# Patient Record
Sex: FEMALE | Race: WHITE | NOT HISPANIC OR LATINO | Employment: UNEMPLOYED | URBAN - METROPOLITAN AREA
[De-identification: names, ages, dates, MRNs, and addresses within clinical notes are randomized per-mention and may not be internally consistent; named-entity substitution may affect disease eponyms.]

---

## 2017-03-30 ENCOUNTER — TELEPHONE (OUTPATIENT)
Dept: FAMILY MEDICINE | Facility: CLINIC | Age: 57
End: 2017-03-30

## 2017-03-30 DIAGNOSIS — J45.50 UNCOMPLICATED SEVERE PERSISTENT ASTHMA (H): Primary | ICD-10-CM

## 2017-03-30 RX ORDER — ALBUTEROL SULFATE 90 UG/1
2 AEROSOL, METERED RESPIRATORY (INHALATION) EVERY 4 HOURS PRN
Qty: 1 INHALER | Refills: 0 | Status: SHIPPED | OUTPATIENT
Start: 2017-03-30 | End: 2017-06-20

## 2017-03-30 NOTE — TELEPHONE ENCOUNTER
PROAIR HFA      NOT ON MED LIST  Last Written Prescription Date: 2-14-17  Last Fill Quantity: 8.5, # refills: 0    [place] [facility] [place] [facility] or  Health prescribing provider:  10-14-16   Future Office Visit:       Date of Last Asthma Action Plan Letter:   Asthma Action Plan Q1 Year    Topic Date Due     Asthma Action Plan - yearly  07/30/2016      Asthma Control Test:   ACT Total Scores 10/14/2016   ACT TOTAL SCORE -   ASTHMA ER VISITS -   ASTHMA HOSPITALIZATIONS -   ACT TOTAL SCORE (Goal Greater than or Equal to 20) 18   In the past 12 months, how many times did you visit the emergency room for your asthma without being admitted to the hospital? 0   In the past 12 months, how many times were you hospitalized overnight because of your asthma? 0       Date of Last Spirometry Test:   No results found for this or any previous visit.    MONETLUKAST NOT ON MED LIST       Last Written Prescription Date: 1-20-16  Last Fill Quantity: 30, # refills: 0    [place] [facility] [place] [facility] or  Health prescribing provider:  10-14-16   Future Office Visit:       Date of Last Asthma Action Plan Letter:   Asthma Action Plan Q1 Year    Topic Date Due     Asthma Action Plan - yearly  07/30/2016      Asthma Control Test:   ACT Total Scores 10/14/2016   ACT TOTAL SCORE -   ASTHMA ER VISITS -   ASTHMA HOSPITALIZATIONS -   ACT TOTAL SCORE (Goal Greater than or Equal to 20) 18   In the past 12 months, how many times did you visit the emergency room for your asthma without being admitted to the hospital? 0   In the past 12 months, how many times were you hospitalized overnight because of your asthma? 0       Date of Last Spirometry Test:   No results found for this or any previous visit.

## 2017-04-10 DIAGNOSIS — J30.2 SEASONAL ALLERGIC RHINITIS, UNSPECIFIED ALLERGIC RHINITIS TRIGGER: Primary | ICD-10-CM

## 2017-04-10 NOTE — TELEPHONE ENCOUNTER
Montelukast      Last Written Prescription Date:  01/20/16  Last Fill Quantity: 30,   # refills: 0  Last Office Visit with [facility], [facility] or [facility] prescribing provider: 10/14/16  Future Office visit:       Routing refill request to provider for review/approval because:  Drug not active on patient's medication list  Medication is reported/historical

## 2017-04-11 RX ORDER — MONTELUKAST SODIUM 10 MG/1
10 TABLET ORAL AT BEDTIME
Qty: 30 TABLET | Refills: 0 | Status: SHIPPED | OUTPATIENT
Start: 2017-04-11 | End: 2017-06-20

## 2017-04-26 DIAGNOSIS — F41.9 ANXIETY: ICD-10-CM

## 2017-04-26 NOTE — TELEPHONE ENCOUNTER
RN unable to refill per protocol.  Patient has depression on problem list and PHQ-9 >4.  Will send to Haseeb PETERS to repeat PHQ-9

## 2017-04-26 NOTE — TELEPHONE ENCOUNTER
CITALOPRAM     Last Written Prescription Date: 1-21-17  Last Fill Quantity: 90, # refills: 0  [place] [facility] [place] provider:  10-26-16        Last PHQ-9 score on record=   PHQ-9 SCORE 10/14/2016   Total Score -   Total Score 5

## 2017-04-27 NOTE — TELEPHONE ENCOUNTER
Left message for patient to call back pod 2 line.([phone]). Aliyah declined refill as patient is due for an office visit, has been more then 1 year.

## 2017-05-01 NOTE — TELEPHONE ENCOUNTER
Patient returned call.  Appointment made for 5-15, wondering if she can get enough medication until then.

## 2017-05-02 RX ORDER — CITALOPRAM HYDROBROMIDE 40 MG/1
TABLET ORAL
Qty: 30 TABLET | Refills: 0 | Status: SHIPPED | OUTPATIENT
Start: 2017-05-02 | End: 2017-06-20

## 2017-06-20 ENCOUNTER — TELEPHONE (OUTPATIENT)
Dept: FAMILY MEDICINE | Facility: CLINIC | Age: 57
End: 2017-06-20

## 2017-06-20 DIAGNOSIS — F41.9 ANXIETY: ICD-10-CM

## 2017-06-20 DIAGNOSIS — J30.2 SEASONAL ALLERGIC RHINITIS, UNSPECIFIED ALLERGIC RHINITIS TRIGGER: ICD-10-CM

## 2017-06-20 DIAGNOSIS — J45.50 UNCOMPLICATED SEVERE PERSISTENT ASTHMA (H): ICD-10-CM

## 2017-06-20 RX ORDER — ALBUTEROL SULFATE 90 UG/1
AEROSOL, METERED RESPIRATORY (INHALATION)
Qty: 1 INHALER | Refills: 0 | Status: SHIPPED | OUTPATIENT
Start: 2017-06-20 | End: 2017-08-03

## 2017-06-20 RX ORDER — CITALOPRAM HYDROBROMIDE 40 MG/1
TABLET ORAL
Qty: 30 TABLET | Refills: 0 | Status: SHIPPED | OUTPATIENT
Start: 2017-06-20 | End: 2017-08-03

## 2017-06-20 RX ORDER — MONTELUKAST SODIUM 10 MG/1
TABLET ORAL
Qty: 30 TABLET | Refills: 0 | Status: SHIPPED | OUTPATIENT
Start: 2017-06-20 | End: 2017-08-03

## 2017-06-20 RX ORDER — FLUTICASONE PROPIONATE 50 MCG
SPRAY, SUSPENSION (ML) NASAL
Qty: 1 BOTTLE | Refills: 0 | Status: SHIPPED | OUTPATIENT
Start: 2017-06-20 | End: 2017-08-03

## 2017-06-20 NOTE — TELEPHONE ENCOUNTER
MITCHELL       Last Written Prescription Date: 3-30-17  Last Fill Quantity: 8.5, # refills: 0    Last Office Visit with [facility] or [facility] prescribing provider:  5-15-17   Future Office Visit:       Date of Last Asthma Action Plan Letter:   Asthma Action Plan Q1 Year    Topic Date Due     Asthma Action Plan - yearly  07/30/2016      Asthma Control Test:   ACT Total Scores 10/14/2016   ACT TOTAL SCORE -   ASTHMA ER VISITS -   ASTHMA HOSPITALIZATIONS -   ACT TOTAL SCORE (Goal Greater than or Equal to 20) 18   In the past 12 months, how many times did you visit the emergency room for your asthma without being admitted to the hospital? 0   In the past 12 months, how many times were you hospitalized overnight because of your asthma? 0       Date of Last Spirometry Test:   No results found for this or any previous visit.

## 2017-06-20 NOTE — TELEPHONE ENCOUNTER
Routing refill request to provider for review/approval because:  Mary Jo given x1 and patient did not follow up, please advise  Lexi Ortega RN

## 2017-06-20 NOTE — TELEPHONE ENCOUNTER
Panel Management Review      Patient has the following on her problem list:     Depression / Dysthymia review  PHQ-9 SCORE 7/30/2015 4/8/2016 10/14/2016   Total Score 2 - -   Total Score - 8 5      Patient is due for:  PHQ9 and DAP    Asthma review     ACT Total Scores 10/14/2016   ACT TOTAL SCORE -   ASTHMA ER VISITS -   ASTHMA HOSPITALIZATIONS -   ACT TOTAL SCORE (Goal Greater than or Equal to 20) 18   In the past 12 months, how many times did you visit the emergency room for your asthma without being admitted to the hospital? 0   In the past 12 months, how many times were you hospitalized overnight because of your asthma? 0      1. Is Asthma diagnosis on the Problem List? Yes    2. Is Asthma listed on Health Maintenance? Yes    3. Patient is due for:  ACT and AAP      Composite cancer screening  Chart review shows that this patient is due/due soon for the following Mammogram and Colonoscopy  Summary:    Patient is due/failing the following:   AAP, ACT, COLONOSCOPY, DAP, FOLLOW UP, MAMMOGRAM and PHQ9    Action needed:   Patient needs office visit for follow up asthma.  Then can address other things due for health maintenance.    Type of outreach:    Phone, spoke to patient.  Appotinment made for this Friday 6/23 with Kanu Williamson.    Questions for provider review:    None                                                                                                                                    Chey Rueda MA       Chart routed to Care Team .

## 2017-06-20 NOTE — TELEPHONE ENCOUNTER
CITALOPRAM     Last Written Prescription Date: 5-22-17  Last Fill Quantity: 30, # refills: 0  Last Office Visit with [facility] primary care provider:  5-15-17        Last PHQ-9 score on record=   PHQ-9 SCORE 10/14/2016   Total Score -   Total Score 5         MONTELUKAST       Last Written Prescription Date: 4-11-17  Last Fill Quantity: 30, # refills: 0    Last Office Visit with [facility], [facility] or [facility] prescribing provider:  5-15-17   Future Office Visit:       Date of Last Asthma Action Plan Letter:   Asthma Action Plan Q1 Year    Topic Date Due     Asthma Action Plan - yearly  07/30/2016      Asthma Control Test:   ACT Total Scores 10/14/2016   ACT TOTAL SCORE -   ASTHMA ER VISITS -   ASTHMA HOSPITALIZATIONS -   ACT TOTAL SCORE (Goal Greater than or Equal to 20) 18   In the past 12 months, how many times did you visit the emergency room for your asthma without being admitted to the hospital? 0   In the past 12 months, how many times were you hospitalized overnight because of your asthma? 0       Date of Last Spirometry Test:   No results found for this or any previous visit.    FLUTICASONE       Last Written Prescription Date: 12-19-16  Last Fill Quantity: 16, # refills: 0    Last Office Visit with [facility], [facility] or [facility] prescribing provider:  5-15-17   Future Office Visit:       Date of Last Asthma Action Plan Letter:   Asthma Action Plan Q1 Year    Topic Date Due     Asthma Action Plan - yearly  07/30/2016      Asthma Control Test:   ACT Total Scores 10/14/2016   ACT TOTAL SCORE -   ASTHMA ER VISITS -   ASTHMA HOSPITALIZATIONS -   ACT TOTAL SCORE (Goal Greater than or Equal to 20) 18   In the past 12 months, how many times did you visit the emergency room for your asthma without being admitted to the hospital? 0   In the past 12 months, how many times were you hospitalized overnight because of your asthma? 0       Date of Last Spirometry Test:   No results found for this or any previous visit.

## 2017-06-20 NOTE — TELEPHONE ENCOUNTER
Routing refill request to provider for review/approval because:  Mary Jo given x1 and patient did not follow up, please advise.  Lexi Ortega RN

## 2017-07-20 ENCOUNTER — TELEPHONE (OUTPATIENT)
Dept: FAMILY MEDICINE | Facility: CLINIC | Age: 57
End: 2017-07-20

## 2017-07-20 NOTE — LETTER
[facility]  [address]  [place] [id number] [phone]        July 31, 2017    Stefani Crowell  [address] [place] [address]              Dear Stefani Crowell    This is to remind you that your colonoscopy and mammogram are due.  You are also due for a follow up on your current medications in order to receive any more refills.    You may call our office at [phone] to schedule an appointment.    Please disregard this notice if you have already made an appointment.        Sincerely,      [facility] hospital

## 2017-07-20 NOTE — TELEPHONE ENCOUNTER
Panel Management Review      Patient has the following on her problem list:     Depression / Dysthymia review  PHQ-9 SCORE 7/30/2015 4/8/2016 10/14/2016   Total Score 2 - -   Total Score - 8 5      Patient is due for:  PHQ9 and DAP    Asthma review     ACT Total Scores 10/14/2016   ACT TOTAL SCORE -   ASTHMA ER VISITS -   ASTHMA HOSPITALIZATIONS -   ACT TOTAL SCORE (Goal Greater than or Equal to 20) 18   In the past 12 months, how many times did you visit the emergency room for your asthma without being admitted to the hospital? 0   In the past 12 months, how many times were you hospitalized overnight because of your asthma? 0      1. Is Asthma diagnosis on the Problem List? Yes    2. Is Asthma listed on Health Maintenance? Yes    3. Patient is due for:  ACT and AAP          Composite cancer screening  Chart review shows that this patient is due/due soon for the following Colonoscopy, mammogram  Summary:    Patient is due/failing the following:   AAP, ACT, COLONOSCOPY, DAP, MAMMOGRAM and PHQ9    Action needed:   Patient needs to do ACT., Patient needs to do PHQ9. and Patient needs referral/order: mammogram and colonoscopy    Type of outreach:    Phone, left message for patient to call back.     Questions for provider review:    None                                                                                                                                    Chey Rueda MA       Chart routed to Care Team .

## 2017-07-27 NOTE — TELEPHONE ENCOUNTER
Left message for patient to call back pod 2 line.([phone])      If no call back in x1 week, send letter.

## 2017-08-01 NOTE — TELEPHONE ENCOUNTER
Pt returned call- advised pt she cannot receive any refills until she is seen in clinic- appt was made for 8/3/17.

## 2017-08-03 ENCOUNTER — OFFICE VISIT (OUTPATIENT)
Dept: FAMILY MEDICINE | Facility: CLINIC | Age: 57
End: 2017-08-03
Payer: COMMERCIAL

## 2017-08-03 VITALS
OXYGEN SATURATION: 98 % | TEMPERATURE: 97.9 F | SYSTOLIC BLOOD PRESSURE: 135 MMHG | RESPIRATION RATE: 16 BRPM | HEART RATE: 72 BPM | DIASTOLIC BLOOD PRESSURE: 83 MMHG | BODY MASS INDEX: 30.79 KG/M2 | WEIGHT: 185 LBS

## 2017-08-03 DIAGNOSIS — J30.81 ALLERGIC RHINITIS DUE TO ANIMAL DANDER: ICD-10-CM

## 2017-08-03 DIAGNOSIS — M62.89 MUSCLE TIGHTNESS: ICD-10-CM

## 2017-08-03 DIAGNOSIS — Z11.59 ENCOUNTER FOR HEPATITIS C SCREENING TEST FOR LOW RISK PATIENT: ICD-10-CM

## 2017-08-03 DIAGNOSIS — F41.9 ANXIETY: ICD-10-CM

## 2017-08-03 DIAGNOSIS — F32.0 MILD MAJOR DEPRESSION (H): Primary | ICD-10-CM

## 2017-08-03 DIAGNOSIS — J45.50 UNCOMPLICATED SEVERE PERSISTENT ASTHMA (H): ICD-10-CM

## 2017-08-03 DIAGNOSIS — E03.9 HYPOTHYROIDISM, UNSPECIFIED TYPE: ICD-10-CM

## 2017-08-03 LAB — TSH SERPL DL<=0.005 MIU/L-ACNC: 1.94 MU/L (ref 0.4–4)

## 2017-08-03 PROCEDURE — 86803 HEPATITIS C AB TEST: CPT | Performed by: PHYSICIAN ASSISTANT

## 2017-08-03 PROCEDURE — 36415 COLL VENOUS BLD VENIPUNCTURE: CPT | Performed by: PHYSICIAN ASSISTANT

## 2017-08-03 PROCEDURE — 99214 OFFICE O/P EST MOD 30 MIN: CPT | Performed by: PHYSICIAN ASSISTANT

## 2017-08-03 PROCEDURE — 84443 ASSAY THYROID STIM HORMONE: CPT | Performed by: PHYSICIAN ASSISTANT

## 2017-08-03 RX ORDER — ALBUTEROL SULFATE 90 UG/1
1-2 AEROSOL, METERED RESPIRATORY (INHALATION) EVERY 6 HOURS PRN
Qty: 1 INHALER | Refills: 5 | Status: SHIPPED | OUTPATIENT
Start: 2017-08-03 | End: 2018-06-21

## 2017-08-03 RX ORDER — FLUTICASONE PROPIONATE 50 MCG
1-2 SPRAY, SUSPENSION (ML) NASAL DAILY
Qty: 1 BOTTLE | Refills: 11 | Status: SHIPPED | OUTPATIENT
Start: 2017-08-03 | End: 2019-05-10

## 2017-08-03 RX ORDER — CYCLOBENZAPRINE HCL 10 MG
5-10 TABLET ORAL 3 TIMES DAILY PRN
Qty: 30 TABLET | Refills: 5 | Status: SHIPPED | OUTPATIENT
Start: 2017-08-03 | End: 2020-09-16

## 2017-08-03 RX ORDER — LORAZEPAM 0.5 MG/1
TABLET ORAL
Qty: 15 TABLET | Refills: 1 | Status: SHIPPED | OUTPATIENT
Start: 2017-08-03 | End: 2019-08-19

## 2017-08-03 RX ORDER — MONTELUKAST SODIUM 10 MG/1
10 TABLET ORAL AT BEDTIME
Qty: 90 TABLET | Refills: 3 | Status: SHIPPED | OUTPATIENT
Start: 2017-08-03 | End: 2018-06-29

## 2017-08-03 RX ORDER — ALBUTEROL SULFATE 0.83 MG/ML
1 SOLUTION RESPIRATORY (INHALATION) EVERY 4 HOURS PRN
Qty: 1 BOX | Refills: 11 | Status: SHIPPED | OUTPATIENT
Start: 2017-08-03 | End: 2020-09-16

## 2017-08-03 RX ORDER — CITALOPRAM HYDROBROMIDE 40 MG/1
40 TABLET ORAL DAILY
Qty: 90 TABLET | Refills: 3 | Status: SHIPPED | OUTPATIENT
Start: 2017-08-03 | End: 2018-12-31

## 2017-08-03 ASSESSMENT — PATIENT HEALTH QUESTIONNAIRE - PHQ9: 5. POOR APPETITE OR OVEREATING: NOT AT ALL

## 2017-08-03 ASSESSMENT — ANXIETY QUESTIONNAIRES
GAD7 TOTAL SCORE: 2
1. FEELING NERVOUS, ANXIOUS, OR ON EDGE: SEVERAL DAYS
6. BECOMING EASILY ANNOYED OR IRRITABLE: NOT AT ALL
2. NOT BEING ABLE TO STOP OR CONTROL WORRYING: SEVERAL DAYS
IF YOU CHECKED OFF ANY PROBLEMS ON THIS QUESTIONNAIRE, HOW DIFFICULT HAVE THESE PROBLEMS MADE IT FOR YOU TO DO YOUR WORK, TAKE CARE OF THINGS AT HOME, OR GET ALONG WITH OTHER PEOPLE: NOT DIFFICULT AT ALL
3. WORRYING TOO MUCH ABOUT DIFFERENT THINGS: NOT AT ALL
7. FEELING AFRAID AS IF SOMETHING AWFUL MIGHT HAPPEN: NOT AT ALL
5. BEING SO RESTLESS THAT IT IS HARD TO SIT STILL: NOT AT ALL

## 2017-08-03 NOTE — NURSING NOTE
"Chief Complaint   Patient presents with     Recheck Medication       Initial /83  Pulse 72  Temp 97.9  F (36.6  C) (Tympanic)  Resp 16  Wt 185 lb (83.9 kg)  LMP 10/03/2011  SpO2 98%  BMI 30.79 kg/m2 Estimated body mass index is 30.79 kg/(m^2) as calculated from the following:    Height as of 10/14/16: 5' 5\" (1.651 m).    Weight as of this encounter: 185 lb (83.9 kg).  Medication Reconciliation: complete       Discussed HM  Extended mammo and colon referrals    "

## 2017-08-03 NOTE — PROGRESS NOTES
SUBJECTIVE:                                                    Stefani Crowell is a 57 year old female who presents to clinic today for the following health issues:    Anxiety Follow-Up    Status since last visit: No change    Other associated symptoms:None    Complicating factors:   Significant life event: No   Current substance abuse: None  Depression symptoms: No    STEPHANIE-7 SCORE 7/30/2015 4/8/2016 10/14/2016   Total Score 6 - -   Total Score - 12 5       GAD7      Asthma Follow-Up    Was ACT completed today?    Yes    ACT Total Scores 10/14/2016   ACT TOTAL SCORE -   ASTHMA ER VISITS -   ASTHMA HOSPITALIZATIONS -   ACT TOTAL SCORE (Goal Greater than or Equal to 20) 18   In the past 12 months, how many times did you visit the emergency room for your asthma without being admitted to the hospital? 0   In the past 12 months, how many times were you hospitalized overnight because of your asthma? 0       Recent asthma triggers that patient is dealing with: humidity and ragweed -this fall            Amount of exercise or physical activity: 6-7 days/week for an average of 30-45 minutes    Problems taking medications regularly: No    Medication side effects: none  Diet: regular (no restrictions)        Problem list and histories reviewed & adjusted, as indicated.  Additional history: as documented    Patient Active Problem List   Diagnosis     Hypothyroid     Mild major depression (H)     Anxiety     Asthma, severe persistent     Seasonal allergic rhinitis     Allergic rhinitis due to animal dander     Diagnostic skin and sensitization tests     Noncompliance with medication regimen     Dry eye syndrome     Cigarette smoker     Panic attacks     Low back pain     Past Surgical History:   Procedure Laterality Date     GENITOURINARY SURGERY      bladder repair     SURGICAL HISTORY OF -   3/10    transvaginal tape placement with cystoscopy       Social History   Substance Use Topics     Smoking status: Current Every Day  Smoker     Packs/day: 0.50     Years: 20.00     Types: Cigarettes     Smokeless tobacco: Never Used      Comment: 5 cigs     Alcohol use Yes      Comment: occasionally     Family History   Problem Relation Age of Onset     Thyroid Disease Mother      Eye Disorder Mother      cornia transplants     Depression Mother      Arthritis Mother      Cervical Cancer Mother      DIABETES Father      Hypertension Father      Asthma Father      Aneurysm Father      Aortic      Eye Disorder Maternal Grandmother      Glaucoma Maternal Grandmother      Macular Degeneration Maternal Grandmother      HEART DISEASE Maternal Grandfather 60     MI     Asthma Paternal Grandmother      Alcohol/Drug Paternal Grandfather      alcohol     Asthma Sister      Depression Sister      Thyroid Disease Sister      Musculoskeletal Disorder Sister      fibromyalgia     Thyroid Disease Sister      Thyroid Disease Sister      Depression Sister      Macular Degeneration Maternal Aunt      CEREBROVASCULAR DISEASE No family hx of      CANCER No family hx of              Reviewed and updated as needed this visit by clinical staff  Tobacco  Allergies  Meds  Med Hx  Surg Hx  Fam Hx  Soc Hx      Reviewed and updated as needed this visit by Provider         ROS:  Constitutional, endocrine, pulmonary, and psychiatric systems are negative, except as otherwise noted.      OBJECTIVE:                                                    /83  Pulse 72  Temp 97.9  F (36.6  C) (Tympanic)  Resp 16  Wt 185 lb (83.9 kg)  LMP 10/03/2011  SpO2 98%  BMI 30.79 kg/m2  Body mass index is 30.79 kg/(m^2).  Constitutional: healthy, alert, active, no distress.    Head: Normocephalic  Musculoskeletal: extremities normal- no gross deformities noted, gait normal, normal muscle tone and able to move about the exam room without difficulty.    Skin: no suspicious lesions or rashes appreciated on exposed areas  Neurologic: Gait normal. Moving all extremities  spontaneously, no apparent weakness.    Psychiatric: mentation appears normal, thoughts are clear and concise. Converses appropriately. Affect is Appropriate/mood-congruent       ASSESSMENT:                                                      1. Mild major depression (H)    2. Anxiety    3. Uncomplicated severe persistent asthma    4. Hypothyroidism, unspecified type    5. Allergic rhinitis due to animal dander    6. Encounter for hepatitis C screening test for low risk patient    7. Muscle tightness         PLAN:                                                    Meds renewed today. Only change was switching Dulera to Breo for better asthma control. Will phone her in 1 month to repeat her ACT.     Labs today. Otherwise, follow up annually.     The patient was in agreement with the plan today and had no questions or concerns prior to leaving the clinic.     Aliyah Marcus PA-C  [facility]

## 2017-08-03 NOTE — PATIENT INSTRUCTIONS
How to Quit Smoking  Smoking is one of the hardest habits to break. About half of all people who have ever smoked have been able to quit. Most people who still smoke want to quit. Here are some of the best ways to stop smoking.    Keep trying  It takes most smokers about eight tries before they can quit entirely. It s important not to give up.  Go cold turkey  Most former smokers quit cold turkey (all at once). Trying to cut back gradually doesn't seem to work as well, perhaps because it continues the smoking habit. Also, it is possible to inhale more while smoking fewer cigarettes. This results in the same amount of nicotine in your body!  Get support  Support programs can be a big help, especially for heavy smokers. These groups offer lectures, ways to change behavior, and peer support. Here are some ways to find a support program:    Free national quitline: 800-QUIT-NOW (846-383-0587).    [facility] quit-smoking programs.    American Lung Association: (947.471.8150).    American Cancer Society (742-760-3192).  Support at home is important too. Nonsmokers can offer praise and encouragement. If the smoker in your life finds it hard to quit, encourage them to keep trying!  Over-the-counter medicines  Nicotine replacement therapy may make quitting easier. Certain aids, such as the nicotine patch, gum, and lozenges, are available without a prescription. It is best to use these under a doctor s care, though. The skin patch provides a steady supply of nicotine. Nicotine gum and lozenges give temporary bursts of low levels of nicotine. Both methods reduce the craving for cigarettes. Warning: If you have nausea, vomiting, dizziness, weakness, or a fast heartbeat, stop using these products and see your doctor.  Prescription medicines  After reviewing your smoking patterns and prior attempts to quit, your doctor may offer a prescription medicine such as bupropion, varenicline, a nicotine inhaler, or nasal spray. Each has  "advantages and side effects. Your doctor can review these with you.  Health benefits of quitting  The benefits of quitting start right away and keep improving the longer you go without smoking. These benefits occur at any age.  So whether you are 17 or 70, quitting is a good decision. Some of the benefits include:    20 minutes: Blood pressure and pulse return to normal.    8 hours: Oxygen levels return to normal.    2 days: Ability to smell and taste begin to improve as damaged nerves regrow.    2 to 3 weeks: Circulation and lung function improve.    1 to 9 months: Coughing, congestion, and shortness of breath decrease; tiredness decreases.    1 year: Risk of heart attack decreases by half.    5 years: Risk of lung cancer decreases by half; risk of stroke becomes the same as a nonsmoker s.  For more on how to quit smoking, try these online resources:     Smokefree.gov    \"Clearing the Air\" booklet from the [employer]: smokefree.gov/sites/default/files/pdf/clearing-the-air-accessible.pdf  Date Last Reviewed: 4/28/2015 2000-2017 [employer]. [address] [place] [id number]. All rights reserved. This information is not intended as a substitute for professional medical care. Always follow your healthcare professional's instructions.        "

## 2017-08-03 NOTE — LETTER
My Depression Action Plan  Name: Stefani Crowell   Date of Birth 1960  Date: 8/3/2017    My doctor: Aliyah Marcus   My clinic: [facility]  [address] [facility]  [place] [id number] [phone]          GREEN    ZONE   Good Control    What it looks like:     Things are going generally well. You have normal up s and down s. You may even feel depressed from time to time, but bad moods usually last less than a day.   What you need to do:  1. Continue to care for yourself (see self care plan)  2. Check your depression survival kit and update it as needed  3. Follow your physician s recommendations including any medication.  4. Do not stop taking medication unless you consult with your physician first.           YELLOW         ZONE Getting Worse    What it looks like:     Depression is starting to interfere with your life.     It may be hard to get out of bed; you may be starting to isolate yourself from others.    Symptoms of depression are starting to last most all day and this has happened for several days.     You may have suicidal thoughts but they are not constant.   What you need to do:     1. Call your care team, your response to treatment will improve if you keep your care team informed of your progress. Yellow periods are signs an adjustment may need to be made.     2. Continue your self-care, even if you have to fake it!    3. Talk to someone in your support network    4. Open up your depression survival kit           RED    ZONE Medical Alert - Get Help    What it looks like:     Depression is seriously interfering with your life.     You may experience these or other symptoms: You can t get out of bed most days, can t work or engage in other necessary activities, you have trouble taking care of basic hygiene, or basic responsibilities, thoughts of suicide or death that will not go away, self-injurious behavior.     What you need to do:  1. Call your care team  and request a same-day appointment. If they are not available (weekends or after hours) call your local crisis line, emergency room or 911.      Electronically signed by: Cesilia Jordan, August 3, 2017    Depression Self Care Plan / Survival Kit    Self-Care for Depression  Here s the deal. Your body and mind are really not as separate as most people think.  What you do and think affects how you feel and how you feel influences what you do and think. This means if you do things that people who feel good do, it will help you feel better.  Sometimes this is all it takes.  There is also a place for medication and therapy depending on how severe your depression is, so be sure to consult with your medical provider and/ or Behavioral Health Consultant if your symptoms are worsening or not improving.     In order to better manage my stress, I will:    Exercise  Get some form of exercise, every day. This will help reduce pain and release endorphins, the  feel good  chemicals in your brain. This is almost as good as taking antidepressants!  This is not the same as joining a gym and then never going! (they count on that by the way ) It can be as simple as just going for a walk or doing some gardening, anything that will get you moving.      Hygiene   Maintain good hygiene (Get out of bed in the morning, Make your bed, Brush your teeth, Take a shower, and Get dressed like you were going to work, even if you are unemployed).  If your clothes don't fit try to get ones that do.    Diet  I will strive to eat foods that are good for me, drink plenty of water, and avoid excessive sugar, caffeine, alcohol, and other mood-altering substances.  Some foods that are helpful in depression are: complex carbohydrates, B vitamins, flaxseed, fish or fish oil, fresh fruits and vegetables.    Psychotherapy  I agree to participate in Individual Therapy (if recommended).    Medication  If prescribed medications, I agree to take them.  Missing  doses can result in serious side effects.  I understand that drinking alcohol, or other illicit drug use, may cause potential side effects.  I will not stop my medication abruptly without first discussing it with my provider.    Staying Connected With Others  I will stay in touch with my friends, family members, and my primary care provider/team.    Use your imagination  Be creative.  We all have a creative side; it doesn t matter if it s oil painting, sand castles, or mud pies! This will also kick up the endorphins.    Witness Beauty  (AKA stop and smell the roses) Take a look outside, even in mid-winter. Notice colors, textures. Watch the squirrels and birds.     Service to others  Be of service to others.  There is always someone else in need.  By helping others we can  get out of ourselves  and remember the really important things.  This also provides opportunities for practicing all the other parts of the program.    Humor  Laugh and be silly!  Adjust your TV habits for less news and crime-drama and more comedy.    Control your stress  Try breathing deep, massage therapy, biofeedback, and meditation. Find time to relax each day.     My support system    Clinic Contact:  Phone number:    Contact 1:  Phone number:    Contact 2:  Phone number:    Jain/:  Phone number:    Therapist:  Phone number:    Local crisis center:    Phone number:    Other community support:  Phone number:

## 2017-08-03 NOTE — LETTER
My Asthma Action Plan  Name: Stefani Crowell   YOB: 1960  Date: 8/3/2017   My doctor: Aliyah Marcus PA-C   [facility]        My Control Medicine: Dulera 200/5  My Rescue Medicine: Albuterol nebulizer solution 108  Albuterol (Proair/Ventolin/Proventil) inhaler 108   My Asthma Severity: severe persistent  Avoid your asthma triggers: pollens and humidity  humidity  ragweed -this fall            GREEN ZONE   Good Control    I feel good    No cough or wheeze    Can work, sleep and play without asthma symptoms       Take your asthma control medicine every day.     1. If exercise triggers your asthma, take your rescue medication    15 minutes before exercise or sports, and    During exercise if you have asthma symptoms  2. Spacer to use with inhaler: If you have a spacer, make sure to use it with your inhaler             YELLOW ZONE Getting Worse  I have ANY of these:    I do not feel good    Cough or wheeze    Chest feels tight    Wake up at night   1. Keep taking your Green Zone medications  2. Start taking your rescue medicine:    every 20 minutes for up to 1 hour. Then every 4 hours for 24-48 hours.  3. If you stay in the Yellow Zone for more than 12-24 hours, contact your doctor.  4. If you do not return to the Green Zone in 12-24 hours or you get worse, start taking your oral steroid medicine if prescribed by your provider.           RED ZONE Medical Alert - Get Help  I have ANY of these:    I feel awful    Medicine is not helping    Breathing getting harder    Trouble walking or talking    Nose opens wide to breathe       1. Take your rescue medicine NOW  2. If your provider has prescribed an oral steroid medicine, start taking it NOW  3. Call your doctor NOW  4. If you are still in the Red Zone after 20 minutes and you have not reached your doctor:    Take your rescue medicine again and    Call 911 or go to the emergency room right away    See your regular doctor  within 2 weeks of an Emergency Room or Urgent Care visit for follow-up treatment.        Electronically signed by: Cesilia Jordan, August 3, 2017    Annual Reminders:  Meet with Asthma Educator,  Flu Shot in the Fall, consider Pneumonia Vaccination for patients with asthma (aged 19 and older).    Pharmacy: [facility] [address] [place] [address] [place] - [address] [facility] AT [facility]                    Asthma Triggers  How To Control Things That Make Your Asthma Worse    Triggers are things that make your asthma worse.  Look at the list below to help you find your triggers and what you can do about them.  You can help prevent asthma flare-ups by staying away from your triggers.      Trigger                                                          What you can do   Cigarette Smoke  Tobacco smoke can make asthma worse. Do not allow smoking in your home, car or around you.  Be sure no one smokes at a child s day care or school.  If you smoke, ask your health care provider for ways to help you quit.  Ask family members to quit too.  Ask your health care provider for a referral to Quit Plan to help you quit smoking, or call 1-163-656-PLAN.     Colds, Flu, Bronchitis  These are common triggers of asthma. Wash your hands often.  Don t touch your eyes, nose or mouth.  Get a flu shot every year.     Dust Mites  These are tiny bugs that live in cloth or carpet. They are too small to see. Wash sheets and blankets in hot water every week.   Encase pillows and mattress in dust mite proof covers.  Avoid having carpet if you can. If you have carpet, vacuum weekly.   Use a dust mask and HEPA vacuum.   Pollen and Outdoor Mold  Some people are allergic to trees, grass, or weed pollen, or molds. Try to keep your windows closed.  Limit time out doors when pollen count is high.   Ask you health care provider about taking medicine during allergy season.     Animal Dander  Some people are allergic to skin flakes,  urine or saliva from pets with fur or feathers. Keep pets with fur or feathers out of your home.    If you can t keep the pet outdoors, then keep the pet out of your bedroom.  Keep the bedroom door closed.  Keep pets off cloth furniture and away from stuffed toys.     Mice, Rats, and Cockroaches  Some people are allergic to the waste from these pests.   Cover food and garbage.  Clean up spills and food crumbs.  Store grease in the refrigerator.   Keep food out of the bedroom.   Indoor Mold  This can be a trigger if your home has high moisture. Fix leaking faucets, pipes, or other sources of water.   Clean moldy surfaces.  Dehumidify basement if it is damp and smelly.   Smoke, Strong Odors, and Sprays  These can reduce air quality. Stay away from strong odors and sprays, such as perfume, powder, hair spray, paints, smoke incense, paint, cleaning products, candles and new carpet.   Exercise or Sports  Some people with asthma have this trigger. Be active!  Ask your doctor about taking medicine before sports or exercise to prevent symptoms.    Warm up for 5-10 minutes before and after sports or exercise.     Other Triggers of Asthma  Cold air:  Cover your nose and mouth with a scarf.  Sometimes laughing or crying can be a trigger.  Some medicines and food can trigger asthma.

## 2017-08-03 NOTE — MR AVS SNAPSHOT
After Visit Summary   8/3/2017    Stefani Crowell    MRN: [id number]           Patient Information     Date Of Birth          1960        Visit Information        Provider Department      8/3/2017 11:40 AM Aliyah aMrcus PA-C [facility]        Today's Diagnoses     Mild major depression (H)    -  1    Anxiety        Uncomplicated severe persistent asthma        Hypothyroidism, unspecified type        Allergic rhinitis due to animal dander        Encounter for hepatitis C screening test for low risk patient        Muscle tightness          Care Instructions      How to Quit Smoking  Smoking is one of the hardest habits to break. About half of all people who have ever smoked have been able to quit. Most people who still smoke want to quit. Here are some of the best ways to stop smoking.    Keep trying  It takes most smokers about eight tries before they can quit entirely. It s important not to give up.  Go cold turkey  Most former smokers quit cold turkey (all at once). Trying to cut back gradually doesn't seem to work as well, perhaps because it continues the smoking habit. Also, it is possible to inhale more while smoking fewer cigarettes. This results in the same amount of nicotine in your body!  Get support  Support programs can be a big help, especially for heavy smokers. These groups offer lectures, ways to change behavior, and peer support. Here are some ways to find a support program:    Free national quitline: 800-QUIT-NOW (243-641-3773).    [facility] quit-smoking programs.    American Lung Association: (426.444.3867).    American Cancer Society (377-861-0239).  Support at home is important too. Nonsmokers can offer praise and encouragement. If the smoker in your life finds it hard to quit, encourage them to keep trying!  Over-the-counter medicines  Nicotine replacement therapy may make quitting easier. Certain aids, such as the nicotine patch, gum, and lozenges, are  "available without a prescription. It is best to use these under a doctor s care, though. The skin patch provides a steady supply of nicotine. Nicotine gum and lozenges give temporary bursts of low levels of nicotine. Both methods reduce the craving for cigarettes. Warning: If you have nausea, vomiting, dizziness, weakness, or a fast heartbeat, stop using these products and see your doctor.  Prescription medicines  After reviewing your smoking patterns and prior attempts to quit, your doctor may offer a prescription medicine such as bupropion, varenicline, a nicotine inhaler, or nasal spray. Each has advantages and side effects. Your doctor can review these with you.  Health benefits of quitting  The benefits of quitting start right away and keep improving the longer you go without smoking. These benefits occur at any age.  So whether you are 17 or 70, quitting is a good decision. Some of the benefits include:    20 minutes: Blood pressure and pulse return to normal.    8 hours: Oxygen levels return to normal.    2 days: Ability to smell and taste begin to improve as damaged nerves regrow.    2 to 3 weeks: Circulation and lung function improve.    1 to 9 months: Coughing, congestion, and shortness of breath decrease; tiredness decreases.    1 year: Risk of heart attack decreases by half.    5 years: Risk of lung cancer decreases by half; risk of stroke becomes the same as a nonsmoker s.  For more on how to quit smoking, try these online resources:     Smokefree.gov    \"Clearing the Air\" booklet from the National [employer] [place]: smokefree.gov/sites/default/files/pdf/clearing-the-air-accessible.pdf  Date Last Reviewed: 4/28/2015 2000-2017 [employer]. [address] [place] [address], [place] [address]. All rights reserved. This information is not intended as a substitute for professional medical care. Always follow your healthcare professional's instructions.                Follow-ups after your visit      "   Who to contact     Normal or non-critical lab and imaging results will be communicated to you by [employer], letter or phone within 4 business days after the clinic has received the results. If you do not hear from us within 7 days, please contact the clinic through [facility] or phone. If you have a critical or abnormal lab result, we will notify you by phone as soon as possible.  Submit refill requests through [employer] or call your pharmacy and they will forward the refill request to us. Please allow 3 business days for your refill to be completed.          If you need to speak with a  for additional information , please call: [phone]             Additional Information About Your Visit        [employer] Information     [employer] gives you secure access to your electronic health record. If you see a primary care provider, you can also send messages to your care team and make appointments. If you have questions, please call your primary care clinic.  If you do not have a primary care provider, please call [phone] and they will assist you.        Care EveryWhere ID     This is your Care EveryWhere ID. This could be used by other organizations to access your [facility] medical records  [id number]        Your Vitals Were     Pulse Temperature Respirations Last Period Pulse Oximetry BMI (Body Mass Index)    72 97.9  F (36.6  C) (Tympanic) 16 10/03/2011 98% 30.79 kg/m2       Blood Pressure from Last 3 Encounters:   08/03/17 135/83   10/26/16 136/86   10/14/16 144/87    Weight from Last 3 Encounters:   08/03/17 185 lb (83.9 kg)   10/26/16 183 lb (83 kg)   10/14/16 183 lb 9.6 oz (83.3 kg)              We Performed the Following     Asthma Action Plan (AAP)     DEPRESSION ACTION PLAN (DAP)     Hepatitis C Screen Reflex to HCV RNA Quant and Genotype     TSH with free T4 reflex          Today's Medication Changes          These changes are accurate as of: 8/3/17 12:30 PM.  If you have any questions, ask your  nurse or doctor.               Start taking these medicines.        Dose/Directions    fluticasone-vilanterol 200-25 MCG/INH oral inhaler   Commonly known as:  BREO ELLIPTA   Used for:  Uncomplicated severe persistent asthma   Started by:  Aliyah Marcus PA-C        Dose:  1 puff   Inhale 1 puff into the lungs daily   Quantity:  3 Inhaler   Refills:  11       montelukast 10 MG tablet   Commonly known as:  SINGULAIR   Used for:  Allergic rhinitis due to animal dander   Started by:  Aliyah Marcus PA-C        Dose:  10 mg   Take 1 tablet (10 mg) by mouth At Bedtime   Quantity:  90 tablet   Refills:  3         These medicines have changed or have updated prescriptions.        Dose/Directions    * albuterol 108 (90 BASE) MCG/ACT Inhaler   Commonly known as:  albuterol   This may have changed:  See the new instructions.   Used for:  Uncomplicated severe persistent asthma   Changed by:  Aliyah Marcus PA-C        Dose:  1-2 puff   Inhale 1-2 puffs into the lungs every 6 hours as needed for shortness of breath / dyspnea or wheezing   Quantity:  1 Inhaler   Refills:  5       * albuterol (2.5 MG/3ML) 0.083% neb solution   This may have changed:  Another medication with the same name was changed. Make sure you understand how and when to take each.   Used for:  Uncomplicated severe persistent asthma   Changed by:  Aliyah Marcus PA-C        Dose:  1 vial   Take 1 vial (2.5 mg) by nebulization every 4 hours as needed Use in neb machine   Quantity:  1 Box   Refills:  11       citalopram 40 MG tablet   Commonly known as:  celeXA   This may have changed:  See the new instructions.   Used for:  Anxiety   Changed by:  Aliyah Marcus PA-C        Dose:  40 mg   Take 1 tablet (40 mg) by mouth daily   Quantity:  90 tablet   Refills:  3       fluticasone 50 MCG/ACT spray   Commonly known as:  FLONASE   This may have changed:  See the new instructions.   Used for:  Allergic rhinitis due to animal dander    Changed by:  Aliyah Marcus PA-C        Dose:  1-2 spray   Spray 1-2 sprays into both nostrils daily   Quantity:  1 Bottle   Refills:  11       * Notice:  This list has 2 medication(s) that are the same as other medications prescribed for you. Read the directions carefully, and ask your doctor or other care provider to review them with you.         Where to get your medicines      These medications were sent to [facility] [id number] [place] [id number] [facility]  [address] [facility] [place] [facility] [address]    Hours:  24-hours Phone:  [phone]     albuterol (2.5 MG/3ML) 0.083% neb solution    albuterol 108 (90 BASE) MCG/ACT Inhaler    citalopram 40 MG tablet    cyclobenzaprine 10 MG tablet    fluticasone 50 MCG/ACT spray    fluticasone-vilanterol 200-25 MCG/INH oral inhaler    montelukast 10 MG tablet         Some of these will need a paper prescription and others can be bought over the counter.  Ask your nurse if you have questions.     Bring a paper prescription for each of these medications     LORazepam 0.5 MG tablet                Primary Care Provider Office Phone # Fax #    Aliyah Marcus PA-C [phone]       [facility] [address] [facility] [address]        Equal Access to Services     [facility] AH: Hadii aad ku hadasho Soomaali, waaxda luqadaha, qaybta kaalmada adeegyada, abelardo hook hayrodney colin . So [facility] [phone].    ATENCIÓN: Si habla español, tiene a purdy disposición servicios gratuitos de asistencia lingüística. Llame al [phone].    We comply with applicable federal civil rights laws and [place] laws. We do not discriminate on the basis of race, color, national origin, age, disability sex, sexual orientation or gender identity.            Thank you!     Thank you for choosing [facility]  for your care. Our goal is always to provide you with excellent care. Hearing  back from our patients is one way we can continue to improve our services. Please take a few minutes to complete the written survey that you may receive in the mail after your visit with us. Thank you!             Your Updated Medication List - Protect others around you: Learn how to safely use, store and throw away your medicines at www.disposemymeds.org.          This list is accurate as of: 8/3/17 12:30 PM.  Always use your most recent med list.                   Brand Name Dispense Instructions for use Diagnosis    * albuterol 108 (90 BASE) MCG/ACT Inhaler    albuterol    1 Inhaler    Inhale 1-2 puffs into the lungs every 6 hours as needed for shortness of breath / dyspnea or wheezing    Uncomplicated severe persistent asthma       * albuterol (2.5 MG/3ML) 0.083% neb solution     1 Box    Take 1 vial (2.5 mg) by nebulization every 4 hours as needed Use in neb machine    Uncomplicated severe persistent asthma       citalopram 40 MG tablet    celeXA    90 tablet    Take 1 tablet (40 mg) by mouth daily    Anxiety       cyclobenzaprine 10 MG tablet    FLEXERIL    30 tablet    Take 0.5-1 tablets (5-10 mg) by mouth 3 times daily as needed for muscle spasms    Muscle tightness       diclofenac 75 MG EC tablet    VOLTAREN    30 tablet    Take 1 tablet (75 mg) by mouth 2 times daily as needed for moderate pain Take with meals    Bilateral lower abdominal pain, Pelvic pain in female       fluticasone 50 MCG/ACT spray    FLONASE    1 Bottle    Spray 1-2 sprays into both nostrils daily    Allergic rhinitis due to animal dander       fluticasone-vilanterol 200-25 MCG/INH oral inhaler    BREO ELLIPTA    3 Inhaler    Inhale 1 puff into the lungs daily    Uncomplicated severe persistent asthma       levothyroxine 112 MCG tablet    SYNTHROID/LEVOTHROID    90 tablet    Take 1 tablet (112 mcg) by mouth daily    Hypothyroidism, unspecified hypothyroidism type       LORazepam 0.5 MG tablet    ATIVAN    15 tablet    Take 1 tablet by  mouth up to 2 times daily as needed for panic attacks    Anxiety       mometasone-formoterol 200-5 MCG/ACT oral inhaler    DULERA    3 Inhaler    Inhale 2 puffs into the lungs 2 times daily    Uncomplicated severe persistent asthma       montelukast 10 MG tablet    SINGULAIR    90 tablet    Take 1 tablet (10 mg) by mouth At Bedtime    Allergic rhinitis due to animal dander       OVER-THE-COUNTER     1 Bottle    Place 1 drop into both eyes 3 times daily. Blink Tears, Systane Ultra, Systane Balance or Refresh Optive Artificial Tear    Dry eye syndrome       SINUS RINSE BOTTLE KIT NA      Spray 1 Bottle in nostril daily as needed.    Seasonal allergic rhinitis       * Notice:  This list has 2 medication(s) that are the same as other medications prescribed for you. Read the directions carefully, and ask your doctor or other care provider to review them with you.

## 2017-08-04 LAB — HCV AB SERPL QL IA: NORMAL

## 2017-08-04 ASSESSMENT — ASTHMA QUESTIONNAIRES: ACT_TOTALSCORE: 18

## 2017-08-04 ASSESSMENT — ANXIETY QUESTIONNAIRES: GAD7 TOTAL SCORE: 2

## 2017-08-04 ASSESSMENT — PATIENT HEALTH QUESTIONNAIRE - PHQ9: SUM OF ALL RESPONSES TO PHQ QUESTIONS 1-9: 0

## 2017-08-23 DIAGNOSIS — E03.9 HYPOTHYROIDISM: ICD-10-CM

## 2017-08-23 NOTE — TELEPHONE ENCOUNTER
LEVOTHYROXINE     Last Written Prescription Date: 3-30-17  Last Quantity: 90, # refills: 0  [place] [email or web address] or [facility] prescribing provider: 8-3-17        TSH   Date Value Ref Range Status   08/03/2017 1.94 0.40 - 4.00 mU/L Final

## 2017-08-25 RX ORDER — LEVOTHYROXINE SODIUM 112 UG/1
TABLET ORAL
Qty: 90 TABLET | Refills: 3 | Status: SHIPPED | OUTPATIENT
Start: 2017-08-25 | End: 2019-08-19

## 2017-09-05 ENCOUNTER — TELEPHONE (OUTPATIENT)
Dept: FAMILY MEDICINE | Facility: CLINIC | Age: 57
End: 2017-09-05

## 2017-09-05 NOTE — TELEPHONE ENCOUNTER
LVM for patient to return call to pod 2. Patient was sent home with a blank ACT. Please review and update ACT form in patients chart.      Catherine Craft CMA

## 2017-09-06 NOTE — TELEPHONE ENCOUNTER
ACT completed    ACT Total Scores 10/14/2016 8/3/2017 9/6/2017   ACT TOTAL SCORE - - -   ASTHMA ER VISITS - - -   ASTHMA HOSPITALIZATIONS - - -   ACT TOTAL SCORE (Goal Greater than or Equal to 20) 18 18 20   In the past 12 months, how many times did you visit the emergency room for your asthma without being admitted to the hospital? 0 0 0   In the past 12 months, how many times were you hospitalized overnight because of your asthma? 0 0 0

## 2017-09-06 NOTE — TELEPHONE ENCOUNTER
LVM for patient to return call to pod 2. Will postpone until 9/8/17 and if no response from patient encounter will be closed.      Catherine Craft,CMA

## 2017-09-07 ASSESSMENT — ASTHMA QUESTIONNAIRES: ACT_TOTALSCORE: 20

## 2017-10-01 ENCOUNTER — HEALTH MAINTENANCE LETTER (OUTPATIENT)
Age: 57
End: 2017-10-01

## 2018-01-07 ENCOUNTER — HEALTH MAINTENANCE LETTER (OUTPATIENT)
Age: 58
End: 2018-01-07

## 2018-06-11 DIAGNOSIS — J45.909 ASTHMA: ICD-10-CM

## 2018-06-11 NOTE — TELEPHONE ENCOUNTER
"Requested Prescriptions   Pending Prescriptions Disp Refills     PROAIR  (90 Base) MCG/ACT inhaler [Pharmacy Med Name: PROAIR HFA ORAL INH (200  PFS) 8.5G] 8.5 g 0    Last Written Prescription Date:  6-20-18  Last Fill Quantity: 8.5,  # refills: 0   Last office visit: 8/3/2017 with prescribing provider:  8-3-17   Future Office Visit:   Sig: INHALE 2 PUFFS BY MOUTH EVERY 4 HOURS AS NEEDED FOR SHORTNESS OF BREATH    Asthma Maintenance Inhalers - Anticholinergics Failed    6/11/2018  2:54 PM       Failed - Asthma control assessment score within normal limits in last 6 months    Please review ACT score.          Failed - Recent (6 mo) or future (30 days) visit within the authorizing provider's specialty    Patient had office visit in the last 6 months or has a visit in the next 30 days with authorizing provider or within the authorizing provider's specialty.  See \"Patient Info\" tab in inbasket, or \"Choose Columns\" in Meds & Orders section of the refill encounter.           Passed - Patient is age 12 years or older        "

## 2018-06-11 NOTE — LETTER
June 12, 2018      Stefani Crowell  [address] [place] [address]        Dear Stefani,       Your medication has been approved.    However, you are due for a annual visit for further refills. Please schedule this visit at your earliest convenience.         Aliyah Marcus PA-C/mp

## 2018-06-12 RX ORDER — ALBUTEROL SULFATE 90 UG/1
AEROSOL, METERED RESPIRATORY (INHALATION)
Qty: 8.5 G | Refills: 0 | Status: SHIPPED | OUTPATIENT
Start: 2018-06-12 | End: 2019-08-19

## 2018-06-12 NOTE — TELEPHONE ENCOUNTER
Medication is being filled for 1 time refill only due to:  Patient needs to be seen because Needs annual exam, mammogram, asthma recheck.   Please call to schedule.  Marycarmen Netwon RN

## 2018-06-29 ENCOUNTER — OFFICE VISIT (OUTPATIENT)
Dept: FAMILY MEDICINE | Facility: CLINIC | Age: 58
End: 2018-06-29
Payer: COMMERCIAL

## 2018-06-29 VITALS
OXYGEN SATURATION: 96 % | RESPIRATION RATE: 20 BRPM | TEMPERATURE: 98.4 F | DIASTOLIC BLOOD PRESSURE: 73 MMHG | SYSTOLIC BLOOD PRESSURE: 121 MMHG | BODY MASS INDEX: 31.18 KG/M2 | WEIGHT: 187.4 LBS | HEART RATE: 95 BPM

## 2018-06-29 DIAGNOSIS — J30.81 ALLERGIC RHINITIS DUE TO ANIMAL DANDER: ICD-10-CM

## 2018-06-29 DIAGNOSIS — R19.7 DIARRHEA, UNSPECIFIED TYPE: ICD-10-CM

## 2018-06-29 DIAGNOSIS — J45.50 UNCOMPLICATED SEVERE PERSISTENT ASTHMA (H): Primary | ICD-10-CM

## 2018-06-29 LAB
BASOPHILS # BLD AUTO: 0 10E9/L (ref 0–0.2)
BASOPHILS NFR BLD AUTO: 0.4 %
CRP SERPL-MCNC: 5.9 MG/L (ref 0–8)
DIFFERENTIAL METHOD BLD: ABNORMAL
EOSINOPHIL # BLD AUTO: 0.1 10E9/L (ref 0–0.7)
EOSINOPHIL NFR BLD AUTO: 1.8 %
ERYTHROCYTE [DISTWIDTH] IN BLOOD BY AUTOMATED COUNT: 13 % (ref 10–15)
ERYTHROCYTE [SEDIMENTATION RATE] IN BLOOD BY WESTERGREN METHOD: 7 MM/H (ref 0–30)
HCT VFR BLD AUTO: 41.8 % (ref 35–47)
HGB BLD-MCNC: 13.7 G/DL (ref 11.7–15.7)
LYMPHOCYTES # BLD AUTO: 1.5 10E9/L (ref 0.8–5.3)
LYMPHOCYTES NFR BLD AUTO: 26 %
MCH RBC QN AUTO: 32.2 PG (ref 26.5–33)
MCHC RBC AUTO-ENTMCNC: 32.8 G/DL (ref 31.5–36.5)
MCV RBC AUTO: 98 FL (ref 78–100)
MONOCYTES # BLD AUTO: 0.6 10E9/L (ref 0–1.3)
MONOCYTES NFR BLD AUTO: 10.2 %
NEUTROPHILS # BLD AUTO: 3.4 10E9/L (ref 1.6–8.3)
NEUTROPHILS NFR BLD AUTO: 61.6 %
PLATELET # BLD AUTO: 125 10E9/L (ref 150–450)
RBC # BLD AUTO: 4.25 10E12/L (ref 3.8–5.2)
WBC # BLD AUTO: 5.6 10E9/L (ref 4–11)

## 2018-06-29 PROCEDURE — 99207 ZZC FOR CODING REVIEW: CPT | Performed by: PHYSICIAN ASSISTANT

## 2018-06-29 PROCEDURE — 99214 OFFICE O/P EST MOD 30 MIN: CPT | Performed by: PHYSICIAN ASSISTANT

## 2018-06-29 PROCEDURE — 85652 RBC SED RATE AUTOMATED: CPT | Performed by: PHYSICIAN ASSISTANT

## 2018-06-29 PROCEDURE — 86140 C-REACTIVE PROTEIN: CPT | Performed by: PHYSICIAN ASSISTANT

## 2018-06-29 PROCEDURE — 85025 COMPLETE CBC W/AUTO DIFF WBC: CPT | Performed by: PHYSICIAN ASSISTANT

## 2018-06-29 PROCEDURE — 36415 COLL VENOUS BLD VENIPUNCTURE: CPT | Performed by: PHYSICIAN ASSISTANT

## 2018-06-29 RX ORDER — ALBUTEROL SULFATE 90 UG/1
1-2 AEROSOL, METERED RESPIRATORY (INHALATION) EVERY 6 HOURS PRN
Qty: 1 INHALER | Refills: 11 | Status: SHIPPED | OUTPATIENT
Start: 2018-06-29 | End: 2019-07-30

## 2018-06-29 RX ORDER — MONTELUKAST SODIUM 10 MG/1
10 TABLET ORAL AT BEDTIME
Qty: 90 TABLET | Refills: 3 | Status: SHIPPED | OUTPATIENT
Start: 2018-06-29 | End: 2019-07-30

## 2018-06-29 NOTE — MR AVS SNAPSHOT
After Visit Summary   6/29/2018    Stefani Crowell    MRN: [id number]           Patient Information     Date Of Birth          1960        Visit Information        Provider Department      6/29/2018 12:40 PM Aliyah Marcus PA-C [facility]        Today's Diagnoses     Diarrhea, unspecified type    -  1    Screen for colon cancer        Visit for screening mammogram        Screening for HIV (human immunodeficiency virus)        Allergic rhinitis due to animal dander        Uncomplicated severe persistent asthma           Follow-ups after your visit        Future tests that were ordered for you today     Open Future Orders        Priority Expected Expires Ordered    Giardia antigen Routine  6/29/2019 6/29/2018    Enteric Bacteria and Virus Panel by FRANCISCO J Stool Routine  6/29/2019 6/29/2018    Clostridium difficile toxin B PCR Routine  7/29/2018 6/29/2018    Ova and Parasite Exam Routine Routine  6/29/2019 6/29/2018            Who to contact     Normal or non-critical lab and imaging results will be communicated to you by [employer], letter or phone within 4 business days after the clinic has received the results. If you do not hear from us within 7 days, please contact the clinic through [employer] or phone. If you have a critical or abnormal lab result, we will notify you by phone as soon as possible.  Submit refill requests through [employer] or call your pharmacy and they will forward the refill request to us. Please allow 3 business days for your refill to be completed.          If you need to speak with a  for additional information , please call: [phone]             Additional Information About Your Visit        [employer] Information     [employer] gives you secure access to your electronic health record. If you see a primary care provider, you can also send messages to your care team and make appointments. If you have questions, please call your primary care clinic.   If you do not have a primary care provider, please call [phone] and they will assist you.        Care EveryWhere ID     This is your Care EveryWhere ID. This could be used by other organizations to access your Colon medical records  [id number]        Your Vitals Were     Pulse Temperature Respirations Last Period Pulse Oximetry BMI (Body Mass Index)    95 98.4  F (36.9  C) (Tympanic) 20 10/03/2011 96% 31.18 kg/m2       Blood Pressure from Last 3 Encounters:   06/29/18 121/73   08/03/17 135/83   10/26/16 136/86    Weight from Last 3 Encounters:   06/29/18 187 lb 6.4 oz (85 kg)   08/03/17 185 lb (83.9 kg)   10/26/16 183 lb (83 kg)              We Performed the Following     CBC with platelets differential     CRP inflammation     Erythrocyte sedimentation rate auto          Today's Medication Changes          These changes are accurate as of 6/29/18  1:07 PM.  If you have any questions, ask your nurse or doctor.               These medicines have changed or have updated prescriptions.        Dose/Directions    * albuterol (2.5 MG/3ML) 0.083% neb solution   This may have changed:  Another medication with the same name was changed. Make sure you understand how and when to take each.   Used for:  Uncomplicated severe persistent asthma   Changed by:  Aliyah Marcus PA-C        Dose:  1 vial   Take 1 vial (2.5 mg) by nebulization every 4 hours as needed Use in neb machine   Quantity:  1 Box   Refills:  11       * PROAIR  (90 Base) MCG/ACT Inhaler   This may have changed:  Another medication with the same name was changed. Make sure you understand how and when to take each.   Used for:  Asthma   Generic drug:  albuterol   Changed by:  Aliyah Marcus PA-C        INHALE 2 PUFFS BY MOUTH EVERY 4 HOURS AS NEEDED FOR SHORTNESS OF BREATH   Quantity:  8.5 g   Refills:  0       * albuterol 108 (90 Base) MCG/ACT Inhaler   Commonly known as:  PROAIR HFA   This may have changed:  See the new  instructions.   Used for:  Uncomplicated severe persistent asthma   Changed by:  Aliyah Marcus PA-C        Dose:  1-2 puff   Inhale 1-2 puffs into the lungs every 6 hours as needed for shortness of breath / dyspnea or wheezing   Quantity:  1 Inhaler   Refills:  11       * Notice:  This list has 3 medication(s) that are the same as other medications prescribed for you. Read the directions carefully, and ask your doctor or other care provider to review them with you.      Stop taking these medicines if you haven't already. Please contact your care team if you have questions.     mometasone-formoterol 200-5 MCG/ACT oral inhaler   Commonly known as:  DULERA   Stopped by:  Aliyah Marcus PA-C                Where to get your medicines      These medications were sent to [id number] [address] [facility] [address] [address] [facility]  [address] [address] [facility] [place] [address]    Hours:  24-hours Phone:  [phone]     albuterol 108 (90 Base) MCG/ACT Inhaler    fluticasone-vilanterol 200-25 MCG/INH oral inhaler    montelukast 10 MG tablet                Primary Care Provider Office Phone # Fax #    Aliyah Marcus PA-C [phone] [address] [facility] [address] [address]        Equal Access to Services     [facility] AH: Hadii aad ku hadasho Soomaali, waaxda luqadaha, qaybta kaalmada adeegyada, abelardo idiin hayaan jackie colin . So [facility] [phone].    ATENCIÓN: Si habla español, tiene a purdy disposición servicios gratuitos de asistencia lingüística. Juan Carlos al [phone].    We comply with applicable federal civil rights laws and [place] laws. We do not discriminate on the basis of race, color, national origin, age, disability, sex, sexual orientation, or gender identity.            Thank you!     Thank you for choosing [facility]  for your care. Our goal is always to provide you with excellent care. Hearing back from  our patients is one way we can continue to improve our services. Please take a few minutes to complete the written survey that you may receive in the mail after your visit with us. Thank you!             Your Updated Medication List - Protect others around you: Learn how to safely use, store and throw away your medicines at www.disposemymeds.org.          This list is accurate as of 6/29/18  1:07 PM.  Always use your most recent med list.                   Brand Name Dispense Instructions for use Diagnosis    * albuterol (2.5 MG/3ML) 0.083% neb solution     1 Box    Take 1 vial (2.5 mg) by nebulization every 4 hours as needed Use in neb machine    Uncomplicated severe persistent asthma       * PROAIR  (90 Base) MCG/ACT Inhaler   Generic drug:  albuterol     8.5 g    INHALE 2 PUFFS BY MOUTH EVERY 4 HOURS AS NEEDED FOR SHORTNESS OF BREATH    Asthma       * albuterol 108 (90 Base) MCG/ACT Inhaler    PROAIR HFA    1 Inhaler    Inhale 1-2 puffs into the lungs every 6 hours as needed for shortness of breath / dyspnea or wheezing    Uncomplicated severe persistent asthma       citalopram 40 MG tablet    celeXA    90 tablet    Take 1 tablet (40 mg) by mouth daily    Anxiety       cyclobenzaprine 10 MG tablet    FLEXERIL    30 tablet    Take 0.5-1 tablets (5-10 mg) by mouth 3 times daily as needed for muscle spasms    Muscle tightness       diclofenac 75 MG EC tablet    VOLTAREN    30 tablet    Take 1 tablet (75 mg) by mouth 2 times daily as needed for moderate pain Take with meals    Bilateral lower abdominal pain, Pelvic pain in female       fluticasone 50 MCG/ACT spray    FLONASE    1 Bottle    Spray 1-2 sprays into both nostrils daily    Allergic rhinitis due to animal dander       fluticasone-vilanterol 200-25 MCG/INH oral inhaler    BREO ELLIPTA    3 Inhaler    Inhale 1 puff into the lungs daily    Uncomplicated severe persistent asthma       levothyroxine 112 MCG tablet    SYNTHROID/LEVOTHROID    90 tablet     TAKE 1 TABLET(112 MCG) BY MOUTH ONCE DAILY    Hypothyroidism       LORazepam 0.5 MG tablet    ATIVAN    15 tablet    Take 1 tablet by mouth up to 2 times daily as needed for panic attacks    Anxiety       montelukast 10 MG tablet    SINGULAIR    90 tablet    Take 1 tablet (10 mg) by mouth At Bedtime    Allergic rhinitis due to animal dander       OVER-THE-COUNTER     1 Bottle    Place 1 drop into both eyes 3 times daily. Blink Tears, Systane Ultra, Systane Balance or Refresh Optive Artificial Tear    Dry eye syndrome       SINUS RINSE BOTTLE KIT NA      Spray 1 Bottle in nostril daily as needed.    Seasonal allergic rhinitis       * Notice:  This list has 3 medication(s) that are the same as other medications prescribed for you. Read the directions carefully, and ask your doctor or other care provider to review them with you.

## 2018-06-29 NOTE — PROGRESS NOTES
SUBJECTIVE:   Stefani Crowell is a 58 year old female who presents to clinic today for the following health issues:      Asthma Follow-Up    Was ACT completed today?    Yes    ACT Total Scores 6/29/2018   ACT TOTAL SCORE -   ASTHMA ER VISITS -   ASTHMA HOSPITALIZATIONS -   ACT TOTAL SCORE (Goal Greater than or Equal to 20) 15   In the past 12 months, how many times did you visit the emergency room for your asthma without being admitted to the hospital? 0   In the past 12 months, how many times were you hospitalized overnight because of your asthma? 0       Recent asthma triggers that patient is dealing with: pollens and humidity        Amount of exercise or physical activity: 6-7 days/week for an average of greater than 60 minutes    Problems taking medications regularly: No    Medication side effects: none    Diet: regular (no restrictions)        PROBLEMS TO ADD ON...  -------------------------------------  Diarrhea      Duration: on and off for a month     Description:       Consistency of stool: watery and mucousy       Blood in stool: no        Number of loose stools past 24 hours: 3    Intensity:  severe    Accompanying signs and symptoms:       Fever: no        Nausea/vomitting: no        Abdominal pain: no        Weight loss: no     History (recent antibiotics or travel/ill contacts/med changes/testing done): yes daughter and  sick x2wks    Precipitating or alleviating factors: None    Therapies tried and outcome: Imodium AD      Problem list and histories reviewed & adjusted, as indicated.  Additional history: as documented    Patient Active Problem List   Diagnosis     Hypothyroid     Mild major depression (H)     Anxiety     Asthma, severe persistent     Seasonal allergic rhinitis     Allergic rhinitis due to animal dander     Diagnostic skin and sensitization tests     Noncompliance with medication regimen     Dry eye syndrome     Cigarette smoker     Panic attacks     Low back pain     Past  Surgical History:   Procedure Laterality Date     GENITOURINARY SURGERY      bladder repair     SURGICAL HISTORY OF -   3/10    transvaginal tape placement with cystoscopy       Social History   Substance Use Topics     Smoking status: Current Every Day Smoker     Packs/day: 0.50     Years: 20.00     Types: Cigarettes     Smokeless tobacco: Never Used      Comment: 5 cigs     Alcohol use Yes      Comment: occasionally     Family History   Problem Relation Age of Onset     Thyroid Disease Mother      Eye Disorder Mother      cornia transplants     Depression Mother      Arthritis Mother      Cervical Cancer Mother      Diabetes Father      Hypertension Father      Asthma Father      Aneurysm Father      Aortic      Eye Disorder Maternal Grandmother      Glaucoma Maternal Grandmother      Macular Degeneration Maternal Grandmother      HEART DISEASE Maternal Grandfather 60     MI     Asthma Paternal Grandmother      Alcohol/Drug Paternal Grandfather      alcohol     Asthma Sister      Depression Sister      Thyroid Disease Sister      Musculoskeletal Disorder Sister      fibromyalgia     Thyroid Disease Sister      Thyroid Disease Sister      Depression Sister      Macular Degeneration Maternal Aunt      Cerebrovascular Disease No family hx of      Cancer No family hx of            Reviewed and updated as needed this visit by clinical staff  Tobacco  Allergies  Meds       Reviewed and updated as needed this visit by Provider         ROS:  Constitutional, HEENT, cardiovascular, gi systems are negative, except as otherwise noted.    OBJECTIVE:                                                    /73  Pulse 95  Temp 98.4  F (36.9  C) (Tympanic)  Resp 20  Wt 187 lb 6.4 oz (85 kg)  LMP 10/03/2011  SpO2 96%  BMI 31.18 kg/m2  Body mass index is 31.18 kg/(m^2).  GENERAL APPEARANCE: healthy, alert and no distress  RESP: lungs clear to auscultation - no rales, rhonchi or wheezes  CV: regular rates and rhythm,  normal S1 S2, no S3 or S4 and no murmur, click or rub  MS: extremities normal- no gross deformities noted  NEURO: Normal strength and tone  PSYCH: mentation appears normal and affect normal/bright       ASSESSMENT:                                                      1. Uncomplicated severe persistent asthma    2. Allergic rhinitis due to animal dander    3. Diarrhea, unspecified type         PLAN:                                                    Patient states her asthma control is pretty good - the humid weather and spring allergies will flare her asthma. Meds renewed, no changes.    Will obtain stool studies and labs for her diarrhea. If neg for infection will proceed with a colonoscopy. Imodium ok to use PRN.     Follow up for her physical in near future.  The patient was in agreement with the plan today and had no questions or concerns prior to leaving the clinic.     Aliyah Marcus PA-C  [facility]

## 2018-06-30 ASSESSMENT — ASTHMA QUESTIONNAIRES: ACT_TOTALSCORE: 15

## 2018-07-03 ENCOUNTER — MYC MEDICAL ADVICE (OUTPATIENT)
Dept: FAMILY MEDICINE | Facility: CLINIC | Age: 58
End: 2018-07-03

## 2018-07-03 DIAGNOSIS — D69.6 THROMBOCYTOPENIA (H): Primary | ICD-10-CM

## 2018-07-03 NOTE — LETTER
[facility] [place]  [address] [place] [address]  [phone]        November 15, 2018    Stefani Crowell  [address]  [place] [address]              Dear Stefani Crowell    This is to remind you that your CBC Panel is due.    You may call our office at [phone] to schedule an appointment.    Please disregard this notice if you have already had your labs drawn or made an appointment.        Sincerely,        Aliyah Marcus PA-C

## 2018-07-10 NOTE — TELEPHONE ENCOUNTER
Spoke with patient and informed her of results per Aliyah Marcus PA-C, see below read word for word.  Patient verbalized understanding.  Offered to schedule lab appointment but patient preferred to call back later to do.

## 2018-07-10 NOTE — TELEPHONE ENCOUNTER
Patient has not read [employer]. Please call patient with the following info, to RNs:    Your white blood cell count and inflammatory markers are normal. This is good to see.   Your platelet count is just slightly low which can happen due to mild inflammation. I'd like to repeat this is 1 month; a lab only appointment would be fine.

## 2018-07-11 DIAGNOSIS — R19.7 DIARRHEA, UNSPECIFIED TYPE: ICD-10-CM

## 2018-07-11 LAB
C DIFF TOX B STL QL: NEGATIVE
SPECIMEN SOURCE: NORMAL

## 2018-07-11 PROCEDURE — 87493 C DIFF AMPLIFIED PROBE: CPT | Performed by: PHYSICIAN ASSISTANT

## 2018-07-11 PROCEDURE — 87329 GIARDIA AG IA: CPT | Performed by: PHYSICIAN ASSISTANT

## 2018-07-11 PROCEDURE — 87209 SMEAR COMPLEX STAIN: CPT | Performed by: PHYSICIAN ASSISTANT

## 2018-07-11 PROCEDURE — 87506 IADNA-DNA/RNA PROBE TQ 6-11: CPT | Performed by: PHYSICIAN ASSISTANT

## 2018-07-11 PROCEDURE — 87177 OVA AND PARASITES SMEARS: CPT | Performed by: PHYSICIAN ASSISTANT

## 2018-07-12 LAB
C COLI+JEJUNI+LARI FUSA STL QL NAA+PROBE: NOT DETECTED
EC STX1 GENE STL QL NAA+PROBE: NOT DETECTED
EC STX2 GENE STL QL NAA+PROBE: NOT DETECTED
ENTERIC PATHOGEN COMMENT: NORMAL
G LAMBLIA AG STL QL IA: NORMAL
NOROV GI+II ORF1-ORF2 JNC STL QL NAA+PR: NOT DETECTED
O+P STL MICRO: NORMAL
O+P STL MICRO: NORMAL
RVA NSP5 STL QL NAA+PROBE: NOT DETECTED
SALMONELLA SP RPOD STL QL NAA+PROBE: NOT DETECTED
SHIGELLA SP+EIEC IPAH STL QL NAA+PROBE: NOT DETECTED
SPECIMEN SOURCE: NORMAL
SPECIMEN SOURCE: NORMAL
V CHOL+PARA RFBL+TRKH+TNAA STL QL NAA+PR: NOT DETECTED
Y ENTERO RECN STL QL NAA+PROBE: NOT DETECTED

## 2018-07-16 ENCOUNTER — MYC MEDICAL ADVICE (OUTPATIENT)
Dept: FAMILY MEDICINE | Facility: CLINIC | Age: 58
End: 2018-07-16

## 2018-07-16 NOTE — LETTER
July 26, 2018        Stefani Crowell  [address]  [place] [address]      Dear Lolly,      I have tried to reach you by telephone and via my chart and have been unsuccessful.  Your stool studies are all normal. Are you still having loose stools/diarrhea?     Please call clinic at your earliest convenience or you can respond via my chart. Thank you      Aliyah Marcus's Care Team

## 2018-07-18 NOTE — TELEPHONE ENCOUNTER
Patient has not read [employer] message, please call with info:    Your stool studies are all normal.   Are you still having loose stools/diarrhea?

## 2018-08-24 ENCOUNTER — ALLIED HEALTH/NURSE VISIT (OUTPATIENT)
Dept: NURSING | Facility: CLINIC | Age: 58
End: 2018-08-24
Payer: COMMERCIAL

## 2018-08-24 DIAGNOSIS — Z23 NEED FOR TDAP VACCINATION: Primary | ICD-10-CM

## 2018-08-24 PROCEDURE — 90471 IMMUNIZATION ADMIN: CPT

## 2018-08-24 PROCEDURE — 90715 TDAP VACCINE 7 YRS/> IM: CPT

## 2018-08-24 PROCEDURE — 99207 ZZC NO CHARGE NURSE ONLY: CPT

## 2018-10-08 ENCOUNTER — HEALTH MAINTENANCE LETTER (OUTPATIENT)
Age: 58
End: 2018-10-08

## 2019-02-28 NOTE — PROGRESS NOTES
SUBJECTIVE:   Stefani Crowell is a 58 year old female who presents to clinic today for the following health issues:      Concern: spotting  Duration: 1 month   Description: spotting-light blood- light watermelon to brown in color. Also having a mucous discharge. Has been in menopause for 4-5 years. Hx bladder repair; does have hx of bladder incontinence and bowel incontinence due to severe perineal tear with vaginal delivery per pt    Problem list and histories reviewed & adjusted, as indicated.  Additional history: as documented    Patient Active Problem List   Diagnosis     Hypothyroid     Mild major depression (H)     Anxiety     Asthma, severe persistent     Seasonal allergic rhinitis     Allergic rhinitis due to animal dander     Diagnostic skin and sensitization tests     Noncompliance with medication regimen     Dry eye syndrome     Cigarette smoker     Panic attacks     Low back pain     Thrombocytopenia (H)     Past Surgical History:   Procedure Laterality Date     GENITOURINARY SURGERY      bladder repair     SURGICAL HISTORY OF -   3/10    transvaginal tape placement with cystoscopy       Social History     Tobacco Use     Smoking status: Current Every Day Smoker     Packs/day: 0.50     Years: 20.00     Pack years: 10.00     Types: Cigarettes     Smokeless tobacco: Never Used     Tobacco comment: 5 cigs   Substance Use Topics     Alcohol use: Yes     Comment: occasionally     Family History   Problem Relation Age of Onset     Thyroid Disease Mother      Eye Disorder Mother         cornia transplants     Depression Mother      Arthritis Mother      Cervical Cancer Mother      Diabetes Father      Hypertension Father      Asthma Father      Aneurysm Father         Aortic      Eye Disorder Maternal Grandmother      Glaucoma Maternal Grandmother      Macular Degeneration Maternal Grandmother      Heart Disease Maternal Grandfather 60        MI     Asthma Paternal Grandmother      Alcohol/Drug Paternal  Grandfather         alcohol     Asthma Sister      Depression Sister      Thyroid Disease Sister      Musculoskeletal Disorder Sister         fibromyalgia     Thyroid Disease Sister      Thyroid Disease Sister      Depression Sister      Macular Degeneration Maternal Aunt      Cerebrovascular Disease No family hx of      Cancer No family hx of          Current Outpatient Medications   Medication Sig Dispense Refill     albuterol (2.5 MG/3ML) 0.083% neb solution Take 1 vial (2.5 mg) by nebulization every 4 hours as needed Use in neb machine 1 Box 11     albuterol (PROAIR HFA) 108 (90 Base) MCG/ACT Inhaler Inhale 1-2 puffs into the lungs every 6 hours as needed for shortness of breath / dyspnea or wheezing 1 Inhaler 11     citalopram (CELEXA) 40 MG tablet TAKE 1 TABLET(40 MG) BY MOUTH DAILY 30 tablet 0     cyclobenzaprine (FLEXERIL) 10 MG tablet Take 0.5-1 tablets (5-10 mg) by mouth 3 times daily as needed for muscle spasms 30 tablet 5     diclofenac (VOLTAREN) 75 MG EC tablet Take 1 tablet (75 mg) by mouth 2 times daily as needed for moderate pain Take with meals 30 tablet 0     fluticasone (FLONASE) 50 MCG/ACT spray Spray 1-2 sprays into both nostrils daily 1 Bottle 11     fluticasone-vilanterol (BREO ELLIPTA) 200-25 MCG/INH oral inhaler Inhale 1 puff into the lungs daily 3 Inhaler 11     Hypertonic Nasal Wash (SINUS RINSE BOTTLE KIT NA) Spray 1 Bottle in nostril daily as needed.       levothyroxine (SYNTHROID/LEVOTHROID) 112 MCG tablet Take 1 tablet (112 mcg) by mouth daily - DUE FOR PHYSICAL 30 tablet 0     levothyroxine (SYNTHROID/LEVOTHROID) 112 MCG tablet TAKE 1 TABLET(112 MCG) BY MOUTH ONCE DAILY 90 tablet 3     LORazepam (ATIVAN) 0.5 MG tablet Take 1 tablet by mouth up to 2 times daily as needed for panic attacks 15 tablet 1     montelukast (SINGULAIR) 10 MG tablet Take 1 tablet (10 mg) by mouth At Bedtime 90 tablet 3     OVER-THE-COUNTER Place 1 drop into both eyes 3 times daily. Blink Tears, Systane Ultra,  "Systane Balance or Refresh Optive Artificial Tear 1 Bottle      PROAIR  (90 Base) MCG/ACT inhaler INHALE 2 PUFFS BY MOUTH EVERY 4 HOURS AS NEEDED FOR SHORTNESS OF BREATH 8.5 g 0     Allergies   Allergen Reactions     Azithromycin Nausea     Recent Labs   Lab Test 08/03/17  1236 04/08/16  1040  07/08/13  1606  10/13/11  1803   LDL  --  141*  --  79  --  92   HDL  --  83  --  74  --  58   TRIG  --  97  --  125  --  229*   ALT  --   --   --  24  --   --    TSH 1.94 1.00   < > 1.25   < > 2.07    < > = values in this interval not displayed.      BP Readings from Last 3 Encounters:   03/01/19 118/77   06/29/18 121/73   08/03/17 135/83    Wt Readings from Last 3 Encounters:   03/01/19 83.7 kg (184 lb 9.6 oz)   06/29/18 85 kg (187 lb 6.4 oz)   08/03/17 83.9 kg (185 lb)                  Labs reviewed in EPIC    Reviewed and updated as needed this visit by clinical staff  Tobacco  Allergies  Meds  Problems  Med Hx  Surg Hx  Fam Hx  Soc Hx        Reviewed and updated as needed this visit by Provider  Tobacco  Allergies  Meds  Problems  Med Hx  Surg Hx  Fam Hx         ROS:  Constitutional, HEENT, cardiovascular, pulmonary, GI, , musculoskeletal, neuro, skin, endocrine and psych systems are negative, except as otherwise noted.    OBJECTIVE:     /77   Pulse 78   Temp 98.1  F (36.7  C) (Oral)   Resp 16   Ht 1.651 m (5' 5\")   Wt 83.7 kg (184 lb 9.6 oz)   LMP 10/03/2011   SpO2 98%   BMI 30.72 kg/m    Body mass index is 30.72 kg/m .  GENERAL: healthy, alert and no distress  RESP: lungs clear to auscultation - no rales, rhonchi or wheezes  CV: regular rate and rhythm, normal S1 S2, no S3 or S4, no murmur, click or rub, no peripheral edema and peripheral pulses strong   (female): normal urethral meatus, vaginal mucosa pink, moist, well rugated, and normal cervix/adnexa/uterus without masses or discharge POSITIVE for external genitalia erythematous; normal for her per pt  PSYCH: mentation appears " normal, affect normal/bright    Diagnostic Test Results:  none     ASSESSMENT/PLAN:         ICD-10-CM    1. Post-menopausal bleeding N95.0 Pap imaged thin layer screen with HPV - recommended age 30 - 65 years (select HPV order below)     HPV High Risk Types DNA Cervical     US Pelvic Complete w Transvaginal   2. Vaginal discharge N89.8 Wet prep   3. Screening for cervical cancer Z12.4 Pap imaged thin layer screen with HPV - recommended age 30 - 65 years (select HPV order below)   4. Special screening examination for human papillomavirus (HPV) Z11.51 HPV High Risk Types DNA Cervical   5. Special screening for malignant neoplasms, colon Z12.11 GASTROENTEROLOGY ADULT REF PROCEDURE ONLY   6. Screening for HIV (human immunodeficiency virus) Z11.4 HIV Antigen Antibody Combo   7. Screening for thyroid disorder Z13.29 TSH with free T4 reflex   8. Screening cholesterol level Z13.220 Lipid panel reflex to direct LDL Non-fasting       See Patient Instructions: We will let you know results.  If symptoms worsen, please follow up.    Sully Yanez, DESTINY  [facility]

## 2019-03-01 ENCOUNTER — OFFICE VISIT (OUTPATIENT)
Dept: FAMILY MEDICINE | Facility: CLINIC | Age: 59
End: 2019-03-01
Payer: COMMERCIAL

## 2019-03-01 ENCOUNTER — ANCILLARY PROCEDURE (OUTPATIENT)
Dept: ULTRASOUND IMAGING | Facility: CLINIC | Age: 59
End: 2019-03-01
Attending: NURSE PRACTITIONER
Payer: COMMERCIAL

## 2019-03-01 ENCOUNTER — ANCILLARY PROCEDURE (OUTPATIENT)
Dept: MAMMOGRAPHY | Facility: CLINIC | Age: 59
End: 2019-03-01
Payer: COMMERCIAL

## 2019-03-01 VITALS
TEMPERATURE: 98.1 F | HEART RATE: 78 BPM | OXYGEN SATURATION: 98 % | SYSTOLIC BLOOD PRESSURE: 118 MMHG | HEIGHT: 65 IN | WEIGHT: 184.6 LBS | RESPIRATION RATE: 16 BRPM | BODY MASS INDEX: 30.75 KG/M2 | DIASTOLIC BLOOD PRESSURE: 77 MMHG

## 2019-03-01 DIAGNOSIS — Z12.4 SCREENING FOR CERVICAL CANCER: ICD-10-CM

## 2019-03-01 DIAGNOSIS — R93.5 ABNORMAL ENDOMETRIAL ULTRASOUND: ICD-10-CM

## 2019-03-01 DIAGNOSIS — N95.0 POST-MENOPAUSAL BLEEDING: Primary | ICD-10-CM

## 2019-03-01 DIAGNOSIS — Z13.29 SCREENING FOR THYROID DISORDER: ICD-10-CM

## 2019-03-01 DIAGNOSIS — Z11.4 SCREENING FOR HIV (HUMAN IMMUNODEFICIENCY VIRUS): ICD-10-CM

## 2019-03-01 DIAGNOSIS — Z11.51 SPECIAL SCREENING EXAMINATION FOR HUMAN PAPILLOMAVIRUS (HPV): ICD-10-CM

## 2019-03-01 DIAGNOSIS — Z12.11 SPECIAL SCREENING FOR MALIGNANT NEOPLASMS, COLON: ICD-10-CM

## 2019-03-01 DIAGNOSIS — N89.8 VAGINAL DISCHARGE: ICD-10-CM

## 2019-03-01 DIAGNOSIS — Z13.220 SCREENING CHOLESTEROL LEVEL: ICD-10-CM

## 2019-03-01 DIAGNOSIS — Z12.31 VISIT FOR SCREENING MAMMOGRAM: ICD-10-CM

## 2019-03-01 DIAGNOSIS — N95.0 POST-MENOPAUSAL BLEEDING: ICD-10-CM

## 2019-03-01 LAB
CHOLEST SERPL-MCNC: 210 MG/DL
HDLC SERPL-MCNC: 75 MG/DL
LDLC SERPL CALC-MCNC: 110 MG/DL
NONHDLC SERPL-MCNC: 135 MG/DL
SPECIMEN SOURCE: NORMAL
TRIGL SERPL-MCNC: 125 MG/DL
TSH SERPL DL<=0.005 MIU/L-ACNC: 2.33 MU/L (ref 0.4–4)
WET PREP SPEC: NORMAL

## 2019-03-01 PROCEDURE — G0145 SCR C/V CYTO,THINLAYER,RESCR: HCPCS | Performed by: NURSE PRACTITIONER

## 2019-03-01 PROCEDURE — 80061 LIPID PANEL: CPT | Performed by: NURSE PRACTITIONER

## 2019-03-01 PROCEDURE — 84443 ASSAY THYROID STIM HORMONE: CPT | Performed by: NURSE PRACTITIONER

## 2019-03-01 PROCEDURE — 76856 US EXAM PELVIC COMPLETE: CPT | Mod: 59

## 2019-03-01 PROCEDURE — 87624 HPV HI-RISK TYP POOLED RSLT: CPT | Performed by: NURSE PRACTITIONER

## 2019-03-01 PROCEDURE — 87389 HIV-1 AG W/HIV-1&-2 AB AG IA: CPT | Performed by: NURSE PRACTITIONER

## 2019-03-01 PROCEDURE — 87210 SMEAR WET MOUNT SALINE/INK: CPT | Performed by: NURSE PRACTITIONER

## 2019-03-01 PROCEDURE — 76830 TRANSVAGINAL US NON-OB: CPT

## 2019-03-01 PROCEDURE — 99214 OFFICE O/P EST MOD 30 MIN: CPT | Performed by: NURSE PRACTITIONER

## 2019-03-01 PROCEDURE — 77067 SCR MAMMO BI INCL CAD: CPT | Mod: TC

## 2019-03-01 PROCEDURE — 36415 COLL VENOUS BLD VENIPUNCTURE: CPT | Performed by: NURSE PRACTITIONER

## 2019-03-01 ASSESSMENT — ANXIETY QUESTIONNAIRES
GAD7 TOTAL SCORE: 3
5. BEING SO RESTLESS THAT IT IS HARD TO SIT STILL: NOT AT ALL
2. NOT BEING ABLE TO STOP OR CONTROL WORRYING: SEVERAL DAYS
6. BECOMING EASILY ANNOYED OR IRRITABLE: NOT AT ALL
7. FEELING AFRAID AS IF SOMETHING AWFUL MIGHT HAPPEN: NOT AT ALL
3. WORRYING TOO MUCH ABOUT DIFFERENT THINGS: SEVERAL DAYS
1. FEELING NERVOUS, ANXIOUS, OR ON EDGE: SEVERAL DAYS

## 2019-03-01 ASSESSMENT — PATIENT HEALTH QUESTIONNAIRE - PHQ9
SUM OF ALL RESPONSES TO PHQ QUESTIONS 1-9: 1
5. POOR APPETITE OR OVEREATING: NOT AT ALL

## 2019-03-01 ASSESSMENT — MIFFLIN-ST. JEOR: SCORE: 1428.22

## 2019-03-01 NOTE — RESULT ENCOUNTER NOTE
Matthew Ko,    Thank you for your recent office visit.    Here are your recent results.  Your uterine ultrasound is abnormal per radiology:    Impression    IMPRESSION:   1. Abnormal thickening of the endometrial stripe in this  postmenopausal patient. Endometrial hyperplasia or neoplasia are  possible.  2. Normal-appearing right ovary. No ultrasound evidence of right  ovarian torsion. Left ovary not visualized.    I am placing an order for you to speak with OBGYN regarding imaging and treatment.  If you need assistance with scheduling, please call the number below.  Follow up as needed.     Feel free to contact me via [employer] or call the clinic at [phone].    Sincerely,    Sully Yanez, ROD, FNP-BC

## 2019-03-01 NOTE — PATIENT INSTRUCTIONS
We will let you know results.  If symptoms worsen, please follow up.  Patient Education     Dysfunctional Uterine Bleeding    Dysfunctional uterine bleeding, also called abnormal uterine bleeding, is a condition in which bleeding is abnormal and occurs at unexpected times of the month. This happens because of changes in the hormones that help control a woman s menstrual cycle each month.  The bleeding may be heavier or lighter than normal. If you have heavy bleeding often, this can lead to a problem called anemia. With anemia, your red blood cell count is too low. Red blood cells help carry oxygen throughout your body. Severe anemia may cause you to look pale and feel very weak or tired. You might also become short of breath easily.  To treat dysfunctional uterine bleeding, medicines are often tried first. If these don t help, or if you have additional symptoms or have reached menopause, further testing and treatments may be needed. Discuss all of your options with your provider.  Home care  Medicines  If you re prescribed medicines, be sure to take them as directed. Some of the more common medicines you may be prescribed include:    Hormone therapy (Options include most methods of hormonal birth control such as pills, shots, or a hormone-releasing IUD)    Nonsteroidal anti-inflammatory drugs (NSAIDs), such as ibuprofen    Iron supplements, if you have anemia     General care    Get plenty of rest if you tire easily. Avoid heavy exertion.    To help relieve pain or cramping that may occur with bleeding, try using a heating pad on the lower belly or back. A warm bath may also help.  Follow-up care  Follow up with your healthcare provider, or as directed.  When to seek medical advice  Call your healthcare provider right away if:    Bleeding becomes heavy (soaking 1 pad or tampon every hour for 3 hours)    Increased abdominal pain    Irregular bleeding worsens or does not get better even with treatment    Fever of  100.4 F (38 C) or higher, or as directed by your provider    Signs of anemia, such as pale skin, extreme fatigue or weakness, or shortness of breath    Dizziness or fainting   Date Last Reviewed: 11/1/2017 2000-2018 [employer]. [address] [place], [place] [address]. All rights reserved. This information is not intended as a substitute for professional medical care. Always follow your healthcare professional's instructions.

## 2019-03-02 ASSESSMENT — ASTHMA QUESTIONNAIRES: ACT_TOTALSCORE: 20

## 2019-03-02 ASSESSMENT — ANXIETY QUESTIONNAIRES: GAD7 TOTAL SCORE: 3

## 2019-03-04 LAB — HIV 1+2 AB+HIV1 P24 AG SERPL QL IA: NONREACTIVE

## 2019-03-04 NOTE — RESULT ENCOUNTER NOTE
Matthew Ko,    Thank you for your recent office visit.    Here are your recent results.  Your blood labs are considered in the normal range; especially for not fasting.  Please follow up as needed for any health care questions/ concerns.     Feel free to contact me via [employer] or call the clinic at [phone].    Sincerely,    Sully Yanez, ROD, FNP-BC

## 2019-03-06 LAB
COPATH REPORT: NORMAL
PAP: NORMAL

## 2019-03-07 LAB
FINAL DIAGNOSIS: NORMAL
HPV HR 12 DNA CVX QL NAA+PROBE: NEGATIVE
HPV16 DNA SPEC QL NAA+PROBE: NEGATIVE
HPV18 DNA SPEC QL NAA+PROBE: NEGATIVE
SPECIMEN DESCRIPTION: NORMAL
SPECIMEN SOURCE CVX/VAG CYTO: NORMAL

## 2019-03-16 ENCOUNTER — OFFICE VISIT (OUTPATIENT)
Dept: URGENT CARE | Facility: URGENT CARE | Age: 59
End: 2019-03-16
Payer: COMMERCIAL

## 2019-03-16 VITALS
TEMPERATURE: 99.8 F | SYSTOLIC BLOOD PRESSURE: 142 MMHG | WEIGHT: 185.2 LBS | DIASTOLIC BLOOD PRESSURE: 91 MMHG | BODY MASS INDEX: 30.82 KG/M2 | RESPIRATION RATE: 16 BRPM | HEART RATE: 95 BPM | OXYGEN SATURATION: 94 %

## 2019-03-16 DIAGNOSIS — J45.41 MODERATE PERSISTENT ASTHMA WITH EXACERBATION: Primary | ICD-10-CM

## 2019-03-16 DIAGNOSIS — K21.9 GASTROESOPHAGEAL REFLUX DISEASE, ESOPHAGITIS PRESENCE NOT SPECIFIED: ICD-10-CM

## 2019-03-16 DIAGNOSIS — Z72.0 TOBACCO USE: ICD-10-CM

## 2019-03-16 PROCEDURE — 99213 OFFICE O/P EST LOW 20 MIN: CPT | Performed by: FAMILY MEDICINE

## 2019-03-16 RX ORDER — METHYLPREDNISOLONE 4 MG
TABLET, DOSE PACK ORAL
Qty: 21 TABLET | Refills: 0 | Status: SHIPPED | OUTPATIENT
Start: 2019-03-16 | End: 2020-09-16

## 2019-03-16 NOTE — PROGRESS NOTES
Acute Illness   Acute illness concerns: worsening cough and wheezing from baseline- has a known history of Asthma  Onset: 5 days    Fever: YES    Chills/Sweats: YES    Headache (location?): YES    Sinus Pressure:no    Conjunctivitis:  no    Ear Pain: no    Rhinorrhea: YES    Congestion: YES    Sore Throat: YES- from coughing     Cough: YES-productive of clear sputum, with shortness of breath    Wheeze: YES    Decreased Appetite: YES    Nausea: no    Vomiting: no    Diarrhea:  no    Dysuria/Freq.: no    Fatigue/Achiness: YES with body aches.    Sick/Strep Exposure: no     Therapies Tried and outcome: ibuprofen, albuterol      Also reports worsening heart burn symptoms with nausea and globus sensation.   Smokes tobacco. No desire to quit at this time.    Problem list and histories reviewed & adjusted, as indicated.  Additional history: as documented    Patient Active Problem List   Diagnosis     Hypothyroid     Mild major depression (H)     Anxiety     Asthma, severe persistent     Seasonal allergic rhinitis     Allergic rhinitis due to animal dander     Diagnostic skin and sensitization tests     Noncompliance with medication regimen     Dry eye syndrome     Cigarette smoker     Panic attacks     Low back pain     Thrombocytopenia (H)     Past Surgical History:   Procedure Laterality Date     GENITOURINARY SURGERY      bladder repair     SURGICAL HISTORY OF -   3/10    transvaginal tape placement with cystoscopy       Social History     Tobacco Use     Smoking status: Current Every Day Smoker     Packs/day: 0.50     Years: 20.00     Pack years: 10.00     Types: Cigarettes     Smokeless tobacco: Never Used     Tobacco comment: 5 cigs   Substance Use Topics     Alcohol use: Yes     Comment: occasionally     Family History   Problem Relation Age of Onset     Thyroid Disease Mother      Eye Disorder Mother         cornia transplants     Depression Mother      Arthritis Mother      Cervical Cancer Mother      Diabetes  Father      Hypertension Father      Asthma Father      Aneurysm Father         Aortic      Eye Disorder Maternal Grandmother      Glaucoma Maternal Grandmother      Macular Degeneration Maternal Grandmother      Heart Disease Maternal Grandfather 60        MI     Asthma Paternal Grandmother      Alcohol/Drug Paternal Grandfather         alcohol     Asthma Sister      Depression Sister      Thyroid Disease Sister      Musculoskeletal Disorder Sister         fibromyalgia     Thyroid Disease Sister      Thyroid Disease Sister      Depression Sister      Macular Degeneration Maternal Aunt      Cerebrovascular Disease No family hx of      Cancer No family hx of          Current Outpatient Medications   Medication Sig Dispense Refill     albuterol (2.5 MG/3ML) 0.083% neb solution Take 1 vial (2.5 mg) by nebulization every 4 hours as needed Use in neb machine 1 Box 11     albuterol (PROAIR HFA) 108 (90 Base) MCG/ACT Inhaler Inhale 1-2 puffs into the lungs every 6 hours as needed for shortness of breath / dyspnea or wheezing 1 Inhaler 11     amoxicillin-clavulanate (AUGMENTIN) 875-125 MG tablet Take 1 tablet by mouth 2 times daily for 7 days 14 tablet 0     citalopram (CELEXA) 40 MG tablet TAKE 1 TABLET(40 MG) BY MOUTH DAILY 30 tablet 0     cyclobenzaprine (FLEXERIL) 10 MG tablet Take 0.5-1 tablets (5-10 mg) by mouth 3 times daily as needed for muscle spasms 30 tablet 5     diclofenac (VOLTAREN) 75 MG EC tablet Take 1 tablet (75 mg) by mouth 2 times daily as needed for moderate pain Take with meals 30 tablet 0     fluticasone (FLONASE) 50 MCG/ACT spray Spray 1-2 sprays into both nostrils daily 1 Bottle 11     fluticasone-vilanterol (BREO ELLIPTA) 200-25 MCG/INH oral inhaler Inhale 1 puff into the lungs daily 3 Inhaler 11     Hypertonic Nasal Wash (SINUS RINSE BOTTLE KIT NA) Spray 1 Bottle in nostril daily as needed.       levothyroxine (SYNTHROID/LEVOTHROID) 112 MCG tablet Take 1 tablet (112 mcg) by mouth daily - DUE FOR  PHYSICAL 30 tablet 0     levothyroxine (SYNTHROID/LEVOTHROID) 112 MCG tablet TAKE 1 TABLET(112 MCG) BY MOUTH ONCE DAILY 90 tablet 3     LORazepam (ATIVAN) 0.5 MG tablet Take 1 tablet by mouth up to 2 times daily as needed for panic attacks 15 tablet 1     methylPREDNISolone (MEDROL DOSEPAK) 4 MG tablet therapy pack Follow Package Directions 21 tablet 0     montelukast (SINGULAIR) 10 MG tablet Take 1 tablet (10 mg) by mouth At Bedtime 90 tablet 3     omeprazole (PRILOSEC) 20 MG DR capsule Take 1 capsule (20 mg) by mouth daily       OVER-THE-COUNTER Place 1 drop into both eyes 3 times daily. Blink Tears, Systane Ultra, Systane Balance or Refresh Optive Artificial Tear 1 Bottle      PROAIR  (90 Base) MCG/ACT inhaler INHALE 2 PUFFS BY MOUTH EVERY 4 HOURS AS NEEDED FOR SHORTNESS OF BREATH 8.5 g 0     Allergies   Allergen Reactions     Azithromycin Nausea            ROS:  All others are negative except as above.      OBJECTIVE:     BP (!) 142/91   Pulse 95   Temp 99.8  F (37.7  C) (Tympanic)   Resp 16   Wt 84 kg (185 lb 3.2 oz)   LMP 10/03/2011   SpO2 94%   BMI 30.82 kg/m    Body mass index is 30.82 kg/m .  GENERAL: healthy, alert and no distress  EYES: Eyes grossly normal to inspection, PERRL and conjunctivae and sclerae normal  HENT: ear canals and TM's normal, nose and mouth without ulcers or lesions  NECK: no adenopathy, no asymmetry, masses, or scars and thyroid normal to palpation  RESP: Bilateral expiratory wheezes. Normal respiratory effort  CV: regular rate and rhythm, normal S1 S2, no S3 or S4, no murmur, click or rub, no peripheral edema and peripheral pulses strong  PSYCH: mentation appears normal, affect normal/bright    Diagnostic Test Results:  none     ASSESSMENT/PLAN:     Stefani was seen today for cough.    Diagnoses and all orders for this visit:    Moderate persistent asthma with exacerbation  -     Outside the window for testing for influenza  -     methylPREDNISolone (MEDROL DOSEPAK) 4  MG tablet therapy pack; Follow Package Directions  -     amoxicillin-clavulanate (AUGMENTIN) 875-125 MG tablet; Take 1 tablet by mouth 2 times daily for 7 days  Continue current Albuterol INH as needed and Breo Ellipta INH daily.    Gastroesophageal reflux disease, esophagitis presence not specified  -     Trial of a PPI: omeprazole (PRILOSEC) 20 MG DR capsule; Take 1 capsule (20 mg) by mouth daily    Tobacco use  Patient has no desire to quit at this time.  Encouraged cessation.       Follow up if symptoms fail to improve in 1 week or worsen.      The patient was in agreement with the plan today and had no questions or concerns prior to leaving the clinic.      Jennifer Christie MD  [facility]

## 2019-05-10 DIAGNOSIS — J30.81 ALLERGIC RHINITIS DUE TO ANIMAL DANDER: ICD-10-CM

## 2019-05-10 NOTE — TELEPHONE ENCOUNTER
Requested Prescriptions   Pending Prescriptions Disp Refills     fluticasone (FLONASE) 50 MCG/ACT nasal spray  Last Written Prescription Date:  08/03/17  Last Fill Quantity: 1,  # refills: 11   Last office visit: 3/16/2019 with prescribing provider:  PORSHA Christie   Future Office Visit:           Sig: Spray 1-2 sprays into both nostrils daily       There is no refill protocol information for this order

## 2019-05-10 NOTE — TELEPHONE ENCOUNTER
Patient is due for physical.   Routing refill request to provider for review/approval because:  Mary Jo given x1 and patient did not follow up, please advise    Odalys Mtz, RN, BSN, PHN

## 2019-05-13 RX ORDER — FLUTICASONE PROPIONATE 50 MCG
1-2 SPRAY, SUSPENSION (ML) NASAL DAILY
Qty: 9.9 ML | Refills: 0 | Status: SHIPPED | OUTPATIENT
Start: 2019-05-13 | End: 2019-07-30

## 2019-06-05 DIAGNOSIS — F41.9 ANXIETY: ICD-10-CM

## 2019-06-05 NOTE — TELEPHONE ENCOUNTER
Spoke with pt and she said she has had it refilled since January. Our records state 30 day supply sent 1/2/19, maybe pharmacy had old refills on hand.  Pt said she isn't very good about taking it every day but is definitely taking it. Pt would like mediation refilled and she said she will try to be better about taking it everyday.     3 month supply pended for approval.

## 2019-06-05 NOTE — TELEPHONE ENCOUNTER
Routing refill request to provider for review/approval because:  A break in medication.  Left message on voice mail for patient to call clinic. [phone]  Lexi Ortega RN

## 2019-06-05 NOTE — TELEPHONE ENCOUNTER
"Requested Prescriptions   Pending Prescriptions Disp Refills     citalopram (CELEXA) 40 MG tablet [Pharmacy Med Name: CITALOPRAM 40MG TABLETS]  Last Written Prescription Date:  01/02/19  Last Fill Quantity: 30,  # refills: 0   Last office visit: 3/16/2019 with prescribing provider:  PORSHA Christie   Future Office Visit:     30 tablet 0     Sig: TAKE 1 TABLET(40 MG) BY MOUTH DAILY       SSRIs Protocol Passed - 6/5/2019 10:07 AM   STEPHANIE-7 SCORE 10/14/2016 8/3/2017 3/1/2019   Total Score - - -   Total Score 5 2 3     PHQ-9 SCORE 10/14/2016 8/3/2017 3/1/2019   PHQ-9 Total Score - - -   PHQ-9 Total Score 5 0 1        Passed - Recent (12 mo) or future (30 days) visit within the authorizing provider's specialty     Patient had office visit in the last 12 months or has a visit in the next 30 days with authorizing provider or within the authorizing provider's specialty.  See \"Patient Info\" tab in inbasket, or \"Choose Columns\" in Meds & Orders section of the refill encounter.              Passed - Medication is active on med list        Passed - Patient is age 18 or older        Passed - No active pregnancy on record        Passed - No positive pregnancy test in last 12 months          "

## 2019-06-06 RX ORDER — CITALOPRAM HYDROBROMIDE 40 MG/1
40 TABLET ORAL DAILY
Qty: 30 TABLET | Refills: 0 | Status: SHIPPED | OUTPATIENT
Start: 2019-06-06 | End: 2019-07-30

## 2019-07-30 DIAGNOSIS — J30.81 ALLERGIC RHINITIS DUE TO ANIMAL DANDER: ICD-10-CM

## 2019-07-30 DIAGNOSIS — J45.50 UNCOMPLICATED SEVERE PERSISTENT ASTHMA (H): ICD-10-CM

## 2019-07-30 DIAGNOSIS — F41.9 ANXIETY: ICD-10-CM

## 2019-07-30 DIAGNOSIS — E03.9 HYPOTHYROIDISM, UNSPECIFIED TYPE: ICD-10-CM

## 2019-07-30 NOTE — TELEPHONE ENCOUNTER
"Requested Prescriptions   Pending Prescriptions Disp Refills     levothyroxine (SYNTHROID/LEVOTHROID) 112 MCG tablet [Pharmacy Med Name: LEVOTHYROXINE 0.112MG (112MCG) TABS] 30 tablet 0     Sig: TAKE 1 TABLET(112 MCG) BY MOUTH DAILY   Last Written Prescription Date:  5-13-19  Last Fill Quantity: 30,  # refills: 0   Last office visit: 3/1/2019 with prescribing provider:  3-1-19   Future Office Visit:      Thyroid Protocol Passed - 7/30/2019 11:43 AM        Passed - Patient is 12 years or older        Passed - Recent (12 mo) or future (30 days) visit within the authorizing provider's specialty     Patient had office visit in the last 12 months or has a visit in the next 30 days with authorizing provider or within the authorizing provider's specialty.  See \"Patient Info\" tab in inbasket, or \"Choose Columns\" in Meds & Orders section of the refill encounter.              Passed - Medication is active on med list        Passed - Normal TSH on file in past 12 months     Recent Labs   Lab Test 03/01/19  0954   TSH 2.33              Passed - No active pregnancy on record     If patient is pregnant or has had a positive pregnancy test, please check TSH.          Passed - No positive pregnancy test in past 12 months     If patient is pregnant or has had a positive pregnancy test, please check TSH.          albuterol (PROAIR HFA/PROVENTIL HFA/VENTOLIN HFA) 108 (90 Base) MCG/ACT inhaler [Pharmacy Med Name: ALBUTEROL HFA INH (200 PUFFS) 8.5GM] 8.5 g 0     Sig: INHALE 1 TO 2 PUFFS INTO THE LUNGS EVERY 6 HOURS AS NEEDED FOR SHORTNESS OF BREATH, DYSPNEA OR WHEEZING   Last Written Prescription Date:  6-5-19  Last Fill Quantity: 8.5,  # refills: 0   Last office visit: 3/1/2019 with prescribing provider:  3-1-19   Future Office Visit:      Asthma Maintenance Inhalers - Anticholinergics Passed - 7/30/2019 11:43 AM        Passed - Patient is age 12 years or older        Passed - Asthma control assessment score within normal limits in " "last 6 months     Please review ACT score.           Passed - Medication is active on med list        Passed - Recent (6 mo) or future (30 days) visit within the authorizing provider's specialty     Patient had office visit in the last 6 months or has a visit in the next 30 days with authorizing provider or within the authorizing provider's specialty.  See \"Patient Info\" tab in inbasket, or \"Choose Columns\" in Meds & Orders section of the refill encounter.            citalopram (CELEXA) 40 MG tablet [Pharmacy Med Name: CITALOPRAM 40MG TABLETS] 30 tablet 0     Sig: TAKE 1 TABLET(40 MG) BY MOUTH DAILY. FOLLOW UP   Last Written Prescription Date:  6-6-19  Last Fill Quantity: 30,  # refills: 0   Last office visit: 3/1/2019 with prescribing provider:  3-1-19   Future Office Visit:      SSRIs Protocol Passed - 7/30/2019 11:43 AM        Passed - Recent (12 mo) or future (30 days) visit within the authorizing provider's specialty     Patient had office visit in the last 12 months or has a visit in the next 30 days with authorizing provider or within the authorizing provider's specialty.  See \"Patient Info\" tab in inbasket, or \"Choose Columns\" in Meds & Orders section of the refill encounter.              Passed - Medication is active on med list        Passed - Patient is age 18 or older        Passed - No active pregnancy on record        Passed - No positive pregnancy test in last 12 months        montelukast (SINGULAIR) 10 MG tablet [Pharmacy Med Name: MONTELUKAST 10MG TABLETS] 90 tablet 0     Sig: TAKE 1 TABLET(10 MG) BY MOUTH AT BEDTIME   Last Written Prescription Date:  1-2-19  Last Fill Quantity: 90,  # refills: 0   Last office visit: 3/1/2019 with prescribing provider:  3-1-19   Future Office Visit:      Leukotriene Inhibitors Protocol Passed - 7/30/2019 11:43 AM        Passed - Patient is age 12 or older     If patient is under 16, ok to refill using age based dosing.           Passed - Asthma control assessment " "score within normal limits in last 6 months     Please review ACT score.           Passed - Medication is active on med list        Passed - Recent (6 mo) or future (30 days) visit within the authorizing provider's specialty     Patient had office visit in the last 6 months or has a visit in the next 30 days with authorizing provider or within the authorizing provider's specialty.  See \"Patient Info\" tab in inbasket, or \"Choose Columns\" in Meds & Orders section of the refill encounter.            fluticasone (FLONASE) 50 MCG/ACT nasal spray [Pharmacy Med Name: FLUTICASONE 50MCG NASAL SP (120) RX] 16 mL 0     Sig: SHAKE LIQUID AND USE 1 TO 2 SPRAYS IN EACH NOSTRIL DAILY   Last Written Prescription Date:  5-13-19  Last Fill Quantity: 16,  # refills: 0   Last office visit: 3/1/2019 with prescribing provider:  3-1-19   Future Office Visit:      Inhaled Steroids Protocol Passed - 7/30/2019 11:43 AM        Passed - Patient is age 12 or older        Passed - Asthma control assessment score within normal limits in last 6 months     Please review ACT score.           Passed - Medication is active on med list        Passed - Recent (6 mo) or future (30 days) visit within the authorizing provider's specialty     Patient had office visit in the last 6 months or has a visit in the next 30 days with authorizing provider or within the authorizing provider's specialty.  See \"Patient Info\" tab in inbasket, or \"Choose Columns\" in Meds & Orders section of the refill encounter.            PROAIR  (90 Base) MCG/ACT inhaler [Pharmacy Med Name: PROAIR HFA ORAL INH (200  PFS) 8.5G] 8.5 g 0     Sig: INHALE 1 TO 2 PUFFS INTO LUNGS EVERY 6 HOURS AS NEEDED FOR SHORTNESS OF BREATH AND WHEEZING   Last Written Prescription Date:  6-26-18  Last Fill Quantity: 8.5,  # refills: 0   Last office visit: 3/1/2019 with prescribing provider:  3-1-19   Future Office Visit:      Asthma Maintenance Inhalers - Anticholinergics Passed - 7/30/2019 11:43 " "AM        Passed - Patient is age 12 years or older        Passed - Asthma control assessment score within normal limits in last 6 months     Please review ACT score.           Passed - Medication is active on med list        Passed - Recent (6 mo) or future (30 days) visit within the authorizing provider's specialty     Patient had office visit in the last 6 months or has a visit in the next 30 days with authorizing provider or within the authorizing provider's specialty.  See \"Patient Info\" tab in inbasket, or \"Choose Columns\" in Meds & Orders section of the refill encounter.            "

## 2019-07-30 NOTE — TELEPHONE ENCOUNTER
Routing refill request to provider for review/approval because:  Mary Jo given x1 and patient did not follow up, please advise    Patient last seen by PCP: 6/29/18    Odalys Mtz, RN, BSN, PHN

## 2019-08-08 ENCOUNTER — TELEPHONE (OUTPATIENT)
Dept: FAMILY MEDICINE | Facility: CLINIC | Age: 59
End: 2019-08-08

## 2019-08-08 NOTE — TELEPHONE ENCOUNTER
Reason for call:  Medication   If this is a refill request, has the caller requested the refill from the pharmacy already? Yes  Will the patient be using a [facility]? No  Name of the pharmacy and phone number for the current request:   [id number] - [place], [place] - [address] [address] [facility] [phone] (Phone)  [phone] (Fax)         Name of the medication requested: LEVOTHYROXINE, ALBUTEROL, CITALOPRAM, MONTELUKAST, FLUTICASONE, PROAIR    Other request: Patient has several medications that needed to be refilled. Unsure of the specific names. Seek patient's chart.    Phone number to reach patient:  Home number on file [phone] (home)    Best Time:  ANYTIME    Can we leave a detailed message on this number?  YES

## 2019-08-08 NOTE — TELEPHONE ENCOUNTER
Patient needs to be seen for refills.  Has not seen Aliyah Knaeble since 6-29-18 7-30-19  Needs appt scheduled prior to sending last refill

## 2019-08-09 RX ORDER — ALBUTEROL SULFATE 90 UG/1
AEROSOL, METERED RESPIRATORY (INHALATION)
Qty: 8.5 G | Refills: 0 | Status: SHIPPED | OUTPATIENT
Start: 2019-08-09 | End: 2019-08-19

## 2019-08-09 RX ORDER — FLUTICASONE PROPIONATE 50 MCG
SPRAY, SUSPENSION (ML) NASAL
Qty: 16 ML | Refills: 0 | Status: SHIPPED | OUTPATIENT
Start: 2019-08-09 | End: 2020-03-04

## 2019-08-09 RX ORDER — LEVOTHYROXINE SODIUM 112 UG/1
TABLET ORAL
Qty: 30 TABLET | Refills: 0 | Status: SHIPPED | OUTPATIENT
Start: 2019-08-09 | End: 2019-08-19

## 2019-08-09 RX ORDER — MONTELUKAST SODIUM 10 MG/1
TABLET ORAL
Qty: 30 TABLET | Refills: 0 | Status: SHIPPED | OUTPATIENT
Start: 2019-08-09 | End: 2019-08-19

## 2019-08-09 RX ORDER — ALBUTEROL SULFATE 90 UG/1
AEROSOL, METERED RESPIRATORY (INHALATION)
Qty: 8.5 G | Refills: 0 | Status: SHIPPED | OUTPATIENT
Start: 2019-08-09 | End: 2020-09-16

## 2019-08-09 RX ORDER — CITALOPRAM HYDROBROMIDE 40 MG/1
TABLET ORAL
Qty: 30 TABLET | Refills: 0 | Status: SHIPPED | OUTPATIENT
Start: 2019-08-09 | End: 2019-08-19

## 2019-08-09 NOTE — TELEPHONE ENCOUNTER
Appointment scheduled for 8/19.    Please advise on refills- patient is completely out. (see 8/8 encounter)     Odalys Mtz RN, BSN, PHN

## 2019-08-09 NOTE — TELEPHONE ENCOUNTER
Patient returned call. Scheduled appt to see Aliyah 8/19/19(first available) patient is completely out of Pro Air Inhaler, Levothyroxine and Citalopram. Requesting refill to be sent to [facility]. Please advise

## 2019-08-09 NOTE — TELEPHONE ENCOUNTER
Patient left message message on TC phone, requesting appt with Aliyah. Called back to patient, LM on  for patient to return call

## 2019-08-19 ENCOUNTER — OFFICE VISIT (OUTPATIENT)
Dept: FAMILY MEDICINE | Facility: CLINIC | Age: 59
End: 2019-08-19
Payer: COMMERCIAL

## 2019-08-19 VITALS
DIASTOLIC BLOOD PRESSURE: 90 MMHG | WEIGHT: 184 LBS | RESPIRATION RATE: 18 BRPM | SYSTOLIC BLOOD PRESSURE: 144 MMHG | OXYGEN SATURATION: 99 % | BODY MASS INDEX: 30.62 KG/M2 | TEMPERATURE: 98.1 F | HEART RATE: 86 BPM

## 2019-08-19 DIAGNOSIS — F41.1 GAD (GENERALIZED ANXIETY DISORDER): Primary | ICD-10-CM

## 2019-08-19 DIAGNOSIS — J45.50 SEVERE PERSISTENT ASTHMA WITHOUT COMPLICATION (H): ICD-10-CM

## 2019-08-19 DIAGNOSIS — Z13.1 SCREENING FOR DIABETES MELLITUS: ICD-10-CM

## 2019-08-19 DIAGNOSIS — F32.0 MILD MAJOR DEPRESSION (H): ICD-10-CM

## 2019-08-19 DIAGNOSIS — E03.9 HYPOTHYROIDISM, UNSPECIFIED TYPE: ICD-10-CM

## 2019-08-19 DIAGNOSIS — J30.81 ALLERGIC RHINITIS DUE TO ANIMAL DANDER: ICD-10-CM

## 2019-08-19 LAB — GLUCOSE SERPL-MCNC: 95 MG/DL (ref 70–99)

## 2019-08-19 PROCEDURE — 82947 ASSAY GLUCOSE BLOOD QUANT: CPT | Performed by: PHYSICIAN ASSISTANT

## 2019-08-19 PROCEDURE — 36415 COLL VENOUS BLD VENIPUNCTURE: CPT | Performed by: PHYSICIAN ASSISTANT

## 2019-08-19 PROCEDURE — 99214 OFFICE O/P EST MOD 30 MIN: CPT | Performed by: PHYSICIAN ASSISTANT

## 2019-08-19 RX ORDER — MONTELUKAST SODIUM 10 MG/1
TABLET ORAL
Qty: 90 TABLET | Refills: 3 | Status: SHIPPED | OUTPATIENT
Start: 2019-08-19 | End: 2020-07-10

## 2019-08-19 RX ORDER — ALBUTEROL SULFATE 90 UG/1
AEROSOL, METERED RESPIRATORY (INHALATION)
Qty: 8.5 G | Refills: 11 | Status: SHIPPED | OUTPATIENT
Start: 2019-08-19 | End: 2020-09-16

## 2019-08-19 RX ORDER — LEVOTHYROXINE SODIUM 112 UG/1
TABLET ORAL
Qty: 90 TABLET | Refills: 1 | Status: SHIPPED | OUTPATIENT
Start: 2019-08-19 | End: 2020-03-04

## 2019-08-19 RX ORDER — LORAZEPAM 0.5 MG/1
TABLET ORAL
Qty: 20 TABLET | Refills: 1 | Status: SHIPPED | OUTPATIENT
Start: 2019-08-19 | End: 2020-09-03

## 2019-08-19 RX ORDER — CITALOPRAM HYDROBROMIDE 40 MG/1
60 TABLET ORAL DAILY
Qty: 135 TABLET | Refills: 3 | Status: SHIPPED | OUTPATIENT
Start: 2019-08-19 | End: 2020-09-03

## 2019-08-19 ASSESSMENT — ANXIETY QUESTIONNAIRES
5. BEING SO RESTLESS THAT IT IS HARD TO SIT STILL: NEARLY EVERY DAY
IF YOU CHECKED OFF ANY PROBLEMS ON THIS QUESTIONNAIRE, HOW DIFFICULT HAVE THESE PROBLEMS MADE IT FOR YOU TO DO YOUR WORK, TAKE CARE OF THINGS AT HOME, OR GET ALONG WITH OTHER PEOPLE: SOMEWHAT DIFFICULT
1. FEELING NERVOUS, ANXIOUS, OR ON EDGE: MORE THAN HALF THE DAYS
6. BECOMING EASILY ANNOYED OR IRRITABLE: MORE THAN HALF THE DAYS
3. WORRYING TOO MUCH ABOUT DIFFERENT THINGS: NEARLY EVERY DAY
7. FEELING AFRAID AS IF SOMETHING AWFUL MIGHT HAPPEN: MORE THAN HALF THE DAYS
2. NOT BEING ABLE TO STOP OR CONTROL WORRYING: NEARLY EVERY DAY
GAD7 TOTAL SCORE: 17

## 2019-08-19 ASSESSMENT — ASTHMA QUESTIONNAIRES
QUESTION_4 LAST FOUR WEEKS HOW OFTEN HAVE YOU USED YOUR RESCUE INHALER OR NEBULIZER MEDICATION (SUCH AS ALBUTEROL): TWO OR THREE TIMES PER WEEK
QUESTION_1 LAST FOUR WEEKS HOW MUCH OF THE TIME DID YOUR ASTHMA KEEP YOU FROM GETTING AS MUCH DONE AT WORK, SCHOOL OR AT HOME: A LITTLE OF THE TIME
QUESTION_3 LAST FOUR WEEKS HOW OFTEN DID YOUR ASTHMA SYMPTOMS (WHEEZING, COUGHING, SHORTNESS OF BREATH, CHEST TIGHTNESS OR PAIN) WAKE YOU UP AT NIGHT OR EARLIER THAN USUAL IN THE MORNING: ONCE A WEEK
ACUTE_EXACERBATION_TODAY: NO
ACT_TOTALSCORE: 17
QUESTION_5 LAST FOUR WEEKS HOW WOULD YOU RATE YOUR ASTHMA CONTROL: SOMEWHAT CONTROLLED
QUESTION_2 LAST FOUR WEEKS HOW OFTEN HAVE YOU HAD SHORTNESS OF BREATH: ONCE OR TWICE A WEEK

## 2019-08-19 ASSESSMENT — PATIENT HEALTH QUESTIONNAIRE - PHQ9
SUM OF ALL RESPONSES TO PHQ QUESTIONS 1-9: 9
5. POOR APPETITE OR OVEREATING: MORE THAN HALF THE DAYS

## 2019-08-19 NOTE — PROGRESS NOTES
Subjective     Stefani Crowell is a 59 year old female who presents to clinic today for the following health issues:    HPI   Depression and Anxiety Follow-Up    How are you doing with your depression since your last visit? Worsened -new job     How are you doing with your anxiety since your last visit?  Worsened -new job    Are you having other symptoms that might be associated with depression or anxiety? Yes:  panic attacks     Have you had a significant life event? Job Concerns     Do you have any concerns with your use of alcohol or other drugs? No    Social History     Tobacco Use     Smoking status: Current Every Day Smoker     Packs/day: 0.50     Years: 20.00     Pack years: 10.00     Types: Cigarettes     Smokeless tobacco: Never Used     Tobacco comment: 5 cigs   Substance Use Topics     Alcohol use: Yes     Comment: occasionally     Drug use: No     PHQ 8/3/2017 3/1/2019 8/19/2019   PHQ-9 Total Score 0 1 9   Q9: Thoughts of better off dead/self-harm past 2 weeks Not at all Not at all Several days     STEPHANIE-7 SCORE 8/3/2017 3/1/2019 8/19/2019   Total Score - - -   Total Score 2 3 17       Suicide Assessment Five-step Evaluation and Treatment (SAFE-T)      Asthma Follow-Up    Was ACT completed today?    Yes    ACT Total Scores 8/19/2019   ACT TOTAL SCORE -   ASTHMA ER VISITS -   ASTHMA HOSPITALIZATIONS -   ACT TOTAL SCORE (Goal Greater than or Equal to 20) 17   In the past 12 months, how many times did you visit the emergency room for your asthma without being admitted to the hospital? 0   In the past 12 months, how many times were you hospitalized overnight because of your asthma? 0       How many days per week do you miss taking your asthma controller medication?  0    Please describe any recent triggers for your asthma: emotions    Have you had any Emergency Room Visits, Urgent Care Visits, or Hospital Admissions since your last office visit?  No      Hypothyroidism Follow-up      Since last visit, patient  describes the following symptoms: loose stools, tremors, anxiety, depression and fatigue        How many servings of fruits and vegetables do you eat daily?  0-1    On average, how many sweetened beverages do you drink each day (soda, juice, sweet tea, etc)?   0  How many days per week do you miss taking your medication? 1    What makes it hard for you to take your medications?  remembering to take        PROBLEMS TO ADD ON...  Fasting for labs  -------------------------------------    Patient Active Problem List   Diagnosis     Hypothyroid     Mild major depression (H)     Anxiety     Asthma, severe persistent     Seasonal allergic rhinitis     Allergic rhinitis due to animal dander     Diagnostic skin and sensitization tests     Noncompliance with medication regimen     Dry eye syndrome     Cigarette smoker     Panic attacks     Low back pain     Thrombocytopenia (H)     Past Surgical History:   Procedure Laterality Date     GENITOURINARY SURGERY      bladder repair     SURGICAL HISTORY OF -   3/10    transvaginal tape placement with cystoscopy       Social History     Tobacco Use     Smoking status: Current Every Day Smoker     Packs/day: 0.50     Years: 20.00     Pack years: 10.00     Types: Cigarettes     Smokeless tobacco: Never Used     Tobacco comment: 5 cigs   Substance Use Topics     Alcohol use: Yes     Comment: occasionally     Family History   Problem Relation Age of Onset     Thyroid Disease Mother      Eye Disorder Mother         cornia transplants     Depression Mother      Arthritis Mother      Cervical Cancer Mother      Diabetes Father      Hypertension Father      Asthma Father      Aneurysm Father         Aortic      Eye Disorder Maternal Grandmother      Glaucoma Maternal Grandmother      Macular Degeneration Maternal Grandmother      Heart Disease Maternal Grandfather 60        MI     Asthma Paternal Grandmother      Alcohol/Drug Paternal Grandfather         alcohol     Asthma Sister       Depression Sister      Thyroid Disease Sister      Musculoskeletal Disorder Sister         fibromyalgia     Thyroid Disease Sister      Thyroid Disease Sister      Depression Sister      Macular Degeneration Maternal Aunt      Cerebrovascular Disease No family hx of      Cancer No family hx of            Reviewed and updated as needed this visit by Provider         Review of Systems   ROS COMP: Constitutional, HEENT, pulmonary, endocrine and psych systems are negative, except as otherwise noted.      Objective    BP (!) 144/90   Pulse 86   Temp 98.1  F (36.7  C) (Tympanic)   Resp 18   Wt 83.5 kg (184 lb)   LMP 10/03/2011   SpO2 99%   BMI 30.62 kg/m    Body mass index is 30.62 kg/m .  Physical Exam   Constitutional: healthy, alert, active, no distress.    Head: Normocephalic  Musculoskeletal: extremities normal- no gross deformities noted, gait normal, normal muscle tone and able to move about the exam room without difficulty.    Skin: no suspicious lesions or rashes appreciated on exposed areas  Neurologic: Gait normal. Moving all extremities spontaneously, no apparent weakness.    Psychiatric: mentation appears normal, thoughts are clear and concise. Converses appropriately. Affect is Appropriate/mood-congruent          Assessment & Plan   Assessment  1. STEPHANIE (generalized anxiety disorder)    2. Mild major depression (H)    3. Severe persistent asthma without complication    4. Allergic rhinitis due to animal dander    5. Hypothyroidism, unspecified type    6. Screening for diabetes mellitus         Plan  1,2) Citalopram increased to 60mg every day for now. Plan to decrease again once she is more familiar with her new job. Xanax renewed.     3,4) Continue Breo, Sigulair, Flonase, and an antihistamine. Referral to allergy/asthma to discuss possible allergy shots.     5) TSH up to date. Levothyroxine renewed, no change.        Tobacco Cessation:   reports that she has been smoking cigarettes.  She has a 10.00  "pack-year smoking history. She has never used smokeless tobacco.        BMI:   Estimated body mass index is 30.62 kg/m  as calculated from the following:    Height as of 3/1/19: 1.651 m (5' 5\").    Weight as of this encounter: 83.5 kg (184 lb).               Return in about 1 month (around 9/19/2019) for an e-visit.    Aliyah Marcus PA-C  [facility]          "

## 2019-08-19 NOTE — PATIENT INSTRUCTIONS
OB/GYN follow up: Levi [phone]     eVisit through [employer] - simply log in to your [facility] (must be on a computer or laptop this is not currently available via the stan[facility], but you could use a web browser on your phone), select the Online care button and request an eVisit. The provider will respond to your visit within one business day. The cost for an eVisit is $40-45.

## 2019-08-20 ASSESSMENT — ANXIETY QUESTIONNAIRES: GAD7 TOTAL SCORE: 17

## 2019-08-20 ASSESSMENT — ASTHMA QUESTIONNAIRES: ACT_TOTALSCORE: 17

## 2019-09-11 ENCOUNTER — TELEPHONE (OUTPATIENT)
Dept: FAMILY MEDICINE | Facility: CLINIC | Age: 59
End: 2019-09-11

## 2019-09-11 NOTE — TELEPHONE ENCOUNTER
Panel Management Review      Patient has the following on her problem list:     Depression / Dysthymia review    Measure:  Needs PHQ-9 score of 4 or less during index window.  Administer PHQ-9 and if score is 5 or more, send encounter to provider for next steps.    5 - 7 month window range: .    PHQ-9 SCORE 8/3/2017 3/1/2019 8/19/2019   PHQ-9 Total Score - - -   PHQ-9 Total Score 0 1 9       If PHQ-9 recheck is 5 or more, route to provider for next steps.    Patient is due for:  PHQ9    Asthma review     ACT Total Scores 8/19/2019   ACT TOTAL SCORE -   ASTHMA ER VISITS -   ASTHMA HOSPITALIZATIONS -   ACT TOTAL SCORE (Goal Greater than or Equal to 20) 17   In the past 12 months, how many times did you visit the emergency room for your asthma without being admitted to the hospital? 0   In the past 12 months, how many times were you hospitalized overnight because of your asthma? 0      1. Is Asthma diagnosis on the Problem List? Yes    2. Is Asthma listed on Health Maintenance? Yes    3. Patient is due for:  ACT      Composite cancer screening  Chart review shows that this patient is due/due soon for the following Colonoscopy  Summary:    Patient is due/failing the following:   Flu vaccine, ACT, BP CHECK and PHQ9    Action needed:   Patient needs nurse only appointment.    Type of outreach:    Phone, left message for patient to call back.  and Sent [employer] message.    Questions for provider review:    None                                                                                                                                    Linda Rajan CMA       Chart routed to Care Team .

## 2019-09-11 NOTE — LETTER
September 16, 2019      Stefani Crowell  [address]  [place] [address]        Dear Stefani,     In order to ensure we are providing the best quality care, we have reviewed your chart and see that you are due for the following.     1. Your next nurse visit is due for blood pressure check   2. Flu vaccine   3. Need to complete an Asthma and depression questionnaire    For fasting labs please fast for at least 10 hours. You can still take your medications and have water.    We greatly appreciate the opportunity to serve you.  Thank you for trusting us with your health care.    If you are now being seen elsewhere please let us know and we will remove you from the list.      Sincerely,      Linda Rajan CMA

## 2019-09-13 NOTE — TELEPHONE ENCOUNTER
2nd Attempt  Linda Rajan CMA contacted Stefani on 09/13/19 and left a message. If patient calls back please schedule appointment as soon as possible on Ancillary-see message below.

## 2019-09-16 NOTE — TELEPHONE ENCOUNTER
3rd attempt  Linda Rajan CMA contacted Stefani on 09/16/19 and left a message. If patient calls back please schedule appointment as soon as possible on Ancillary-see message below.    Letter mailed

## 2019-09-17 ENCOUNTER — ALLIED HEALTH/NURSE VISIT (OUTPATIENT)
Dept: NURSING | Facility: CLINIC | Age: 59
End: 2019-09-17
Payer: COMMERCIAL

## 2019-09-17 VITALS
DIASTOLIC BLOOD PRESSURE: 82 MMHG | RESPIRATION RATE: 20 BRPM | SYSTOLIC BLOOD PRESSURE: 124 MMHG | TEMPERATURE: 99.9 F | OXYGEN SATURATION: 96 % | HEART RATE: 108 BPM

## 2019-09-17 DIAGNOSIS — Z01.30 BP CHECK: Primary | ICD-10-CM

## 2019-09-17 PROCEDURE — 99207 ZZC NO CHARGE NURSE ONLY: CPT

## 2019-09-17 NOTE — PROGRESS NOTES
Stefani Crowell is a 59 year old patient who comes in today for a Blood Pressure check.  /82   Pulse 108   Temp 99.9  F (37.7  C) (Tympanic)   Resp 20   LMP 10/03/2011   SpO2 96%        Disposition: provider notified while patient in the clinic      Panel Management Review    Summary:    Patient is due/failing the following:   Zoster, Influenza and PHYSICAL    Type of outreach:    Patient informed that she was due, while in for ancillary visit.  Patient does not want to schedule these at this time.    Questions for provider review:    None                                                                                                                                    Unique Carbajal CMA       Chart routed to Care Team .

## 2019-12-13 ENCOUNTER — TRANSFERRED RECORDS (OUTPATIENT)
Dept: HEALTH INFORMATION MANAGEMENT | Facility: CLINIC | Age: 59
End: 2019-12-13

## 2020-02-23 ENCOUNTER — HEALTH MAINTENANCE LETTER (OUTPATIENT)
Age: 60
End: 2020-02-23

## 2020-03-02 DIAGNOSIS — E03.9 HYPOTHYROIDISM, UNSPECIFIED TYPE: ICD-10-CM

## 2020-03-02 DIAGNOSIS — J45.50 SEVERE PERSISTENT ASTHMA WITHOUT COMPLICATION (H): ICD-10-CM

## 2020-03-02 DIAGNOSIS — J30.81 ALLERGIC RHINITIS DUE TO ANIMAL DANDER: ICD-10-CM

## 2020-03-02 NOTE — TELEPHONE ENCOUNTER
"Requested Prescriptions   Pending Prescriptions Disp Refills     levothyroxine (SYNTHROID/LEVOTHROID) 112 MCG tablet [Pharmacy Med Name: LEVOTHYROXINE 0.112MG (112MCG) TABS] 30 tablet      Sig: TAKE 1 TABLET(112 MCG) BY MOUTH DAILY  Last Written Prescription Date:  3/2/20  Last Fill Quantity: 30,  # refills: 0   Last office visit: 8/19/2019 with prescribing provider:  Amrit  Future Office Visit:         Thyroid Protocol Failed - 3/2/2020  4:58 PM        Failed - Normal TSH on file in past 12 months     Recent Labs   Lab Test 03/01/19  0954   TSH 2.33              Passed - Patient is 12 years or older        Passed - Recent (12 mo) or future (30 days) visit within the authorizing provider's specialty     Patient has had an office visit with the authorizing provider or a provider within the authorizing providers department within the previous 12 mos or has a future within next 30 days. See \"Patient Info\" tab in inbasket, or \"Choose Columns\" in Meds & Orders section of the refill encounter.              Passed - Medication is active on med list        Passed - No active pregnancy on record     If patient is pregnant or has had a positive pregnancy test, please check TSH.          Passed - No positive pregnancy test in past 12 months     If patient is pregnant or has had a positive pregnancy test, please check TSH.          BREO ELLIPTA 200-25 MCG/INH Inhaler [Pharmacy Med Name: BREO ELLIPTA 200-25MCG ORAL INH(30)]       Sig: INHALE 1 PUFF INTO THE LUNGS DAILY  Last Written Prescription Date:  3/2/20  Last Fill Quantity: 180,  # refills: 0   Last office visit: 8/19/2019 with prescribing provider:  Amrit   Future Office Visit:         Inhaled Steroids Protocol Failed - 3/2/2020  4:58 PM        Failed - Asthma control assessment score within normal limits in last 6 months     Please review ACT score.           Failed - Recent (6 mo) or future (30 days) visit within the authorizing provider's specialty     Patient had " "office visit in the last 6 months or has a visit in the next 30 days with authorizing provider or within the authorizing provider's specialty.  See \"Patient Info\" tab in inbasket, or \"Choose Columns\" in Meds & Orders section of the refill encounter.            Passed - Patient is age 12 or older        Passed - Medication is active on med list        fluticasone (FLONASE) 50 MCG/ACT nasal spray [Pharmacy Med Name: FLUTICASONE 50MCG NASAL SP (120) RX] 16 g      Sig: SHAKE LIQUID AND USE 1 TO 2 SPRAYS IN EACH NOSTRIL DAILY  Last Written Prescription Date:  3/2/20  Last Fill Quantity: 16 g,  # refills: 0   Last office visit: 8/19/2019 with prescribing provider:  Amrit   Future Office Visit:         Inhaled Steroids Protocol Failed - 3/2/2020  4:58 PM        Failed - Asthma control assessment score within normal limits in last 6 months     Please review ACT score.           Failed - Recent (6 mo) or future (30 days) visit within the authorizing provider's specialty     Patient had office visit in the last 6 months or has a visit in the next 30 days with authorizing provider or within the authorizing provider's specialty.  See \"Patient Info\" tab in inbasket, or \"Choose Columns\" in Meds & Orders section of the refill encounter.            Passed - Patient is age 12 or older        Passed - Medication is active on med list        "

## 2020-03-04 NOTE — TELEPHONE ENCOUNTER
Routing refill request to provider for review/approval because:  Patient needs to be seen because:  Last OV 8/19/19 and due for 6 month follow up 2/19/20.     TSH   Date Value Ref Range Status   03/01/2019 2.33 0.40 - 4.00 mU/L Final   Last Lab TSH over 1 year ago.   Overdue for ACT.     Pended for 1 month supplies with appointment reminder.     Stephanie Kang RN BSN

## 2020-03-05 RX ORDER — LEVOTHYROXINE SODIUM 112 UG/1
TABLET ORAL
Qty: 30 TABLET | Refills: 0 | Status: SHIPPED | OUTPATIENT
Start: 2020-03-05 | End: 2020-09-11

## 2020-03-05 RX ORDER — FLUTICASONE PROPIONATE 50 MCG
SPRAY, SUSPENSION (ML) NASAL
Qty: 16 G | Refills: 0 | Status: SHIPPED | OUTPATIENT
Start: 2020-03-05 | End: 2021-06-21

## 2020-07-09 DIAGNOSIS — J45.50 SEVERE PERSISTENT ASTHMA WITHOUT COMPLICATION (H): ICD-10-CM

## 2020-07-09 DIAGNOSIS — J30.81 ALLERGIC RHINITIS DUE TO ANIMAL DANDER: ICD-10-CM

## 2020-07-10 RX ORDER — MONTELUKAST SODIUM 10 MG/1
TABLET ORAL
Qty: 30 TABLET | Refills: 0 | Status: SHIPPED | OUTPATIENT
Start: 2020-07-10 | End: 2020-09-16

## 2020-07-10 NOTE — TELEPHONE ENCOUNTER
Medication is being filled for 1 time refill only due to:  Patient needs to be seen because needs recheck.   Marycarmen Newton RN

## 2020-08-31 ENCOUNTER — NURSE TRIAGE (OUTPATIENT)
Dept: NURSING | Facility: CLINIC | Age: 60
End: 2020-08-31

## 2020-08-31 NOTE — TELEPHONE ENCOUNTER
"  Call from pt       CC:  Spells of pain behind the breast bone  - has been going on for \"some time\" now       Pain located about an inch below her breasts       She thinks this is a stomach ulcer but has not been seen for this     Spells may last 30-45 seconds at a time - the pain will be at least an 8 out of 10  - she does describes it as \"feels like I'm dying\"       During these these spells she will start sweating and then feel nauseated and will often vomit -- the emesis and a few deep breaths will usually make her feel better       Pain does not seem to travel anywhere - not up jaw or arms       She does report issues with diarrhea as well but that is \"chronic\"        The spells have been getting worse over time      Last one was this am       A/P:  > Directed to be seen in ED for eval of this now          She will head there now         Jordin Murillo RN                             COVID 19 Nurse Triage Plan/Patient Instructions    Please be aware that novel coronavirus (COVID-19) may be circulating in the community. If you develop symptoms such as fever, cough, or SOB or if you have concerns about the presence of another infection including coronavirus (COVID-19), please contact your health care provider or visit [email or web address].     Disposition/Instructions    ED Visit recommended. Follow protocol based instructions.     Bring Your Own Device:  Please also bring your smart device(s) (smart phones, tablets, laptops) and their charging cables for your personal use and to communicate with your care team during your visit.    Thank you for taking steps to prevent the spread of this virus.  o Limit your contact with others.  o Wear a simple mask to cover your cough.  o Wash your hands well and often.    Resources    [facility]: About COVID-19: [email or web address]    CDC: What to Do If You're Sick: www.cdc.gov/coronavirus/2019-ncov/about/steps-when-sick.html    CDC: Ending Home Isolation: " www.cdc.gov/coronavirus/2019-ncov/hcp/disposition-in-home-patients.html     CDC: Caring for Someone: www.cdc.gov/coronavirus/2019-ncov/if-you-are-sick/care-for-someone.html     [facility]: Interim Guidance for Hospital Discharge to Home: www.health.[facility].mn.[place]/diseases/coronavirus/hcp/hospdischarge.pdf    [facility] clinical trials (COVID-19 research studies): [email or web address]/um-clinical-trials     Below are the COVID-19 hotlines at the [place] Department of Health ([facility]). Interpreters are available.   o For health questions: Call 769-654-7102 or 1-941.497.8224 (7 a.m. to 7 p.m.)  o For questions about schools and childcare: Call 049-238-6610 or 1-237.230.6170 (7 a.m. to 7 p.m.)                           Additional Information    Negative: Severe difficulty breathing (e.g., struggling for each breath, speaks in single words)    Negative: Passed out (i.e., fainted, collapsed and was not responding)    Negative: Chest pain lasting longer than 5 minutes and ANY of the following:* Over 50 years old* Over 30 years old and at least one cardiac risk factor (i.e., high blood pressure, diabetes, high cholesterol, obesity, smoker or strong family history of heart disease)* Pain is crushing, pressure-like, or heavy * Took nitroglycerin and chest pain was not relieved* History of heart disease (i.e., angina, heart attack, bypass surgery, angioplasty, CHF)    Intermittent chest pain and pain has been increasing in severity or frequency    Negative: SEVERE chest pain    Negative: Pain also present in shoulder(s) or arm(s) or jaw    Negative: Difficulty breathing    Negative: Cocaine use within last 3 days    Negative: History of prior 'blood clot' in leg or lungs (i.e., deep vein thrombosis, pulmonary embolism)    Negative: Recent illness requiring prolonged bed rest (i.e., immobilization)    Negative: Hip or leg fracture in past 2 months (e.g, or had cast on leg or ankle)    Negative: Major surgery in the past  "month    Negative: Recent long-distance travel with prolonged time in car, bus, plane, or train (i.e., within past 2 weeks; 6 or more hours duration)    Negative: Heart beating irregularly or very rapidly    Negative: Followed an injury to chest    Negative: Visible sweat on face or sweat dripping down face    Negative: Sounds like a life-threatening emergency to the triager    Negative: Chest pain lasting longer than 5 minutes    Answer Assessment - Initial Assessment Questions  1. LOCATION: \"Where does it hurt?\"          2. RADIATION: \"Does the pain go anywhere else?\" (e.g., into neck, jaw, arms, back)    No           3. ONSET: \"When did the chest pain begin?\" (Minutes, hours or days)         Week+      4. PATTERN \"Does the pain come and go, or has it been constant since it started?\"  \"Does it get worse with exertion?\"     Comes and goes           5. DURATION: \"How long does it last\" (e.g., seconds, minutes, hours)    30 seconds a a time       6. SEVERITY: \"How bad is the pain?\"  (e.g., Scale 1-10; mild, moderate, or severe)     - MILD (1-3): doesn't interfere with normal activities      - MODERATE (4-7): interferes with normal activities or awakens from sleep     - SEVERE (8-10): excruciating pain, unable to do any normal activities      Pain feels 8-10 and start sweating and SOB           7. CARDIAC RISK FACTORS: \"Do you have any history of heart problems or risk factors for heart disease?\" (e.g., prior heart attack, angina; high blood pressure, diabetes, being overweight, high cholesterol, smoking, or strong family history of heart disease)    Yes smoking - that makes it worse     Fam Hx is NIDDM         8. PULMONARY RISK FACTORS: \"Do you have any history of lung disease?\"  (e.g., blood clots in lung, asthma, emphysema, birth control pills)      Yes asthma       9. CAUSE: \"What do you think is causing the chest pain?\"          Stomach ulcerns       10. OTHER SYMPTOMS: \"Do you have any other symptoms?\" (e.g., " "dizziness, nausea, vomiting, sweating, fever, difficulty breathing, cough)    SOB and vomiting with the spells       11. PREGNANCY: \"Is there any chance you are pregnant?\" \"When was your last menstrual period?\"  NA    Protocols used: CHEST PAIN-A-[place]      "

## 2020-09-03 ENCOUNTER — OFFICE VISIT (OUTPATIENT)
Dept: FAMILY MEDICINE | Facility: CLINIC | Age: 60
End: 2020-09-03
Payer: COMMERCIAL

## 2020-09-03 VITALS
BODY MASS INDEX: 29.59 KG/M2 | SYSTOLIC BLOOD PRESSURE: 126 MMHG | TEMPERATURE: 98.9 F | WEIGHT: 177.6 LBS | HEIGHT: 65 IN | HEART RATE: 83 BPM | DIASTOLIC BLOOD PRESSURE: 75 MMHG | OXYGEN SATURATION: 96 %

## 2020-09-03 DIAGNOSIS — R10.13 ABDOMINAL PAIN, EPIGASTRIC: Primary | ICD-10-CM

## 2020-09-03 DIAGNOSIS — R19.7 DIARRHEA, UNSPECIFIED TYPE: ICD-10-CM

## 2020-09-03 DIAGNOSIS — F41.1 GAD (GENERALIZED ANXIETY DISORDER): ICD-10-CM

## 2020-09-03 DIAGNOSIS — F32.0 MILD MAJOR DEPRESSION (H): ICD-10-CM

## 2020-09-03 LAB
ALBUMIN SERPL-MCNC: 3.6 G/DL (ref 3.4–5)
ALP SERPL-CCNC: 167 U/L (ref 40–150)
ALT SERPL W P-5'-P-CCNC: 85 U/L (ref 0–50)
ANION GAP SERPL CALCULATED.3IONS-SCNC: 7 MMOL/L (ref 3–14)
AST SERPL W P-5'-P-CCNC: 257 U/L (ref 0–45)
BILIRUB DIRECT SERPL-MCNC: 1 MG/DL (ref 0–0.2)
BILIRUB SERPL-MCNC: 2 MG/DL (ref 0.2–1.3)
BUN SERPL-MCNC: 6 MG/DL (ref 7–30)
CALCIUM SERPL-MCNC: 9 MG/DL (ref 8.5–10.1)
CHLORIDE SERPL-SCNC: 103 MMOL/L (ref 94–109)
CO2 SERPL-SCNC: 28 MMOL/L (ref 20–32)
CREAT SERPL-MCNC: 0.57 MG/DL (ref 0.52–1.04)
ERYTHROCYTE [DISTWIDTH] IN BLOOD BY AUTOMATED COUNT: 14.1 % (ref 10–15)
GFR SERPL CREATININE-BSD FRML MDRD: >90 ML/MIN/{1.73_M2}
GLUCOSE SERPL-MCNC: 99 MG/DL (ref 70–99)
HCT VFR BLD AUTO: 42.7 % (ref 35–47)
HGB BLD-MCNC: 14.1 G/DL (ref 11.7–15.7)
LIPASE SERPL-CCNC: 189 U/L (ref 73–393)
MCH RBC QN AUTO: 33.4 PG (ref 26.5–33)
MCHC RBC AUTO-ENTMCNC: 33 G/DL (ref 31.5–36.5)
MCV RBC AUTO: 101 FL (ref 78–100)
PLATELET # BLD AUTO: 74 10E9/L (ref 150–450)
POTASSIUM SERPL-SCNC: 3.7 MMOL/L (ref 3.4–5.3)
PROT SERPL-MCNC: 7.4 G/DL (ref 6.8–8.8)
RBC # BLD AUTO: 4.22 10E12/L (ref 3.8–5.2)
SODIUM SERPL-SCNC: 138 MMOL/L (ref 133–144)
WBC # BLD AUTO: 5 10E9/L (ref 4–11)

## 2020-09-03 PROCEDURE — 80048 BASIC METABOLIC PNL TOTAL CA: CPT | Performed by: PHYSICIAN ASSISTANT

## 2020-09-03 PROCEDURE — 36415 COLL VENOUS BLD VENIPUNCTURE: CPT | Performed by: PHYSICIAN ASSISTANT

## 2020-09-03 PROCEDURE — 85027 COMPLETE CBC AUTOMATED: CPT | Performed by: PHYSICIAN ASSISTANT

## 2020-09-03 PROCEDURE — 83690 ASSAY OF LIPASE: CPT | Performed by: PHYSICIAN ASSISTANT

## 2020-09-03 PROCEDURE — 80076 HEPATIC FUNCTION PANEL: CPT | Performed by: PHYSICIAN ASSISTANT

## 2020-09-03 PROCEDURE — 99214 OFFICE O/P EST MOD 30 MIN: CPT | Performed by: PHYSICIAN ASSISTANT

## 2020-09-03 RX ORDER — FAMOTIDINE 20 MG/1
20 TABLET, FILM COATED ORAL 2 TIMES DAILY PRN
Qty: 60 TABLET | Refills: 1 | Status: SHIPPED | OUTPATIENT
Start: 2020-09-03 | End: 2022-03-02

## 2020-09-03 RX ORDER — CITALOPRAM HYDROBROMIDE 40 MG/1
40 TABLET ORAL DAILY
Qty: 90 TABLET | Refills: 1 | Status: SHIPPED | OUTPATIENT
Start: 2020-09-03 | End: 2020-09-16

## 2020-09-03 RX ORDER — LORAZEPAM 0.5 MG/1
TABLET ORAL
Qty: 20 TABLET | Refills: 0 | Status: SHIPPED | OUTPATIENT
Start: 2020-09-03 | End: 2020-09-16

## 2020-09-03 RX ORDER — LORAZEPAM 0.5 MG/1
TABLET ORAL
Qty: 20 TABLET | Refills: 1 | Status: CANCELLED | OUTPATIENT
Start: 2020-09-03

## 2020-09-03 ASSESSMENT — ENCOUNTER SYMPTOMS
LIGHT-HEADEDNESS: 0
SHORTNESS OF BREATH: 0
HEMATURIA: 0
VOMITING: 1
NERVOUS/ANXIOUS: 0
NAUSEA: 1
HEARTBURN: 1
CHILLS: 0
DYSURIA: 0
FEVER: 0
ABDOMINAL PAIN: 0
DIARRHEA: 1

## 2020-09-03 ASSESSMENT — MIFFLIN-ST. JEOR: SCORE: 1386.47

## 2020-09-03 NOTE — PROGRESS NOTES
"Subjective     Stefani Crowell is a 60 year old female who presents to clinic today for the following health issues:    HPI     Patient notes at least 24 years of intermittent epigastric abdominal discomfort that worsened over the last 6 months. She admits to diarrhea, nausea, vomiting and bloating. Patient states she has 10 episodes of diarrhea per day. Symptoms treated with Omeprazole, Imodium, Pepto Bismol without significant relief.     Patient drinks around 2 vodka drinks per night with 1.5 shots per drink.     Patient notes ongoing anxiety that has increased due to abdominal discomfort, diarrhea, COVID19, and not being able to get out of the house as often as she would like. Celexa has been working for patient and she is taking one tablet per day (40mg). Ativan has been used for panic successfully and is only used once per month. No therapy at this time. Patient has good family support.     Abdominal/Flank Pain  Onset/Duration: 6 months   Description:   Character: Cramping and \"heartburn pain\"  Location: By bowels and periumbilical region  Radiation: Pelvic region  Intensity: severe  Progression of Symptoms:  worsening  Accompanying Signs & Symptoms:  Fever/Chills: no  Gas/Bloating: YES  Nausea: YES  Vomitting: YES  Diarrhea: YES  Constipation: no  Dysuria or Hematuria: YES- hematuria  History:   Trauma: YES- level 4 lacerations with birth of daughter   Previous similar pain: no  Previous tests done: none  Precipitating factors:   Does the pain change with:     Food: YES    Bowel Movement: no    Urination: no   Other factors:  no  Therapies tried and outcome: Omeprazole, Imodium, Pepto Bismol  Patient's last menstrual period was 10/03/2011.    Patient is out of her Lorazepam.     Olga Ellis CMA     Review of Systems   Constitutional: Negative for chills and fever.   HENT: Negative for congestion.    Respiratory: Negative for shortness of breath.    Cardiovascular: Negative for chest pain.   Gastrointestinal: " "Positive for diarrhea, heartburn, nausea and vomiting. Negative for abdominal pain (epigastric ).   Genitourinary: Negative for dysuria and hematuria.   Skin: Negative for rash.   Neurological: Negative for light-headedness.   Psychiatric/Behavioral: The patient is not nervous/anxious.             Objective    /75   Pulse 83   Temp 98.9  F (37.2  C) (Tympanic)   Ht 1.651 m (5' 5\")   Wt 80.6 kg (177 lb 9.6 oz)   LMP 10/03/2011   SpO2 96%   BMI 29.55 kg/m    Body mass index is 29.55 kg/m .  Physical Exam  Vitals signs and nursing note reviewed.   Constitutional:       General: She is not in acute distress.     Appearance: Normal appearance.   HENT:      Head: Normocephalic and atraumatic.   Eyes:      Extraocular Movements: Extraocular movements intact.      Pupils: Pupils are equal, round, and reactive to light.   Neck:      Musculoskeletal: Normal range of motion.   Cardiovascular:      Rate and Rhythm: Normal rate and regular rhythm.      Heart sounds: Normal heart sounds.   Pulmonary:      Effort: Pulmonary effort is normal.      Breath sounds: Normal breath sounds.   Abdominal:      General: Bowel sounds are normal.      Palpations: Abdomen is soft.      Tenderness: There is no abdominal tenderness. There is no guarding or rebound.   Musculoskeletal: Normal range of motion.   Skin:     General: Skin is warm and dry.   Neurological:      General: No focal deficit present.      Mental Status: She is alert.   Psychiatric:         Mood and Affect: Mood normal.         Behavior: Behavior normal.            Results for orders placed or performed in visit on 09/03/20 (from the past 24 hour(s))   CBC with platelets   Result Value Ref Range    WBC 5.0 4.0 - 11.0 10e9/L    RBC Count 4.22 3.8 - 5.2 10e12/L    Hemoglobin 14.1 11.7 - 15.7 g/dL    Hematocrit 42.7 35.0 - 47.0 %     (H) 78 - 100 fl    MCH 33.4 (H) 26.5 - 33.0 pg    MCHC 33.0 31.5 - 36.5 g/dL    RDW 14.1 10.0 - 15.0 %    Platelet Count 74 (L) " 150 - 450 10e9/L       BMP, C. difficile PCR, enteric bacteria/viral panel, hepatic panel, lipase pending.    Assessment & Plan         Abdominal pain, epigastric  Diarrhea, unspecified type  Patient is a 60-year-old female who presents to clinic today due to abdominal pain located to the epigastric region as well as ongoing diarrhea.  Patient notes symptoms have been present for many years, but worsened over the last 6 months.  Vital signs normal.  Physical exam without acute abnormalities.  Differential diagnosis for epigastric symptoms include reflux, pancreatitis, cholelithiasis.  Will evaluate further with CBC, BMP, LFTs, and lipase.  Patient prescribed Pepcid for treatment.     Patient also notes up to 10 episodes of diarrhea daily.  Will evaluate with C. difficile PCR as well as enteric bacterial/viral panel.  Low suspicion for significant volume loss given normal vital signs and abdominal exam without rebound, guarding, or tenderness.    - CBC with platelets  - Basic metabolic panel  (Ca, Cl, CO2, Creat, Gluc, K, Na, BUN)  - Hepatic panel (Albumin, ALT, AST, Bili, Alk Phos, TP)  - Lipase  - famotidine (PEPCID) 20 MG tablet; Take 1 tablet (20 mg) by mouth 2 times daily as needed (reflux, epigsatric discomfort)  - Clostridium difficile Toxin B PCR; Future  - Enteric Bacteria and Virus Panel by FRANCISCO J Stool; Future    STEPHANIE (generalized anxiety disorder)  Mild major depression (H)  Patient also requests refills of medication for depression/anxiety including citalopram and Lorazepam.  Patient notes increase in anxiety symptoms due to ongoing abdominal discomfort as well as COVID-19.  Patient is not therapy at this time and declines referral to therapy.  She feels medications work well to treat her symptoms.  Refills provided.  Recommended follow-up with PCP in 1 month for reevaluation and ongoing refills.  - citalopram (CELEXA) 40 MG tablet; Take 1 tablet (40 mg) by mouth daily  - LORazepam (ATIVAN) 0.5 MG tablet;  Take 1 tablet by mouth daily as needed for panic attacks       See Patient Instructions    Return in about 4 weeks (around 10/1/2020) for Reevaluation.    Ninfa Carreon PA-C  [facility]

## 2020-09-03 NOTE — PATIENT INSTRUCTIONS
We will complete lab work today to further evaluate your abdominal discomfort.  You have been provided with items to collect stool samples.  Please bring these back to the clinic for evaluation.  We are also completing blood work to assess your liver, pancreas, and gallbladder function.  You have been prescribed famotidine which will hopefully help your abdominal discomfort as well as nausea and vomiting.    You have been provided with refills of your citalopram and Ativan for anxiety.    I would like you to follow-up with Aliyah your primary care provider in 4 weeks for a recheck.  If you notice significantly worsening symptoms such as fevers, severe abdominal pain uncontrollable nausea vomiting, please seek urgent care.      Patient Education     Understanding Anxiety Disorders  Almost everyone gets nervous now and then. It s normal to have knots in your stomach before a test, or for your heart to race on a first date. But an anxiety disorder is much more than a case of nerves. In fact, its symptoms may be overwhelming. But treatment can relieve many of these symptoms. Talking to your healthcare provider is the first step.    What are anxiety disorders?  An anxiety disorder causes intense feelings of panic and fear. These feelings may arise for no apparent reason. And they tend to recur again and again. They may prevent you from coping with life and cause you great distress. As a result, you may avoid anything that triggers your fear. In extreme cases, you may never leave the house. Anxiety disorders may cause other symptoms, such as:    Obsessive thoughts that are unwanted and you can t control    Constant nightmares or painful thoughts of the past    Nausea, sweating, and muscle tension    Trouble sleeping or concentrating  What causes anxiety disorders?  Anxiety disorders tend to run in families. For some people, childhood abuse or neglect may play a role. For others, stressful life events or trauma may trigger  anxiety disorders. Anxiety can trigger low self-esteem and poor coping skills.    Panic disorder. This causes an intense fear of being in danger.    Phobias. These are extreme fears of certain objects, places, or events.    Obsessive-compulsive disorder. This causes you to have unwanted thoughts and urges. You also may perform certain actions over and over.    Posttraumatic stress disorder. This occurs in people who have survived a terrible ordeal. It can cause nightmares and flashbacks about the event.    Generalized anxiety disorder. This causes constant worry that can greatly disrupt your life.    Getting better  You may believe that nothing can help you. Or, you might fear what others may think. But most anxiety symptoms can be eased. Having an anxiety disorder is nothing to be ashamed of. Most people do best with treatment that combines medicine and individual and group therapy. These aren t cures. But they can help you live a healthier life.  Date Last Reviewed: 2/1/2017 2000-2019 [employer]. [address] [place] [address]. All rights reserved. This information is not intended as a substitute for professional medical care. Always follow your healthcare professional's instructions.           Patient Education     Tips to Control Acid Reflux    To control acid reflux, you ll need to make some basic diet and lifestyle changes. The simple steps outlined below may be all you ll need to ease discomfort.  Watch what you eat    Avoid fatty foods and spicy foods.    Eat fewer acidic foods, such as citrus and tomato-based foods. These can increase symptoms.    Limit drinking alcohol, caffeine, and fizzy beverages. All increase acid reflux.    Try limiting chocolate, peppermint, and spearmint. These can worsen acid reflux in some people.  Watch when you eat    Avoid lying down for 3 hours after eating.    Do not snack before going to bed.  Raise your head  Raising your head and upper body by  4 to 6 inches helps limit reflux when you re lying down. Put blocks under the head of your bed frame to raise it.  Other changes    Lose weight, if you need to    Don t exercise near bedtime    Avoid tight-fitting clothes    Limit aspirin and ibuprofen    Stop smoking   Date Last Reviewed: 7/1/2016 2000-2019 [employer]. [address] [place] [address], [place] [address]. All rights reserved. This information is not intended as a substitute for professional medical care. Always follow your healthcare professional's instructions.           Patient Education     Diarrhea with Uncertain Cause (Adult)    Diarrhea is when stools are loose and watery. This can be caused by:    Viral infections    Bacterial infections    Food poisoning    Parasites    Irritable bowel syndrome (IBS)    Inflammatory bowel diseases such as ulcerative colitis, Crohn's disease, and celiac disease    Food intolerance, such as to lactose, the sugar found in milk and milk products    Reaction to medicines like antibiotics, laxatives, cancer drugs, and antacids  Along with diarrhea, you may also have:    Abdominal pain and cramping    Nausea and vomiting    Loss of bowel control    Fever and chills    Bloody stools  In some cases, antibiotics may help to treat diarrhea. You may have a stool sample test. This is done to see what is causing your diarrhea, and if antibiotics will help treat it. The results of a stool sample test may take up to 2 days. The healthcare provider may not give you antibiotics until he or she has the stool test results.  Diarrhea can cause dehydration. This is the loss of too much water and other fluids from the body. When this occurs, body fluid must be replaced. This can be done with oral rehydration solutions. Oral rehydration solutions are available at drugstores and grocery stores without a prescription. Sports drinks are not the best choice if you are very dehydrated. They have too much sugar and not enough  electrolytes.  Home care  Follow all instructions given by your healthcare provider. Rest at home for the next 24 hours, or until you feel better. Avoid caffeine, tobacco, and alcohol. These can make diarrhea, cramping, and pain worse.  If taking medicines:    Over-the-counter nausea and diarrhea medicines are generally OK unless you experience fever or blood stool. Check with your doctor first in those circumstances.    You may use acetaminophen or NSAID medicines like ibuprofen or naproxen to reduce pain and fever. Don t use these if you have chronic liver or kidney disease, or ever had a stomach ulcer or gastrointestinal bleeding. Don't use NSAID medicines if you are already taking one for another condition (like arthritis) or are on daily aspirin therapy (such as for heart disease or after a stroke). Talk with your healthcare provider first.    If antibiotics were prescribed, be sure you take them until they are finished. Don t stop taking them even when you feel better. Antibiotics must be taken as a full course.  To prevent the spread of illness:    Remember that washing with soap and water and using alcohol-based  is the best way to prevent the spread of infection. Dry your hands with a single use towel (like a paper towel).    Clean the toilet after each use.    Wash your hands before eating.    Wash your hands before and after preparing food. Keep in mind that people with diarrhea or vomiting should not prepare food for others.    Wash your hands after using cutting boards, countertops, and knives that have been in contact with raw foods.    Wash and then peel fruits and vegetables.    Keep uncooked meats away from cooked and ready-to-eat foods.    Use a food thermometer when cooking. Cook poultry to at least 165 F (74 C). Cook ground meat (beef, veal, pork, lamb) to at least 160 F (71 C). Cook fresh beef, veal, lamb, and pork to at least 145 F (63 C).    Don t eat raw or undercooked eggs (poached  or bk side up), poultry, meat, or unpasteurized milk and juices.  Food and drinks  The main goal while treating vomiting or diarrhea is to prevent dehydration. This is done by taking small amounts of liquids often.    Keep in mind that liquids are more important than food right now.    Drink only small amounts of liquids at a time.    Don t force yourself to eat, especially if you are having cramping, vomiting, or diarrhea. Don t eat large amounts at a time, even if you are hungry.    If you eat, avoid fatty, greasy, spicy, or fried foods.    Don t eat dairy foods or drink milk if you have diarrhea. These can make diarrhea worse.  During the first 24 hours you can try:    Oral rehydration solutions.  Sports drinks may be used if you are not too dehydrated and are otherwise healthy.    Soft drinks without caffeine    Ginger ale    Water (plain or flavored)    Decaf tea or coffee    Clear broth, consommé, or bouillon    Gelatin, popsicles, or frozen fruit juice bars  The second 24 hours, if you are feeling better, you can add:    Hot cereal, plain toast, bread, rolls, or crackers    Plain noodles, rice, mashed potatoes, chicken noodle soup, or rice soup    Unsweetened canned fruit (no pineapple)    Bananas  As you recover:    Limit fat intake to less than 15 grams per day. Don t eat margarine, butter, oils, mayonnaise, sauces, gravies, fried foods, peanut butter, meat, poultry, or fish.    Limit fiber. Don t eat raw or cooked vegetables, fresh fruits except bananas, or bran cereals.    Limit caffeine and chocolate.    Limit dairy.    Don t use spices or seasonings except salt.    Go back to your normal diet over time, as you feel better and your symptoms improve.    If the symptoms come back, go back to a simple diet or clear liquids.  Follow-up care  Follow up with your healthcare provider, or as advised. If a stool sample was taken or cultures were done, call the healthcare provider for the results as  instructed.  Call 911  Call 911 if you have any of these symptoms:    Trouble breathing    Confusion    Extreme drowsiness or trouble walking    Loss of consciousness    Rapid heart rate    Chest pain    Stiff neck    Seizure  When to seek medical advice  Call your healthcare provider right away if any of these occur:    Abdominal pain that gets worse    Constant lower right abdominal pain    Continued vomiting and inability to keep liquids down    Diarrhea more than 5 times a day    Blood in vomit or stool    Dark urine or no urine for 8 hours, dry mouth and tongue, tiredness, weakness, or dizziness    Drowsiness    New rash    You don t get better in 2 to 3 days    Fever of 100.4 F (38 C) or higher, or as directed by your healthcare provider  Date Last Reviewed: 6/1/2018 2000-2019 [employer]. [address] [place] [address], [place] [address]. All rights reserved. This information is not intended as a substitute for professional medical care. Always follow your healthcare professional's instructions.

## 2020-09-04 DIAGNOSIS — R79.89 ELEVATED LFTS: Primary | ICD-10-CM

## 2020-09-04 DIAGNOSIS — R19.7 DIARRHEA, UNSPECIFIED TYPE: ICD-10-CM

## 2020-09-04 LAB
C DIFF TOX B STL QL: POSITIVE
SPECIMEN SOURCE: ABNORMAL

## 2020-09-04 PROCEDURE — 87506 IADNA-DNA/RNA PROBE TQ 6-11: CPT | Performed by: PHYSICIAN ASSISTANT

## 2020-09-04 PROCEDURE — 87493 C DIFF AMPLIFIED PROBE: CPT | Performed by: PHYSICIAN ASSISTANT

## 2020-09-05 LAB
C COLI+JEJUNI+LARI FUSA STL QL NAA+PROBE: ABNORMAL
EC STX1 GENE STL QL NAA+PROBE: ABNORMAL
EC STX2 GENE STL QL NAA+PROBE: ABNORMAL
ENTERIC PATHOGEN COMMENT: ABNORMAL
NOROV GI+II ORF1-ORF2 JNC STL QL NAA+PR: ABNORMAL
RVA NSP5 STL QL NAA+PROBE: ABNORMAL
SALMONELLA SP RPOD STL QL NAA+PROBE: ABNORMAL
SHIGELLA SP+EIEC IPAH STL QL NAA+PROBE: ABNORMAL
V CHOL+PARA RFBL+TRKH+TNAA STL QL NAA+PR: ABNORMAL
Y ENTERO RECN STL QL NAA+PROBE: ABNORMAL

## 2020-09-08 ENCOUNTER — TELEPHONE (OUTPATIENT)
Dept: FAMILY MEDICINE | Facility: CLINIC | Age: 60
End: 2020-09-08

## 2020-09-08 DIAGNOSIS — A04.72 C. DIFFICILE COLITIS: Primary | ICD-10-CM

## 2020-09-08 RX ORDER — VANCOMYCIN HYDROCHLORIDE 125 MG/1
125 CAPSULE ORAL 4 TIMES DAILY
Qty: 40 CAPSULE | Refills: 0 | Status: SHIPPED | OUTPATIENT
Start: 2020-09-08 | End: 2020-09-18

## 2020-09-08 NOTE — TELEPHONE ENCOUNTER
Left message on answering machine to return call.  Direct line provided.  Andie Lind RN      Team - please call patient with results.  Patient stool samples are positive for C. difficile colitis.  This requires antibiotic treatment with vancomycin.  Prescription sent to patient's preferred pharmacy.  Symptoms should improve over the 10-day course of treatment.  If patient experiences new or worsening symptoms such as severe abdominal pain, uncontrollable fevers, uncontrollable nausea/vomiting, emergent follow-up required.  If symptoms not improving with treatment over the next 7-10 days, patient follow-up in clinic.

## 2020-09-09 DIAGNOSIS — E03.9 HYPOTHYROIDISM, UNSPECIFIED TYPE: ICD-10-CM

## 2020-09-09 NOTE — TELEPHONE ENCOUNTER
Requested Prescriptions   Pending Prescriptions Disp Refills     levothyroxine (SYNTHROID/LEVOTHROID) 112 MCG tablet 30 tablet 0   Last Written Prescription Date:  7-9-20  Last Fill Quantity: 90,  # refills: 0   Last office visit: 9/3/2020 with prescribing provider:  9-3-20   Future Office Visit:            There is no refill protocol information for this order

## 2020-09-10 ENCOUNTER — ANCILLARY PROCEDURE (OUTPATIENT)
Dept: ULTRASOUND IMAGING | Facility: CLINIC | Age: 60
End: 2020-09-10
Attending: PHYSICIAN ASSISTANT
Payer: COMMERCIAL

## 2020-09-10 DIAGNOSIS — R79.89 ELEVATED LFTS: ICD-10-CM

## 2020-09-10 PROCEDURE — 76705 ECHO EXAM OF ABDOMEN: CPT

## 2020-09-12 RX ORDER — LEVOTHYROXINE SODIUM 112 UG/1
112 TABLET ORAL DAILY
Qty: 30 TABLET | Refills: 0 | Status: SHIPPED | OUTPATIENT
Start: 2020-09-12 | End: 2020-09-16

## 2020-09-14 NOTE — TELEPHONE ENCOUNTER
Call placed topatient.  Voicemail message left, requesting call back to verify she has received lab results from ROWENA Carreon and has started her antibiotic.  Call back number clinic RN given.  Anitra Cruz RN

## 2020-09-16 ENCOUNTER — OFFICE VISIT (OUTPATIENT)
Dept: FAMILY MEDICINE | Facility: CLINIC | Age: 60
End: 2020-09-16
Payer: COMMERCIAL

## 2020-09-16 VITALS
BODY MASS INDEX: 28.79 KG/M2 | WEIGHT: 173 LBS | OXYGEN SATURATION: 98 % | TEMPERATURE: 97.6 F | SYSTOLIC BLOOD PRESSURE: 106 MMHG | HEART RATE: 83 BPM | DIASTOLIC BLOOD PRESSURE: 72 MMHG | RESPIRATION RATE: 20 BRPM

## 2020-09-16 DIAGNOSIS — J45.50 SEVERE PERSISTENT ASTHMA WITHOUT COMPLICATION (H): ICD-10-CM

## 2020-09-16 DIAGNOSIS — E03.9 HYPOTHYROIDISM, UNSPECIFIED TYPE: ICD-10-CM

## 2020-09-16 DIAGNOSIS — F32.0 MILD MAJOR DEPRESSION (H): ICD-10-CM

## 2020-09-16 DIAGNOSIS — D69.6 THROMBOCYTOPENIA (H): ICD-10-CM

## 2020-09-16 DIAGNOSIS — R79.89 ELEVATED LFTS: ICD-10-CM

## 2020-09-16 DIAGNOSIS — A04.72 C. DIFFICILE COLITIS: Primary | ICD-10-CM

## 2020-09-16 DIAGNOSIS — F41.1 GAD (GENERALIZED ANXIETY DISORDER): ICD-10-CM

## 2020-09-16 DIAGNOSIS — R31.0 GROSS HEMATURIA: ICD-10-CM

## 2020-09-16 DIAGNOSIS — J30.81 ALLERGIC RHINITIS DUE TO ANIMAL DANDER: ICD-10-CM

## 2020-09-16 LAB
ALBUMIN SERPL-MCNC: 3.5 G/DL (ref 3.4–5)
ALP SERPL-CCNC: 111 U/L (ref 40–150)
ALT SERPL W P-5'-P-CCNC: 87 U/L (ref 0–50)
AST SERPL W P-5'-P-CCNC: 149 U/L (ref 0–45)
BACTERIA #/AREA URNS HPF: ABNORMAL /HPF
BASOPHILS # BLD AUTO: 0.1 10E9/L (ref 0–0.2)
BASOPHILS NFR BLD AUTO: 2 %
BILIRUB DIRECT SERPL-MCNC: 1.4 MG/DL (ref 0–0.2)
BILIRUB SERPL-MCNC: 2.8 MG/DL (ref 0.2–1.3)
DIFFERENTIAL METHOD BLD: ABNORMAL
EOSINOPHIL # BLD AUTO: 0.2 10E9/L (ref 0–0.7)
EOSINOPHIL NFR BLD AUTO: 3 %
ERYTHROCYTE [DISTWIDTH] IN BLOOD BY AUTOMATED COUNT: 13.5 % (ref 10–15)
HCT VFR BLD AUTO: 43 % (ref 35–47)
HGB BLD-MCNC: 13.9 G/DL (ref 11.7–15.7)
LYMPHOCYTES # BLD AUTO: 1.5 10E9/L (ref 0.8–5.3)
LYMPHOCYTES NFR BLD AUTO: 21 %
MCH RBC QN AUTO: 33.1 PG (ref 26.5–33)
MCHC RBC AUTO-ENTMCNC: 32.3 G/DL (ref 31.5–36.5)
MCV RBC AUTO: 102 FL (ref 78–100)
MONOCYTES # BLD AUTO: 0.5 10E9/L (ref 0–1.3)
MONOCYTES NFR BLD AUTO: 7 %
NEUTROPHILS # BLD AUTO: 4.8 10E9/L (ref 1.6–8.3)
NEUTROPHILS NFR BLD AUTO: 67 %
NON-SQ EPI CELLS #/AREA URNS LPF: ABNORMAL /LPF
PLATELET # BLD AUTO: 118 10E9/L (ref 150–450)
PLATELET # BLD EST: ABNORMAL 10*3/UL
PROT SERPL-MCNC: 7.5 G/DL (ref 6.8–8.8)
RBC # BLD AUTO: 4.2 10E12/L (ref 3.8–5.2)
RBC #/AREA URNS AUTO: ABNORMAL /HPF
RBC MORPH BLD: ABNORMAL
T4 FREE SERPL-MCNC: 1.68 NG/DL (ref 0.76–1.46)
TSH SERPL DL<=0.005 MIU/L-ACNC: 10.44 MU/L (ref 0.4–4)
VARIANT LYMPHS BLD QL SMEAR: PRESENT
WBC # BLD AUTO: 7.1 10E9/L (ref 4–11)
WBC #/AREA URNS AUTO: ABNORMAL /HPF

## 2020-09-16 PROCEDURE — 80076 HEPATIC FUNCTION PANEL: CPT | Performed by: PHYSICIAN ASSISTANT

## 2020-09-16 PROCEDURE — 81015 MICROSCOPIC EXAM OF URINE: CPT | Performed by: PHYSICIAN ASSISTANT

## 2020-09-16 PROCEDURE — 87086 URINE CULTURE/COLONY COUNT: CPT | Performed by: PHYSICIAN ASSISTANT

## 2020-09-16 PROCEDURE — 84443 ASSAY THYROID STIM HORMONE: CPT | Performed by: PHYSICIAN ASSISTANT

## 2020-09-16 PROCEDURE — 84439 ASSAY OF FREE THYROXINE: CPT | Performed by: PHYSICIAN ASSISTANT

## 2020-09-16 PROCEDURE — 99214 OFFICE O/P EST MOD 30 MIN: CPT | Performed by: PHYSICIAN ASSISTANT

## 2020-09-16 PROCEDURE — 85025 COMPLETE CBC W/AUTO DIFF WBC: CPT | Performed by: PHYSICIAN ASSISTANT

## 2020-09-16 PROCEDURE — 36415 COLL VENOUS BLD VENIPUNCTURE: CPT | Performed by: PHYSICIAN ASSISTANT

## 2020-09-16 RX ORDER — LEVOTHYROXINE SODIUM 112 UG/1
112 TABLET ORAL DAILY
Qty: 90 TABLET | Refills: 3 | Status: SHIPPED | OUTPATIENT
Start: 2020-09-16 | End: 2021-09-21

## 2020-09-16 RX ORDER — LORAZEPAM 0.5 MG/1
TABLET ORAL
Qty: 20 TABLET | Refills: 1 | Status: SHIPPED | OUTPATIENT
Start: 2020-09-16 | End: 2021-09-27

## 2020-09-16 RX ORDER — ALBUTEROL SULFATE 90 UG/1
AEROSOL, METERED RESPIRATORY (INHALATION)
Qty: 8.5 G | Refills: 11 | Status: SHIPPED | OUTPATIENT
Start: 2020-09-16 | End: 2022-01-25

## 2020-09-16 RX ORDER — CITALOPRAM HYDROBROMIDE 40 MG/1
40 TABLET ORAL DAILY
Qty: 90 TABLET | Refills: 3 | Status: SHIPPED | OUTPATIENT
Start: 2020-09-16 | End: 2021-09-27

## 2020-09-16 RX ORDER — MONTELUKAST SODIUM 10 MG/1
10 TABLET ORAL AT BEDTIME
Qty: 90 TABLET | Refills: 3 | Status: SHIPPED | OUTPATIENT
Start: 2020-09-16 | End: 2021-10-26

## 2020-09-16 ASSESSMENT — ANXIETY QUESTIONNAIRES
2. NOT BEING ABLE TO STOP OR CONTROL WORRYING: SEVERAL DAYS
IF YOU CHECKED OFF ANY PROBLEMS ON THIS QUESTIONNAIRE, HOW DIFFICULT HAVE THESE PROBLEMS MADE IT FOR YOU TO DO YOUR WORK, TAKE CARE OF THINGS AT HOME, OR GET ALONG WITH OTHER PEOPLE: SOMEWHAT DIFFICULT
3. WORRYING TOO MUCH ABOUT DIFFERENT THINGS: SEVERAL DAYS
5. BEING SO RESTLESS THAT IT IS HARD TO SIT STILL: NOT AT ALL
7. FEELING AFRAID AS IF SOMETHING AWFUL MIGHT HAPPEN: NOT AT ALL
6. BECOMING EASILY ANNOYED OR IRRITABLE: NOT AT ALL
1. FEELING NERVOUS, ANXIOUS, OR ON EDGE: SEVERAL DAYS
GAD7 TOTAL SCORE: 3

## 2020-09-16 ASSESSMENT — PATIENT HEALTH QUESTIONNAIRE - PHQ9
5. POOR APPETITE OR OVEREATING: NOT AT ALL
SUM OF ALL RESPONSES TO PHQ QUESTIONS 1-9: 5

## 2020-09-16 NOTE — PROGRESS NOTES
Subjective     Setfani Crowell is a 60 year old female who presents to clinic today for the following health issues:    HPI       Medication Followup of Abx for c.diff    Taking Medication as prescribed: yes    Side Effects:  None    Medication Helping Symptoms:  Yes feeling really good now.    On day 8 of 10  Diarrhea nearly resolved  No fevers     Depression and Anxiety Follow-Up    How are you doing with your depression since your last visit? Improved     How are you doing with your anxiety since your last visit?  Improved     Are you having other symptoms that might be associated with depression or anxiety? No    Have you had a significant life event? No     Do you have any concerns with your use of alcohol or other drugs? No    Social History     Tobacco Use     Smoking status: Current Every Day Smoker     Packs/day: 0.50     Years: 20.00     Pack years: 10.00     Types: Cigarettes     Smokeless tobacco: Never Used     Tobacco comment: 5 cigs   Substance Use Topics     Alcohol use: Yes     Comment: occasionally     Drug use: No     PHQ 3/1/2019 8/19/2019 9/16/2020   PHQ-9 Total Score 1 9 5   Q9: Thoughts of better off dead/self-harm past 2 weeks Not at all Several days Not at all     STEPHANIE-7 SCORE 3/1/2019 8/19/2019 9/16/2020   Total Score - - -   Total Score 3 17 3       Suicide Assessment Five-step Evaluation and Treatment (SAFE-T)    Asthma Follow-Up    Was ACT completed today?    Yes    ACT Total Scores 9/16/2020   ACT TOTAL SCORE -   ASTHMA ER VISITS -   ASTHMA HOSPITALIZATIONS -   ACT TOTAL SCORE (Goal Greater than or Equal to 20) 20   In the past 12 months, how many times did you visit the emergency room for your asthma without being admitted to the hospital? 0   In the past 12 months, how many times were you hospitalized overnight because of your asthma? 0         How many days per week do you miss taking your asthma controller medication?  2    Please describe any recent triggers for your asthma: smoke  from [facility]    Have you had any Emergency Room Visits, Urgent Care Visits, or Hospital Admissions since your last office visit?  No    Hypothyroidism Follow-up      Since last visit, patient describes the following symptoms: weight loss of 10 lbs and loose stools-due to c.diff      How many servings of fruits and vegetables do you eat daily?  2-3    On average, how many sweetened beverages do you drink each day (Examples: soda, juice, sweet tea, etc.  Do NOT count diet or artificially sweetened beverages)?   0    How many days per week do you exercise enough to make your heart beat faster? 3 or less    How many minutes a day do you exercise enough to make your heart beat faster? 30 - 60  How many days per week do you miss taking your medication? 1    What makes it hard for you to take your medications?  remembering to take      Review of Systems   Constitutional, pulmonary, GI, endocrine and psych systems are negative, except as otherwise noted.      Objective    /72   Pulse 83   Temp 97.6  F (36.4  C) (Tympanic)   Resp 20   Wt 78.5 kg (173 lb)   LMP 10/03/2011   SpO2 98%   BMI 28.79 kg/m    Body mass index is 28.79 kg/m .  Physical Exam   GENERAL: healthy, alert and no distress  MS: no gross musculoskeletal defects noted, no edema  SKIN: no suspicious lesions or rashes  NEURO: Normal strength and tone, mentation intact and speech normal  PSYCH: mentation appears normal, affect normal/bright        Assessment & Plan     1. C. difficile colitis    2. Severe persistent asthma without complication    3. STEPHANIE (generalized anxiety disorder)    4. Mild major depression (H)    5. Hypothyroidism, unspecified type    6. Allergic rhinitis due to animal dander    7. Thrombocytopenia (H)    8. Elevated LFTs    9. Gross hematuria         1) Currently being treated. Follow up if symptoms worsen again.    2-8) Due for routine labs, obtained today. ACT, PHQ, and STEPHANIE at goal. Meds renewed, no changes.     9) UA today.  "If negative, will refer to GYN for re-evaluation. She has a hx of postmenopausal bleeding.       Tobacco Cessation:   reports that she has been smoking cigarettes. She has a 10.00 pack-year smoking history. She has never used smokeless tobacco.      BMI:   Estimated body mass index is 28.79 kg/m  as calculated from the following:    Height as of 9/3/20: 1.651 m (5' 5\").    Weight as of this encounter: 78.5 kg (173 lb).       Return in about 1 year (around 9/16/2021) for an annual office visit, repeat labs.    Aliyah Marcus PA-C  [facility] [place]    "

## 2020-09-17 LAB
BACTERIA SPEC CULT: NORMAL
Lab: NORMAL
SPECIMEN SOURCE: NORMAL

## 2020-09-17 ASSESSMENT — ANXIETY QUESTIONNAIRES: GAD7 TOTAL SCORE: 3

## 2020-09-17 ASSESSMENT — ASTHMA QUESTIONNAIRES: ACT_TOTALSCORE: 20

## 2020-09-18 DIAGNOSIS — E03.9 HYPOTHYROIDISM, UNSPECIFIED TYPE: Primary | ICD-10-CM

## 2020-09-18 DIAGNOSIS — R79.89 ELEVATED LFTS: ICD-10-CM

## 2020-12-13 ENCOUNTER — HEALTH MAINTENANCE LETTER (OUTPATIENT)
Age: 60
End: 2020-12-13

## 2020-12-29 ENCOUNTER — MYC MEDICAL ADVICE (OUTPATIENT)
Dept: FAMILY MEDICINE | Facility: CLINIC | Age: 60
End: 2020-12-29

## 2021-01-19 ENCOUNTER — OFFICE VISIT (OUTPATIENT)
Dept: FAMILY MEDICINE | Facility: CLINIC | Age: 61
End: 2021-01-19
Payer: COMMERCIAL

## 2021-01-19 VITALS
DIASTOLIC BLOOD PRESSURE: 84 MMHG | HEIGHT: 65 IN | BODY MASS INDEX: 26.82 KG/M2 | HEART RATE: 74 BPM | WEIGHT: 161 LBS | TEMPERATURE: 97.3 F | RESPIRATION RATE: 14 BRPM | SYSTOLIC BLOOD PRESSURE: 126 MMHG

## 2021-01-19 DIAGNOSIS — F32.0 MILD MAJOR DEPRESSION (H): ICD-10-CM

## 2021-01-19 DIAGNOSIS — K76.9 LIVER DISEASE: ICD-10-CM

## 2021-01-19 DIAGNOSIS — J45.50 SEVERE PERSISTENT ASTHMA WITHOUT COMPLICATION (H): ICD-10-CM

## 2021-01-19 DIAGNOSIS — Z23 NEED FOR VACCINATION: ICD-10-CM

## 2021-01-19 DIAGNOSIS — E03.9 HYPOTHYROIDISM, UNSPECIFIED TYPE: ICD-10-CM

## 2021-01-19 DIAGNOSIS — Z23 NEED FOR PROPHYLACTIC VACCINATION AND INOCULATION AGAINST INFLUENZA: ICD-10-CM

## 2021-01-19 DIAGNOSIS — N93.9 VAGINAL BLEEDING: ICD-10-CM

## 2021-01-19 DIAGNOSIS — Z01.818 PRE-OP EVALUATION: Primary | ICD-10-CM

## 2021-01-19 DIAGNOSIS — D69.6 THROMBOCYTOPENIA (H): ICD-10-CM

## 2021-01-19 LAB
ALBUMIN SERPL-MCNC: 4.2 G/DL (ref 3.4–5)
ALP SERPL-CCNC: 89 U/L (ref 40–150)
ALT SERPL W P-5'-P-CCNC: 33 U/L (ref 0–50)
ANION GAP SERPL CALCULATED.3IONS-SCNC: 6 MMOL/L (ref 3–14)
AST SERPL W P-5'-P-CCNC: 37 U/L (ref 0–45)
BILIRUB DIRECT SERPL-MCNC: 0.7 MG/DL (ref 0–0.2)
BILIRUB SERPL-MCNC: 2.2 MG/DL (ref 0.2–1.3)
BUN SERPL-MCNC: 9 MG/DL (ref 7–30)
CALCIUM SERPL-MCNC: 9.6 MG/DL (ref 8.5–10.1)
CHLORIDE SERPL-SCNC: 104 MMOL/L (ref 94–109)
CO2 SERPL-SCNC: 25 MMOL/L (ref 20–32)
CREAT SERPL-MCNC: 0.69 MG/DL (ref 0.52–1.04)
ERYTHROCYTE [DISTWIDTH] IN BLOOD BY AUTOMATED COUNT: 15.2 % (ref 10–15)
GFR SERPL CREATININE-BSD FRML MDRD: >90 ML/MIN/{1.73_M2}
GLUCOSE SERPL-MCNC: 106 MG/DL (ref 70–99)
HCT VFR BLD AUTO: 44.1 % (ref 35–47)
HGB BLD-MCNC: 14.7 G/DL (ref 11.7–15.7)
INR PPP: 1.39 (ref 0.86–1.14)
MCH RBC QN AUTO: 31.3 PG (ref 26.5–33)
MCHC RBC AUTO-ENTMCNC: 33.3 G/DL (ref 31.5–36.5)
MCV RBC AUTO: 94 FL (ref 78–100)
PLATELET # BLD AUTO: 51 10E9/L (ref 150–450)
POTASSIUM SERPL-SCNC: 4.4 MMOL/L (ref 3.4–5.3)
PROT SERPL-MCNC: 8 G/DL (ref 6.8–8.8)
RBC # BLD AUTO: 4.69 10E12/L (ref 3.8–5.2)
SODIUM SERPL-SCNC: 135 MMOL/L (ref 133–144)
TSH SERPL DL<=0.005 MIU/L-ACNC: 2.46 MU/L (ref 0.4–4)
WBC # BLD AUTO: 5.2 10E9/L (ref 4–11)

## 2021-01-19 PROCEDURE — 80048 BASIC METABOLIC PNL TOTAL CA: CPT | Performed by: PHYSICIAN ASSISTANT

## 2021-01-19 PROCEDURE — 99214 OFFICE O/P EST MOD 30 MIN: CPT | Mod: 25 | Performed by: PHYSICIAN ASSISTANT

## 2021-01-19 PROCEDURE — 90471 IMMUNIZATION ADMIN: CPT | Performed by: PHYSICIAN ASSISTANT

## 2021-01-19 PROCEDURE — 36415 COLL VENOUS BLD VENIPUNCTURE: CPT | Performed by: PHYSICIAN ASSISTANT

## 2021-01-19 PROCEDURE — 85610 PROTHROMBIN TIME: CPT | Performed by: PHYSICIAN ASSISTANT

## 2021-01-19 PROCEDURE — 84443 ASSAY THYROID STIM HORMONE: CPT | Performed by: PHYSICIAN ASSISTANT

## 2021-01-19 PROCEDURE — 90682 RIV4 VACC RECOMBINANT DNA IM: CPT | Performed by: PHYSICIAN ASSISTANT

## 2021-01-19 PROCEDURE — 85027 COMPLETE CBC AUTOMATED: CPT | Performed by: PHYSICIAN ASSISTANT

## 2021-01-19 PROCEDURE — 80076 HEPATIC FUNCTION PANEL: CPT | Performed by: PHYSICIAN ASSISTANT

## 2021-01-19 ASSESSMENT — MIFFLIN-ST. JEOR: SCORE: 1311.17

## 2021-01-19 ASSESSMENT — ENCOUNTER SYMPTOMS
COUGH: 0
ABDOMINAL PAIN: 0
DYSURIA: 0
NAUSEA: 0
WEAKNESS: 0
HEMATURIA: 0
HEADACHES: 0
ARTHRALGIAS: 0
PALPITATIONS: 0
COLOR CHANGE: 0
VOMITING: 0
DIARRHEA: 0
SHORTNESS OF BREATH: 0
FEVER: 0
BACK PAIN: 0
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
## 2021-01-19 NOTE — PATIENT INSTRUCTIONS
We are completing lab work today for further evaluation of your liver. I will call you tomorrow with results and next steps regarding your surgery.    Please call the gastroenterology clinic to schedule your appointment within the 1-3 days.     [place]  Gastroenterology   [facility] [place]   Floor 2, Check-In D  ?[address]  [place] [address]   Appointments: [phone]

## 2021-01-20 DIAGNOSIS — D69.6 THROMBOCYTOPENIA (H): Primary | ICD-10-CM

## 2021-01-20 ASSESSMENT — ENCOUNTER SYMPTOMS
CHILLS: 0
FATIGUE: 1
DIAPHORESIS: 1

## 2021-01-21 NOTE — TELEPHONE ENCOUNTER
RECORDS RECEIVED FROM: Internal   Appt Date: 01.29.2021   NOTES STATUS DETAILS   OFFICE NOTE from referring provider Internal 01.19.2021 Ninfa Carreon PA-C   OFFICE NOTES from other specialists Internal 09.16.2020 Aliyah Marcus PA-C   DISCHARGE SUMMARY from hospital N/A    MEDICATION LIST Internal    LIVER BIOSPY (IF APPLICABLE)      PATHOLOGY REPORTS  N/A    IMAGING     ENDOSCOPY (IF AVAILABLE) N/A    COLONOSCOPY (IF AVAILABLE) N/A    ULTRASOUND LIVER Internal 09.10.2020    CT OF ABDOMEN N/A    MRI OF LIVER N/A    FIBROSCAN, US ELASTOGRAPHY, FIBROSIS SCAN, MR ELASTOGRAPHY N/A    LABS     HEPATIC PANEL (LIVER PANEL) Internal 01.19.2021   BASIC METABOLIC PANEL Internal 01.19.2021   COMPLETE METABOLIC PANEL Internal 01.19.2021   COMPLETE BLOOD COUNT (CBC) Internal 01.19.2021   INTERNATIONAL NORMALIZED RATIO (INR) N/A    HEPATITIS C ANTIBODY N/A    HEPATITIS C VIRAL LOAD/PCR N/A    HEPATITIS C GENOTYPE N/A    HEPATITIS B SURFACE ANTIGEN N/A    HEPATITIS B SURFACE ANTIBODY N/A    HEPATITIS B DNA QUANT LEVEL N/A    HEPATITIS B CORE ANTIBODY N/A

## 2021-01-25 NOTE — TELEPHONE ENCOUNTER
ONCOLOGY INTAKE: Records Information      APPT INFORMATION: 1/26/21 - Jan - Video  Referring provider:  Jun  Referring provider s clinic:  [facility]  Reason for visit/diagnosis:  significant thrombocytopenia  Has patient been notified of appointment date and time?: Yes by Tasha CARRERA    RECORDS INFORMATION:  Were the records received with the referral (via [employer])? In Epic/CE    ADDITIONAL INFORMATION:  Scheduled via IM from /[facility]  No call needed per Tasha CARRERA - she notified patient & pt is familiar with video visit instructions

## 2021-01-26 ENCOUNTER — MYC MEDICAL ADVICE (OUTPATIENT)
Dept: FAMILY MEDICINE | Facility: CLINIC | Age: 61
End: 2021-01-26

## 2021-01-26 ENCOUNTER — VIRTUAL VISIT (OUTPATIENT)
Dept: ONCOLOGY | Facility: CLINIC | Age: 61
End: 2021-01-26
Attending: INTERNAL MEDICINE
Payer: COMMERCIAL

## 2021-01-26 ENCOUNTER — PRE VISIT (OUTPATIENT)
Dept: ONCOLOGY | Facility: CLINIC | Age: 61
End: 2021-01-26

## 2021-01-26 DIAGNOSIS — D69.6 THROMBOCYTOPENIA (H): ICD-10-CM

## 2021-01-26 PROCEDURE — 99205 OFFICE O/P NEW HI 60 MIN: CPT | Mod: GT | Performed by: INTERNAL MEDICINE

## 2021-01-26 PROCEDURE — 999N001193 HC VIDEO/TELEPHONE VISIT; NO CHARGE

## 2021-01-26 RX ORDER — TRANEXAMIC ACID 650 MG/1
TABLET ORAL
Qty: 60 TABLET | Refills: 0 | Status: SHIPPED | OUTPATIENT
Start: 2021-01-26 | End: 2021-02-03

## 2021-01-26 NOTE — TELEPHONE ENCOUNTER
Duplicate my chart.  Same message sent to PCP.  Ninfa Carreon is a same day provider so will close this encounter

## 2021-01-26 NOTE — PROGRESS NOTES
[facility] Hematology Consultation  [address] [place] [address]  Phone: [phone]    Outpatient Visit Note:    Patient: Stefani Crowell  MRN: [id number]  : 1960  SHYANNE: 2021     Reason for Consultation:  Stefani Crowell is a referred by Dr. Carreon for evaluation and treatment of thrombocytopenia    Assessment: Stefani Crowell is a 60 year old woman with cirrhosis (previous heavy alcohol use) and mild thrombocytopenia secondary to splenomegaly and portal hypertension.  Her baseline is borderline adequate for surgery, but for the sake of safety we recommended use of platelet transfusion and antifibrinolytics with surgery.     She was noted to be thrombocytopenic to the 50s as part of pre-operative work up.She is planned to undergo a dilatation and curettage procedure, which is being done for post-menopausal bleeding.  At this time, we suspect that the thrombocytopenia is related to her underlying cirrhosis. We do not have spleen imaging. She does not have any other cytopenias. There are no new medications to suggest drug induced thrombocytopenia. She has had a fluctuating thrombocytopenia since 2018, and suspicion for ITP is low. No heparin exposures to suggest HIT. We do not have viral testing (HIV/Hep B or HepC) but that may be done as part of her cirrhosis work up when she meets with Hepatology later this week.   Specifically for her procedure, we discussed that a platelet count of 50 is generally adequate, but we would recommend that there be platelets and blood available incase the need arises and there is unexpected bleeding.     Recommendations for management of thrombocytopenia he-procedurally for her upcoming D/C:  1. Recommend having blood and platelet products available at time of procedure in case there is unexpected bleeding  2. In addition, recommend one dose of IV tranexemic acid 1g to be given 30-60 minutes before procedure  3. Post procedure, initiate PO  tranexemic acid 1300 mg TID x 7 days    Patient seen and discussed with Dr. Jan Rodriguez MD  PGY5 Fellow. Hematology/Oncology/Transplantation      Physician Attestation   I saw this patient with the resident and agree with the resident s findings and plan of care as documented in the resident s note.      Briefly, Ms Crowell is a 60 year old woman with cirrhosis and thrombocytopenia from cirrhosis, splenomegaly and portal hypertension. For surgery, give TXA as above and have platelets available in the OR.    60 minutes spent on the date of the encounter doing chart review, review of test results, interpretation of tests, patient visit and documentation     Patrice Montoya MD   of Medicine  [facility] [employer]   -------------------------------      History: Stefani Crowell is a 60 year old female with a past medical history of depression and hypothyroidism and a recent diagnosis of liver cirrhosis, presumably alcohol related (has consultation with hepatology next week) who was referred to the hematology clinic for evaluation of thrombocytopenia.  Patient reports that this all started when she was diagnosed with C. difficile colitis-itis in the fall 2020.  She went to her PCP and underwent lab test that showed elevated liver function tests.  She was instructed to discontinue alcohol and she underwent an ultrasound of her liver.  The ultrasound revealed an enlarged liver with imaging evidence of cirrhosis.  Spleen was not evaluated.  She reports that in terms of bleeding, she has been dealing with abnormal vaginal bleeding for the past year.  She underwent evaluation with OB/GYN about a year ago and reports that she had a biopsy done that did not reveal any abnormalities.  She has had continued spotting, pretty much daily, for the last year requiring her to wear pads daily.  She had a repeat visit with OB/GYN and a dilatation and curettage was being planned.  She  was undergoing preoperative evaluation for this When she was noted to have worsening  thrombocytopenia with a platelet count of 51, and she was referred to hematology. She denies current GI bleeding, but reports that she would have frequent black tarry stools when she was drinking heavily prior to9/2020. She denies hematemesis.   She denies fevers or chills. She denies abdominal pain. She reports easy bruising all her life. She denies ever being diagnosed with a blood clot.     her medical history is notable for depression, hypothyroidism, cirrhosis    her surgical history is reviewed in the EMR and is notable for bladder surgery over 10 years ago    her medications are reviewed in the EMR and notable for levothyroxine, antidepressants, cyclobenzaprine    her family history is notable for nosebleed and ICH in father while on coumadin. No other relatives with bleeding problems. No family history of clots that she knows.     Physical Exam:  Appears comfortable in no distress. Appropriate mood and affect. Exam limited as this was a video visit.     Labs:  Results for ZARA TORRES (MRN [id number]) as of 1/26/2021 11:35   Ref. Range 6/29/2018 13:09 9/3/2020 12:16 9/16/2020 11:47 1/19/2021 15:02   WBC Latest Ref Range: 4.0 - 11.0 10e9/L 5.6 5.0 7.1 5.2   Hemoglobin Latest Ref Range: 11.7 - 15.7 g/dL 13.7 14.1 13.9 14.7   Hematocrit Latest Ref Range: 35.0 - 47.0 % 41.8 42.7 43.0 44.1   Platelet Count Latest Ref Range: 150 - 450 10e9/L 125 (L) 74 (L) 118 (L) 51 (L)       Imaging:  US abdomen 9/10/2020                                    IMPRESSION:  1.  Enlarged, cirrhotic-appearing liver is associated with a small  amount of ascites.        Video-Visit Details    Type of service:  Video Visit  Video Start Time: 832 AM  Video End Time:920 AM    Originating Location (pt. Location): Home    Distant Location (provider location):  [facility]     Platform used for Video Visit: Constant Contact

## 2021-01-26 NOTE — LETTER
"    2021         RE: Stefani Crowell  [address] [facility] [address]        Dear Colleague,    Thank you for referring your patient, Stefani Crowell, to the [facility]. Please see a copy of my visit note below.    Lolly is a 60 year old who is being evaluated via a billable video visit.      How would you like to obtain your AVS? MyChart  If the video visit is dropped, the invitation should be resent by: Text to cell phone: [phone]  Will anyone else be joining your video visit? No      Vitals - Patient Reported  Weight (Patient Reported): 73 kg (161 lb)  Height (Patient Reported): 165.1 cm (5' 5\")  BMI (Based on Pt Reported Ht/Wt): 26.79  Pain Score: Extreme Pain (8)  Pain Loc: Low Back      I have reviewed and updated patient's allergy and medication list.    Concerns: NONE  Refills: NONE      Emelyn Nix [facility] Hematology Consultation  [address] [place], [place] [id number]  Phone: [phone]    Outpatient Visit Note:    Patient: Stefani Crowell  MRN: [id number]  : 1960  SHYANNE: 2021     Reason for Consultation:  Stefani Crowell is a referred by Dr. Carreon for evaluation and treatment of thrombocytopenia    Assessment: Setfani Crowell is a 60 year old woman with cirrhosis (previous heavy alcohol use) and mild thrombocytopenia secondary to splenomegaly and portal hypertension.  Her baseline is borderline adequate for surgery, but for the sake of safety we recommended use of platelet transfusion and antifibrinolytics with surgery.     She was noted to be thrombocytopenic to the 50s as part of pre-operative work up.She is planned to undergo a dilatation and curettage procedure, which is being done for post-menopausal bleeding.  At this time, we suspect that the thrombocytopenia is related to her underlying cirrhosis. We do not have spleen imaging. She does not have any other cytopenias. There are no new " medications to suggest drug induced thrombocytopenia. She has had a fluctuating thrombocytopenia since 2018, and suspicion for ITP is low. No heparin exposures to suggest HIT. We do not have viral testing (HIV/Hep B or HepC) but that may be done as part of her cirrhosis work up when she meets with Hepatology later this week.   Specifically for her procedure, we discussed that a platelet count of 50 is generally adequate, but we would recommend that there be platelets and blood available incase the need arises and there is unexpected bleeding.     Recommendations for management of thrombocytopenia he-procedurally for her upcoming D/C:  1. Recommend having blood and platelet products available at time of procedure in case there is unexpected bleeding  2. In addition, recommend one dose of IV tranexemic acid 1g to be given 30-60 minutes before procedure  3. Post procedure, initiate PO tranexemic acid 1300 mg TID x 7 days    Patient seen and discussed with Dr. Jan Rodriguez MD  PGY5 Fellow. Hematology/Oncology/Transplantation      Physician Attestation   I saw this patient with the resident and agree with the resident s findings and plan of care as documented in the resident s note.      Briefly, Ms Crowell is a 60 year old woman with cirrhosis and thrombocytopenia from cirrhosis, splenomegaly and portal hypertension. For surgery, give TXA as above and have platelets available in the OR.    60 minutes spent on the date of the encounter doing chart review, review of test results, interpretation of tests, patient visit and documentation     Patrice Montoya MD   of Medicine  [facility] [employer]   -------------------------------      History: Stefani Crowell is a 60 year old female with a past medical history of depression and hypothyroidism and a recent diagnosis of liver cirrhosis, presumably alcohol related (has consultation with hepatology next week) who was  referred to the hematology clinic for evaluation of thrombocytopenia.  Patient reports that this all started when she was diagnosed with C. difficile colitis-itis in the fall 2020.  She went to her PCP and underwent lab test that showed elevated liver function tests.  She was instructed to discontinue alcohol and she underwent an ultrasound of her liver.  The ultrasound revealed an enlarged liver with imaging evidence of cirrhosis.  Spleen was not evaluated.  She reports that in terms of bleeding, she has been dealing with abnormal vaginal bleeding for the past year.  She underwent evaluation with OB/GYN about a year ago and reports that she had a biopsy done that did not reveal any abnormalities.  She has had continued spotting, pretty much daily, for the last year requiring her to wear pads daily.  She had a repeat visit with OB/GYN and a dilatation and curettage was being planned.  She was undergoing preoperative evaluation for this When she was noted to have worsening  thrombocytopenia with a platelet count of 51, and she was referred to hematology. She denies current GI bleeding, but reports that she would have frequent black tarry stools when she was drinking heavily prior to9/2020. She denies hematemesis.   She denies fevers or chills. She denies abdominal pain. She reports easy bruising all her life. She denies ever being diagnosed with a blood clot.     her medical history is notable for depression, hypothyroidism, cirrhosis    her surgical history is reviewed in the EMR and is notable for bladder surgery over 10 years ago    her medications are reviewed in the EMR and notable for levothyroxine, antidepressants, cyclobenzaprine    her family history is notable for nosebleed and ICH in father while on coumadin. No other relatives with bleeding problems. No family history of clots that she knows.     Physical Exam:  Appears comfortable in no distress. Appropriate mood and affect. Exam limited as this was a  video visit.     Labs:  Results for ZARA TORRES (MRN [id number]) as of 1/26/2021 11:35   Ref. Range 6/29/2018 13:09 9/3/2020 12:16 9/16/2020 11:47 1/19/2021 15:02   WBC Latest Ref Range: 4.0 - 11.0 10e9/L 5.6 5.0 7.1 5.2   Hemoglobin Latest Ref Range: 11.7 - 15.7 g/dL 13.7 14.1 13.9 14.7   Hematocrit Latest Ref Range: 35.0 - 47.0 % 41.8 42.7 43.0 44.1   Platelet Count Latest Ref Range: 150 - 450 10e9/L 125 (L) 74 (L) 118 (L) 51 (L)       Imaging:  US abdomen 9/10/2020                                    IMPRESSION:  1.  Enlarged, cirrhotic-appearing liver is associated with a small  amount of ascites.        Video-Visit Details    Type of service:  Video Visit  Video Start Time: 832 AM  Video End Time:920 AM    Originating Location (pt. Location): Home    Distant Location (provider location):  [facility] CANCER [facility]     Platform used for Video Visit: Romeo        Again, thank you for allowing me to participate in the care of your patient.        Sincerely,        Patrice Montoya MD

## 2021-01-26 NOTE — PROGRESS NOTES
"Lolly is a 60 year old who is being evaluated via a billable video visit.      How would you like to obtain your AVS? MyChart  If the video visit is dropped, the invitation should be resent by: Text to cell phone: [phone]  Will anyone else be joining your video visit? No      Vitals - Patient Reported  Weight (Patient Reported): 73 kg (161 lb)  Height (Patient Reported): 165.1 cm (5' 5\")  BMI (Based on Pt Reported Ht/Wt): 26.79  Pain Score: Extreme Pain (8)  Pain Loc: Low Back      I have reviewed and updated patient's allergy and medication list.    Concerns: NONE  Refills: NONE      Emelyn Nix CMA      "

## 2021-01-27 ENCOUNTER — TELEPHONE (OUTPATIENT)
Dept: ONCOLOGY | Facility: CLINIC | Age: 61
End: 2021-01-27

## 2021-01-27 ENCOUNTER — PATIENT OUTREACH (OUTPATIENT)
Dept: CARE COORDINATION | Facility: CLINIC | Age: 61
End: 2021-01-27

## 2021-01-27 NOTE — PROGRESS NOTES
Oncology Distress Screening Follow-up  Clinical Social Work  [facility]    Identified Concern and Score From Distress Screening:   Oncology Distress Screening    Row Name  21   [id number]           Please tell me how concerned you feel regarding the following common issues people with cancer face. Please answer with a number from zero to ten where 0=not concerned and 10=very concerned.    1. How concerned are you about your ability to eat?   5  PATIENT STATED SHE IS LOSING WEIGHT - UNABLE TO EAT AS MUCH AS SHE USED TO           2. How concerned are you about unintended weight loss or your current weight?   5  PATIENT STATED SHE IS LOSING WEIGHT - UNABLE TO EAT AS MUCH AS SHE USED TO           3. How concerned are you about feeling depressed or very sad?   8Abnormal   PATIENT STATED SHE WAS UNABLE TO SLEEP LAST NIGHT - SHE IS SO WORRIED ABOUT THIS APPOINTMENT           4. How concerned are you about feeling anxious or very scared?   8Abnormal   PATIENT STATED SHE WAS UNABLE TO SLEEP LAST NIGHT - SHE IS SO WORRIED ABOUT THIS APPOINTMENT           7. How concerned are you about knowing what resources are available to help you?   0           8. Do you currently have what you would describe as Yazdanism or spiritual struggles?              Not at all                 Date of Distress Screenin2021    Data:   Pt indicated feelings of being worried about the appointment.    Intervention:     SW called pt to follow up on distress screen, but pt did not answer. SW left pt a voicemail with contact information and will wait for pt to call back.     Follow-up Required:     SW will wait for pt to call back and continue to remain available as needed.      KAR Sparks,ELAINA  Hematology/Oncology Social Worker  Phone:[phone] Pager: [phone]

## 2021-01-28 NOTE — TELEPHONE ENCOUNTER
Central Prior Authorization Team   Phone: [phone]      PA Initiation    Medication: Tranexamic acid-PA initiated  Insurance Company: [employer] ([facility]) - Phone [phone] Fax [phone]  Pharmacy Filling the Rx: [id number] - [place] - [address] [facility]  Filling Pharmacy Phone: [phone]  Filling Pharmacy Fax:    Start Date: 1/28/2021

## 2021-01-29 ENCOUNTER — PRE VISIT (OUTPATIENT)
Dept: GASTROENTEROLOGY | Facility: CLINIC | Age: 61
End: 2021-01-29

## 2021-01-29 ENCOUNTER — VIRTUAL VISIT (OUTPATIENT)
Dept: GASTROENTEROLOGY | Facility: CLINIC | Age: 61
End: 2021-01-29
Attending: STUDENT IN AN ORGANIZED HEALTH CARE EDUCATION/TRAINING PROGRAM
Payer: COMMERCIAL

## 2021-01-29 DIAGNOSIS — K70.31 ALCOHOLIC CIRRHOSIS OF LIVER WITH ASCITES (H): Primary | ICD-10-CM

## 2021-01-29 DIAGNOSIS — N93.9 VAGINAL BLEEDING: ICD-10-CM

## 2021-01-29 DIAGNOSIS — K76.9 LIVER DISEASE: ICD-10-CM

## 2021-01-29 DIAGNOSIS — D69.6 THROMBOCYTOPENIA (H): ICD-10-CM

## 2021-01-29 DIAGNOSIS — F10.21 ALCOHOL USE DISORDER, SEVERE, IN EARLY REMISSION (H): ICD-10-CM

## 2021-01-29 PROCEDURE — 99215 OFFICE O/P EST HI 40 MIN: CPT | Mod: GT | Performed by: STUDENT IN AN ORGANIZED HEALTH CARE EDUCATION/TRAINING PROGRAM

## 2021-01-29 ASSESSMENT — PAIN SCALES - GENERAL: PAINLEVEL: NO PAIN (0)
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
## 2021-01-29 NOTE — PROGRESS NOTES
No history of jaundice  No bx    First told she had liver disease in September.    Was drinking 3-4 drinks daily,     Had c. Dif - brought her to doctor in September. BR was noted to be elevated, so US was ordered that showed cirrhosis and small volume ascites.     First found to have c. Dif 2020    In the past, was sober for 6 months, did not drink while pregnant.     Never had a paracentesis. No history of HE, GIB.     Brother in law  of liver cancer    Having vaginal bleeding, about the same. Has to wear a pad daily. Dr. Lolly Adams    Planning on D+C       Lives at home with partner of 19 years, Hari, and daughter  Smokes 7-10/day    Was taking ativan to help sleeping after she stopped drinking. Now taking melatonin. She reports mild withdrawal symptoms with stopping drinking, never been hospitalized.     Went to AA a few years ago.     DUI in her 20s    Lost 30 lbs with stopping drinking  llabs with PCP    Endoscopy =- EGD/colon  RUQ US    RV 3 months    marcin
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
## 2021-01-29 NOTE — TELEPHONE ENCOUNTER
PRIOR AUTHORIZATION DENIED    Medication: Tranexamic acid-PA denied    Denial Date: 1/28/2021    Denial Rational: Excluded        Appeal Information:

## 2021-02-01 ENCOUNTER — TELEPHONE (OUTPATIENT)
Dept: GASTROENTEROLOGY | Facility: CLINIC | Age: 61
End: 2021-02-01

## 2021-02-01 NOTE — NURSING NOTE
Office visit note faxed to Dr. Lolly Muse ([facility]) per request of Dr. Cary.    Mery LAGUNA LPN  Hepatology Clinic

## 2021-02-03 DIAGNOSIS — D69.6 THROMBOCYTOPENIA (H): ICD-10-CM

## 2021-02-03 RX ORDER — TRANEXAMIC ACID 650 MG/1
TABLET ORAL
Qty: 30 TABLET | Refills: 1 | Status: SHIPPED | OUTPATIENT
Start: 2021-02-03 | End: 2022-10-07

## 2021-02-04 ENCOUNTER — MYC MEDICAL ADVICE (OUTPATIENT)
Dept: FAMILY MEDICINE | Facility: CLINIC | Age: 61
End: 2021-02-04

## 2021-02-18 ENCOUNTER — OFFICE VISIT (OUTPATIENT)
Dept: FAMILY MEDICINE | Facility: CLINIC | Age: 61
End: 2021-02-18
Payer: COMMERCIAL

## 2021-02-18 VITALS
HEIGHT: 65 IN | SYSTOLIC BLOOD PRESSURE: 122 MMHG | DIASTOLIC BLOOD PRESSURE: 74 MMHG | HEART RATE: 68 BPM | RESPIRATION RATE: 14 BRPM | BODY MASS INDEX: 27.72 KG/M2 | WEIGHT: 166.38 LBS | TEMPERATURE: 98 F

## 2021-02-18 DIAGNOSIS — N93.9 VAGINAL BLEEDING: ICD-10-CM

## 2021-02-18 DIAGNOSIS — K70.31 ALCOHOLIC CIRRHOSIS OF LIVER WITH ASCITES (H): ICD-10-CM

## 2021-02-18 DIAGNOSIS — K76.9 LIVER DISEASE: ICD-10-CM

## 2021-02-18 DIAGNOSIS — D69.6 THROMBOCYTOPENIA (H): ICD-10-CM

## 2021-02-18 DIAGNOSIS — Z01.818 PREOP GENERAL PHYSICAL EXAM: Primary | ICD-10-CM

## 2021-02-18 DIAGNOSIS — E03.9 HYPOTHYROIDISM, UNSPECIFIED TYPE: ICD-10-CM

## 2021-02-18 DIAGNOSIS — F10.21 ALCOHOL USE DISORDER, SEVERE, IN EARLY REMISSION (H): ICD-10-CM

## 2021-02-18 LAB
AFP SERPL-MCNC: 6.7 UG/L (ref 0–8)
ALBUMIN SERPL-MCNC: 4 G/DL (ref 3.4–5)
ALP SERPL-CCNC: 85 U/L (ref 40–150)
ALT SERPL W P-5'-P-CCNC: 43 U/L (ref 0–50)
ANION GAP SERPL CALCULATED.3IONS-SCNC: 4 MMOL/L (ref 3–14)
AST SERPL W P-5'-P-CCNC: 48 U/L (ref 0–45)
BILIRUB DIRECT SERPL-MCNC: 0.4 MG/DL (ref 0–0.2)
BILIRUB SERPL-MCNC: 1.4 MG/DL (ref 0.2–1.3)
BUN SERPL-MCNC: 10 MG/DL (ref 7–30)
CALCIUM SERPL-MCNC: 9.8 MG/DL (ref 8.5–10.1)
CHLORIDE SERPL-SCNC: 105 MMOL/L (ref 94–109)
CO2 SERPL-SCNC: 30 MMOL/L (ref 20–32)
CREAT SERPL-MCNC: 0.65 MG/DL (ref 0.52–1.04)
ERYTHROCYTE [DISTWIDTH] IN BLOOD BY AUTOMATED COUNT: 14.2 % (ref 10–15)
GFR SERPL CREATININE-BSD FRML MDRD: >90 ML/MIN/{1.73_M2}
GLUCOSE SERPL-MCNC: 90 MG/DL (ref 70–99)
HCT VFR BLD AUTO: 41.6 % (ref 35–47)
HGB BLD-MCNC: 14.1 G/DL (ref 11.7–15.7)
INR PPP: 1.28 (ref 0.86–1.14)
MCH RBC QN AUTO: 31.2 PG (ref 26.5–33)
MCHC RBC AUTO-ENTMCNC: 33.9 G/DL (ref 31.5–36.5)
MCV RBC AUTO: 92 FL (ref 78–100)
PLATELET # BLD AUTO: 53 10E9/L (ref 150–450)
POTASSIUM SERPL-SCNC: 3.9 MMOL/L (ref 3.4–5.3)
PROT SERPL-MCNC: 7.9 G/DL (ref 6.8–8.8)
RBC # BLD AUTO: 4.52 10E12/L (ref 3.8–5.2)
SODIUM SERPL-SCNC: 139 MMOL/L (ref 133–144)
WBC # BLD AUTO: 4.9 10E9/L (ref 4–11)

## 2021-02-18 PROCEDURE — 82248 BILIRUBIN DIRECT: CPT | Performed by: STUDENT IN AN ORGANIZED HEALTH CARE EDUCATION/TRAINING PROGRAM

## 2021-02-18 PROCEDURE — 86704 HEP B CORE ANTIBODY TOTAL: CPT | Performed by: STUDENT IN AN ORGANIZED HEALTH CARE EDUCATION/TRAINING PROGRAM

## 2021-02-18 PROCEDURE — 80053 COMPREHEN METABOLIC PANEL: CPT | Performed by: STUDENT IN AN ORGANIZED HEALTH CARE EDUCATION/TRAINING PROGRAM

## 2021-02-18 PROCEDURE — 36415 COLL VENOUS BLD VENIPUNCTURE: CPT | Performed by: STUDENT IN AN ORGANIZED HEALTH CARE EDUCATION/TRAINING PROGRAM

## 2021-02-18 PROCEDURE — 85027 COMPLETE CBC AUTOMATED: CPT | Performed by: STUDENT IN AN ORGANIZED HEALTH CARE EDUCATION/TRAINING PROGRAM

## 2021-02-18 PROCEDURE — 82105 ALPHA-FETOPROTEIN SERUM: CPT | Performed by: STUDENT IN AN ORGANIZED HEALTH CARE EDUCATION/TRAINING PROGRAM

## 2021-02-18 PROCEDURE — 99214 OFFICE O/P EST MOD 30 MIN: CPT | Performed by: PHYSICIAN ASSISTANT

## 2021-02-18 PROCEDURE — 87340 HEPATITIS B SURFACE AG IA: CPT | Performed by: STUDENT IN AN ORGANIZED HEALTH CARE EDUCATION/TRAINING PROGRAM

## 2021-02-18 PROCEDURE — 85610 PROTHROMBIN TIME: CPT | Performed by: STUDENT IN AN ORGANIZED HEALTH CARE EDUCATION/TRAINING PROGRAM

## 2021-02-18 ASSESSMENT — MIFFLIN-ST. JEOR: SCORE: 1335.55
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
## 2021-02-18 NOTE — PATIENT INSTRUCTIONS
Matthew Ambrocio,     We will complete lab work today ordered by Dr. Lim.  I will contact you tomorrow regarding whether or not it is safe for you to proceed with your planned D&C procedure.    I would like you to schedule an appointment with your OB/GYN doctor early next week to discuss your ongoing low platelets, cirrhosis, vaginal bleeding and the D&C procedure.  Ultimately, it is up to you and the OB/GYN doctor to decide whether or not to proceed.  We will fax over my summary and preoperative report to the OB/GYN doctor after the lab work is available tomorrow.     If you do decide to proceed with the procedure, you may take all of your medications as prescribed.    Please reach out with any questions or concerns along the way.    Take Care,  Ninfa Carreon PA-C  Preparing for Your Surgery  Getting started  A nurse will call you to review your health history and instructions. They will give you an arrival time based on your scheduled surgery time.  Please be ready to share the following:    Your doctor's clinic name and phone number    Your medical, surgical and anesthesia history    A list of allergies and sensitivities    A list of medicines, including herbal treatments and over-the-counter drugs    Whether the patient has a legal guardian (ask how to send us the papers in advance)  If you have a child who's having surgery, please ask for a copy of Preparing for Your Child's Surgery.    Preparing for surgery    Within 30 days of surgery: Have a pre-op exam (sometimes called an H&P, or History and Physical). This can be done at a clinic or pre-operative center.  ? If you're having a , you may not need this exam. Talk to your care team    At your pre-op exam, talk to your care team about all medicines you take. If you need to stop any medicines before surgery, ask when to start taking them again.  ? We do this for your safety. Many medicines can make you bleed too much during surgery. Some change how well  surgery (anesthesia) drugs work.    Call your insurance company to let them know you're having surgery. (If you don't have insurance, call [phone].)    Call your clinic if there's any change in your health. This includes signs of a cold or flu (sore throat, runny nose, cough, rash, fever). It also includes a scrape or scratch near the surgery site.    If you have questions on the day of surgery, call your hospital or surgery center.  Eating and drinking guidelines  For your safety: Unless your surgeon tells you otherwise, follow the guidelines below.    Eat and drink as usual until 8 hours before surgery. After that, no food or milk.    Drink clear liquids until 2 hours before surgery. These are liquids you can see through, like water, Gatorade and Propel Water. You may also have black coffee and tea (no cream or milk).    Nothing by mouth within 2 hours of surgery. This includes gum, candy and breath mints.    If you drink, stop drinking alcohol the night before surgery.    If your care team tells you to take medicine on the morning of surgery, it's okay to take it with a sip of water.  Preventing infection    Shower or bathe the night before and morning of your surgery. Follow the instructions your clinic gave you. (If no instructions, use regular soap.)    Don't shave or clip hair near your surgery site. We'll remove the hair if needed.    Don't smoke or vape the morning of surgery. You may chew nicotine gum up to 2 hours before surgery. A nicotine patch is okay.  ? Note: Some surgeries require you to completely quit smoking and nicotine. Check with your surgeon.    Your care team will make every effort to keep you safe from infection. We will:  ? Clean our hands often with soap and water (or an alcohol-based hand rub).  ? Clean the skin at your surgery site with a special soap that kills germs.  ? Give you a special gown to keep you warm. (Cold raises the risk of infection.)  ? Wear special hair covers,  masks, gowns and gloves during surgery.  ? Give antibiotic medicine, if prescribed. Not all surgeries need antibiotics.  What to bring on the day of surgery    Photo ID and insurance card    Copy of your health care directive, if you have one    Glasses and hearing aides (bring cases)  ? You can't wear contacts during surgery    Inhaler and eye drops, if you use them (tell us about these when you arrive)    CPAP machine or breathing device, if you use them    A few personal items, if spending the night    If you have . . .  ? A pacemaker or ICD (cardiac defibrillator): Bring the ID card.  ? An implanted stimulator: Bring the remote control.  ? A legal guardian: Bring a copy of the certified (court-stamped) guardianship papers.  Please remove any jewelry, including body piercings. Leave jewelry and other valuables at home.  If you're going home the day of surgery  Important: If you don't follow the rules below, we must cancel your surgery.     Arrange for someone to drive you home after surgery. You may not drive, take a taxi or take public transportation by yourself (unless you'll have local anesthesia only).    Arrange for a responsible adult to stay with you overnight. If you don't, we may keep you in the hospital overnight, and you may need to pay the costs yourself.  Questions?   If you have any questions for your care team, list them here: _________________________________________________________________________________________________________________________________________________________________________________________________________________________________________________________________________________________________________________________  For informational purposes only. Not to replace the advice of your health care provider. Copyright   2003, 2019 [facility]. All rights reserved. Clinically reviewed by Zohra Richardson MD. [employer] [id number] - REV 4/20.

## 2021-02-18 NOTE — Clinical Note
Matthew Ly and Dr. Cary,     Thank you so much for your assistance with Lolly's care plan. I will have my pre-op note faxed to the OB/GYN Dr. Muse today. On a good note, it looks like Lolly's MELD is coming down. 10 based on lab results yesterday. She is going to schedule an appointment with Dr. Muse for further discussion prior to surgery and in light of all of the new information.     Take Care,  Ninfa Carreon PA-C

## 2021-02-19 LAB
HBV CORE AB SERPL QL IA: NONREACTIVE
HBV SURFACE AG SERPL QL IA: NONREACTIVE

## 2021-02-22 DIAGNOSIS — K76.9 LIVER DISEASE: Primary | ICD-10-CM

## 2021-02-22 DIAGNOSIS — Z12.11 COLON CANCER SCREENING: ICD-10-CM

## 2021-02-22 DIAGNOSIS — K57.32 DIVERTICULITIS OF COLON: ICD-10-CM

## 2021-02-23 ENCOUNTER — TELEPHONE (OUTPATIENT)
Dept: FAMILY MEDICINE | Facility: CLINIC | Age: 61
End: 2021-02-23

## 2021-02-23 NOTE — TELEPHONE ENCOUNTER
Spoke with Dr. Muse regarding patient care and needs for D&C. Dr. Muse notes that patient's endometrial stripe was thickened on prior ultrasound at 8 mm.  Ideally postmenopausally, this should be under 4 mm.  At 8 mm there are some concerns for malignancy.  Although, biopsy negative.  Biopsy only provided small amount of tissue and not adequate sample.  Given risks of bleeding due to thrombocytopenia, she may repeat ultrasound to ensure D&C is needed.     Ninfa Carreon PA-C on 2/23/2021 at 12:21 PM

## 2021-02-23 NOTE — TELEPHONE ENCOUNTER
Please call provider below.  Thank-you,    Danielle WHATLEYN-RN  Triage Nurse  [facility]: [facility]

## 2021-02-23 NOTE — TELEPHONE ENCOUNTER
Reason for Call:  Other     Detailed comments: Patient had been seen for a pre op for a DNC procedure and the provider would like to discuss her low platelets     Phone Number  Dr Micha keita  [phone]         Best Time:     Can we leave a detailed message on this number? YES    Call taken on 2/23/2021 at 11:55 AM by Rahcel Salas

## 2021-02-24 ENCOUNTER — TELEPHONE (OUTPATIENT)
Dept: GASTROENTEROLOGY | Facility: CLINIC | Age: 61
End: 2021-02-24

## 2021-02-24 NOTE — TELEPHONE ENCOUNTER
Patient is scheduled for EGD & COLONOSCOPY with Dr. LEVENTHAL    Spoke with: ZARA    Date of Procedure: 03/31/2021    Location: [facility]    Sedation Type CS    Pre-op for Unit J MAC and OR NOT NEEDED    (if yes advise patient they will need a pre-op prior to procedure)      Is patient on blood thinners? -NO (If yes- inform patient to follow up with PCP or provider for follow up instructions)     Informed patient they will need an adult  YES    Informed Patient of COVID Test Requirement YES    Preferred Pharmacy for Pre Prescription NA    Confirmed Nurse will call to complete assessment YES    Additional comments: NA

## 2021-03-15 DIAGNOSIS — Z11.59 ENCOUNTER FOR SCREENING FOR OTHER VIRAL DISEASES: ICD-10-CM

## 2021-03-23 ENCOUNTER — TELEPHONE (OUTPATIENT)
Dept: GASTROENTEROLOGY | Facility: CLINIC | Age: 61
End: 2021-03-23

## 2021-03-25 ENCOUNTER — OFFICE VISIT (OUTPATIENT)
Dept: FAMILY MEDICINE | Facility: CLINIC | Age: 61
End: 2021-03-25
Payer: COMMERCIAL

## 2021-03-25 VITALS
DIASTOLIC BLOOD PRESSURE: 72 MMHG | TEMPERATURE: 98.4 F | SYSTOLIC BLOOD PRESSURE: 100 MMHG | BODY MASS INDEX: 26.99 KG/M2 | HEIGHT: 65 IN | WEIGHT: 162 LBS | RESPIRATION RATE: 16 BRPM | OXYGEN SATURATION: 100 % | HEART RATE: 79 BPM

## 2021-03-25 DIAGNOSIS — K70.31 ALCOHOLIC CIRRHOSIS OF LIVER WITH ASCITES (H): ICD-10-CM

## 2021-03-25 DIAGNOSIS — E03.9 HYPOTHYROIDISM, UNSPECIFIED TYPE: ICD-10-CM

## 2021-03-25 DIAGNOSIS — N93.9 VAGINAL BLEEDING: ICD-10-CM

## 2021-03-25 DIAGNOSIS — F32.0 MILD MAJOR DEPRESSION (H): ICD-10-CM

## 2021-03-25 DIAGNOSIS — D69.6 THROMBOCYTOPENIA (H): ICD-10-CM

## 2021-03-25 DIAGNOSIS — Z01.818 PREOP GENERAL PHYSICAL EXAM: Primary | ICD-10-CM

## 2021-03-25 DIAGNOSIS — F10.21 ALCOHOL USE DISORDER, SEVERE, IN EARLY REMISSION (H): ICD-10-CM

## 2021-03-25 LAB
ALBUMIN SERPL-MCNC: 4.2 G/DL (ref 3.4–5)
ALP SERPL-CCNC: 76 U/L (ref 40–150)
ALT SERPL W P-5'-P-CCNC: 41 U/L (ref 0–50)
ANION GAP SERPL CALCULATED.3IONS-SCNC: 2 MMOL/L (ref 3–14)
AST SERPL W P-5'-P-CCNC: 43 U/L (ref 0–45)
BILIRUB SERPL-MCNC: 1.7 MG/DL (ref 0.2–1.3)
BUN SERPL-MCNC: 9 MG/DL (ref 7–30)
CALCIUM SERPL-MCNC: 9.8 MG/DL (ref 8.5–10.1)
CHLORIDE SERPL-SCNC: 106 MMOL/L (ref 94–109)
CO2 SERPL-SCNC: 29 MMOL/L (ref 20–32)
CREAT SERPL-MCNC: 0.7 MG/DL (ref 0.52–1.04)
ERYTHROCYTE [DISTWIDTH] IN BLOOD BY AUTOMATED COUNT: 15.6 % (ref 10–15)
GFR SERPL CREATININE-BSD FRML MDRD: >90 ML/MIN/{1.73_M2}
GLUCOSE SERPL-MCNC: 93 MG/DL (ref 70–99)
HCT VFR BLD AUTO: 42.3 % (ref 35–47)
HGB BLD-MCNC: 14 G/DL (ref 11.7–15.7)
INR PPP: 1.36 (ref 0.86–1.14)
MCH RBC QN AUTO: 30.8 PG (ref 26.5–33)
MCHC RBC AUTO-ENTMCNC: 33.1 G/DL (ref 31.5–36.5)
MCV RBC AUTO: 93 FL (ref 78–100)
PLATELET # BLD AUTO: 56 10E9/L (ref 150–450)
POTASSIUM SERPL-SCNC: 4.5 MMOL/L (ref 3.4–5.3)
PROT SERPL-MCNC: 8.1 G/DL (ref 6.8–8.8)
RBC # BLD AUTO: 4.55 10E12/L (ref 3.8–5.2)
SODIUM SERPL-SCNC: 137 MMOL/L (ref 133–144)
WBC # BLD AUTO: 5.9 10E9/L (ref 4–11)

## 2021-03-25 PROCEDURE — 80053 COMPREHEN METABOLIC PANEL: CPT | Performed by: PHYSICIAN ASSISTANT

## 2021-03-25 PROCEDURE — 85610 PROTHROMBIN TIME: CPT | Performed by: PHYSICIAN ASSISTANT

## 2021-03-25 PROCEDURE — 85027 COMPLETE CBC AUTOMATED: CPT | Performed by: PHYSICIAN ASSISTANT

## 2021-03-25 PROCEDURE — 36415 COLL VENOUS BLD VENIPUNCTURE: CPT | Performed by: PHYSICIAN ASSISTANT

## 2021-03-25 PROCEDURE — 99214 OFFICE O/P EST MOD 30 MIN: CPT | Performed by: PHYSICIAN ASSISTANT

## 2021-03-25 ASSESSMENT — MIFFLIN-ST. JEOR: SCORE: 1315.71
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
## 2021-03-27 DIAGNOSIS — Z11.59 ENCOUNTER FOR SCREENING FOR OTHER VIRAL DISEASES: ICD-10-CM

## 2021-03-27 LAB
SARS-COV-2 RNA RESP QL NAA+PROBE: NORMAL
SPECIMEN SOURCE: NORMAL

## 2021-03-27 PROCEDURE — U0005 INFEC AGEN DETEC AMPLI PROBE: HCPCS | Performed by: INTERNAL MEDICINE

## 2021-03-27 PROCEDURE — U0003 INFECTIOUS AGENT DETECTION BY NUCLEIC ACID (DNA OR RNA); SEVERE ACUTE RESPIRATORY SYNDROME CORONAVIRUS 2 (SARS-COV-2) (CORONAVIRUS DISEASE [COVID-19]), AMPLIFIED PROBE TECHNIQUE, MAKING USE OF HIGH THROUGHPUT TECHNOLOGIES AS DESCRIBED BY CMS-2020-01-R: HCPCS | Performed by: INTERNAL MEDICINE

## 2021-03-28 LAB
LABORATORY COMMENT REPORT: NORMAL
SARS-COV-2 RNA RESP QL NAA+PROBE: NEGATIVE
SPECIMEN SOURCE: NORMAL

## 2021-03-30 RX ORDER — LIDOCAINE 40 MG/G
CREAM TOPICAL
Status: CANCELLED | OUTPATIENT
Start: 2021-03-30

## 2021-03-30 RX ORDER — ONDANSETRON 2 MG/ML
4 INJECTION INTRAMUSCULAR; INTRAVENOUS
Status: CANCELLED | OUTPATIENT
Start: 2021-03-30

## 2021-03-31 ENCOUNTER — HOSPITAL ENCOUNTER (OUTPATIENT)
Facility: AMBULATORY SURGERY CENTER | Age: 61
Discharge: HOME | End: 2021-03-31
Attending: INTERNAL MEDICINE | Admitting: INTERNAL MEDICINE
Payer: COMMERCIAL

## 2021-03-31 VITALS
WEIGHT: 162 LBS | TEMPERATURE: 97.2 F | SYSTOLIC BLOOD PRESSURE: 130 MMHG | OXYGEN SATURATION: 97 % | RESPIRATION RATE: 12 BRPM | BODY MASS INDEX: 26.03 KG/M2 | DIASTOLIC BLOOD PRESSURE: 69 MMHG | HEART RATE: 65 BPM | HEIGHT: 66 IN

## 2021-03-31 DIAGNOSIS — Z12.11 COLON CANCER SCREENING: Primary | ICD-10-CM

## 2021-03-31 DIAGNOSIS — K70.31 ALCOHOLIC CIRRHOSIS OF LIVER WITH ASCITES (H): ICD-10-CM

## 2021-03-31 LAB
COLONOSCOPY: NORMAL
UPPER GI ENDOSCOPY: NORMAL

## 2021-03-31 PROCEDURE — 88305 TISSUE EXAM BY PATHOLOGIST: CPT | Mod: GC | Performed by: PATHOLOGY

## 2021-03-31 PROCEDURE — 45380 COLONOSCOPY AND BIOPSY: CPT | Mod: 33

## 2021-03-31 PROCEDURE — 43239 EGD BIOPSY SINGLE/MULTIPLE: CPT

## 2021-03-31 RX ORDER — FENTANYL CITRATE 50 UG/ML
INJECTION, SOLUTION INTRAMUSCULAR; INTRAVENOUS PRN
Status: DISCONTINUED | OUTPATIENT
Start: 2021-03-31 | End: 2021-03-31 | Stop reason: HOSPADM

## 2021-03-31 RX ORDER — SIMETHICONE
LIQUID (ML) MISCELLANEOUS PRN
Status: DISCONTINUED | OUTPATIENT
Start: 2021-03-31 | End: 2021-03-31 | Stop reason: HOSPADM

## 2021-03-31 ASSESSMENT — MIFFLIN-ST. JEOR: SCORE: 1331.58

## 2021-04-02 LAB — COPATH REPORT: NORMAL

## 2021-04-11 ENCOUNTER — HEALTH MAINTENANCE LETTER (OUTPATIENT)
Age: 61
End: 2021-04-11

## 2021-06-06 ENCOUNTER — HEALTH MAINTENANCE LETTER (OUTPATIENT)
Age: 61
End: 2021-06-06

## 2021-07-21 NOTE — PATIENT INSTRUCTIONS
You may continue with your procedures/surgeries as planned.  We are completing lab work today and will contact you if there are any worrisome findings.    Please reach out with any questions or concerns.  Preparing for Your Surgery  Getting started  A nurse will call you to review your health history and instructions. They will give you an arrival time based on your scheduled surgery time.  Please be ready to share the following:    Your doctor's clinic name and phone number    Your medical, surgical and anesthesia history    A list of allergies and sensitivities    A list of medicines, including herbal treatments and over-the-counter drugs    Whether the patient has a legal guardian (ask how to send us the papers in advance)  If you have a child who's having surgery, please ask for a copy of Preparing for Your Child's Surgery.    Preparing for surgery    Within 30 days of surgery: Have a pre-op exam (sometimes called an H&P, or History and Physical). This can be done at a clinic or pre-operative center.  ? If you're having a , you may not need this exam. Talk to your care team    At your pre-op exam, talk to your care team about all medicines you take. If you need to stop any medicines before surgery, ask when to start taking them again.  ? We do this for your safety. Many medicines can make you bleed too much during surgery. Some change how well surgery (anesthesia) drugs work.    Call your insurance company to let them know you're having surgery. (If you don't have insurance, call [phone].)    Call your clinic if there's any change in your health. This includes signs of a cold or flu (sore throat, runny nose, cough, rash, fever). It also includes a scrape or scratch near the surgery site.    If you have questions on the day of surgery, call your hospital or surgery center.  Eating and drinking guidelines  For your safety: Unless your surgeon tells you otherwise, follow the guidelines below.    Eat  and drink as usual until 8 hours before surgery. After that, no food or milk.    Drink clear liquids until 2 hours before surgery. These are liquids you can see through, like water, Gatorade and Propel Water. You may also have black coffee and tea (no cream or milk).    Nothing by mouth within 2 hours of surgery. This includes gum, candy and breath mints.    If you drink, stop drinking alcohol the night before surgery.    If your care team tells you to take medicine on the morning of surgery, it's okay to take it with a sip of water.  Preventing infection    Shower or bathe the night before and morning of your surgery. Follow the instructions your clinic gave you. (If no instructions, use regular soap.)    Don't shave or clip hair near your surgery site. We'll remove the hair if needed.    Don't smoke or vape the morning of surgery. You may chew nicotine gum up to 2 hours before surgery. A nicotine patch is okay.  ? Note: Some surgeries require you to completely quit smoking and nicotine. Check with your surgeon.    Your care team will make every effort to keep you safe from infection. We will:  ? Clean our hands often with soap and water (or an alcohol-based hand rub).  ? Clean the skin at your surgery site with a special soap that kills germs.  ? Give you a special gown to keep you warm. (Cold raises the risk of infection.)  ? Wear special hair covers, masks, gowns and gloves during surgery.  ? Give antibiotic medicine, if prescribed. Not all surgeries need antibiotics.  What to bring on the day of surgery    Photo ID and insurance card    Copy of your health care directive, if you have one    Glasses and hearing aides (bring cases)  ? You can't wear contacts during surgery    Inhaler and eye drops, if you use them (tell us about these when you arrive)    CPAP machine or breathing device, if you use them    A few personal items, if spending the night    If you have . . .  ? A pacemaker or ICD (cardiac  defibrillator): Bring the ID card.  ? An implanted stimulator: Bring the remote control.  ? A legal guardian: Bring a copy of the certified (court-stamped) guardianship papers.  Please remove any jewelry, including body piercings. Leave jewelry and other valuables at home.  If you're going home the day of surgery  Important: If you don't follow the rules below, we must cancel your surgery.     Arrange for someone to drive you home after surgery. You may not drive, take a taxi or take public transportation by yourself (unless you'll have local anesthesia only).    Arrange for a responsible adult to stay with you overnight. If you don't, we may keep you in the hospital overnight, and you may need to pay the costs yourself.  Questions?   If you have any questions for your care team, list them here: _________________________________________________________________________________________________________________________________________________________________________________________________________________________________________________________________________________________________________________________  For informational purposes only. Not to replace the advice of your health care provider. Copyright   2003, 2019 [facility]. All rights reserved. Clinically reviewed by Zohra Richardson MD. RETAIL PRO [id number] - REV 4/20.     C/O pain with swallowing and general discomfort.

## 2021-08-09 DIAGNOSIS — J45.50 SEVERE PERSISTENT ASTHMA WITHOUT COMPLICATION (H): ICD-10-CM

## 2021-08-11 NOTE — TELEPHONE ENCOUNTER
Medication is being filled for 1 time refill only due to:  Patient needs to be seen because Return in about 1 year (around 9/16/2021) for an annual office visit, repeat labs.   Please schedule.  Marycarmen Newton RN

## 2021-09-18 DIAGNOSIS — E03.9 HYPOTHYROIDISM, UNSPECIFIED TYPE: ICD-10-CM

## 2021-09-21 RX ORDER — LEVOTHYROXINE SODIUM 112 UG/1
112 TABLET ORAL DAILY
Qty: 90 TABLET | Refills: 0 | Status: SHIPPED | OUTPATIENT
Start: 2021-09-21 | End: 2021-12-20

## 2021-09-26 ENCOUNTER — HEALTH MAINTENANCE LETTER (OUTPATIENT)
Age: 61
End: 2021-09-26

## 2021-09-27 ENCOUNTER — MYC MEDICAL ADVICE (OUTPATIENT)
Dept: FAMILY MEDICINE | Facility: CLINIC | Age: 61
End: 2021-09-27

## 2021-09-27 DIAGNOSIS — F32.0 MILD MAJOR DEPRESSION (H): ICD-10-CM

## 2021-09-27 DIAGNOSIS — F41.1 GAD (GENERALIZED ANXIETY DISORDER): ICD-10-CM

## 2021-09-27 RX ORDER — LORAZEPAM 0.5 MG/1
TABLET ORAL
Qty: 10 TABLET | Refills: 0 | Status: SHIPPED | OUTPATIENT
Start: 2021-09-27 | End: 2022-06-17

## 2021-09-28 RX ORDER — CITALOPRAM HYDROBROMIDE 40 MG/1
40 TABLET ORAL DAILY
Qty: 30 TABLET | Refills: 0 | Status: SHIPPED | OUTPATIENT
Start: 2021-09-28 | End: 2021-11-15

## 2021-09-28 NOTE — TELEPHONE ENCOUNTER
"Requested Prescriptions   Pending Prescriptions Disp Refills     citalopram (CELEXA) 40 MG tablet 30 tablet 0     Sig: Take 1 tablet (40 mg) by mouth daily       SSRIs Protocol Failed - 9/27/2021  5:47 PM        Failed - PHQ-9 score less than 5 in past 6 months     Please review last PHQ-9 score.           Failed - Recent (6 mo) or future (30 days) visit within the authorizing provider's specialty     Patient had office visit in the last 6 months or has a visit in the next 30 days with authorizing provider or within the authorizing provider's specialty.  See \"Patient Info\" tab in inbasket, or \"Choose Columns\" in Meds & Orders section of the refill encounter.            Passed - Medication is active on med list        Passed - Patient is age 18 or older        Passed - No active pregnancy on record        Passed - No positive pregnancy test in last 12 months           Routing refill request to provider for review/approval because:  Failed protocol.  Danielle Diehl RN, BSN  Triage nurse  [facility]: [place]    "

## 2021-10-24 DIAGNOSIS — J30.81 ALLERGIC RHINITIS DUE TO ANIMAL DANDER: ICD-10-CM

## 2021-10-24 DIAGNOSIS — J45.50 SEVERE PERSISTENT ASTHMA WITHOUT COMPLICATION (H): ICD-10-CM

## 2021-10-26 RX ORDER — MONTELUKAST SODIUM 10 MG/1
10 TABLET ORAL AT BEDTIME
Qty: 90 TABLET | Refills: 0 | Status: SHIPPED | OUTPATIENT
Start: 2021-10-26 | End: 2022-08-15

## 2021-11-10 ENCOUNTER — MYC REFILL (OUTPATIENT)
Dept: FAMILY MEDICINE | Facility: CLINIC | Age: 61
End: 2021-11-10
Payer: COMMERCIAL

## 2021-11-10 DIAGNOSIS — E03.9 HYPOTHYROIDISM, UNSPECIFIED TYPE: ICD-10-CM

## 2021-11-10 DIAGNOSIS — J45.50 SEVERE PERSISTENT ASTHMA WITHOUT COMPLICATION (H): ICD-10-CM

## 2021-11-10 RX ORDER — LEVOTHYROXINE SODIUM 112 UG/1
112 TABLET ORAL DAILY
Qty: 90 TABLET | Refills: 0 | Status: CANCELLED | OUTPATIENT
Start: 2021-11-10

## 2021-11-12 DIAGNOSIS — F41.1 GAD (GENERALIZED ANXIETY DISORDER): ICD-10-CM

## 2021-11-12 DIAGNOSIS — F32.0 MILD MAJOR DEPRESSION (H): ICD-10-CM

## 2021-11-15 NOTE — TELEPHONE ENCOUNTER
Routing refill request to provider for review/approval because:  Patient needs to be seen because it has been more than 1 year since last office visit.  Failed protocol  Mary Jo x 1    Last Written Prescription Date:  9/28/21  Last Fill Quantity: 30 tablets  refills: 0     Last office visit: 3/25/2021 with Ninfa Carreon PA-C     Future Office Visit:  None    Stephanie Kang RN BSN  [facility]

## 2021-11-15 NOTE — TELEPHONE ENCOUNTER
Left message on voice mail for patient to call clinic.   [phone].    See My Chart message sent to patient.     Stephanie Kang RN BSN  [facility]

## 2021-11-15 NOTE — TELEPHONE ENCOUNTER
Deleted duplicate refill request for Breo inhaler.     Stephanie Kang RN BSN  [facility]

## 2021-11-15 NOTE — TELEPHONE ENCOUNTER
Routing refill request to provider for review/approval because:  Patient needs to be seen because it has been more than 1 year since last office visit.  Failed protocol  Mary Jo given x 1    Last Written Prescription Date:  8/11/21  Last Fill Quantity: 60 g  refills: 0     Last office visit: 3/25/2021 with Ninfa Carreon PA-C  Future Office Visit:        Stephanie Kang RN BSN  [facility]

## 2021-11-17 RX ORDER — CITALOPRAM HYDROBROMIDE 40 MG/1
40 TABLET ORAL DAILY
Qty: 30 TABLET | Refills: 0 | Status: SHIPPED | OUTPATIENT
Start: 2021-11-17 | End: 2022-10-07

## 2021-12-17 DIAGNOSIS — E03.9 HYPOTHYROIDISM, UNSPECIFIED TYPE: ICD-10-CM

## 2021-12-20 RX ORDER — LEVOTHYROXINE SODIUM 112 UG/1
112 TABLET ORAL DAILY
Qty: 30 TABLET | Refills: 0 | Status: SHIPPED | OUTPATIENT
Start: 2021-12-20 | End: 2022-02-21

## 2022-01-26 DIAGNOSIS — J45.50 UNCOMPLICATED SEVERE PERSISTENT ASTHMA (H): ICD-10-CM

## 2022-01-26 RX ORDER — ALBUTEROL SULFATE 0.83 MG/ML
2.5 SOLUTION RESPIRATORY (INHALATION) EVERY 4 HOURS PRN
Qty: 30 ML | Refills: 0 | Status: SHIPPED | OUTPATIENT
Start: 2022-01-26 | End: 2023-01-01

## 2022-01-26 NOTE — TELEPHONE ENCOUNTER
Albuterol (PROVENTIL) (2.5 MG/3ML) 0.083% neb solution authorized by Aliyah Marcus PA-C.    Stephanie Kang RN BSN  [facility]

## 2022-01-26 NOTE — TELEPHONE ENCOUNTER
"Lolly states that she has not picked up her Albuterol inhaler yet. The inhaler was approved yesterday.     She said that what she needed most was to have her Albuterol Neb solution filled to have \"on hand.\" Current supply at home has .     Patient said that she only wants to make one trip to her pharmacy. She reports that someone in her home already has Covid and if she would become ill, she would need her Albuterol Nebulizer Solution.     I informed patient that she needs to schedule her annual physical and she agreed to schedule this visit.     Routing refill request to provider for review/approval because:  Drug not active on patient's medication list  Needs provider approval    Last Written Prescription Date:  8/3/17  Last Fill Quantity: 1 box  refills: 11     Last office visit: 3/25/2021 with prescribing provider:  Ninfa SANCHES (Pre-Op).     Future Office Visit:   Next 5 appointments (look out 90 days)    2022  3:20 PM  (Arrive by 3:00 PM)  Annual Wellness Visit with Aliyah Marcus PA-C  [facility] [place] ([facility] ) [address] [facility]  [place] [address]  [phone]         Stephanie Kang RN BSN  [facility]                "

## 2022-02-19 DIAGNOSIS — E03.9 HYPOTHYROIDISM, UNSPECIFIED TYPE: ICD-10-CM

## 2022-02-21 RX ORDER — LEVOTHYROXINE SODIUM 112 UG/1
TABLET ORAL
Qty: 30 TABLET | Refills: 0 | Status: SHIPPED | OUTPATIENT
Start: 2022-02-21 | End: 2022-03-18

## 2022-03-01 DIAGNOSIS — R10.13 ABDOMINAL PAIN, EPIGASTRIC: ICD-10-CM

## 2022-03-01 NOTE — TELEPHONE ENCOUNTER
"Requested Prescriptions   Pending Prescriptions Disp Refills     famotidine (PEPCID) 20 MG tablet [Pharmacy Med Name: FAMOTIDINE 20MG TABLETS] 50 tablet      Sig: TAKE 1 TABLET BY MOUTH TWICE DAILY AS NEEDED FOR REFLUX, EPIGASTRIC DISCOMFORT       H2 Blockers Protocol Passed - 3/1/2022  1:03 PM        Passed - Patient is age 12 or older        Passed - Recent (12 mo) or future (30 days) visit within the authorizing provider's specialty     Patient has had an office visit with the authorizing provider or a provider within the authorizing providers department within the previous 12 mos or has a future within next 30 days. See \"Patient Info\" tab in inbasket, or \"Choose Columns\" in Meds & Orders section of the refill encounter.              Passed - Medication is active on med list           Last Written Prescription Date:  9/3/20  Last Fill Quantity: 60,  # refills: 0   Last office visit: 3/25/2021 with prescribing provider:     Please call patient and schedule her for an OV with provider.she is overdue for office visits    Route back to RN when done.      Danielle MCCLURE,BSN  Triage Nurse  [facility]  Ph: [phone]          "

## 2022-03-01 NOTE — TELEPHONE ENCOUNTER
Patient sent [employer] message regarding need for an office visit, copied here:    I currently do not have health insurance. I need to set that up I am between jobs. (I contract as an SLP).  Lolly      I see she had a preop visit with Ninfa Mark 3/25/21.  She cancelled or no-showed visits scheduled in Jan and Feb 2022.    Routed to Aliyah Marcus to address request for refill in light of patient without insurance.    Adele Molina RN  [facility]

## 2022-03-02 RX ORDER — FAMOTIDINE 20 MG/1
TABLET, FILM COATED ORAL
Qty: 60 TABLET | Refills: 0 | Status: SHIPPED | OUTPATIENT
Start: 2022-03-02 | End: 2022-12-07

## 2022-03-02 NOTE — TELEPHONE ENCOUNTER
[employer] message sent to patient with the information below.    Danielle RN,BSN  Triage Nurse  [facility]: [facility]]  Ph: [phone]

## 2022-05-08 ENCOUNTER — HEALTH MAINTENANCE LETTER (OUTPATIENT)
Age: 62
End: 2022-05-08

## 2022-06-16 DIAGNOSIS — E03.9 HYPOTHYROIDISM, UNSPECIFIED TYPE: ICD-10-CM

## 2022-06-16 DIAGNOSIS — F32.0 MILD MAJOR DEPRESSION (H): ICD-10-CM

## 2022-06-16 DIAGNOSIS — J45.50 UNCOMPLICATED SEVERE PERSISTENT ASTHMA (H): ICD-10-CM

## 2022-06-16 DIAGNOSIS — F41.1 GAD (GENERALIZED ANXIETY DISORDER): ICD-10-CM

## 2022-06-16 RX ORDER — LORAZEPAM 0.5 MG/1
TABLET ORAL
Qty: 10 TABLET | Refills: 0 | Status: CANCELLED | OUTPATIENT
Start: 2022-06-16

## 2022-06-16 RX ORDER — LEVOTHYROXINE SODIUM 112 UG/1
112 TABLET ORAL DAILY
Qty: 30 TABLET | Refills: 0 | Status: CANCELLED | OUTPATIENT
Start: 2022-06-16

## 2022-06-16 NOTE — TELEPHONE ENCOUNTER
Pt is calling for a refill on medicationslevothyroxine (SYNTHROID/LEVOTHROID) 112 MCG tablet    citalopram (CELEXA) 40 MG tablet    [facility]    Call  if questions/concerns    Dominga Cook  [place]

## 2022-06-17 ENCOUNTER — VIRTUAL VISIT (OUTPATIENT)
Dept: FAMILY MEDICINE | Facility: CLINIC | Age: 62
End: 2022-06-17
Payer: COMMERCIAL

## 2022-06-17 DIAGNOSIS — F32.0 MILD MAJOR DEPRESSION (H): Primary | ICD-10-CM

## 2022-06-17 DIAGNOSIS — E03.9 HYPOTHYROIDISM, UNSPECIFIED TYPE: ICD-10-CM

## 2022-06-17 DIAGNOSIS — J45.50 SEVERE PERSISTENT ASTHMA WITHOUT COMPLICATION (H): ICD-10-CM

## 2022-06-17 DIAGNOSIS — F41.1 GAD (GENERALIZED ANXIETY DISORDER): ICD-10-CM

## 2022-06-17 PROCEDURE — 96127 BRIEF EMOTIONAL/BEHAV ASSMT: CPT | Performed by: PHYSICIAN ASSISTANT

## 2022-06-17 PROCEDURE — 99214 OFFICE O/P EST MOD 30 MIN: CPT | Mod: 95 | Performed by: PHYSICIAN ASSISTANT

## 2022-06-17 RX ORDER — CITALOPRAM HYDROBROMIDE 20 MG/1
20 TABLET ORAL DAILY
Qty: 30 TABLET | Refills: 0 | Status: SHIPPED | OUTPATIENT
Start: 2022-06-17 | End: 2022-08-15

## 2022-06-17 RX ORDER — ALBUTEROL SULFATE 90 UG/1
AEROSOL, METERED RESPIRATORY (INHALATION)
Qty: 18 G | Refills: 0 | Status: SHIPPED | OUTPATIENT
Start: 2022-06-17 | End: 2022-08-15

## 2022-06-17 RX ORDER — LEVOTHYROXINE SODIUM 112 UG/1
112 TABLET ORAL DAILY
Qty: 30 TABLET | Refills: 0 | Status: SHIPPED | OUTPATIENT
Start: 2022-06-17 | End: 2022-07-21

## 2022-06-17 RX ORDER — LORAZEPAM 0.5 MG/1
TABLET ORAL
Qty: 10 TABLET | Refills: 0 | Status: SHIPPED | OUTPATIENT
Start: 2022-06-17 | End: 2022-08-15

## 2022-06-17 ASSESSMENT — ANXIETY QUESTIONNAIRES
6. BECOMING EASILY ANNOYED OR IRRITABLE: SEVERAL DAYS
7. FEELING AFRAID AS IF SOMETHING AWFUL MIGHT HAPPEN: NEARLY EVERY DAY
1. FEELING NERVOUS, ANXIOUS, OR ON EDGE: NEARLY EVERY DAY
GAD7 TOTAL SCORE: 17
2. NOT BEING ABLE TO STOP OR CONTROL WORRYING: NEARLY EVERY DAY
3. WORRYING TOO MUCH ABOUT DIFFERENT THINGS: NEARLY EVERY DAY
GAD7 TOTAL SCORE: 17
5. BEING SO RESTLESS THAT IT IS HARD TO SIT STILL: MORE THAN HALF THE DAYS
IF YOU CHECKED OFF ANY PROBLEMS ON THIS QUESTIONNAIRE, HOW DIFFICULT HAVE THESE PROBLEMS MADE IT FOR YOU TO DO YOUR WORK, TAKE CARE OF THINGS AT HOME, OR GET ALONG WITH OTHER PEOPLE: EXTREMELY DIFFICULT

## 2022-06-17 ASSESSMENT — PATIENT HEALTH QUESTIONNAIRE - PHQ9
5. POOR APPETITE OR OVEREATING: MORE THAN HALF THE DAYS
SUM OF ALL RESPONSES TO PHQ QUESTIONS 1-9: 13

## 2022-06-17 NOTE — PATIENT INSTRUCTIONS
Matthew Ambrocio,     I am sorry your depression and anxiety have been severe.  We have restarted your Celexa to help with symptoms.  Additionally, you have been prescribed a small amount of Ativan to help with panic.  Ativan needs to be used very sparingly.  It is important that you get counseling scheduled.  I have placed a referral to help with this.    You were also provided with refills of albuterol as well as levothyroxine.    Additionally, we have scheduled you for a follow-up with Aliyah, your primary care provider.  Please make sure to follow-up sooner for any new or worsening symptoms.  Reach out with questions or concerns.    Take care,  Ninfa Mark PA-C    In an emergency--call 911  Don't leave the person alone. Anyone who is at imminent risk of suicide needs psychiatric services right away. The person must be constantly watched, and never left out of sight. Call 911 or a 24-hour suicide crisis hotline. You can search for this online. You can also take the person to the nearest hospital emergency room.     Don't keep it a secret and don't wait  Call a mental health clinic or a licensed mental health professional in your area right away. This may be a psychiatrist, clinical psychologist, psychiatric or licensed clinical , marriage and family counselor, or clergy. If you don't know how to contact such professionals and there is an immediate risk, call 911. Tell them you need help for a person who is thinking about suicide.     Resources  National Suicide Prevention Sxsiyydw800-374-1692 (128-022-FMDM)www.suicidepreventionlifeline.org  National Suicide Zctapjp236-658-1383 (800-SUICIDE)  National [place] of Mental Lbgktw843-811-7761fdp.nimh.nih.gov  National [place] on Mental Qykivls790-755-2909frq.lorene.org  Mental Health Uuhiicp710-561-7699ush.nmha.org

## 2022-06-17 NOTE — COMMUNITY RESOURCES LIST (ENGLISH)
06/17/2022   [facility]  Ninfacassie Mark  For questions about this resource list or additional care needs, please contact your primary care clinic or care manager.  Phone: [phone]   Email: N/A   Address: [address] [place] [address]   Hours: N/A        Hotlines and Helplines       Hotline - Crisis help  1  [facility]. - 24-Hour Helpline Distance: 0.82 miles      COVID-19 Status: Regular Operations   [address] [place] [address]  Language: Welsh, English, Hmong, Eritrean, Hungarian  Hours: Mon - Sun Open 24 Hours   Phone: (954) [phone] Email: [email or web address] CenterExosLas VegasImb[facility] Website: http://SuppreMol     2  [facility] Distance: 0.95 miles      COVID-19 Status: Phone/Virtual   [address] [place] [address]  Language: English  Hours: Mon - Sun Open 24 Hours   Phone: (677) 279-3822 Website: [email or web address]://[email or web address].org/location/coon-rapids/          Mental Health       Individual counseling  3  [facility] Distance: 0.94 miles      COVID-19 Status: Regular Operations, COVID-19 Status: Phone/Virtual   [address] [facility] [address] [place] [address]  Language: English  Hours: Mon - Fri 8:30 AM - 8:00 PM  Fees: Insurance, Self Pay, Sliding Fee   Phone: (233) 401-5633 Email: justo@[email or web address].org Website: http://www.Tesora.org/     4  [facility] Distance: 0.95 miles      COVID-19 Status: Regular Operations, COVID-19 Status: Phone/Virtual   [address] [facility] [address] [place] [address]  Language: English  Hours: Mon - Fri 9:00 AM - 5:00 PM  Fees: Insurance, Self Pay, Sliding Fee   Phone: (607) 258-6537 Website: [email or web address]://[email or web address]/location/coon-[email or web address]/     Mental health crisis care  5  [facility] Distance: 0.95 miles      COVID-19 Status: Regular  Operations, COVID-19 Status: Phone/Virtual   [address] [place] [address]  Language: English  Hours: Mon - Sun Open 24 Hours  Fees: Insurance, Self Pay   Phone: (685) 633-5338 Website: https://www.mindSHIFT Technologies.org/location/gregory/     6  [facility] Distance: 13.22 miles      COVID-19 Status: Regular Operations   [address] [place] [address]  Language: English  Hours: Mon - Sun Open 24 Hours  Fees: Insurance, Self Pay, Sliding Fee   Phone: (934) 275-3642 Website: https://www.[facility].org/specialty/psychiatry/acute-psychiatry-services/     Mental health support group  7  [facility] Distance: 3.56 miles      COVID-19 Status: Phone/Virtual   [address] [place] [address]  Language: English  Hours: Mon - Fri 9:00 AM - 3:00 PM  Fees: Free   Phone: (383) 175-9743 Website: http://www.GREEer.Celnyx/     8  [facility] Distance: 12.66 miles      COVID-19 Status: Regular Operations, COVID-19 Status: Phone/Virtual   [address] [place] [address]  Language: English, Ethiopian, Ukrainian  Hours: Mon - Fri 9:00 AM - 5:00 PM  Fees: Free   Phone: (565) 414-1253 Email: bk@Qu[employer]Vadis.Celnyx Website: https://www.shopp.[employer]/BlueMessaging.org/          Important Numbers & Websites       Emergency Services   911  City Services   311  Poison Control   (793) 997-1683  Suicide Prevention Lifeline   (842) 654-5463 (TALK)  Child Abuse Hotline   (899) 418-5065 (4-A-Child)  Sexual Assault Hotline   (655) 952-9117 (HOPE)  National Runaway Safeline   (190) 844-4836 (RUNAWAY)  All-Options Talkline   (958) 619-8259  Substance Abuse Referral   (236) 920-5839 (HELP)
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
## 2022-06-18 NOTE — TELEPHONE ENCOUNTER
Pt has appt 6/29/22    PHQ-9 score:    PHQ 6/17/2022   PHQ-9 Total Score 13   Q9: Thoughts of better off dead/self-harm past 2 weeks Not at all     STEPHANIE-7 SCORE 8/19/2019 9/16/2020 6/17/2022   Total Score - - -   Total Score 17 3 17       .

## 2022-06-20 RX ORDER — CITALOPRAM HYDROBROMIDE 40 MG/1
40 TABLET ORAL DAILY
Qty: 30 TABLET | Refills: 0 | OUTPATIENT
Start: 2022-06-20

## 2022-06-20 RX ORDER — ALBUTEROL SULFATE 0.83 MG/ML
2.5 SOLUTION RESPIRATORY (INHALATION) EVERY 4 HOURS PRN
Qty: 30 ML | Refills: 0 | OUTPATIENT
Start: 2022-06-20

## 2022-06-20 NOTE — TELEPHONE ENCOUNTER
Duplicate medication request. Albuterol inhaler and Celexa were filled 6/17/22 and Receipt confirmed by pharmacy (6/17/2022  2:22 PM CDT).     Phylicia Roche RN   [facility]

## 2022-07-20 DIAGNOSIS — E03.9 HYPOTHYROIDISM, UNSPECIFIED TYPE: ICD-10-CM

## 2022-07-21 RX ORDER — LEVOTHYROXINE SODIUM 112 UG/1
112 TABLET ORAL DAILY
Qty: 30 TABLET | Refills: 0 | Status: SHIPPED | OUTPATIENT
Start: 2022-07-21 | End: 2022-08-15

## 2022-07-21 NOTE — TELEPHONE ENCOUNTER
Routing refill request to provider for review/approval because:  Labs not current:  TSH    levothyroxine (SYNTHROID/LEVOTHROID) 112 MCG tablet 30 tablet 0 6/17/2022       Last seen virtually on 6/17/22 with same day provider Ninfa Mark.  Last seen by PCP Aliyah Marcus on 9/16/20  Future Office Visit:   Next 5 appointments (look out 90 days)    Aug 15, 2022  1:40 PM  (Arrive by 1:20 PM)  Provider Visit with Aliyah Marcus PA-C  [facility] [place] ([facility] [place] ) [address] [facility] [address]  [phone]           TSH   Date Value Ref Range Status   01/19/2021 2.46 0.40 - 4.00 mU/L Final

## 2022-08-10 DIAGNOSIS — J45.50 SEVERE PERSISTENT ASTHMA WITHOUT COMPLICATION (H): ICD-10-CM

## 2022-08-10 RX ORDER — ALBUTEROL SULFATE 90 UG/1
AEROSOL, METERED RESPIRATORY (INHALATION)
Qty: 18 G | Refills: 0 | Status: CANCELLED | OUTPATIENT
Start: 2022-08-10

## 2022-08-11 NOTE — TELEPHONE ENCOUNTER
Routing refill request to provider for review/approval because:  Last JF-Upvnobp-0/17/22    ACT Total Scores 3/1/2019 8/19/2019 9/16/2020   ACT TOTAL SCORE - - -   ASTHMA ER VISITS - - -   ASTHMA HOSPITALIZATIONS - - -   ACT TOTAL SCORE (Goal Greater than or Equal to 20) 20 17 20   In the past 12 months, how many times did you visit the emergency room for your asthma without being admitted to the hospital? 0 0 0   In the past 12 months, how many times were you hospitalized overnight because of your asthma? 0 0 0

## 2022-08-15 ENCOUNTER — OFFICE VISIT (OUTPATIENT)
Dept: FAMILY MEDICINE | Facility: CLINIC | Age: 62
End: 2022-08-15
Payer: COMMERCIAL

## 2022-08-15 VITALS
SYSTOLIC BLOOD PRESSURE: 158 MMHG | TEMPERATURE: 97.3 F | RESPIRATION RATE: 20 BRPM | HEIGHT: 65 IN | HEART RATE: 101 BPM | OXYGEN SATURATION: 97 % | BODY MASS INDEX: 28.82 KG/M2 | WEIGHT: 173 LBS | DIASTOLIC BLOOD PRESSURE: 87 MMHG

## 2022-08-15 DIAGNOSIS — Z13.1 DIABETES MELLITUS SCREENING: ICD-10-CM

## 2022-08-15 DIAGNOSIS — J45.50 SEVERE PERSISTENT ASTHMA WITHOUT COMPLICATION (H): ICD-10-CM

## 2022-08-15 DIAGNOSIS — J30.81 ALLERGIC RHINITIS DUE TO ANIMAL DANDER: ICD-10-CM

## 2022-08-15 DIAGNOSIS — D69.6 THROMBOCYTOPENIA (H): ICD-10-CM

## 2022-08-15 DIAGNOSIS — F32.0 MILD MAJOR DEPRESSION (H): ICD-10-CM

## 2022-08-15 DIAGNOSIS — Z12.31 VISIT FOR SCREENING MAMMOGRAM: ICD-10-CM

## 2022-08-15 DIAGNOSIS — Z23 HIGH PRIORITY FOR 2019-NCOV VACCINE: ICD-10-CM

## 2022-08-15 DIAGNOSIS — K70.31 ALCOHOLIC CIRRHOSIS OF LIVER WITH ASCITES (H): ICD-10-CM

## 2022-08-15 DIAGNOSIS — E03.9 HYPOTHYROIDISM, UNSPECIFIED TYPE: ICD-10-CM

## 2022-08-15 DIAGNOSIS — F41.1 GAD (GENERALIZED ANXIETY DISORDER): Primary | ICD-10-CM

## 2022-08-15 DIAGNOSIS — Z13.220 LIPID SCREENING: ICD-10-CM

## 2022-08-15 LAB
ALBUMIN SERPL-MCNC: 3.6 G/DL (ref 3.4–5)
ALP SERPL-CCNC: 120 U/L (ref 40–150)
ALT SERPL W P-5'-P-CCNC: 72 U/L (ref 0–50)
AST SERPL W P-5'-P-CCNC: 133 U/L (ref 0–45)
BASOPHILS # BLD AUTO: 0 10E3/UL (ref 0–0.2)
BASOPHILS NFR BLD AUTO: 0 %
BILIRUB DIRECT SERPL-MCNC: 0.7 MG/DL (ref 0–0.2)
BILIRUB SERPL-MCNC: 1.6 MG/DL (ref 0.2–1.3)
CHOLEST SERPL-MCNC: 176 MG/DL
EOSINOPHIL # BLD AUTO: 0.1 10E3/UL (ref 0–0.7)
EOSINOPHIL NFR BLD AUTO: 2 %
ERYTHROCYTE [DISTWIDTH] IN BLOOD BY AUTOMATED COUNT: 13.7 % (ref 10–15)
FASTING STATUS PATIENT QL REPORTED: YES
FASTING STATUS PATIENT QL REPORTED: YES
GLUCOSE BLD-MCNC: 107 MG/DL (ref 70–99)
HCT VFR BLD AUTO: 41.4 % (ref 35–47)
HDLC SERPL-MCNC: 44 MG/DL
HGB BLD-MCNC: 13.6 G/DL (ref 11.7–15.7)
LDLC SERPL CALC-MCNC: 110 MG/DL
LYMPHOCYTES # BLD AUTO: 1.2 10E3/UL (ref 0.8–5.3)
LYMPHOCYTES NFR BLD AUTO: 34 %
MCH RBC QN AUTO: 32.9 PG (ref 26.5–33)
MCHC RBC AUTO-ENTMCNC: 32.9 G/DL (ref 31.5–36.5)
MCV RBC AUTO: 100 FL (ref 78–100)
MONOCYTES # BLD AUTO: 0.4 10E3/UL (ref 0–1.3)
MONOCYTES NFR BLD AUTO: 10 %
NEUTROPHILS # BLD AUTO: 2 10E3/UL (ref 1.6–8.3)
NEUTROPHILS NFR BLD AUTO: 55 %
NONHDLC SERPL-MCNC: 132 MG/DL
PLATELET # BLD AUTO: 41 10E3/UL (ref 150–450)
PROT SERPL-MCNC: 7.6 G/DL (ref 6.8–8.8)
RBC # BLD AUTO: 4.13 10E6/UL (ref 3.8–5.2)
TRIGL SERPL-MCNC: 111 MG/DL
TSH SERPL DL<=0.005 MIU/L-ACNC: 1.43 MU/L (ref 0.4–4)
WBC # BLD AUTO: 3.7 10E3/UL (ref 4–11)

## 2022-08-15 PROCEDURE — 90471 IMMUNIZATION ADMIN: CPT | Performed by: PHYSICIAN ASSISTANT

## 2022-08-15 PROCEDURE — 82947 ASSAY GLUCOSE BLOOD QUANT: CPT | Performed by: PHYSICIAN ASSISTANT

## 2022-08-15 PROCEDURE — 80061 LIPID PANEL: CPT | Performed by: PHYSICIAN ASSISTANT

## 2022-08-15 PROCEDURE — 36415 COLL VENOUS BLD VENIPUNCTURE: CPT | Performed by: PHYSICIAN ASSISTANT

## 2022-08-15 PROCEDURE — 0064A COVID-19,PF,MODERNA (18+ YRS BOOSTER .25ML): CPT | Performed by: PHYSICIAN ASSISTANT

## 2022-08-15 PROCEDURE — 99214 OFFICE O/P EST MOD 30 MIN: CPT | Mod: 25 | Performed by: PHYSICIAN ASSISTANT

## 2022-08-15 PROCEDURE — 80076 HEPATIC FUNCTION PANEL: CPT | Performed by: PHYSICIAN ASSISTANT

## 2022-08-15 PROCEDURE — 84443 ASSAY THYROID STIM HORMONE: CPT | Performed by: PHYSICIAN ASSISTANT

## 2022-08-15 PROCEDURE — 90677 PCV20 VACCINE IM: CPT | Performed by: PHYSICIAN ASSISTANT

## 2022-08-15 PROCEDURE — 85025 COMPLETE CBC W/AUTO DIFF WBC: CPT | Performed by: PHYSICIAN ASSISTANT

## 2022-08-15 PROCEDURE — 96127 BRIEF EMOTIONAL/BEHAV ASSMT: CPT | Mod: 59 | Performed by: PHYSICIAN ASSISTANT

## 2022-08-15 PROCEDURE — 91306 COVID-19,PF,MODERNA (18+ YRS BOOSTER .25ML): CPT | Performed by: PHYSICIAN ASSISTANT

## 2022-08-15 RX ORDER — ALBUTEROL SULFATE 90 UG/1
AEROSOL, METERED RESPIRATORY (INHALATION)
Qty: 18 G | Refills: 3 | Status: SHIPPED | OUTPATIENT
Start: 2022-08-15 | End: 2022-12-07

## 2022-08-15 RX ORDER — LORAZEPAM 0.5 MG/1
TABLET ORAL
Qty: 20 TABLET | Refills: 0 | Status: SHIPPED | OUTPATIENT
Start: 2022-08-15 | End: 2024-01-01

## 2022-08-15 RX ORDER — CITALOPRAM HYDROBROMIDE 40 MG/1
40 TABLET ORAL DAILY
Qty: 90 TABLET | Refills: 0 | Status: SHIPPED | OUTPATIENT
Start: 2022-08-15 | End: 2022-12-07

## 2022-08-15 RX ORDER — MONTELUKAST SODIUM 10 MG/1
10 TABLET ORAL AT BEDTIME
Qty: 90 TABLET | Refills: 3 | Status: SHIPPED | OUTPATIENT
Start: 2022-08-15 | End: 2023-01-01

## 2022-08-15 RX ORDER — LEVOTHYROXINE SODIUM 112 UG/1
112 TABLET ORAL DAILY
Qty: 90 TABLET | Refills: 3 | Status: SHIPPED | OUTPATIENT
Start: 2022-08-15 | End: 2023-01-01

## 2022-08-15 ASSESSMENT — ASTHMA QUESTIONNAIRES
ACT_TOTALSCORE: 20
QUESTION_2 LAST FOUR WEEKS HOW OFTEN HAVE YOU HAD SHORTNESS OF BREATH: ONCE OR TWICE A WEEK
QUESTION_4 LAST FOUR WEEKS HOW OFTEN HAVE YOU USED YOUR RESCUE INHALER OR NEBULIZER MEDICATION (SUCH AS ALBUTEROL): ONE OR TWO TIMES PER DAY
QUESTION_5 LAST FOUR WEEKS HOW WOULD YOU RATE YOUR ASTHMA CONTROL: WELL CONTROLLED
QUESTION_3 LAST FOUR WEEKS HOW OFTEN DID YOUR ASTHMA SYMPTOMS (WHEEZING, COUGHING, SHORTNESS OF BREATH, CHEST TIGHTNESS OR PAIN) WAKE YOU UP AT NIGHT OR EARLIER THAN USUAL IN THE MORNING: NOT AT ALL
ACT_TOTALSCORE: 20
QUESTION_1 LAST FOUR WEEKS HOW MUCH OF THE TIME DID YOUR ASTHMA KEEP YOU FROM GETTING AS MUCH DONE AT WORK, SCHOOL OR AT HOME: NONE OF THE TIME

## 2022-08-15 ASSESSMENT — ANXIETY QUESTIONNAIRES
GAD7 TOTAL SCORE: 15
2. NOT BEING ABLE TO STOP OR CONTROL WORRYING: NEARLY EVERY DAY
8. IF YOU CHECKED OFF ANY PROBLEMS, HOW DIFFICULT HAVE THESE MADE IT FOR YOU TO DO YOUR WORK, TAKE CARE OF THINGS AT HOME, OR GET ALONG WITH OTHER PEOPLE?: EXTREMELY DIFFICULT
GAD7 TOTAL SCORE: 15
5. BEING SO RESTLESS THAT IT IS HARD TO SIT STILL: SEVERAL DAYS
7. FEELING AFRAID AS IF SOMETHING AWFUL MIGHT HAPPEN: MORE THAN HALF THE DAYS
IF YOU CHECKED OFF ANY PROBLEMS ON THIS QUESTIONNAIRE, HOW DIFFICULT HAVE THESE PROBLEMS MADE IT FOR YOU TO DO YOUR WORK, TAKE CARE OF THINGS AT HOME, OR GET ALONG WITH OTHER PEOPLE: EXTREMELY DIFFICULT
GAD7 TOTAL SCORE: 15
7. FEELING AFRAID AS IF SOMETHING AWFUL MIGHT HAPPEN: MORE THAN HALF THE DAYS
1. FEELING NERVOUS, ANXIOUS, OR ON EDGE: NEARLY EVERY DAY
6. BECOMING EASILY ANNOYED OR IRRITABLE: MORE THAN HALF THE DAYS
3. WORRYING TOO MUCH ABOUT DIFFERENT THINGS: NEARLY EVERY DAY
4. TROUBLE RELAXING: SEVERAL DAYS

## 2022-08-15 ASSESSMENT — PATIENT HEALTH QUESTIONNAIRE - PHQ9
SUM OF ALL RESPONSES TO PHQ QUESTIONS 1-9: 10
SUM OF ALL RESPONSES TO PHQ QUESTIONS 1-9: 10
10. IF YOU CHECKED OFF ANY PROBLEMS, HOW DIFFICULT HAVE THESE PROBLEMS MADE IT FOR YOU TO DO YOUR WORK, TAKE CARE OF THINGS AT HOME, OR GET ALONG WITH OTHER PEOPLE: EXTREMELY DIFFICULT

## 2022-08-15 ASSESSMENT — PAIN SCALES - GENERAL: PAINLEVEL: NO PAIN (0)

## 2022-08-15 NOTE — PROGRESS NOTES
"  Assessment & Plan       ICD-10-CM    1. STEPHANIE (generalized anxiety disorder)  F41.1 citalopram (CELEXA) 40 MG tablet     LORazepam (ATIVAN) 0.5 MG tablet   2. Mild major depression (H)  F32.0 citalopram (CELEXA) 40 MG tablet   3. Allergic rhinitis due to animal dander  J30.81 montelukast (SINGULAIR) 10 MG tablet   4. Severe persistent asthma without complication  J45.50 montelukast (SINGULAIR) 10 MG tablet     albuterol (PROAIR HFA/PROVENTIL HFA/VENTOLIN HFA) 108 (90 Base) MCG/ACT inhaler     fluticasone-vilanterol (BREO ELLIPTA) 200-25 MCG/INH inhaler   5. Hypothyroidism, unspecified type  E03.9 TSH with free T4 reflex     levothyroxine (SYNTHROID/LEVOTHROID) 112 MCG tablet   6. Alcoholic cirrhosis of liver with ascites (H)  K70.31 Hepatic function panel   7. Thrombocytopenia (H)  D69.6 CBC with Platelets & Differential   8. Lipid screening  Z13.220 Lipid panel reflex to direct LDL Fasting   9. Diabetes mellitus screening  Z13.1 Glucose   10. Visit for screening mammogram  Z12.31 MA SCREENING DIGITAL BILAT - Future  (s+30)   11. High priority for 2019-nCoV vaccine  Z23 COVID-19,PF,MODERNA (18+ Yrs BOOSTER .25mL)       1,2) Increase Celexa to 40mg daily. Continue Ativan PRN. Follow up e-visit in 1 month.     3,4) Asthma at goal. ACT at goal. Meds renewed, no changes    5-7) Routine labs in process. Med renewed, no change    8-11) Screenings discussed      Ordering of each unique test  Prescription drug management         BMI:   Estimated body mass index is 29.24 kg/m  as calculated from the following:    Height as of this encounter: 1.638 m (5' 4.5\").    Weight as of this encounter: 78.5 kg (173 lb).       Return in about 1 month (around 9/15/2022) for depression/anxiety follow up, an e-visit through [facility].    Aliyah Marcus PA-C  [facility] DANI Ambrocio is a 62 year old, presenting for the following health issues:  Recheck Medication      History of Present Illness       Mental " "Health Follow-up:  Patient presents to follow-up on Depression & Anxiety.Patient's depression since last visit has been:  Worse  The patient is not having other symptoms associated with depression.  Patient's anxiety since last visit has been:  Worse  The patient is not having other symptoms associated with anxiety.  Any significant life events: financial concerns, grief or loss and health concerns  Patient is feeling anxious or having panic attacks.  Patient has no concerns about alcohol or drug use.    She eats 0-1 servings of fruits and vegetables daily.She consumes 0 sweetened beverage(s) daily.She exercises with enough effort to increase her heart rate 10 to 19 minutes per day.  She exercises with enough effort to increase her heart rate 3 or less days per week.   She is taking medications regularly.    Today's PHQ-9         PHQ-9 Total Score: 10    PHQ-9 Q9 Thoughts of better off dead/self-harm past 2 weeks :   Not at all    How difficult have these problems made it for you to do your work, take care of things at home, or get along with other people: Extremely difficult  Today's STEPHANIE-7 Score: 15     Good friend of the family passed away last week, drowned    is on       Hypothyroidism Follow-up      Since last visit, patient describes the following symptoms: Weight stable, no hair loss, no skin changes, no constipation, no loose stools    Cirrhosis  Doesn't drink every day, more so socially    Thrombocytopenia      Review of Systems   Constitutional, cardiovascular, pulmonary, and psych systems are negative, except as otherwise noted.      Objective    BP (!) 158/87 (BP Location: Left arm, Patient Position: Sitting, Cuff Size: Adult Regular)   Pulse 101   Temp 97.3  F (36.3  C) (Tympanic)   Resp 20   Ht 1.638 m (5' 4.5\")   Wt 78.5 kg (173 lb)   LMP 10/03/2011   SpO2 97%   Breastfeeding No   BMI 29.24 kg/m    Body mass index is 29.24 kg/m .  Physical Exam   GENERAL: healthy, alert and no " distress  RESP: lungs clear to auscultation - no rales, rhonchi or wheezes  CV: regular rates and rhythm, normal S1 S2, no S3 or S4 and no murmur, click or rub  MS: no gross musculoskeletal defects noted, no edema  SKIN: no suspicious lesions or rashes  NEURO: Normal strength and tone, mentation intact and speech normal  PSYCH: mentation appears normal, affect normal/bright              .  ..

## 2022-08-15 NOTE — PROGRESS NOTES
Prior to immunization administration, verified patients identity using patient s name and date of birth. Please see Immunization Activity for additional information.     Screening Questionnaire for Adult Immunization    Are you sick today?   No   Do you have allergies to medications, food, a vaccine component or latex?   No   Have you ever had a serious reaction after receiving a vaccination?   No   Do you have a long-term health problem with heart, lung, kidney, or metabolic disease (e.g., diabetes), asthma, a blood disorder, no spleen, complement component deficiency, a cochlear implant, or a spinal fluid leak?  Are you on long-term aspirin therapy?   Yes   Do you have cancer, leukemia, HIV/AIDS, or any other immune system problem?   No   Do you have a parent, brother, or sister with an immune system problem?   No   In the past 3 months, have you taken medications that affect  your immune system, such as prednisone, other steroids, or anticancer drugs; drugs for the treatment of rheumatoid arthritis, Crohn s disease, or psoriasis; or have you had radiation treatments?   No   Have you had a seizure, or a brain or other nervous system problem?   No   During the past year, have you received a transfusion of blood or blood    products, or been given immune (gamma) globulin or antiviral drug?   No   For women: Are you pregnant or is there a chance you could become       pregnant during the next month?   No   Have you received any vaccinations in the past 4 weeks?   No     Immunization questionnaire answers were all negative.        Per orders of Aliyah Marcus PA-C, injection of [id number] given by Shahana Couch CMA. Patient instructed to remain in clinic for 15 minutes afterwards, and to report any adverse reaction to me immediately.       Screening performed by Shahana Couch CMA on 8/15/2022 at 2:11 PM.

## 2022-08-18 PROBLEM — R73.01 ELEVATED FASTING GLUCOSE: Status: ACTIVE | Noted: 2022-08-18

## 2022-08-28 ENCOUNTER — HEALTH MAINTENANCE LETTER (OUTPATIENT)
Age: 62
End: 2022-08-28

## 2022-10-07 ENCOUNTER — VIRTUAL VISIT (OUTPATIENT)
Dept: GASTROENTEROLOGY | Facility: CLINIC | Age: 62
End: 2022-10-07
Attending: PHYSICIAN ASSISTANT
Payer: COMMERCIAL

## 2022-10-07 DIAGNOSIS — K70.31 ALCOHOLIC CIRRHOSIS OF LIVER WITH ASCITES (H): ICD-10-CM

## 2022-10-07 DIAGNOSIS — F10.21 ALCOHOL USE DISORDER, SEVERE, IN EARLY REMISSION (H): Primary | ICD-10-CM

## 2022-10-07 PROCEDURE — G0463 HOSPITAL OUTPT CLINIC VISIT: HCPCS | Mod: PN,RTG | Performed by: STUDENT IN AN ORGANIZED HEALTH CARE EDUCATION/TRAINING PROGRAM

## 2022-10-07 PROCEDURE — 99214 OFFICE O/P EST MOD 30 MIN: CPT | Mod: 95 | Performed by: STUDENT IN AN ORGANIZED HEALTH CARE EDUCATION/TRAINING PROGRAM

## 2022-10-07 RX ORDER — GABAPENTIN 300 MG/1
300 CAPSULE ORAL 3 TIMES DAILY
Qty: 270 CAPSULE | Refills: 3 | Status: ON HOLD | OUTPATIENT
Start: 2022-10-07 | End: 2023-01-01
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
## 2022-10-07 NOTE — PROGRESS NOTES
Lolly is a 62 year old who is being evaluated via a billable video visit.      How would you like to obtain your AVS? MyChart  If the video visit is dropped, the invitation should be resent by: Text to cell phone: [phone]  Will anyone else be joining your video visit? No        Video-Visit Details    Video Start Time: 11:24 AM    Type of service:  Video Visit    Video End Time:11:36 AM    Originating Location (pt. Location): Home    Distant Location (provider location):  [facility] [place]     Platform used for Video Visit: [employer]

## 2022-10-11 ENCOUNTER — TELEPHONE (OUTPATIENT)
Dept: GASTROENTEROLOGY | Facility: CLINIC | Age: 62
End: 2022-10-11

## 2022-10-11 ENCOUNTER — HOSPITAL ENCOUNTER (OUTPATIENT)
Facility: AMBULATORY SURGERY CENTER | Age: 62
End: 2022-10-11
Attending: STUDENT IN AN ORGANIZED HEALTH CARE EDUCATION/TRAINING PROGRAM

## 2022-10-11 NOTE — TELEPHONE ENCOUNTER
Screening Questions  BLUE  KIND OF PREP RED  LOCATION [review exclusion criteria] GREEN  SEDATION TYPE        Y Are you active on mychart?      Annie Cary MD   Ordering/Referring Provider?        HEALTHCHOICE What type of coverage do you have?      N Have you had a positive covid test in the last 90 days?     29.7 1. BMI  [BMI 40+ - review exclusion criteria]    Y  2. Are you able to give consent for your medical care? [IF NO,RN REVIEW]        N  3. Are you taking any prescription pain medications on a routine schedule?      N  3a. EXTENDED PREP What kind of prescription?     Y, ALCOHOL 4. Do you have any chemical dependencies such as alcohol, street drugs, or methadone?    Y, PTSD 5. Do you have any history of post-traumatic stress syndrome, severe anxiety or history of psychosis?      **If yes 3- 5 , please schedule with MAC sedation.**          IF YES TO ANY 6 - 10 - HOSPITAL SETTING ONLY.     N 6.   Do you need assistance transferring?     N 7.   Have you had a heart or lung transplant?    N 8.   Are you currently on dialysis?   N 9.   Do you use daily home oxygen?   N 10. Do you take nitroglycerin?   10a. NA If yes, how often?     11. [FEMALES]  N Are you currently pregnant?    11a. NA If yes, how many weeks? [ Greater than 12 weeks, OR NEEDED]    N 12. Do you have Pulmonary Hypertension? *NEED PAC APPT AT [facility]*     N 13. [review exclusion criteria]  Do you have any implantable devices in your body (pacemaker, defib, LVAD)?    N 14. In the past 6 months, have you had any heart related issues including cardiomyopathy or heart attack?     14a. NA If yes, did it require cardiac stenting if so when?     N 15. Have you had a stroke or Transient ischemic attack (TIA - aka  mini stroke ) within 6 months?      N 16. Do you have mod to severe Obstructive Sleep Apnea?  [Hospital only - Ok at [facility]]    N 17. Do you have SEVERE AND UNCONTROLLED asthma? *NEED PAC APPT AT [facility]*     N 18. Are you currently taking  "any blood thinners?     18a. If yes, inform patient to \"follow up w/ ordering provider for bridging instructions.\"    N 19. Do you take the medication Phentermine?    19a. If yes, \"Hold for 7 days before procedure.  Please consult your prescribing provider if you have questions about holding this medication.\"     N  20. Do you have chronic kidney disease?      N  21. Do you have a diagnosis of diabetes?     N  22. On a regular basis do you go 3-5 days between bowel movements?      23. Preferred LOCAL Pharmacy for Pre Prescription    [ LIST ONLY ONE PHARMACY]     [id number] - [facility] [id number] [address]    - CLOSING REMINDERS -    Informed patient they will need an adult    Cannot take any type of public or medical transportation alone    Conscious Sedation- Needs  for 6 hours after the procedure       MAC/General-Needs  for 24 hours after procedure    Pre-Procedure Covid test to be completed [[facility] PCR Testing Required]    Confirmed Nurse will call to complete assessment       - SCHEDULING DETAILS -     ULISES  Surgeon    12/29  Date of Procedure  Lower Endoscopy [Colonoscopy]  Type of Procedure Scheduled   [facility]    MAC Sedation Type     N PAC / Pre-op Required         Additional comments:            "

## 2022-12-07 DIAGNOSIS — J45.50 SEVERE PERSISTENT ASTHMA WITHOUT COMPLICATION (H): ICD-10-CM

## 2022-12-08 RX ORDER — FLUTICASONE FUROATE AND VILANTEROL 200; 25 UG/1; UG/1
POWDER RESPIRATORY (INHALATION)
Qty: 60 EACH | Refills: 0 | Status: ON HOLD | OUTPATIENT
Start: 2022-12-08 | End: 2023-01-01

## 2022-12-16 ENCOUNTER — TELEPHONE (OUTPATIENT)
Dept: GASTROENTEROLOGY | Facility: CLINIC | Age: 62
End: 2022-12-16

## 2022-12-16 NOTE — TELEPHONE ENCOUNTER
Attempted to contact patient regarding upcoming EGD procedure on 12/29/22 for pre assessment questions. No answer.     Left message to return call to [phone] #[phone]    Discuss Covid policy.     Pre op exam scheduled: N/A    Arrival time: 1030    [facility] [address] [place] [address], [address] Floor, [place] [address]    Sedation type: MAC    Anticoagulants: No    Electronic implanted devices? No    Diabetic? No    Indication for procedure: Alcohol use disorder, severe, in early remission     Prep instructions sent via [employer].     LIA RAMIRES RN

## 2022-12-20 NOTE — TELEPHONE ENCOUNTER
2nd attempt    Attempted to contact patient regarding upcoming upper endoscopy (EGD) procedure on 12.29.22 for pre assessment questions. No answer.     Left message to return call to [phone] #4    MyChart message sent      Katie Barahona RN

## 2022-12-25 RX ORDER — LIDOCAINE 40 MG/G
CREAM TOPICAL
Status: CANCELLED | OUTPATIENT
Start: 2022-12-25

## 2022-12-25 RX ORDER — ONDANSETRON 2 MG/ML
4 INJECTION INTRAMUSCULAR; INTRAVENOUS
Status: CANCELLED | OUTPATIENT
Start: 2022-12-25

## 2022-12-25 RX ORDER — SODIUM CHLORIDE, SODIUM LACTATE, POTASSIUM CHLORIDE, CALCIUM CHLORIDE 600; 310; 30; 20 MG/100ML; MG/100ML; MG/100ML; MG/100ML
INJECTION, SOLUTION INTRAVENOUS CONTINUOUS
Status: CANCELLED | OUTPATIENT
Start: 2022-12-25

## 2022-12-28 NOTE — TELEPHONE ENCOUNTER
Third attempt for pre-assessment prior to upcoming upper endoscopy (EGD)     No answer.  Left message to return call [phone] #4    [employer] message previously sent has not been read.     Stefani Goss RN

## 2022-12-29 ENCOUNTER — TELEPHONE (OUTPATIENT)
Dept: GASTROENTEROLOGY | Facility: CLINIC | Age: 62
End: 2022-12-29

## 2022-12-29 NOTE — TELEPHONE ENCOUNTER
Caller: Left VM [facility]  Reason for Reschedule/Cancellation (please be detailed, any staff messages or encounters to note?): Patient did not show      Prior to reschedule please review:    Ordering Provider:Raeann    Sedation per order:MAC    Does patient have any ASC Exclusions, please identify?: n      Notes on Cancelled Procedure:    Procedure:Lower Endoscopy [Colonoscopy]     Date: 12/29     Location:[facility]; [address] [place] [address], [address] Floor, [place] [address]    Surgeon: Raeann        Rescheduled: No    Left message to reschedule due to no show

## 2023-01-01 ENCOUNTER — APPOINTMENT (OUTPATIENT)
Dept: GENERAL RADIOLOGY | Facility: CLINIC | Age: 63
End: 2023-01-01
Payer: COMMERCIAL

## 2023-01-01 ENCOUNTER — TRANSFERRED RECORDS (OUTPATIENT)
Dept: HEALTH INFORMATION MANAGEMENT | Facility: CLINIC | Age: 63
End: 2023-01-01
Payer: COMMERCIAL

## 2023-01-01 ENCOUNTER — HOSPITAL ENCOUNTER (OUTPATIENT)
Dept: ULTRASOUND IMAGING | Facility: CLINIC | Age: 63
Discharge: HOME | End: 2023-08-29
Attending: STUDENT IN AN ORGANIZED HEALTH CARE EDUCATION/TRAINING PROGRAM | Admitting: STUDENT IN AN ORGANIZED HEALTH CARE EDUCATION/TRAINING PROGRAM
Payer: COMMERCIAL

## 2023-01-01 ENCOUNTER — APPOINTMENT (OUTPATIENT)
Dept: PHYSICAL THERAPY | Facility: CLINIC | Age: 63
End: 2023-01-01
Attending: STUDENT IN AN ORGANIZED HEALTH CARE EDUCATION/TRAINING PROGRAM
Payer: COMMERCIAL

## 2023-01-01 ENCOUNTER — PATIENT OUTREACH (OUTPATIENT)
Dept: GASTROENTEROLOGY | Facility: CLINIC | Age: 63
End: 2023-01-01
Payer: COMMERCIAL

## 2023-01-01 ENCOUNTER — TRANSFERRED RECORDS (OUTPATIENT)
Dept: TRANSPLANT | Facility: CLINIC | Age: 63
End: 2023-01-01

## 2023-01-01 ENCOUNTER — HOSPITAL ENCOUNTER (OUTPATIENT)
Dept: ULTRASOUND IMAGING | Facility: CLINIC | Age: 63
Discharge: HOME | End: 2023-04-27
Attending: STUDENT IN AN ORGANIZED HEALTH CARE EDUCATION/TRAINING PROGRAM | Admitting: STUDENT IN AN ORGANIZED HEALTH CARE EDUCATION/TRAINING PROGRAM
Payer: COMMERCIAL

## 2023-01-01 ENCOUNTER — TELEPHONE (OUTPATIENT)
Dept: TRANSPLANT | Facility: CLINIC | Age: 63
End: 2023-01-01
Payer: COMMERCIAL

## 2023-01-01 ENCOUNTER — OFFICE VISIT (OUTPATIENT)
Dept: OBGYN | Facility: CLINIC | Age: 63
End: 2023-01-01
Attending: REGISTERED NURSE
Payer: COMMERCIAL

## 2023-01-01 ENCOUNTER — MYC REFILL (OUTPATIENT)
Dept: FAMILY MEDICINE | Facility: CLINIC | Age: 63
End: 2023-01-01
Payer: COMMERCIAL

## 2023-01-01 ENCOUNTER — MYC MEDICAL ADVICE (OUTPATIENT)
Dept: FAMILY MEDICINE | Facility: CLINIC | Age: 63
End: 2023-01-01
Payer: COMMERCIAL

## 2023-01-01 ENCOUNTER — TELEPHONE (OUTPATIENT)
Dept: FAMILY MEDICINE | Facility: CLINIC | Age: 63
End: 2023-01-01
Payer: COMMERCIAL

## 2023-01-01 ENCOUNTER — MEDICAL CORRESPONDENCE (OUTPATIENT)
Dept: HEALTH INFORMATION MANAGEMENT | Facility: CLINIC | Age: 63
End: 2023-01-01
Payer: COMMERCIAL

## 2023-01-01 ENCOUNTER — ANCILLARY ORDERS (OUTPATIENT)
Dept: GASTROENTEROLOGY | Facility: CLINIC | Age: 63
End: 2023-01-01

## 2023-01-01 ENCOUNTER — PATIENT OUTREACH (OUTPATIENT)
Dept: GASTROENTEROLOGY | Facility: CLINIC | Age: 63
End: 2023-01-01

## 2023-01-01 ENCOUNTER — TELEPHONE (OUTPATIENT)
Dept: GASTROENTEROLOGY | Facility: CLINIC | Age: 63
End: 2023-01-01

## 2023-01-01 ENCOUNTER — APPOINTMENT (OUTPATIENT)
Dept: GENERAL RADIOLOGY | Facility: CLINIC | Age: 63
End: 2023-01-01
Attending: STUDENT IN AN ORGANIZED HEALTH CARE EDUCATION/TRAINING PROGRAM
Payer: COMMERCIAL

## 2023-01-01 ENCOUNTER — APPOINTMENT (OUTPATIENT)
Dept: PHYSICAL THERAPY | Facility: CLINIC | Age: 63
End: 2023-01-01
Payer: COMMERCIAL

## 2023-01-01 ENCOUNTER — APPOINTMENT (OUTPATIENT)
Dept: CARDIOLOGY | Facility: CLINIC | Age: 63
End: 2023-01-01
Attending: HOSPITALIST
Payer: COMMERCIAL

## 2023-01-01 ENCOUNTER — APPOINTMENT (OUTPATIENT)
Dept: GENERAL RADIOLOGY | Facility: CLINIC | Age: 63
End: 2023-01-01
Attending: ORTHOPAEDIC SURGERY
Payer: COMMERCIAL

## 2023-01-01 ENCOUNTER — APPOINTMENT (OUTPATIENT)
Dept: ULTRASOUND IMAGING | Facility: CLINIC | Age: 63
End: 2023-01-01
Attending: INTERNAL MEDICINE
Payer: COMMERCIAL

## 2023-01-01 ENCOUNTER — ANCILLARY PROCEDURE (OUTPATIENT)
Dept: ULTRASOUND IMAGING | Facility: CLINIC | Age: 63
End: 2023-01-01
Attending: PEDIATRICS
Payer: COMMERCIAL

## 2023-01-01 ENCOUNTER — TELEPHONE (OUTPATIENT)
Dept: GASTROENTEROLOGY | Facility: CLINIC | Age: 63
End: 2023-01-01
Payer: COMMERCIAL

## 2023-01-01 ENCOUNTER — ANCILLARY PROCEDURE (OUTPATIENT)
Dept: GENERAL RADIOLOGY | Facility: CLINIC | Age: 63
End: 2023-01-01
Attending: PHYSICIAN ASSISTANT
Payer: COMMERCIAL

## 2023-01-01 ENCOUNTER — ANESTHESIA (OUTPATIENT)
Dept: EMERGENCY MEDICINE | Facility: CLINIC | Age: 63
End: 2023-01-01
Payer: COMMERCIAL

## 2023-01-01 ENCOUNTER — HOSPITAL ENCOUNTER (INPATIENT)
Facility: CLINIC | Age: 63
LOS: 32 days | Discharge: HOME | End: 2023-11-23
Attending: EMERGENCY MEDICINE | Admitting: EMERGENCY MEDICINE
Payer: COMMERCIAL

## 2023-01-01 ENCOUNTER — APPOINTMENT (OUTPATIENT)
Dept: GENERAL RADIOLOGY | Facility: CLINIC | Age: 63
End: 2023-01-01
Attending: INTERNAL MEDICINE
Payer: COMMERCIAL

## 2023-01-01 ENCOUNTER — TELEPHONE (OUTPATIENT)
Dept: MEDSURG UNIT | Facility: CLINIC | Age: 63
End: 2023-01-01
Payer: COMMERCIAL

## 2023-01-01 ENCOUNTER — DOCUMENTATION ONLY (OUTPATIENT)
Dept: OTHER | Facility: CLINIC | Age: 63
End: 2023-01-01
Payer: COMMERCIAL

## 2023-01-01 ENCOUNTER — OFFICE VISIT (OUTPATIENT)
Dept: CARDIOLOGY | Facility: CLINIC | Age: 63
End: 2023-01-01
Payer: COMMERCIAL

## 2023-01-01 ENCOUNTER — TRANSFERRED RECORDS (OUTPATIENT)
Dept: HEALTH INFORMATION MANAGEMENT | Facility: CLINIC | Age: 63
End: 2023-01-01

## 2023-01-01 ENCOUNTER — MYC MEDICAL ADVICE (OUTPATIENT)
Dept: CARDIOLOGY | Facility: CLINIC | Age: 63
End: 2023-01-01
Payer: COMMERCIAL

## 2023-01-01 ENCOUNTER — TELEPHONE (OUTPATIENT)
Dept: FAMILY MEDICINE | Facility: CLINIC | Age: 63
End: 2023-01-01

## 2023-01-01 ENCOUNTER — TELEPHONE (OUTPATIENT)
Dept: TRANSPLANT | Facility: CLINIC | Age: 63
End: 2023-01-01

## 2023-01-01 ENCOUNTER — LAB (OUTPATIENT)
Dept: LAB | Facility: CLINIC | Age: 63
End: 2023-01-01
Payer: COMMERCIAL

## 2023-01-01 ENCOUNTER — HOSPITAL ENCOUNTER (OUTPATIENT)
Dept: ULTRASOUND IMAGING | Facility: CLINIC | Age: 63
Discharge: HOME | End: 2023-08-23
Attending: STUDENT IN AN ORGANIZED HEALTH CARE EDUCATION/TRAINING PROGRAM | Admitting: STUDENT IN AN ORGANIZED HEALTH CARE EDUCATION/TRAINING PROGRAM
Payer: COMMERCIAL

## 2023-01-01 ENCOUNTER — HEALTH MAINTENANCE LETTER (OUTPATIENT)
Age: 63
End: 2023-01-01

## 2023-01-01 ENCOUNTER — HOSPITAL ENCOUNTER (OUTPATIENT)
Dept: ULTRASOUND IMAGING | Facility: CLINIC | Age: 63
Discharge: HOME | End: 2023-08-15
Attending: STUDENT IN AN ORGANIZED HEALTH CARE EDUCATION/TRAINING PROGRAM | Admitting: STUDENT IN AN ORGANIZED HEALTH CARE EDUCATION/TRAINING PROGRAM
Payer: COMMERCIAL

## 2023-01-01 ENCOUNTER — APPOINTMENT (OUTPATIENT)
Dept: INTERVENTIONAL RADIOLOGY/VASCULAR | Facility: CLINIC | Age: 63
End: 2023-01-01
Attending: NURSE PRACTITIONER
Payer: COMMERCIAL

## 2023-01-01 ENCOUNTER — TELEPHONE (OUTPATIENT)
Dept: CARDIOLOGY | Facility: CLINIC | Age: 63
End: 2023-01-01

## 2023-01-01 ENCOUNTER — HOSPITAL ENCOUNTER (INPATIENT)
Facility: CLINIC | Age: 63
LOS: 10 days | Discharge: HOME | End: 2023-12-16
Attending: EMERGENCY MEDICINE | Admitting: INTERNAL MEDICINE
Payer: COMMERCIAL

## 2023-01-01 ENCOUNTER — DOCUMENTATION ONLY (OUTPATIENT)
Dept: GASTROENTEROLOGY | Facility: CLINIC | Age: 63
End: 2023-01-01
Payer: COMMERCIAL

## 2023-01-01 ENCOUNTER — PATIENT OUTREACH (OUTPATIENT)
Dept: CARE COORDINATION | Facility: CLINIC | Age: 63
End: 2023-01-01

## 2023-01-01 ENCOUNTER — PATIENT OUTREACH (OUTPATIENT)
Dept: CARE COORDINATION | Facility: CLINIC | Age: 63
End: 2023-01-01
Payer: COMMERCIAL

## 2023-01-01 ENCOUNTER — HOSPITAL ENCOUNTER (OUTPATIENT)
Dept: ULTRASOUND IMAGING | Facility: CLINIC | Age: 63
Discharge: HOME | End: 2023-04-14
Attending: STUDENT IN AN ORGANIZED HEALTH CARE EDUCATION/TRAINING PROGRAM | Admitting: STUDENT IN AN ORGANIZED HEALTH CARE EDUCATION/TRAINING PROGRAM
Payer: COMMERCIAL

## 2023-01-01 ENCOUNTER — OFFICE VISIT (OUTPATIENT)
Dept: FAMILY MEDICINE | Facility: CLINIC | Age: 63
End: 2023-01-01
Payer: COMMERCIAL

## 2023-01-01 ENCOUNTER — TELEPHONE (OUTPATIENT)
Dept: INTERVENTIONAL RADIOLOGY/VASCULAR | Facility: CLINIC | Age: 63
End: 2023-01-01
Payer: COMMERCIAL

## 2023-01-01 ENCOUNTER — ANCILLARY ORDERS (OUTPATIENT)
Dept: TRANSPLANT | Facility: CLINIC | Age: 63
End: 2023-01-01

## 2023-01-01 ENCOUNTER — APPOINTMENT (OUTPATIENT)
Dept: GENERAL RADIOLOGY | Facility: CLINIC | Age: 63
End: 2023-01-01
Attending: HOSPITALIST
Payer: COMMERCIAL

## 2023-01-01 ENCOUNTER — DOCUMENTATION ONLY (OUTPATIENT)
Dept: TRANSPLANT | Facility: CLINIC | Age: 63
End: 2023-01-01

## 2023-01-01 ENCOUNTER — APPOINTMENT (OUTPATIENT)
Dept: GENERAL RADIOLOGY | Facility: CLINIC | Age: 63
End: 2023-01-01
Attending: EMERGENCY MEDICINE
Payer: COMMERCIAL

## 2023-01-01 ENCOUNTER — HOSPITAL ENCOUNTER (OUTPATIENT)
Dept: ULTRASOUND IMAGING | Facility: CLINIC | Age: 63
Discharge: HOME | End: 2023-05-10
Attending: STUDENT IN AN ORGANIZED HEALTH CARE EDUCATION/TRAINING PROGRAM | Admitting: STUDENT IN AN ORGANIZED HEALTH CARE EDUCATION/TRAINING PROGRAM
Payer: COMMERCIAL

## 2023-01-01 ENCOUNTER — VIRTUAL VISIT (OUTPATIENT)
Dept: FAMILY MEDICINE | Facility: CLINIC | Age: 63
End: 2023-01-01
Payer: COMMERCIAL

## 2023-01-01 ENCOUNTER — HOSPITAL ENCOUNTER (OUTPATIENT)
Dept: ULTRASOUND IMAGING | Facility: CLINIC | Age: 63
Discharge: HOME | End: 2023-05-17
Attending: STUDENT IN AN ORGANIZED HEALTH CARE EDUCATION/TRAINING PROGRAM | Admitting: STUDENT IN AN ORGANIZED HEALTH CARE EDUCATION/TRAINING PROGRAM
Payer: COMMERCIAL

## 2023-01-01 ENCOUNTER — MYC MEDICAL ADVICE (OUTPATIENT)
Dept: GASTROENTEROLOGY | Facility: CLINIC | Age: 63
End: 2023-01-01
Payer: COMMERCIAL

## 2023-01-01 ENCOUNTER — DOCUMENTATION ONLY (OUTPATIENT)
Dept: INTERVENTIONAL RADIOLOGY/VASCULAR | Facility: CLINIC | Age: 63
End: 2023-01-01

## 2023-01-01 ENCOUNTER — REFERRAL (OUTPATIENT)
Dept: TRANSPLANT | Facility: CLINIC | Age: 63
End: 2023-01-01
Payer: COMMERCIAL

## 2023-01-01 ENCOUNTER — ANESTHESIA EVENT (OUTPATIENT)
Dept: EMERGENCY MEDICINE | Facility: CLINIC | Age: 63
End: 2023-01-01
Payer: COMMERCIAL

## 2023-01-01 ENCOUNTER — ANCILLARY PROCEDURE (OUTPATIENT)
Dept: ULTRASOUND IMAGING | Facility: CLINIC | Age: 63
End: 2023-01-01
Attending: STUDENT IN AN ORGANIZED HEALTH CARE EDUCATION/TRAINING PROGRAM
Payer: COMMERCIAL

## 2023-01-01 ENCOUNTER — HOSPITAL ENCOUNTER (OUTPATIENT)
Dept: ULTRASOUND IMAGING | Facility: CLINIC | Age: 63
Discharge: HOME | End: 2023-06-05
Attending: STUDENT IN AN ORGANIZED HEALTH CARE EDUCATION/TRAINING PROGRAM | Admitting: STUDENT IN AN ORGANIZED HEALTH CARE EDUCATION/TRAINING PROGRAM
Payer: COMMERCIAL

## 2023-01-01 ENCOUNTER — LAB (OUTPATIENT)
Dept: LAB | Facility: CLINIC | Age: 63
End: 2023-01-01
Attending: INTERNAL MEDICINE
Payer: COMMERCIAL

## 2023-01-01 ENCOUNTER — HOSPITAL ENCOUNTER (OUTPATIENT)
Dept: ULTRASOUND IMAGING | Facility: CLINIC | Age: 63
Discharge: HOME | End: 2023-06-12
Attending: STUDENT IN AN ORGANIZED HEALTH CARE EDUCATION/TRAINING PROGRAM | Admitting: STUDENT IN AN ORGANIZED HEALTH CARE EDUCATION/TRAINING PROGRAM
Payer: COMMERCIAL

## 2023-01-01 ENCOUNTER — APPOINTMENT (OUTPATIENT)
Dept: CT IMAGING | Facility: CLINIC | Age: 63
End: 2023-01-01
Attending: STUDENT IN AN ORGANIZED HEALTH CARE EDUCATION/TRAINING PROGRAM
Payer: COMMERCIAL

## 2023-01-01 ENCOUNTER — OFFICE VISIT (OUTPATIENT)
Dept: INFUSION THERAPY | Facility: CLINIC | Age: 63
End: 2023-01-01
Attending: INTERNAL MEDICINE
Payer: COMMERCIAL

## 2023-01-01 ENCOUNTER — HOSPITAL ENCOUNTER (OUTPATIENT)
Facility: CLINIC | Age: 63
End: 2023-01-01
Attending: INTERNAL MEDICINE | Admitting: INTERNAL MEDICINE
Payer: COMMERCIAL

## 2023-01-01 ENCOUNTER — MEDICAL CORRESPONDENCE (OUTPATIENT)
Dept: HEALTH INFORMATION MANAGEMENT | Facility: CLINIC | Age: 63
End: 2023-01-01

## 2023-01-01 ENCOUNTER — HOSPITAL ENCOUNTER (OUTPATIENT)
Dept: ULTRASOUND IMAGING | Facility: CLINIC | Age: 63
Discharge: HOME | End: 2023-06-20
Attending: STUDENT IN AN ORGANIZED HEALTH CARE EDUCATION/TRAINING PROGRAM | Admitting: STUDENT IN AN ORGANIZED HEALTH CARE EDUCATION/TRAINING PROGRAM
Payer: COMMERCIAL

## 2023-01-01 ENCOUNTER — APPOINTMENT (OUTPATIENT)
Dept: GENERAL RADIOLOGY | Facility: CLINIC | Age: 63
End: 2023-01-01
Attending: CLINICAL NURSE SPECIALIST
Payer: COMMERCIAL

## 2023-01-01 ENCOUNTER — ANCILLARY PROCEDURE (OUTPATIENT)
Dept: MAMMOGRAPHY | Facility: CLINIC | Age: 63
End: 2023-01-01
Attending: INTERNAL MEDICINE
Payer: COMMERCIAL

## 2023-01-01 ENCOUNTER — MYC MEDICAL ADVICE (OUTPATIENT)
Dept: FAMILY MEDICINE | Facility: CLINIC | Age: 63
End: 2023-01-01

## 2023-01-01 ENCOUNTER — HOSPITAL ENCOUNTER (OUTPATIENT)
Dept: ULTRASOUND IMAGING | Facility: CLINIC | Age: 63
Discharge: HOME | End: 2023-05-24
Attending: STUDENT IN AN ORGANIZED HEALTH CARE EDUCATION/TRAINING PROGRAM | Admitting: STUDENT IN AN ORGANIZED HEALTH CARE EDUCATION/TRAINING PROGRAM
Payer: COMMERCIAL

## 2023-01-01 ENCOUNTER — APPOINTMENT (OUTPATIENT)
Dept: GENERAL RADIOLOGY | Facility: CLINIC | Age: 63
End: 2023-01-01
Attending: PEDIATRICS
Payer: COMMERCIAL

## 2023-01-01 ENCOUNTER — HOSPITAL ENCOUNTER (OUTPATIENT)
Dept: ULTRASOUND IMAGING | Facility: CLINIC | Age: 63
Discharge: HOME | End: 2023-05-04
Attending: STUDENT IN AN ORGANIZED HEALTH CARE EDUCATION/TRAINING PROGRAM | Admitting: STUDENT IN AN ORGANIZED HEALTH CARE EDUCATION/TRAINING PROGRAM
Payer: COMMERCIAL

## 2023-01-01 ENCOUNTER — OFFICE VISIT (OUTPATIENT)
Dept: TRANSPLANT | Facility: CLINIC | Age: 63
End: 2023-01-01
Attending: INTERNAL MEDICINE
Payer: COMMERCIAL

## 2023-01-01 ENCOUNTER — HOSPITAL ENCOUNTER (INPATIENT)
Facility: CLINIC | Age: 63
Setting detail: SURGERY ADMIT
End: 2023-01-01
Payer: COMMERCIAL

## 2023-01-01 ENCOUNTER — APPOINTMENT (OUTPATIENT)
Dept: INTERVENTIONAL RADIOLOGY/VASCULAR | Facility: CLINIC | Age: 63
End: 2023-01-01
Attending: STUDENT IN AN ORGANIZED HEALTH CARE EDUCATION/TRAINING PROGRAM
Payer: COMMERCIAL

## 2023-01-01 ENCOUNTER — HOSPITAL ENCOUNTER (OUTPATIENT)
Facility: CLINIC | Age: 63
End: 2023-01-01
Payer: COMMERCIAL

## 2023-01-01 VITALS
SYSTOLIC BLOOD PRESSURE: 120 MMHG | TEMPERATURE: 99.7 F | DIASTOLIC BLOOD PRESSURE: 74 MMHG | HEART RATE: 92 BPM | DIASTOLIC BLOOD PRESSURE: 79 MMHG | SYSTOLIC BLOOD PRESSURE: 131 MMHG | HEART RATE: 88 BPM | RESPIRATION RATE: 16 BRPM | OXYGEN SATURATION: 95 % | TEMPERATURE: 98.8 F | RESPIRATION RATE: 18 BRPM | OXYGEN SATURATION: 93 %

## 2023-01-01 VITALS
OXYGEN SATURATION: 94 % | SYSTOLIC BLOOD PRESSURE: 100 MMHG | BODY MASS INDEX: 25.83 KG/M2 | WEIGHT: 155 LBS | TEMPERATURE: 98.8 F | DIASTOLIC BLOOD PRESSURE: 58 MMHG | HEIGHT: 65 IN | HEART RATE: 95 BPM

## 2023-01-01 VITALS
DIASTOLIC BLOOD PRESSURE: 51 MMHG | RESPIRATION RATE: 17 BRPM | TEMPERATURE: 97.6 F | SYSTOLIC BLOOD PRESSURE: 98 MMHG | HEART RATE: 81 BPM

## 2023-01-01 VITALS — WEIGHT: 145 LBS | BODY MASS INDEX: 24.16 KG/M2 | HEIGHT: 65 IN

## 2023-01-01 VITALS
OXYGEN SATURATION: 99 % | WEIGHT: 150.6 LBS | SYSTOLIC BLOOD PRESSURE: 109 MMHG | BODY MASS INDEX: 24.2 KG/M2 | HEIGHT: 66 IN | DIASTOLIC BLOOD PRESSURE: 64 MMHG | HEART RATE: 72 BPM

## 2023-01-01 VITALS
HEART RATE: 75 BPM | SYSTOLIC BLOOD PRESSURE: 123 MMHG | OXYGEN SATURATION: 100 % | TEMPERATURE: 98.4 F | RESPIRATION RATE: 16 BRPM | DIASTOLIC BLOOD PRESSURE: 76 MMHG

## 2023-01-01 VITALS
WEIGHT: 153 LBS | HEART RATE: 77 BPM | DIASTOLIC BLOOD PRESSURE: 66 MMHG | BODY MASS INDEX: 25.49 KG/M2 | SYSTOLIC BLOOD PRESSURE: 106 MMHG | HEIGHT: 65 IN

## 2023-01-01 VITALS — SYSTOLIC BLOOD PRESSURE: 127 MMHG | HEART RATE: 84 BPM | DIASTOLIC BLOOD PRESSURE: 63 MMHG | RESPIRATION RATE: 16 BRPM

## 2023-01-01 VITALS
OXYGEN SATURATION: 97 % | HEART RATE: 87 BPM | DIASTOLIC BLOOD PRESSURE: 62 MMHG | SYSTOLIC BLOOD PRESSURE: 127 MMHG | TEMPERATURE: 98.8 F | RESPIRATION RATE: 16 BRPM

## 2023-01-01 VITALS
RESPIRATION RATE: 20 BRPM | BODY MASS INDEX: 29.69 KG/M2 | HEIGHT: 65 IN | TEMPERATURE: 98.1 F | DIASTOLIC BLOOD PRESSURE: 66 MMHG | WEIGHT: 178.2 LBS | SYSTOLIC BLOOD PRESSURE: 110 MMHG | HEART RATE: 91 BPM | OXYGEN SATURATION: 97 %

## 2023-01-01 VITALS
TEMPERATURE: 98.3 F | DIASTOLIC BLOOD PRESSURE: 68 MMHG | OXYGEN SATURATION: 95 % | SYSTOLIC BLOOD PRESSURE: 121 MMHG | HEART RATE: 75 BPM | RESPIRATION RATE: 16 BRPM

## 2023-01-01 VITALS
WEIGHT: 154.6 LBS | BODY MASS INDEX: 25.76 KG/M2 | TEMPERATURE: 97.8 F | HEART RATE: 87 BPM | OXYGEN SATURATION: 97 % | HEIGHT: 65 IN

## 2023-01-01 VITALS
DIASTOLIC BLOOD PRESSURE: 39 MMHG | WEIGHT: 135 LBS | SYSTOLIC BLOOD PRESSURE: 113 MMHG | RESPIRATION RATE: 16 BRPM | BODY MASS INDEX: 22.49 KG/M2 | TEMPERATURE: 97.3 F | OXYGEN SATURATION: 94 % | HEART RATE: 70 BPM | HEIGHT: 65 IN

## 2023-01-01 VITALS
SYSTOLIC BLOOD PRESSURE: 130 MMHG | WEIGHT: 172.5 LBS | OXYGEN SATURATION: 97 % | BODY MASS INDEX: 28.64 KG/M2 | HEART RATE: 80 BPM | DIASTOLIC BLOOD PRESSURE: 84 MMHG | TEMPERATURE: 98 F

## 2023-01-01 VITALS
OXYGEN SATURATION: 97 % | SYSTOLIC BLOOD PRESSURE: 94 MMHG | DIASTOLIC BLOOD PRESSURE: 50 MMHG | HEART RATE: 78 BPM | WEIGHT: 145 LBS | BODY MASS INDEX: 23.87 KG/M2 | TEMPERATURE: 97.3 F

## 2023-01-01 VITALS
TEMPERATURE: 97.8 F | HEIGHT: 65 IN | BODY MASS INDEX: 26.12 KG/M2 | DIASTOLIC BLOOD PRESSURE: 51 MMHG | HEART RATE: 64 BPM | RESPIRATION RATE: 18 BRPM | WEIGHT: 156.75 LBS | SYSTOLIC BLOOD PRESSURE: 92 MMHG | OXYGEN SATURATION: 100 %

## 2023-01-01 VITALS
SYSTOLIC BLOOD PRESSURE: 113 MMHG | TEMPERATURE: 98.1 F | OXYGEN SATURATION: 100 % | HEART RATE: 83 BPM | RESPIRATION RATE: 16 BRPM | DIASTOLIC BLOOD PRESSURE: 55 MMHG

## 2023-01-01 VITALS — DIASTOLIC BLOOD PRESSURE: 56 MMHG | SYSTOLIC BLOOD PRESSURE: 104 MMHG | RESPIRATION RATE: 16 BRPM | HEART RATE: 75 BPM

## 2023-01-01 VITALS
DIASTOLIC BLOOD PRESSURE: 73 MMHG | OXYGEN SATURATION: 99 % | RESPIRATION RATE: 16 BRPM | HEART RATE: 86 BPM | SYSTOLIC BLOOD PRESSURE: 116 MMHG | TEMPERATURE: 98.2 F

## 2023-01-01 VITALS
HEART RATE: 67 BPM | SYSTOLIC BLOOD PRESSURE: 97 MMHG | BODY MASS INDEX: 22.07 KG/M2 | HEIGHT: 66 IN | OXYGEN SATURATION: 100 % | WEIGHT: 137.3 LBS | DIASTOLIC BLOOD PRESSURE: 57 MMHG

## 2023-01-01 VITALS
WEIGHT: 163.8 LBS | DIASTOLIC BLOOD PRESSURE: 74 MMHG | OXYGEN SATURATION: 93 % | BODY MASS INDEX: 27.29 KG/M2 | HEART RATE: 92 BPM | RESPIRATION RATE: 18 BRPM | TEMPERATURE: 97 F | SYSTOLIC BLOOD PRESSURE: 112 MMHG | HEIGHT: 65 IN

## 2023-01-01 VITALS
HEART RATE: 72 BPM | OXYGEN SATURATION: 98 % | SYSTOLIC BLOOD PRESSURE: 129 MMHG | DIASTOLIC BLOOD PRESSURE: 67 MMHG | RESPIRATION RATE: 16 BRPM

## 2023-01-01 VITALS
OXYGEN SATURATION: 95 % | SYSTOLIC BLOOD PRESSURE: 107 MMHG | RESPIRATION RATE: 16 BRPM | HEART RATE: 74 BPM | DIASTOLIC BLOOD PRESSURE: 63 MMHG | TEMPERATURE: 97.3 F

## 2023-01-01 DIAGNOSIS — E87.1 HYPONATREMIA: ICD-10-CM

## 2023-01-01 DIAGNOSIS — K70.31 ALCOHOLIC CIRRHOSIS OF LIVER WITH ASCITES (H): Primary | ICD-10-CM

## 2023-01-01 DIAGNOSIS — R25.2 CRAMP OF LIMB: ICD-10-CM

## 2023-01-01 DIAGNOSIS — J90 PLEURAL EFFUSION: ICD-10-CM

## 2023-01-01 DIAGNOSIS — K76.9 LIVER DISEASE: ICD-10-CM

## 2023-01-01 DIAGNOSIS — J30.81 ALLERGIC RHINITIS DUE TO ANIMAL DANDER: ICD-10-CM

## 2023-01-01 DIAGNOSIS — K70.31 ALCOHOLIC CIRRHOSIS OF LIVER WITH ASCITES (H): ICD-10-CM

## 2023-01-01 DIAGNOSIS — R06.00 DYSPNEA, UNSPECIFIED TYPE: ICD-10-CM

## 2023-01-01 DIAGNOSIS — Z12.31 BREAST CANCER SCREENING BY MAMMOGRAM: ICD-10-CM

## 2023-01-01 DIAGNOSIS — Z12.31 BREAST CANCER SCREENING BY MAMMOGRAM: Primary | ICD-10-CM

## 2023-01-01 DIAGNOSIS — E03.9 HYPOTHYROIDISM, UNSPECIFIED TYPE: ICD-10-CM

## 2023-01-01 DIAGNOSIS — Z09 HOSPITAL DISCHARGE FOLLOW-UP: Primary | ICD-10-CM

## 2023-01-01 DIAGNOSIS — J45.50 SEVERE PERSISTENT ASTHMA WITHOUT COMPLICATION (H): ICD-10-CM

## 2023-01-01 DIAGNOSIS — S23.8XXA SPRAIN OF CHEST WALL, INITIAL ENCOUNTER: ICD-10-CM

## 2023-01-01 DIAGNOSIS — J34.89 NASAL SORE: Primary | ICD-10-CM

## 2023-01-01 DIAGNOSIS — F10.21 ALCOHOL USE DISORDER, SEVERE, IN EARLY REMISSION (H): ICD-10-CM

## 2023-01-01 DIAGNOSIS — Z01.818 ENCOUNTER FOR PRE-TRANSPLANT EVALUATION FOR CHRONIC LIVER DISEASE: ICD-10-CM

## 2023-01-01 DIAGNOSIS — K27.9 PUD (PEPTIC ULCER DISEASE): ICD-10-CM

## 2023-01-01 DIAGNOSIS — K70.30 ALCOHOLIC CIRRHOSIS (H): ICD-10-CM

## 2023-01-01 DIAGNOSIS — D64.9 LOW HEMOGLOBIN: ICD-10-CM

## 2023-01-01 DIAGNOSIS — E87.1 HYPONATREMIA: Primary | ICD-10-CM

## 2023-01-01 DIAGNOSIS — K70.11 ALCOHOLIC HEPATITIS WITH ASCITES (H): ICD-10-CM

## 2023-01-01 DIAGNOSIS — R73.01 ELEVATED FASTING GLUCOSE: ICD-10-CM

## 2023-01-01 DIAGNOSIS — F32.0 MILD MAJOR DEPRESSION (H): ICD-10-CM

## 2023-01-01 DIAGNOSIS — K76.82 HEPATIC ENCEPHALOPATHY (H): ICD-10-CM

## 2023-01-01 DIAGNOSIS — K76.9 PLEURAL EFFUSION ASSOCIATED WITH HEPATIC DISORDER: Primary | ICD-10-CM

## 2023-01-01 DIAGNOSIS — K70.30 ALCOHOLIC CIRRHOSIS (H): Primary | ICD-10-CM

## 2023-01-01 DIAGNOSIS — Z76.82 LIVER TRANSPLANT CANDIDATE: Primary | ICD-10-CM

## 2023-01-01 DIAGNOSIS — F41.0 PANIC ATTACKS: ICD-10-CM

## 2023-01-01 DIAGNOSIS — Z01.419 ENCOUNTER FOR GYNECOLOGICAL EXAMINATION WITHOUT ABNORMAL FINDING: ICD-10-CM

## 2023-01-01 DIAGNOSIS — Z12.4 SCREENING FOR MALIGNANT NEOPLASM OF CERVIX: Primary | ICD-10-CM

## 2023-01-01 DIAGNOSIS — S51.012A SKIN TEAR OF LEFT ELBOW WITHOUT COMPLICATION, INITIAL ENCOUNTER: ICD-10-CM

## 2023-01-01 DIAGNOSIS — J91.8 PLEURAL EFFUSION ASSOCIATED WITH HEPATIC DISORDER: Primary | ICD-10-CM

## 2023-01-01 DIAGNOSIS — Z91.148 NONCOMPLIANCE WITH MEDICATION REGIMEN: ICD-10-CM

## 2023-01-01 DIAGNOSIS — K70.30 ALCOHOLIC CIRRHOSIS OF LIVER WITHOUT ASCITES (H): Primary | ICD-10-CM

## 2023-01-01 DIAGNOSIS — J45.50 UNCOMPLICATED SEVERE PERSISTENT ASTHMA (H): ICD-10-CM

## 2023-01-01 DIAGNOSIS — T14.8XXA ABRASION: ICD-10-CM

## 2023-01-01 DIAGNOSIS — J90 PLEURAL EFFUSION: Primary | ICD-10-CM

## 2023-01-01 DIAGNOSIS — D69.6 THROMBOCYTOPENIA (H): ICD-10-CM

## 2023-01-01 DIAGNOSIS — Z12.31 VISIT FOR SCREENING MAMMOGRAM: ICD-10-CM

## 2023-01-01 DIAGNOSIS — F41.1 GAD (GENERALIZED ANXIETY DISORDER): ICD-10-CM

## 2023-01-01 DIAGNOSIS — N17.9 ACUTE RENAL FAILURE, UNSPECIFIED ACUTE RENAL FAILURE TYPE (H): ICD-10-CM

## 2023-01-01 DIAGNOSIS — D64.9 LOW HEMOGLOBIN: Primary | ICD-10-CM

## 2023-01-01 DIAGNOSIS — Z48.02 VISIT FOR SUTURE REMOVAL: ICD-10-CM

## 2023-01-01 DIAGNOSIS — D62 ANEMIA DUE TO BLOOD LOSS, ACUTE: ICD-10-CM

## 2023-01-01 DIAGNOSIS — J45.50 SEVERE PERSISTENT ASTHMA, UNSPECIFIED WHETHER COMPLICATED (H): ICD-10-CM

## 2023-01-01 DIAGNOSIS — J34.89 SORE IN NOSE: ICD-10-CM

## 2023-01-01 DIAGNOSIS — S81.812A: Primary | ICD-10-CM

## 2023-01-01 DIAGNOSIS — E55.9 VITAMIN D DEFICIENCY: Primary | ICD-10-CM

## 2023-01-01 DIAGNOSIS — F17.210 CIGARETTE SMOKER: ICD-10-CM

## 2023-01-01 DIAGNOSIS — D22.9 CHANGE IN SKIN MOLE: ICD-10-CM

## 2023-01-01 DIAGNOSIS — Z02.89 ENCOUNTER FOR COMPLETION OF FORM WITH PATIENT: ICD-10-CM

## 2023-01-01 DIAGNOSIS — Z01.89 DIAGNOSTIC SKIN AND SENSITIZATION TESTS: ICD-10-CM

## 2023-01-01 DIAGNOSIS — K29.71 GASTROINTESTINAL HEMORRHAGE ASSOCIATED WITH GASTRITIS, UNSPECIFIED GASTRITIS TYPE: ICD-10-CM

## 2023-01-01 DIAGNOSIS — F41.9 ANXIETY: ICD-10-CM

## 2023-01-01 LAB
ABO/RH(D): NORMAL
ABSOLUTE NEUTROPHILS, BODY FLUID: 23.2 /UL
ABSOLUTE NEUTROPHILS, BODY FLUID: 32.1 /UL
ACANTHOCYTES BLD QL SMEAR: ABNORMAL
ACANTHOCYTES BLD QL SMEAR: ABNORMAL
ACANTHOCYTES BLD QL SMEAR: NORMAL
ACANTHOCYTES BLD QL SMEAR: NORMAL
AFP SERPL-MCNC: 5.3 NG/ML
ALBUMIN BODY FLUID SOURCE: NORMAL
ALBUMIN BODY FLUID SOURCE: NORMAL
ALBUMIN FLD-MCNC: 0.3 G/DL
ALBUMIN FLD-MCNC: 0.9 G/DL
ALBUMIN MFR UR ELPH: 11.2 MG/DL
ALBUMIN MFR UR ELPH: <6 MG/DL
ALBUMIN SERPL BCG-MCNC: 3.1 G/DL (ref 3.5–5.2)
ALBUMIN SERPL BCG-MCNC: 3.2 G/DL (ref 3.5–5.2)
ALBUMIN SERPL BCG-MCNC: 3.2 G/DL (ref 3.5–5.2)
ALBUMIN SERPL BCG-MCNC: 3.3 G/DL (ref 3.5–5.2)
ALBUMIN SERPL BCG-MCNC: 3.4 G/DL (ref 3.5–5.2)
ALBUMIN SERPL BCG-MCNC: 3.5 G/DL (ref 3.5–5.2)
ALBUMIN SERPL BCG-MCNC: 3.6 G/DL (ref 3.5–5.2)
ALBUMIN SERPL BCG-MCNC: 3.6 G/DL (ref 3.5–5.2)
ALBUMIN SERPL BCG-MCNC: 3.7 G/DL (ref 3.5–5.2)
ALBUMIN SERPL BCG-MCNC: 3.8 G/DL (ref 3.5–5.2)
ALBUMIN SERPL BCG-MCNC: 3.9 G/DL (ref 3.5–5.2)
ALBUMIN SERPL BCG-MCNC: 4 G/DL (ref 3.5–5.2)
ALBUMIN SERPL BCG-MCNC: 4.2 G/DL (ref 3.5–5.2)
ALBUMIN SERPL BCG-MCNC: 4.3 G/DL (ref 3.5–5.2)
ALBUMIN SERPL BCG-MCNC: 4.4 G/DL (ref 3.5–5.2)
ALBUMIN SERPL BCG-MCNC: 4.5 G/DL (ref 3.5–5.2)
ALBUMIN SERPL BCG-MCNC: 4.6 G/DL (ref 3.5–5.2)
ALBUMIN SERPL BCG-MCNC: 4.8 G/DL (ref 3.5–5.2)
ALBUMIN SERPL BCG-MCNC: 5.1 G/DL (ref 3.5–5.2)
ALBUMIN UR-MCNC: NEGATIVE MG/DL
ALP SERPL-CCNC: 100 U/L (ref 35–104)
ALP SERPL-CCNC: 103 U/L (ref 35–104)
ALP SERPL-CCNC: 103 U/L (ref 40–150)
ALP SERPL-CCNC: 106 U/L (ref 35–104)
ALP SERPL-CCNC: 107 U/L (ref 35–104)
ALP SERPL-CCNC: 110 U/L (ref 35–104)
ALP SERPL-CCNC: 111 U/L (ref 35–104)
ALP SERPL-CCNC: 112 U/L (ref 35–104)
ALP SERPL-CCNC: 114 U/L (ref 35–104)
ALP SERPL-CCNC: 114 U/L (ref 35–104)
ALP SERPL-CCNC: 61 U/L (ref 35–104)
ALP SERPL-CCNC: 63 U/L (ref 35–104)
ALP SERPL-CCNC: 64 U/L (ref 40–150)
ALP SERPL-CCNC: 65 U/L (ref 40–150)
ALP SERPL-CCNC: 66 U/L (ref 35–104)
ALP SERPL-CCNC: 66 U/L (ref 40–150)
ALP SERPL-CCNC: 67 U/L (ref 35–104)
ALP SERPL-CCNC: 67 U/L (ref 40–150)
ALP SERPL-CCNC: 67 U/L (ref 40–150)
ALP SERPL-CCNC: 68 U/L (ref 40–150)
ALP SERPL-CCNC: 71 U/L (ref 40–150)
ALP SERPL-CCNC: 72 U/L (ref 35–104)
ALP SERPL-CCNC: 73 U/L (ref 40–150)
ALP SERPL-CCNC: 73 U/L (ref 40–150)
ALP SERPL-CCNC: 75 U/L (ref 35–104)
ALP SERPL-CCNC: 76 U/L (ref 35–104)
ALP SERPL-CCNC: 77 U/L (ref 35–104)
ALP SERPL-CCNC: 78 U/L (ref 35–104)
ALP SERPL-CCNC: 78 U/L (ref 40–150)
ALP SERPL-CCNC: 80 U/L (ref 35–104)
ALP SERPL-CCNC: 80 U/L (ref 40–150)
ALP SERPL-CCNC: 83 U/L (ref 40–150)
ALP SERPL-CCNC: 84 U/L (ref 35–104)
ALP SERPL-CCNC: 84 U/L (ref 35–104)
ALP SERPL-CCNC: 91 U/L (ref 35–104)
ALP SERPL-CCNC: 91 U/L (ref 35–104)
ALP SERPL-CCNC: 96 U/L (ref 35–104)
ALT SERPL W P-5'-P-CCNC: 24 U/L (ref 0–50)
ALT SERPL W P-5'-P-CCNC: 25 U/L (ref 0–50)
ALT SERPL W P-5'-P-CCNC: 25 U/L (ref 0–50)
ALT SERPL W P-5'-P-CCNC: 26 U/L (ref 0–50)
ALT SERPL W P-5'-P-CCNC: 27 U/L (ref 0–50)
ALT SERPL W P-5'-P-CCNC: 28 U/L (ref 0–50)
ALT SERPL W P-5'-P-CCNC: 29 U/L (ref 0–50)
ALT SERPL W P-5'-P-CCNC: 30 U/L (ref 0–50)
ALT SERPL W P-5'-P-CCNC: 30 U/L (ref 0–50)
ALT SERPL W P-5'-P-CCNC: 31 U/L (ref 0–50)
ALT SERPL W P-5'-P-CCNC: 32 U/L (ref 0–50)
ALT SERPL W P-5'-P-CCNC: 32 U/L (ref 10–35)
ALT SERPL W P-5'-P-CCNC: 34 U/L (ref 0–50)
ALT SERPL W P-5'-P-CCNC: 36 U/L (ref 0–50)
ALT SERPL W P-5'-P-CCNC: 36 U/L (ref 0–50)
ALT SERPL W P-5'-P-CCNC: 37 U/L (ref 0–50)
ALT SERPL W P-5'-P-CCNC: 38 U/L (ref 0–50)
ALT SERPL W P-5'-P-CCNC: 39 U/L (ref 0–50)
ALT SERPL W P-5'-P-CCNC: 39 U/L (ref 0–50)
ALT SERPL W P-5'-P-CCNC: 40 U/L (ref 0–50)
ALT SERPL W P-5'-P-CCNC: 41 U/L (ref 0–50)
AMMONIA PLAS-SCNC: 30 UMOL/L (ref 11–51)
ANION GAP SERPL CALCULATED.3IONS-SCNC: 10 MMOL/L (ref 7–15)
ANION GAP SERPL CALCULATED.3IONS-SCNC: 11 MMOL/L (ref 7–15)
ANION GAP SERPL CALCULATED.3IONS-SCNC: 12 MMOL/L (ref 7–15)
ANION GAP SERPL CALCULATED.3IONS-SCNC: 13 MMOL/L (ref 7–15)
ANION GAP SERPL CALCULATED.3IONS-SCNC: 13 MMOL/L (ref 7–15)
ANION GAP SERPL CALCULATED.3IONS-SCNC: 14 MMOL/L (ref 7–15)
ANION GAP SERPL CALCULATED.3IONS-SCNC: 15 MMOL/L (ref 7–15)
ANION GAP SERPL CALCULATED.3IONS-SCNC: 6 MMOL/L (ref 7–15)
ANION GAP SERPL CALCULATED.3IONS-SCNC: 7 MMOL/L (ref 7–15)
ANION GAP SERPL CALCULATED.3IONS-SCNC: 8 MMOL/L (ref 7–15)
ANION GAP SERPL CALCULATED.3IONS-SCNC: 9 MMOL/L (ref 7–15)
ANTIBODY SCREEN: NEGATIVE
APPEARANCE FLD: ABNORMAL
APPEARANCE UR: ABNORMAL
APPEARANCE UR: CLEAR
AST SERPL W P-5'-P-CCNC: 34 U/L (ref 0–45)
AST SERPL W P-5'-P-CCNC: 35 U/L (ref 0–45)
AST SERPL W P-5'-P-CCNC: 37 U/L (ref 0–45)
AST SERPL W P-5'-P-CCNC: 38 U/L (ref 0–45)
AST SERPL W P-5'-P-CCNC: 39 U/L (ref 0–45)
AST SERPL W P-5'-P-CCNC: 39 U/L (ref 0–45)
AST SERPL W P-5'-P-CCNC: 40 U/L (ref 0–45)
AST SERPL W P-5'-P-CCNC: 41 U/L (ref 0–45)
AST SERPL W P-5'-P-CCNC: 42 U/L (ref 0–45)
AST SERPL W P-5'-P-CCNC: 43 U/L (ref 0–45)
AST SERPL W P-5'-P-CCNC: 43 U/L (ref 0–45)
AST SERPL W P-5'-P-CCNC: 44 U/L (ref 0–45)
AST SERPL W P-5'-P-CCNC: 44 U/L (ref 0–45)
AST SERPL W P-5'-P-CCNC: 45 U/L (ref 0–45)
AST SERPL W P-5'-P-CCNC: 46 U/L (ref 0–45)
AST SERPL W P-5'-P-CCNC: 47 U/L (ref 0–45)
AST SERPL W P-5'-P-CCNC: 48 U/L (ref 0–45)
AST SERPL W P-5'-P-CCNC: 50 U/L (ref 0–45)
AST SERPL W P-5'-P-CCNC: 51 U/L (ref 0–45)
AST SERPL W P-5'-P-CCNC: 52 U/L (ref 0–45)
AST SERPL W P-5'-P-CCNC: 66 U/L (ref 10–35)
ATRIAL RATE - MUSE: 80 BPM
ATRIAL RATE - MUSE: 83 BPM
AUER BODIES BLD QL SMEAR: ABNORMAL
AUER BODIES BLD QL SMEAR: ABNORMAL
AUER BODIES BLD QL SMEAR: NORMAL
AUER BODIES BLD QL SMEAR: NORMAL
BACTERIA #/AREA URNS HPF: ABNORMAL /HPF
BACTERIA FLD CULT: NO GROWTH
BACTERIA PLR CULT: NO GROWTH
BACTERIA PLR CULT: NORMAL
BACTERIA UR CULT: ABNORMAL
BACTERIA UR CULT: ABNORMAL
BACTERIA UR CULT: NORMAL
BACTERIA UR CULT: NORMAL
BASO STIPL BLD QL SMEAR: ABNORMAL
BASO STIPL BLD QL SMEAR: ABNORMAL
BASO STIPL BLD QL SMEAR: NORMAL
BASO STIPL BLD QL SMEAR: NORMAL
BASO+EOS+MONOS # BLD AUTO: ABNORMAL 10*3/UL
BASO+EOS+MONOS NFR BLD AUTO: ABNORMAL %
BASOPHILS # BLD AUTO: 0 10E3/UL (ref 0–0.2)
BASOPHILS NFR BLD AUTO: 0 %
BASOPHILS NFR BLD AUTO: 1 %
BASOPHILS NFR FLD MANUAL: 1 %
BILIRUB DIRECT SERPL-MCNC: 1.23 MG/DL (ref 0–0.3)
BILIRUB DIRECT SERPL-MCNC: 1.48 MG/DL (ref 0–0.3)
BILIRUB DIRECT SERPL-MCNC: 1.52 MG/DL (ref 0–0.3)
BILIRUB DIRECT SERPL-MCNC: 1.53 MG/DL (ref 0–0.3)
BILIRUB DIRECT SERPL-MCNC: 1.76 MG/DL (ref 0–0.3)
BILIRUB DIRECT SERPL-MCNC: 1.83 MG/DL (ref 0–0.3)
BILIRUB DIRECT SERPL-MCNC: 1.94 MG/DL (ref 0–0.3)
BILIRUB DIRECT SERPL-MCNC: 2.06 MG/DL (ref 0–0.3)
BILIRUB DIRECT SERPL-MCNC: 2.88 MG/DL (ref 0–0.3)
BILIRUB DIRECT SERPL-MCNC: 2.88 MG/DL (ref 0–0.3)
BILIRUB SERPL-MCNC: 2.9 MG/DL
BILIRUB SERPL-MCNC: 3.1 MG/DL
BILIRUB SERPL-MCNC: 3.2 MG/DL
BILIRUB SERPL-MCNC: 3.3 MG/DL
BILIRUB SERPL-MCNC: 3.3 MG/DL
BILIRUB SERPL-MCNC: 3.4 MG/DL
BILIRUB SERPL-MCNC: 3.6 MG/DL
BILIRUB SERPL-MCNC: 3.8 MG/DL
BILIRUB SERPL-MCNC: 3.8 MG/DL
BILIRUB SERPL-MCNC: 3.9 MG/DL
BILIRUB SERPL-MCNC: 4.2 MG/DL
BILIRUB SERPL-MCNC: 4.3 MG/DL
BILIRUB SERPL-MCNC: 4.5 MG/DL
BILIRUB SERPL-MCNC: 4.9 MG/DL
BILIRUB SERPL-MCNC: 5.2 MG/DL
BILIRUB SERPL-MCNC: 5.3 MG/DL
BILIRUB SERPL-MCNC: 5.4 MG/DL
BILIRUB SERPL-MCNC: 5.4 MG/DL
BILIRUB SERPL-MCNC: 5.5 MG/DL
BILIRUB SERPL-MCNC: 5.6 MG/DL
BILIRUB SERPL-MCNC: 5.9 MG/DL
BILIRUB SERPL-MCNC: 6.3 MG/DL
BILIRUB SERPL-MCNC: 6.3 MG/DL
BILIRUB SERPL-MCNC: 6.9 MG/DL
BILIRUB SERPL-MCNC: 6.9 MG/DL
BILIRUB SERPL-MCNC: 7.1 MG/DL
BILIRUB SERPL-MCNC: 7.2 MG/DL
BILIRUB SERPL-MCNC: 7.3 MG/DL
BILIRUB SERPL-MCNC: 8 MG/DL
BILIRUB SERPL-MCNC: 8.5 MG/DL
BILIRUB SERPL-MCNC: 8.8 MG/DL
BILIRUB SERPL-MCNC: 8.9 MG/DL
BILIRUB UR QL STRIP: NEGATIVE
BITE CELLS BLD QL SMEAR: ABNORMAL
BITE CELLS BLD QL SMEAR: ABNORMAL
BITE CELLS BLD QL SMEAR: NORMAL
BITE CELLS BLD QL SMEAR: NORMAL
BLD PROD TYP BPU: NORMAL
BLISTER CELLS BLD QL SMEAR: ABNORMAL
BLISTER CELLS BLD QL SMEAR: ABNORMAL
BLISTER CELLS BLD QL SMEAR: NORMAL
BLISTER CELLS BLD QL SMEAR: NORMAL
BLOOD COMPONENT TYPE: NORMAL
BUN SERPL-MCNC: 102 MG/DL (ref 8–23)
BUN SERPL-MCNC: 14 MG/DL (ref 8–23)
BUN SERPL-MCNC: 18.8 MG/DL (ref 8–23)
BUN SERPL-MCNC: 23.4 MG/DL (ref 8–23)
BUN SERPL-MCNC: 26.5 MG/DL (ref 8–23)
BUN SERPL-MCNC: 27.6 MG/DL (ref 8–23)
BUN SERPL-MCNC: 30 MG/DL (ref 8–23)
BUN SERPL-MCNC: 30.3 MG/DL (ref 8–23)
BUN SERPL-MCNC: 33.9 MG/DL (ref 8–23)
BUN SERPL-MCNC: 34.4 MG/DL (ref 8–23)
BUN SERPL-MCNC: 35 MG/DL (ref 8–23)
BUN SERPL-MCNC: 35.4 MG/DL (ref 8–23)
BUN SERPL-MCNC: 36.1 MG/DL (ref 8–23)
BUN SERPL-MCNC: 38 MG/DL (ref 8–23)
BUN SERPL-MCNC: 38.2 MG/DL (ref 8–23)
BUN SERPL-MCNC: 43.3 MG/DL (ref 8–23)
BUN SERPL-MCNC: 44.3 MG/DL (ref 8–23)
BUN SERPL-MCNC: 46.1 MG/DL (ref 8–23)
BUN SERPL-MCNC: 47.8 MG/DL (ref 8–23)
BUN SERPL-MCNC: 48.2 MG/DL (ref 8–23)
BUN SERPL-MCNC: 48.5 MG/DL (ref 8–23)
BUN SERPL-MCNC: 48.7 MG/DL (ref 8–23)
BUN SERPL-MCNC: 49.7 MG/DL (ref 8–23)
BUN SERPL-MCNC: 50.1 MG/DL (ref 8–23)
BUN SERPL-MCNC: 51.1 MG/DL (ref 8–23)
BUN SERPL-MCNC: 53.1 MG/DL (ref 8–23)
BUN SERPL-MCNC: 53.3 MG/DL (ref 8–23)
BUN SERPL-MCNC: 54.1 MG/DL (ref 8–23)
BUN SERPL-MCNC: 54.8 MG/DL (ref 8–23)
BUN SERPL-MCNC: 56 MG/DL (ref 8–23)
BUN SERPL-MCNC: 56.1 MG/DL (ref 8–23)
BUN SERPL-MCNC: 57.9 MG/DL (ref 8–23)
BUN SERPL-MCNC: 58.4 MG/DL (ref 8–23)
BUN SERPL-MCNC: 58.8 MG/DL (ref 8–23)
BUN SERPL-MCNC: 59.4 MG/DL (ref 8–23)
BUN SERPL-MCNC: 61.1 MG/DL (ref 8–23)
BUN SERPL-MCNC: 62.2 MG/DL (ref 8–23)
BUN SERPL-MCNC: 62.7 MG/DL (ref 8–23)
BUN SERPL-MCNC: 67.4 MG/DL (ref 8–23)
BUN SERPL-MCNC: 71.2 MG/DL (ref 8–23)
BUN SERPL-MCNC: 72.3 MG/DL (ref 8–23)
BUN SERPL-MCNC: 73.8 MG/DL (ref 8–23)
BUN SERPL-MCNC: 73.8 MG/DL (ref 8–23)
BUN SERPL-MCNC: 74 MG/DL (ref 8–23)
BUN SERPL-MCNC: 74.7 MG/DL (ref 8–23)
BUN SERPL-MCNC: 78.6 MG/DL (ref 8–23)
BUN SERPL-MCNC: 8.3 MG/DL (ref 8–23)
BUN SERPL-MCNC: 82.7 MG/DL (ref 8–23)
BUN SERPL-MCNC: 83.4 MG/DL (ref 8–23)
BUN SERPL-MCNC: 92.4 MG/DL (ref 8–23)
BUN SERPL-MCNC: 93.1 MG/DL (ref 8–23)
BUN SERPL-MCNC: 93.3 MG/DL (ref 8–23)
BURR CELLS BLD QL SMEAR: ABNORMAL
BURR CELLS BLD QL SMEAR: NORMAL
BURR CELLS BLD QL SMEAR: NORMAL
BURR CELLS BLD QL SMEAR: SLIGHT
CALCIUM SERPL-MCNC: 10 MG/DL (ref 8.8–10.2)
CALCIUM SERPL-MCNC: 10.1 MG/DL (ref 8.8–10.2)
CALCIUM SERPL-MCNC: 10.2 MG/DL (ref 8.8–10.2)
CALCIUM SERPL-MCNC: 10.3 MG/DL (ref 8.8–10.2)
CALCIUM SERPL-MCNC: 10.4 MG/DL (ref 8.8–10.2)
CALCIUM SERPL-MCNC: 10.4 MG/DL (ref 8.8–10.2)
CALCIUM SERPL-MCNC: 10.5 MG/DL (ref 8.8–10.2)
CALCIUM SERPL-MCNC: 10.6 MG/DL (ref 8.8–10.2)
CALCIUM SERPL-MCNC: 10.6 MG/DL (ref 8.8–10.2)
CALCIUM SERPL-MCNC: 10.8 MG/DL (ref 8.8–10.2)
CALCIUM SERPL-MCNC: 10.9 MG/DL (ref 8.8–10.2)
CALCIUM SERPL-MCNC: 11 MG/DL (ref 8.8–10.2)
CALCIUM SERPL-MCNC: 11.1 MG/DL (ref 8.8–10.2)
CALCIUM SERPL-MCNC: 11.2 MG/DL (ref 8.8–10.2)
CALCIUM SERPL-MCNC: 8.8 MG/DL (ref 8.8–10.2)
CALCIUM SERPL-MCNC: 8.9 MG/DL (ref 8.8–10.2)
CALCIUM SERPL-MCNC: 9 MG/DL (ref 8.8–10.2)
CALCIUM SERPL-MCNC: 9.2 MG/DL (ref 8.8–10.2)
CALCIUM SERPL-MCNC: 9.3 MG/DL (ref 8.8–10.2)
CALCIUM SERPL-MCNC: 9.4 MG/DL (ref 8.8–10.2)
CALCIUM SERPL-MCNC: 9.5 MG/DL (ref 8.8–10.2)
CALCIUM SERPL-MCNC: 9.6 MG/DL (ref 8.8–10.2)
CALCIUM SERPL-MCNC: 9.7 MG/DL (ref 8.8–10.2)
CALCIUM SERPL-MCNC: 9.8 MG/DL (ref 8.8–10.2)
CALCIUM SERPL-MCNC: 9.8 MG/DL (ref 8.8–10.2)
CALCIUM SERPL-MCNC: 9.9 MG/DL (ref 8.8–10.2)
CELL COUNT BODY FLUID SOURCE: ABNORMAL
CHLORIDE SERPL-SCNC: 100 MMOL/L (ref 98–107)
CHLORIDE SERPL-SCNC: 100 MMOL/L (ref 98–107)
CHLORIDE SERPL-SCNC: 102 MMOL/L (ref 98–107)
CHLORIDE SERPL-SCNC: 105 MMOL/L (ref 98–107)
CHLORIDE SERPL-SCNC: 81 MMOL/L (ref 98–107)
CHLORIDE SERPL-SCNC: 84 MMOL/L (ref 98–107)
CHLORIDE SERPL-SCNC: 85 MMOL/L (ref 98–107)
CHLORIDE SERPL-SCNC: 86 MMOL/L (ref 98–107)
CHLORIDE SERPL-SCNC: 87 MMOL/L (ref 98–107)
CHLORIDE SERPL-SCNC: 88 MMOL/L (ref 98–107)
CHLORIDE SERPL-SCNC: 89 MMOL/L (ref 98–107)
CHLORIDE SERPL-SCNC: 90 MMOL/L (ref 98–107)
CHLORIDE SERPL-SCNC: 91 MMOL/L (ref 98–107)
CHLORIDE SERPL-SCNC: 92 MMOL/L (ref 98–107)
CHLORIDE SERPL-SCNC: 92 MMOL/L (ref 98–107)
CHLORIDE SERPL-SCNC: 94 MMOL/L (ref 98–107)
CHLORIDE SERPL-SCNC: 95 MMOL/L (ref 98–107)
CHLORIDE SERPL-SCNC: 97 MMOL/L (ref 98–107)
CHLORIDE SERPL-SCNC: 99 MMOL/L (ref 98–107)
CHLORIDE SERPL-SCNC: 99 MMOL/L (ref 98–107)
CMV DNA SPEC NAA+PROBE-ACNC: NOT DETECTED IU/ML
CMV IGG SERPL IA-ACNC: >10 U/ML
CMV IGG SERPL IA-ACNC: >10 U/ML
CMV IGG SERPL IA-ACNC: ABNORMAL
CMV IGG SERPL IA-ACNC: ABNORMAL
CODING SYSTEM: NORMAL
COLONOSCOPY: NORMAL
COLOR FLD: ABNORMAL
COLOR UR AUTO: ABNORMAL
COLOR UR AUTO: YELLOW
CORTIS SERPL-MCNC: 10.4 UG/DL
CREAT SERPL-MCNC: 0.58 MG/DL (ref 0.51–0.95)
CREAT SERPL-MCNC: 0.59 MG/DL (ref 0.51–0.95)
CREAT SERPL-MCNC: 0.59 MG/DL (ref 0.51–0.95)
CREAT SERPL-MCNC: 0.62 MG/DL (ref 0.51–0.95)
CREAT SERPL-MCNC: 0.63 MG/DL (ref 0.51–0.95)
CREAT SERPL-MCNC: 0.63 MG/DL (ref 0.51–0.95)
CREAT SERPL-MCNC: 0.64 MG/DL (ref 0.51–0.95)
CREAT SERPL-MCNC: 0.64 MG/DL (ref 0.51–0.95)
CREAT SERPL-MCNC: 0.68 MG/DL (ref 0.51–0.95)
CREAT SERPL-MCNC: 0.7 MG/DL (ref 0.51–0.95)
CREAT SERPL-MCNC: 0.7 MG/DL (ref 0.51–0.95)
CREAT SERPL-MCNC: 0.71 MG/DL (ref 0.51–0.95)
CREAT SERPL-MCNC: 0.73 MG/DL (ref 0.51–0.95)
CREAT SERPL-MCNC: 0.74 MG/DL (ref 0.51–0.95)
CREAT SERPL-MCNC: 0.74 MG/DL (ref 0.51–0.95)
CREAT SERPL-MCNC: 0.76 MG/DL (ref 0.51–0.95)
CREAT SERPL-MCNC: 0.77 MG/DL (ref 0.51–0.95)
CREAT SERPL-MCNC: 0.78 MG/DL (ref 0.51–0.95)
CREAT SERPL-MCNC: 0.8 MG/DL (ref 0.51–0.95)
CREAT SERPL-MCNC: 0.8 MG/DL (ref 0.51–0.95)
CREAT SERPL-MCNC: 0.81 MG/DL (ref 0.51–0.95)
CREAT SERPL-MCNC: 0.81 MG/DL (ref 0.51–0.95)
CREAT SERPL-MCNC: 0.82 MG/DL (ref 0.51–0.95)
CREAT SERPL-MCNC: 0.83 MG/DL (ref 0.51–0.95)
CREAT SERPL-MCNC: 0.86 MG/DL (ref 0.51–0.95)
CREAT SERPL-MCNC: 0.87 MG/DL (ref 0.51–0.95)
CREAT SERPL-MCNC: 0.88 MG/DL (ref 0.51–0.95)
CREAT SERPL-MCNC: 0.95 MG/DL (ref 0.51–0.95)
CREAT SERPL-MCNC: 0.96 MG/DL (ref 0.51–0.95)
CREAT SERPL-MCNC: 0.97 MG/DL (ref 0.51–0.95)
CREAT SERPL-MCNC: 0.98 MG/DL (ref 0.51–0.95)
CREAT SERPL-MCNC: 1.01 MG/DL (ref 0.51–0.95)
CREAT SERPL-MCNC: 1.04 MG/DL (ref 0.51–0.95)
CREAT SERPL-MCNC: 1.05 MG/DL (ref 0.51–0.95)
CREAT SERPL-MCNC: 1.08 MG/DL (ref 0.51–0.95)
CREAT SERPL-MCNC: 1.1 MG/DL (ref 0.51–0.95)
CREAT SERPL-MCNC: 1.11 MG/DL (ref 0.51–0.95)
CREAT SERPL-MCNC: 1.11 MG/DL (ref 0.51–0.95)
CREAT SERPL-MCNC: 1.12 MG/DL (ref 0.51–0.95)
CREAT SERPL-MCNC: 1.13 MG/DL (ref 0.51–0.95)
CREAT SERPL-MCNC: 1.13 MG/DL (ref 0.51–0.95)
CREAT SERPL-MCNC: 1.14 MG/DL (ref 0.51–0.95)
CREAT SERPL-MCNC: 1.16 MG/DL (ref 0.51–0.95)
CREAT SERPL-MCNC: 1.17 MG/DL (ref 0.51–0.95)
CREAT SERPL-MCNC: 1.19 MG/DL (ref 0.51–0.95)
CREAT SERPL-MCNC: 1.24 MG/DL (ref 0.51–0.95)
CREAT SERPL-MCNC: 1.27 MG/DL (ref 0.51–0.95)
CREAT SERPL-MCNC: 1.41 MG/DL (ref 0.51–0.95)
CREAT UR-MCNC: 40.5 MG/DL
CREAT UR-MCNC: 46.5 MG/DL
CROSSMATCH: NORMAL
CRP SERPL-MCNC: 3.56 MG/L
CYSTATIN C (ROCHE): 1.4 MG/L (ref 0.6–1)
CYSTATIN C (ROCHE): 1.9 MG/L (ref 0.6–1)
DACRYOCYTES BLD QL SMEAR: ABNORMAL
DACRYOCYTES BLD QL SMEAR: ABNORMAL
DACRYOCYTES BLD QL SMEAR: NORMAL
DACRYOCYTES BLD QL SMEAR: NORMAL
DEPRECATED CALCIDIOL+CALCIFEROL SERPL-MC: <14 UG/L (ref 20–75)
DEPRECATED HCO3 PLAS-SCNC: 15 MMOL/L (ref 22–29)
DEPRECATED HCO3 PLAS-SCNC: 17 MMOL/L (ref 22–29)
DEPRECATED HCO3 PLAS-SCNC: 18 MMOL/L (ref 22–29)
DEPRECATED HCO3 PLAS-SCNC: 19 MMOL/L (ref 22–29)
DEPRECATED HCO3 PLAS-SCNC: 20 MMOL/L (ref 22–29)
DEPRECATED HCO3 PLAS-SCNC: 21 MMOL/L (ref 22–29)
DEPRECATED HCO3 PLAS-SCNC: 22 MMOL/L (ref 22–29)
DEPRECATED HCO3 PLAS-SCNC: 23 MMOL/L (ref 22–29)
DEPRECATED HCO3 PLAS-SCNC: 24 MMOL/L (ref 22–29)
DEPRECATED HCO3 PLAS-SCNC: 25 MMOL/L (ref 22–29)
DEPRECATED HCO3 PLAS-SCNC: 26 MMOL/L (ref 22–29)
DEPRECATED HCO3 PLAS-SCNC: 26 MMOL/L (ref 22–29)
DIASTOLIC BLOOD PRESSURE - MUSE: NORMAL MMHG
DIASTOLIC BLOOD PRESSURE - MUSE: NORMAL MMHG
EBV DNA COPIES/ML, INSTRUMENT: 537 COPIES/ML
EBV DNA SPEC NAA+PROBE-LOG#: 2.7 {LOG_COPIES}/ML
EBV VCA IGG SER IA-ACNC: >750 U/ML
EBV VCA IGG SER IA-ACNC: POSITIVE
EGFRCR SERPLBLD CKD-EPI 2021: 42 ML/MIN/1.73M2
EGFRCR SERPLBLD CKD-EPI 2021: 48 ML/MIN/1.73M2
EGFRCR SERPLBLD CKD-EPI 2021: 49 ML/MIN/1.73M2
EGFRCR SERPLBLD CKD-EPI 2021: 52 ML/MIN/1.73M2
EGFRCR SERPLBLD CKD-EPI 2021: 53 ML/MIN/1.73M2
EGFRCR SERPLBLD CKD-EPI 2021: 53 ML/MIN/1.73M2
EGFRCR SERPLBLD CKD-EPI 2021: 55 ML/MIN/1.73M2
EGFRCR SERPLBLD CKD-EPI 2021: 56 ML/MIN/1.73M2
EGFRCR SERPLBLD CKD-EPI 2021: 57 ML/MIN/1.73M2
EGFRCR SERPLBLD CKD-EPI 2021: 58 ML/MIN/1.73M2
EGFRCR SERPLBLD CKD-EPI 2021: 60 ML/MIN/1.73M2
EGFRCR SERPLBLD CKD-EPI 2021: 61 ML/MIN/1.73M2
EGFRCR SERPLBLD CKD-EPI 2021: 63 ML/MIN/1.73M2
EGFRCR SERPLBLD CKD-EPI 2021: 65 ML/MIN/1.73M2
EGFRCR SERPLBLD CKD-EPI 2021: 66 ML/MIN/1.73M2
EGFRCR SERPLBLD CKD-EPI 2021: 67 ML/MIN/1.73M2
EGFRCR SERPLBLD CKD-EPI 2021: 68 ML/MIN/1.73M2
EGFRCR SERPLBLD CKD-EPI 2021: 74 ML/MIN/1.73M2
EGFRCR SERPLBLD CKD-EPI 2021: 75 ML/MIN/1.73M2
EGFRCR SERPLBLD CKD-EPI 2021: 76 ML/MIN/1.73M2
EGFRCR SERPLBLD CKD-EPI 2021: 80 ML/MIN/1.73M2
EGFRCR SERPLBLD CKD-EPI 2021: 81 ML/MIN/1.73M2
EGFRCR SERPLBLD CKD-EPI 2021: 82 ML/MIN/1.73M2
EGFRCR SERPLBLD CKD-EPI 2021: 82 ML/MIN/1.73M2
EGFRCR SERPLBLD CKD-EPI 2021: 83 ML/MIN/1.73M2
EGFRCR SERPLBLD CKD-EPI 2021: 83 ML/MIN/1.73M2
EGFRCR SERPLBLD CKD-EPI 2021: 86 ML/MIN/1.73M2
EGFRCR SERPLBLD CKD-EPI 2021: 87 ML/MIN/1.73M2
EGFRCR SERPLBLD CKD-EPI 2021: 89 ML/MIN/1.73M2
EGFRCR SERPLBLD CKD-EPI 2021: >90 ML/MIN/1.73M2
ELLIPTOCYTES BLD QL SMEAR: NORMAL
ELLIPTOCYTES BLD QL SMEAR: NORMAL
ELLIPTOCYTES BLD QL SMEAR: SLIGHT
ELLIPTOCYTES BLD QL SMEAR: SLIGHT
EOSINOPHIL # BLD AUTO: 0.1 10E3/UL (ref 0–0.7)
EOSINOPHIL # BLD AUTO: 0.2 10E3/UL (ref 0–0.7)
EOSINOPHIL NFR BLD AUTO: 1 %
EOSINOPHIL NFR BLD AUTO: 2 %
EOSINOPHIL NFR BLD AUTO: 2 %
EOSINOPHIL NFR BLD AUTO: 3 %
EOSINOPHIL NFR BLD AUTO: 3 %
EOSINOPHIL NFR FLD MANUAL: 1 %
ERYTHROCYTE [DISTWIDTH] IN BLOOD BY AUTOMATED COUNT: 14.3 % (ref 10–15)
ERYTHROCYTE [DISTWIDTH] IN BLOOD BY AUTOMATED COUNT: 14.8 % (ref 10–15)
ERYTHROCYTE [DISTWIDTH] IN BLOOD BY AUTOMATED COUNT: 15.2 % (ref 10–15)
ERYTHROCYTE [DISTWIDTH] IN BLOOD BY AUTOMATED COUNT: 15.2 % (ref 10–15)
ERYTHROCYTE [DISTWIDTH] IN BLOOD BY AUTOMATED COUNT: 15.3 % (ref 10–15)
ERYTHROCYTE [DISTWIDTH] IN BLOOD BY AUTOMATED COUNT: 15.4 % (ref 10–15)
ERYTHROCYTE [DISTWIDTH] IN BLOOD BY AUTOMATED COUNT: 15.4 % (ref 10–15)
ERYTHROCYTE [DISTWIDTH] IN BLOOD BY AUTOMATED COUNT: 15.5 % (ref 10–15)
ERYTHROCYTE [DISTWIDTH] IN BLOOD BY AUTOMATED COUNT: 15.5 % (ref 10–15)
ERYTHROCYTE [DISTWIDTH] IN BLOOD BY AUTOMATED COUNT: 15.6 % (ref 10–15)
ERYTHROCYTE [DISTWIDTH] IN BLOOD BY AUTOMATED COUNT: 15.6 % (ref 10–15)
ERYTHROCYTE [DISTWIDTH] IN BLOOD BY AUTOMATED COUNT: 15.7 % (ref 10–15)
ERYTHROCYTE [DISTWIDTH] IN BLOOD BY AUTOMATED COUNT: 15.7 % (ref 10–15)
ERYTHROCYTE [DISTWIDTH] IN BLOOD BY AUTOMATED COUNT: 15.8 % (ref 10–15)
ERYTHROCYTE [DISTWIDTH] IN BLOOD BY AUTOMATED COUNT: 15.9 % (ref 10–15)
ERYTHROCYTE [DISTWIDTH] IN BLOOD BY AUTOMATED COUNT: 15.9 % (ref 10–15)
ERYTHROCYTE [DISTWIDTH] IN BLOOD BY AUTOMATED COUNT: 16 % (ref 10–15)
ERYTHROCYTE [DISTWIDTH] IN BLOOD BY AUTOMATED COUNT: 16.1 % (ref 10–15)
ERYTHROCYTE [DISTWIDTH] IN BLOOD BY AUTOMATED COUNT: 16.4 % (ref 10–15)
ERYTHROCYTE [DISTWIDTH] IN BLOOD BY AUTOMATED COUNT: 16.4 % (ref 10–15)
ERYTHROCYTE [DISTWIDTH] IN BLOOD BY AUTOMATED COUNT: 16.6 % (ref 10–15)
ERYTHROCYTE [DISTWIDTH] IN BLOOD BY AUTOMATED COUNT: 16.8 % (ref 10–15)
ERYTHROCYTE [DISTWIDTH] IN BLOOD BY AUTOMATED COUNT: 16.9 % (ref 10–15)
ERYTHROCYTE [DISTWIDTH] IN BLOOD BY AUTOMATED COUNT: 17.2 % (ref 10–15)
ERYTHROCYTE [DISTWIDTH] IN BLOOD BY AUTOMATED COUNT: 17.3 % (ref 10–15)
ERYTHROCYTE [DISTWIDTH] IN BLOOD BY AUTOMATED COUNT: 17.5 % (ref 10–15)
ERYTHROCYTE [DISTWIDTH] IN BLOOD BY AUTOMATED COUNT: 17.8 % (ref 10–15)
ERYTHROCYTE [DISTWIDTH] IN BLOOD BY AUTOMATED COUNT: 17.9 % (ref 10–15)
ERYTHROCYTE [DISTWIDTH] IN BLOOD BY AUTOMATED COUNT: 18.3 % (ref 10–15)
ERYTHROCYTE [DISTWIDTH] IN BLOOD BY AUTOMATED COUNT: 18.3 % (ref 10–15)
ERYTHROCYTE [DISTWIDTH] IN BLOOD BY AUTOMATED COUNT: 18.4 % (ref 10–15)
ERYTHROCYTE [DISTWIDTH] IN BLOOD BY AUTOMATED COUNT: 18.5 % (ref 10–15)
ERYTHROCYTE [DISTWIDTH] IN BLOOD BY AUTOMATED COUNT: 18.6 % (ref 10–15)
ERYTHROCYTE [DISTWIDTH] IN BLOOD BY AUTOMATED COUNT: 18.7 % (ref 10–15)
ERYTHROCYTE [DISTWIDTH] IN BLOOD BY AUTOMATED COUNT: 18.8 % (ref 10–15)
ERYTHROCYTE [DISTWIDTH] IN BLOOD BY AUTOMATED COUNT: 18.8 % (ref 10–15)
ERYTHROCYTE [DISTWIDTH] IN BLOOD BY AUTOMATED COUNT: 18.9 % (ref 10–15)
ERYTHROCYTE [DISTWIDTH] IN BLOOD BY AUTOMATED COUNT: 19 % (ref 10–15)
ERYTHROCYTE [DISTWIDTH] IN BLOOD BY AUTOMATED COUNT: 19 % (ref 10–15)
ERYTHROCYTE [DISTWIDTH] IN BLOOD BY AUTOMATED COUNT: 19.1 % (ref 10–15)
ERYTHROCYTE [DISTWIDTH] IN BLOOD BY AUTOMATED COUNT: 19.2 % (ref 10–15)
ERYTHROCYTE [DISTWIDTH] IN BLOOD BY AUTOMATED COUNT: 19.3 % (ref 10–15)
ERYTHROCYTE [DISTWIDTH] IN BLOOD BY AUTOMATED COUNT: 19.3 % (ref 10–15)
ERYTHROCYTE [SEDIMENTATION RATE] IN BLOOD BY WESTERGREN METHOD: 12 MM/HR (ref 0–30)
FERRITIN SERPL-MCNC: 157 NG/ML (ref 11–328)
FIBRINOGEN PPP-MCNC: 144 MG/DL (ref 170–490)
FIBRINOGEN PPP-MCNC: 155 MG/DL (ref 170–490)
FIBRINOGEN PPP-MCNC: 167 MG/DL (ref 170–490)
FIBRINOGEN PPP-MCNC: 179 MG/DL (ref 170–490)
FIBRINOGEN PPP-MCNC: 204 MG/DL (ref 170–490)
FIBRINOGEN PPP-MCNC: 223 MG/DL (ref 170–490)
FLUAV RNA SPEC QL NAA+PROBE: NEGATIVE
FLUBV RNA RESP QL NAA+PROBE: NEGATIVE
FOLATE SERPL-MCNC: 15.2 NG/ML (ref 4.6–34.8)
FRAGMENTS BLD QL SMEAR: ABNORMAL
FRAGMENTS BLD QL SMEAR: ABNORMAL
FRAGMENTS BLD QL SMEAR: NORMAL
FRAGMENTS BLD QL SMEAR: NORMAL
GAMMA INTERFERON BACKGROUND BLD IA-ACNC: 0.04 IU/ML
GAMMA INTERFERON BACKGROUND BLD IA-ACNC: 0.05 IU/ML
GFR SERPL CREATININE-BSD FRML MDRD: 31 ML/MIN/1.73M2
GFR SERPL CREATININE-BSD FRML MDRD: 46 ML/MIN/1.73M2
GFR SERPL CREATININE-BSD FRML MDRD: >90 ML/MIN/1.73M2
GLUCOSE BLDC GLUCOMTR-MCNC: 102 MG/DL (ref 70–99)
GLUCOSE BLDC GLUCOMTR-MCNC: 83 MG/DL (ref 70–99)
GLUCOSE BODY FLUID SOURCE: NORMAL
GLUCOSE FLD-MCNC: 103 MG/DL
GLUCOSE FLD-MCNC: 112 MG/DL
GLUCOSE FLD-MCNC: 126 MG/DL
GLUCOSE SERPL-MCNC: 100 MG/DL (ref 70–99)
GLUCOSE SERPL-MCNC: 100 MG/DL (ref 70–99)
GLUCOSE SERPL-MCNC: 101 MG/DL (ref 70–99)
GLUCOSE SERPL-MCNC: 103 MG/DL (ref 70–99)
GLUCOSE SERPL-MCNC: 104 MG/DL (ref 70–99)
GLUCOSE SERPL-MCNC: 105 MG/DL (ref 70–99)
GLUCOSE SERPL-MCNC: 106 MG/DL (ref 70–99)
GLUCOSE SERPL-MCNC: 109 MG/DL (ref 70–99)
GLUCOSE SERPL-MCNC: 110 MG/DL (ref 70–99)
GLUCOSE SERPL-MCNC: 111 MG/DL (ref 70–99)
GLUCOSE SERPL-MCNC: 113 MG/DL (ref 70–99)
GLUCOSE SERPL-MCNC: 115 MG/DL (ref 70–99)
GLUCOSE SERPL-MCNC: 116 MG/DL (ref 70–99)
GLUCOSE SERPL-MCNC: 118 MG/DL (ref 70–99)
GLUCOSE SERPL-MCNC: 119 MG/DL (ref 70–99)
GLUCOSE SERPL-MCNC: 127 MG/DL (ref 70–99)
GLUCOSE SERPL-MCNC: 127 MG/DL (ref 70–99)
GLUCOSE SERPL-MCNC: 128 MG/DL (ref 70–99)
GLUCOSE SERPL-MCNC: 128 MG/DL (ref 70–99)
GLUCOSE SERPL-MCNC: 148 MG/DL (ref 70–99)
GLUCOSE SERPL-MCNC: 71 MG/DL (ref 70–99)
GLUCOSE SERPL-MCNC: 75 MG/DL (ref 70–99)
GLUCOSE SERPL-MCNC: 77 MG/DL (ref 70–99)
GLUCOSE SERPL-MCNC: 78 MG/DL (ref 70–99)
GLUCOSE SERPL-MCNC: 78 MG/DL (ref 70–99)
GLUCOSE SERPL-MCNC: 79 MG/DL (ref 70–99)
GLUCOSE SERPL-MCNC: 80 MG/DL (ref 70–99)
GLUCOSE SERPL-MCNC: 86 MG/DL (ref 70–99)
GLUCOSE SERPL-MCNC: 87 MG/DL (ref 70–99)
GLUCOSE SERPL-MCNC: 88 MG/DL (ref 70–99)
GLUCOSE SERPL-MCNC: 89 MG/DL (ref 70–99)
GLUCOSE SERPL-MCNC: 90 MG/DL (ref 70–99)
GLUCOSE SERPL-MCNC: 90 MG/DL (ref 70–99)
GLUCOSE SERPL-MCNC: 91 MG/DL (ref 70–99)
GLUCOSE SERPL-MCNC: 91 MG/DL (ref 70–99)
GLUCOSE SERPL-MCNC: 92 MG/DL (ref 70–99)
GLUCOSE SERPL-MCNC: 93 MG/DL (ref 70–99)
GLUCOSE SERPL-MCNC: 93 MG/DL (ref 70–99)
GLUCOSE SERPL-MCNC: 94 MG/DL (ref 70–99)
GLUCOSE SERPL-MCNC: 95 MG/DL (ref 70–99)
GLUCOSE SERPL-MCNC: 96 MG/DL (ref 70–99)
GLUCOSE SERPL-MCNC: 98 MG/DL (ref 70–99)
GLUCOSE UR STRIP-MCNC: NEGATIVE MG/DL
GRAM STAIN RESULT: NORMAL
HAPTOGLOB SERPL-MCNC: 30 MG/DL (ref 30–200)
HBA1C MFR BLD: 4.7 % (ref 0–5.6)
HCG INTACT+B SERPL-ACNC: <1 MIU/ML
HCG INTACT+B SERPL-ACNC: <1 MIU/ML
HCT VFR BLD AUTO: 19 % (ref 35–47)
HCT VFR BLD AUTO: 19.3 % (ref 35–47)
HCT VFR BLD AUTO: 19.6 % (ref 35–47)
HCT VFR BLD AUTO: 19.7 % (ref 35–47)
HCT VFR BLD AUTO: 19.8 % (ref 35–47)
HCT VFR BLD AUTO: 20.2 % (ref 35–47)
HCT VFR BLD AUTO: 20.4 % (ref 35–47)
HCT VFR BLD AUTO: 20.8 % (ref 35–47)
HCT VFR BLD AUTO: 20.8 % (ref 35–47)
HCT VFR BLD AUTO: 21 % (ref 35–47)
HCT VFR BLD AUTO: 21.2 % (ref 35–47)
HCT VFR BLD AUTO: 21.5 % (ref 35–47)
HCT VFR BLD AUTO: 21.8 % (ref 35–47)
HCT VFR BLD AUTO: 21.8 % (ref 35–47)
HCT VFR BLD AUTO: 21.9 % (ref 35–47)
HCT VFR BLD AUTO: 21.9 % (ref 35–47)
HCT VFR BLD AUTO: 22.3 % (ref 35–47)
HCT VFR BLD AUTO: 22.4 % (ref 35–47)
HCT VFR BLD AUTO: 22.5 % (ref 35–47)
HCT VFR BLD AUTO: 22.6 % (ref 35–47)
HCT VFR BLD AUTO: 22.8 % (ref 35–47)
HCT VFR BLD AUTO: 22.9 % (ref 35–47)
HCT VFR BLD AUTO: 23 % (ref 35–47)
HCT VFR BLD AUTO: 23.3 % (ref 35–47)
HCT VFR BLD AUTO: 23.4 % (ref 35–47)
HCT VFR BLD AUTO: 23.9 % (ref 35–47)
HCT VFR BLD AUTO: 24 % (ref 35–47)
HCT VFR BLD AUTO: 24.1 % (ref 35–47)
HCT VFR BLD AUTO: 24.3 % (ref 35–47)
HCT VFR BLD AUTO: 24.4 % (ref 35–47)
HCT VFR BLD AUTO: 24.4 % (ref 35–47)
HCT VFR BLD AUTO: 24.6 % (ref 35–47)
HCT VFR BLD AUTO: 25 % (ref 35–47)
HCT VFR BLD AUTO: 25 % (ref 35–47)
HCT VFR BLD AUTO: 25.1 % (ref 35–47)
HCT VFR BLD AUTO: 25.2 % (ref 35–47)
HCT VFR BLD AUTO: 25.4 % (ref 35–47)
HCT VFR BLD AUTO: 25.7 % (ref 35–47)
HCT VFR BLD AUTO: 25.7 % (ref 35–47)
HCT VFR BLD AUTO: 26 % (ref 35–47)
HCT VFR BLD AUTO: 26.2 % (ref 35–47)
HCT VFR BLD AUTO: 26.4 % (ref 35–47)
HCT VFR BLD AUTO: 26.8 % (ref 35–47)
HCT VFR BLD AUTO: 26.8 % (ref 35–47)
HCT VFR BLD AUTO: 27.9 % (ref 35–47)
HCT VFR BLD AUTO: 28 % (ref 35–47)
HCT VFR BLD AUTO: 28.3 % (ref 35–47)
HCT VFR BLD AUTO: 28.3 % (ref 35–47)
HCT VFR BLD AUTO: 28.6 % (ref 35–47)
HCT VFR BLD AUTO: 33.1 % (ref 35–47)
HCT VFR BLD AUTO: 39.1 % (ref 35–47)
HGB BLD-MCNC: 10.7 G/DL (ref 11.7–15.7)
HGB BLD-MCNC: 12.5 G/DL (ref 11.7–15.7)
HGB BLD-MCNC: 6.4 G/DL (ref 11.7–15.7)
HGB BLD-MCNC: 6.7 G/DL (ref 11.7–15.7)
HGB BLD-MCNC: 6.8 G/DL (ref 11.7–15.7)
HGB BLD-MCNC: 6.9 G/DL (ref 11.7–15.7)
HGB BLD-MCNC: 7 G/DL (ref 11.7–15.7)
HGB BLD-MCNC: 7.1 G/DL (ref 11.7–15.7)
HGB BLD-MCNC: 7.3 G/DL (ref 11.7–15.7)
HGB BLD-MCNC: 7.4 G/DL (ref 11.7–15.7)
HGB BLD-MCNC: 7.5 G/DL (ref 11.7–15.7)
HGB BLD-MCNC: 7.6 G/DL (ref 11.7–15.7)
HGB BLD-MCNC: 7.6 G/DL (ref 11.7–15.7)
HGB BLD-MCNC: 7.7 G/DL (ref 11.7–15.7)
HGB BLD-MCNC: 7.8 G/DL (ref 11.7–15.7)
HGB BLD-MCNC: 7.9 G/DL (ref 11.7–15.7)
HGB BLD-MCNC: 8 G/DL (ref 11.7–15.7)
HGB BLD-MCNC: 8.1 G/DL (ref 11.7–15.7)
HGB BLD-MCNC: 8.2 G/DL (ref 11.7–15.7)
HGB BLD-MCNC: 8.3 G/DL (ref 11.7–15.7)
HGB BLD-MCNC: 8.4 G/DL (ref 11.7–15.7)
HGB BLD-MCNC: 8.5 G/DL (ref 11.7–15.7)
HGB BLD-MCNC: 8.6 G/DL (ref 11.7–15.7)
HGB BLD-MCNC: 8.6 G/DL (ref 11.7–15.7)
HGB BLD-MCNC: 8.7 G/DL (ref 11.7–15.7)
HGB BLD-MCNC: 8.8 G/DL (ref 11.7–15.7)
HGB BLD-MCNC: 9.1 G/DL (ref 11.7–15.7)
HGB BLD-MCNC: 9.2 G/DL (ref 11.7–15.7)
HGB BLD-MCNC: 9.3 G/DL (ref 11.7–15.7)
HGB BLD-MCNC: 9.4 G/DL (ref 11.7–15.7)
HGB BLD-MCNC: 9.5 G/DL (ref 11.7–15.7)
HGB C CRYSTALS: ABNORMAL
HGB C CRYSTALS: ABNORMAL
HGB C CRYSTALS: NORMAL
HGB C CRYSTALS: NORMAL
HGB UR QL STRIP: ABNORMAL
HGB UR QL STRIP: NEGATIVE
HGB UR QL STRIP: NEGATIVE
HOLD SPECIMEN: NORMAL
HOWELL-JOLLY BOD BLD QL SMEAR: ABNORMAL
HOWELL-JOLLY BOD BLD QL SMEAR: ABNORMAL
HOWELL-JOLLY BOD BLD QL SMEAR: NORMAL
HOWELL-JOLLY BOD BLD QL SMEAR: NORMAL
HYALINE CASTS: 16 /LPF
HYALINE CASTS: 5 /LPF
HYALINE CASTS: 5 /LPF
HYALINE CASTS: 6 /LPF
IMM GRANULOCYTES # BLD: 0 10E3/UL
IMM GRANULOCYTES # BLD: 0.1 10E3/UL
IMM GRANULOCYTES # BLD: 0.1 10E3/UL
IMM GRANULOCYTES NFR BLD: 0 %
IMM GRANULOCYTES NFR BLD: 1 %
INR PPP: 1.72 (ref 0.85–1.15)
INR PPP: 1.73 (ref 0.85–1.15)
INR PPP: 1.74 (ref 0.85–1.15)
INR PPP: 1.81 (ref 0.85–1.15)
INR PPP: 1.85 (ref 0.85–1.15)
INR PPP: 1.89 (ref 0.85–1.15)
INR PPP: 1.89 (ref 0.85–1.15)
INR PPP: 1.93 (ref 0.85–1.15)
INR PPP: 1.94 (ref 0.85–1.15)
INR PPP: 1.95 (ref 0.85–1.15)
INR PPP: 1.96 (ref 0.85–1.15)
INR PPP: 1.97 (ref 0.85–1.15)
INR PPP: 1.97 (ref 0.85–1.15)
INR PPP: 1.98 (ref 0.85–1.15)
INR PPP: 2 (ref 0.85–1.15)
INR PPP: 2.02 (ref 0.85–1.15)
INR PPP: 2.05 (ref 0.85–1.15)
INR PPP: 2.08 (ref 0.85–1.15)
INR PPP: 2.09 (ref 0.85–1.15)
INR PPP: 2.11 (ref 0.85–1.15)
INR PPP: 2.13 (ref 0.85–1.15)
INR PPP: 2.16 (ref 0.85–1.15)
INR PPP: 2.19 (ref 0.85–1.15)
INR PPP: 2.19 (ref 0.85–1.15)
INR PPP: 2.22 (ref 0.85–1.15)
INR PPP: 2.25 (ref 0.85–1.15)
INR PPP: 2.3 (ref 0.85–1.15)
INR PPP: 2.3 (ref 0.85–1.15)
INR PPP: 2.33 (ref 0.85–1.15)
INR PPP: 2.41 (ref 0.85–1.15)
INR PPP: 2.56 (ref 0.85–1.15)
INR PPP: 2.68 (ref 0.85–1.15)
INTERPRETATION ECG - MUSE: NORMAL
INTERPRETATION ECG - MUSE: NORMAL
IRON BINDING CAPACITY (ROCHE): 156 UG/DL (ref 240–430)
IRON BINDING CAPACITY (ROCHE): 203 UG/DL (ref 240–430)
IRON BINDING CAPACITY (ROCHE): 221 UG/DL (ref 240–430)
IRON SATN MFR SERPL: 26 % (ref 15–46)
IRON SATN MFR SERPL: 28 % (ref 15–46)
IRON SATN MFR SERPL: 28 % (ref 15–46)
IRON SERPL-MCNC: 43 UG/DL (ref 37–145)
IRON SERPL-MCNC: 52 UG/DL (ref 37–145)
IRON SERPL-MCNC: 62 UG/DL (ref 37–145)
ISSUE DATE AND TIME: NORMAL
KETONES UR STRIP-MCNC: NEGATIVE MG/DL
LABORATORY REPORT: NORMAL
LACTATE SERPL-SCNC: 1.1 MMOL/L (ref 0.7–2)
LD BODY BODY FLUID SOURCE: NORMAL
LDH FLD L TO P-CCNC: 156 U/L
LDH FLD L TO P-CCNC: 45 U/L
LDH FLD L TO P-CCNC: 46 U/L
LDH SERPL L TO P-CCNC: 162 U/L (ref 0–250)
LDH SERPL L TO P-CCNC: 185 U/L (ref 0–250)
LDH SERPL L TO P-CCNC: 193 U/L (ref 0–250)
LDH SERPL L TO P-CCNC: 205 U/L (ref 0–250)
LDH SERPL L TO P-CCNC: 243 U/L (ref 0–250)
LEUKOCYTE ESTERASE UR QL STRIP: ABNORMAL
LIPASE SERPL-CCNC: 116 U/L (ref 13–60)
LIPASE SERPL-CCNC: 125 U/L (ref 13–60)
LVEF ECHO: NORMAL
LYMPHOCYTES # BLD AUTO: 0.6 10E3/UL (ref 0.8–5.3)
LYMPHOCYTES # BLD AUTO: 0.8 10E3/UL (ref 0.8–5.3)
LYMPHOCYTES # BLD AUTO: 0.9 10E3/UL (ref 0.8–5.3)
LYMPHOCYTES # BLD AUTO: 1 10E3/UL (ref 0.8–5.3)
LYMPHOCYTES # BLD AUTO: 1.1 10E3/UL (ref 0.8–5.3)
LYMPHOCYTES # BLD AUTO: 1.3 10E3/UL (ref 0.8–5.3)
LYMPHOCYTES # BLD AUTO: 1.5 10E3/UL (ref 0.8–5.3)
LYMPHOCYTES # BLD AUTO: 1.5 10E3/UL (ref 0.8–5.3)
LYMPHOCYTES NFR BLD AUTO: 11 %
LYMPHOCYTES NFR BLD AUTO: 12 %
LYMPHOCYTES NFR BLD AUTO: 13 %
LYMPHOCYTES NFR BLD AUTO: 14 %
LYMPHOCYTES NFR BLD AUTO: 14 %
LYMPHOCYTES NFR BLD AUTO: 17 %
LYMPHOCYTES NFR BLD AUTO: 17 %
LYMPHOCYTES NFR BLD AUTO: 25 %
LYMPHOCYTES NFR FLD MANUAL: 2 %
LYMPHOCYTES NFR FLD MANUAL: 20 %
LYMPHOCYTES NFR FLD MANUAL: 27 %
LYMPHOCYTES NFR FLD MANUAL: 30 %
LYMPHOCYTES NFR FLD MANUAL: 47 %
LYMPHOCYTES NFR FLD MANUAL: 57 %
M TB IFN-G BLD-IMP: NEGATIVE
M TB IFN-G BLD-IMP: NEGATIVE
M TB IFN-G CD4+ BCKGRND COR BLD-ACNC: 9.95 IU/ML
M TB IFN-G CD4+ BCKGRND COR BLD-ACNC: 9.96 IU/ML
MAGNESIUM SERPL-MCNC: 2 MG/DL (ref 1.7–2.3)
MAGNESIUM SERPL-MCNC: 2.1 MG/DL (ref 1.7–2.3)
MAGNESIUM SERPL-MCNC: 2.2 MG/DL (ref 1.7–2.3)
MAGNESIUM SERPL-MCNC: 2.6 MG/DL (ref 1.7–2.3)
MAGNESIUM SERPL-MCNC: 2.8 MG/DL (ref 1.7–2.3)
MAGNESIUM SERPL-MCNC: 2.9 MG/DL (ref 1.7–2.3)
MCH RBC QN AUTO: 30.3 PG (ref 26.5–33)
MCH RBC QN AUTO: 30.3 PG (ref 26.5–33)
MCH RBC QN AUTO: 30.6 PG (ref 26.5–33)
MCH RBC QN AUTO: 30.6 PG (ref 26.5–33)
MCH RBC QN AUTO: 30.7 PG (ref 26.5–33)
MCH RBC QN AUTO: 30.9 PG (ref 26.5–33)
MCH RBC QN AUTO: 30.9 PG (ref 26.5–33)
MCH RBC QN AUTO: 31.1 PG (ref 26.5–33)
MCH RBC QN AUTO: 31.1 PG (ref 26.5–33)
MCH RBC QN AUTO: 31.3 PG (ref 26.5–33)
MCH RBC QN AUTO: 31.3 PG (ref 26.5–33)
MCH RBC QN AUTO: 31.5 PG (ref 26.5–33)
MCH RBC QN AUTO: 31.6 PG (ref 26.5–33)
MCH RBC QN AUTO: 31.7 PG (ref 26.5–33)
MCH RBC QN AUTO: 31.8 PG (ref 26.5–33)
MCH RBC QN AUTO: 31.9 PG (ref 26.5–33)
MCH RBC QN AUTO: 32 PG (ref 26.5–33)
MCH RBC QN AUTO: 32.1 PG (ref 26.5–33)
MCH RBC QN AUTO: 32.2 PG (ref 26.5–33)
MCH RBC QN AUTO: 32.2 PG (ref 26.5–33)
MCH RBC QN AUTO: 32.3 PG (ref 26.5–33)
MCH RBC QN AUTO: 32.5 PG (ref 26.5–33)
MCH RBC QN AUTO: 32.5 PG (ref 26.5–33)
MCH RBC QN AUTO: 32.6 PG (ref 26.5–33)
MCH RBC QN AUTO: 32.6 PG (ref 26.5–33)
MCH RBC QN AUTO: 32.7 PG (ref 26.5–33)
MCH RBC QN AUTO: 32.8 PG (ref 26.5–33)
MCH RBC QN AUTO: 32.8 PG (ref 26.5–33)
MCH RBC QN AUTO: 32.9 PG (ref 26.5–33)
MCH RBC QN AUTO: 32.9 PG (ref 26.5–33)
MCH RBC QN AUTO: 33 PG (ref 26.5–33)
MCH RBC QN AUTO: 33.2 PG (ref 26.5–33)
MCH RBC QN AUTO: 33.3 PG (ref 26.5–33)
MCH RBC QN AUTO: 33.5 PG (ref 26.5–33)
MCHC RBC AUTO-ENTMCNC: 31.6 G/DL (ref 31.5–36.5)
MCHC RBC AUTO-ENTMCNC: 31.8 G/DL (ref 31.5–36.5)
MCHC RBC AUTO-ENTMCNC: 31.9 G/DL (ref 31.5–36.5)
MCHC RBC AUTO-ENTMCNC: 32 G/DL (ref 31.5–36.5)
MCHC RBC AUTO-ENTMCNC: 32.3 G/DL (ref 31.5–36.5)
MCHC RBC AUTO-ENTMCNC: 32.6 G/DL (ref 31.5–36.5)
MCHC RBC AUTO-ENTMCNC: 32.6 G/DL (ref 31.5–36.5)
MCHC RBC AUTO-ENTMCNC: 32.7 G/DL (ref 31.5–36.5)
MCHC RBC AUTO-ENTMCNC: 32.8 G/DL (ref 31.5–36.5)
MCHC RBC AUTO-ENTMCNC: 32.9 G/DL (ref 31.5–36.5)
MCHC RBC AUTO-ENTMCNC: 33 G/DL (ref 31.5–36.5)
MCHC RBC AUTO-ENTMCNC: 33.1 G/DL (ref 31.5–36.5)
MCHC RBC AUTO-ENTMCNC: 33.2 G/DL (ref 31.5–36.5)
MCHC RBC AUTO-ENTMCNC: 33.3 G/DL (ref 31.5–36.5)
MCHC RBC AUTO-ENTMCNC: 33.5 G/DL (ref 31.5–36.5)
MCHC RBC AUTO-ENTMCNC: 33.6 G/DL (ref 31.5–36.5)
MCHC RBC AUTO-ENTMCNC: 33.6 G/DL (ref 31.5–36.5)
MCHC RBC AUTO-ENTMCNC: 33.7 G/DL (ref 31.5–36.5)
MCHC RBC AUTO-ENTMCNC: 33.7 G/DL (ref 31.5–36.5)
MCHC RBC AUTO-ENTMCNC: 33.8 G/DL (ref 31.5–36.5)
MCHC RBC AUTO-ENTMCNC: 33.9 G/DL (ref 31.5–36.5)
MCHC RBC AUTO-ENTMCNC: 34 G/DL (ref 31.5–36.5)
MCHC RBC AUTO-ENTMCNC: 34.1 G/DL (ref 31.5–36.5)
MCHC RBC AUTO-ENTMCNC: 34.1 G/DL (ref 31.5–36.5)
MCHC RBC AUTO-ENTMCNC: 34.2 G/DL (ref 31.5–36.5)
MCHC RBC AUTO-ENTMCNC: 34.7 G/DL (ref 31.5–36.5)
MCV RBC AUTO: 100 FL (ref 78–100)
MCV RBC AUTO: 101 FL (ref 78–100)
MCV RBC AUTO: 102 FL (ref 78–100)
MCV RBC AUTO: 103 FL (ref 78–100)
MCV RBC AUTO: 91 FL (ref 78–100)
MCV RBC AUTO: 92 FL (ref 78–100)
MCV RBC AUTO: 93 FL (ref 78–100)
MCV RBC AUTO: 94 FL (ref 78–100)
MCV RBC AUTO: 95 FL (ref 78–100)
MCV RBC AUTO: 96 FL (ref 78–100)
MCV RBC AUTO: 97 FL (ref 78–100)
MCV RBC AUTO: 98 FL (ref 78–100)
MCV RBC AUTO: 99 FL (ref 78–100)
MCV RBC AUTO: 99 FL (ref 78–100)
MITOGEN IGNF BCKGRD COR BLD-ACNC: 0 IU/ML
MITOGEN IGNF BCKGRD COR BLD-ACNC: 0 IU/ML
MITOGEN IGNF BCKGRD COR BLD-ACNC: 0.01 IU/ML
MITOGEN IGNF BCKGRD COR BLD-ACNC: 0.02 IU/ML
MONOCYTES # BLD AUTO: 0.6 10E3/UL (ref 0–1.3)
MONOCYTES # BLD AUTO: 0.9 10E3/UL (ref 0–1.3)
MONOCYTES # BLD AUTO: 0.9 10E3/UL (ref 0–1.3)
MONOCYTES # BLD AUTO: 1 10E3/UL (ref 0–1.3)
MONOCYTES # BLD AUTO: 1.1 10E3/UL (ref 0–1.3)
MONOCYTES # BLD AUTO: 1.1 10E3/UL (ref 0–1.3)
MONOCYTES # BLD AUTO: 1.5 10E3/UL (ref 0–1.3)
MONOCYTES # BLD AUTO: 1.7 10E3/UL (ref 0–1.3)
MONOCYTES # BLD AUTO: 2 10E3/UL (ref 0–1.3)
MONOCYTES # BLD AUTO: 2 10E3/UL (ref 0–1.3)
MONOCYTES NFR BLD AUTO: 10 %
MONOCYTES NFR BLD AUTO: 11 %
MONOCYTES NFR BLD AUTO: 14 %
MONOCYTES NFR BLD AUTO: 15 %
MONOCYTES NFR BLD AUTO: 17 %
MONOCYTES NFR BLD AUTO: 18 %
MONOCYTES NFR BLD AUTO: 18 %
MONOCYTES NFR BLD AUTO: 20 %
MONOCYTES NFR BLD AUTO: 22 %
MONOCYTES NFR BLD AUTO: 23 %
MONOS+MACROS NFR FLD MANUAL: 11 %
MONOS+MACROS NFR FLD MANUAL: 25 %
MONOS+MACROS NFR FLD MANUAL: 44 %
MONOS+MACROS NFR FLD MANUAL: 65 %
MONOS+MACROS NFR FLD MANUAL: NORMAL %
MONOS+MACROS NFR FLD MANUAL: NORMAL %
MUCOUS THREADS #/AREA URNS LPF: PRESENT /LPF
NEUTROPHILS # BLD AUTO: 3.9 10E3/UL (ref 1.6–8.3)
NEUTROPHILS # BLD AUTO: 4.1 10E3/UL (ref 1.6–8.3)
NEUTROPHILS # BLD AUTO: 4.1 10E3/UL (ref 1.6–8.3)
NEUTROPHILS # BLD AUTO: 4.2 10E3/UL (ref 1.6–8.3)
NEUTROPHILS # BLD AUTO: 4.7 10E3/UL (ref 1.6–8.3)
NEUTROPHILS # BLD AUTO: 5.2 10E3/UL (ref 1.6–8.3)
NEUTROPHILS # BLD AUTO: 5.5 10E3/UL (ref 1.6–8.3)
NEUTROPHILS # BLD AUTO: 5.6 10E3/UL (ref 1.6–8.3)
NEUTROPHILS # BLD AUTO: 5.6 10E3/UL (ref 1.6–8.3)
NEUTROPHILS # BLD AUTO: 6.5 10E3/UL (ref 1.6–8.3)
NEUTROPHILS NFR BLD AUTO: 60 %
NEUTROPHILS NFR BLD AUTO: 61 %
NEUTROPHILS NFR BLD AUTO: 64 %
NEUTROPHILS NFR BLD AUTO: 65 %
NEUTROPHILS NFR BLD AUTO: 65 %
NEUTROPHILS NFR BLD AUTO: 66 %
NEUTROPHILS NFR BLD AUTO: 67 %
NEUTROPHILS NFR BLD AUTO: 68 %
NEUTROPHILS NFR BLD AUTO: 71 %
NEUTROPHILS NFR BLD AUTO: 73 %
NEUTS BAND NFR FLD MANUAL: 14 %
NEUTS BAND NFR FLD MANUAL: 16 %
NEUTS BAND NFR FLD MANUAL: 25 %
NEUTS BAND NFR FLD MANUAL: 27 %
NEUTS BAND NFR FLD MANUAL: 3 %
NEUTS BAND NFR FLD MANUAL: 63 %
NEUTS HYPERSEG BLD QL SMEAR: ABNORMAL
NEUTS HYPERSEG BLD QL SMEAR: ABNORMAL
NEUTS HYPERSEG BLD QL SMEAR: NORMAL
NEUTS HYPERSEG BLD QL SMEAR: NORMAL
NITRATE UR QL: NEGATIVE
NRBC # BLD AUTO: 0 10E3/UL
NRBC BLD AUTO-RTO: 0 /100
OSMOLALITY SERPL: 266 MMOL/KG (ref 280–301)
OSMOLALITY SERPL: 269 MMOL/KG (ref 280–301)
OSMOLALITY SERPL: 274 MMOL/KG (ref 280–301)
OSMOLALITY UR: 359 MMOL/KG (ref 100–1200)
OSMOLALITY UR: 373 MMOL/KG (ref 100–1200)
OSMOLALITY UR: 393 MMOL/KG (ref 100–1200)
OSMOLALITY UR: 401 MMOL/KG (ref 100–1200)
OSMOLALITY UR: 409 MMOL/KG (ref 100–1200)
OTHER CELLS FLD MANUAL: 27 %
OTHER CELLS FLD MANUAL: 95 %
P AXIS - MUSE: 62 DEGREES
P AXIS - MUSE: 64 DEGREES
PATH REPORT.COMMENTS IMP SPEC: NORMAL
PATH REPORT.FINAL DX SPEC: NORMAL
PATH REPORT.GROSS SPEC: NORMAL
PATH REPORT.MICROSCOPIC SPEC OTHER STN: NORMAL
PATH REPORT.RELEVANT HX SPEC: NORMAL
PETH INTERPRETATION: NORMAL
PETH INTERPRETATION: NORMAL
PH BODY FLUID SOURCE: NORMAL
PH FLD: 7.6 PH
PH UR STRIP: 5 [PH] (ref 5–7)
PH UR STRIP: 5.5 [PH] (ref 5–7)
PHOSPHATE SERPL-MCNC: 2.6 MG/DL (ref 2.5–4.5)
PLAT MORPH BLD: ABNORMAL
PLAT MORPH BLD: ABNORMAL
PLAT MORPH BLD: NORMAL
PLAT MORPH BLD: NORMAL
PLATELET # BLD AUTO: 27 10E3/UL (ref 150–450)
PLATELET # BLD AUTO: 27 10E3/UL (ref 150–450)
PLATELET # BLD AUTO: 35 10E3/UL (ref 150–450)
PLATELET # BLD AUTO: 37 10E3/UL (ref 150–450)
PLATELET # BLD AUTO: 39 10E3/UL (ref 150–450)
PLATELET # BLD AUTO: 39 10E3/UL (ref 150–450)
PLATELET # BLD AUTO: 40 10E3/UL (ref 150–450)
PLATELET # BLD AUTO: 43 10E3/UL (ref 150–450)
PLATELET # BLD AUTO: 44 10E3/UL (ref 150–450)
PLATELET # BLD AUTO: 46 10E3/UL (ref 150–450)
PLATELET # BLD AUTO: 47 10E3/UL (ref 150–450)
PLATELET # BLD AUTO: 49 10E3/UL (ref 150–450)
PLATELET # BLD AUTO: 50 10E3/UL (ref 150–450)
PLATELET # BLD AUTO: 51 10E3/UL (ref 150–450)
PLATELET # BLD AUTO: 51 10E3/UL (ref 150–450)
PLATELET # BLD AUTO: 52 10E3/UL (ref 150–450)
PLATELET # BLD AUTO: 53 10E3/UL (ref 150–450)
PLATELET # BLD AUTO: 54 10E3/UL (ref 150–450)
PLATELET # BLD AUTO: 55 10E3/UL (ref 150–450)
PLATELET # BLD AUTO: 55 10E3/UL (ref 150–450)
PLATELET # BLD AUTO: 57 10E3/UL (ref 150–450)
PLATELET # BLD AUTO: 58 10E3/UL (ref 150–450)
PLATELET # BLD AUTO: 59 10E3/UL (ref 150–450)
PLATELET # BLD AUTO: 59 10E3/UL (ref 150–450)
PLATELET # BLD AUTO: 61 10E3/UL (ref 150–450)
PLATELET # BLD AUTO: 62 10E3/UL (ref 150–450)
PLATELET # BLD AUTO: 65 10E3/UL (ref 150–450)
PLATELET # BLD AUTO: 72 10E3/UL (ref 150–450)
PLATELET # BLD AUTO: 72 10E3/UL (ref 150–450)
PLATELET # BLD AUTO: 77 10E3/UL (ref 150–450)
PLATELET # BLD AUTO: 82 10E3/UL (ref 150–450)
PLATELET # BLD AUTO: 83 10E3/UL (ref 150–450)
PLATELET # BLD AUTO: 84 10E3/UL (ref 150–450)
PLATELET # BLD AUTO: 86 10E3/UL (ref 150–450)
PLATELET # BLD AUTO: 92 10E3/UL (ref 150–450)
PLPETH BLD-MCNC: 22 NG/ML
PLPETH BLD-MCNC: <10 NG/ML
POLYCHROMASIA BLD QL SMEAR: ABNORMAL
POLYCHROMASIA BLD QL SMEAR: ABNORMAL
POLYCHROMASIA BLD QL SMEAR: NORMAL
POLYCHROMASIA BLD QL SMEAR: NORMAL
POPETH BLD-MCNC: 23 NG/ML
POPETH BLD-MCNC: <10 NG/ML
POTASSIUM SERPL-SCNC: 3.1 MMOL/L (ref 3.4–5.3)
POTASSIUM SERPL-SCNC: 3.2 MMOL/L (ref 3.4–5.3)
POTASSIUM SERPL-SCNC: 3.3 MMOL/L (ref 3.4–5.3)
POTASSIUM SERPL-SCNC: 3.4 MMOL/L (ref 3.4–5.3)
POTASSIUM SERPL-SCNC: 3.4 MMOL/L (ref 3.4–5.3)
POTASSIUM SERPL-SCNC: 3.9 MMOL/L (ref 3.4–5.3)
POTASSIUM SERPL-SCNC: 4 MMOL/L (ref 3.4–5.3)
POTASSIUM SERPL-SCNC: 4.1 MMOL/L (ref 3.4–5.3)
POTASSIUM SERPL-SCNC: 4.2 MMOL/L (ref 3.4–5.3)
POTASSIUM SERPL-SCNC: 4.3 MMOL/L (ref 3.4–5.3)
POTASSIUM SERPL-SCNC: 4.4 MMOL/L (ref 3.4–5.3)
POTASSIUM SERPL-SCNC: 4.5 MMOL/L (ref 3.4–5.3)
POTASSIUM SERPL-SCNC: 4.5 MMOL/L (ref 3.4–5.3)
POTASSIUM SERPL-SCNC: 4.6 MMOL/L (ref 3.4–5.3)
POTASSIUM SERPL-SCNC: 4.7 MMOL/L (ref 3.4–5.3)
POTASSIUM SERPL-SCNC: 4.8 MMOL/L (ref 3.4–5.3)
POTASSIUM SERPL-SCNC: 4.8 MMOL/L (ref 3.4–5.3)
POTASSIUM SERPL-SCNC: 4.9 MMOL/L (ref 3.4–5.3)
POTASSIUM SERPL-SCNC: 4.9 MMOL/L (ref 3.4–5.3)
POTASSIUM SERPL-SCNC: 5 MMOL/L (ref 3.4–5.3)
POTASSIUM SERPL-SCNC: 5.1 MMOL/L (ref 3.4–5.3)
POTASSIUM SERPL-SCNC: 5.2 MMOL/L (ref 3.4–5.3)
POTASSIUM SERPL-SCNC: 5.3 MMOL/L (ref 3.4–5.3)
POTASSIUM SERPL-SCNC: 5.4 MMOL/L (ref 3.4–5.3)
POTASSIUM SERPL-SCNC: 5.6 MMOL/L (ref 3.4–5.3)
POTASSIUM SERPL-SCNC: 5.7 MMOL/L (ref 3.4–5.3)
POTASSIUM UR-SCNC: 26.9 MMOL/L
PR INTERVAL - MUSE: 126 MS
PR INTERVAL - MUSE: 130 MS
PROCALCITONIN SERPL IA-MCNC: 0.15 NG/ML
PROT FLD-MCNC: 0.4 G/DL
PROT FLD-MCNC: 0.9 G/DL
PROT FLD-MCNC: 1.1 G/DL
PROT FLD-MCNC: 2.1 G/DL
PROT SERPL-MCNC: 4.6 G/DL (ref 6.4–8.3)
PROT SERPL-MCNC: 4.8 G/DL (ref 6.4–8.3)
PROT SERPL-MCNC: 4.8 G/DL (ref 6.4–8.3)
PROT SERPL-MCNC: 5 G/DL (ref 6.4–8.3)
PROT SERPL-MCNC: 5.1 G/DL (ref 6.4–8.3)
PROT SERPL-MCNC: 5.2 G/DL (ref 6.4–8.3)
PROT SERPL-MCNC: 5.3 G/DL (ref 6.4–8.3)
PROT SERPL-MCNC: 5.4 G/DL (ref 6.4–8.3)
PROT SERPL-MCNC: 5.5 G/DL (ref 6.4–8.3)
PROT SERPL-MCNC: 5.6 G/DL (ref 6.4–8.3)
PROT SERPL-MCNC: 5.7 G/DL (ref 6.4–8.3)
PROT SERPL-MCNC: 5.7 G/DL (ref 6.4–8.3)
PROT SERPL-MCNC: 5.8 G/DL (ref 6.4–8.3)
PROT SERPL-MCNC: 5.9 G/DL (ref 6.4–8.3)
PROT SERPL-MCNC: 6 G/DL (ref 6.4–8.3)
PROT SERPL-MCNC: 6.1 G/DL (ref 6.4–8.3)
PROT SERPL-MCNC: 6.2 G/DL (ref 6.4–8.3)
PROT SERPL-MCNC: 6.2 G/DL (ref 6.4–8.3)
PROT SERPL-MCNC: 6.3 G/DL (ref 6.4–8.3)
PROT SERPL-MCNC: 6.6 G/DL (ref 6.4–8.3)
PROT SERPL-MCNC: 7.3 G/DL (ref 6.4–8.3)
PROT/CREAT 24H UR: 0.24 MG/MG CR (ref 0–0.2)
PROT/CREAT 24H UR: NORMAL MG/G{CREAT}
PROTEIN BODY FLUID SOURCE: NORMAL
QRS DURATION - MUSE: 106 MS
QRS DURATION - MUSE: 94 MS
QT - MUSE: 460 MS
QT - MUSE: 472 MS
QTC - MUSE: 540 MS
QTC - MUSE: 544 MS
QUANTIFERON MITOGEN: 10 IU/ML
QUANTIFERON MITOGEN: 10 IU/ML
QUANTIFERON NIL TUBE: 0.04 IU/ML
QUANTIFERON NIL TUBE: 0.05 IU/ML
QUANTIFERON TB1 TUBE: 0.05 IU/ML
QUANTIFERON TB1 TUBE: 0.06 IU/ML
QUANTIFERON TB2 TUBE: 0.05
QUANTIFERON TB2 TUBE: 0.05
R AXIS - MUSE: 22 DEGREES
R AXIS - MUSE: 9 DEGREES
RADIOLOGIST FLAGS: ABNORMAL
RBC # BLD AUTO: 2 10E6/UL (ref 3.8–5.2)
RBC # BLD AUTO: 2 10E6/UL (ref 3.8–5.2)
RBC # BLD AUTO: 2.02 10E6/UL (ref 3.8–5.2)
RBC # BLD AUTO: 2.08 10E6/UL (ref 3.8–5.2)
RBC # BLD AUTO: 2.09 10E6/UL (ref 3.8–5.2)
RBC # BLD AUTO: 2.09 10E6/UL (ref 3.8–5.2)
RBC # BLD AUTO: 2.1 10E6/UL (ref 3.8–5.2)
RBC # BLD AUTO: 2.11 10E6/UL (ref 3.8–5.2)
RBC # BLD AUTO: 2.13 10E6/UL (ref 3.8–5.2)
RBC # BLD AUTO: 2.13 10E6/UL (ref 3.8–5.2)
RBC # BLD AUTO: 2.14 10E6/UL (ref 3.8–5.2)
RBC # BLD AUTO: 2.16 10E6/UL (ref 3.8–5.2)
RBC # BLD AUTO: 2.2 10E6/UL (ref 3.8–5.2)
RBC # BLD AUTO: 2.24 10E6/UL (ref 3.8–5.2)
RBC # BLD AUTO: 2.29 10E6/UL (ref 3.8–5.2)
RBC # BLD AUTO: 2.31 10E6/UL (ref 3.8–5.2)
RBC # BLD AUTO: 2.31 10E6/UL (ref 3.8–5.2)
RBC # BLD AUTO: 2.32 10E6/UL (ref 3.8–5.2)
RBC # BLD AUTO: 2.33 10E6/UL (ref 3.8–5.2)
RBC # BLD AUTO: 2.37 10E6/UL (ref 3.8–5.2)
RBC # BLD AUTO: 2.38 10E6/UL (ref 3.8–5.2)
RBC # BLD AUTO: 2.4 10E6/UL (ref 3.8–5.2)
RBC # BLD AUTO: 2.41 10E6/UL (ref 3.8–5.2)
RBC # BLD AUTO: 2.44 10E6/UL (ref 3.8–5.2)
RBC # BLD AUTO: 2.46 10E6/UL (ref 3.8–5.2)
RBC # BLD AUTO: 2.47 10E6/UL (ref 3.8–5.2)
RBC # BLD AUTO: 2.48 10E6/UL (ref 3.8–5.2)
RBC # BLD AUTO: 2.48 10E6/UL (ref 3.8–5.2)
RBC # BLD AUTO: 2.49 10E6/UL (ref 3.8–5.2)
RBC # BLD AUTO: 2.5 10E6/UL (ref 3.8–5.2)
RBC # BLD AUTO: 2.57 10E6/UL (ref 3.8–5.2)
RBC # BLD AUTO: 2.57 10E6/UL (ref 3.8–5.2)
RBC # BLD AUTO: 2.59 10E6/UL (ref 3.8–5.2)
RBC # BLD AUTO: 2.61 10E6/UL (ref 3.8–5.2)
RBC # BLD AUTO: 2.61 10E6/UL (ref 3.8–5.2)
RBC # BLD AUTO: 2.62 10E6/UL (ref 3.8–5.2)
RBC # BLD AUTO: 2.64 10E6/UL (ref 3.8–5.2)
RBC # BLD AUTO: 2.64 10E6/UL (ref 3.8–5.2)
RBC # BLD AUTO: 2.68 10E6/UL (ref 3.8–5.2)
RBC # BLD AUTO: 2.7 10E6/UL (ref 3.8–5.2)
RBC # BLD AUTO: 2.71 10E6/UL (ref 3.8–5.2)
RBC # BLD AUTO: 2.72 10E6/UL (ref 3.8–5.2)
RBC # BLD AUTO: 2.72 10E6/UL (ref 3.8–5.2)
RBC # BLD AUTO: 2.78 10E6/UL (ref 3.8–5.2)
RBC # BLD AUTO: 2.85 10E6/UL (ref 3.8–5.2)
RBC # BLD AUTO: 2.93 10E6/UL (ref 3.8–5.2)
RBC # BLD AUTO: 2.99 10E6/UL (ref 3.8–5.2)
RBC # BLD AUTO: 3 10E6/UL (ref 3.8–5.2)
RBC # BLD AUTO: 3.5 10E6/UL (ref 3.8–5.2)
RBC # BLD AUTO: 4.09 10E6/UL (ref 3.8–5.2)
RBC AGGLUT BLD QL: ABNORMAL
RBC AGGLUT BLD QL: ABNORMAL
RBC AGGLUT BLD QL: NORMAL
RBC AGGLUT BLD QL: NORMAL
RBC MORPH BLD: ABNORMAL
RBC MORPH BLD: ABNORMAL
RBC MORPH BLD: NORMAL
RBC MORPH BLD: NORMAL
RBC URINE: 0 /HPF
RBC URINE: 0 /HPF
RBC URINE: 1 /HPF
RBC URINE: 2 /HPF
RBC URINE: 5 /HPF
RBC URINE: 9 /HPF
RETICS # AUTO: 0.1 10E6/UL (ref 0.03–0.1)
RETICS # AUTO: 0.11 10E6/UL (ref 0.03–0.1)
RETICS # AUTO: 0.13 10E6/UL (ref 0.03–0.1)
RETICS # AUTO: 0.14 10E6/UL (ref 0.03–0.1)
RETICS # AUTO: 0.15 10E6/UL (ref 0.03–0.1)
RETICS # AUTO: 0.16 10E6/UL (ref 0.03–0.1)
RETICS/RBC NFR AUTO: 4.9 % (ref 0.5–2)
RETICS/RBC NFR AUTO: 5.1 % (ref 0.5–2)
RETICS/RBC NFR AUTO: 5.2 % (ref 0.5–2)
RETICS/RBC NFR AUTO: 5.6 % (ref 0.5–2)
RETICS/RBC NFR AUTO: 7 % (ref 0.5–2)
RETICS/RBC NFR AUTO: 7.2 % (ref 0.5–2)
ROULEAUX BLD QL SMEAR: ABNORMAL
ROULEAUX BLD QL SMEAR: ABNORMAL
ROULEAUX BLD QL SMEAR: NORMAL
ROULEAUX BLD QL SMEAR: NORMAL
RSV RNA SPEC NAA+PROBE: NEGATIVE
SARS-COV-2 RNA RESP QL NAA+PROBE: NEGATIVE
SICKLE CELLS BLD QL SMEAR: ABNORMAL
SICKLE CELLS BLD QL SMEAR: ABNORMAL
SICKLE CELLS BLD QL SMEAR: NORMAL
SICKLE CELLS BLD QL SMEAR: NORMAL
SMUDGE CELLS BLD QL SMEAR: ABNORMAL
SMUDGE CELLS BLD QL SMEAR: ABNORMAL
SMUDGE CELLS BLD QL SMEAR: NORMAL
SMUDGE CELLS BLD QL SMEAR: NORMAL
SODIUM SERPL-SCNC: 113 MMOL/L (ref 135–145)
SODIUM SERPL-SCNC: 114 MMOL/L (ref 135–145)
SODIUM SERPL-SCNC: 115 MMOL/L (ref 135–145)
SODIUM SERPL-SCNC: 116 MMOL/L (ref 135–145)
SODIUM SERPL-SCNC: 117 MMOL/L (ref 135–145)
SODIUM SERPL-SCNC: 118 MMOL/L (ref 135–145)
SODIUM SERPL-SCNC: 119 MMOL/L (ref 135–145)
SODIUM SERPL-SCNC: 120 MMOL/L (ref 135–145)
SODIUM SERPL-SCNC: 121 MMOL/L (ref 135–145)
SODIUM SERPL-SCNC: 122 MMOL/L (ref 135–145)
SODIUM SERPL-SCNC: 123 MMOL/L (ref 135–145)
SODIUM SERPL-SCNC: 124 MMOL/L (ref 135–145)
SODIUM SERPL-SCNC: 125 MMOL/L (ref 135–145)
SODIUM SERPL-SCNC: 126 MMOL/L (ref 135–145)
SODIUM SERPL-SCNC: 127 MMOL/L (ref 135–145)
SODIUM SERPL-SCNC: 127 MMOL/L (ref 136–145)
SODIUM SERPL-SCNC: 129 MMOL/L (ref 136–145)
SODIUM SERPL-SCNC: 129 MMOL/L (ref 136–145)
SODIUM SERPL-SCNC: 133 MMOL/L (ref 136–145)
SODIUM UR-SCNC: <20 MMOL/L
SP GR UR STRIP: 1.01 (ref 1–1.03)
SP GR UR STRIP: 1.03 (ref 1–1.03)
SPECIMEN EXPIRATION DATE: NORMAL
SPHEROCYTES BLD QL SMEAR: ABNORMAL
SPHEROCYTES BLD QL SMEAR: ABNORMAL
SPHEROCYTES BLD QL SMEAR: NORMAL
SPHEROCYTES BLD QL SMEAR: NORMAL
SQUAMOUS EPITHELIAL: 1 /HPF
SQUAMOUS EPITHELIAL: 5 /HPF
STOMATOCYTES BLD QL SMEAR: ABNORMAL
STOMATOCYTES BLD QL SMEAR: ABNORMAL
STOMATOCYTES BLD QL SMEAR: NORMAL
STOMATOCYTES BLD QL SMEAR: NORMAL
SYSTOLIC BLOOD PRESSURE - MUSE: NORMAL MMHG
SYSTOLIC BLOOD PRESSURE - MUSE: NORMAL MMHG
T AXIS - MUSE: 21 DEGREES
T AXIS - MUSE: 23 DEGREES
T PALLIDUM AB SER QL: NONREACTIVE
T PALLIDUM AB SER QL: NONREACTIVE
TARGETS BLD QL SMEAR: ABNORMAL
TARGETS BLD QL SMEAR: ABNORMAL
TARGETS BLD QL SMEAR: NORMAL
TARGETS BLD QL SMEAR: NORMAL
TOXIC GRANULES BLD QL SMEAR: ABNORMAL
TOXIC GRANULES BLD QL SMEAR: ABNORMAL
TOXIC GRANULES BLD QL SMEAR: NORMAL
TOXIC GRANULES BLD QL SMEAR: NORMAL
TRANSFERRIN SERPL-MCNC: 132 MG/DL (ref 200–360)
TRANSITIONAL EPI: <1 /HPF
TRIGL SERPL-MCNC: 32 MG/DL
TROPONIN T SERPL HS-MCNC: 27 NG/L
TROPONIN T SERPL HS-MCNC: 28 NG/L
TSH SERPL DL<=0.005 MIU/L-ACNC: 0.74 UIU/ML (ref 0.3–4.2)
TSH SERPL DL<=0.005 MIU/L-ACNC: 1.75 UIU/ML (ref 0.3–4.2)
TSH SERPL DL<=0.005 MIU/L-ACNC: 3.98 UIU/ML (ref 0.3–4.2)
UNIT ABO/RH: NORMAL
UNIT NUMBER: NORMAL
UNIT STATUS: NORMAL
UNIT TYPE ISBT: 600
UNIT TYPE ISBT: 600
UNIT TYPE ISBT: 6200
UPPER GI ENDOSCOPY: NORMAL
UPPER GI ENDOSCOPY: NORMAL
UROBILINOGEN UR STRIP-MCNC: NORMAL MG/DL
VARIANT LYMPHS BLD QL SMEAR: ABNORMAL
VARIANT LYMPHS BLD QL SMEAR: ABNORMAL
VARIANT LYMPHS BLD QL SMEAR: NORMAL
VARIANT LYMPHS BLD QL SMEAR: NORMAL
VENTRICULAR RATE- MUSE: 80 BPM
VENTRICULAR RATE- MUSE: 83 BPM
VIT B12 SERPL-MCNC: 1368 PG/ML (ref 232–1245)
VIT D+METAB SERPL-MCNC: 9 NG/ML (ref 20–50)
VITAMIN D2 SERPL-MCNC: <5 UG/L
VITAMIN D3 SERPL-MCNC: 9 UG/L
WBC # BLD AUTO: 10.2 10E3/UL (ref 4–11)
WBC # BLD AUTO: 10.4 10E3/UL (ref 4–11)
WBC # BLD AUTO: 10.4 10E3/UL (ref 4–11)
WBC # BLD AUTO: 11.2 10E3/UL (ref 4–11)
WBC # BLD AUTO: 11.6 10E3/UL (ref 4–11)
WBC # BLD AUTO: 15.5 10E3/UL (ref 4–11)
WBC # BLD AUTO: 16.3 10E3/UL (ref 4–11)
WBC # BLD AUTO: 4.8 10E3/UL (ref 4–11)
WBC # BLD AUTO: 5.7 10E3/UL (ref 4–11)
WBC # BLD AUTO: 5.7 10E3/UL (ref 4–11)
WBC # BLD AUTO: 5.8 10E3/UL (ref 4–11)
WBC # BLD AUTO: 5.8 10E3/UL (ref 4–11)
WBC # BLD AUTO: 6 10E3/UL (ref 4–11)
WBC # BLD AUTO: 6 10E3/UL (ref 4–11)
WBC # BLD AUTO: 6.2 10E3/UL (ref 4–11)
WBC # BLD AUTO: 6.3 10E3/UL (ref 4–11)
WBC # BLD AUTO: 6.5 10E3/UL (ref 4–11)
WBC # BLD AUTO: 6.6 10E3/UL (ref 4–11)
WBC # BLD AUTO: 6.7 10E3/UL (ref 4–11)
WBC # BLD AUTO: 6.9 10E3/UL (ref 4–11)
WBC # BLD AUTO: 7.1 10E3/UL (ref 4–11)
WBC # BLD AUTO: 7.7 10E3/UL (ref 4–11)
WBC # BLD AUTO: 7.7 10E3/UL (ref 4–11)
WBC # BLD AUTO: 7.9 10E3/UL (ref 4–11)
WBC # BLD AUTO: 8 10E3/UL (ref 4–11)
WBC # BLD AUTO: 8 10E3/UL (ref 4–11)
WBC # BLD AUTO: 8.2 10E3/UL (ref 4–11)
WBC # BLD AUTO: 8.3 10E3/UL (ref 4–11)
WBC # BLD AUTO: 8.3 10E3/UL (ref 4–11)
WBC # BLD AUTO: 8.4 10E3/UL (ref 4–11)
WBC # BLD AUTO: 8.5 10E3/UL (ref 4–11)
WBC # BLD AUTO: 8.5 10E3/UL (ref 4–11)
WBC # BLD AUTO: 8.6 10E3/UL (ref 4–11)
WBC # BLD AUTO: 8.7 10E3/UL (ref 4–11)
WBC # BLD AUTO: 8.9 10E3/UL (ref 4–11)
WBC # BLD AUTO: 9 10E3/UL (ref 4–11)
WBC # BLD AUTO: 9 10E3/UL (ref 4–11)
WBC # BLD AUTO: 9.1 10E3/UL (ref 4–11)
WBC # BLD AUTO: 9.1 10E3/UL (ref 4–11)
WBC # BLD AUTO: 9.2 10E3/UL (ref 4–11)
WBC # BLD AUTO: 9.5 10E3/UL (ref 4–11)
WBC # BLD AUTO: 9.6 10E3/UL (ref 4–11)
WBC # FLD AUTO: 229 /UL
WBC # FLD AUTO: 264 /UL
WBC # FLD AUTO: 456 /UL
WBC # FLD AUTO: 457 /UL
WBC # FLD AUTO: 791 /UL
WBC # FLD AUTO: 86 /UL
WBC CLUMPS #/AREA URNS HPF: PRESENT /HPF
WBC URINE: 17 /HPF
WBC URINE: 23 /HPF
WBC URINE: 4 /HPF
WBC URINE: 56 /HPF
WBC URINE: 7 /HPF
WBC URINE: 71 /HPF

## 2023-01-01 PROCEDURE — 80053 COMPREHEN METABOLIC PANEL: CPT | Performed by: STUDENT IN AN ORGANIZED HEALTH CARE EDUCATION/TRAINING PROGRAM

## 2023-01-01 PROCEDURE — 250N000013 HC RX MED GY IP 250 OP 250 PS 637

## 2023-01-01 PROCEDURE — 250N000011 HC RX IP 250 OP 636: Performed by: STUDENT IN AN ORGANIZED HEALTH CARE EDUCATION/TRAINING PROGRAM

## 2023-01-01 PROCEDURE — 36415 COLL VENOUS BLD VENIPUNCTURE: CPT | Performed by: INTERNAL MEDICINE

## 2023-01-01 PROCEDURE — 99417 PROLNG OP E/M EACH 15 MIN: CPT | Performed by: INTERNAL MEDICINE

## 2023-01-01 PROCEDURE — 250N000013 HC RX MED GY IP 250 OP 250 PS 637: Performed by: STUDENT IN AN ORGANIZED HEALTH CARE EDUCATION/TRAINING PROGRAM

## 2023-01-01 PROCEDURE — 250N000011 HC RX IP 250 OP 636: Mod: JZ | Performed by: INTERNAL MEDICINE

## 2023-01-01 PROCEDURE — 32555 ASPIRATE PLEURA W/ IMAGING: CPT | Mod: RT | Performed by: STUDENT IN AN ORGANIZED HEALTH CARE EDUCATION/TRAINING PROGRAM

## 2023-01-01 PROCEDURE — 49083 ABD PARACENTESIS W/IMAGING: CPT | Performed by: RADIOLOGY

## 2023-01-01 PROCEDURE — 120N000002 HC R&B MED SURG/OB UMMC

## 2023-01-01 PROCEDURE — 36415 COLL VENOUS BLD VENIPUNCTURE: CPT | Performed by: CLINICAL NURSE SPECIALIST

## 2023-01-01 PROCEDURE — 85018 HEMOGLOBIN: CPT | Performed by: INTERNAL MEDICINE

## 2023-01-01 PROCEDURE — 99233 SBSQ HOSP IP/OBS HIGH 50: CPT | Performed by: NURSE PRACTITIONER

## 2023-01-01 PROCEDURE — G0480 DRUG TEST DEF 1-7 CLASSES: HCPCS | Mod: 90 | Performed by: PATHOLOGY

## 2023-01-01 PROCEDURE — 82248 BILIRUBIN DIRECT: CPT | Performed by: STUDENT IN AN ORGANIZED HEALTH CARE EDUCATION/TRAINING PROGRAM

## 2023-01-01 PROCEDURE — 99233 SBSQ HOSP IP/OBS HIGH 50: CPT | Performed by: HOSPITALIST

## 2023-01-01 PROCEDURE — 0W9G3ZZ DRAINAGE OF PERITONEAL CAVITY, PERCUTANEOUS APPROACH: ICD-10-PCS | Performed by: ORTHOPAEDIC SURGERY

## 2023-01-01 PROCEDURE — 71275 CT ANGIOGRAPHY CHEST: CPT | Mod: 26 | Performed by: RADIOLOGY

## 2023-01-01 PROCEDURE — 86901 BLOOD TYPING SEROLOGIC RH(D): CPT | Performed by: STUDENT IN AN ORGANIZED HEALTH CARE EDUCATION/TRAINING PROGRAM

## 2023-01-01 PROCEDURE — C9399 UNCLASSIFIED DRUGS OR BIOLOG: HCPCS | Mod: JZ | Performed by: INTERNAL MEDICINE

## 2023-01-01 PROCEDURE — 83615 LACTATE (LD) (LDH) ENZYME: CPT | Performed by: HOSPITALIST

## 2023-01-01 PROCEDURE — P9047 ALBUMIN (HUMAN), 25%, 50ML: HCPCS | Performed by: HOSPITALIST

## 2023-01-01 PROCEDURE — 36415 COLL VENOUS BLD VENIPUNCTURE: CPT | Performed by: STUDENT IN AN ORGANIZED HEALTH CARE EDUCATION/TRAINING PROGRAM

## 2023-01-01 PROCEDURE — 32555 ASPIRATE PLEURA W/ IMAGING: CPT | Performed by: PEDIATRICS

## 2023-01-01 PROCEDURE — 82248 BILIRUBIN DIRECT: CPT | Performed by: PATHOLOGY

## 2023-01-01 PROCEDURE — 999N000065 XR CHEST 2 VIEWS

## 2023-01-01 PROCEDURE — 83615 LACTATE (LD) (LDH) ENZYME: CPT | Performed by: STUDENT IN AN ORGANIZED HEALTH CARE EDUCATION/TRAINING PROGRAM

## 2023-01-01 PROCEDURE — 84295 ASSAY OF SERUM SODIUM: CPT | Performed by: HOSPITALIST

## 2023-01-01 PROCEDURE — 80053 COMPREHEN METABOLIC PANEL: CPT | Performed by: HOSPITALIST

## 2023-01-01 PROCEDURE — 83735 ASSAY OF MAGNESIUM: CPT | Performed by: STUDENT IN AN ORGANIZED HEALTH CARE EDUCATION/TRAINING PROGRAM

## 2023-01-01 PROCEDURE — 36415 COLL VENOUS BLD VENIPUNCTURE: CPT | Performed by: NURSE PRACTITIONER

## 2023-01-01 PROCEDURE — 250N000009 HC RX 250

## 2023-01-01 PROCEDURE — 36415 COLL VENOUS BLD VENIPUNCTURE: CPT

## 2023-01-01 PROCEDURE — 272N000706 US PARACENTESIS WITH ALBUMIN

## 2023-01-01 PROCEDURE — 85045 AUTOMATED RETICULOCYTE COUNT: CPT | Performed by: INTERNAL MEDICINE

## 2023-01-01 PROCEDURE — 85027 COMPLETE CBC AUTOMATED: CPT | Performed by: HOSPITALIST

## 2023-01-01 PROCEDURE — 84466 ASSAY OF TRANSFERRIN: CPT | Performed by: STUDENT IN AN ORGANIZED HEALTH CARE EDUCATION/TRAINING PROGRAM

## 2023-01-01 PROCEDURE — 250N000011 HC RX IP 250 OP 636: Performed by: EMERGENCY MEDICINE

## 2023-01-01 PROCEDURE — 250N000013 HC RX MED GY IP 250 OP 250 PS 637: Performed by: INTERNAL MEDICINE

## 2023-01-01 PROCEDURE — 85384 FIBRINOGEN ACTIVITY: CPT | Performed by: STUDENT IN AN ORGANIZED HEALTH CARE EDUCATION/TRAINING PROGRAM

## 2023-01-01 PROCEDURE — 99232 SBSQ HOSP IP/OBS MODERATE 35: CPT | Performed by: INTERNAL MEDICINE

## 2023-01-01 PROCEDURE — 999N000157 HC STATISTIC RCP TIME EA 10 MIN

## 2023-01-01 PROCEDURE — 99214 OFFICE O/P EST MOD 30 MIN: CPT | Performed by: PHYSICIAN ASSISTANT

## 2023-01-01 PROCEDURE — 83930 ASSAY OF BLOOD OSMOLALITY: CPT

## 2023-01-01 PROCEDURE — 32555 ASPIRATE PLEURA W/ IMAGING: CPT | Performed by: INTERNAL MEDICINE

## 2023-01-01 PROCEDURE — 99214 OFFICE O/P EST MOD 30 MIN: CPT | Mod: 93 | Performed by: PHYSICIAN ASSISTANT

## 2023-01-01 PROCEDURE — 85027 COMPLETE CBC AUTOMATED: CPT | Performed by: INTERNAL MEDICINE

## 2023-01-01 PROCEDURE — 80051 ELECTROLYTE PANEL: CPT | Performed by: PHYSICIAN ASSISTANT

## 2023-01-01 PROCEDURE — 84156 ASSAY OF PROTEIN URINE: CPT | Performed by: NURSE PRACTITIONER

## 2023-01-01 PROCEDURE — 85610 PROTHROMBIN TIME: CPT | Performed by: HOSPITALIST

## 2023-01-01 PROCEDURE — 94640 AIRWAY INHALATION TREATMENT: CPT

## 2023-01-01 PROCEDURE — 0W9G3ZZ DRAINAGE OF PERITONEAL CAVITY, PERCUTANEOUS APPROACH: ICD-10-PCS | Performed by: STUDENT IN AN ORGANIZED HEALTH CARE EDUCATION/TRAINING PROGRAM

## 2023-01-01 PROCEDURE — 86923 COMPATIBILITY TEST ELECTRIC: CPT | Performed by: STUDENT IN AN ORGANIZED HEALTH CARE EDUCATION/TRAINING PROGRAM

## 2023-01-01 PROCEDURE — 99285 EMERGENCY DEPT VISIT HI MDM: CPT | Mod: 25 | Performed by: EMERGENCY MEDICINE

## 2023-01-01 PROCEDURE — 97161 PT EVAL LOW COMPLEX 20 MIN: CPT | Mod: GP

## 2023-01-01 PROCEDURE — P9047 ALBUMIN (HUMAN), 25%, 50ML: HCPCS | Performed by: STUDENT IN AN ORGANIZED HEALTH CARE EDUCATION/TRAINING PROGRAM

## 2023-01-01 PROCEDURE — 32555 ASPIRATE PLEURA W/ IMAGING: CPT

## 2023-01-01 PROCEDURE — 84295 ASSAY OF SERUM SODIUM: CPT | Performed by: STUDENT IN AN ORGANIZED HEALTH CARE EDUCATION/TRAINING PROGRAM

## 2023-01-01 PROCEDURE — 84443 ASSAY THYROID STIM HORMONE: CPT | Performed by: EMERGENCY MEDICINE

## 2023-01-01 PROCEDURE — 80321 ALCOHOLS BIOMARKERS 1OR 2: CPT | Performed by: NURSE PRACTITIONER

## 2023-01-01 PROCEDURE — 83550 IRON BINDING TEST: CPT | Performed by: PATHOLOGY

## 2023-01-01 PROCEDURE — 250N000009 HC RX 250: Performed by: RADIOLOGY

## 2023-01-01 PROCEDURE — 97530 THERAPEUTIC ACTIVITIES: CPT | Mod: GP

## 2023-01-01 PROCEDURE — 36415 COLL VENOUS BLD VENIPUNCTURE: CPT | Performed by: HOSPITALIST

## 2023-01-01 PROCEDURE — 82728 ASSAY OF FERRITIN: CPT | Performed by: PATHOLOGY

## 2023-01-01 PROCEDURE — 85014 HEMATOCRIT: CPT | Performed by: STUDENT IN AN ORGANIZED HEALTH CARE EDUCATION/TRAINING PROGRAM

## 2023-01-01 PROCEDURE — 99233 SBSQ HOSP IP/OBS HIGH 50: CPT | Performed by: INTERNAL MEDICINE

## 2023-01-01 PROCEDURE — P9016 RBC LEUKOCYTES REDUCED: HCPCS | Performed by: STUDENT IN AN ORGANIZED HEALTH CARE EDUCATION/TRAINING PROGRAM

## 2023-01-01 PROCEDURE — 120N000003 HC R&B IMCU UMMC

## 2023-01-01 PROCEDURE — P9047 ALBUMIN (HUMAN), 25%, 50ML: HCPCS | Performed by: INTERNAL MEDICINE

## 2023-01-01 PROCEDURE — 250N000013 HC RX MED GY IP 250 OP 250 PS 637: Performed by: CLINICAL NURSE SPECIALIST

## 2023-01-01 PROCEDURE — 83690 ASSAY OF LIPASE: CPT | Performed by: EMERGENCY MEDICINE

## 2023-01-01 PROCEDURE — 71045 X-RAY EXAM CHEST 1 VIEW: CPT

## 2023-01-01 PROCEDURE — 94640 AIRWAY INHALATION TREATMENT: CPT | Mod: 76

## 2023-01-01 PROCEDURE — 80321 ALCOHOLS BIOMARKERS 1OR 2: CPT | Performed by: INTERNAL MEDICINE

## 2023-01-01 PROCEDURE — 84295 ASSAY OF SERUM SODIUM: CPT | Performed by: CLINICAL NURSE SPECIALIST

## 2023-01-01 PROCEDURE — 250N000011 HC RX IP 250 OP 636: Performed by: HOSPITALIST

## 2023-01-01 PROCEDURE — 99207 PR APP CREDIT; MD BILLING SHARED VISIT: CPT | Mod: FS | Performed by: INTERNAL MEDICINE

## 2023-01-01 PROCEDURE — 85027 COMPLETE CBC AUTOMATED: CPT | Performed by: STUDENT IN AN ORGANIZED HEALTH CARE EDUCATION/TRAINING PROGRAM

## 2023-01-01 PROCEDURE — 83540 ASSAY OF IRON: CPT | Performed by: PATHOLOGY

## 2023-01-01 PROCEDURE — C1729 CATH, DRAINAGE: HCPCS

## 2023-01-01 PROCEDURE — 84295 ASSAY OF SERUM SODIUM: CPT

## 2023-01-01 PROCEDURE — 250N000013 HC RX MED GY IP 250 OP 250 PS 637: Performed by: EMERGENCY MEDICINE

## 2023-01-01 PROCEDURE — 99232 SBSQ HOSP IP/OBS MODERATE 35: CPT | Mod: FS | Performed by: CLINICAL NURSE SPECIALIST

## 2023-01-01 PROCEDURE — 93306 TTE W/DOPPLER COMPLETE: CPT | Mod: 26 | Performed by: INTERNAL MEDICINE

## 2023-01-01 PROCEDURE — 99233 SBSQ HOSP IP/OBS HIGH 50: CPT | Mod: GC | Performed by: INTERNAL MEDICINE

## 2023-01-01 PROCEDURE — 86901 BLOOD TYPING SEROLOGIC RH(D): CPT | Performed by: INTERNAL MEDICINE

## 2023-01-01 PROCEDURE — 82248 BILIRUBIN DIRECT: CPT | Performed by: INTERNAL MEDICINE

## 2023-01-01 PROCEDURE — 87086 URINE CULTURE/COLONY COUNT: CPT | Performed by: INTERNAL MEDICINE

## 2023-01-01 PROCEDURE — 84300 ASSAY OF URINE SODIUM: CPT

## 2023-01-01 PROCEDURE — 87070 CULTURE OTHR SPECIMN AEROBIC: CPT | Performed by: HOSPITALIST

## 2023-01-01 PROCEDURE — 76700 US EXAM ABDOM COMPLETE: CPT | Mod: 26 | Performed by: RADIOLOGY

## 2023-01-01 PROCEDURE — 84300 ASSAY OF URINE SODIUM: CPT | Performed by: HOSPITALIST

## 2023-01-01 PROCEDURE — 99233 SBSQ HOSP IP/OBS HIGH 50: CPT | Performed by: STUDENT IN AN ORGANIZED HEALTH CARE EDUCATION/TRAINING PROGRAM

## 2023-01-01 PROCEDURE — 71045 X-RAY EXAM CHEST 1 VIEW: CPT | Mod: 26 | Performed by: RADIOLOGY

## 2023-01-01 PROCEDURE — 71046 X-RAY EXAM CHEST 2 VIEWS: CPT

## 2023-01-01 PROCEDURE — 82248 BILIRUBIN DIRECT: CPT | Performed by: NURSE PRACTITIONER

## 2023-01-01 PROCEDURE — 93010 ELECTROCARDIOGRAM REPORT: CPT | Performed by: EMERGENCY MEDICINE

## 2023-01-01 PROCEDURE — 87205 SMEAR GRAM STAIN: CPT | Performed by: STUDENT IN AN ORGANIZED HEALTH CARE EDUCATION/TRAINING PROGRAM

## 2023-01-01 PROCEDURE — 250N000013 HC RX MED GY IP 250 OP 250 PS 637: Performed by: NURSE PRACTITIONER

## 2023-01-01 PROCEDURE — 99232 SBSQ HOSP IP/OBS MODERATE 35: CPT | Performed by: STUDENT IN AN ORGANIZED HEALTH CARE EDUCATION/TRAINING PROGRAM

## 2023-01-01 PROCEDURE — 250N000011 HC RX IP 250 OP 636

## 2023-01-01 PROCEDURE — 80053 COMPREHEN METABOLIC PANEL: CPT

## 2023-01-01 PROCEDURE — 99222 1ST HOSP IP/OBS MODERATE 55: CPT | Mod: GC | Performed by: TRANSPLANT SURGERY

## 2023-01-01 PROCEDURE — 82042 OTHER SOURCE ALBUMIN QUAN EA: CPT

## 2023-01-01 PROCEDURE — 83735 ASSAY OF MAGNESIUM: CPT

## 2023-01-01 PROCEDURE — 84155 ASSAY OF PROTEIN SERUM: CPT | Performed by: STUDENT IN AN ORGANIZED HEALTH CARE EDUCATION/TRAINING PROGRAM

## 2023-01-01 PROCEDURE — 82248 BILIRUBIN DIRECT: CPT | Performed by: EMERGENCY MEDICINE

## 2023-01-01 PROCEDURE — 258N000003 HC RX IP 258 OP 636: Performed by: STUDENT IN AN ORGANIZED HEALTH CARE EDUCATION/TRAINING PROGRAM

## 2023-01-01 PROCEDURE — 999N000065 XR CHEST PORT 1 VIEW

## 2023-01-01 PROCEDURE — 83036 HEMOGLOBIN GLYCOSYLATED A1C: CPT | Performed by: PHYSICIAN ASSISTANT

## 2023-01-01 PROCEDURE — 84478 ASSAY OF TRIGLYCERIDES: CPT

## 2023-01-01 PROCEDURE — 85025 COMPLETE CBC W/AUTO DIFF WBC: CPT | Performed by: PHYSICIAN ASSISTANT

## 2023-01-01 PROCEDURE — 83615 LACTATE (LD) (LDH) ENZYME: CPT | Performed by: INTERNAL MEDICINE

## 2023-01-01 PROCEDURE — 99418 PROLNG IP/OBS E/M EA 15 MIN: CPT | Performed by: STUDENT IN AN ORGANIZED HEALTH CARE EDUCATION/TRAINING PROGRAM

## 2023-01-01 PROCEDURE — 89051 BODY FLUID CELL COUNT: CPT | Performed by: STUDENT IN AN ORGANIZED HEALTH CARE EDUCATION/TRAINING PROGRAM

## 2023-01-01 PROCEDURE — 83735 ASSAY OF MAGNESIUM: CPT | Performed by: PHYSICIAN ASSISTANT

## 2023-01-01 PROCEDURE — 97110 THERAPEUTIC EXERCISES: CPT | Mod: GP

## 2023-01-01 PROCEDURE — 0DJ08ZZ INSPECTION OF UPPER INTESTINAL TRACT, VIA NATURAL OR ARTIFICIAL OPENING ENDOSCOPIC: ICD-10-PCS | Performed by: INTERNAL MEDICINE

## 2023-01-01 PROCEDURE — 85610 PROTHROMBIN TIME: CPT | Performed by: STUDENT IN AN ORGANIZED HEALTH CARE EDUCATION/TRAINING PROGRAM

## 2023-01-01 PROCEDURE — 77067 SCR MAMMO BI INCL CAD: CPT | Mod: GC | Performed by: RADIOLOGY

## 2023-01-01 PROCEDURE — 250N000009 HC RX 250: Performed by: EMERGENCY MEDICINE

## 2023-01-01 PROCEDURE — 99000 SPECIMEN HANDLING OFFICE-LAB: CPT | Performed by: PATHOLOGY

## 2023-01-01 PROCEDURE — 86665 EPSTEIN-BARR CAPSID VCA: CPT | Performed by: NURSE PRACTITIONER

## 2023-01-01 PROCEDURE — 83935 ASSAY OF URINE OSMOLALITY: CPT | Performed by: HOSPITALIST

## 2023-01-01 PROCEDURE — 250N000011 HC RX IP 250 OP 636: Mod: JZ | Performed by: STUDENT IN AN ORGANIZED HEALTH CARE EDUCATION/TRAINING PROGRAM

## 2023-01-01 PROCEDURE — 99223 1ST HOSP IP/OBS HIGH 75: CPT | Mod: GC | Performed by: STUDENT IN AN ORGANIZED HEALTH CARE EDUCATION/TRAINING PROGRAM

## 2023-01-01 PROCEDURE — 86850 RBC ANTIBODY SCREEN: CPT | Performed by: STUDENT IN AN ORGANIZED HEALTH CARE EDUCATION/TRAINING PROGRAM

## 2023-01-01 PROCEDURE — 80048 BASIC METABOLIC PNL TOTAL CA: CPT | Performed by: PEDIATRICS

## 2023-01-01 PROCEDURE — 80053 COMPREHEN METABOLIC PANEL: CPT | Performed by: PEDIATRICS

## 2023-01-01 PROCEDURE — 84145 PROCALCITONIN (PCT): CPT | Performed by: EMERGENCY MEDICINE

## 2023-01-01 PROCEDURE — 87075 CULTR BACTERIA EXCEPT BLOOD: CPT | Performed by: STUDENT IN AN ORGANIZED HEALTH CARE EDUCATION/TRAINING PROGRAM

## 2023-01-01 PROCEDURE — 250N000011 HC RX IP 250 OP 636: Performed by: INTERNAL MEDICINE

## 2023-01-01 PROCEDURE — P9047 ALBUMIN (HUMAN), 25%, 50ML: HCPCS | Performed by: PHYSICIAN ASSISTANT

## 2023-01-01 PROCEDURE — 84295 ASSAY OF SERUM SODIUM: CPT | Performed by: PEDIATRICS

## 2023-01-01 PROCEDURE — 85025 COMPLETE CBC W/AUTO DIFF WBC: CPT

## 2023-01-01 PROCEDURE — 82607 VITAMIN B-12: CPT | Performed by: STUDENT IN AN ORGANIZED HEALTH CARE EDUCATION/TRAINING PROGRAM

## 2023-01-01 PROCEDURE — 99207 PR NO BILLABLE SERVICE THIS VISIT: CPT | Performed by: INTERNAL MEDICINE

## 2023-01-01 PROCEDURE — 84157 ASSAY OF PROTEIN OTHER: CPT

## 2023-01-01 PROCEDURE — 99239 HOSP IP/OBS DSCHRG MGMT >30: CPT | Performed by: STUDENT IN AN ORGANIZED HEALTH CARE EDUCATION/TRAINING PROGRAM

## 2023-01-01 PROCEDURE — 85384 FIBRINOGEN ACTIVITY: CPT | Performed by: PEDIATRICS

## 2023-01-01 PROCEDURE — 85060 BLOOD SMEAR INTERPRETATION: CPT | Performed by: PATHOLOGY

## 2023-01-01 PROCEDURE — 999N000248 HC STATISTIC IV INSERT WITH US BY RN

## 2023-01-01 PROCEDURE — 49083 ABD PARACENTESIS W/IMAGING: CPT | Performed by: STUDENT IN AN ORGANIZED HEALTH CARE EDUCATION/TRAINING PROGRAM

## 2023-01-01 PROCEDURE — 86923 COMPATIBILITY TEST ELECTRIC: CPT | Performed by: INTERNAL MEDICINE

## 2023-01-01 PROCEDURE — 32555 ASPIRATE PLEURA W/ IMAGING: CPT | Performed by: ORTHOPAEDIC SURGERY

## 2023-01-01 PROCEDURE — 84702 CHORIONIC GONADOTROPIN TEST: CPT | Performed by: NURSE PRACTITIONER

## 2023-01-01 PROCEDURE — 250N000009 HC RX 250: Performed by: INTERNAL MEDICINE

## 2023-01-01 PROCEDURE — 45378 DIAGNOSTIC COLONOSCOPY: CPT | Performed by: INTERNAL MEDICINE

## 2023-01-01 PROCEDURE — 85027 COMPLETE CBC AUTOMATED: CPT | Performed by: PATHOLOGY

## 2023-01-01 PROCEDURE — 85060 BLOOD SMEAR INTERPRETATION: CPT | Performed by: STUDENT IN AN ORGANIZED HEALTH CARE EDUCATION/TRAINING PROGRAM

## 2023-01-01 PROCEDURE — 36415 COLL VENOUS BLD VENIPUNCTURE: CPT | Performed by: EMERGENCY MEDICINE

## 2023-01-01 PROCEDURE — 0W993ZZ DRAINAGE OF RIGHT PLEURAL CAVITY, PERCUTANEOUS APPROACH: ICD-10-PCS | Performed by: STUDENT IN AN ORGANIZED HEALTH CARE EDUCATION/TRAINING PROGRAM

## 2023-01-01 PROCEDURE — 85025 COMPLETE CBC W/AUTO DIFF WBC: CPT | Performed by: HOSPITALIST

## 2023-01-01 PROCEDURE — 272N000706 US PARACENTESIS WITHOUT ALBUMIN

## 2023-01-01 PROCEDURE — 0W993ZZ DRAINAGE OF RIGHT PLEURAL CAVITY, PERCUTANEOUS APPROACH: ICD-10-PCS | Performed by: PHYSICIAN ASSISTANT

## 2023-01-01 PROCEDURE — G0480 DRUG TEST DEF 1-7 CLASSES: HCPCS | Mod: 90

## 2023-01-01 PROCEDURE — 32555 ASPIRATE PLEURA W/ IMAGING: CPT | Performed by: STUDENT IN AN ORGANIZED HEALTH CARE EDUCATION/TRAINING PROGRAM

## 2023-01-01 PROCEDURE — G0463 HOSPITAL OUTPT CLINIC VISIT: HCPCS | Mod: 25

## 2023-01-01 PROCEDURE — 999N000208 ECHOCARDIOGRAM COMPLETE

## 2023-01-01 PROCEDURE — 86780 TREPONEMA PALLIDUM: CPT | Performed by: NURSE PRACTITIONER

## 2023-01-01 PROCEDURE — 71046 X-RAY EXAM CHEST 2 VIEWS: CPT | Mod: TC | Performed by: RADIOLOGY

## 2023-01-01 PROCEDURE — 97116 GAIT TRAINING THERAPY: CPT | Mod: GP

## 2023-01-01 PROCEDURE — 43235 EGD DIAGNOSTIC BRUSH WASH: CPT | Performed by: INTERNAL MEDICINE

## 2023-01-01 PROCEDURE — 999N000127 HC STATISTIC PERIPHERAL IV START W US GUIDANCE

## 2023-01-01 PROCEDURE — 250N000011 HC RX IP 250 OP 636: Performed by: RADIOLOGY

## 2023-01-01 PROCEDURE — 36415 COLL VENOUS BLD VENIPUNCTURE: CPT | Performed by: PEDIATRICS

## 2023-01-01 PROCEDURE — G0500 MOD SEDAT ENDO SERVICE >5YRS: HCPCS | Performed by: INTERNAL MEDICINE

## 2023-01-01 PROCEDURE — 99232 SBSQ HOSP IP/OBS MODERATE 35: CPT | Mod: FS | Performed by: STUDENT IN AN ORGANIZED HEALTH CARE EDUCATION/TRAINING PROGRAM

## 2023-01-01 PROCEDURE — 84484 ASSAY OF TROPONIN QUANT: CPT | Performed by: EMERGENCY MEDICINE

## 2023-01-01 PROCEDURE — 84295 ASSAY OF SERUM SODIUM: CPT | Performed by: INTERNAL MEDICINE

## 2023-01-01 PROCEDURE — 82105 ALPHA-FETOPROTEIN SERUM: CPT | Performed by: STUDENT IN AN ORGANIZED HEALTH CARE EDUCATION/TRAINING PROGRAM

## 2023-01-01 PROCEDURE — 0W9930Z DRAINAGE OF RIGHT PLEURAL CAVITY WITH DRAINAGE DEVICE, PERCUTANEOUS APPROACH: ICD-10-PCS | Performed by: PHYSICIAN ASSISTANT

## 2023-01-01 PROCEDURE — 85014 HEMATOCRIT: CPT | Performed by: INTERNAL MEDICINE

## 2023-01-01 PROCEDURE — 89050 BODY FLUID CELL COUNT: CPT | Performed by: STUDENT IN AN ORGANIZED HEALTH CARE EDUCATION/TRAINING PROGRAM

## 2023-01-01 PROCEDURE — 36415 COLL VENOUS BLD VENIPUNCTURE: CPT | Performed by: PHYSICIAN ASSISTANT

## 2023-01-01 PROCEDURE — 81001 URINALYSIS AUTO W/SCOPE: CPT | Performed by: INTERNAL MEDICINE

## 2023-01-01 PROCEDURE — 80048 BASIC METABOLIC PNL TOTAL CA: CPT | Performed by: PHYSICIAN ASSISTANT

## 2023-01-01 PROCEDURE — 99223 1ST HOSP IP/OBS HIGH 75: CPT | Mod: GC | Performed by: INTERNAL MEDICINE

## 2023-01-01 PROCEDURE — 85025 COMPLETE CBC W/AUTO DIFF WBC: CPT | Performed by: EMERGENCY MEDICINE

## 2023-01-01 PROCEDURE — 85045 AUTOMATED RETICULOCYTE COUNT: CPT | Performed by: STUDENT IN AN ORGANIZED HEALTH CARE EDUCATION/TRAINING PROGRAM

## 2023-01-01 PROCEDURE — 85384 FIBRINOGEN ACTIVITY: CPT | Performed by: INTERNAL MEDICINE

## 2023-01-01 PROCEDURE — 82248 BILIRUBIN DIRECT: CPT

## 2023-01-01 PROCEDURE — 99213 OFFICE O/P EST LOW 20 MIN: CPT | Performed by: PHYSICIAN ASSISTANT

## 2023-01-01 PROCEDURE — P9047 ALBUMIN (HUMAN), 25%, 50ML: HCPCS | Performed by: NURSE PRACTITIONER

## 2023-01-01 PROCEDURE — 88305 TISSUE EXAM BY PATHOLOGIST: CPT | Mod: TC | Performed by: PEDIATRICS

## 2023-01-01 PROCEDURE — 85610 PROTHROMBIN TIME: CPT | Performed by: INTERNAL MEDICINE

## 2023-01-01 PROCEDURE — 80048 BASIC METABOLIC PNL TOTAL CA: CPT | Performed by: INTERNAL MEDICINE

## 2023-01-01 PROCEDURE — 82945 GLUCOSE OTHER FLUID: CPT | Performed by: STUDENT IN AN ORGANIZED HEALTH CARE EDUCATION/TRAINING PROGRAM

## 2023-01-01 PROCEDURE — 272N000500 HC NEEDLE CR2

## 2023-01-01 PROCEDURE — C9399 UNCLASSIFIED DRUGS OR BIOLOG: HCPCS | Mod: JZ | Performed by: NURSE PRACTITIONER

## 2023-01-01 PROCEDURE — 80053 COMPREHEN METABOLIC PANEL: CPT | Performed by: INTERNAL MEDICINE

## 2023-01-01 PROCEDURE — P9045 ALBUMIN (HUMAN), 5%, 250 ML: HCPCS | Mod: JZ | Performed by: STUDENT IN AN ORGANIZED HEALTH CARE EDUCATION/TRAINING PROGRAM

## 2023-01-01 PROCEDURE — 86644 CMV ANTIBODY: CPT | Performed by: NURSE PRACTITIONER

## 2023-01-01 PROCEDURE — 83605 ASSAY OF LACTIC ACID: CPT | Performed by: EMERGENCY MEDICINE

## 2023-01-01 PROCEDURE — 82042 OTHER SOURCE ALBUMIN QUAN EA: CPT | Performed by: STUDENT IN AN ORGANIZED HEALTH CARE EDUCATION/TRAINING PROGRAM

## 2023-01-01 PROCEDURE — 84132 ASSAY OF SERUM POTASSIUM: CPT | Performed by: STUDENT IN AN ORGANIZED HEALTH CARE EDUCATION/TRAINING PROGRAM

## 2023-01-01 PROCEDURE — 82140 ASSAY OF AMMONIA: CPT | Performed by: EMERGENCY MEDICINE

## 2023-01-01 PROCEDURE — 82533 TOTAL CORTISOL: CPT | Performed by: INTERNAL MEDICINE

## 2023-01-01 PROCEDURE — 86140 C-REACTIVE PROTEIN: CPT | Performed by: EMERGENCY MEDICINE

## 2023-01-01 PROCEDURE — 250N000009 HC RX 250: Performed by: STUDENT IN AN ORGANIZED HEALTH CARE EDUCATION/TRAINING PROGRAM

## 2023-01-01 PROCEDURE — 250N000011 HC RX IP 250 OP 636: Performed by: PHYSICIAN ASSISTANT

## 2023-01-01 PROCEDURE — 88305 TISSUE EXAM BY PATHOLOGIST: CPT | Mod: 26 | Performed by: PATHOLOGY

## 2023-01-01 PROCEDURE — 85027 COMPLETE CBC AUTOMATED: CPT

## 2023-01-01 PROCEDURE — 80048 BASIC METABOLIC PNL TOTAL CA: CPT | Performed by: EMERGENCY MEDICINE

## 2023-01-01 PROCEDURE — 83930 ASSAY OF BLOOD OSMOLALITY: CPT | Performed by: HOSPITALIST

## 2023-01-01 PROCEDURE — 85018 HEMOGLOBIN: CPT | Performed by: STUDENT IN AN ORGANIZED HEALTH CARE EDUCATION/TRAINING PROGRAM

## 2023-01-01 PROCEDURE — 258N000003 HC RX IP 258 OP 636: Performed by: EMERGENCY MEDICINE

## 2023-01-01 PROCEDURE — 83550 IRON BINDING TEST: CPT | Performed by: STUDENT IN AN ORGANIZED HEALTH CARE EDUCATION/TRAINING PROGRAM

## 2023-01-01 PROCEDURE — G0145 SCR C/V CYTO,THINLAYER,RESCR: HCPCS | Performed by: REGISTERED NURSE

## 2023-01-01 PROCEDURE — 99232 SBSQ HOSP IP/OBS MODERATE 35: CPT | Mod: GC | Performed by: INTERNAL MEDICINE

## 2023-01-01 PROCEDURE — 82746 ASSAY OF FOLIC ACID SERUM: CPT | Performed by: STUDENT IN AN ORGANIZED HEALTH CARE EDUCATION/TRAINING PROGRAM

## 2023-01-01 PROCEDURE — 49083 ABD PARACENTESIS W/IMAGING: CPT

## 2023-01-01 PROCEDURE — 84702 CHORIONIC GONADOTROPIN TEST: CPT | Performed by: STUDENT IN AN ORGANIZED HEALTH CARE EDUCATION/TRAINING PROGRAM

## 2023-01-01 PROCEDURE — 99255 IP/OBS CONSLTJ NEW/EST HI 80: CPT | Performed by: INTERNAL MEDICINE

## 2023-01-01 PROCEDURE — 83986 ASSAY PH BODY FLUID NOS: CPT | Performed by: STUDENT IN AN ORGANIZED HEALTH CARE EDUCATION/TRAINING PROGRAM

## 2023-01-01 PROCEDURE — C9113 INJ PANTOPRAZOLE SODIUM, VIA: HCPCS | Mod: JZ | Performed by: STUDENT IN AN ORGANIZED HEALTH CARE EDUCATION/TRAINING PROGRAM

## 2023-01-01 PROCEDURE — 80321 ALCOHOLS BIOMARKERS 1OR 2: CPT | Performed by: STUDENT IN AN ORGANIZED HEALTH CARE EDUCATION/TRAINING PROGRAM

## 2023-01-01 PROCEDURE — P9037 PLATE PHERES LEUKOREDU IRRAD: HCPCS | Performed by: INTERNAL MEDICINE

## 2023-01-01 PROCEDURE — 87070 CULTURE OTHR SPECIMN AEROBIC: CPT | Performed by: STUDENT IN AN ORGANIZED HEALTH CARE EDUCATION/TRAINING PROGRAM

## 2023-01-01 PROCEDURE — 83930 ASSAY OF BLOOD OSMOLALITY: CPT | Performed by: EMERGENCY MEDICINE

## 2023-01-01 PROCEDURE — 71046 X-RAY EXAM CHEST 2 VIEWS: CPT | Mod: 26 | Performed by: RADIOLOGY

## 2023-01-01 PROCEDURE — 89051 BODY FLUID CELL COUNT: CPT

## 2023-01-01 PROCEDURE — 99223 1ST HOSP IP/OBS HIGH 75: CPT | Mod: FS | Performed by: CLINICAL NURSE SPECIALIST

## 2023-01-01 PROCEDURE — 250N000011 HC RX IP 250 OP 636: Performed by: CLINICAL NURSE SPECIALIST

## 2023-01-01 PROCEDURE — 82610 CYSTATIN C: CPT

## 2023-01-01 PROCEDURE — 85610 PROTHROMBIN TIME: CPT | Performed by: PATHOLOGY

## 2023-01-01 PROCEDURE — 85025 COMPLETE CBC W/AUTO DIFF WBC: CPT | Performed by: STUDENT IN AN ORGANIZED HEALTH CARE EDUCATION/TRAINING PROGRAM

## 2023-01-01 PROCEDURE — 99233 SBSQ HOSP IP/OBS HIGH 50: CPT | Mod: FS | Performed by: CLINICAL NURSE SPECIALIST

## 2023-01-01 PROCEDURE — 250N000011 HC RX IP 250 OP 636: Performed by: NURSE PRACTITIONER

## 2023-01-01 PROCEDURE — 36415 COLL VENOUS BLD VENIPUNCTURE: CPT | Performed by: PATHOLOGY

## 2023-01-01 PROCEDURE — 83935 ASSAY OF URINE OSMOLALITY: CPT

## 2023-01-01 PROCEDURE — 99231 SBSQ HOSP IP/OBS SF/LOW 25: CPT | Performed by: INTERNAL MEDICINE

## 2023-01-01 PROCEDURE — 99291 CRITICAL CARE FIRST HOUR: CPT | Performed by: EMERGENCY MEDICINE

## 2023-01-01 PROCEDURE — 85610 PROTHROMBIN TIME: CPT

## 2023-01-01 PROCEDURE — 82306 VITAMIN D 25 HYDROXY: CPT | Performed by: STUDENT IN AN ORGANIZED HEALTH CARE EDUCATION/TRAINING PROGRAM

## 2023-01-01 PROCEDURE — 250N000009 HC RX 250: Performed by: PHYSICIAN ASSISTANT

## 2023-01-01 PROCEDURE — 75574 CT ANGIO HRT W/3D IMAGE: CPT

## 2023-01-01 PROCEDURE — 86850 RBC ANTIBODY SCREEN: CPT | Performed by: INTERNAL MEDICINE

## 2023-01-01 PROCEDURE — 80053 COMPREHEN METABOLIC PANEL: CPT | Performed by: CLINICAL NURSE SPECIALIST

## 2023-01-01 PROCEDURE — G0463 HOSPITAL OUTPT CLINIC VISIT: HCPCS

## 2023-01-01 PROCEDURE — 85610 PROTHROMBIN TIME: CPT | Performed by: EMERGENCY MEDICINE

## 2023-01-01 PROCEDURE — XW03367 INTRODUCTION OF TERLIPRESSIN INTO PERIPHERAL VEIN, PERCUTANEOUS APPROACH, NEW TECHNOLOGY GROUP 7: ICD-10-PCS | Performed by: INTERNAL MEDICINE

## 2023-01-01 PROCEDURE — 32555 ASPIRATE PLEURA W/ IMAGING: CPT | Mod: RT | Performed by: PHYSICIAN ASSISTANT

## 2023-01-01 PROCEDURE — 32555 ASPIRATE PLEURA W/ IMAGING: CPT | Mod: GC | Performed by: RADIOLOGY

## 2023-01-01 PROCEDURE — 250N000011 HC RX IP 250 OP 636: Mod: JZ | Performed by: NURSE PRACTITIONER

## 2023-01-01 PROCEDURE — 81001 URINALYSIS AUTO W/SCOPE: CPT | Performed by: CLINICAL NURSE SPECIALIST

## 2023-01-01 PROCEDURE — 0DJD8ZZ INSPECTION OF LOWER INTESTINAL TRACT, VIA NATURAL OR ARTIFICIAL OPENING ENDOSCOPIC: ICD-10-PCS | Performed by: INTERNAL MEDICINE

## 2023-01-01 PROCEDURE — 99207 PR NO BILLABLE SERVICE THIS VISIT: CPT | Performed by: STUDENT IN AN ORGANIZED HEALTH CARE EDUCATION/TRAINING PROGRAM

## 2023-01-01 PROCEDURE — P9016 RBC LEUKOCYTES REDUCED: HCPCS | Performed by: INTERNAL MEDICINE

## 2023-01-01 PROCEDURE — 0W993ZZ DRAINAGE OF RIGHT PLEURAL CAVITY, PERCUTANEOUS APPROACH: ICD-10-PCS | Performed by: ORTHOPAEDIC SURGERY

## 2023-01-01 PROCEDURE — P9047 ALBUMIN (HUMAN), 25%, 50ML: HCPCS | Performed by: CLINICAL NURSE SPECIALIST

## 2023-01-01 PROCEDURE — 84155 ASSAY OF PROTEIN SERUM: CPT

## 2023-01-01 PROCEDURE — 99213 OFFICE O/P EST LOW 20 MIN: CPT | Performed by: REGISTERED NURSE

## 2023-01-01 PROCEDURE — 32555 ASPIRATE PLEURA W/ IMAGING: CPT | Performed by: PHYSICIAN ASSISTANT

## 2023-01-01 PROCEDURE — 0W9B30Z DRAINAGE OF LEFT PLEURAL CAVITY WITH DRAINAGE DEVICE, PERCUTANEOUS APPROACH: ICD-10-PCS | Performed by: PHYSICIAN ASSISTANT

## 2023-01-01 PROCEDURE — 93010 ELECTROCARDIOGRAM REPORT: CPT | Performed by: INTERNAL MEDICINE

## 2023-01-01 PROCEDURE — 86481 TB AG RESPONSE T-CELL SUSP: CPT | Performed by: NURSE PRACTITIONER

## 2023-01-01 PROCEDURE — 99497 ADVNCD CARE PLAN 30 MIN: CPT | Performed by: INTERNAL MEDICINE

## 2023-01-01 PROCEDURE — 84156 ASSAY OF PROTEIN URINE: CPT | Performed by: STUDENT IN AN ORGANIZED HEALTH CARE EDUCATION/TRAINING PROGRAM

## 2023-01-01 PROCEDURE — 97162 PT EVAL MOD COMPLEX 30 MIN: CPT | Mod: GP

## 2023-01-01 PROCEDURE — 99204 OFFICE O/P NEW MOD 45 MIN: CPT | Performed by: INTERNAL MEDICINE

## 2023-01-01 PROCEDURE — 77063 BREAST TOMOSYNTHESIS BI: CPT | Mod: GC | Performed by: RADIOLOGY

## 2023-01-01 PROCEDURE — 83735 ASSAY OF MAGNESIUM: CPT | Performed by: EMERGENCY MEDICINE

## 2023-01-01 PROCEDURE — 89050 BODY FLUID CELL COUNT: CPT | Performed by: HOSPITALIST

## 2023-01-01 PROCEDURE — 80053 COMPREHEN METABOLIC PANEL: CPT | Performed by: PHYSICIAN ASSISTANT

## 2023-01-01 PROCEDURE — 86780 TREPONEMA PALLIDUM: CPT | Performed by: STUDENT IN AN ORGANIZED HEALTH CARE EDUCATION/TRAINING PROGRAM

## 2023-01-01 PROCEDURE — 82306 VITAMIN D 25 HYDROXY: CPT | Performed by: NURSE PRACTITIONER

## 2023-01-01 PROCEDURE — 93005 ELECTROCARDIOGRAM TRACING: CPT | Performed by: EMERGENCY MEDICINE

## 2023-01-01 PROCEDURE — 84443 ASSAY THYROID STIM HORMONE: CPT | Performed by: HOSPITALIST

## 2023-01-01 PROCEDURE — 93975 VASCULAR STUDY: CPT | Mod: 26 | Performed by: RADIOLOGY

## 2023-01-01 PROCEDURE — 71275 CT ANGIOGRAPHY CHEST: CPT

## 2023-01-01 PROCEDURE — 83010 ASSAY OF HAPTOGLOBIN QUANT: CPT | Performed by: INTERNAL MEDICINE

## 2023-01-01 PROCEDURE — 99418 PROLNG IP/OBS E/M EA 15 MIN: CPT | Mod: 25 | Performed by: STUDENT IN AN ORGANIZED HEALTH CARE EDUCATION/TRAINING PROGRAM

## 2023-01-01 PROCEDURE — 81001 URINALYSIS AUTO W/SCOPE: CPT | Performed by: EMERGENCY MEDICINE

## 2023-01-01 PROCEDURE — 99418 PROLNG IP/OBS E/M EA 15 MIN: CPT | Performed by: CLINICAL NURSE SPECIALIST

## 2023-01-01 PROCEDURE — 82945 GLUCOSE OTHER FLUID: CPT | Performed by: HOSPITALIST

## 2023-01-01 PROCEDURE — 71250 CT THORAX DX C-: CPT

## 2023-01-01 PROCEDURE — 99215 OFFICE O/P EST HI 40 MIN: CPT | Performed by: INTERNAL MEDICINE

## 2023-01-01 PROCEDURE — 84157 ASSAY OF PROTEIN OTHER: CPT | Performed by: STUDENT IN AN ORGANIZED HEALTH CARE EDUCATION/TRAINING PROGRAM

## 2023-01-01 PROCEDURE — 81001 URINALYSIS AUTO W/SCOPE: CPT

## 2023-01-01 PROCEDURE — 99418 PROLNG IP/OBS E/M EA 15 MIN: CPT | Performed by: HOSPITALIST

## 2023-01-01 PROCEDURE — 84157 ASSAY OF PROTEIN OTHER: CPT | Performed by: HOSPITALIST

## 2023-01-01 PROCEDURE — 99223 1ST HOSP IP/OBS HIGH 75: CPT | Performed by: NURSE PRACTITIONER

## 2023-01-01 PROCEDURE — 999N000099 HC STATISTIC MODERATE SEDATION < 10 MIN: Performed by: INTERNAL MEDICINE

## 2023-01-01 PROCEDURE — P9037 PLATE PHERES LEUKOREDU IRRAD: HCPCS | Performed by: STUDENT IN AN ORGANIZED HEALTH CARE EDUCATION/TRAINING PROGRAM

## 2023-01-01 PROCEDURE — 99223 1ST HOSP IP/OBS HIGH 75: CPT

## 2023-01-01 PROCEDURE — 84133 ASSAY OF URINE POTASSIUM: CPT | Performed by: INTERNAL MEDICINE

## 2023-01-01 PROCEDURE — 999N000285 HC STATISTIC VASC ACCESS LAB DRAW WITH PIV START

## 2023-01-01 PROCEDURE — 999N000128 HC STATISTIC PERIPHERAL IV START W/O US GUIDANCE

## 2023-01-01 PROCEDURE — 84132 ASSAY OF SERUM POTASSIUM: CPT | Performed by: HOSPITALIST

## 2023-01-01 PROCEDURE — 84100 ASSAY OF PHOSPHORUS: CPT | Performed by: PATHOLOGY

## 2023-01-01 PROCEDURE — 86644 CMV ANTIBODY: CPT | Performed by: STUDENT IN AN ORGANIZED HEALTH CARE EDUCATION/TRAINING PROGRAM

## 2023-01-01 PROCEDURE — 99418 PROLNG IP/OBS E/M EA 15 MIN: CPT | Mod: FS | Performed by: CLINICAL NURSE SPECIALIST

## 2023-01-01 PROCEDURE — 99207 PR NO BILLABLE SERVICE THIS VISIT: CPT | Performed by: PEDIATRICS

## 2023-01-01 PROCEDURE — 80053 COMPREHEN METABOLIC PANEL: CPT | Performed by: PATHOLOGY

## 2023-01-01 PROCEDURE — 80053 COMPREHEN METABOLIC PANEL: CPT | Performed by: EMERGENCY MEDICINE

## 2023-01-01 PROCEDURE — 84300 ASSAY OF URINE SODIUM: CPT | Performed by: INTERNAL MEDICINE

## 2023-01-01 PROCEDURE — 84443 ASSAY THYROID STIM HORMONE: CPT | Performed by: PHYSICIAN ASSISTANT

## 2023-01-01 PROCEDURE — 258N000003 HC RX IP 258 OP 636: Performed by: INTERNAL MEDICINE

## 2023-01-01 PROCEDURE — 87624 HPV HI-RISK TYP POOLED RSLT: CPT | Performed by: REGISTERED NURSE

## 2023-01-01 PROCEDURE — G0463 HOSPITAL OUTPT CLINIC VISIT: HCPCS | Performed by: INTERNAL MEDICINE

## 2023-01-01 PROCEDURE — 71250 CT THORAX DX C-: CPT | Mod: 26 | Performed by: RADIOLOGY

## 2023-01-01 PROCEDURE — 85652 RBC SED RATE AUTOMATED: CPT | Performed by: EMERGENCY MEDICINE

## 2023-01-01 PROCEDURE — 80048 BASIC METABOLIC PNL TOTAL CA: CPT | Performed by: STUDENT IN AN ORGANIZED HEALTH CARE EDUCATION/TRAINING PROGRAM

## 2023-01-01 PROCEDURE — 99214 OFFICE O/P EST MOD 30 MIN: CPT | Mod: 95 | Performed by: PHYSICIAN ASSISTANT

## 2023-01-01 PROCEDURE — 76700 US EXAM ABDOM COMPLETE: CPT

## 2023-01-01 PROCEDURE — 84300 ASSAY OF URINE SODIUM: CPT | Performed by: EMERGENCY MEDICINE

## 2023-01-01 PROCEDURE — 87799 DETECT AGENT NOS DNA QUANT: CPT | Performed by: STUDENT IN AN ORGANIZED HEALTH CARE EDUCATION/TRAINING PROGRAM

## 2023-01-01 PROCEDURE — 82610 CYSTATIN C: CPT | Performed by: STUDENT IN AN ORGANIZED HEALTH CARE EDUCATION/TRAINING PROGRAM

## 2023-01-01 PROCEDURE — 88112 CYTOPATH CELL ENHANCE TECH: CPT | Mod: 26 | Performed by: PATHOLOGY

## 2023-01-01 PROCEDURE — 71045 X-RAY EXAM CHEST 1 VIEW: CPT | Mod: 77

## 2023-01-01 PROCEDURE — 93005 ELECTROCARDIOGRAM TRACING: CPT

## 2023-01-01 PROCEDURE — 85048 AUTOMATED LEUKOCYTE COUNT: CPT | Performed by: INTERNAL MEDICINE

## 2023-01-01 PROCEDURE — 0W993ZZ DRAINAGE OF RIGHT PLEURAL CAVITY, PERCUTANEOUS APPROACH: ICD-10-PCS | Performed by: RADIOLOGY

## 2023-01-01 PROCEDURE — C1769 GUIDE WIRE: HCPCS

## 2023-01-01 PROCEDURE — 86481 TB AG RESPONSE T-CELL SUSP: CPT | Performed by: STUDENT IN AN ORGANIZED HEALTH CARE EDUCATION/TRAINING PROGRAM

## 2023-01-01 PROCEDURE — 87637 SARSCOV2&INF A&B&RSV AMP PRB: CPT | Performed by: EMERGENCY MEDICINE

## 2023-01-01 PROCEDURE — 88112 CYTOPATH CELL ENHANCE TECH: CPT | Mod: TC | Performed by: PEDIATRICS

## 2023-01-01 PROCEDURE — 258N000001 HC RX 258: Performed by: INTERNAL MEDICINE

## 2023-01-01 PROCEDURE — 83935 ASSAY OF URINE OSMOLALITY: CPT | Performed by: EMERGENCY MEDICINE

## 2023-01-01 PROCEDURE — P9047 ALBUMIN (HUMAN), 25%, 50ML: HCPCS | Performed by: RADIOLOGY

## 2023-01-01 PROCEDURE — 99232 SBSQ HOSP IP/OBS MODERATE 35: CPT | Performed by: HOSPITALIST

## 2023-01-01 PROCEDURE — 87070 CULTURE OTHR SPECIMN AEROBIC: CPT

## 2023-01-01 PROCEDURE — 75574 CT ANGIO HRT W/3D IMAGE: CPT | Mod: 26 | Performed by: INTERNAL MEDICINE

## 2023-01-01 PROCEDURE — 83550 IRON BINDING TEST: CPT | Performed by: CLINICAL NURSE SPECIALIST

## 2023-01-01 PROCEDURE — 81001 URINALYSIS AUTO W/SCOPE: CPT | Performed by: HOSPITALIST

## 2023-01-01 RX ORDER — LIDOCAINE 40 MG/G
CREAM TOPICAL
Status: DISCONTINUED | OUTPATIENT
Start: 2023-01-01 | End: 2023-01-01 | Stop reason: HOSPADM

## 2023-01-01 RX ORDER — IOPAMIDOL 755 MG/ML
110 INJECTION, SOLUTION INTRAVASCULAR ONCE
Status: COMPLETED | OUTPATIENT
Start: 2023-01-01 | End: 2023-01-01

## 2023-01-01 RX ORDER — FLUTICASONE FUROATE AND VILANTEROL 100; 25 UG/1; UG/1
1 POWDER RESPIRATORY (INHALATION) DAILY
Status: DISCONTINUED | OUTPATIENT
Start: 2023-01-01 | End: 2023-01-01 | Stop reason: HOSPADM

## 2023-01-01 RX ORDER — CITALOPRAM HYDROBROMIDE 20 MG/1
40 TABLET ORAL DAILY
Status: DISCONTINUED | OUTPATIENT
Start: 2023-01-01 | End: 2023-01-01 | Stop reason: HOSPADM

## 2023-01-01 RX ORDER — HEPARIN SODIUM,PORCINE 10 UNIT/ML
5 VIAL (ML) INTRAVENOUS
Status: CANCELLED | OUTPATIENT
Start: 2023-01-01

## 2023-01-01 RX ORDER — ALBUMIN (HUMAN) 12.5 G/50ML
12.5 SOLUTION INTRAVENOUS ONCE
Status: DISCONTINUED | OUTPATIENT
Start: 2023-01-01 | End: 2023-01-01

## 2023-01-01 RX ORDER — LIDOCAINE 40 MG/G
CREAM TOPICAL
Status: CANCELLED | OUTPATIENT
Start: 2023-01-01

## 2023-01-01 RX ORDER — PANTOPRAZOLE SODIUM 40 MG/1
40 TABLET, DELAYED RELEASE ORAL 2 TIMES DAILY
Status: DISCONTINUED | OUTPATIENT
Start: 2023-01-01 | End: 2023-01-01 | Stop reason: HOSPADM

## 2023-01-01 RX ORDER — CEFTRIAXONE 1 G/1
1 INJECTION, POWDER, FOR SOLUTION INTRAMUSCULAR; INTRAVENOUS EVERY 24 HOURS
Status: DISCONTINUED | OUTPATIENT
Start: 2023-01-01 | End: 2023-01-01

## 2023-01-01 RX ORDER — ALBUMIN (HUMAN) 12.5 G/50ML
12.5 SOLUTION INTRAVENOUS ONCE
Status: CANCELLED | OUTPATIENT
Start: 2023-01-01 | End: 2023-01-01

## 2023-01-01 RX ORDER — ACETAMINOPHEN 650 MG/1
650 SUPPOSITORY RECTAL EVERY 6 HOURS PRN
Status: DISCONTINUED | OUTPATIENT
Start: 2023-01-01 | End: 2023-01-01 | Stop reason: HOSPADM

## 2023-01-01 RX ORDER — ACETAMINOPHEN 650 MG/1
650 SUPPOSITORY RECTAL EVERY 4 HOURS PRN
Status: DISCONTINUED | OUTPATIENT
Start: 2023-01-01 | End: 2023-01-01 | Stop reason: HOSPADM

## 2023-01-01 RX ORDER — MIDODRINE HYDROCHLORIDE 5 MG/1
15 TABLET ORAL 3 TIMES DAILY
Status: DISCONTINUED | OUTPATIENT
Start: 2023-01-01 | End: 2023-01-01 | Stop reason: HOSPADM

## 2023-01-01 RX ORDER — UREA 15 G
1 POWDER IN PACKET (EA) ORAL
Status: ON HOLD | COMMUNITY
Start: 2023-01-01 | End: 2023-01-01

## 2023-01-01 RX ORDER — ONDANSETRON 2 MG/ML
4 INJECTION INTRAMUSCULAR; INTRAVENOUS
Status: DISCONTINUED | OUTPATIENT
Start: 2023-01-01 | End: 2023-01-01

## 2023-01-01 RX ORDER — AMOXICILLIN 250 MG
2 CAPSULE ORAL 2 TIMES DAILY PRN
Status: DISCONTINUED | OUTPATIENT
Start: 2023-01-01 | End: 2023-01-01 | Stop reason: HOSPADM

## 2023-01-01 RX ORDER — LEVOTHYROXINE SODIUM 112 UG/1
112 TABLET ORAL DAILY
Qty: 90 TABLET | Refills: 0 | Status: SHIPPED | OUTPATIENT
Start: 2023-01-01 | End: 2024-01-01

## 2023-01-01 RX ORDER — ALBUTEROL SULFATE 90 UG/1
1-2 AEROSOL, METERED RESPIRATORY (INHALATION)
Status: CANCELLED
Start: 2023-01-01

## 2023-01-01 RX ORDER — MONTELUKAST SODIUM 10 MG/1
10 TABLET ORAL AT BEDTIME
Status: DISCONTINUED | OUTPATIENT
Start: 2023-01-01 | End: 2023-01-01 | Stop reason: HOSPADM

## 2023-01-01 RX ORDER — IBUPROFEN 200 MG
200 TABLET ORAL EVERY 8 HOURS PRN
Status: ON HOLD | COMMUNITY
End: 2023-01-01

## 2023-01-01 RX ORDER — MUPIROCIN 20 MG/G
OINTMENT TOPICAL 2 TIMES DAILY
Qty: 15 G | Refills: 0 | Status: SHIPPED | OUTPATIENT
Start: 2023-01-01 | End: 2023-01-01

## 2023-01-01 RX ORDER — CITALOPRAM HYDROBROMIDE 20 MG/1
40 TABLET ORAL DAILY
Status: DISCONTINUED | OUTPATIENT
Start: 2023-01-01 | End: 2023-01-01

## 2023-01-01 RX ORDER — ASPIRIN 81 MG/1
243 TABLET, CHEWABLE ORAL ONCE
Status: CANCELLED | OUTPATIENT
Start: 2023-01-01

## 2023-01-01 RX ORDER — CYCLOBENZAPRINE HCL 5 MG
5-10 TABLET ORAL EVERY 8 HOURS PRN
Status: DISCONTINUED | OUTPATIENT
Start: 2023-01-01 | End: 2023-01-01 | Stop reason: HOSPADM

## 2023-01-01 RX ORDER — ALBUMIN (HUMAN) 12.5 G/50ML
12.5 SOLUTION INTRAVENOUS ONCE
Status: COMPLETED | OUTPATIENT
Start: 2023-01-01 | End: 2023-01-01

## 2023-01-01 RX ORDER — MIDODRINE HYDROCHLORIDE 5 MG/1
15 TABLET ORAL 3 TIMES DAILY
Status: DISCONTINUED | OUTPATIENT
Start: 2023-01-01 | End: 2023-01-01

## 2023-01-01 RX ORDER — LACTULOSE 10 G/10G
20 SOLUTION ORAL 3 TIMES DAILY
Qty: 180 PACKET | Refills: 2 | Status: SHIPPED | OUTPATIENT
Start: 2023-01-01 | End: 2024-01-01

## 2023-01-01 RX ORDER — IOPAMIDOL 755 MG/ML
90 INJECTION, SOLUTION INTRAVASCULAR ONCE
Status: COMPLETED | OUTPATIENT
Start: 2023-01-01 | End: 2023-01-01

## 2023-01-01 RX ORDER — OCTREOTIDE ACETATE 50 UG/ML
50 INJECTION, SOLUTION INTRAVENOUS; SUBCUTANEOUS ONCE
Qty: 1 ML | Refills: 0 | Status: COMPLETED | OUTPATIENT
Start: 2023-01-01 | End: 2023-01-01

## 2023-01-01 RX ORDER — IPRATROPIUM BROMIDE AND ALBUTEROL SULFATE 2.5; .5 MG/3ML; MG/3ML
3 SOLUTION RESPIRATORY (INHALATION)
Status: DISCONTINUED | OUTPATIENT
Start: 2023-01-01 | End: 2023-01-01

## 2023-01-01 RX ORDER — METHYLPREDNISOLONE SODIUM SUCCINATE 125 MG/2ML
125 INJECTION, POWDER, LYOPHILIZED, FOR SOLUTION INTRAMUSCULAR; INTRAVENOUS
Status: CANCELLED
Start: 2023-01-01

## 2023-01-01 RX ORDER — ASPIRIN 325 MG
325 TABLET ORAL ONCE
Status: CANCELLED | OUTPATIENT
Start: 2023-01-01 | End: 2023-01-01

## 2023-01-01 RX ORDER — LIDOCAINE HYDROCHLORIDE 10 MG/ML
1-30 INJECTION, SOLUTION EPIDURAL; INFILTRATION; INTRACAUDAL; PERINEURAL
Status: DISCONTINUED | OUTPATIENT
Start: 2023-01-01 | End: 2023-01-01 | Stop reason: HOSPADM

## 2023-01-01 RX ORDER — FENTANYL CITRATE 50 UG/ML
INJECTION, SOLUTION INTRAMUSCULAR; INTRAVENOUS PRN
Status: DISCONTINUED | OUTPATIENT
Start: 2023-01-01 | End: 2023-01-01

## 2023-01-01 RX ORDER — MUPIROCIN 20 MG/G
OINTMENT TOPICAL 3 TIMES DAILY
Qty: 30 G | Refills: 0 | Status: SHIPPED | OUTPATIENT
Start: 2023-01-01 | End: 2023-01-01

## 2023-01-01 RX ORDER — ALBUTEROL SULFATE 0.83 MG/ML
2.5 SOLUTION RESPIRATORY (INHALATION) EVERY 4 HOURS PRN
Status: DISCONTINUED | OUTPATIENT
Start: 2023-01-01 | End: 2023-01-01

## 2023-01-01 RX ORDER — 3% SODIUM CHLORIDE 3 G/100ML
INJECTION, SOLUTION INTRAVENOUS CONTINUOUS
Status: DISPENSED | OUTPATIENT
Start: 2023-01-01 | End: 2023-01-01

## 2023-01-01 RX ORDER — DIPHENHYDRAMINE HCL 25 MG
25 CAPSULE ORAL
Status: DISCONTINUED | OUTPATIENT
Start: 2023-01-01 | End: 2023-01-01

## 2023-01-01 RX ORDER — ALBUMIN (HUMAN) 12.5 G/50ML
25 SOLUTION INTRAVENOUS ONCE
Status: COMPLETED | OUTPATIENT
Start: 2023-01-01 | End: 2023-01-01

## 2023-01-01 RX ORDER — LORAZEPAM 2 MG/ML
0.25 INJECTION INTRAMUSCULAR ONCE
Status: COMPLETED | OUTPATIENT
Start: 2023-01-01 | End: 2023-01-01

## 2023-01-01 RX ORDER — SPIRONOLACTONE 50 MG/1
100 TABLET, FILM COATED ORAL DAILY
Status: DISCONTINUED | OUTPATIENT
Start: 2023-01-01 | End: 2023-01-01

## 2023-01-01 RX ORDER — DIPHENHYDRAMINE HYDROCHLORIDE 50 MG/ML
50 INJECTION INTRAMUSCULAR; INTRAVENOUS
Status: CANCELLED
Start: 2023-01-01

## 2023-01-01 RX ORDER — ACETAMINOPHEN 325 MG/1
650 TABLET ORAL EVERY 6 HOURS PRN
Status: DISCONTINUED | OUTPATIENT
Start: 2023-01-01 | End: 2023-01-01 | Stop reason: HOSPADM

## 2023-01-01 RX ORDER — LACTULOSE 10 G/15ML
20 SOLUTION ORAL 3 TIMES DAILY
Status: DISCONTINUED | OUTPATIENT
Start: 2023-01-01 | End: 2023-01-01 | Stop reason: HOSPADM

## 2023-01-01 RX ORDER — LANOLIN ALCOHOL/MO/W.PET/CERES
3 CREAM (GRAM) TOPICAL
Status: DISCONTINUED | OUTPATIENT
Start: 2023-01-01 | End: 2023-01-01

## 2023-01-01 RX ORDER — IPRATROPIUM BROMIDE AND ALBUTEROL SULFATE 2.5; .5 MG/3ML; MG/3ML
3 SOLUTION RESPIRATORY (INHALATION) EVERY 4 HOURS PRN
Status: DISCONTINUED | OUTPATIENT
Start: 2023-01-01 | End: 2023-01-01

## 2023-01-01 RX ORDER — FENTANYL CITRATE 50 UG/ML
INJECTION, SOLUTION INTRAMUSCULAR; INTRAVENOUS PRN
Status: COMPLETED | OUTPATIENT
Start: 2023-01-01 | End: 2023-01-01

## 2023-01-01 RX ORDER — ONDANSETRON 4 MG/1
4 TABLET, ORALLY DISINTEGRATING ORAL EVERY 6 HOURS PRN
Status: DISCONTINUED | OUTPATIENT
Start: 2023-01-01 | End: 2023-01-01 | Stop reason: HOSPADM

## 2023-01-01 RX ORDER — LACTULOSE 10 G/15ML
20 SOLUTION ORAL 3 TIMES DAILY PRN
Status: DISCONTINUED | OUTPATIENT
Start: 2023-01-01 | End: 2023-01-01 | Stop reason: HOSPADM

## 2023-01-01 RX ORDER — MIDODRINE HYDROCHLORIDE 10 MG/1
15 TABLET ORAL 3 TIMES DAILY
Status: ON HOLD | COMMUNITY
Start: 2023-01-01 | End: 2024-01-01

## 2023-01-01 RX ORDER — ALBUMIN (HUMAN) 12.5 G/50ML
12.5 SOLUTION INTRAVENOUS
Status: CANCELLED | OUTPATIENT
Start: 2023-01-01

## 2023-01-01 RX ORDER — LIDOCAINE HYDROCHLORIDE 10 MG/ML
20 INJECTION, SOLUTION EPIDURAL; INFILTRATION; INTRACAUDAL; PERINEURAL ONCE
Status: COMPLETED | OUTPATIENT
Start: 2023-01-01 | End: 2023-01-01

## 2023-01-01 RX ORDER — EPINEPHRINE 1 MG/ML
0.3 INJECTION, SOLUTION INTRAMUSCULAR; SUBCUTANEOUS EVERY 5 MIN PRN
Status: CANCELLED | OUTPATIENT
Start: 2023-01-01

## 2023-01-01 RX ORDER — ALBUTEROL SULFATE 0.83 MG/ML
2.5 SOLUTION RESPIRATORY (INHALATION) EVERY 4 HOURS PRN
Qty: 30 ML | Refills: 0 | Status: SHIPPED | OUTPATIENT
Start: 2023-01-01 | End: 2023-01-01

## 2023-01-01 RX ORDER — FLUTICASONE FUROATE AND VILANTEROL 200; 25 UG/1; UG/1
1 POWDER RESPIRATORY (INHALATION) DAILY
Status: DISCONTINUED | OUTPATIENT
Start: 2023-01-01 | End: 2023-01-01 | Stop reason: HOSPADM

## 2023-01-01 RX ORDER — ALBUMIN (HUMAN) 12.5 G/50ML
12.5 SOLUTION INTRAVENOUS
Status: DISCONTINUED | OUTPATIENT
Start: 2023-01-01 | End: 2023-01-01 | Stop reason: HOSPADM

## 2023-01-01 RX ORDER — METOPROLOL TARTRATE 1 MG/ML
5-15 INJECTION, SOLUTION INTRAVENOUS
Status: DISCONTINUED | OUTPATIENT
Start: 2023-01-01 | End: 2023-01-01 | Stop reason: HOSPADM

## 2023-01-01 RX ORDER — FUROSEMIDE 40 MG
1 TABLET ORAL EVERY MORNING
COMMUNITY
Start: 2023-01-01 | End: 2023-01-01

## 2023-01-01 RX ORDER — FUROSEMIDE 40 MG
40 TABLET ORAL EVERY MORNING
Qty: 90 TABLET | Refills: 0 | Status: SHIPPED | OUTPATIENT
Start: 2023-01-01 | End: 2023-01-01

## 2023-01-01 RX ORDER — FERROUS SULFATE 325(65) MG
325 TABLET ORAL
COMMUNITY
Start: 2023-01-01 | End: 2023-01-01

## 2023-01-01 RX ORDER — FERROUS SULFATE 325(65) MG
325 TABLET ORAL
Status: DISCONTINUED | OUTPATIENT
Start: 2023-01-01 | End: 2023-01-01 | Stop reason: HOSPADM

## 2023-01-01 RX ORDER — TRAZODONE HYDROCHLORIDE 50 MG/1
50 TABLET, FILM COATED ORAL AT BEDTIME
Status: DISCONTINUED | OUTPATIENT
Start: 2023-01-01 | End: 2023-01-01

## 2023-01-01 RX ORDER — MIDODRINE HYDROCHLORIDE 10 MG/1
10 TABLET ORAL 4 TIMES DAILY
COMMUNITY
End: 2023-01-01

## 2023-01-01 RX ORDER — FERROUS SULFATE 325(65) MG
325 TABLET ORAL
Qty: 90 TABLET | Refills: 0 | Status: ON HOLD | OUTPATIENT
Start: 2023-01-01 | End: 2024-01-01

## 2023-01-01 RX ORDER — LIDOCAINE HYDROCHLORIDE 10 MG/ML
1-30 INJECTION, SOLUTION EPIDURAL; INFILTRATION; INTRACAUDAL; PERINEURAL
Status: DISCONTINUED | OUTPATIENT
Start: 2023-01-01 | End: 2023-01-01

## 2023-01-01 RX ORDER — ALBUTEROL SULFATE 90 UG/1
1-2 AEROSOL, METERED RESPIRATORY (INHALATION) EVERY 6 HOURS PRN
Status: DISCONTINUED | OUTPATIENT
Start: 2023-01-01 | End: 2023-01-01 | Stop reason: HOSPADM

## 2023-01-01 RX ORDER — SODIUM CHLORIDE 3 G/100ML
100 INJECTION, SOLUTION INTRAVENOUS ONCE
Status: DISCONTINUED | OUTPATIENT
Start: 2023-01-01 | End: 2023-01-01

## 2023-01-01 RX ORDER — ALBUTEROL SULFATE 0.83 MG/ML
2.5 SOLUTION RESPIRATORY (INHALATION) EVERY 4 HOURS PRN
Qty: 30 ML | Refills: 0 | Status: SHIPPED | OUTPATIENT
Start: 2023-01-01 | End: 2024-01-01

## 2023-01-01 RX ORDER — ACETAMINOPHEN 325 MG/1
650 TABLET ORAL EVERY 4 HOURS PRN
Status: DISCONTINUED | OUTPATIENT
Start: 2023-01-01 | End: 2023-01-01 | Stop reason: HOSPADM

## 2023-01-01 RX ORDER — ALBUTEROL SULFATE 0.83 MG/ML
2.5 SOLUTION RESPIRATORY (INHALATION)
Status: CANCELLED | OUTPATIENT
Start: 2023-01-01

## 2023-01-01 RX ORDER — ALBUMIN (HUMAN) 12.5 G/50ML
50 SOLUTION INTRAVENOUS ONCE
Status: COMPLETED | OUTPATIENT
Start: 2023-01-01 | End: 2023-01-01

## 2023-01-01 RX ORDER — PANTOPRAZOLE SODIUM 40 MG/1
40 TABLET, DELAYED RELEASE ORAL
COMMUNITY
Start: 2023-01-01 | End: 2023-01-01

## 2023-01-01 RX ORDER — FLUORIDE TOOTHPASTE
5 TOOTHPASTE DENTAL 4 TIMES DAILY PRN
Qty: 473 ML | Refills: 3 | Status: SHIPPED | OUTPATIENT
Start: 2023-01-01

## 2023-01-01 RX ORDER — ALBUTEROL SULFATE 0.83 MG/ML
2.5 SOLUTION RESPIRATORY (INHALATION) EVERY 4 HOURS PRN
Status: DISCONTINUED | OUTPATIENT
Start: 2023-01-01 | End: 2023-01-01 | Stop reason: HOSPADM

## 2023-01-01 RX ORDER — SPIRONOLACTONE 50 MG/1
50 TABLET, FILM COATED ORAL ONCE
Status: COMPLETED | OUTPATIENT
Start: 2023-01-01 | End: 2023-01-01

## 2023-01-01 RX ORDER — SODIUM CHLORIDE 9 MG/ML
INJECTION, SOLUTION INTRAVENOUS CONTINUOUS
Status: CANCELLED | OUTPATIENT
Start: 2023-01-01

## 2023-01-01 RX ORDER — TRAZODONE HYDROCHLORIDE 50 MG/1
50 TABLET, FILM COATED ORAL AT BEDTIME
Status: ON HOLD | COMMUNITY
End: 2023-01-01

## 2023-01-01 RX ORDER — MIRTAZAPINE 15 MG/1
15 TABLET, ORALLY DISINTEGRATING ORAL AT BEDTIME
Status: DISCONTINUED | OUTPATIENT
Start: 2023-01-01 | End: 2023-01-01

## 2023-01-01 RX ORDER — METOPROLOL TARTRATE 25 MG/1
25-100 TABLET, FILM COATED ORAL
Status: DISCONTINUED | OUTPATIENT
Start: 2023-01-01 | End: 2023-01-01

## 2023-01-01 RX ORDER — POLYETHYLENE GLYCOL 3350 17 G/17G
17 POWDER, FOR SOLUTION ORAL DAILY PRN
Status: DISCONTINUED | OUTPATIENT
Start: 2023-01-01 | End: 2023-01-01 | Stop reason: HOSPADM

## 2023-01-01 RX ORDER — CALCIUM CARBONATE 500 MG/1
1000 TABLET, CHEWABLE ORAL 4 TIMES DAILY PRN
Status: DISCONTINUED | OUTPATIENT
Start: 2023-01-01 | End: 2023-01-01 | Stop reason: HOSPADM

## 2023-01-01 RX ORDER — ALBUTEROL SULFATE 90 UG/1
1-2 AEROSOL, METERED RESPIRATORY (INHALATION) EVERY 6 HOURS PRN
Qty: 18 G | Refills: 0 | Status: SHIPPED | OUTPATIENT
Start: 2023-01-01 | End: 2023-01-01

## 2023-01-01 RX ORDER — OXYMETAZOLINE HYDROCHLORIDE 0.05 G/100ML
2 SPRAY NASAL 2 TIMES DAILY
Status: DISCONTINUED | OUTPATIENT
Start: 2023-01-01 | End: 2023-01-01 | Stop reason: HOSPADM

## 2023-01-01 RX ORDER — LACTULOSE 10 G/15ML
20 SOLUTION ORAL
COMMUNITY
Start: 2023-01-01 | End: 2023-01-01 | Stop reason: DRUGHIGH

## 2023-01-01 RX ORDER — EPINEPHRINE 1 MG/ML
0.3 INJECTION, SOLUTION, CONCENTRATE INTRAVENOUS EVERY 5 MIN PRN
Status: CANCELLED | OUTPATIENT
Start: 2023-01-01

## 2023-01-01 RX ORDER — DIPHENHYDRAMINE HYDROCHLORIDE 50 MG/ML
25-50 INJECTION INTRAMUSCULAR; INTRAVENOUS
Status: DISCONTINUED | OUTPATIENT
Start: 2023-01-01 | End: 2023-01-01

## 2023-01-01 RX ORDER — LIDOCAINE HYDROCHLORIDE 10 MG/ML
1-30 INJECTION, SOLUTION EPIDURAL; INFILTRATION; INTRACAUDAL; PERINEURAL
Status: COMPLETED | OUTPATIENT
Start: 2023-01-01 | End: 2023-01-01

## 2023-01-01 RX ORDER — ALBUMIN (HUMAN) 12.5 G/50ML
12.5 SOLUTION INTRAVENOUS ONCE
Status: DISCONTINUED | OUTPATIENT
Start: 2023-01-01 | End: 2023-01-01 | Stop reason: HOSPADM

## 2023-01-01 RX ORDER — ALBUMIN (HUMAN) 12.5 G/50ML
25 SOLUTION INTRAVENOUS DAILY
Status: DISCONTINUED | OUTPATIENT
Start: 2023-01-01 | End: 2023-01-01

## 2023-01-01 RX ORDER — FUROSEMIDE 20 MG
20 TABLET ORAL
Status: DISCONTINUED | OUTPATIENT
Start: 2023-01-01 | End: 2023-01-01

## 2023-01-01 RX ORDER — ACETAMINOPHEN 325 MG/1
325 TABLET ORAL EVERY 4 HOURS PRN
Status: DISCONTINUED | OUTPATIENT
Start: 2023-01-01 | End: 2023-01-01

## 2023-01-01 RX ORDER — SPIRONOLACTONE 25 MG/1
25 TABLET ORAL ONCE
Status: COMPLETED | OUTPATIENT
Start: 2023-01-01 | End: 2023-01-01

## 2023-01-01 RX ORDER — LORAZEPAM 0.5 MG/1
0.5 TABLET ORAL DAILY PRN
Status: DISCONTINUED | OUTPATIENT
Start: 2023-01-01 | End: 2023-01-01 | Stop reason: HOSPADM

## 2023-01-01 RX ORDER — ALBUTEROL SULFATE 90 UG/1
1-2 AEROSOL, METERED RESPIRATORY (INHALATION) EVERY 6 HOURS PRN
Qty: 18 G | Refills: 1 | Status: SHIPPED | OUTPATIENT
Start: 2023-01-01 | End: 2024-01-01

## 2023-01-01 RX ORDER — POTASSIUM CHLORIDE 1.5 G/1.58G
20 POWDER, FOR SOLUTION ORAL ONCE
Status: COMPLETED | OUTPATIENT
Start: 2023-01-01 | End: 2023-01-01

## 2023-01-01 RX ORDER — PROCHLORPERAZINE 25 MG
25 SUPPOSITORY, RECTAL RECTAL EVERY 12 HOURS PRN
Status: DISCONTINUED | OUTPATIENT
Start: 2023-01-01 | End: 2023-01-01 | Stop reason: HOSPADM

## 2023-01-01 RX ORDER — FENTANYL CITRATE 50 UG/ML
INJECTION, SOLUTION INTRAMUSCULAR; INTRAVENOUS PRN
Status: DISCONTINUED | OUTPATIENT
Start: 2023-01-01 | End: 2023-01-01 | Stop reason: HOSPADM

## 2023-01-01 RX ORDER — SPIRONOLACTONE 100 MG/1
100 TABLET, FILM COATED ORAL DAILY
COMMUNITY
Start: 2023-01-01 | End: 2023-01-01

## 2023-01-01 RX ORDER — LACTULOSE 10 G/10G
20 SOLUTION ORAL 3 TIMES DAILY
Status: DISCONTINUED | OUTPATIENT
Start: 2023-01-01 | End: 2023-01-01

## 2023-01-01 RX ORDER — ACYCLOVIR 200 MG/1
10 CAPSULE ORAL ONCE
Status: COMPLETED | OUTPATIENT
Start: 2023-01-01 | End: 2023-01-01

## 2023-01-01 RX ORDER — MONTELUKAST SODIUM 10 MG/1
10 TABLET ORAL AT BEDTIME
Qty: 90 TABLET | Refills: 3 | Status: SHIPPED | OUTPATIENT
Start: 2023-01-01 | End: 2023-01-01

## 2023-01-01 RX ORDER — HEPARIN SODIUM (PORCINE) LOCK FLUSH IV SOLN 100 UNIT/ML 100 UNIT/ML
5 SOLUTION INTRAVENOUS
Status: CANCELLED | OUTPATIENT
Start: 2023-01-01

## 2023-01-01 RX ORDER — PROCHLORPERAZINE MALEATE 5 MG
5 TABLET ORAL EVERY 6 HOURS PRN
Status: DISCONTINUED | OUTPATIENT
Start: 2023-01-01 | End: 2023-01-01 | Stop reason: HOSPADM

## 2023-01-01 RX ORDER — LEVOTHYROXINE SODIUM 112 UG/1
112 TABLET ORAL DAILY
Status: DISCONTINUED | OUTPATIENT
Start: 2023-01-01 | End: 2023-01-01 | Stop reason: HOSPADM

## 2023-01-01 RX ORDER — LORAZEPAM 0.5 MG/1
0.5 TABLET ORAL EVERY 8 HOURS PRN
Status: DISCONTINUED | OUTPATIENT
Start: 2023-01-01 | End: 2023-01-01 | Stop reason: HOSPADM

## 2023-01-01 RX ORDER — LORAZEPAM 0.5 MG/1
0.5 TABLET ORAL DAILY PRN
Qty: 20 TABLET | Refills: 0 | Status: SHIPPED | OUTPATIENT
Start: 2023-01-01 | End: 2024-01-01

## 2023-01-01 RX ORDER — LACTULOSE 10 G/10G
20 SOLUTION ORAL 3 TIMES DAILY PRN
Status: DISCONTINUED | OUTPATIENT
Start: 2023-01-01 | End: 2023-01-01

## 2023-01-01 RX ORDER — ALBUMIN (HUMAN) 12.5 G/50ML
12.5 SOLUTION INTRAVENOUS CONTINUOUS PRN
Status: COMPLETED | OUTPATIENT
Start: 2023-01-01 | End: 2023-01-01

## 2023-01-01 RX ORDER — POLYETHYLENE GLYCOL 3350 17 G/17G
17 POWDER, FOR SOLUTION ORAL DAILY PRN
Status: DISCONTINUED | OUTPATIENT
Start: 2023-01-01 | End: 2023-01-01

## 2023-01-01 RX ORDER — ALBUTEROL SULFATE 90 UG/1
1-2 AEROSOL, METERED RESPIRATORY (INHALATION) EVERY 6 HOURS PRN
Qty: 18 G | Refills: 3 | Status: SHIPPED | OUTPATIENT
Start: 2023-01-01 | End: 2023-01-01

## 2023-01-01 RX ORDER — LIDOCAINE HYDROCHLORIDE 10 MG/ML
20 INJECTION, SOLUTION EPIDURAL; INFILTRATION; INTRACAUDAL; PERINEURAL ONCE
Status: CANCELLED | OUTPATIENT
Start: 2023-01-01 | End: 2023-01-01

## 2023-01-01 RX ORDER — MIDODRINE HYDROCHLORIDE 5 MG/1
20 TABLET ORAL 3 TIMES DAILY
Status: DISCONTINUED | OUTPATIENT
Start: 2023-01-01 | End: 2023-01-01

## 2023-01-01 RX ORDER — SPIRONOLACTONE 25 MG/1
25 TABLET ORAL
COMMUNITY
End: 2023-01-01

## 2023-01-01 RX ORDER — ALBUMIN (HUMAN) 12.5 G/50ML
50 SOLUTION INTRAVENOUS DAILY
Status: DISCONTINUED | OUTPATIENT
Start: 2023-01-01 | End: 2023-01-01

## 2023-01-01 RX ORDER — IVABRADINE 5 MG/1
15 TABLET, FILM COATED ORAL ONCE
Status: COMPLETED | OUTPATIENT
Start: 2023-01-01 | End: 2023-01-01

## 2023-01-01 RX ORDER — LANOLIN ALCOHOL/MO/W.PET/CERES
3 CREAM (GRAM) TOPICAL
Status: DISCONTINUED | OUTPATIENT
Start: 2023-01-01 | End: 2023-01-01 | Stop reason: HOSPADM

## 2023-01-01 RX ORDER — PROCHLORPERAZINE MALEATE 5 MG
10 TABLET ORAL EVERY 6 HOURS PRN
Status: DISCONTINUED | OUTPATIENT
Start: 2023-01-01 | End: 2023-01-01 | Stop reason: HOSPADM

## 2023-01-01 RX ORDER — POLYETHYLENE GLYCOL 3350 17 G/17G
238 POWDER, FOR SOLUTION ORAL ONCE
Qty: 238 G | Refills: 0 | Status: COMPLETED | OUTPATIENT
Start: 2023-01-01 | End: 2023-01-01

## 2023-01-01 RX ORDER — FLUORIDE TOOTHPASTE
5 TOOTHPASTE DENTAL 4 TIMES DAILY PRN
Status: DISCONTINUED | OUTPATIENT
Start: 2023-01-01 | End: 2023-01-01 | Stop reason: HOSPADM

## 2023-01-01 RX ORDER — ONDANSETRON 2 MG/ML
4 INJECTION INTRAMUSCULAR; INTRAVENOUS EVERY 6 HOURS PRN
Status: DISCONTINUED | OUTPATIENT
Start: 2023-01-01 | End: 2023-01-01 | Stop reason: HOSPADM

## 2023-01-01 RX ORDER — MONTELUKAST SODIUM 10 MG/1
10 TABLET ORAL AT BEDTIME
Qty: 90 TABLET | Refills: 0 | Status: ON HOLD | OUTPATIENT
Start: 2023-01-01 | End: 2024-01-01

## 2023-01-01 RX ORDER — PANTOPRAZOLE SODIUM 40 MG/1
40 TABLET, DELAYED RELEASE ORAL DAILY
Qty: 90 TABLET | Refills: 0 | Status: ON HOLD | OUTPATIENT
Start: 2023-01-01 | End: 2023-01-01

## 2023-01-01 RX ORDER — METHYLPREDNISOLONE SODIUM SUCCINATE 125 MG/2ML
125 INJECTION, POWDER, LYOPHILIZED, FOR SOLUTION INTRAMUSCULAR; INTRAVENOUS
Status: DISCONTINUED | OUTPATIENT
Start: 2023-01-01 | End: 2023-01-01

## 2023-01-01 RX ORDER — SPIRONOLACTONE 25 MG/1
25 TABLET ORAL DAILY
Qty: 90 TABLET | Refills: 0 | Status: SHIPPED | OUTPATIENT
Start: 2023-01-01 | End: 2023-01-01

## 2023-01-01 RX ORDER — PANTOPRAZOLE SODIUM 40 MG/1
40 TABLET, DELAYED RELEASE ORAL
Status: DISCONTINUED | OUTPATIENT
Start: 2023-01-01 | End: 2023-01-01 | Stop reason: HOSPADM

## 2023-01-01 RX ORDER — SPIRONOLACTONE 25 MG/1
25 TABLET ORAL DAILY
Status: DISCONTINUED | OUTPATIENT
Start: 2023-01-01 | End: 2023-01-01

## 2023-01-01 RX ORDER — PANTOPRAZOLE SODIUM 40 MG/1
40 TABLET, DELAYED RELEASE ORAL
Qty: 60 TABLET | Refills: 0 | Status: SHIPPED | OUTPATIENT
Start: 2023-01-01 | End: 2023-01-01

## 2023-01-01 RX ORDER — NITROGLYCERIN 0.4 MG/1
.4-.8 TABLET SUBLINGUAL
Status: DISCONTINUED | OUTPATIENT
Start: 2023-01-01 | End: 2023-01-01 | Stop reason: HOSPADM

## 2023-01-01 RX ORDER — CITALOPRAM HYDROBROMIDE 40 MG/1
40 TABLET ORAL DAILY
Qty: 90 TABLET | Refills: 0 | Status: SHIPPED | OUTPATIENT
Start: 2023-01-01

## 2023-01-01 RX ORDER — IVABRADINE 5 MG/1
5-15 TABLET, FILM COATED ORAL
Status: DISCONTINUED | OUTPATIENT
Start: 2023-01-01 | End: 2023-01-01 | Stop reason: HOSPADM

## 2023-01-01 RX ORDER — SODIUM CHLORIDE FOR INHALATION 3 %
3 VIAL, NEBULIZER (ML) INHALATION
Status: DISCONTINUED | OUTPATIENT
Start: 2023-01-01 | End: 2023-01-01 | Stop reason: HOSPADM

## 2023-01-01 RX ORDER — LACTULOSE 10 G/15ML
20 SOLUTION ORAL 2 TIMES DAILY
Status: DISCONTINUED | OUTPATIENT
Start: 2023-01-01 | End: 2023-01-01

## 2023-01-01 RX ORDER — SPIRONOLACTONE 50 MG/1
50 TABLET, FILM COATED ORAL DAILY
Status: DISCONTINUED | OUTPATIENT
Start: 2023-01-01 | End: 2023-01-01

## 2023-01-01 RX ORDER — FLUTICASONE FUROATE AND VILANTEROL 200; 25 UG/1; UG/1
1 POWDER RESPIRATORY (INHALATION) DAILY
Qty: 28 EACH | Refills: 3 | Status: SHIPPED | OUTPATIENT
Start: 2023-01-01 | End: 2023-01-01

## 2023-01-01 RX ORDER — PANTOPRAZOLE SODIUM 40 MG/1
40 TABLET, DELAYED RELEASE ORAL DAILY
Status: DISCONTINUED | OUTPATIENT
Start: 2023-01-01 | End: 2023-01-01

## 2023-01-01 RX ORDER — FLUTICASONE PROPIONATE AND SALMETEROL 250; 50 UG/1; UG/1
1 POWDER RESPIRATORY (INHALATION) EVERY 12 HOURS
Qty: 60 EACH | Refills: 0 | Status: ON HOLD | OUTPATIENT
Start: 2023-01-01 | End: 2024-01-01

## 2023-01-01 RX ORDER — LACTULOSE 10 G/15ML
20 SOLUTION ORAL 3 TIMES DAILY
Status: DISCONTINUED | OUTPATIENT
Start: 2023-01-01 | End: 2023-01-01

## 2023-01-01 RX ORDER — AMOXICILLIN 250 MG
1 CAPSULE ORAL 2 TIMES DAILY PRN
Status: DISCONTINUED | OUTPATIENT
Start: 2023-01-01 | End: 2023-01-01 | Stop reason: HOSPADM

## 2023-01-01 RX ORDER — ERGOCALCIFEROL 1.25 MG/1
50000 CAPSULE, LIQUID FILLED ORAL WEEKLY
Qty: 12 CAPSULE | Refills: 0 | Status: SHIPPED | OUTPATIENT
Start: 2023-01-01 | End: 2024-01-01

## 2023-01-01 RX ORDER — FUROSEMIDE 40 MG
40 TABLET ORAL 2 TIMES DAILY
Qty: 90 TABLET | Refills: 0 | Status: ON HOLD | COMMUNITY
Start: 2023-01-01 | End: 2023-01-01

## 2023-01-01 RX ADMIN — MIDODRINE HYDROCHLORIDE 15 MG: 5 TABLET ORAL at 14:26

## 2023-01-01 RX ADMIN — OCTREOTIDE ACETATE 50 MCG/HR: 200 INJECTION, SOLUTION INTRAVENOUS; SUBCUTANEOUS at 14:50

## 2023-01-01 RX ADMIN — SODIUM CHLORIDE: 4 INJECTION, SOLUTION, CONCENTRATE INTRAVENOUS at 11:01

## 2023-01-01 RX ADMIN — MIDODRINE HYDROCHLORIDE 15 MG: 5 TABLET ORAL at 19:49

## 2023-01-01 RX ADMIN — LACTULOSE 20 G: 20 SOLUTION ORAL at 20:56

## 2023-01-01 RX ADMIN — ALBUTEROL SULFATE 2 PUFF: 90 AEROSOL, METERED RESPIRATORY (INHALATION) at 18:03

## 2023-01-01 RX ADMIN — FERROUS SULFATE TAB 325 MG (65 MG ELEMENTAL FE) 325 MG: 325 (65 FE) TAB at 08:50

## 2023-01-01 RX ADMIN — RIFAXIMIN 550 MG: 550 TABLET ORAL at 08:14

## 2023-01-01 RX ADMIN — CITALOPRAM HYDROBROMIDE 40 MG: 40 TABLET ORAL at 07:49

## 2023-01-01 RX ADMIN — FLUTICASONE FUROATE AND VILANTEROL TRIFENATATE 1 PUFF: 200; 25 POWDER RESPIRATORY (INHALATION) at 08:59

## 2023-01-01 RX ADMIN — MONTELUKAST 10 MG: 10 TABLET, FILM COATED ORAL at 21:42

## 2023-01-01 RX ADMIN — RIFAXIMIN 550 MG: 550 TABLET ORAL at 08:47

## 2023-01-01 RX ADMIN — ACETAMINOPHEN 650 MG: 325 TABLET, FILM COATED ORAL at 22:44

## 2023-01-01 RX ADMIN — CITALOPRAM HYDROBROMIDE 40 MG: 20 TABLET ORAL at 08:24

## 2023-01-01 RX ADMIN — LACTULOSE 20 G: 20 SOLUTION ORAL at 08:04

## 2023-01-01 RX ADMIN — FLUTICASONE FUROATE AND VILANTEROL TRIFENATATE 1 PUFF: 200; 25 POWDER RESPIRATORY (INHALATION) at 08:27

## 2023-01-01 RX ADMIN — LORAZEPAM 0.5 MG: 0.5 TABLET ORAL at 20:15

## 2023-01-01 RX ADMIN — LACTULOSE 20 G: 20 SOLUTION ORAL at 07:46

## 2023-01-01 RX ADMIN — LACTULOSE 20 G: 10 POWDER, FOR SOLUTION ORAL at 07:49

## 2023-01-01 RX ADMIN — CITALOPRAM HYDROBROMIDE 40 MG: 20 TABLET ORAL at 09:29

## 2023-01-01 RX ADMIN — MIDODRINE HYDROCHLORIDE 15 MG: 5 TABLET ORAL at 19:42

## 2023-01-01 RX ADMIN — MIDODRINE HYDROCHLORIDE 15 MG: 5 TABLET ORAL at 14:53

## 2023-01-01 RX ADMIN — PANTOPRAZOLE SODIUM 40 MG: 40 TABLET, DELAYED RELEASE ORAL at 08:36

## 2023-01-01 RX ADMIN — TERLIPRESSIN 0.85 MG: 0.85 INJECTION, POWDER, LYOPHILIZED, FOR SOLUTION INTRAVENOUS at 02:14

## 2023-01-01 RX ADMIN — MIDODRINE HYDROCHLORIDE 15 MG: 5 TABLET ORAL at 08:36

## 2023-01-01 RX ADMIN — FLUTICASONE FUROATE AND VILANTEROL TRIFENATATE 1 PUFF: 200; 25 POWDER RESPIRATORY (INHALATION) at 07:56

## 2023-01-01 RX ADMIN — TERLIPRESSIN 0.85 MG: 0.85 INJECTION, POWDER, LYOPHILIZED, FOR SOLUTION INTRAVENOUS at 21:16

## 2023-01-01 RX ADMIN — FERROUS SULFATE TAB 325 MG (65 MG ELEMENTAL FE) 325 MG: 325 (65 FE) TAB at 08:34

## 2023-01-01 RX ADMIN — RIFAXIMIN 550 MG: 550 TABLET ORAL at 20:33

## 2023-01-01 RX ADMIN — LACTULOSE 20 G: 20 SOLUTION ORAL at 08:52

## 2023-01-01 RX ADMIN — LORAZEPAM 0.5 MG: 0.5 TABLET ORAL at 08:12

## 2023-01-01 RX ADMIN — RIFAXIMIN 550 MG: 550 TABLET ORAL at 21:40

## 2023-01-01 RX ADMIN — LEVOTHYROXINE SODIUM 112 MCG: 0.11 TABLET ORAL at 08:04

## 2023-01-01 RX ADMIN — FUROSEMIDE 20 MG: 20 TABLET ORAL at 16:04

## 2023-01-01 RX ADMIN — MIDODRINE HYDROCHLORIDE 15 MG: 5 TABLET ORAL at 08:05

## 2023-01-01 RX ADMIN — RIFAXIMIN 550 MG: 550 TABLET ORAL at 08:04

## 2023-01-01 RX ADMIN — RIFAXIMIN 550 MG: 550 TABLET ORAL at 08:08

## 2023-01-01 RX ADMIN — MONTELUKAST 10 MG: 10 TABLET, FILM COATED ORAL at 20:52

## 2023-01-01 RX ADMIN — TERLIPRESSIN 0.85 MG: 0.85 INJECTION, POWDER, LYOPHILIZED, FOR SOLUTION INTRAVENOUS at 10:13

## 2023-01-01 RX ADMIN — MIDODRINE HYDROCHLORIDE 15 MG: 5 TABLET ORAL at 13:14

## 2023-01-01 RX ADMIN — OCTREOTIDE ACETATE 50 MCG: 50 INJECTION, SOLUTION INTRAVENOUS; SUBCUTANEOUS at 16:56

## 2023-01-01 RX ADMIN — ALBUMIN HUMAN 12.5 G: 0.25 SOLUTION INTRAVENOUS at 10:39

## 2023-01-01 RX ADMIN — CITALOPRAM HYDROBROMIDE 40 MG: 20 TABLET ORAL at 07:46

## 2023-01-01 RX ADMIN — FLUTICASONE FUROATE AND VILANTEROL TRIFENATATE 1 PUFF: 200; 25 POWDER RESPIRATORY (INHALATION) at 08:51

## 2023-01-01 RX ADMIN — IPRATROPIUM BROMIDE AND ALBUTEROL SULFATE 3 ML: .5; 3 SOLUTION RESPIRATORY (INHALATION) at 07:52

## 2023-01-01 RX ADMIN — PANTOPRAZOLE SODIUM 40 MG: 40 TABLET, DELAYED RELEASE ORAL at 21:43

## 2023-01-01 RX ADMIN — FLUTICASONE FUROATE AND VILANTEROL TRIFENATATE 1 PUFF: 200; 25 POWDER RESPIRATORY (INHALATION) at 09:16

## 2023-01-01 RX ADMIN — LACTULOSE 20 G: 10 POWDER, FOR SOLUTION ORAL at 08:05

## 2023-01-01 RX ADMIN — RIFAXIMIN 550 MG: 550 TABLET ORAL at 20:24

## 2023-01-01 RX ADMIN — LACTULOSE 20 G: 20 SOLUTION ORAL at 16:02

## 2023-01-01 RX ADMIN — ONDANSETRON 4 MG: 2 INJECTION INTRAMUSCULAR; INTRAVENOUS at 11:01

## 2023-01-01 RX ADMIN — RIFAXIMIN 550 MG: 550 TABLET ORAL at 07:49

## 2023-01-01 RX ADMIN — LACTULOSE 20 G: 20 SOLUTION ORAL at 10:49

## 2023-01-01 RX ADMIN — CEFTRIAXONE SODIUM 1 G: 1 INJECTION, POWDER, FOR SOLUTION INTRAMUSCULAR; INTRAVENOUS at 14:35

## 2023-01-01 RX ADMIN — ALBUTEROL SULFATE 2.5 MG: 2.5 SOLUTION RESPIRATORY (INHALATION) at 02:48

## 2023-01-01 RX ADMIN — RIFAXIMIN 550 MG: 550 TABLET ORAL at 20:16

## 2023-01-01 RX ADMIN — FLUTICASONE FUROATE AND VILANTEROL TRIFENATATE 1 PUFF: 200; 25 POWDER RESPIRATORY (INHALATION) at 08:05

## 2023-01-01 RX ADMIN — SODIUM ZIRCONIUM CYCLOSILICATE 10 G: 10 POWDER, FOR SUSPENSION ORAL at 10:50

## 2023-01-01 RX ADMIN — RIFAXIMIN 550 MG: 550 TABLET ORAL at 08:35

## 2023-01-01 RX ADMIN — RIFAXIMIN 550 MG: 550 TABLET ORAL at 20:08

## 2023-01-01 RX ADMIN — LEVOTHYROXINE SODIUM 112 MCG: 0.11 TABLET ORAL at 08:05

## 2023-01-01 RX ADMIN — FERROUS SULFATE TAB 325 MG (65 MG ELEMENTAL FE) 325 MG: 325 (65 FE) TAB at 07:49

## 2023-01-01 RX ADMIN — SODIUM CHLORIDE 10 ML: 9 INJECTION, SOLUTION INTRAMUSCULAR; INTRAVENOUS; SUBCUTANEOUS at 15:52

## 2023-01-01 RX ADMIN — RIFAXIMIN 550 MG: 550 TABLET ORAL at 21:16

## 2023-01-01 RX ADMIN — IPRATROPIUM BROMIDE AND ALBUTEROL SULFATE 3 ML: .5; 3 SOLUTION RESPIRATORY (INHALATION) at 12:39

## 2023-01-01 RX ADMIN — RIFAXIMIN 550 MG: 550 TABLET ORAL at 19:54

## 2023-01-01 RX ADMIN — PROCHLORPERAZINE EDISYLATE 5 MG: 5 INJECTION INTRAMUSCULAR; INTRAVENOUS at 02:02

## 2023-01-01 RX ADMIN — PANTOPRAZOLE SODIUM 40 MG: 40 TABLET, DELAYED RELEASE ORAL at 07:46

## 2023-01-01 RX ADMIN — MONTELUKAST 10 MG: 10 TABLET, FILM COATED ORAL at 20:17

## 2023-01-01 RX ADMIN — MONTELUKAST 10 MG: 10 TABLET, FILM COATED ORAL at 19:10

## 2023-01-01 RX ADMIN — FERROUS SULFATE TAB 325 MG (65 MG ELEMENTAL FE) 325 MG: 325 (65 FE) TAB at 09:15

## 2023-01-01 RX ADMIN — FERROUS SULFATE TAB 325 MG (65 MG ELEMENTAL FE) 325 MG: 325 (65 FE) TAB at 07:55

## 2023-01-01 RX ADMIN — RIFAXIMIN 550 MG: 550 TABLET ORAL at 20:49

## 2023-01-01 RX ADMIN — LIDOCAINE HYDROCHLORIDE 6 ML: 10 INJECTION, SOLUTION EPIDURAL; INFILTRATION; INTRACAUDAL; PERINEURAL at 12:57

## 2023-01-01 RX ADMIN — LACTULOSE 20 G: 20 SOLUTION ORAL at 07:54

## 2023-01-01 RX ADMIN — LACTULOSE 20 G: 10 POWDER, FOR SOLUTION ORAL at 16:17

## 2023-01-01 RX ADMIN — LEVOTHYROXINE SODIUM 112 MCG: 0.11 TABLET ORAL at 07:49

## 2023-01-01 RX ADMIN — LIDOCAINE HYDROCHLORIDE 10 ML: 10 SOLUTION INTRAVENOUS at 13:07

## 2023-01-01 RX ADMIN — PANTOPRAZOLE SODIUM 40 MG: 40 TABLET, DELAYED RELEASE ORAL at 08:07

## 2023-01-01 RX ADMIN — LEVOTHYROXINE SODIUM 112 MCG: 0.11 TABLET ORAL at 07:46

## 2023-01-01 RX ADMIN — FERROUS SULFATE TAB 325 MG (65 MG ELEMENTAL FE) 325 MG: 325 (65 FE) TAB at 08:18

## 2023-01-01 RX ADMIN — LACTULOSE 20 G: 20 SOLUTION ORAL at 08:07

## 2023-01-01 RX ADMIN — SODIUM CHLORIDE: 234 INJECTION, SOLUTION INTRAVENOUS at 20:34

## 2023-01-01 RX ADMIN — LACTULOSE 20 G: 20 SOLUTION ORAL at 08:17

## 2023-01-01 RX ADMIN — MONTELUKAST 10 MG: 10 TABLET, FILM COATED ORAL at 20:16

## 2023-01-01 RX ADMIN — OCTREOTIDE ACETATE 50 MCG/HR: 200 INJECTION, SOLUTION INTRAVENOUS; SUBCUTANEOUS at 16:56

## 2023-01-01 RX ADMIN — FLUTICASONE FUROATE AND VILANTEROL TRIFENATATE 1 PUFF: 200; 25 POWDER RESPIRATORY (INHALATION) at 08:31

## 2023-01-01 RX ADMIN — LEVOTHYROXINE SODIUM 112 MCG: 0.11 TABLET ORAL at 08:59

## 2023-01-01 RX ADMIN — PANTOPRAZOLE SODIUM 40 MG: 40 TABLET, DELAYED RELEASE ORAL at 09:30

## 2023-01-01 RX ADMIN — LACTULOSE 20 G: 10 POWDER, FOR SOLUTION ORAL at 21:07

## 2023-01-01 RX ADMIN — IOPAMIDOL 90 ML: 755 INJECTION, SOLUTION INTRAVENOUS at 12:24

## 2023-01-01 RX ADMIN — FERROUS SULFATE TAB 325 MG (65 MG ELEMENTAL FE) 325 MG: 325 (65 FE) TAB at 08:49

## 2023-01-01 RX ADMIN — MIDODRINE HYDROCHLORIDE 15 MG: 5 TABLET ORAL at 14:34

## 2023-01-01 RX ADMIN — RIFAXIMIN 550 MG: 550 TABLET ORAL at 19:56

## 2023-01-01 RX ADMIN — FLUTICASONE FUROATE AND VILANTEROL TRIFENATATE 1 PUFF: 200; 25 POWDER RESPIRATORY (INHALATION) at 08:15

## 2023-01-01 RX ADMIN — MONTELUKAST 10 MG: 10 TABLET, FILM COATED ORAL at 19:59

## 2023-01-01 RX ADMIN — LORAZEPAM 0.5 MG: 0.5 TABLET ORAL at 20:24

## 2023-01-01 RX ADMIN — IOPAMIDOL 110 ML: 755 INJECTION, SOLUTION INTRAVENOUS at 12:34

## 2023-01-01 RX ADMIN — MIDODRINE HYDROCHLORIDE 15 MG: 5 TABLET ORAL at 14:10

## 2023-01-01 RX ADMIN — PANTOPRAZOLE SODIUM 40 MG: 40 TABLET, DELAYED RELEASE ORAL at 08:33

## 2023-01-01 RX ADMIN — LEVOTHYROXINE SODIUM 112 MCG: 0.11 TABLET ORAL at 09:29

## 2023-01-01 RX ADMIN — CITALOPRAM HYDROBROMIDE 40 MG: 20 TABLET ORAL at 08:04

## 2023-01-01 RX ADMIN — POLYETHYLENE GLYCOL 3350 238 G: 17 POWDER, FOR SOLUTION ORAL at 17:28

## 2023-01-01 RX ADMIN — RIFAXIMIN 550 MG: 550 TABLET ORAL at 19:50

## 2023-01-01 RX ADMIN — LEVOTHYROXINE SODIUM 112 MCG: 0.11 TABLET ORAL at 08:32

## 2023-01-01 RX ADMIN — ALBUMIN HUMAN 50 G: 0.25 SOLUTION INTRAVENOUS at 07:47

## 2023-01-01 RX ADMIN — PANTOPRAZOLE SODIUM 40 MG: 40 TABLET, DELAYED RELEASE ORAL at 08:27

## 2023-01-01 RX ADMIN — FLUTICASONE FUROATE AND VILANTEROL TRIFENATATE 1 PUFF: 200; 25 POWDER RESPIRATORY (INHALATION) at 08:17

## 2023-01-01 RX ADMIN — ALBUMIN HUMAN 12.5 G: 0.25 SOLUTION INTRAVENOUS at 14:40

## 2023-01-01 RX ADMIN — TERLIPRESSIN 0.85 MG: 0.85 INJECTION, POWDER, LYOPHILIZED, FOR SOLUTION INTRAVENOUS at 16:57

## 2023-01-01 RX ADMIN — ALBUMIN HUMAN 12.5 G: 0.25 SOLUTION INTRAVENOUS at 09:01

## 2023-01-01 RX ADMIN — LACTULOSE 20 G: 20 SOLUTION ORAL at 08:10

## 2023-01-01 RX ADMIN — LEVOTHYROXINE SODIUM 112 MCG: 0.11 TABLET ORAL at 07:59

## 2023-01-01 RX ADMIN — RIFAXIMIN 550 MG: 550 TABLET ORAL at 08:07

## 2023-01-01 RX ADMIN — PANTOPRAZOLE SODIUM 40 MG: 40 TABLET, DELAYED RELEASE ORAL at 08:15

## 2023-01-01 RX ADMIN — MIDODRINE HYDROCHLORIDE 15 MG: 5 TABLET ORAL at 14:35

## 2023-01-01 RX ADMIN — SODIUM CHLORIDE: 4 INJECTION, SOLUTION, CONCENTRATE INTRAVENOUS at 16:45

## 2023-01-01 RX ADMIN — MIDODRINE HYDROCHLORIDE 15 MG: 5 TABLET ORAL at 20:12

## 2023-01-01 RX ADMIN — CITALOPRAM HYDROBROMIDE 40 MG: 20 TABLET ORAL at 08:59

## 2023-01-01 RX ADMIN — FERROUS SULFATE TAB 325 MG (65 MG ELEMENTAL FE) 325 MG: 325 (65 FE) TAB at 07:25

## 2023-01-01 RX ADMIN — RIFAXIMIN 550 MG: 550 TABLET ORAL at 08:27

## 2023-01-01 RX ADMIN — MONTELUKAST 10 MG: 10 TABLET, FILM COATED ORAL at 19:56

## 2023-01-01 RX ADMIN — FERROUS SULFATE TAB 325 MG (65 MG ELEMENTAL FE) 325 MG: 325 (65 FE) TAB at 08:08

## 2023-01-01 RX ADMIN — MIDODRINE HYDROCHLORIDE 15 MG: 5 TABLET ORAL at 19:10

## 2023-01-01 RX ADMIN — TRAZODONE HYDROCHLORIDE 50 MG: 50 TABLET ORAL at 21:42

## 2023-01-01 RX ADMIN — MIDODRINE HYDROCHLORIDE 15 MG: 5 TABLET ORAL at 07:50

## 2023-01-01 RX ADMIN — LIDOCAINE HYDROCHLORIDE 30 ML: 10 INJECTION, SOLUTION EPIDURAL; INFILTRATION; INTRACAUDAL; PERINEURAL at 14:29

## 2023-01-01 RX ADMIN — LACTULOSE 20 G: 20 SOLUTION ORAL at 14:58

## 2023-01-01 RX ADMIN — MONTELUKAST 10 MG: 10 TABLET, FILM COATED ORAL at 20:30

## 2023-01-01 RX ADMIN — LACTULOSE 20 G: 20 SOLUTION ORAL at 07:55

## 2023-01-01 RX ADMIN — MIDODRINE HYDROCHLORIDE 15 MG: 5 TABLET ORAL at 13:46

## 2023-01-01 RX ADMIN — RIFAXIMIN 550 MG: 550 TABLET ORAL at 20:13

## 2023-01-01 RX ADMIN — MIDODRINE HYDROCHLORIDE 15 MG: 5 TABLET ORAL at 07:48

## 2023-01-01 RX ADMIN — MIDODRINE HYDROCHLORIDE 15 MG: 5 TABLET ORAL at 19:52

## 2023-01-01 RX ADMIN — OXYMETAZOLINE HYDROCHLORIDE 2 SPRAY: 0.05 SPRAY NASAL at 07:49

## 2023-01-01 RX ADMIN — MIDODRINE HYDROCHLORIDE 15 MG: 5 TABLET ORAL at 13:59

## 2023-01-01 RX ADMIN — IPRATROPIUM BROMIDE AND ALBUTEROL SULFATE 3 ML: .5; 3 SOLUTION RESPIRATORY (INHALATION) at 11:53

## 2023-01-01 RX ADMIN — FUROSEMIDE 20 MG: 20 TABLET ORAL at 17:04

## 2023-01-01 RX ADMIN — RIFAXIMIN 550 MG: 550 TABLET ORAL at 08:24

## 2023-01-01 RX ADMIN — LACTULOSE 20 G: 20 SOLUTION ORAL at 14:42

## 2023-01-01 RX ADMIN — OXYMETAZOLINE HYDROCHLORIDE 2 SPRAY: 0.05 SPRAY NASAL at 21:09

## 2023-01-01 RX ADMIN — IPRATROPIUM BROMIDE AND ALBUTEROL SULFATE 3 ML: .5; 3 SOLUTION RESPIRATORY (INHALATION) at 12:08

## 2023-01-01 RX ADMIN — ALBUTEROL SULFATE 2.5 MG: 2.5 SOLUTION RESPIRATORY (INHALATION) at 23:48

## 2023-01-01 RX ADMIN — LEVOTHYROXINE SODIUM 112 MCG: 0.11 TABLET ORAL at 08:18

## 2023-01-01 RX ADMIN — MIDODRINE HYDROCHLORIDE 15 MG: 5 TABLET ORAL at 09:10

## 2023-01-01 RX ADMIN — LACTULOSE 20 G: 20 SOLUTION ORAL at 13:14

## 2023-01-01 RX ADMIN — FLUTICASONE FUROATE AND VILANTEROL TRIFENATATE 1 PUFF: 200; 25 POWDER RESPIRATORY (INHALATION) at 10:49

## 2023-01-01 RX ADMIN — RIFAXIMIN 550 MG: 550 TABLET ORAL at 07:46

## 2023-01-01 RX ADMIN — LACTULOSE 20 G: 10 POWDER, FOR SOLUTION ORAL at 14:35

## 2023-01-01 RX ADMIN — FLUTICASONE FUROATE AND VILANTEROL TRIFENATATE 1 PUFF: 200; 25 POWDER RESPIRATORY (INHALATION) at 08:02

## 2023-01-01 RX ADMIN — FERROUS SULFATE TAB 325 MG (65 MG ELEMENTAL FE) 325 MG: 325 (65 FE) TAB at 08:27

## 2023-01-01 RX ADMIN — LIDOCAINE HYDROCHLORIDE 10 ML: 10 INJECTION, SOLUTION EPIDURAL; INFILTRATION; INTRACAUDAL; PERINEURAL at 13:10

## 2023-01-01 RX ADMIN — FUROSEMIDE 20 MG: 20 TABLET ORAL at 08:36

## 2023-01-01 RX ADMIN — MIDODRINE HYDROCHLORIDE 15 MG: 5 TABLET ORAL at 20:19

## 2023-01-01 RX ADMIN — FLUTICASONE FUROATE AND VILANTEROL TRIFENATATE 1 PUFF: 200; 25 POWDER RESPIRATORY (INHALATION) at 09:24

## 2023-01-01 RX ADMIN — MIDODRINE HYDROCHLORIDE 15 MG: 5 TABLET ORAL at 19:56

## 2023-01-01 RX ADMIN — RIFAXIMIN 550 MG: 550 TABLET ORAL at 20:56

## 2023-01-01 RX ADMIN — PANTOPRAZOLE SODIUM 40 MG: 40 TABLET, DELAYED RELEASE ORAL at 08:49

## 2023-01-01 RX ADMIN — RIFAXIMIN 550 MG: 550 TABLET ORAL at 09:11

## 2023-01-01 RX ADMIN — OXYMETAZOLINE HYDROCHLORIDE 2 SPRAY: 0.05 SPRAY NASAL at 19:16

## 2023-01-01 RX ADMIN — CITALOPRAM HYDROBROMIDE 40 MG: 40 TABLET ORAL at 08:26

## 2023-01-01 RX ADMIN — LEVOTHYROXINE SODIUM 112 MCG: 0.11 TABLET ORAL at 08:27

## 2023-01-01 RX ADMIN — MIDODRINE HYDROCHLORIDE 15 MG: 5 TABLET ORAL at 16:02

## 2023-01-01 RX ADMIN — MONTELUKAST 10 MG: 10 TABLET, FILM COATED ORAL at 20:05

## 2023-01-01 RX ADMIN — MIDODRINE HYDROCHLORIDE 20 MG: 5 TABLET ORAL at 15:08

## 2023-01-01 RX ADMIN — CITALOPRAM HYDROBROMIDE 40 MG: 20 TABLET ORAL at 08:33

## 2023-01-01 RX ADMIN — LEVOTHYROXINE SODIUM 112 MCG: 0.11 TABLET ORAL at 07:54

## 2023-01-01 RX ADMIN — PANTOPRAZOLE SODIUM 40 MG: 40 TABLET, DELAYED RELEASE ORAL at 19:43

## 2023-01-01 RX ADMIN — FERROUS SULFATE TAB 325 MG (65 MG ELEMENTAL FE) 325 MG: 325 (65 FE) TAB at 07:50

## 2023-01-01 RX ADMIN — ALBUMIN HUMAN 12.5 G: 0.05 INJECTION, SOLUTION INTRAVENOUS at 14:55

## 2023-01-01 RX ADMIN — MIDODRINE HYDROCHLORIDE 15 MG: 5 TABLET ORAL at 08:58

## 2023-01-01 RX ADMIN — MIDODRINE HYDROCHLORIDE 20 MG: 5 TABLET ORAL at 14:23

## 2023-01-01 RX ADMIN — MIDODRINE HYDROCHLORIDE 15 MG: 5 TABLET ORAL at 20:33

## 2023-01-01 RX ADMIN — ALBUTEROL SULFATE 2.5 MG: 2.5 SOLUTION RESPIRATORY (INHALATION) at 11:54

## 2023-01-01 RX ADMIN — LACTULOSE 20 G: 20 SOLUTION ORAL at 14:01

## 2023-01-01 RX ADMIN — MIDODRINE HYDROCHLORIDE 15 MG: 5 TABLET ORAL at 13:34

## 2023-01-01 RX ADMIN — Medication 3 MG: at 20:24

## 2023-01-01 RX ADMIN — MONTELUKAST 10 MG: 10 TABLET, FILM COATED ORAL at 21:08

## 2023-01-01 RX ADMIN — CITALOPRAM HYDROBROMIDE 40 MG: 40 TABLET ORAL at 08:06

## 2023-01-01 RX ADMIN — IPRATROPIUM BROMIDE AND ALBUTEROL SULFATE 3 ML: .5; 3 SOLUTION RESPIRATORY (INHALATION) at 16:12

## 2023-01-01 RX ADMIN — RIFAXIMIN 550 MG: 550 TABLET ORAL at 08:58

## 2023-01-01 RX ADMIN — RIFAXIMIN 550 MG: 550 TABLET ORAL at 08:37

## 2023-01-01 RX ADMIN — LACTULOSE 20 G: 20 SOLUTION ORAL at 14:53

## 2023-01-01 RX ADMIN — MONTELUKAST 10 MG: 10 TABLET, FILM COATED ORAL at 19:58

## 2023-01-01 RX ADMIN — MIDODRINE HYDROCHLORIDE 15 MG: 5 TABLET ORAL at 20:30

## 2023-01-01 RX ADMIN — MONTELUKAST 10 MG: 10 TABLET, FILM COATED ORAL at 22:17

## 2023-01-01 RX ADMIN — MIDODRINE HYDROCHLORIDE 15 MG: 5 TABLET ORAL at 19:54

## 2023-01-01 RX ADMIN — PANTOPRAZOLE SODIUM 40 MG: 40 TABLET, DELAYED RELEASE ORAL at 08:18

## 2023-01-01 RX ADMIN — SPIRONOLACTONE 50 MG: 50 TABLET ORAL at 13:26

## 2023-01-01 RX ADMIN — LACTULOSE 20 G: 20 SOLUTION ORAL at 13:49

## 2023-01-01 RX ADMIN — LACTULOSE 20 G: 20 SOLUTION ORAL at 17:24

## 2023-01-01 RX ADMIN — PANTOPRAZOLE SODIUM 40 MG: 40 TABLET, DELAYED RELEASE ORAL at 20:10

## 2023-01-01 RX ADMIN — RIFAXIMIN 550 MG: 550 TABLET ORAL at 07:59

## 2023-01-01 RX ADMIN — NITROGLYCERIN 0.8 MG: 0.4 TABLET SUBLINGUAL at 12:49

## 2023-01-01 RX ADMIN — MIDODRINE HYDROCHLORIDE 15 MG: 5 TABLET ORAL at 08:03

## 2023-01-01 RX ADMIN — PANTOPRAZOLE SODIUM 40 MG: 40 TABLET, DELAYED RELEASE ORAL at 07:55

## 2023-01-01 RX ADMIN — SODIUM ZIRCONIUM CYCLOSILICATE 10 G: 10 POWDER, FOR SUSPENSION ORAL at 16:12

## 2023-01-01 RX ADMIN — PANTOPRAZOLE SODIUM 40 MG: 40 TABLET, DELAYED RELEASE ORAL at 10:29

## 2023-01-01 RX ADMIN — ALBUTEROL SULFATE 2 PUFF: 90 AEROSOL, METERED RESPIRATORY (INHALATION) at 05:58

## 2023-01-01 RX ADMIN — LEVOTHYROXINE SODIUM 112 MCG: 0.11 TABLET ORAL at 09:24

## 2023-01-01 RX ADMIN — CITALOPRAM HYDROBROMIDE 40 MG: 40 TABLET ORAL at 09:34

## 2023-01-01 RX ADMIN — PROCHLORPERAZINE MALEATE 5 MG: 5 TABLET ORAL at 08:43

## 2023-01-01 RX ADMIN — PANTOPRAZOLE SODIUM 40 MG: 40 TABLET, DELAYED RELEASE ORAL at 08:10

## 2023-01-01 RX ADMIN — MONTELUKAST 10 MG: 10 TABLET, FILM COATED ORAL at 21:15

## 2023-01-01 RX ADMIN — LEVOTHYROXINE SODIUM 112 MCG: 0.11 TABLET ORAL at 10:29

## 2023-01-01 RX ADMIN — Medication 3 MG: at 02:47

## 2023-01-01 RX ADMIN — MIDODRINE HYDROCHLORIDE 15 MG: 5 TABLET ORAL at 07:55

## 2023-01-01 RX ADMIN — ACETAMINOPHEN 650 MG: 325 TABLET, FILM COATED ORAL at 16:49

## 2023-01-01 RX ADMIN — RIFAXIMIN 550 MG: 550 TABLET ORAL at 08:18

## 2023-01-01 RX ADMIN — PANTOPRAZOLE SODIUM 40 MG: 40 TABLET, DELAYED RELEASE ORAL at 20:19

## 2023-01-01 RX ADMIN — FLUTICASONE FUROATE AND VILANTEROL TRIFENATATE 1 PUFF: 200; 25 POWDER RESPIRATORY (INHALATION) at 07:53

## 2023-01-01 RX ADMIN — IPRATROPIUM BROMIDE AND ALBUTEROL SULFATE 3 ML: .5; 3 SOLUTION RESPIRATORY (INHALATION) at 20:10

## 2023-01-01 RX ADMIN — LACTULOSE 20 G: 20 SOLUTION ORAL at 14:35

## 2023-01-01 RX ADMIN — SPIRONOLACTONE 50 MG: 50 TABLET, FILM COATED ORAL at 09:11

## 2023-01-01 RX ADMIN — PANTOPRAZOLE SODIUM 40 MG: 40 TABLET, DELAYED RELEASE ORAL at 07:25

## 2023-01-01 RX ADMIN — PANTOPRAZOLE SODIUM 40 MG: 40 TABLET, DELAYED RELEASE ORAL at 08:20

## 2023-01-01 RX ADMIN — RIFAXIMIN 550 MG: 550 TABLET ORAL at 08:05

## 2023-01-01 RX ADMIN — TERLIPRESSIN 0.85 MG: 0.85 INJECTION, POWDER, LYOPHILIZED, FOR SOLUTION INTRAVENOUS at 20:24

## 2023-01-01 RX ADMIN — ALBUMIN HUMAN 50 G: 0.25 SOLUTION INTRAVENOUS at 08:37

## 2023-01-01 RX ADMIN — MIDODRINE HYDROCHLORIDE 15 MG: 5 TABLET ORAL at 15:19

## 2023-01-01 RX ADMIN — MONTELUKAST 10 MG: 10 TABLET, FILM COATED ORAL at 20:47

## 2023-01-01 RX ADMIN — SODIUM CHLORIDE 1000 ML: 9 INJECTION, SOLUTION INTRAVENOUS at 07:51

## 2023-01-01 RX ADMIN — MIDODRINE HYDROCHLORIDE 15 MG: 5 TABLET ORAL at 08:10

## 2023-01-01 RX ADMIN — PANTOPRAZOLE SODIUM 40 MG: 40 INJECTION, POWDER, FOR SOLUTION INTRAVENOUS at 20:55

## 2023-01-01 RX ADMIN — MIDODRINE HYDROCHLORIDE 15 MG: 5 TABLET ORAL at 14:32

## 2023-01-01 RX ADMIN — CITALOPRAM HYDROBROMIDE 40 MG: 20 TABLET ORAL at 09:24

## 2023-01-01 RX ADMIN — OXYMETAZOLINE HYDROCHLORIDE 2 SPRAY: 0.05 SPRAY NASAL at 08:29

## 2023-01-01 RX ADMIN — MIDODRINE HYDROCHLORIDE 15 MG: 5 TABLET ORAL at 08:31

## 2023-01-01 RX ADMIN — LEVOTHYROXINE SODIUM 112 MCG: 0.11 TABLET ORAL at 07:25

## 2023-01-01 RX ADMIN — PANTOPRAZOLE SODIUM 40 MG: 40 TABLET, DELAYED RELEASE ORAL at 08:37

## 2023-01-01 RX ADMIN — ALBUTEROL SULFATE 2 PUFF: 90 AEROSOL, METERED RESPIRATORY (INHALATION) at 08:59

## 2023-01-01 RX ADMIN — TERLIPRESSIN 0.85 MG: 0.85 INJECTION, POWDER, LYOPHILIZED, FOR SOLUTION INTRAVENOUS at 06:17

## 2023-01-01 RX ADMIN — RIFAXIMIN 550 MG: 550 TABLET ORAL at 08:20

## 2023-01-01 RX ADMIN — LACTULOSE 20 G: 20 SOLUTION ORAL at 19:50

## 2023-01-01 RX ADMIN — MIDODRINE HYDROCHLORIDE 15 MG: 5 TABLET ORAL at 20:16

## 2023-01-01 RX ADMIN — ACETAMINOPHEN 650 MG: 325 TABLET, FILM COATED ORAL at 20:17

## 2023-01-01 RX ADMIN — MONTELUKAST 10 MG: 10 TABLET, FILM COATED ORAL at 20:21

## 2023-01-01 RX ADMIN — LACTULOSE 20 G: 20 SOLUTION ORAL at 19:58

## 2023-01-01 RX ADMIN — MONTELUKAST 10 MG: 10 TABLET, FILM COATED ORAL at 19:52

## 2023-01-01 RX ADMIN — IPRATROPIUM BROMIDE AND ALBUTEROL SULFATE 3 ML: .5; 3 SOLUTION RESPIRATORY (INHALATION) at 12:50

## 2023-01-01 RX ADMIN — MIDODRINE HYDROCHLORIDE 20 MG: 5 TABLET ORAL at 16:02

## 2023-01-01 RX ADMIN — ALBUTEROL SULFATE 2 PUFF: 90 AEROSOL, METERED RESPIRATORY (INHALATION) at 12:45

## 2023-01-01 RX ADMIN — SODIUM CHLORIDE: 4 INJECTION, SOLUTION, CONCENTRATE INTRAVENOUS at 17:41

## 2023-01-01 RX ADMIN — TERLIPRESSIN 0.85 MG: 0.85 INJECTION, POWDER, LYOPHILIZED, FOR SOLUTION INTRAVENOUS at 13:31

## 2023-01-01 RX ADMIN — LACTULOSE 20 G: 20 SOLUTION ORAL at 20:54

## 2023-01-01 RX ADMIN — TERLIPRESSIN 0.85 MG: 0.85 INJECTION, POWDER, LYOPHILIZED, FOR SOLUTION INTRAVENOUS at 01:47

## 2023-01-01 RX ADMIN — ACETAMINOPHEN 650 MG: 325 TABLET, FILM COATED ORAL at 16:58

## 2023-01-01 RX ADMIN — PANTOPRAZOLE SODIUM 40 MG: 40 TABLET, DELAYED RELEASE ORAL at 19:16

## 2023-01-01 RX ADMIN — LEVOTHYROXINE SODIUM 112 MCG: 0.11 TABLET ORAL at 08:15

## 2023-01-01 RX ADMIN — LACTULOSE 20 G: 20 SOLUTION ORAL at 20:03

## 2023-01-01 RX ADMIN — FERROUS SULFATE TAB 325 MG (65 MG ELEMENTAL FE) 325 MG: 325 (65 FE) TAB at 08:04

## 2023-01-01 RX ADMIN — ACETAMINOPHEN 650 MG: 325 TABLET, FILM COATED ORAL at 09:23

## 2023-01-01 RX ADMIN — LACTULOSE 20 G: 20 SOLUTION ORAL at 10:25

## 2023-01-01 RX ADMIN — SODIUM CHLORIDE: 3 INJECTION, SOLUTION INTRAVENOUS at 01:58

## 2023-01-01 RX ADMIN — RIFAXIMIN 550 MG: 550 TABLET ORAL at 20:17

## 2023-01-01 RX ADMIN — PANTOPRAZOLE SODIUM 40 MG: 40 TABLET, DELAYED RELEASE ORAL at 08:04

## 2023-01-01 RX ADMIN — CITALOPRAM HYDROBROMIDE 40 MG: 20 TABLET ORAL at 08:58

## 2023-01-01 RX ADMIN — RIFAXIMIN 550 MG: 550 TABLET ORAL at 20:36

## 2023-01-01 RX ADMIN — LACTULOSE 20 G: 20 SOLUTION ORAL at 08:33

## 2023-01-01 RX ADMIN — MONTELUKAST 10 MG: 10 TABLET, FILM COATED ORAL at 20:12

## 2023-01-01 RX ADMIN — ACETAMINOPHEN 650 MG: 325 TABLET, FILM COATED ORAL at 08:07

## 2023-01-01 RX ADMIN — PANTOPRAZOLE SODIUM 40 MG: 40 TABLET, DELAYED RELEASE ORAL at 19:58

## 2023-01-01 RX ADMIN — RIFAXIMIN 550 MG: 550 TABLET ORAL at 20:21

## 2023-01-01 RX ADMIN — PANTOPRAZOLE SODIUM 40 MG: 40 TABLET, DELAYED RELEASE ORAL at 19:56

## 2023-01-01 RX ADMIN — FLUTICASONE FUROATE AND VILANTEROL TRIFENATATE 1 PUFF: 200; 25 POWDER RESPIRATORY (INHALATION) at 07:25

## 2023-01-01 RX ADMIN — LACTULOSE 20 G: 10 POWDER, FOR SOLUTION ORAL at 13:54

## 2023-01-01 RX ADMIN — LORAZEPAM 0.5 MG: 0.5 TABLET ORAL at 19:03

## 2023-01-01 RX ADMIN — OXYMETAZOLINE HYDROCHLORIDE 2 SPRAY: 0.05 SPRAY NASAL at 10:42

## 2023-01-01 RX ADMIN — LACTULOSE 20 G: 20 SOLUTION ORAL at 15:25

## 2023-01-01 RX ADMIN — LACTULOSE 20 G: 10 POWDER, FOR SOLUTION ORAL at 21:40

## 2023-01-01 RX ADMIN — FLUTICASONE FUROATE AND VILANTEROL TRIFENATATE 1 PUFF: 200; 25 POWDER RESPIRATORY (INHALATION) at 14:40

## 2023-01-01 RX ADMIN — RIFAXIMIN 550 MG: 550 TABLET ORAL at 21:24

## 2023-01-01 RX ADMIN — ACETAMINOPHEN 650 MG: 325 TABLET, FILM COATED ORAL at 09:00

## 2023-01-01 RX ADMIN — MIDODRINE HYDROCHLORIDE 15 MG: 5 TABLET ORAL at 13:49

## 2023-01-01 RX ADMIN — MIDODRINE HYDROCHLORIDE 15 MG: 5 TABLET ORAL at 08:07

## 2023-01-01 RX ADMIN — LACTULOSE 20 G: 20 SOLUTION ORAL at 10:30

## 2023-01-01 RX ADMIN — Medication 3 MG: at 20:59

## 2023-01-01 RX ADMIN — Medication 25 MG: at 21:15

## 2023-01-01 RX ADMIN — MIDODRINE HYDROCHLORIDE 15 MG: 5 TABLET ORAL at 14:33

## 2023-01-01 RX ADMIN — MONTELUKAST 10 MG: 10 TABLET, FILM COATED ORAL at 20:13

## 2023-01-01 RX ADMIN — PROCHLORPERAZINE EDISYLATE 5 MG: 5 INJECTION INTRAMUSCULAR; INTRAVENOUS at 15:10

## 2023-01-01 RX ADMIN — Medication 3 MG: at 20:00

## 2023-01-01 RX ADMIN — PANTOPRAZOLE SODIUM 40 MG: 40 INJECTION, POWDER, FOR SOLUTION INTRAVENOUS at 07:37

## 2023-01-01 RX ADMIN — PANTOPRAZOLE SODIUM 40 MG: 40 TABLET, DELAYED RELEASE ORAL at 08:34

## 2023-01-01 RX ADMIN — MIDODRINE HYDROCHLORIDE 15 MG: 5 TABLET ORAL at 19:59

## 2023-01-01 RX ADMIN — SODIUM CHLORIDE: 4 INJECTION, SOLUTION, CONCENTRATE INTRAVENOUS at 23:41

## 2023-01-01 RX ADMIN — LACTULOSE 20 G: 20 SOLUTION ORAL at 21:04

## 2023-01-01 RX ADMIN — OXYMETAZOLINE HYDROCHLORIDE 2 SPRAY: 0.05 SPRAY NASAL at 19:51

## 2023-01-01 RX ADMIN — LEVOTHYROXINE SODIUM 112 MCG: 0.11 TABLET ORAL at 08:34

## 2023-01-01 RX ADMIN — LIDOCAINE HYDROCHLORIDE 10 ML: 10 INJECTION, SOLUTION EPIDURAL; INFILTRATION; INTRACAUDAL; PERINEURAL at 14:35

## 2023-01-01 RX ADMIN — Medication 3 MG: at 20:34

## 2023-01-01 RX ADMIN — LORAZEPAM 0.5 MG: 0.5 TABLET ORAL at 21:16

## 2023-01-01 RX ADMIN — MIDODRINE HYDROCHLORIDE 15 MG: 5 TABLET ORAL at 09:14

## 2023-01-01 RX ADMIN — FLUTICASONE FUROATE AND VILANTEROL TRIFENATATE 1 PUFF: 100; 25 POWDER RESPIRATORY (INHALATION) at 08:04

## 2023-01-01 RX ADMIN — FERROUS SULFATE TAB 325 MG (65 MG ELEMENTAL FE) 325 MG: 325 (65 FE) TAB at 08:58

## 2023-01-01 RX ADMIN — FLUTICASONE FUROATE AND VILANTEROL TRIFENATATE 1 PUFF: 100; 25 POWDER RESPIRATORY (INHALATION) at 07:53

## 2023-01-01 RX ADMIN — RIFAXIMIN 550 MG: 550 TABLET ORAL at 08:51

## 2023-01-01 RX ADMIN — PANTOPRAZOLE SODIUM 40 MG: 40 TABLET, DELAYED RELEASE ORAL at 08:11

## 2023-01-01 RX ADMIN — RIFAXIMIN 550 MG: 550 TABLET ORAL at 10:29

## 2023-01-01 RX ADMIN — FERROUS SULFATE TAB 325 MG (65 MG ELEMENTAL FE) 325 MG: 325 (65 FE) TAB at 08:33

## 2023-01-01 RX ADMIN — ACETAMINOPHEN 650 MG: 325 TABLET, FILM COATED ORAL at 04:11

## 2023-01-01 RX ADMIN — MIDODRINE HYDROCHLORIDE 15 MG: 5 TABLET ORAL at 20:10

## 2023-01-01 RX ADMIN — MIDODRINE HYDROCHLORIDE 20 MG: 5 TABLET ORAL at 20:02

## 2023-01-01 RX ADMIN — FLUTICASONE FUROATE AND VILANTEROL TRIFENATATE 1 PUFF: 200; 25 POWDER RESPIRATORY (INHALATION) at 08:37

## 2023-01-01 RX ADMIN — LEVOTHYROXINE SODIUM 112 MCG: 0.11 TABLET ORAL at 08:49

## 2023-01-01 RX ADMIN — MONTELUKAST 10 MG: 10 TABLET, FILM COATED ORAL at 21:26

## 2023-01-01 RX ADMIN — FERROUS SULFATE TAB 325 MG (65 MG ELEMENTAL FE) 325 MG: 325 (65 FE) TAB at 07:59

## 2023-01-01 RX ADMIN — FERROUS SULFATE TAB 325 MG (65 MG ELEMENTAL FE) 325 MG: 325 (65 FE) TAB at 10:30

## 2023-01-01 RX ADMIN — ALBUTEROL SULFATE 2 PUFF: 90 AEROSOL, METERED RESPIRATORY (INHALATION) at 11:21

## 2023-01-01 RX ADMIN — FLUTICASONE FUROATE AND VILANTEROL TRIFENATATE 1 PUFF: 100; 25 POWDER RESPIRATORY (INHALATION) at 08:18

## 2023-01-01 RX ADMIN — Medication 3 MG: at 21:16

## 2023-01-01 RX ADMIN — ACETAMINOPHEN 650 MG: 325 TABLET, FILM COATED ORAL at 04:29

## 2023-01-01 RX ADMIN — LACTULOSE 20 G: 10 POWDER, FOR SOLUTION ORAL at 19:16

## 2023-01-01 RX ADMIN — LACTULOSE 20 G: 20 SOLUTION ORAL at 10:44

## 2023-01-01 RX ADMIN — MONTELUKAST 10 MG: 10 TABLET, FILM COATED ORAL at 20:54

## 2023-01-01 RX ADMIN — FLUTICASONE FUROATE AND VILANTEROL TRIFENATATE 1 PUFF: 100; 25 POWDER RESPIRATORY (INHALATION) at 08:05

## 2023-01-01 RX ADMIN — FERROUS SULFATE TAB 325 MG (65 MG ELEMENTAL FE) 325 MG: 325 (65 FE) TAB at 08:06

## 2023-01-01 RX ADMIN — LORAZEPAM 0.5 MG: 0.5 TABLET ORAL at 02:21

## 2023-01-01 RX ADMIN — FLUTICASONE FUROATE AND VILANTEROL TRIFENATATE 1 PUFF: 100; 25 POWDER RESPIRATORY (INHALATION) at 08:06

## 2023-01-01 RX ADMIN — MIDODRINE HYDROCHLORIDE 20 MG: 5 TABLET ORAL at 14:16

## 2023-01-01 RX ADMIN — OXYMETAZOLINE HYDROCHLORIDE 2 SPRAY: 0.05 SPRAY NASAL at 08:05

## 2023-01-01 RX ADMIN — ALBUMIN HUMAN 12.5 G: 0.25 SOLUTION INTRAVENOUS at 10:00

## 2023-01-01 RX ADMIN — LACTULOSE 20 G: 10 POWDER, FOR SOLUTION ORAL at 09:15

## 2023-01-01 RX ADMIN — RIFAXIMIN 550 MG: 550 TABLET ORAL at 09:28

## 2023-01-01 RX ADMIN — ALBUMIN HUMAN 50 G: 0.25 SOLUTION INTRAVENOUS at 13:28

## 2023-01-01 RX ADMIN — PANTOPRAZOLE SODIUM 40 MG: 40 TABLET, DELAYED RELEASE ORAL at 08:08

## 2023-01-01 RX ADMIN — LACTULOSE 20 G: 20 SOLUTION ORAL at 20:23

## 2023-01-01 RX ADMIN — LORAZEPAM 0.5 MG: 0.5 TABLET ORAL at 18:23

## 2023-01-01 RX ADMIN — PANTOPRAZOLE SODIUM 40 MG: 40 TABLET, DELAYED RELEASE ORAL at 08:47

## 2023-01-01 RX ADMIN — FUROSEMIDE 20 MG: 20 TABLET ORAL at 07:38

## 2023-01-01 RX ADMIN — RIFAXIMIN 550 MG: 550 TABLET ORAL at 19:58

## 2023-01-01 RX ADMIN — RIFAXIMIN 550 MG: 550 TABLET ORAL at 19:10

## 2023-01-01 RX ADMIN — LEVOTHYROXINE SODIUM 112 MCG: 0.11 TABLET ORAL at 08:36

## 2023-01-01 RX ADMIN — MIDODRINE HYDROCHLORIDE 15 MG: 5 TABLET ORAL at 13:32

## 2023-01-01 RX ADMIN — ACETAMINOPHEN 650 MG: 325 TABLET, FILM COATED ORAL at 22:53

## 2023-01-01 RX ADMIN — LIDOCAINE HYDROCHLORIDE 4 ML: 10 INJECTION, SOLUTION EPIDURAL; INFILTRATION; INTRACAUDAL; PERINEURAL at 14:15

## 2023-01-01 RX ADMIN — MIDODRINE HYDROCHLORIDE 15 MG: 5 TABLET ORAL at 14:54

## 2023-01-01 RX ADMIN — FUROSEMIDE 20 MG: 20 TABLET ORAL at 08:21

## 2023-01-01 RX ADMIN — IPRATROPIUM BROMIDE AND ALBUTEROL SULFATE 3 ML: .5; 3 SOLUTION RESPIRATORY (INHALATION) at 21:26

## 2023-01-01 RX ADMIN — MONTELUKAST 10 MG: 10 TABLET, FILM COATED ORAL at 21:16

## 2023-01-01 RX ADMIN — LEVOTHYROXINE SODIUM 112 MCG: 0.11 TABLET ORAL at 08:58

## 2023-01-01 RX ADMIN — MONTELUKAST 10 MG: 10 TABLET, FILM COATED ORAL at 20:14

## 2023-01-01 RX ADMIN — ACETAMINOPHEN 650 MG: 325 TABLET, FILM COATED ORAL at 05:27

## 2023-01-01 RX ADMIN — RIFAXIMIN 550 MG: 550 TABLET ORAL at 21:22

## 2023-01-01 RX ADMIN — IPRATROPIUM BROMIDE AND ALBUTEROL SULFATE 3 ML: .5; 3 SOLUTION RESPIRATORY (INHALATION) at 12:54

## 2023-01-01 RX ADMIN — FLUTICASONE FUROATE AND VILANTEROL TRIFENATATE 1 PUFF: 200; 25 POWDER RESPIRATORY (INHALATION) at 07:45

## 2023-01-01 RX ADMIN — LORAZEPAM 0.5 MG: 0.5 TABLET ORAL at 20:08

## 2023-01-01 RX ADMIN — IPRATROPIUM BROMIDE AND ALBUTEROL SULFATE 3 ML: .5; 3 SOLUTION RESPIRATORY (INHALATION) at 21:05

## 2023-01-01 RX ADMIN — PANTOPRAZOLE SODIUM 40 MG: 40 TABLET, DELAYED RELEASE ORAL at 08:59

## 2023-01-01 RX ADMIN — LACTULOSE 20 G: 20 SOLUTION ORAL at 08:46

## 2023-01-01 RX ADMIN — CITALOPRAM HYDROBROMIDE 40 MG: 20 TABLET ORAL at 07:25

## 2023-01-01 RX ADMIN — LACTULOSE 20 G: 10 POWDER, FOR SOLUTION ORAL at 19:50

## 2023-01-01 RX ADMIN — FLUTICASONE FUROATE AND VILANTEROL TRIFENATATE 1 PUFF: 100; 25 POWDER RESPIRATORY (INHALATION) at 07:49

## 2023-01-01 RX ADMIN — RIFAXIMIN 550 MG: 550 TABLET ORAL at 08:33

## 2023-01-01 RX ADMIN — RIFAXIMIN 550 MG: 550 TABLET ORAL at 09:29

## 2023-01-01 RX ADMIN — TRAZODONE HYDROCHLORIDE 50 MG: 50 TABLET ORAL at 20:55

## 2023-01-01 RX ADMIN — MONTELUKAST 10 MG: 10 TABLET, FILM COATED ORAL at 20:33

## 2023-01-01 RX ADMIN — RIFAXIMIN 550 MG: 550 TABLET ORAL at 20:05

## 2023-01-01 RX ADMIN — PANTOPRAZOLE SODIUM 40 MG: 40 TABLET, DELAYED RELEASE ORAL at 07:59

## 2023-01-01 RX ADMIN — LACTULOSE 20 G: 20 SOLUTION ORAL at 08:03

## 2023-01-01 RX ADMIN — Medication 25 MG: at 21:26

## 2023-01-01 RX ADMIN — LIDOCAINE HYDROCHLORIDE 10 ML: 10 INJECTION, SOLUTION EPIDURAL; INFILTRATION; INTRACAUDAL; PERINEURAL at 13:22

## 2023-01-01 RX ADMIN — LACTULOSE 20 G: 20 SOLUTION ORAL at 09:23

## 2023-01-01 RX ADMIN — MIDODRINE HYDROCHLORIDE 20 MG: 5 TABLET ORAL at 13:31

## 2023-01-01 RX ADMIN — CITALOPRAM HYDROBROMIDE 40 MG: 20 TABLET ORAL at 07:54

## 2023-01-01 RX ADMIN — RIFAXIMIN 550 MG: 550 TABLET ORAL at 20:30

## 2023-01-01 RX ADMIN — MIDODRINE HYDROCHLORIDE 15 MG: 5 TABLET ORAL at 19:15

## 2023-01-01 RX ADMIN — MIDODRINE HYDROCHLORIDE 15 MG: 5 TABLET ORAL at 07:25

## 2023-01-01 RX ADMIN — PANTOPRAZOLE SODIUM 40 MG: 40 TABLET, DELAYED RELEASE ORAL at 07:38

## 2023-01-01 RX ADMIN — FERROUS SULFATE TAB 325 MG (65 MG ELEMENTAL FE) 325 MG: 325 (65 FE) TAB at 08:37

## 2023-01-01 RX ADMIN — LACTULOSE 20 G: 20 SOLUTION ORAL at 08:06

## 2023-01-01 RX ADMIN — SPIRONOLACTONE 100 MG: 50 TABLET ORAL at 07:37

## 2023-01-01 RX ADMIN — LIDOCAINE HYDROCHLORIDE 6 ML: 10 INJECTION, SOLUTION EPIDURAL; INFILTRATION; INTRACAUDAL; PERINEURAL at 14:44

## 2023-01-01 RX ADMIN — ACETAMINOPHEN 650 MG: 325 TABLET, FILM COATED ORAL at 20:45

## 2023-01-01 RX ADMIN — SPIRONOLACTONE 50 MG: 50 TABLET, FILM COATED ORAL at 08:36

## 2023-01-01 RX ADMIN — MIDODRINE HYDROCHLORIDE 15 MG: 5 TABLET ORAL at 08:06

## 2023-01-01 RX ADMIN — FLUTICASONE FUROATE AND VILANTEROL TRIFENATATE 1 PUFF: 200; 25 POWDER RESPIRATORY (INHALATION) at 07:47

## 2023-01-01 RX ADMIN — TERLIPRESSIN 0.85 MG: 0.85 INJECTION, POWDER, LYOPHILIZED, FOR SOLUTION INTRAVENOUS at 10:00

## 2023-01-01 RX ADMIN — MIDODRINE HYDROCHLORIDE 15 MG: 5 TABLET ORAL at 20:48

## 2023-01-01 RX ADMIN — MONTELUKAST 10 MG: 10 TABLET, FILM COATED ORAL at 21:22

## 2023-01-01 RX ADMIN — LIDOCAINE HYDROCHLORIDE 4 ML: 10 INJECTION, SOLUTION EPIDURAL; INFILTRATION; INTRACAUDAL; PERINEURAL at 10:27

## 2023-01-01 RX ADMIN — MIDODRINE HYDROCHLORIDE 15 MG: 5 TABLET ORAL at 07:46

## 2023-01-01 RX ADMIN — MONTELUKAST 10 MG: 10 TABLET, FILM COATED ORAL at 21:39

## 2023-01-01 RX ADMIN — LEVOTHYROXINE SODIUM 112 MCG: 0.11 TABLET ORAL at 08:10

## 2023-01-01 RX ADMIN — ALBUTEROL SULFATE 2 PUFF: 90 AEROSOL, METERED RESPIRATORY (INHALATION) at 01:02

## 2023-01-01 RX ADMIN — LEVOTHYROXINE SODIUM 112 MCG: 0.11 TABLET ORAL at 08:01

## 2023-01-01 RX ADMIN — PANTOPRAZOLE SODIUM 40 MG: 40 TABLET, DELAYED RELEASE ORAL at 09:29

## 2023-01-01 RX ADMIN — IPRATROPIUM BROMIDE AND ALBUTEROL SULFATE 3 ML: .5; 3 SOLUTION RESPIRATORY (INHALATION) at 20:14

## 2023-01-01 RX ADMIN — PANTOPRAZOLE SODIUM 40 MG: 40 TABLET, DELAYED RELEASE ORAL at 09:10

## 2023-01-01 RX ADMIN — Medication 5 MG: at 21:08

## 2023-01-01 RX ADMIN — SODIUM CHLORIDE: 4 INJECTION, SOLUTION, CONCENTRATE INTRAVENOUS at 10:26

## 2023-01-01 RX ADMIN — Medication 25 MG: at 22:33

## 2023-01-01 RX ADMIN — MIDODRINE HYDROCHLORIDE 20 MG: 5 TABLET ORAL at 21:04

## 2023-01-01 RX ADMIN — RIFAXIMIN 550 MG: 550 TABLET ORAL at 07:48

## 2023-01-01 RX ADMIN — ALBUTEROL SULFATE 2 PUFF: 90 AEROSOL, METERED RESPIRATORY (INHALATION) at 09:49

## 2023-01-01 RX ADMIN — LACTULOSE 20 G: 10 POWDER, FOR SOLUTION ORAL at 08:06

## 2023-01-01 RX ADMIN — LACTULOSE 20 G: 20 SOLUTION ORAL at 13:32

## 2023-01-01 RX ADMIN — POTASSIUM CHLORIDE 20 MEQ: 1.5 POWDER, FOR SOLUTION ORAL at 04:53

## 2023-01-01 RX ADMIN — MIDODRINE HYDROCHLORIDE 15 MG: 5 TABLET ORAL at 20:08

## 2023-01-01 RX ADMIN — MIDODRINE HYDROCHLORIDE 15 MG: 5 TABLET ORAL at 15:25

## 2023-01-01 RX ADMIN — MONTELUKAST 10 MG: 10 TABLET, FILM COATED ORAL at 20:02

## 2023-01-01 RX ADMIN — MIDODRINE HYDROCHLORIDE 20 MG: 5 TABLET ORAL at 19:55

## 2023-01-01 RX ADMIN — IPRATROPIUM BROMIDE AND ALBUTEROL SULFATE 3 ML: .5; 3 SOLUTION RESPIRATORY (INHALATION) at 20:54

## 2023-01-01 RX ADMIN — OXYMETAZOLINE HYDROCHLORIDE 2 SPRAY: 0.05 SPRAY NASAL at 08:17

## 2023-01-01 RX ADMIN — LACTULOSE 20 G: 10 POWDER, FOR SOLUTION ORAL at 20:18

## 2023-01-01 RX ADMIN — FUROSEMIDE 20 MG: 20 TABLET ORAL at 15:47

## 2023-01-01 RX ADMIN — CITALOPRAM HYDROBROMIDE 40 MG: 20 TABLET ORAL at 07:55

## 2023-01-01 RX ADMIN — MIDODRINE HYDROCHLORIDE 15 MG: 5 TABLET ORAL at 08:18

## 2023-01-01 RX ADMIN — FERROUS SULFATE TAB 325 MG (65 MG ELEMENTAL FE) 325 MG: 325 (65 FE) TAB at 08:07

## 2023-01-01 RX ADMIN — CITALOPRAM HYDROBROMIDE 40 MG: 20 TABLET ORAL at 08:01

## 2023-01-01 RX ADMIN — LACTULOSE 20 G: 20 SOLUTION ORAL at 14:09

## 2023-01-01 RX ADMIN — CITALOPRAM HYDROBROMIDE 40 MG: 20 TABLET ORAL at 08:10

## 2023-01-01 RX ADMIN — MIDODRINE HYDROCHLORIDE 15 MG: 5 TABLET ORAL at 15:07

## 2023-01-01 RX ADMIN — OXYMETAZOLINE HYDROCHLORIDE 2 SPRAY: 0.05 SPRAY NASAL at 19:48

## 2023-01-01 RX ADMIN — MIDODRINE HYDROCHLORIDE 15 MG: 5 TABLET ORAL at 07:59

## 2023-01-01 RX ADMIN — LEVOTHYROXINE SODIUM 112 MCG: 0.11 TABLET ORAL at 08:07

## 2023-01-01 RX ADMIN — SODIUM ZIRCONIUM CYCLOSILICATE 10 G: 10 POWDER, FOR SUSPENSION ORAL at 14:27

## 2023-01-01 RX ADMIN — MIDODRINE HYDROCHLORIDE 15 MG: 5 TABLET ORAL at 08:20

## 2023-01-01 RX ADMIN — RIFAXIMIN 550 MG: 550 TABLET ORAL at 19:43

## 2023-01-01 RX ADMIN — RIFAXIMIN 550 MG: 550 TABLET ORAL at 08:34

## 2023-01-01 RX ADMIN — LACTULOSE 20 G: 20 SOLUTION ORAL at 21:15

## 2023-01-01 RX ADMIN — FLUTICASONE FUROATE AND VILANTEROL TRIFENATATE 1 PUFF: 200; 25 POWDER RESPIRATORY (INHALATION) at 07:40

## 2023-01-01 RX ADMIN — SPIRONOLACTONE 25 MG: 25 TABLET ORAL at 08:21

## 2023-01-01 RX ADMIN — ALBUTEROL SULFATE 2 PUFF: 90 AEROSOL, METERED RESPIRATORY (INHALATION) at 08:46

## 2023-01-01 RX ADMIN — ACETAMINOPHEN 650 MG: 325 TABLET, FILM COATED ORAL at 16:54

## 2023-01-01 RX ADMIN — MIDODRINE HYDROCHLORIDE 15 MG: 5 TABLET ORAL at 07:37

## 2023-01-01 RX ADMIN — MIDODRINE HYDROCHLORIDE 15 MG: 5 TABLET ORAL at 13:54

## 2023-01-01 RX ADMIN — LIDOCAINE HYDROCHLORIDE 5 ML: 10 INJECTION, SOLUTION EPIDURAL; INFILTRATION; INTRACAUDAL; PERINEURAL at 08:20

## 2023-01-01 RX ADMIN — MONTELUKAST 10 MG: 10 TABLET, FILM COATED ORAL at 21:23

## 2023-01-01 RX ADMIN — LACTULOSE 20 G: 10 POWDER, FOR SOLUTION ORAL at 08:27

## 2023-01-01 RX ADMIN — ALBUMIN HUMAN 25 G: 0.25 SOLUTION INTRAVENOUS at 09:08

## 2023-01-01 RX ADMIN — IPRATROPIUM BROMIDE AND ALBUTEROL SULFATE 3 ML: .5; 3 SOLUTION RESPIRATORY (INHALATION) at 07:29

## 2023-01-01 RX ADMIN — LORAZEPAM 0.5 MG: 0.5 TABLET ORAL at 21:33

## 2023-01-01 RX ADMIN — MIDODRINE HYDROCHLORIDE 20 MG: 5 TABLET ORAL at 10:29

## 2023-01-01 RX ADMIN — LIDOCAINE HYDROCHLORIDE 10 ML: 10 INJECTION, SOLUTION EPIDURAL; INFILTRATION; INTRACAUDAL; PERINEURAL at 12:40

## 2023-01-01 RX ADMIN — MIDODRINE HYDROCHLORIDE 15 MG: 5 TABLET ORAL at 08:11

## 2023-01-01 RX ADMIN — IPRATROPIUM BROMIDE AND ALBUTEROL SULFATE 3 ML: .5; 3 SOLUTION RESPIRATORY (INHALATION) at 11:00

## 2023-01-01 RX ADMIN — ACETAMINOPHEN 650 MG: 325 TABLET, FILM COATED ORAL at 21:36

## 2023-01-01 RX ADMIN — RIFAXIMIN 550 MG: 550 TABLET ORAL at 21:42

## 2023-01-01 RX ADMIN — TERLIPRESSIN 0.85 MG: 0.85 INJECTION, POWDER, LYOPHILIZED, FOR SOLUTION INTRAVENOUS at 03:44

## 2023-01-01 RX ADMIN — IPRATROPIUM BROMIDE AND ALBUTEROL SULFATE 3 ML: .5; 3 SOLUTION RESPIRATORY (INHALATION) at 09:23

## 2023-01-01 RX ADMIN — FUROSEMIDE 20 MG: 20 TABLET ORAL at 09:11

## 2023-01-01 RX ADMIN — RIFAXIMIN 550 MG: 550 TABLET ORAL at 20:01

## 2023-01-01 RX ADMIN — LEVOTHYROXINE SODIUM 112 MCG: 0.11 TABLET ORAL at 08:24

## 2023-01-01 RX ADMIN — LACTULOSE 20 G: 20 SOLUTION ORAL at 08:58

## 2023-01-01 RX ADMIN — MIDODRINE HYDROCHLORIDE 15 MG: 5 TABLET ORAL at 19:58

## 2023-01-01 RX ADMIN — MONTELUKAST 10 MG: 10 TABLET, FILM COATED ORAL at 20:23

## 2023-01-01 RX ADMIN — LEVOTHYROXINE SODIUM 112 MCG: 0.11 TABLET ORAL at 08:37

## 2023-01-01 RX ADMIN — ALBUMIN HUMAN 12.5 G: 0.25 SOLUTION INTRAVENOUS at 12:07

## 2023-01-01 RX ADMIN — PANTOPRAZOLE SODIUM 40 MG: 40 TABLET, DELAYED RELEASE ORAL at 19:23

## 2023-01-01 RX ADMIN — LACTULOSE 20 G: 20 SOLUTION ORAL at 19:55

## 2023-01-01 RX ADMIN — PANTOPRAZOLE SODIUM 40 MG: 40 TABLET, DELAYED RELEASE ORAL at 08:01

## 2023-01-01 RX ADMIN — RIFAXIMIN 550 MG: 550 TABLET ORAL at 08:00

## 2023-01-01 RX ADMIN — ALBUTEROL SULFATE 2 PUFF: 90 AEROSOL, METERED RESPIRATORY (INHALATION) at 14:04

## 2023-01-01 RX ADMIN — PANTOPRAZOLE SODIUM 40 MG: 40 TABLET, DELAYED RELEASE ORAL at 07:49

## 2023-01-01 RX ADMIN — FLUTICASONE FUROATE AND VILANTEROL TRIFENATATE 1 PUFF: 200; 25 POWDER RESPIRATORY (INHALATION) at 08:07

## 2023-01-01 RX ADMIN — PANTOPRAZOLE SODIUM 40 MG: 40 TABLET, DELAYED RELEASE ORAL at 08:00

## 2023-01-01 RX ADMIN — FERROUS SULFATE TAB 325 MG (65 MG ELEMENTAL FE) 325 MG: 325 (65 FE) TAB at 08:24

## 2023-01-01 RX ADMIN — IPRATROPIUM BROMIDE AND ALBUTEROL SULFATE 3 ML: .5; 3 SOLUTION RESPIRATORY (INHALATION) at 08:13

## 2023-01-01 RX ADMIN — LEVOTHYROXINE SODIUM 112 MCG: 0.11 TABLET ORAL at 09:11

## 2023-01-01 RX ADMIN — MIDODRINE HYDROCHLORIDE 15 MG: 5 TABLET ORAL at 08:47

## 2023-01-01 RX ADMIN — METOPROLOL TARTRATE 5 MG: 5 INJECTION INTRAVENOUS at 12:45

## 2023-01-01 RX ADMIN — RIFAXIMIN 550 MG: 550 TABLET ORAL at 19:02

## 2023-01-01 RX ADMIN — MIDODRINE HYDROCHLORIDE 15 MG: 5 TABLET ORAL at 20:17

## 2023-01-01 RX ADMIN — LEVOTHYROXINE SODIUM 112 MCG: 0.11 TABLET ORAL at 09:15

## 2023-01-01 RX ADMIN — TERLIPRESSIN 0.85 MG: 0.85 INJECTION, POWDER, LYOPHILIZED, FOR SOLUTION INTRAVENOUS at 17:33

## 2023-01-01 RX ADMIN — LEVOTHYROXINE SODIUM 112 MCG: 0.11 TABLET ORAL at 08:06

## 2023-01-01 RX ADMIN — TERLIPRESSIN 0.85 MG: 0.85 INJECTION, POWDER, LYOPHILIZED, FOR SOLUTION INTRAVENOUS at 13:13

## 2023-01-01 RX ADMIN — FERROUS SULFATE TAB 325 MG (65 MG ELEMENTAL FE) 325 MG: 325 (65 FE) TAB at 08:35

## 2023-01-01 RX ADMIN — CITALOPRAM HYDROBROMIDE 40 MG: 40 TABLET ORAL at 08:05

## 2023-01-01 RX ADMIN — MIDODRINE HYDROCHLORIDE 15 MG: 5 TABLET ORAL at 08:34

## 2023-01-01 RX ADMIN — SPIRONOLACTONE 25 MG: 25 TABLET ORAL at 15:47

## 2023-01-01 RX ADMIN — TERLIPRESSIN 0.85 MG: 0.85 INJECTION, POWDER, LYOPHILIZED, FOR SOLUTION INTRAVENOUS at 10:59

## 2023-01-01 RX ADMIN — LEVOTHYROXINE SODIUM 112 MCG: 0.11 TABLET ORAL at 08:00

## 2023-01-01 RX ADMIN — LACTULOSE 20 G: 10 POWDER, FOR SOLUTION ORAL at 14:53

## 2023-01-01 RX ADMIN — MIDODRINE HYDROCHLORIDE 15 MG: 5 TABLET ORAL at 20:54

## 2023-01-01 RX ADMIN — ALBUTEROL SULFATE 2 PUFF: 90 AEROSOL, METERED RESPIRATORY (INHALATION) at 16:12

## 2023-01-01 RX ADMIN — ALBUMIN HUMAN 12.5 G: 0.25 SOLUTION INTRAVENOUS at 11:46

## 2023-01-01 RX ADMIN — RIFAXIMIN 550 MG: 550 TABLET ORAL at 19:15

## 2023-01-01 RX ADMIN — MONTELUKAST 10 MG: 10 TABLET, FILM COATED ORAL at 20:58

## 2023-01-01 RX ADMIN — CITALOPRAM HYDROBROMIDE 40 MG: 40 TABLET ORAL at 08:35

## 2023-01-01 RX ADMIN — MIDODRINE HYDROCHLORIDE 15 MG: 5 TABLET ORAL at 13:45

## 2023-01-01 RX ADMIN — MONTELUKAST 10 MG: 10 TABLET, FILM COATED ORAL at 19:02

## 2023-01-01 RX ADMIN — LEVOTHYROXINE SODIUM 112 MCG: 0.11 TABLET ORAL at 07:55

## 2023-01-01 RX ADMIN — RIFAXIMIN 550 MG: 550 TABLET ORAL at 19:55

## 2023-01-01 RX ADMIN — FERROUS SULFATE TAB 325 MG (65 MG ELEMENTAL FE) 325 MG: 325 (65 FE) TAB at 08:00

## 2023-01-01 RX ADMIN — FLUTICASONE FUROATE AND VILANTEROL TRIFENATATE 1 PUFF: 200; 25 POWDER RESPIRATORY (INHALATION) at 10:30

## 2023-01-01 RX ADMIN — SODIUM CHLORIDE: 4 INJECTION, SOLUTION, CONCENTRATE INTRAVENOUS at 10:54

## 2023-01-01 RX ADMIN — LACTULOSE 20 G: 20 SOLUTION ORAL at 15:44

## 2023-01-01 RX ADMIN — Medication 3 MG: at 20:06

## 2023-01-01 RX ADMIN — LORAZEPAM 0.5 MG: 0.5 TABLET ORAL at 20:06

## 2023-01-01 RX ADMIN — RIFAXIMIN 550 MG: 550 TABLET ORAL at 19:59

## 2023-01-01 RX ADMIN — OXYMETAZOLINE HYDROCHLORIDE 2 SPRAY: 0.05 SPRAY NASAL at 08:49

## 2023-01-01 RX ADMIN — IPRATROPIUM BROMIDE AND ALBUTEROL SULFATE 3 ML: .5; 3 SOLUTION RESPIRATORY (INHALATION) at 09:24

## 2023-01-01 RX ADMIN — ACETAMINOPHEN 650 MG: 325 TABLET, FILM COATED ORAL at 18:35

## 2023-01-01 RX ADMIN — ACETAMINOPHEN 650 MG: 325 TABLET, FILM COATED ORAL at 20:19

## 2023-01-01 RX ADMIN — LACTULOSE 20 G: 10 POWDER, FOR SOLUTION ORAL at 08:38

## 2023-01-01 RX ADMIN — RIFAXIMIN 550 MG: 550 TABLET ORAL at 20:10

## 2023-01-01 RX ADMIN — SODIUM ZIRCONIUM CYCLOSILICATE 10 G: 10 POWDER, FOR SUSPENSION ORAL at 13:26

## 2023-01-01 RX ADMIN — PROCHLORPERAZINE MALEATE 5 MG: 5 TABLET ORAL at 07:25

## 2023-01-01 RX ADMIN — FERROUS SULFATE TAB 325 MG (65 MG ELEMENTAL FE) 325 MG: 325 (65 FE) TAB at 08:11

## 2023-01-01 RX ADMIN — PANTOPRAZOLE SODIUM 40 MG: 40 TABLET, DELAYED RELEASE ORAL at 09:23

## 2023-01-01 RX ADMIN — CITALOPRAM HYDROBROMIDE 40 MG: 40 TABLET ORAL at 08:07

## 2023-01-01 RX ADMIN — ALBUMIN HUMAN 12.5 G: 0.25 SOLUTION INTRAVENOUS at 14:59

## 2023-01-01 RX ADMIN — ALBUMIN HUMAN 12.5 G: 0.25 SOLUTION INTRAVENOUS at 15:09

## 2023-01-01 RX ADMIN — RIFAXIMIN 550 MG: 550 TABLET ORAL at 07:55

## 2023-01-01 RX ADMIN — IPRATROPIUM BROMIDE AND ALBUTEROL SULFATE 3 ML: .5; 3 SOLUTION RESPIRATORY (INHALATION) at 09:59

## 2023-01-01 RX ADMIN — LEVOTHYROXINE SODIUM 112 MCG: 0.11 TABLET ORAL at 08:20

## 2023-01-01 RX ADMIN — LACTULOSE 20 G: 10 POWDER, FOR SOLUTION ORAL at 13:45

## 2023-01-01 RX ADMIN — SPIRONOLACTONE 25 MG: 25 TABLET ORAL at 13:45

## 2023-01-01 RX ADMIN — LACTULOSE 20 G: 10 POWDER, FOR SOLUTION ORAL at 08:00

## 2023-01-01 RX ADMIN — MONTELUKAST 10 MG: 10 TABLET, FILM COATED ORAL at 23:09

## 2023-01-01 RX ADMIN — IPRATROPIUM BROMIDE AND ALBUTEROL SULFATE 3 ML: .5; 3 SOLUTION RESPIRATORY (INHALATION) at 08:27

## 2023-01-01 RX ADMIN — LEVOTHYROXINE SODIUM 112 MCG: 0.11 TABLET ORAL at 07:56

## 2023-01-01 RX ADMIN — MIDODRINE HYDROCHLORIDE 15 MG: 5 TABLET ORAL at 21:42

## 2023-01-01 RX ADMIN — MONTELUKAST 10 MG: 10 TABLET, FILM COATED ORAL at 21:04

## 2023-01-01 RX ADMIN — MIDODRINE HYDROCHLORIDE 15 MG: 5 TABLET ORAL at 20:35

## 2023-01-01 RX ADMIN — ALBUMIN HUMAN 12.5 G: 0.25 SOLUTION INTRAVENOUS at 13:45

## 2023-01-01 RX ADMIN — FERROUS SULFATE TAB 325 MG (65 MG ELEMENTAL FE) 325 MG: 325 (65 FE) TAB at 08:14

## 2023-01-01 RX ADMIN — MIDODRINE HYDROCHLORIDE 20 MG: 5 TABLET ORAL at 08:37

## 2023-01-01 RX ADMIN — LORAZEPAM 0.5 MG: 0.5 TABLET ORAL at 19:13

## 2023-01-01 RX ADMIN — RIFAXIMIN 550 MG: 550 TABLET ORAL at 08:01

## 2023-01-01 RX ADMIN — IPRATROPIUM BROMIDE AND ALBUTEROL SULFATE 3 ML: .5; 3 SOLUTION RESPIRATORY (INHALATION) at 20:00

## 2023-01-01 RX ADMIN — SODIUM ZIRCONIUM CYCLOSILICATE 15 G: 10 POWDER, FOR SUSPENSION ORAL at 14:17

## 2023-01-01 RX ADMIN — LIDOCAINE HYDROCHLORIDE 10 ML: 10 INJECTION, SOLUTION EPIDURAL; INFILTRATION; INTRACAUDAL; PERINEURAL at 14:12

## 2023-01-01 RX ADMIN — FLUTICASONE FUROATE AND VILANTEROL TRIFENATATE 1 PUFF: 200; 25 POWDER RESPIRATORY (INHALATION) at 08:04

## 2023-01-01 RX ADMIN — ALBUMIN HUMAN 50 G: 0.25 SOLUTION INTRAVENOUS at 10:50

## 2023-01-01 RX ADMIN — CITALOPRAM HYDROBROMIDE 40 MG: 20 TABLET ORAL at 08:18

## 2023-01-01 RX ADMIN — MIDODRINE HYDROCHLORIDE 15 MG: 5 TABLET ORAL at 21:20

## 2023-01-01 RX ADMIN — FLUTICASONE FUROATE AND VILANTEROL TRIFENATATE 1 PUFF: 200; 25 POWDER RESPIRATORY (INHALATION) at 08:23

## 2023-01-01 RX ADMIN — ACETAMINOPHEN 650 MG: 325 TABLET, FILM COATED ORAL at 07:48

## 2023-01-01 RX ADMIN — PANTOPRAZOLE SODIUM 40 MG: 40 TABLET, DELAYED RELEASE ORAL at 19:50

## 2023-01-01 RX ADMIN — LACTULOSE 20 G: 20 SOLUTION ORAL at 13:01

## 2023-01-01 RX ADMIN — RIFAXIMIN 550 MG: 550 TABLET ORAL at 21:08

## 2023-01-01 RX ADMIN — IVABRADINE 15 MG: 5 TABLET, FILM COATED ORAL at 11:22

## 2023-01-01 RX ADMIN — MIDODRINE HYDROCHLORIDE 15 MG: 5 TABLET ORAL at 20:05

## 2023-01-01 RX ADMIN — SODIUM CHLORIDE, PRESERVATIVE FREE 90 ML: 5 INJECTION INTRAVENOUS at 12:24

## 2023-01-01 RX ADMIN — FERROUS SULFATE TAB 325 MG (65 MG ELEMENTAL FE) 325 MG: 325 (65 FE) TAB at 08:02

## 2023-01-01 RX ADMIN — MIDODRINE HYDROCHLORIDE 15 MG: 5 TABLET ORAL at 09:29

## 2023-01-01 RX ADMIN — IPRATROPIUM BROMIDE AND ALBUTEROL SULFATE 3 ML: .5; 3 SOLUTION RESPIRATORY (INHALATION) at 20:36

## 2023-01-01 RX ADMIN — MIDODRINE HYDROCHLORIDE 15 MG: 5 TABLET ORAL at 13:01

## 2023-01-01 RX ADMIN — LACTULOSE 20 G: 10 POWDER, FOR SOLUTION ORAL at 21:42

## 2023-01-01 RX ADMIN — LACTULOSE 20 G: 10 POWDER, FOR SOLUTION ORAL at 07:50

## 2023-01-01 RX ADMIN — MIDODRINE HYDROCHLORIDE 15 MG: 5 TABLET ORAL at 14:08

## 2023-01-01 RX ADMIN — ALBUMIN HUMAN 25 G: 0.25 SOLUTION INTRAVENOUS at 07:37

## 2023-01-01 RX ADMIN — FLUTICASONE FUROATE AND VILANTEROL TRIFENATATE 1 PUFF: 200; 25 POWDER RESPIRATORY (INHALATION) at 08:36

## 2023-01-01 RX ADMIN — FERROUS SULFATE TAB 325 MG (65 MG ELEMENTAL FE) 325 MG: 325 (65 FE) TAB at 07:48

## 2023-01-01 RX ADMIN — LEVOTHYROXINE SODIUM 112 MCG: 0.11 TABLET ORAL at 08:50

## 2023-01-01 RX ADMIN — FLUTICASONE FUROATE AND VILANTEROL TRIFENATATE 1 PUFF: 100; 25 POWDER RESPIRATORY (INHALATION) at 08:48

## 2023-01-01 RX ADMIN — SODIUM ZIRCONIUM CYCLOSILICATE 10 G: 10 POWDER, FOR SUSPENSION ORAL at 10:35

## 2023-01-01 RX ADMIN — CITALOPRAM HYDROBROMIDE 40 MG: 20 TABLET ORAL at 08:51

## 2023-01-01 RX ADMIN — RIFAXIMIN 550 MG: 550 TABLET ORAL at 20:07

## 2023-01-01 RX ADMIN — ALBUMIN HUMAN 25 G: 0.25 SOLUTION INTRAVENOUS at 15:36

## 2023-01-01 RX ADMIN — RIFAXIMIN 550 MG: 550 TABLET ORAL at 07:37

## 2023-01-01 RX ADMIN — IPRATROPIUM BROMIDE AND ALBUTEROL SULFATE 3 ML: .5; 3 SOLUTION RESPIRATORY (INHALATION) at 17:00

## 2023-01-01 RX ADMIN — PANTOPRAZOLE SODIUM 40 MG: 40 TABLET, DELAYED RELEASE ORAL at 08:05

## 2023-01-01 RX ADMIN — MONTELUKAST 10 MG: 10 TABLET, FILM COATED ORAL at 20:56

## 2023-01-01 RX ADMIN — FLUTICASONE FUROATE AND VILANTEROL TRIFENATATE 1 PUFF: 200; 25 POWDER RESPIRATORY (INHALATION) at 08:03

## 2023-01-01 RX ADMIN — MIDODRINE HYDROCHLORIDE 15 MG: 5 TABLET ORAL at 14:01

## 2023-01-01 RX ADMIN — FERROUS SULFATE TAB 325 MG (65 MG ELEMENTAL FE) 325 MG: 325 (65 FE) TAB at 09:23

## 2023-01-01 RX ADMIN — LACTULOSE 20 G: 20 SOLUTION ORAL at 14:00

## 2023-01-01 RX ADMIN — ALBUTEROL SULFATE 2 PUFF: 90 AEROSOL, METERED RESPIRATORY (INHALATION) at 16:37

## 2023-01-01 RX ADMIN — FERROUS SULFATE TAB 325 MG (65 MG ELEMENTAL FE) 325 MG: 325 (65 FE) TAB at 09:29

## 2023-01-01 RX ADMIN — MIDODRINE HYDROCHLORIDE 20 MG: 5 TABLET ORAL at 19:03

## 2023-01-01 RX ADMIN — ALBUMIN HUMAN 25 G: 0.25 SOLUTION INTRAVENOUS at 15:23

## 2023-01-01 RX ADMIN — LIDOCAINE HYDROCHLORIDE 5 ML: 10 INJECTION, SOLUTION EPIDURAL; INFILTRATION; INTRACAUDAL; PERINEURAL at 09:06

## 2023-01-01 RX ADMIN — FERROUS SULFATE TAB 325 MG (65 MG ELEMENTAL FE) 325 MG: 325 (65 FE) TAB at 07:46

## 2023-01-01 RX ADMIN — MIDODRINE HYDROCHLORIDE 15 MG: 5 TABLET ORAL at 21:08

## 2023-01-01 RX ADMIN — MIDODRINE HYDROCHLORIDE 15 MG: 5 TABLET ORAL at 13:26

## 2023-01-01 RX ADMIN — FLUTICASONE FUROATE AND VILANTEROL TRIFENATATE 1 PUFF: 100; 25 POWDER RESPIRATORY (INHALATION) at 08:30

## 2023-01-01 RX ADMIN — LACTULOSE 20 G: 20 SOLUTION ORAL at 14:11

## 2023-01-01 RX ADMIN — FLUTICASONE FUROATE AND VILANTEROL TRIFENATATE 1 PUFF: 200; 25 POWDER RESPIRATORY (INHALATION) at 07:55

## 2023-01-01 RX ADMIN — CITALOPRAM HYDROBROMIDE 40 MG: 40 TABLET ORAL at 08:52

## 2023-01-01 RX ADMIN — TERLIPRESSIN 0.85 MG: 0.85 INJECTION, POWDER, LYOPHILIZED, FOR SOLUTION INTRAVENOUS at 16:38

## 2023-01-01 RX ADMIN — CEFTRIAXONE SODIUM 1 G: 1 INJECTION, POWDER, FOR SOLUTION INTRAMUSCULAR; INTRAVENOUS at 15:29

## 2023-01-01 RX ADMIN — MIDODRINE HYDROCHLORIDE 15 MG: 5 TABLET ORAL at 14:11

## 2023-01-01 RX ADMIN — MONTELUKAST 10 MG: 10 TABLET, FILM COATED ORAL at 22:29

## 2023-01-01 RX ADMIN — RIFAXIMIN 550 MG: 550 TABLET ORAL at 20:02

## 2023-01-01 RX ADMIN — ALBUMIN HUMAN 25 G: 0.25 SOLUTION INTRAVENOUS at 09:50

## 2023-01-01 RX ADMIN — RIFAXIMIN 550 MG: 550 TABLET ORAL at 19:14

## 2023-01-01 RX ADMIN — LIDOCAINE HYDROCHLORIDE 10 ML: 10 INJECTION, SOLUTION EPIDURAL; INFILTRATION; INTRACAUDAL; PERINEURAL at 09:51

## 2023-01-01 RX ADMIN — FERROUS SULFATE TAB 325 MG (65 MG ELEMENTAL FE) 325 MG: 325 (65 FE) TAB at 08:59

## 2023-01-01 RX ADMIN — MONTELUKAST 10 MG: 10 TABLET, FILM COATED ORAL at 20:36

## 2023-01-01 RX ADMIN — LORAZEPAM 0.25 MG: 2 INJECTION INTRAMUSCULAR; INTRAVENOUS at 04:51

## 2023-01-01 RX ADMIN — RIFAXIMIN 550 MG: 550 TABLET ORAL at 20:14

## 2023-01-01 RX ADMIN — LACTULOSE 20 G: 20 SOLUTION ORAL at 08:18

## 2023-01-01 RX ADMIN — LIDOCAINE HYDROCHLORIDE 8 ML: 10 INJECTION, SOLUTION EPIDURAL; INFILTRATION; INTRACAUDAL; PERINEURAL at 10:03

## 2023-01-01 RX ADMIN — CITALOPRAM HYDROBROMIDE 40 MG: 20 TABLET ORAL at 08:08

## 2023-01-01 RX ADMIN — PANTOPRAZOLE SODIUM 40 MG: 40 TABLET, DELAYED RELEASE ORAL at 07:51

## 2023-01-01 RX ADMIN — CITALOPRAM HYDROBROMIDE 40 MG: 40 TABLET ORAL at 08:18

## 2023-01-01 RX ADMIN — LACTULOSE 20 G: 20 SOLUTION ORAL at 19:10

## 2023-01-01 RX ADMIN — MIDODRINE HYDROCHLORIDE 15 MG: 5 TABLET ORAL at 19:50

## 2023-01-01 RX ADMIN — ACETAMINOPHEN 650 MG: 325 TABLET, FILM COATED ORAL at 14:52

## 2023-01-01 RX ADMIN — MIDODRINE HYDROCHLORIDE 15 MG: 5 TABLET ORAL at 08:00

## 2023-01-01 RX ADMIN — PANTOPRAZOLE SODIUM 40 MG: 40 TABLET, DELAYED RELEASE ORAL at 08:58

## 2023-01-01 RX ADMIN — LACTULOSE 20 G: 10 POWDER, FOR SOLUTION ORAL at 20:28

## 2023-01-01 RX ADMIN — RIFAXIMIN 550 MG: 550 TABLET ORAL at 21:04

## 2023-01-01 RX ADMIN — ALBUMIN HUMAN 50 G: 0.25 SOLUTION INTRAVENOUS at 15:08

## 2023-01-01 RX ADMIN — PANTOPRAZOLE SODIUM 40 MG: 40 TABLET, DELAYED RELEASE ORAL at 21:08

## 2023-01-01 RX ADMIN — FUROSEMIDE 20 MG: 20 TABLET ORAL at 16:48

## 2023-01-01 RX ADMIN — FLUTICASONE FUROATE AND VILANTEROL TRIFENATATE 1 PUFF: 200; 25 POWDER RESPIRATORY (INHALATION) at 08:58

## 2023-01-01 RX ADMIN — ALBUMIN HUMAN 50 G: 0.25 SOLUTION INTRAVENOUS at 08:53

## 2023-01-01 RX ADMIN — TERLIPRESSIN 0.85 MG: 0.85 INJECTION, POWDER, LYOPHILIZED, FOR SOLUTION INTRAVENOUS at 00:03

## 2023-01-01 RX ADMIN — LACTULOSE 20 G: 20 SOLUTION ORAL at 08:35

## 2023-01-01 RX ADMIN — MONTELUKAST 10 MG: 10 TABLET, FILM COATED ORAL at 21:40

## 2023-01-01 RX ADMIN — PROCHLORPERAZINE EDISYLATE 5 MG: 5 INJECTION INTRAMUSCULAR; INTRAVENOUS at 17:07

## 2023-01-01 RX ADMIN — POLYETHYLENE GLYCOL 3350, SODIUM SULFATE ANHYDROUS, SODIUM BICARBONATE, SODIUM CHLORIDE, POTASSIUM CHLORIDE 1000 ML: 236; 22.74; 6.74; 5.86; 2.97 POWDER, FOR SOLUTION ORAL at 09:41

## 2023-01-01 RX ADMIN — MIDODRINE HYDROCHLORIDE 15 MG: 5 TABLET ORAL at 19:57

## 2023-01-01 RX ADMIN — MIDODRINE HYDROCHLORIDE 15 MG: 5 TABLET ORAL at 20:52

## 2023-01-01 RX ADMIN — LACTULOSE 20 G: 20 SOLUTION ORAL at 20:40

## 2023-01-01 RX ADMIN — MIDODRINE HYDROCHLORIDE 15 MG: 5 TABLET ORAL at 14:58

## 2023-01-01 RX ADMIN — ACETAMINOPHEN 650 MG: 325 TABLET, FILM COATED ORAL at 20:24

## 2023-01-01 RX ADMIN — SODIUM CHLORIDE: 3 INJECTION, SOLUTION INTRAVENOUS at 09:17

## 2023-01-01 RX ADMIN — LACTULOSE 20 G: 20 SOLUTION ORAL at 09:10

## 2023-01-01 RX ADMIN — ALBUMIN HUMAN 50 G: 0.25 SOLUTION INTRAVENOUS at 08:27

## 2023-01-01 RX ADMIN — OXYMETAZOLINE HYDROCHLORIDE 2 SPRAY: 0.05 SPRAY NASAL at 20:10

## 2023-01-01 RX ADMIN — LEVOTHYROXINE SODIUM 112 MCG: 0.11 TABLET ORAL at 08:08

## 2023-01-01 RX ADMIN — SODIUM CHLORIDE: 4 INJECTION, SOLUTION, CONCENTRATE INTRAVENOUS at 22:47

## 2023-01-01 RX ADMIN — SODIUM CHLORIDE 250 ML: 9 INJECTION, SOLUTION INTRAVENOUS at 18:40

## 2023-01-01 RX ADMIN — TERLIPRESSIN 0.85 MG: 0.85 INJECTION, POWDER, LYOPHILIZED, FOR SOLUTION INTRAVENOUS at 20:17

## 2023-01-01 RX ADMIN — LACTULOSE 20 G: 20 SOLUTION ORAL at 15:29

## 2023-01-01 RX ADMIN — ALBUMIN HUMAN 50 G: 0.25 SOLUTION INTRAVENOUS at 16:04

## 2023-01-01 RX ADMIN — RIFAXIMIN 550 MG: 550 TABLET ORAL at 20:19

## 2023-01-01 RX ADMIN — FERROUS SULFATE TAB 325 MG (65 MG ELEMENTAL FE) 325 MG: 325 (65 FE) TAB at 07:38

## 2023-01-01 RX ADMIN — ACETAMINOPHEN 650 MG: 325 TABLET, FILM COATED ORAL at 02:21

## 2023-01-01 RX ADMIN — RIFAXIMIN 550 MG: 550 TABLET ORAL at 20:54

## 2023-01-01 RX ADMIN — MIDODRINE HYDROCHLORIDE 15 MG: 5 TABLET ORAL at 08:24

## 2023-01-01 RX ADMIN — IPRATROPIUM BROMIDE AND ALBUTEROL SULFATE 3 ML: .5; 3 SOLUTION RESPIRATORY (INHALATION) at 08:03

## 2023-01-01 RX ADMIN — LORAZEPAM 0.5 MG: 0.5 TABLET ORAL at 20:00

## 2023-01-01 RX ADMIN — CITALOPRAM HYDROBROMIDE 40 MG: 20 TABLET ORAL at 08:07

## 2023-01-01 RX ADMIN — TERLIPRESSIN 0.85 MG: 0.85 INJECTION, POWDER, LYOPHILIZED, FOR SOLUTION INTRAVENOUS at 05:16

## 2023-01-01 RX ADMIN — LACTULOSE 20 G: 20 SOLUTION ORAL at 13:46

## 2023-01-01 RX ADMIN — LEVOTHYROXINE SODIUM 112 MCG: 0.11 TABLET ORAL at 07:38

## 2023-01-01 RX ADMIN — SODIUM CHLORIDE: 4 INJECTION, SOLUTION, CONCENTRATE INTRAVENOUS at 03:20

## 2023-01-01 RX ADMIN — MIDODRINE HYDROCHLORIDE 15 MG: 5 TABLET ORAL at 20:14

## 2023-01-01 RX ADMIN — LACTULOSE 20 G: 20 SOLUTION ORAL at 13:31

## 2023-01-01 RX ADMIN — LIDOCAINE HYDROCHLORIDE 6 ML: 10 INJECTION, SOLUTION EPIDURAL; INFILTRATION; INTRACAUDAL; PERINEURAL at 09:08

## 2023-01-01 RX ADMIN — LEVOTHYROXINE SODIUM 112 MCG: 0.11 TABLET ORAL at 08:11

## 2023-01-01 RX ADMIN — TERLIPRESSIN 0.85 MG: 0.85 INJECTION, POWDER, LYOPHILIZED, FOR SOLUTION INTRAVENOUS at 22:10

## 2023-01-01 RX ADMIN — MONTELUKAST 10 MG: 10 TABLET, FILM COATED ORAL at 19:15

## 2023-01-01 RX ADMIN — CITALOPRAM HYDROBROMIDE 40 MG: 20 TABLET ORAL at 07:59

## 2023-01-01 RX ADMIN — SODIUM CHLORIDE 500 ML: 9 INJECTION, SOLUTION INTRAVENOUS at 08:29

## 2023-01-01 RX ADMIN — IPRATROPIUM BROMIDE AND ALBUTEROL SULFATE 3 ML: .5; 3 SOLUTION RESPIRATORY (INHALATION) at 13:15

## 2023-01-01 RX ADMIN — FERROUS SULFATE TAB 325 MG (65 MG ELEMENTAL FE) 325 MG: 325 (65 FE) TAB at 07:47

## 2023-01-01 RX ADMIN — RIFAXIMIN 550 MG: 550 TABLET ORAL at 09:23

## 2023-01-01 RX ADMIN — SODIUM CHLORIDE: 4 INJECTION, SOLUTION, CONCENTRATE INTRAVENOUS at 15:32

## 2023-01-01 RX ADMIN — FERROUS SULFATE TAB 325 MG (65 MG ELEMENTAL FE) 325 MG: 325 (65 FE) TAB at 08:10

## 2023-01-01 RX ADMIN — RIFAXIMIN 550 MG: 550 TABLET ORAL at 07:25

## 2023-01-01 RX ADMIN — MONTELUKAST 10 MG: 10 TABLET, FILM COATED ORAL at 19:43

## 2023-01-01 RX ADMIN — LORAZEPAM 0.5 MG: 0.5 TABLET ORAL at 22:09

## 2023-01-01 RX ADMIN — LACTULOSE 20 G: 20 SOLUTION ORAL at 08:14

## 2023-01-01 RX ADMIN — RIFAXIMIN 550 MG: 550 TABLET ORAL at 19:52

## 2023-01-01 RX ADMIN — LACTULOSE 20 G: 20 SOLUTION ORAL at 21:19

## 2023-01-01 RX ADMIN — LIDOCAINE HYDROCHLORIDE 8 ML: 10 INJECTION, SOLUTION EPIDURAL; INFILTRATION; INTRACAUDAL; PERINEURAL at 13:28

## 2023-01-01 RX ADMIN — IPRATROPIUM BROMIDE AND ALBUTEROL SULFATE 3 ML: .5; 3 SOLUTION RESPIRATORY (INHALATION) at 15:49

## 2023-01-01 RX ADMIN — MONTELUKAST 10 MG: 10 TABLET, FILM COATED ORAL at 21:50

## 2023-01-01 RX ADMIN — RIFAXIMIN 550 MG: 550 TABLET ORAL at 08:11

## 2023-01-01 RX ADMIN — MIDODRINE HYDROCHLORIDE 15 MG: 5 TABLET ORAL at 20:21

## 2023-01-01 RX ADMIN — FLUTICASONE FUROATE AND VILANTEROL TRIFENATATE 1 PUFF: 200; 25 POWDER RESPIRATORY (INHALATION) at 08:12

## 2023-01-01 RX ADMIN — MIDODRINE HYDROCHLORIDE 15 MG: 5 TABLET ORAL at 08:27

## 2023-01-01 RX ADMIN — CITALOPRAM HYDROBROMIDE 40 MG: 20 TABLET ORAL at 08:15

## 2023-01-01 RX ADMIN — MIDODRINE HYDROCHLORIDE 15 MG: 5 TABLET ORAL at 21:40

## 2023-01-01 RX ADMIN — LORAZEPAM 0.5 MG: 0.5 TABLET ORAL at 11:39

## 2023-01-01 RX ADMIN — RIFAXIMIN 550 MG: 550 TABLET ORAL at 20:12

## 2023-01-01 RX ADMIN — PANTOPRAZOLE SODIUM 40 MG: 40 TABLET, DELAYED RELEASE ORAL at 20:12

## 2023-01-01 RX ADMIN — FERROUS SULFATE TAB 325 MG (65 MG ELEMENTAL FE) 325 MG: 325 (65 FE) TAB at 08:20

## 2023-01-01 RX ADMIN — Medication 3 MG: at 21:40

## 2023-01-01 RX ADMIN — CITALOPRAM HYDROBROMIDE 40 MG: 20 TABLET ORAL at 07:48

## 2023-01-01 RX ADMIN — MIDODRINE HYDROCHLORIDE 15 MG: 5 TABLET ORAL at 16:05

## 2023-01-01 RX ADMIN — PANTOPRAZOLE SODIUM 40 MG: 40 TABLET, DELAYED RELEASE ORAL at 08:24

## 2023-01-01 RX ADMIN — LACTULOSE 20 G: 10 POWDER, FOR SOLUTION ORAL at 14:33

## 2023-01-01 RX ADMIN — MONTELUKAST 10 MG: 10 TABLET, FILM COATED ORAL at 20:15

## 2023-01-01 RX ADMIN — MIDODRINE HYDROCHLORIDE 20 MG: 5 TABLET ORAL at 07:46

## 2023-01-01 RX ADMIN — ACETAMINOPHEN 650 MG: 325 TABLET, FILM COATED ORAL at 14:08

## 2023-01-01 RX ADMIN — LACTULOSE 20 G: 20 SOLUTION ORAL at 07:25

## 2023-01-01 RX ADMIN — MIDODRINE HYDROCHLORIDE 20 MG: 5 TABLET ORAL at 07:59

## 2023-01-01 RX ADMIN — LACTULOSE 20 G: 20 SOLUTION ORAL at 08:02

## 2023-01-01 RX ADMIN — LACTULOSE 20 G: 20 SOLUTION ORAL at 13:45

## 2023-01-01 RX ADMIN — FERROUS SULFATE TAB 325 MG (65 MG ELEMENTAL FE) 325 MG: 325 (65 FE) TAB at 08:05

## 2023-01-01 RX ADMIN — FERROUS SULFATE TAB 325 MG (65 MG ELEMENTAL FE) 325 MG: 325 (65 FE) TAB at 09:11

## 2023-01-01 ASSESSMENT — ACTIVITIES OF DAILY LIVING (ADL)
ADLS_ACUITY_SCORE: 35
ADLS_ACUITY_SCORE: 33
ADLS_ACUITY_SCORE: 30
ADLS_ACUITY_SCORE: 32
ADLS_ACUITY_SCORE: 33
ADLS_ACUITY_SCORE: 32
ADLS_ACUITY_SCORE: 30
ADLS_ACUITY_SCORE: 33
ADLS_ACUITY_SCORE: 31
ADLS_ACUITY_SCORE: 32
ADLS_ACUITY_SCORE: 33
ADLS_ACUITY_SCORE: 30
ADLS_ACUITY_SCORE: 33
ADLS_ACUITY_SCORE: 32
ADLS_ACUITY_SCORE: 32
ADLS_ACUITY_SCORE: 38
ADLS_ACUITY_SCORE: 30
ADLS_ACUITY_SCORE: 33
ADLS_ACUITY_SCORE: 32
ADLS_ACUITY_SCORE: 30
ADLS_ACUITY_SCORE: 38
ADLS_ACUITY_SCORE: 36
ADLS_ACUITY_SCORE: 35
ADLS_ACUITY_SCORE: 26
ADLS_ACUITY_SCORE: 33
ADLS_ACUITY_SCORE: 31
ADLS_ACUITY_SCORE: 32
ADLS_ACUITY_SCORE: 31
ADLS_ACUITY_SCORE: 31
ADLS_ACUITY_SCORE: 30
ADLS_ACUITY_SCORE: 30
ADLS_ACUITY_SCORE: 32
ADLS_ACUITY_SCORE: 31
ADLS_ACUITY_SCORE: 33
ADLS_ACUITY_SCORE: 30
ADLS_ACUITY_SCORE: 33
ADLS_ACUITY_SCORE: 32
ADLS_ACUITY_SCORE: 32
ADLS_ACUITY_SCORE: 33
ADLS_ACUITY_SCORE: 32
ADLS_ACUITY_SCORE: 32
ADLS_ACUITY_SCORE: 30
ADLS_ACUITY_SCORE: 32
ADLS_ACUITY_SCORE: 33
ADLS_ACUITY_SCORE: 28
ADLS_ACUITY_SCORE: 38
ADLS_ACUITY_SCORE: 28
ADLS_ACUITY_SCORE: 38
ADLS_ACUITY_SCORE: 31
ADLS_ACUITY_SCORE: 32
ADLS_ACUITY_SCORE: 31
ADLS_ACUITY_SCORE: 31
ADLS_ACUITY_SCORE: 33
ADLS_ACUITY_SCORE: 30
ADLS_ACUITY_SCORE: 35
ADLS_ACUITY_SCORE: 32
ADLS_ACUITY_SCORE: 32
ADLS_ACUITY_SCORE: 33
ADLS_ACUITY_SCORE: 33
ADLS_ACUITY_SCORE: 32
ADLS_ACUITY_SCORE: 31
DEPENDENT_IADLS:: CLEANING;LAUNDRY;COOKING;SHOPPING;MEAL PREPARATION;MEDICATION MANAGEMENT;TRANSPORTATION
ADLS_ACUITY_SCORE: 32
ADLS_ACUITY_SCORE: 35
ADLS_ACUITY_SCORE: 31
ADLS_ACUITY_SCORE: 32
ADLS_ACUITY_SCORE: 32
ADLS_ACUITY_SCORE: 31
ADLS_ACUITY_SCORE: 31
ADLS_ACUITY_SCORE: 33
ADLS_ACUITY_SCORE: 31
ADLS_ACUITY_SCORE: 32
ADLS_ACUITY_SCORE: 33
ADLS_ACUITY_SCORE: 30
ADLS_ACUITY_SCORE: 33
ADLS_ACUITY_SCORE: 31
ADLS_ACUITY_SCORE: 32
ADLS_ACUITY_SCORE: 32
ADLS_ACUITY_SCORE: 28
ADLS_ACUITY_SCORE: 30
ADLS_ACUITY_SCORE: 28
ADLS_ACUITY_SCORE: 33
ADLS_ACUITY_SCORE: 35
ADLS_ACUITY_SCORE: 33
ADLS_ACUITY_SCORE: 32
ADLS_ACUITY_SCORE: 31
ADLS_ACUITY_SCORE: 27
ADLS_ACUITY_SCORE: 33
ADLS_ACUITY_SCORE: 30
ADLS_ACUITY_SCORE: 30
ADLS_ACUITY_SCORE: 31
ADLS_ACUITY_SCORE: 32
ADLS_ACUITY_SCORE: 31
ADLS_ACUITY_SCORE: 30
ADLS_ACUITY_SCORE: 33
ADLS_ACUITY_SCORE: 31
ADLS_ACUITY_SCORE: 28
ADLS_ACUITY_SCORE: 30
ADLS_ACUITY_SCORE: 32
ADLS_ACUITY_SCORE: 30
ADLS_ACUITY_SCORE: 29
ADLS_ACUITY_SCORE: 30
ADLS_ACUITY_SCORE: 31
ADLS_ACUITY_SCORE: 32
ADLS_ACUITY_SCORE: 31
ADLS_ACUITY_SCORE: 33
ADLS_ACUITY_SCORE: 28
ADLS_ACUITY_SCORE: 36
ADLS_ACUITY_SCORE: 31
ADLS_ACUITY_SCORE: 30
ADLS_ACUITY_SCORE: 31
ADLS_ACUITY_SCORE: 32
ADLS_ACUITY_SCORE: 31
ADLS_ACUITY_SCORE: 30
ADLS_ACUITY_SCORE: 33
ADLS_ACUITY_SCORE: 33
ADLS_ACUITY_SCORE: 31
ADLS_ACUITY_SCORE: 30
ADLS_ACUITY_SCORE: 32
ADLS_ACUITY_SCORE: 35
ADLS_ACUITY_SCORE: 30
ADLS_ACUITY_SCORE: 30
ADLS_ACUITY_SCORE: 31
ADLS_ACUITY_SCORE: 32
ADLS_ACUITY_SCORE: 31
ADLS_ACUITY_SCORE: 31
ADLS_ACUITY_SCORE: 30
ADLS_ACUITY_SCORE: 28
ADLS_ACUITY_SCORE: 30
ADLS_ACUITY_SCORE: 35
ADLS_ACUITY_SCORE: 30
ADLS_ACUITY_SCORE: 31
ADLS_ACUITY_SCORE: 31
ADLS_ACUITY_SCORE: 32
ADLS_ACUITY_SCORE: 31
ADLS_ACUITY_SCORE: 31
ADLS_ACUITY_SCORE: 36
ADLS_ACUITY_SCORE: 30
ADLS_ACUITY_SCORE: 33
ADLS_ACUITY_SCORE: 38
ADLS_ACUITY_SCORE: 31
ADLS_ACUITY_SCORE: 35
ADLS_ACUITY_SCORE: 33
ADLS_ACUITY_SCORE: 33
ADLS_ACUITY_SCORE: 32
ADLS_ACUITY_SCORE: 32
ADLS_ACUITY_SCORE: 35
ADLS_ACUITY_SCORE: 31
ADLS_ACUITY_SCORE: 30
ADLS_ACUITY_SCORE: 30
ADLS_ACUITY_SCORE: 31
ADLS_ACUITY_SCORE: 35
ADLS_ACUITY_SCORE: 32
ADLS_ACUITY_SCORE: 28
ADLS_ACUITY_SCORE: 33
ADLS_ACUITY_SCORE: 32
ADLS_ACUITY_SCORE: 35
ADLS_ACUITY_SCORE: 30
ADLS_ACUITY_SCORE: 30
ADLS_ACUITY_SCORE: 32
ADLS_ACUITY_SCORE: 33
ADLS_ACUITY_SCORE: 32
ADLS_ACUITY_SCORE: 32
ADLS_ACUITY_SCORE: 38
ADLS_ACUITY_SCORE: 32
ADLS_ACUITY_SCORE: 32
ADLS_ACUITY_SCORE: 33
ADLS_ACUITY_SCORE: 32
ADLS_ACUITY_SCORE: 30
ADLS_ACUITY_SCORE: 31
ADLS_ACUITY_SCORE: 32
ADLS_ACUITY_SCORE: 33
ADLS_ACUITY_SCORE: 28
ADLS_ACUITY_SCORE: 30
ADLS_ACUITY_SCORE: 32
ADLS_ACUITY_SCORE: 33
ADLS_ACUITY_SCORE: 26
ADLS_ACUITY_SCORE: 30
ADLS_ACUITY_SCORE: 33
ADLS_ACUITY_SCORE: 32
ADLS_ACUITY_SCORE: 33
ADLS_ACUITY_SCORE: 35
ADLS_ACUITY_SCORE: 30
ADLS_ACUITY_SCORE: 32
ADLS_ACUITY_SCORE: 32
ADLS_ACUITY_SCORE: 35
ADLS_ACUITY_SCORE: 36
ADLS_ACUITY_SCORE: 33
ADLS_ACUITY_SCORE: 31
ADLS_ACUITY_SCORE: 32
ADLS_ACUITY_SCORE: 32
ADLS_ACUITY_SCORE: 31
ADLS_ACUITY_SCORE: 33
ADLS_ACUITY_SCORE: 32
ADLS_ACUITY_SCORE: 33
ADLS_ACUITY_SCORE: 32
ADLS_ACUITY_SCORE: 31
ADLS_ACUITY_SCORE: 32
ADLS_ACUITY_SCORE: 31
ADLS_ACUITY_SCORE: 33
ADLS_ACUITY_SCORE: 26
ADLS_ACUITY_SCORE: 31
ADLS_ACUITY_SCORE: 31
ADLS_ACUITY_SCORE: 32
ADLS_ACUITY_SCORE: 30
ADLS_ACUITY_SCORE: 32
ADLS_ACUITY_SCORE: 30
ADLS_ACUITY_SCORE: 32
ADLS_ACUITY_SCORE: 30
ADLS_ACUITY_SCORE: 33
ADLS_ACUITY_SCORE: 33
ADLS_ACUITY_SCORE: 32
ADLS_ACUITY_SCORE: 32
ADLS_ACUITY_SCORE: 33
ADLS_ACUITY_SCORE: 30
ADLS_ACUITY_SCORE: 38
ADLS_ACUITY_SCORE: 28
ADLS_ACUITY_SCORE: 33
ADLS_ACUITY_SCORE: 30
ADLS_ACUITY_SCORE: 31
ADLS_ACUITY_SCORE: 32
ADLS_ACUITY_SCORE: 32
ADLS_ACUITY_SCORE: 31
ADLS_ACUITY_SCORE: 33
ADLS_ACUITY_SCORE: 31
ADLS_ACUITY_SCORE: 36
ADLS_ACUITY_SCORE: 31
ADLS_ACUITY_SCORE: 30
ADLS_ACUITY_SCORE: 32
ADLS_ACUITY_SCORE: 30
ADLS_ACUITY_SCORE: 31
ADLS_ACUITY_SCORE: 31
ADLS_ACUITY_SCORE: 33
ADLS_ACUITY_SCORE: 31
ADLS_ACUITY_SCORE: 32
ADLS_ACUITY_SCORE: 33
ADLS_ACUITY_SCORE: 35
ADLS_ACUITY_SCORE: 31
ADLS_ACUITY_SCORE: 31
ADLS_ACUITY_SCORE: 32
ADLS_ACUITY_SCORE: 36
ADLS_ACUITY_SCORE: 36
ADLS_ACUITY_SCORE: 33
ADLS_ACUITY_SCORE: 32
ADLS_ACUITY_SCORE: 28
ADLS_ACUITY_SCORE: 32
ADLS_ACUITY_SCORE: 30
ADLS_ACUITY_SCORE: 31
ADLS_ACUITY_SCORE: 33
ADLS_ACUITY_SCORE: 32
ADLS_ACUITY_SCORE: 32
ADLS_ACUITY_SCORE: 31
ADLS_ACUITY_SCORE: 32
ADLS_ACUITY_SCORE: 35
ADLS_ACUITY_SCORE: 31
ADLS_ACUITY_SCORE: 33
ADLS_ACUITY_SCORE: 31
ADLS_ACUITY_SCORE: 32
ADLS_ACUITY_SCORE: 33
ADLS_ACUITY_SCORE: 31
ADLS_ACUITY_SCORE: 32
ADLS_ACUITY_SCORE: 28
ADLS_ACUITY_SCORE: 33
ADLS_ACUITY_SCORE: 30
ADLS_ACUITY_SCORE: 31
ADLS_ACUITY_SCORE: 32
ADLS_ACUITY_SCORE: 33
ADLS_ACUITY_SCORE: 30
ADLS_ACUITY_SCORE: 26
ADLS_ACUITY_SCORE: 31
ADLS_ACUITY_SCORE: 32
ADLS_ACUITY_SCORE: 31
ADLS_ACUITY_SCORE: 38
ADLS_ACUITY_SCORE: 31
ADLS_ACUITY_SCORE: 32
ADLS_ACUITY_SCORE: 30
ADLS_ACUITY_SCORE: 33
ADLS_ACUITY_SCORE: 30
ADLS_ACUITY_SCORE: 31
ADLS_ACUITY_SCORE: 30
ADLS_ACUITY_SCORE: 33
ADLS_ACUITY_SCORE: 32
ADLS_ACUITY_SCORE: 32
ADLS_ACUITY_SCORE: 30
ADLS_ACUITY_SCORE: 29
ADLS_ACUITY_SCORE: 32
ADLS_ACUITY_SCORE: 31
ADLS_ACUITY_SCORE: 33
ADLS_ACUITY_SCORE: 30
ADLS_ACUITY_SCORE: 36
ADLS_ACUITY_SCORE: 38
ADLS_ACUITY_SCORE: 36
ADLS_ACUITY_SCORE: 31
ADLS_ACUITY_SCORE: 30
ADLS_ACUITY_SCORE: 31
ADLS_ACUITY_SCORE: 32
ADLS_ACUITY_SCORE: 30
ADLS_ACUITY_SCORE: 33
ADLS_ACUITY_SCORE: 32
ADLS_ACUITY_SCORE: 32
ADLS_ACUITY_SCORE: 30
ADLS_ACUITY_SCORE: 31
ADLS_ACUITY_SCORE: 31
ADLS_ACUITY_SCORE: 30
ADLS_ACUITY_SCORE: 30
ADLS_ACUITY_SCORE: 31
ADLS_ACUITY_SCORE: 32
ADLS_ACUITY_SCORE: 30
ADLS_ACUITY_SCORE: 26
ADLS_ACUITY_SCORE: 30
ADLS_ACUITY_SCORE: 30
ADLS_ACUITY_SCORE: 32
ADLS_ACUITY_SCORE: 31
ADLS_ACUITY_SCORE: 33
ADLS_ACUITY_SCORE: 31
ADLS_ACUITY_SCORE: 32
ADLS_ACUITY_SCORE: 31
ADLS_ACUITY_SCORE: 32
ADLS_ACUITY_SCORE: 31
ADLS_ACUITY_SCORE: 35
ADLS_ACUITY_SCORE: 32
ADLS_ACUITY_SCORE: 35
ADLS_ACUITY_SCORE: 26
ADLS_ACUITY_SCORE: 31
ADLS_ACUITY_SCORE: 32
ADLS_ACUITY_SCORE: 33
ADLS_ACUITY_SCORE: 31
ADLS_ACUITY_SCORE: 32
ADLS_ACUITY_SCORE: 33
ADLS_ACUITY_SCORE: 30
ADLS_ACUITY_SCORE: 32
ADLS_ACUITY_SCORE: 31
ADLS_ACUITY_SCORE: 32
ADLS_ACUITY_SCORE: 32
ADLS_ACUITY_SCORE: 30
ADLS_ACUITY_SCORE: 33
ADLS_ACUITY_SCORE: 32
ADLS_ACUITY_SCORE: 30
ADLS_ACUITY_SCORE: 34
ADLS_ACUITY_SCORE: 33
ADLS_ACUITY_SCORE: 31
ADLS_ACUITY_SCORE: 32
ADLS_ACUITY_SCORE: 31
ADLS_ACUITY_SCORE: 31
ADLS_ACUITY_SCORE: 32
ADLS_ACUITY_SCORE: 35
ADLS_ACUITY_SCORE: 30
ADLS_ACUITY_SCORE: 34
ADLS_ACUITY_SCORE: 32
ADLS_ACUITY_SCORE: 33
ADLS_ACUITY_SCORE: 31
ADLS_ACUITY_SCORE: 32
ADLS_ACUITY_SCORE: 29
ADLS_ACUITY_SCORE: 26
ADLS_ACUITY_SCORE: 32
ADLS_ACUITY_SCORE: 30
ADLS_ACUITY_SCORE: 30
ADLS_ACUITY_SCORE: 33
ADLS_ACUITY_SCORE: 30
ADLS_ACUITY_SCORE: 32
ADLS_ACUITY_SCORE: 32
ADLS_ACUITY_SCORE: 28
ADLS_ACUITY_SCORE: 31
ADLS_ACUITY_SCORE: 30
ADLS_ACUITY_SCORE: 30
ADLS_ACUITY_SCORE: 35
ADLS_ACUITY_SCORE: 31
ADLS_ACUITY_SCORE: 33
ADLS_ACUITY_SCORE: 31
ADLS_ACUITY_SCORE: 33
ADLS_ACUITY_SCORE: 29
ADLS_ACUITY_SCORE: 26
ADLS_ACUITY_SCORE: 33
ADLS_ACUITY_SCORE: 32
ADLS_ACUITY_SCORE: 30
ADLS_ACUITY_SCORE: 33
ADLS_ACUITY_SCORE: 33
ADLS_ACUITY_SCORE: 35
ADLS_ACUITY_SCORE: 33
ADLS_ACUITY_SCORE: 31
ADLS_ACUITY_SCORE: 31
ADLS_ACUITY_SCORE: 32
ADLS_ACUITY_SCORE: 31
ADLS_ACUITY_SCORE: 33
ADLS_ACUITY_SCORE: 32
ADLS_ACUITY_SCORE: 30
ADLS_ACUITY_SCORE: 33
ADLS_ACUITY_SCORE: 32
ADLS_ACUITY_SCORE: 32
ADLS_ACUITY_SCORE: 28
ADLS_ACUITY_SCORE: 33
ADLS_ACUITY_SCORE: 30
ADLS_ACUITY_SCORE: 33
ADLS_ACUITY_SCORE: 38
ADLS_ACUITY_SCORE: 30
ADLS_ACUITY_SCORE: 30
ADLS_ACUITY_SCORE: 35
ADLS_ACUITY_SCORE: 32
ADLS_ACUITY_SCORE: 28
ADLS_ACUITY_SCORE: 31
ADLS_ACUITY_SCORE: 30
ADLS_ACUITY_SCORE: 31
ADLS_ACUITY_SCORE: 33
ADLS_ACUITY_SCORE: 30
ADLS_ACUITY_SCORE: 38
ADLS_ACUITY_SCORE: 31
ADLS_ACUITY_SCORE: 30
ADLS_ACUITY_SCORE: 31
ADLS_ACUITY_SCORE: 36
ADLS_ACUITY_SCORE: 33
ADLS_ACUITY_SCORE: 30
ADLS_ACUITY_SCORE: 32
ADLS_ACUITY_SCORE: 32
ADLS_ACUITY_SCORE: 30
ADLS_ACUITY_SCORE: 30
ADLS_ACUITY_SCORE: 33
ADLS_ACUITY_SCORE: 30
ADLS_ACUITY_SCORE: 36
ADLS_ACUITY_SCORE: 36
ADLS_ACUITY_SCORE: 38
ADLS_ACUITY_SCORE: 33
ADLS_ACUITY_SCORE: 32
ADLS_ACUITY_SCORE: 33
DEPENDENT_IADLS:: CLEANING;LAUNDRY;COOKING;SHOPPING;MEAL PREPARATION;MEDICATION MANAGEMENT;TRANSPORTATION
ADLS_ACUITY_SCORE: 30
ADLS_ACUITY_SCORE: 30
ADLS_ACUITY_SCORE: 32
ADLS_ACUITY_SCORE: 33
ADLS_ACUITY_SCORE: 30
ADLS_ACUITY_SCORE: 31
ADLS_ACUITY_SCORE: 31
ADLS_ACUITY_SCORE: 32
ADLS_ACUITY_SCORE: 32
ADLS_ACUITY_SCORE: 33
ADLS_ACUITY_SCORE: 31
ADLS_ACUITY_SCORE: 32
ADLS_ACUITY_SCORE: 30
ADLS_ACUITY_SCORE: 31
ADLS_ACUITY_SCORE: 31
ADLS_ACUITY_SCORE: 35
ADLS_ACUITY_SCORE: 30
ADLS_ACUITY_SCORE: 33
ADLS_ACUITY_SCORE: 32
ADLS_ACUITY_SCORE: 33
ADLS_ACUITY_SCORE: 32
ADLS_ACUITY_SCORE: 32
ADLS_ACUITY_SCORE: 30
ADLS_ACUITY_SCORE: 32
ADLS_ACUITY_SCORE: 32
ADLS_ACUITY_SCORE: 30
ADLS_ACUITY_SCORE: 30
ADLS_ACUITY_SCORE: 33
ADLS_ACUITY_SCORE: 32
ADLS_ACUITY_SCORE: 30
ADLS_ACUITY_SCORE: 32
ADLS_ACUITY_SCORE: 31
ADLS_ACUITY_SCORE: 28
ADLS_ACUITY_SCORE: 30
ADLS_ACUITY_SCORE: 35
ADLS_ACUITY_SCORE: 31
ADLS_ACUITY_SCORE: 33
ADLS_ACUITY_SCORE: 32
ADLS_ACUITY_SCORE: 33
ADLS_ACUITY_SCORE: 30
ADLS_ACUITY_SCORE: 31
ADLS_ACUITY_SCORE: 28
ADLS_ACUITY_SCORE: 28
ADLS_ACUITY_SCORE: 30
ADLS_ACUITY_SCORE: 35
ADLS_ACUITY_SCORE: 32
ADLS_ACUITY_SCORE: 28
ADLS_ACUITY_SCORE: 33
ADLS_ACUITY_SCORE: 32
ADLS_ACUITY_SCORE: 32
ADLS_ACUITY_SCORE: 30
ADLS_ACUITY_SCORE: 28
ADLS_ACUITY_SCORE: 32

## 2023-01-01 ASSESSMENT — ENCOUNTER SYMPTOMS
ABDOMINAL DISTENTION: 1
SHORTNESS OF BREATH: 1
CONFUSION: 0
SHORTNESS OF BREATH: 0
ABDOMINAL DISTENTION: 1
FEVER: 0
NERVOUS/ANXIOUS: 0
COUGH: 0
NERVOUS/ANXIOUS: 0
CONFUSION: 0
FEVER: 0
NAUSEA: 0
ABDOMINAL PAIN: 0
DIZZINESS: 0
LIGHT-HEADEDNESS: 0
FEVER: 0
VOMITING: 0
COUGH: 0
CHILLS: 0
COUGH: 1
SHORTNESS OF BREATH: 1
ABDOMINAL PAIN: 0
SPEECH DIFFICULTY: 0
WHEEZING: 1
VOMITING: 0
NAUSEA: 0
FATIGUE: 1
LIGHT-HEADEDNESS: 0

## 2023-01-01 ASSESSMENT — PATIENT HEALTH QUESTIONNAIRE - PHQ9
10. IF YOU CHECKED OFF ANY PROBLEMS, HOW DIFFICULT HAVE THESE PROBLEMS MADE IT FOR YOU TO DO YOUR WORK, TAKE CARE OF THINGS AT HOME, OR GET ALONG WITH OTHER PEOPLE: EXTREMELY DIFFICULT
SUM OF ALL RESPONSES TO PHQ QUESTIONS 1-9: 19
SUM OF ALL RESPONSES TO PHQ QUESTIONS 1-9: 19
SUM OF ALL RESPONSES TO PHQ QUESTIONS 1-9: 5
SUM OF ALL RESPONSES TO PHQ QUESTIONS 1-9: 5
10. IF YOU CHECKED OFF ANY PROBLEMS, HOW DIFFICULT HAVE THESE PROBLEMS MADE IT FOR YOU TO DO YOUR WORK, TAKE CARE OF THINGS AT HOME, OR GET ALONG WITH OTHER PEOPLE: SOMEWHAT DIFFICULT

## 2023-01-01 ASSESSMENT — ANXIETY QUESTIONNAIRES
GAD7 TOTAL SCORE: 14
6. BECOMING EASILY ANNOYED OR IRRITABLE: MORE THAN HALF THE DAYS
4. TROUBLE RELAXING: NEARLY EVERY DAY
3. WORRYING TOO MUCH ABOUT DIFFERENT THINGS: NEARLY EVERY DAY
GAD7 TOTAL SCORE: 14
1. FEELING NERVOUS, ANXIOUS, OR ON EDGE: SEVERAL DAYS
2. NOT BEING ABLE TO STOP OR CONTROL WORRYING: SEVERAL DAYS
7. FEELING AFRAID AS IF SOMETHING AWFUL MIGHT HAPPEN: SEVERAL DAYS
5. BEING SO RESTLESS THAT IT IS HARD TO SIT STILL: NEARLY EVERY DAY
IF YOU CHECKED OFF ANY PROBLEMS ON THIS QUESTIONNAIRE, HOW DIFFICULT HAVE THESE PROBLEMS MADE IT FOR YOU TO DO YOUR WORK, TAKE CARE OF THINGS AT HOME, OR GET ALONG WITH OTHER PEOPLE: EXTREMELY DIFFICULT

## 2023-01-01 ASSESSMENT — ASTHMA QUESTIONNAIRES
ACT_TOTALSCORE: 20
QUESTION_2 LAST FOUR WEEKS HOW OFTEN HAVE YOU HAD SHORTNESS OF BREATH: ONCE OR TWICE A WEEK
QUESTION_5 LAST FOUR WEEKS HOW WOULD YOU RATE YOUR ASTHMA CONTROL: WELL CONTROLLED
ACT_TOTALSCORE: 20
QUESTION_1 LAST FOUR WEEKS HOW MUCH OF THE TIME DID YOUR ASTHMA KEEP YOU FROM GETTING AS MUCH DONE AT WORK, SCHOOL OR AT HOME: A LITTLE OF THE TIME
QUESTION_3 LAST FOUR WEEKS HOW OFTEN DID YOUR ASTHMA SYMPTOMS (WHEEZING, COUGHING, SHORTNESS OF BREATH, CHEST TIGHTNESS OR PAIN) WAKE YOU UP AT NIGHT OR EARLIER THAN USUAL IN THE MORNING: NOT AT ALL
QUESTION_4 LAST FOUR WEEKS HOW OFTEN HAVE YOU USED YOUR RESCUE INHALER OR NEBULIZER MEDICATION (SUCH AS ALBUTEROL): TWO OR THREE TIMES PER WEEK

## 2023-01-01 ASSESSMENT — PAIN SCALES - GENERAL
PAINLEVEL: MODERATE PAIN (5)
PAINLEVEL: MODERATE PAIN (4)
PAINLEVEL: NO PAIN (0)

## 2023-01-08 ENCOUNTER — HEALTH MAINTENANCE LETTER (OUTPATIENT)
Age: 63
End: 2023-01-08

## 2023-03-10 NOTE — PROGRESS NOTES
"  Assessment & Plan       ICD-10-CM    1. Nasal sore  J34.89 mupirocin (BACTROBAN) 2 % external ointment      2. Sprain of chest wall, initial encounter  S23.8XXA           1) Bactroban ointment in both nares BID x7 days    2) Likely a strain/sprain. She is seeing gradual improvements. Supportive therapy also discussed. Follow up if symptoms fail to improve or worsen.        Prescription drug management         Nicotine/Tobacco Cessation:  She reports that she has been smoking cigarettes. She has a 5.00 pack-year smoking history. She has never used smokeless tobacco.      BMI:   Estimated body mass index is 29.58 kg/m  as calculated from the following:    Height as of this encounter: 1.653 m (5' 5.08\").    Weight as of this encounter: 80.8 kg (178 lb 3.2 oz).     Return in about 2 weeks (around 3/24/2023), or if symptoms worsen or fail to improve.    Aliyah Marcus PA-C  [facility] DANI Ambrocio is a 62 year old, presenting for the following health issues:  Pain and Nose Problem      Pain    History of Present Illness       Reason for visit:  Left side pain nose sore  Symptom onset:  3-7 days ago    She eats 0-1 servings of fruits and vegetables daily.She consumes 0 sweetened beverage(s) daily.She exercises with enough effort to increase her heart rate 10 to 19 minutes per day.  She exercises with enough effort to increase her heart rate 3 or less days per week.   She is taking medications regularly.    Today's PHQ-9         PHQ-9 Total Score: 5    PHQ-9 Q9 Thoughts of better off dead/self-harm past 2 weeks :   Not at all    How difficult have these problems made it for you to do your work, take care of things at home, or get along with other people: Somewhat difficult       Pain History:  When did you first notice your pain? - Acute Pain   Have you seen anyone else for your pain? No  Where in your body do you have pain? Abdominal/Flank Pain  Onset/Duration: 3 days, started after " "snow blowing   Description:   Character: Burning  Location: left side  Radiation: Back  Intensity: 5/10  Progression of Symptoms:  improving  Accompanying Signs & Symptoms:  Fever/Chills: No  Gas/Bloating: YES- bloating  Nausea: YES  Vomitting: YES  Diarrhea: No  Constipation: YES  Dysuria or Hematuria: YES- hematuria, not new  History:   Trauma: No  Previous similar pain: No  Previous tests done: EGD, Colonoscopy and Upper Endoscopy  Precipitating factors:   Does the pain change with:     Food: No    Bowel Movement: No    Urination: No   Other factors:  No  Therapies tried and outcome: ice, heating pad, advil    Has noticed gradual improvements    Patient's last menstrual period was 10/03/2011.    Nasal sores x months, pain, bleeding, using neosporin, Vaseline, herbal remedies         Review of Systems   Constitutional, ENT, musculoskeletal, neuro systems are negative, except as otherwise noted.      Objective    /66 (BP Location: Right arm, Patient Position: Sitting, Cuff Size: Adult Regular)   Pulse 91   Temp 98.1  F (36.7  C) (Tympanic)   Resp 20   Ht 1.653 m (5' 5.08\")   Wt 80.8 kg (178 lb 3.2 oz)   LMP 10/03/2011   SpO2 97%   BMI 29.58 kg/m    Body mass index is 29.58 kg/m .  Physical Exam   GENERAL: healthy, alert and no distress  HENT: open sores in both nares, no honey crust seen  MS: moderate TTP of the posterolateral left ribs 6-8  SKIN: no suspicious lesions or rashes  NEURO: Normal strength and tone, mentation intact and speech normal  PSYCH: mentation appears normal, affect normal/bright              "

## 2023-03-27 NOTE — TELEPHONE ENCOUNTER
Patient is having severe bloating and pain on her right side. Patient asking if she should go to ER or should be seen by PCP first?

## 2023-03-27 NOTE — TELEPHONE ENCOUNTER
"Patient returning call; see TE below.    Patient reporting severe, right side abdominal pain, \"I think it's my liver,\" and severe abdominal bloating, \"It looks like I'm 9 months pregnant;\"  The bloating is causing her to be short of breath but breathing okay; talking okay with this writer; states pain and bloating started a week ago and severe at this point.    Informed patient to go to emergency room now; patient agreed; states able to get herself to [facility] at this time.    Sherry Romo RN    "

## 2023-03-27 NOTE — TELEPHONE ENCOUNTER
See TE (below); patient has hx of alcoholic cirrhosis of liver.    Called patient, no answer, left message on voicemail to call [facility] at [phone] and ask to speak with a nurse.    REN Mayo Heidi L HO     9:54 AM  Note  Patient is having severe bloating and pain on her right side. Patient asking if she should go to ER or should be seen by PCP first?

## 2023-04-07 NOTE — TELEPHONE ENCOUNTER
Spoke with pt , offered her virtual slot at end of the day today per provider note below.     Azalea Headley RN  [facility]

## 2023-04-07 NOTE — TELEPHONE ENCOUNTER
Attempted to call pt, no answer , left message to return call to the clinic @ [phone]    Azalea Headley RN  [facility]

## 2023-04-07 NOTE — PROGRESS NOTES
Para therapy plan entered and patient updated.    Mery LAGUNA LPN  Hepatology Clinic    ---------  Annie Cary MD  Nameable, Aliyah Houston PA-C; Mery Vela LPN; Vi Ramires - can you place orders for standing weekly therapeutic courtney (6 L max, standard albumin replacement after 4 L) and give her information on how to schedule?

## 2023-04-07 NOTE — PROGRESS NOTES
"Lolly is a 62 year old who is being evaluated via a billable telephone visit.      What phone number would you like to be contacted at? [phone]  How would you like to obtain your AVS? Torrie    Distant Location (provider location):  On-site    Assessment & Plan       ICD-10-CM    1. Alcoholic cirrhosis of liver with ascites (H)  K70.31 Hepatic function panel     CBC with Platelets & Differential     US Paracentesis without Albumin     US Paracentesis without Albumin          ER records reviewed. Orders placed for repeat LFTs and CBC. Order placed for paracentesis. Will do a diagnostic and therapeutic para initially then just therapeutic PRN after that. I also sent a message to Dr. Cary, her GI specialist, asking if her team can reach out to the patient in order to schedule a follow up appointment.      Ordering of each unique test       MED REC REQUIRED  Post Medication Reconciliation Status: discharge medications reconciled, continue medications without change  BMI:   Estimated body mass index is 29.58 kg/m  as calculated from the following:    Height as of 3/10/23: 1.653 m (5' 5.08\").    Weight as of 3/10/23: 80.8 kg (178 lb 3.2 oz).     Return for repeat labs and paracentesis.     Aliyah Marcus PA-C  [facility] DANI Ambrocio is a 62 year old, presenting for the following health issues:  Hospital F/U         View : No data to display.              History of Present Illness       Reason for visit:  ED follow up    She eats 0-1 servings of fruits and vegetables daily.She consumes 0 sweetened beverage(s) daily.She exercises with enough effort to increase her heart rate 9 or less minutes per day.  She exercises with enough effort to increase her heart rate 3 or less days per week.   She is taking medications regularly.       Hospital Follow-up Visit:    Hospital/Nursing Home/IP Rehab [facility]   Date of Admission: 3/27/23  Date of Discharge: " 3/27/23  Reason(s) for Admission: Abdominal Pain    Saw GI on 10/1/22  Had 3.5L drained   Feels as though she has filled back up 80%  Belly feels hard  No fevers/chills   Hasn't had a drink in 3 weeks    Impression and Plan:  Stefani Crowell is a 62 y.o. female presents ER with concerns for abdominal distention. Given her history of cirrhosis felt that likely her symptoms are related to significant ascites. She did have right upper quadrant tenderness imaging study was obtained shows ascites however no evidence of biliary tract obstruction nothing concerning for biliary disease. My suspicion for SBP is very low based on her exam and her other laboratory findings. Patient's case was discussed with IR who was able to perform paracentesis she had almost 4 L of fluid drained off. Patient has remained stable afterwards she was observed for an hour. Patient had IV contrast infiltrated into her right arm I discussed expectant management for that with elevation ice and watchful waiting return here if there is any new or worsening symptoms. She was observed for an hour systolic blood pressures have been in the 1 teens. I did recommend giving patient IV albumin but she did not want further IV placement and declined the albumin. I recommended that if she had lightheadedness dizziness feeling she can a pass out her blood pressure is low she should return here and then get that medication. Otherwise patient will follow up with her gastroenterologist. She does have elevated bilirubin and liver enzymes but nothing that feels consistent with hepatitis. Patient to return here with increasing pain swelling fever confusion. Follow-up with her clinic for repeat liver enzymes this week.       Was your hospitalization related to COVID-19? No   Problems taking medications regularly:  None  Medication changes since discharge: None  Problems adhering to non-medication therapy:  None    Summary of hospitalization:  CareEverywhere  information obtained and reviewed  Diagnostic Tests/Treatments reviewed.  Follow up needed: labs  Other Healthcare Providers Involved in Patient s Care:         GI/hepatology  Update since discharge: improved and worsening again         Plan of care communicated with patient             Review of Systems   Constitutional, gi and gu systems are negative, except as otherwise noted.      Objective           Vitals:  No vitals were obtained today due to virtual visit.    Physical Exam   healthy, alert and no distress  PSYCH: Alert and oriented times 3; coherent speech, normal   rate and volume, able to articulate logical thoughts, able   to abstract reason, no tangential thoughts, no hallucinations   or delusions  Her affect is normal and pleasant  RESP: No cough, no audible wheezing, able to talk in full sentences  Remainder of exam unable to be completed due to telephone visits          Phone call duration: 11 minutes

## 2023-04-07 NOTE — TELEPHONE ENCOUNTER
She needs to follow up with me for ER follow up and to discuss future/ongoing orders - I held my virtual slot at the end of the day today if she is able to do a tele/video visit?  If she's not able to please unblock the slot  Ok to schedule in one of my same day slots next week

## 2023-04-07 NOTE — Clinical Note
Lolly was in the ER a couple weeks ago for paracentesis. Had 3.5L drained. We did a virtual visit today for ER follow up and she's filling back up. I placed an order for PRN US guided paracentesis as well as repeat LFTs and CBC. She needs to follow up with you! Will your team help her get scheduled? Aliyah

## 2023-04-11 NOTE — TELEPHONE ENCOUNTER
Patient requesting para order for [facility] location which doesn't use our therapy plan. Ultrasound guided para order entered. Replied to patient mychart message letting her know the order is in.    Mery LAGUNA LPN  Hepatology Clinic

## 2023-04-12 NOTE — TELEPHONE ENCOUNTER
See Efrain encounter from GI from 4/11/23 - they've put in orders for her and are trying to get in contact with the patient

## 2023-04-14 NOTE — PROGRESS NOTES
LLQ paracentesis performed by radiologist. 5100 Ml clear yellow fluid removed. Pressure held at site after catheter removed. No bleeding noted. Given 25mg  Albumin per Dr Cary.

## 2023-04-14 NOTE — DISCHARGE INSTRUCTIONS
Radiology  Discharge Instructions for Abdominal Paracentesis    You have had an abdominal paracentesis procedure today.  A needle or catheter was put into your abdomen to remove fluid for laboratory studies and/or to remove the extra fluid from your abdomen.    AFTER YOU ARE HOME:  Rest at home today.  Limit physical activity such as lifting, straining, or exercise for 48 hours.  You may resume normal activity in 24 hours.  Resume previous diet and medications.  You may remove bandage in 24 hours.    CALL YOUR PRIMARY PROVIDER IF:  You develop temperature over 101o F, or redness at the drainage site.  You have any other questions or concerns.    AFTER HOURS CALL [facility] NURSE ADVISORS AT [phone]    COME TO EMERGENCY ROOM IF:  You develop severe abdominal pain, swelling the size of a baseball or larger, continuous oozing from puncture site longer than 24 hours, bleeding that saturates a gauze dressing even after you have put direct pressure on the site for 15 minutes, severe lightheadedness or fainting.  DO NOT DRIVE YOURSELF.

## 2023-04-14 NOTE — TELEPHONE ENCOUNTER
LVM & sent mychart // pt needs to schedule follow up with Dr. Cary (virtual or in person), next available // first attempt, AN 4.14.23

## 2023-04-18 NOTE — TELEPHONE ENCOUNTER
LVM & sent mychart // pt needs to schedule follow up with Dr. Cary (virtual or in person), next available // second attempt, AN 4.18.23

## 2023-04-19 NOTE — LETTER
4/19/2023         RE: Stefani Crowell  [address]  [place] [address]        Dear Colleague,    Thank you for referring your patient, Stefani Crowell, to the [facility]. Please see a copy of my visit note below.    Paracentesis Nursing Note  Stefani Crowell presents today to [facility] for a paracentesis.    During today's appointment orders from Annie Cary MD were completed.    Progress Note:  Patient identification verified by name and date of birth.  Assessment completed.  Vitals monitored throughout appointment and recorded in Doc Flowsheets.  See proceduralist note in ultrasound.    Vascular Access: peripheral IV was placed by vascular access nurse.  Labs: were drawn per orders.   Date of consent or authorization: 04/19/23 - 7/18/23 .  Invasive Procedure Safety Checklist was completed and sent for scanning.   Paracentesis performed by Dr. Bacon.    The following labs were communicated to provider performing paracentesis:  Lab Results   Component Value Date    PLT 54 04/19/2023    PLT 56 03/25/2021     Total amount of ascites fluid drained: 3.5 liters.  Color of ascites fluid: yellow.  Total amount of albumin given: 0 grams, orders state to give if patient gets 4 or more liters of ascites fluid out  Patient tolerated procedure well.  Post procedure,denies pain or discomfort post paracentesis.    Discharge Plan:  Discharge instructions were reviewed with patient.  Patient/Representative verbalized understanding and all questions were answered.   [facility] in stable condition.    Adele Mariano RN        /73 (Patient Position: Standing)   Pulse 88   Temp 98  F (36.7  C) (Oral)   Wt 81.5 kg (179 lb 11.2 oz)   LMP 10/03/2011   SpO2 97%   BMI 29.83 kg/m          Again, thank you for allowing me to participate in the care of your patient.         Sincerely,        [facility] Paracentesis Provider

## 2023-04-19 NOTE — PATIENT INSTRUCTIONS
Dear Stefani Crowell    Thank you for choosing [facility] ([facility]) for your procedure.  The following information is a summary of our appointment as well as important reminders.      We look forward in seeing you on your next appointment here at [facility] ([facility]).  Please don t hesitate to call us at [phone] to reschedule any of your appointments or to speak with one of the [facility] registered nurses.  It was a pleasure taking care of you today.    Sincerely,    [facility]  [address] [place] [address]  [id number]  Phone:  [phone]   DISCHARGE INSTRUCTIONS FOLLOWING ABDOMINAL PARACENTESIS    After you go home:  No strenuous activity for 24 hours  Resume your regular diet  Limit fluid intake for the first 48 hours to no more than 2 quarts per day.  There should be minimal drainage from the needle site.  If drainage does occur and soaks through the bandage, apply gentle pressure with your hand for 5 minutes.    Notify MD for the following:  Excessive drainage  Excessive swelling, redness or tenderness at the needle site  Fever greater than 101 degrees F  Dizziness or light-headedness when getting up or walking      IF THIS IS A MEDICAL EMERGENCY, CALL [phone]    If you have any questions or concerns    Contact the Hepatology Clinic:   [phone]    If you are a post-transplant patient, contact the Transplant Office:  [phone]    If this is after hours, contact the hospital :  [phone] and as to have the GI resident on call paged                   I have received and understand my discharge instructions and I have all of my personal belongings.          ------------------------------------------------------        ---------------------------------------------------    Patient / Significant Other's Signature     Relationship

## 2023-04-19 NOTE — PROGRESS NOTES
Paracentesis Nursing Note  Stefani Crowell [facility].    During today's appointment orders from Annie Cary MD were completed.    Progress Note:  Patient identification verified by name and date of birth.  Assessment completed.  Vitals monitored throughout appointment and recorded in Doc Flowsheets.  See proceduralist note in ultrasound.    Vascular Access: peripheral IV was placed by vascular access nurse.  Labs: were drawn per orders.   Date of consent or authorization: 04/19/23 - 7/18/23 .  Invasive Procedure Safety Checklist was completed and sent for scanning.   Paracentesis performed by Dr. Bacon.    The following labs were communicated to provider performing paracentesis:  Lab Results   Component Value Date    PLT 54 04/19/2023    PLT 56 03/25/2021     Total amount of ascites fluid drained: 3.5 liters.  Color of ascites fluid: yellow.  Total amount of albumin given: 0 grams, orders state to give if patient gets 4 or more liters of ascites fluid out  Patient tolerated procedure well.  Post procedure,denies pain or discomfort post paracentesis.    Discharge Plan:  Discharge instructions were reviewed with patient.  Patient/Representative verbalized understanding and all questions were answered.   [facility] in stable condition.    Adele Mariano RN        /73 (Patient Position: Standing)   Pulse 88   Temp 98  F (36.7  C) (Oral)   Wt 81.5 kg (179 lb 11.2 oz)   LMP 10/03/2011   SpO2 97%   BMI 29.83 kg/m

## 2023-04-27 NOTE — PROGRESS NOTES
LLQ paracentesis performed by radiologist.  4800 ml clear yellow fluid removed. Pressure held at site after catheter removed. No bleeding noted.  Skin glue applied at site.  Given Albumin 12.5 g per Dr Cary order.

## 2023-04-27 NOTE — DISCHARGE INSTRUCTIONS
Radiology  Discharge Instructions for Abdominal Paracentesis    You have had an abdominal paracentesis procedure today.  A needle or catheter was put into your abdomen to remove fluid for laboratory studies and/or to remove the extra fluid from your abdomen.    AFTER YOU ARE HOME:  Rest at home today.  Limit physical activity such as lifting, straining, or exercise for 48 hours.  You may resume normal activity in 24 hours.  Resume previous diet and medications.  You may remove bandage in 24 hours.    CALL YOUR PRIMARY PROVIDER IF:  You develop temperature over 101o F, or redness at the drainage site.  You have any other questions or concerns.    AFTER HOURS CALL [facility] NURSE ADVISORS AT [phone]    COME TO EMERGENCY ROOM IF:  You develop severe abdominal pain, swelling the size of a baseball or larger, continuous oozing from puncture site longer than 24 hours, bleeding that saturates a gauze dressing even after you have put direct pressure on the site for 15 minutes, severe lightheadedness or fainting.  DO NOT DRIVE YOURSELF.

## 2023-05-03 NOTE — TELEPHONE ENCOUNTER
LVM & sent mychart // pt needs to reschedule appt on 8.4.23 with Dr. Cary, next available, pt also needs labs and US prior to visit // first attempt, AN 5.3.23

## 2023-05-04 NOTE — PROGRESS NOTES
LLQ paracentesis performed by radiologist.  4100 ml clear yellow fluid removed. Pressure held at site after catheter removed. No bleeding noted. Given Albumin 12.5 g per Dr Cary order.

## 2023-05-04 NOTE — DISCHARGE INSTRUCTIONS
Radiology  Discharge Instructions for Abdominal Paracentesis    You have had an abdominal paracentesis procedure today.  A needle or catheter was put into your abdomen to remove fluid for laboratory studies and/or to remove the extra fluid from your abdomen.    AFTER YOU ARE HOME:  Rest at home today.  Limit physical activity such as lifting, straining, or exercise for 48 hours.  You may resume normal activity in 24 hours.  Resume previous diet and medications.  You may remove bandage in 24 hours.    CALL YOUR PRIMARY PROVIDER IF:  You develop temperature over 101o F, or redness at the drainage site.  You have any other questions or concerns.    AFTER HOURS CALL [facility] NURSE ADVISORS AT [phone]    COME TO EMERGENCY ROOM IF:  You develop severe abdominal pain, swelling the size of a baseball or larger, continuous oozing from puncture site longer than 24 hours, bleeding that saturates a gauze dressing even after you have put direct pressure on the site for 15 minutes, severe lightheadedness or fainting.  DO NOT DRIVE YOURSELF.

## 2023-05-10 NOTE — PROGRESS NOTES
RLQ paracentesis performed by radiologist.  2900 ml clear yellow fluid removed. Pressure held at site after catheter removed. No bleeding noted. Albumin not given for volume removed.

## 2023-05-10 NOTE — DISCHARGE INSTRUCTIONS
Radiology  Discharge Instructions for Abdominal Paracentesis    You have had an abdominal paracentesis procedure today.  A needle or catheter was put into your abdomen to remove fluid for laboratory studies and/or to remove the extra fluid from your abdomen.    AFTER YOU ARE HOME:  Rest at home today.  Limit physical activity such as lifting, straining, or exercise for 48 hours.  You may resume normal activity in 24 hours.  Resume previous diet and medications.  You may remove bandage in 24 hours.    CALL YOUR PRIMARY PROVIDER IF:  You develop temperature over 101o F, or redness at the drainage site.  You have any other questions or concerns.    AFTER HOURS CALL [facility] NURSE ADVISORS AT [phone]    COME TO EMERGENCY ROOM IF:  You develop severe abdominal pain, swelling the size of a baseball or larger, continuous oozing from puncture site longer than 24 hours, bleeding that saturates a gauze dressing even after you have put direct pressure on the site for 15 minutes, severe lightheadedness or fainting.  DO NOT DRIVE YOURSELF.

## 2023-05-24 NOTE — PROGRESS NOTES
LLQ paracentesis performed by radiologist.  4800 ml clear yellow fluid removed. Pressure held at site after catheter removed. No bleeding noted. Given Albumin 12.5 g per Dr Cary order.

## 2023-05-24 NOTE — DISCHARGE INSTRUCTIONS
Radiology  Discharge Instructions for Abdominal Paracentesis    You have had an abdominal paracentesis procedure today.  A needle or catheter was put into your abdomen to remove fluid for laboratory studies and/or to remove the extra fluid from your abdomen.    AFTER YOU ARE HOME:  Rest at home today.  Limit physical activity such as lifting, straining, or exercise for 48 hours.  You may resume normal activity in 24 hours.  Resume previous diet and medications.  You may remove bandage in 24 hours.    CALL YOUR PRIMARY PROVIDER IF:  You develop temperature over 101o F, or redness at the drainage site.  You have any other questions or concerns.    AFTER HOURS CALL [facility] NURSE ADVISORS AT [phone]    COME TO EMERGENCY ROOM IF:  You develop severe abdominal pain, swelling the size of a baseball or larger, continuous oozing from puncture site longer than 24 hours, bleeding that saturates a gauze dressing even after you have put direct pressure on the site for 15 minutes, severe lightheadedness or fainting.  DO NOT DRIVE YOURSELF.

## 2023-06-05 NOTE — PROGRESS NOTES
LLQ paracentesis performed by radiologist. 4000 ml clear yellow fluid removed. Pressure held at site after catheter removed. No bleeding noted. Given Albumin 12.5 g per Dr Cary order.

## 2023-06-05 NOTE — DISCHARGE INSTRUCTIONS
Radiology  Discharge Instructions for Abdominal Paracentesis    You have had an abdominal paracentesis procedure today.  A needle or catheter was put into your abdomen to remove fluid for laboratory studies and/or to remove the extra fluid from your abdomen.    AFTER YOU ARE HOME:  Rest at home today.  Limit physical activity such as lifting, straining, or exercise for 48 hours.  You may resume normal activity in 24 hours.  Resume previous diet and medications.  You may remove bandage in 24 hours.    CALL YOUR PRIMARY PROVIDER IF:  You develop temperature over 101o F, or redness at the drainage site.  You have any other questions or concerns.    AFTER HOURS CALL [facility] NURSE ADVISORS AT [phone]    COME TO EMERGENCY ROOM IF:  You develop severe abdominal pain, swelling the size of a baseball or larger, continuous oozing from puncture site longer than 24 hours, bleeding that saturates a gauze dressing even after you have put direct pressure on the site for 15 minutes, severe lightheadedness or fainting.  DO NOT DRIVE YOURSELF.

## 2023-06-12 NOTE — PROGRESS NOTES
RLQ paracentesis performed by radiologist.   3600  Ml clear yellow fluid removed. Pressure held at site after catheter removed. No bleeding noted. Skin glue applied. No Albumin given, per order.

## 2023-06-12 NOTE — DISCHARGE INSTRUCTIONS
Radiology  Discharge Instructions for Abdominal Paracentesis    You have had an abdominal paracentesis procedure today.  A needle or catheter was put into your abdomen to remove the extra fluid from your abdomen.    AFTER YOU ARE HOME:  Rest at home today.  Limit physical activity such as lifting, straining, or exercise for 48 hours.  You may resume normal activity in 24 hours.  Resume previous diet and medications.  You may remove bandage in 24 hours.    CALL YOUR PRIMARY PROVIDER IF:  You develop temperature over 101o F, or redness at the drainage site.  You have any other questions or concerns.    AFTER HOURS CALL [facility] NURSE ADVISORS AT [phone]    COME TO EMERGENCY ROOM IF:  You develop severe abdominal pain, swelling the size of a baseball or larger, continuous oozing from puncture site longer than 24 hours, bleeding that saturates a gauze dressing even after you have put direct pressure on the site for 15 minutes, severe lightheadedness or fainting.  DO NOT DRIVE YOURSELF.

## 2023-06-20 NOTE — PROGRESS NOTES
RLQ paracentesis performed by radiologist.  3000 ml clear yellow fluid removed. Pressure held at site after catheter removed. No bleeding noted. Albumin not given for volume removed.

## 2023-06-20 NOTE — DISCHARGE INSTRUCTIONS
Radiology  Discharge Instructions for Abdominal Paracentesis    You have had an abdominal paracentesis procedure today.  A needle or catheter was put into your abdomen to remove fluid for laboratory studies and/or to remove the extra fluid from your abdomen.    AFTER YOU ARE HOME:  Rest at home today.  Limit physical activity such as lifting, straining, or exercise for 48 hours.  You may resume normal activity in 24 hours.  Resume previous diet and medications.  You may remove bandage in 24 hours.    CALL YOUR PRIMARY PROVIDER IF:  You develop temperature over 101o F, or redness at the drainage site.  You have any other questions or concerns.    AFTER HOURS CALL [facility] NURSE ADVISORS AT [phone]    COME TO EMERGENCY ROOM IF:  You develop severe abdominal pain, swelling the size of a baseball or larger, continuous oozing from puncture site longer than 24 hours, bleeding that saturates a gauze dressing even after you have put direct pressure on the site for 15 minutes, severe lightheadedness or fainting.  DO NOT DRIVE YOURSELF.

## 2023-07-05 NOTE — TELEPHONE ENCOUNTER
Pt calling to state that she was seen for pleural effusion and paracentesis done at [facility]. Pt scheduled for hospital follow up for 7/6/23.     Pt stated that she is out of albuterol inhaler and neb. Pt asking for stronger neb, pt stated that she doesn't feel completely back to normal yet. Pt previously on prednisone, what should pt take now?    Will be mucinex beneficial for pt?     Pt would like to discuss continuing asthma care plan when she will be seen for hospital follow up.     Mary Jo Angela, RN

## 2023-07-06 NOTE — PATIENT INSTRUCTIONS
Please continue with your medications as prescribed.  As you continue to have shortness of breath we are rechecking a chest x-ray today.  We are also completing lab work to make sure your kidneys, potassium, and magnesium are doing okay.  We will send results to [employer] [facility].  You have been scheduled for a follow-up visit with your primary care provider Aliyah.  If you develop severe or worsening shortness of breath, please go back to the emergency department as they may need to remove additional fluid.    Please call and schedule a follow up with your GI doctor, Annie Lim. Let them know that you were in the hospital and need followup within the next few weeks.    Reach out with questions or concerns along the way.

## 2023-07-06 NOTE — TELEPHONE ENCOUNTER
[facility] called with an urgent finding on patients x-ray.    IMPRESSION: Large right pleural effusion with adjacent consolidation.  The left lung is clear. Partially obscured cardiac silhouette.    Routed to provider to advise.    Danielle MCCLURE BSN  Triage Nurse  [facility]: [facility]  Ph: [phone]

## 2023-07-06 NOTE — PROGRESS NOTES
Assessment & Plan     Hospital discharge follow-up  Alcoholic cirrhosis of liver with ascites (H)  Hypothyroidism, unspecified type  Pleural effusion  Severe persistent asthma, unspecified whether complicated  Patient is a 63-year-old female with past medical history significant for asthma, alcoholic cirrhosis with ascites, hypothyroidism, thrombocytopenia, depression, who presents to clinic for hospital discharge follow-up.  Patient was admitted to hospital due to respiratory insufficiency related to pleural effusion.  Pleural effusion was removed twice via thoracentesis with only mild improvement in symptoms the second time.  Patient was started on Lasix and spironolactone.  Given large pleural effusion, CT was completed and showed a large right pleural effusion with complete passive atelectasis/collapse of right middle and right lower lobes.  No mass or consolidation visualized.    Today, patient notes that she generally feels unwell and has increased dyspnea from baseline.  She is concerned that symptoms are related to asthma, but lung exam without wheezing to suggest asthma exacerbation.  Patient does not have follow-up scheduled with her gastroenterology/hepatology team, but does have paracentesis for ascites scheduled 7/12/2023.      Discussed the importance of scheduling her gastroenterology/hepatology follow-up.  Patient is agreeable and will do so.  We will recheck electrolyte levels including magnesium.  Given ongoing dyspnea will complete repeat chest x-ray to evaluate for ongoing pleural effusion.    Patient also notes stress given chronic health conditions as well as many stressors.  Patient is agreeable to referral for counseling as well as care coordination due to financial difficulties.    Discussed the importance of emergency department evaluation for any worsening dyspnea or chest pain.  Patient is agreeable.  Patient scheduled for follow-up with PCP in 2 weeks.    - Basic metabolic panel  (Ca,  Cl, CO2, Creat, Gluc, K, Na, BUN); Future  - Magnesium; Future  - Basic metabolic panel  (Ca, Cl, CO2, Creat, Gluc, K, Na, BUN)  - Magnesium  - XR Chest 2 Views; Future  - Adult Mental Health  Referral; Future  - Primary Care - Care Coordination Referral; Future  - XR Chest 2 Views; Future       MED REC REQUIRED{  Post Medication Reconciliation Status: discharge medications reconciled, continue medications without change      See Patient Instructions    Ninfa Mark PA-C  [facility] DANI Ambrocio is a 63 year old, presenting for the following health issues:  Hospital F/U (Pleural effusion follow up) and Asthma        7/6/2023     9:40 AM   Additional Questions   Roomed by Poppy DASILVA MA   Accompanied by No one         7/6/2023     9:40 AM   Patient Reported Additional Medications   Patient reports taking the following new medications None     HPI       Hospital Follow-up Visit:    Hospital/Nursing Home/ Rehab Facility: [facility]  Date of Admission: 6/27/23  Date of Discharge: 6/29/23  Reason(s) for Admission: Pleural effusion, lung nodule, exacerbation of asthma    Was your hospitalization related to COVID-19? No   Problems taking medications regularly:  None  Medication changes since discharge: See below  Problems adhering to non-medication therapy:  None    Summary of hospitalization:  CareEverywhere information obtained and reviewed  Diagnostic Tests/Treatments reviewed.  Follow up needed: PCP, GI  Other Healthcare Providers Involved in Patient s Care:         PCP, GI  Update since discharge: Patient notes she continues to have dyspnea since discharge that does not improve with asthma management. Symptoms have improved by 10-20% with new medications. Patient does not have GI follow up scheduled. She has abdominal paracentesis scheduled for 7/12. Patient notes dry cough and wheezing.     Plan of care communicated with patient and      Per chart review, patient  "admitted to hospital due to respiratory insufficiency from pleural effusion.  Pertinent medical history includes cirrhosis and asthma exacerbation.  Pleural effusion removed by interventional radiology with 1750 mL removed.  Symptoms improved, but did not entirely resolve.  Repeat thoracentesis completed 6/29/2023 with improved symptoms.  Close follow-up with PCP and GI recommended.  Lasix 40 mg and spironolactone 25 mg daily added to treatment plan.  Hypokalemia treated with oral magnesium and to be monitored closely.  Patient to resume levothyroxine 112 mcg for hypothyroidism.  Thrombocytopenia related to liver disease. CT completed:   IMPRESSION:   1.  Large right pleural effusion with complete passive   atelectasis/collapse of the right middle and right lower lobes. The   airways appear patent.         Review of Systems   Constitutional: Negative for fever.   HENT: Negative for congestion.    Respiratory: Positive for cough, shortness of breath and wheezing.    Cardiovascular: Negative for chest pain.   Gastrointestinal: Negative for abdominal pain.   Skin: Negative for rash.   Neurological: Negative for light-headedness.   Psychiatric/Behavioral: The patient is not nervous/anxious.             Objective    Pulse 87   Temp 97.8  F (36.6  C) (Tympanic)   Ht 1.64 m (5' 4.57\")   Wt 70.1 kg (154 lb 9.6 oz)   LMP  (LMP Unknown)   SpO2 97%   Breastfeeding No   BMI 26.07 kg/m    Body mass index is 26.07 kg/m .  Physical Exam  Vitals and nursing note reviewed.   Constitutional:       General: She is not in acute distress.     Appearance: Normal appearance.   HENT:      Head: Normocephalic and atraumatic.      Mouth/Throat:      Mouth: Mucous membranes are moist.      Pharynx: Oropharynx is clear.   Eyes:      Extraocular Movements: Extraocular movements intact.      Pupils: Pupils are equal, round, and reactive to light.   Cardiovascular:      Rate and Rhythm: Normal rate and regular rhythm.      Heart sounds: " Normal heart sounds.   Pulmonary:      Effort: Pulmonary effort is normal.      Breath sounds: Normal breath sounds.      Comments: Decreased breath sounds with crackles on right  Abdominal:      General: Bowel sounds are normal. There is distension (positive fluid wave).      Palpations: Abdomen is soft.      Tenderness: There is no abdominal tenderness. There is no guarding or rebound.   Musculoskeletal:         General: Normal range of motion.      Cervical back: Normal range of motion.   Skin:     General: Skin is warm and dry.   Neurological:      General: No focal deficit present.      Mental Status: She is alert.   Psychiatric:         Mood and Affect: Mood normal.         Behavior: Behavior normal.

## 2023-07-06 NOTE — PROGRESS NOTES
[facility]/Voicemail      Clinical Data: Care Coordinator Outreach  Outreach attempted x 1.  Left message on patient's voicemail with call back information and requested return call.  Plan:   Care Coordinator will try to reach patient again in 1-2 business days.    JO Perez [place], Levi Glover [place] and [facility]  [phone]

## 2023-07-06 NOTE — TELEPHONE ENCOUNTER
Called patient to discuss results of chest x-ray showing large pleural effusion on right.  When patient returns call, please advise her to present to the emergency department right away for further evaluation and management.    Ninfa Mark PA-C on 7/6/2023 at 11:57 AM

## 2023-07-06 NOTE — TELEPHONE ENCOUNTER
Attempted to call patient at home/mobile number, no answer, left message on voicemail; patient was instructed to return call to [facility] at [phone].    Adele Molina RN  [facility]

## 2023-07-06 NOTE — LETTER
[facility] CARE COORDINATION  [address] [place] [address]    July 7, 2023    Stefani Crowell  [address] [place] [address]      Dear Stefani,        I am a  clinic community health worker who works with Aliyah Marcus PA-C with the [facility]. I have been trying to reach you recently to introduce Clinic Care Coordination. Below is a description of clinic care coordination and how I can further assist you.       The clinic care coordination team is made up of a registered nurse, , financial resource worker and community health worker who understand the health care system. The goal of clinic care coordination is to help you manage your health and improve access to the health care system. Our team works alongside your provider to assist you in determining your health and social needs. We can help you obtain health care and community resources, providing you with necessary information and education. We can work with you through any barriers and develop a care plan that helps coordinate and strengthen the communication between you and your care team.  Our services are voluntary and are offered without charge to you personally.    Please feel free to contact me with any questions or concerns regarding care coordination and what we can offer.      We are focused on providing you with the highest-quality healthcare experience possible.    Sincerely,     JO Perez, [place] Espinoza, Levi Glover [place] and [facility]  [phone]

## 2023-07-06 NOTE — TELEPHONE ENCOUNTER
Pt returning this call. Given below advice.     Pt plans to proceed to the ED per her report.     Azalea Headley RN  [facility]

## 2023-07-07 NOTE — TELEPHONE ENCOUNTER
I called and spoke to patient, advised her of Ninfa's note.   Sounds to me like she is waiting for gastro or PCP to put in orders for thoracentesis?    She denies chest pain or trouble breathing, advised she go back to ER if those happen.    Patient verbalized understanding of and agreement with plan.      I see patient is scheduled for thoracentesis on 7/12/23 at [facility]?   Are we trying to get this done at [facility]?  Will await call from supervisor at [facility], per nurse note previously.    I see order date 6/20/23 for US Paracentesis with Albumin.   Is that the order to fax to [facility]?    Routed to Ninfa Mark to be sure I am understanding the plan.    Adele Molina RN  [facility]

## 2023-07-07 NOTE — PROGRESS NOTES
[facility]/Voicemail       Clinical Data: Care Coordinator Outreach  Outreach attempted x 2.  Left message on patient's voicemail with call back information and requested return call.  Plan: Care Coordinator will send care coordination introduction letter with care coordinator contact information and explanation of care coordination services via [employer]. Care Coordinator will do no further outreaches at this time.    Angelica Schulte, JO Graff, [place] Espinoza, Levi Glover [place] and [facility]  [phone]

## 2023-07-07 NOTE — TELEPHONE ENCOUNTER
Aliyah and Team,      Patient called and stated she needs orders for thoracocentesis and amniocentesis to be done at [facility]. She was upset because she sat in the hospital for a few hours yesterday and was told by ED doc that she didn't need this done today and can ask for order to schedule and have it done.     We have had [facility] patient's do this before at [facility]. Once orders are in and fax patient can call the main number at [facility] and ask to be transferred to IR to schedule for the draining. I am not certain how to orders are entered.     Patient hoping to have orders today so she can have this done over the weekend at the hospital.     Thanks,  REN Betancourt  [facility]

## 2023-07-07 NOTE — TELEPHONE ENCOUNTER
"Pt calling to state that she was seen at [facility] ED. Pt did not have paracentesis and thoracentesis per Levi provider advice. ED provider stated:    \"I think is likely hypervolemic hyponatremia and she would benefit from thoracentesis and paracentesis, though I do not think it is emergent at this time. After discussing with her she feels comfortable discharging with outpatient follow-up for thoracentesis and paracentesis and I am placed the order for this to be facilitated. She should follow-up with her primary doctor regarding continued regularly scheduled Thora and paracentesis. She was discharged home.\"    Pt stated that she does not know the number to initiate these. Pt calling for further advise as she would like to have these done today (7/7/23). No number or way for pt to initiate paracentesis and thoracentesis. Pt advised to call [phone] (ED number) to get advice on this as ED provider has placed an order to have these done.     Pt also advised to call clinic if she has no resolution to this as Levi provider had recommended these to be done. Pt verbalized understanding with no further questions.     Mary Jo Angela, RN  "

## 2023-07-07 NOTE — TELEPHONE ENCOUNTER
Huddled with Ninfa Mark, advised to have writer call [phone] ([facility] ED) to find order that was to be placed by ED provider that pt met with on 7/6/23.    Spoke with Olivia Shannon supervisor, Olivia stated that she does not have a provider available at the moment due to multiple emergencies. Gave [facility] clinic number and to ask to speak with a nurse to discuss orders.     When [facility] representative calls back, we need orders for paracentesis and thoracentesis to be done per Ninfa Mark (See 7/6/23 TE for more details of [facility] provider note).     Mary Jo Angela, RN

## 2023-07-07 NOTE — TELEPHONE ENCOUNTER
Please call patient and let her know that I will be reaching out to her hepatologist/gastroenterologist as well as Aliyah, her primary care provider given the need for likely ongoing thoracentesis (draining fluid from the lungs).  If she develops any severe symptoms such as difficulty breathing, chest pain, or any other severe symptoms, she should return to the emergency department.    Ninfa Mark PA-C on 7/7/2023 at 3:46 PM

## 2023-07-10 NOTE — TELEPHONE ENCOUNTER
Patient has standing orders for paracentesis, but not thoracentesis per my chart review.  Will message PCP and hepatologist/gastroenterologist to ensure patient has ongoing orders for thoracentesis and ongoing management.    Ninfa Mark PA-C on 7/10/2023 at 10:21 AM

## 2023-07-10 NOTE — TELEPHONE ENCOUNTER
Lasix and spironolactone are listed in external meds, prescribed by Amy  Not sure when the last time she drank - she saw Ninfa for hospital follow up. Ninfa do you know?

## 2023-07-10 NOTE — TELEPHONE ENCOUNTER
RN updated that Standing order for a thoracentesis has been placed by PCP.    Patient has appt at [facility] on July 12 to have this done. She stated she will schedule the rest of these at that time. RN stated the clinic should be able to see this order in the system if she is going to [place] for her care.     RN encouraged ER with symptoms such as difficulty breathing, chest pain, or any other severe symptoms    She verbalized understanding     Elis Neri RN on 7/10/2023 at 1:58 PM

## 2023-07-10 NOTE — TELEPHONE ENCOUNTER
Attempted to call patient with number on file to relay provider's message below with no answer, left partial voicemail (pt identified with first name) to call clinic back at [phone].    Imaging number: [phone]    Mary Jo Angela RN

## 2023-07-21 NOTE — TELEPHONE ENCOUNTER
See mychart message 7/21/23.    Pt found prescription that she was looking for.     Mary Jo Angela RN on 7/21/2023 at 10:31 AM

## 2023-07-21 NOTE — TELEPHONE ENCOUNTER
Attempted to call patient with number on file to schedule a sooner hospital follow up with no answer, left voicemail to call clinic back at [phone].    Mary Jo Angela, RN on 7/21/2023 at 9:50 AM

## 2023-07-21 NOTE — TELEPHONE ENCOUNTER
Patient called back. Rescheduled on Monday with PCP for an hospital follow up, after stay at [facility].    Patient is calling [facility], as she thinks that the Urea powder was sent there on discharge. Also noted to be there per discharge instructions.    She will call back and let us know if she needs it sent elsewhere.  It is not currently on her med list, as it was prescribed when she was in the hospital.    Per discharge instructions below:  urea powder 15 gram Pwpk packet  For diagnoses: Hyponatremia  Mix 1 Packet in liquid then take by mouth once daily.  Doctor's comments: Continue until sodium level is up to 134 and then stop.    These medications were sent to [facility] [address] [address]     Hours: M-F: 8am-7pm SAT: 8am-1pm SUN: Closed Phone: [phone]   ferrous sulfate (65 mg elemental) tablet  furosemide 40 mg tablet  urea powder 15 gram Pwpk packet      Danielle MCCLURE BSN  Triage Nurse  [facility]: [facility]  Ph: [phone]

## 2023-07-24 NOTE — PROGRESS NOTES
"  Assessment & Plan       ICD-10-CM    1. Alcoholic cirrhosis of liver with ascites (H)  K70.31       2. Hyponatremia  E87.1 Electrolyte panel (Na, K, Cl, CO2, Anion gap)      3. Low hemoglobin  D64.9 CBC with platelets and differential      4. Sore in nose  J34.89 mupirocin (BACTROBAN) 2 % external ointment      5. Hypothyroidism, unspecified type  E03.9 TSH WITH FREE T4 REFLEX      6. Elevated fasting glucose  R73.01 HEMOGLOBIN A1C      7. Allergic rhinitis due to animal dander  J30.81 montelukast (SINGULAIR) 10 MG tablet      8. Severe persistent asthma without complication  J45.50 montelukast (SINGULAIR) 10 MG tablet          1-3) Care Everywhere reviewed. Repeat labs today. She has follow up scheduled with [facility] on 8/23. Will continue Urea until sodium is 134 or higher.     4) Bactroban ointment TID x7 days    5,6) Repeat labs in process    7,8) Med renewed, no change      Review of prior external note(s) from - CareEverywhere information from [facility] reviewed  Ordering of each unique test  Prescription drug management     MED REC REQUIRED  Post Medication Reconciliation Status: discharge medications reconciled, continue medications without change  BMI:   Estimated body mass index is 26.19 kg/m  as calculated from the following:    Height as of this encounter: 1.638 m (5' 4.5\").    Weight as of this encounter: 70.3 kg (155 lb).     Return in about 5 weeks (around 8/28/2023) for liver follow up, with Aliyah, in person.     Aliyah Marcus PA-C  [facility] DANI Ambrocio is a 63 year old, presenting for the following health issues:  Hospital F/U        7/24/2023     2:07 PM   Additional Questions   Roomed by Sheree PETERS   Accompanied by          7/24/2023     2:07 PM   Patient Reported Additional Medications   Patient reports taking the following new medications Yes     HPI       Hospital Follow-up Visit:    Hospital/Nursing Home/IP Rehab Facility:  [facility]  Date of Admission: " 07/11/2023  Date of Discharge: 07/20/2023  Reason(s) for Admission: Hyponatremia (Primary Dx);  Pleural effusion on right;  Ascites due to alcoholic cirrhosis (HC);  Pleural effusion;  Iron deficiency  Discharge Disposition: Home Self Care     Started outpatient tx today - 9am-noon M/Tu/Th/F  Drained twice in the hospital  Breathing is ok - gets winded easily   Restarted citalopram last week, was off x8 days  6/28/23 - last drink    Hospital Course     Stefani Crowell is a 63 y.o. female with a history of alcohol use admitted with shortness of breath. Was found to have large effusion and ascites. Underwent thoracentesis on 7/12 with removal of 1.5 L. Also underwent paracentesis on 7/12 for 2.9 L repeated on 7/17 for 3.5 L.    She also had severe hyponatremia with sodium 112. 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 Responded to improved intravascular volume status, DDAVP and urea powder. To continue urea powder daily and stop when sodium is up to 134. Recheck in clinic.    Per GI, not a liver transplant candidate acutely. Meld too high for safe TIPS. She had a negative Doppler ultrasound. Received vitamin K doses with INR stable at 2.2/2.3 from 11.8 on presentation. SBP not suspected. Labs not suggesting hemochromatosis. AFP unremarkable and work-up negative for viral hepatitis. Ascitic fluid cytology negative. She is to continue low-sodium diet. Will need an EGD for variceal screening as an outpatient during follow-up. Has an appointment with GI on August 23.    Recommendations for Outpatient Provider PCP: VERÓNICA Douglas     Admission: 7/11/2023 @ [facility]    Specific recommendations to be addressed at the follow up visit: Recurrent ascites and pleural effusion. Hyponatremia  Medication changes:   Started on urea packet. To be discontinued when sodium is up to 134.   Spironolactone acutely held in-house. Resumed at discharge.  Follow up labs/imaging: Recheck sodium and potassium  "levels.  Other specialty follow-up not included in DC orders: na     Follow-Up   After Hospital Follow Up Appointment(s)   [facility]. [phone]. You have a follow up appointment with Dr. SAEID Chung on August 23rd at 8:30 a.m. at our [facility]      Was your hospitalization related to COVID-19? No   Problems taking medications regularly:  None  Medication changes since discharge: Yes  Problems adhering to non-medication therapy:  Worried about being taken off the Citalopram but did restart    Summary of hospitalization:  CareEverywhere information obtained and reviewed  Diagnostic Tests/Treatments reviewed.  Follow up needed: labs  Other Healthcare Providers Involved in Patient s Care:         Specialist appointment - 8/23, [facility]  Update since discharge: fluctuating course.         Plan of care communicated with patient and family           Review of Systems   Constitutional, pulmonary, GI, and psych systems are negative, except as otherwise noted.      Objective    /58 (BP Location: Right arm, Patient Position: Sitting, Cuff Size: Adult Regular)   Pulse 95   Temp 98.8  F (37.1  C) (Tympanic)   Ht 1.638 m (5' 4.5\")   Wt 70.3 kg (155 lb)   LMP  (LMP Unknown)   SpO2 94%   BMI 26.19 kg/m    Body mass index is 26.19 kg/m .  Physical Exam   GENERAL: healthy, alert and no distress  ABDOMEN: distended, semi-soft, non tender  MS: no gross musculoskeletal defects noted, no edema  SKIN: no suspicious lesions or rashes  NEURO: Normal strength and tone, mentation intact and speech normal  PSYCH: mentation appears normal, affect normal/bright                "

## 2023-08-14 PROBLEM — K76.82 HEPATIC ENCEPHALOPATHY (H): Status: ACTIVE | Noted: 2023-01-01

## 2023-08-14 PROBLEM — K70.11 ALCOHOLIC HEPATITIS WITH ASCITES (H): Status: ACTIVE | Noted: 2023-01-01

## 2023-08-14 NOTE — PROGRESS NOTES
Lolly is a 63 year old who is being evaluated via a billable telephone visit.      What phone number would you like to be contacted at? [phone]  How would you like to obtain your AVS? Torrie  Distant Location (provider location):  On-site    Assessment & Plan     Patient is a 63-year-old female with past medical history significant for alcoholic cirrhosis of liver with ascites, hepatic encephalopathy, STEPHANIE, hypothyroidism, depression, who presents to telephone visit with sister and caregiver requesting refills of medications urgently as patient was discharged from the hospital and does not have many of her medications.  Going forward she will have assistance in managing these.  Medications reconciled with recent hospitalization and urgent refills provided as listed below.    STEPHANIE (generalized anxiety disorder)  - citalopram (CELEXA) 40 MG tablet; Take 1 tablet (40 mg) by mouth daily    Mild major depression (H)  - citalopram (CELEXA) 40 MG tablet; Take 1 tablet (40 mg) by mouth daily    Allergic rhinitis due to animal dander  - montelukast (SINGULAIR) 10 MG tablet; Take 1 tablet (10 mg) by mouth At Bedtime    Severe persistent asthma without complication  - montelukast (SINGULAIR) 10 MG tablet; Take 1 tablet (10 mg) by mouth At Bedtime  - albuterol (PROAIR HFA/PROVENTIL HFA/VENTOLIN HFA) 108 (90 Base) MCG/ACT inhaler; Inhale 1-2 puffs into the lungs every 6 hours as needed for shortness of breath or wheezing    Alcoholic cirrhosis of liver with ascites (H)  - pantoprazole (PROTONIX) 40 MG EC tablet; Take 1 tablet (40 mg) by mouth daily  - furosemide (LASIX) 40 MG tablet; Take 1 tablet (40 mg) by mouth every morning    Hepatic encephalopathy (H)  - lactulose (CEPHULAC) 10 GM packet; Take 2 packets (20 g) by mouth 3 times daily  - rifaximin (XIFAXAN) 550 MG TABS tablet; Take 1 tablet (550 mg) by mouth 2 times daily for 90 days  - spironolactone (ALDACTONE) 25 MG tablet; Take 1 tablet (25 mg) by mouth daily for 90  days      See Patient Instructions    Ninfa Mark PA-C  [facility] DANI Ambrocio is a 63 year old, presenting for the following health issues:  Medication Request      8/14/2023     4:24 PM   Additional Questions   Roomed by Sheree PETERS       HPI     Patient notes that hepatic specialist would like patient to have internal medicine provider.  They have a referral for internal medicine, but have not set up follow-up at this point.  They do have a hospital discharge follow-up scheduled with me in 3 days in person.  Paracentesis scheduled tomorrow.    Patient's caregiver and sister are present and patient is out of many medications which is why they are completing telephone visit today.  Caregiver and sister would like to ensure that patient's medications are refilled and are correct.    Per chart review, patient hospitalized for hepatic encephalopathy due to confusion 8/9/23-8/12/23.     Review of Systems   Constitutional:  Negative for fever.   Respiratory:  Negative for cough and shortness of breath.    Cardiovascular:  Negative for chest pain.   Gastrointestinal:  Positive for abdominal distention (Improved). Negative for nausea and vomiting.   Psychiatric/Behavioral:  Negative for confusion.             Objective           Vitals:  No vitals were obtained today due to virtual visit.    Physical Exam   healthy, alert, and no distress  PSYCH: Alert and oriented times 3; coherent speech, normal   rate and volume, able to articulate logical thoughts, able   to abstract reason, no tangential thoughts, no hallucinations   or delusions  Her affect is normal  RESP: No cough, no audible wheezing, able to talk in full sentences  Remainder of exam unable to be completed due to telephone visits            Phone call duration: 16 minutes

## 2023-08-14 NOTE — TELEPHONE ENCOUNTER
Patient is looking for new Rx for Citalopram 40 mg (last filled in May-sent refill request but no response yet) also looking for new Rx for Spironolactone 25 mg qam-filled at [facility], filled in June w/ Dr. Melva Mayfield 8/13.

## 2023-08-15 NOTE — PROGRESS NOTES
LLQ paracentesis performed by radiologist.  1400 ml clear yellow fluid removed. Pressure held at site after catheter removed. No bleeding noted. Tolerated procedure, but tearful and nervous about it.  Patient reassured and doing well.  Albumin not given for volume removed.  Patient states she is planning to get a new hepatology provider, encouraged to get a new paracentesis order from her new provider.

## 2023-08-15 NOTE — DISCHARGE INSTRUCTIONS
If you are seeing a new liver doctor, please get a new standing order for your paracentesis.  They can send it over and we will schedule with that order.      Radiology  Discharge Instructions for Abdominal Paracentesis      You have had an abdominal paracentesis procedure today.  A needle or catheter was put into your abdomen to remove fluid for laboratory studies and/or to remove the extra fluid from your abdomen.    AFTER YOU ARE HOME:  Rest at home today.  Limit physical activity such as lifting, straining, or exercise for 48 hours.  You may resume normal activity in 24 hours.  Resume previous diet and medications.  You may remove bandage in 24 hours.    CALL YOUR PRIMARY PROVIDER IF:  You develop temperature over 101o F, or redness at the drainage site.  You have any other questions or concerns.    AFTER HOURS CALL [facility] NURSE ADVISORS AT [phone]    COME TO EMERGENCY ROOM IF:  You develop severe abdominal pain, swelling the size of a baseball or larger, continuous oozing from puncture site longer than 24 hours, bleeding that saturates a gauze dressing even after you have put direct pressure on the site for 15 minutes, severe lightheadedness or fainting.  DO NOT DRIVE YOURSELF.

## 2023-08-16 NOTE — PATIENT INSTRUCTIONS
Matthew Ambrocio,    Refills of your requested medications were sent to your pharmacy.  Please reach out with any questions or concerns.  I look forward to catching up with you further in 3 days.    Take Care,  Ninfa Mark PA-C

## 2023-08-18 NOTE — PROGRESS NOTES
Assessment & Plan     Hospital discharge follow-up  Hepatic encephalopathy (H)  Alcoholic cirrhosis of liver with ascites (H)  Thrombocytopenia (H)  Noncompliance with medication regimen  Alcohol use disorder, severe, in early remission (H)  Dyspnea, unspecified type  Patient is a 63-year-old female who presents to clinic for hospital discharge follow-up.  Patient has history of alcoholic cirrhosis of liver with ascites, undergoes paracentesis weekly, and was admitted for hepatic encephalopathy.  Patient also had drop in hemoglobin with concerns for GI bleeding and was treated with octreotide and Protonix with stabilization of hemoglobin.  Ammonia levels improved and confusion resolved during hospital stay.      Since discharge, patient has received additional help from family who plan to manage her medical treatment and medications going forward.  She is compliant with medications as prescribed with family's assistance.  Patient does not worsening shortness of breath.  No signs of respiratory distress during visit today-patient is able to speak in full sentences and O2 saturation within normal range.  She does have history of pleural effusion and thoracentesis.  Will complete chest x-ray today for further evaluation.    Patient is concerned for increased lower extremity edema.  She does have 2+ pitting edema to mid tibia.  Discussed that this is likely due to decreasing Lasix dosing from soft systolic blood pressures.   today, thus we discussed that Lasix dosing cannot be increased at this time.    Patient is switching from primary care to internal medicine with appointment scheduled 9/5/23.  She has follow-up visit with current PCP 8/30/23.  Patient is also switching nephrologist and has appointment scheduled for 8/21/23 within outside medical facility.    Patient to discuss lab findings, paracentesis scheduled, and chest x-ray findings with nephrologist.  Discussed the importance for urgent/emergent  "follow-up for any new or worsening symptoms including increasing confusion.  Patient/ verbalized understanding and agrees with plan.    - XR Chest 2 Views; Future    Cramp of limb  Patient notes chronic cramping of extremities which has slowly worsened over time.  We will check electrolytes.  - Basic metabolic panel  (Ca, Cl, CO2, Creat, Gluc, K, Na, BUN); Future  - Magnesium; Future  - Magnesium  - Basic metabolic panel  (Ca, Cl, CO2, Creat, Gluc, K, Na, BUN)    Change in skin mole  Patient notes changes of many moles and would like to schedule a dermatology skin check.  Referral placed.  - Adult Dermatology Referral; Future     MED REC REQUIRED  Post Medication Reconciliation Status:  Discharge medications reconciled, continue medications without change  BMI:   Estimated body mass index is 26.96 kg/m  as calculated from the following:    Height as of this encounter: 1.66 m (5' 5.35\").    Weight as of this encounter: 74.3 kg (163 lb 12.8 oz).       See Patient Instructions    Ninfa Mark PA-C  [facility] DANI Ambrocio is a 63 year old, presenting for the following health issues:  RECHECK (Patient coming in for a follow up from her virtual visit she had with Ninfa 08/14/2023)        8/18/2023     1:10 PM   Additional Questions   Roomed by Poppy DASILVA MA   Accompanied by - Hari         8/18/2023     1:10 PM   Patient Reported Additional Medications   Patient reports taking the following new medications None       HPI       Hospital Follow-up Visit:    Hospital/Nursing Home/IP Rehab Facility:  [facility]  Date of Admission: 8/9/23  Date of Discharge: 8/12/23  Reason(s) for Admission: Acute hepatic encephalopathy, melena, acute blood loss anemia, hyponatremia, alcoholic cirrhosis with ascites, hypotension    Was your hospitalization related to COVID-19? No   Problems taking medications regularly:  Since discharge patient's family is managing medications for her.   Medication " changes since discharge: None  Problems adhering to non-medication therapy:  None    Summary of hospitalization:  CareEverywhere information obtained and reviewed  Diagnostic Tests/Treatments reviewed.  Follow up needed: Hepatology, internal medicine  Other Healthcare Providers Involved in Patient s Care:         Specialist appointment - hepatology  8/21/23  Update since discharge: improved. But patient notes additional concerns:    -she is gaining fluid quickly and had around a 10lb weight gain since discharge. She notes she is experiencing slowly worsening dyspnea. Swelling in the legs.  She is wondering if she needs paracentesis sooner than weekly.    -She also notes slowly worsening cramping in her legs and hands.      -She had many moles and would like a skin check by dermatology    -Daughter is in charge of medications, sister is in charge of medical decisions.    Patient has paracentesis in 5 days and follow up with nephrology 8/21/23    Patient is going to rehab but has not drank alcohol since June 23.    Plan of care communicated with patient and      Per chart review, patient was admitted with hepatic encephalopathy and ammonia of 70.  Patient also reported melanotic stool and hemoglobin drop from 11.1-8.8.  Patient treated with octreotide and Protonix.  Lactulose and Xifaxan were restarted.  Ammonia improved and confusion resolved.  EGD completed showing portal hypertensive gastropathy and erythematous duodenopathy.  Patient underwent paracentesis without signs of infection and received IV Rocephin.  Cultures resulted negative.  Hemoglobin stabilized.  Patient received vitamin K.  Electrolytes replaced.  Patient seen by PT/OT.  Outpatient OT recommended and ordered for patient.  Lasix decreased to 40 mg daily as systolic blood pressures were soft, from twice daily, and patient started on Aldactone.  Patient to follow-up with hepatologist in 2-4 weeks.      Review of Systems   Constitutional:   "Positive for fatigue. Negative for chills and fever.   Respiratory:  Positive for shortness of breath (baseline). Negative for cough.    Cardiovascular:  Negative for chest pain.   Gastrointestinal:  Positive for abdominal distention. Negative for abdominal pain, nausea and vomiting.   Skin:  Negative for rash.   Neurological:  Negative for dizziness, syncope, speech difficulty and light-headedness.   Psychiatric/Behavioral:  Negative for confusion. The patient is not nervous/anxious.             Objective    /74   Pulse 92   Temp 97  F (36.1  C) (Temporal)   Resp 18   Ht 1.66 m (5' 5.35\")   Wt 74.3 kg (163 lb 12.8 oz)   LMP  (LMP Unknown)   SpO2 93%   Breastfeeding No   BMI 26.96 kg/m    Body mass index is 26.96 kg/m .  Physical Exam  Vitals and nursing note reviewed.   Constitutional:       General: She is not in acute distress.     Appearance: Normal appearance.   HENT:      Head: Normocephalic and atraumatic.      Mouth/Throat:      Mouth: Mucous membranes are moist.      Pharynx: Oropharynx is clear.   Eyes:      Extraocular Movements: Extraocular movements intact.      Pupils: Pupils are equal, round, and reactive to light.   Cardiovascular:      Rate and Rhythm: Normal rate and regular rhythm.      Heart sounds: Normal heart sounds.   Pulmonary:      Effort: Pulmonary effort is normal. No respiratory distress.      Breath sounds: Normal breath sounds. No wheezing, rhonchi or rales.   Abdominal:      General: Bowel sounds are normal. There is distension (Positive fluid wave).      Palpations: Abdomen is soft.      Tenderness: There is no abdominal tenderness. There is no guarding or rebound.   Musculoskeletal:         General: Normal range of motion.      Cervical back: Normal range of motion.      Right lower leg: Edema (2+ to mid tibia) present.      Left lower leg: Edema (2+ to mid tibia) present.   Skin:     General: Skin is warm and dry.   Neurological:      General: No focal deficit " present.      Mental Status: She is alert.   Psychiatric:         Mood and Affect: Mood normal.         Behavior: Behavior normal.

## 2023-08-18 NOTE — PATIENT INSTRUCTIONS
We are checking your electrolytes and are completing a chest x-ray to see why you are having cramping as well as increasing shortness of breath.  Please continue with your liver doctor follow-up on Monday as well as your paracentesis on Wednesday.  If you feel that you need your paracentesis sooner, please discuss this with your liver doctor.    I have placed a referral for dermatology to do your yearly skin check.  Please reach out with any questions or concerns.  Make sure you follow-up for any new or worsening symptoms.  For any severe symptoms such as confusion, severe shortness of breath, abdominal pain, or any other severe symptoms, please go to the emergency department.    I will review your paperwork with Aliyah and we will be in touch regarding next steps.

## 2023-08-22 NOTE — TELEPHONE ENCOUNTER
The thoracentesis orders have been placed by hepatology in the past and should be going forward.  It will be up to them to determine how frequently patient will need this as this does appear to be a chronic fluid collection in the lungs.  Patient is switching her hepatology care as far as I know, so I would recommend she reach out to her hepatologist, either her new one or her former 1 to see if they would recommend this be drained and to decide if she needs standing orders going forward.    Ninfa Mark PA-C on 8/22/2023 at 5:46 PM

## 2023-08-23 NOTE — PROGRESS NOTES
RLQ paracentesis performed by radiologist. 4,400 Ml clear yellow fluid removed. Pressure held at site after catheter removed. No bleeding noted. Skin glue applied. Given 12.5gm  Albumin per Dr Raeann andrade.

## 2023-08-23 NOTE — DISCHARGE INSTRUCTIONS
Radiology  Discharge Instructions for Abdominal Paracentesis    You have had an abdominal paracentesis procedure today.  A needle or catheter was put into your abdomen to remove fluid for laboratory studies and/or to remove the extra fluid from your abdomen.    AFTER YOU ARE HOME:  Rest at home today.  Limit physical activity such as lifting, straining, or exercise for 48 hours.  You may resume normal activity in 24 hours.  Resume previous diet and medications.  You may remove bandage in 24 hours.    CALL YOUR PRIMARY PROVIDER IF:  You develop temperature over 101o F, or redness at the drainage site.  You have any other questions or concerns.    AFTER HOURS CALL [facility] NURSE ADVISORS AT [phone]    COME TO EMERGENCY ROOM IF:  You develop severe abdominal pain, swelling the size of a baseball or larger, continuous oozing from puncture site longer than 24 hours, bleeding that saturates a gauze dressing even after you have put direct pressure on the site for 15 minutes, severe lightheadedness or fainting.  DO NOT DRIVE YOURSELF.

## 2023-08-25 NOTE — TELEPHONE ENCOUNTER
[facility] & [facility]; pt to schedule soonest avail; okay to overbook 9.12.23 1:30pm, per provider- KB 8.25.23//

## 2023-08-28 NOTE — TELEPHONE ENCOUNTER
Paracentesis therapy plan to be cancelled after scheduled para on 8/29/23 per Dr. Cary. Patient seeing a new provider at [facility].    Therapy plan cancelled on 8/30/23          Mery LAGUNA LPN  Hepatology Clinic

## 2023-08-28 NOTE — TELEPHONE ENCOUNTER
Yes, we have the forms.  I gave these to Aliyah, patient's primary care provider, who plans to fill these out with her at her next visit 8/30/23.     Ninfa Mark PA-C on 8/28/2023 at 12:29 PM

## 2023-08-29 NOTE — DISCHARGE INSTRUCTIONS
You don't have another paracentesis scheduled.  Connect with your [facility] provider to get an order so you can make an appointment for another paracentesis.  I would do this as soon as you can.               Radiology  Discharge Instructions for Abdominal Paracentesis    You have had an abdominal paracentesis procedure today.  A needle or catheter was put into your abdomen to remove fluid for laboratory studies and/or to remove the extra fluid from your abdomen.    AFTER YOU ARE HOME:  Rest at home today.  Limit physical activity such as lifting, straining, or exercise for 48 hours.  You may resume normal activity in 24 hours.  Resume previous diet and medications.  You may remove bandage in 24 hours.    CALL YOUR PRIMARY PROVIDER IF:  You develop temperature over 101o F, or redness at the drainage site.  You have any other questions or concerns.    AFTER HOURS CALL [facility] NURSE ADVISORS AT [phone]    COME TO EMERGENCY ROOM IF:  You develop severe abdominal pain, swelling the size of a baseball or larger, continuous oozing from puncture site longer than 24 hours, bleeding that saturates a gauze dressing even after you have put direct pressure on the site for 15 minutes, severe lightheadedness or fainting.  DO NOT DRIVE YOURSELF.

## 2023-08-29 NOTE — PROGRESS NOTES
RLQ paracentesis performed by radiologist.  5800 ml clear yellow fluid removed. Pressure held at site after catheter removed. No bleeding noted. Given Albumin 25 g per Dr Cary order.  Patient reports she is going to [facility] for care now, plans to connect with them to schedule another paracentesis.

## 2023-08-30 NOTE — TELEPHONE ENCOUNTER
Pt's sister calling to inform care team that pt's [facility] liver team Christine deleon will be sending over orders for chest x-ray and lab work to adjust diuretics for 8/30/23 appointment.     Routing to our team to give these orders to pcp as a fax should be done.     Mary Jo Angela RN on 8/30/2023 at 10:05 AM

## 2023-08-30 NOTE — PROGRESS NOTES
"  Assessment & Plan       ICD-10-CM    1. Alcoholic cirrhosis of liver with ascites (H)  K70.31 XR Chest 2 Views     Comprehensive metabolic panel (BMP + Alb, Alk Phos, ALT, AST, Total. Bili, TP)     Comprehensive metabolic panel (BMP + Alb, Alk Phos, ALT, AST, Total. Bili, TP)      2. Encounter for completion of form with patient  Z02.89       3. Uncomplicated severe persistent asthma  J45.50 albuterol (PROVENTIL) (2.5 MG/3ML) 0.083% neb solution      4. Visit for suture removal  Z48.02       5. Visit for screening mammogram  Z12.31 MA SCREENING DIGITAL BILAT - Future  (s+30)          1,2) Three different forms were reviewed and completed today: FMLA, [employer], and a handicap certificate. CXR and CMP per her GI specialist. Follow up in 3 months.     3) Rx renewed    4) 1 suture removed with a straight iris and forceps. Patient tolerated well, no complications.       Ordering of each unique test         BMI:   Estimated body mass index is 23.87 kg/m  as calculated from the following:    Height as of 8/18/23: 1.66 m (5' 5.35\").    Weight as of this encounter: 65.8 kg (145 lb).     Return in about 3 months (around 11/30/2023) for a health update, with Aliyah, in person.     KAROLYN Ni [facility] DANI Ambrocio is a 63 year old, presenting for the following health issues:    Liver Fup and Forms        8/30/2023    10:59 AM   Additional Questions   Roomed by Sheree PETERS   Accompanied by , Hari         8/30/2023    10:59 AM   Patient Reported Additional Medications   Patient reports taking the following new medications No new medications to add       History of Present Illness     Asthma:  She presents for follow up of asthma.  She has some cough, some wheezing, and some shortness of breath.  She is using a relief medication daily. She does not have a controller medication. Patient is aware of the following triggers: same as previous visit. The patient has not " had a visit to the Emergency Room, Urgent Care or Hospital due to asthma since the last clinic visit.     Mental Health Follow-up:  Patient presents to follow-up on Depression & Anxiety.Patient's depression since last visit has been:  Worse  The patient is having other symptoms associated with depression.  Patient's anxiety since last visit has been:  Worse  The patient is having other symptoms associated with anxiety.  Any significant life events: job concerns, financial concerns and health concerns  Patient is feeling anxious or having panic attacks.  Patient has no concerns about alcohol or drug use.    Reason for visit:  Follow up xray domeniches paperwork    She eats 2-3 servings of fruits and vegetables daily.She consumes 0 sweetened beverage(s) daily.She exercises with enough effort to increase her heart rate 9 or less minutes per day.  She exercises with enough effort to increase her heart rate 3 or less days per week.   She is taking medications regularly.       Had a paracentesis and the site started leaking a weak later  So ER provider put in sutures   Needs it removed - just one      Review of Systems   Constitutional systems are negative, except as otherwise noted.      Objective    BP 94/50 (BP Location: Right arm, Patient Position: Chair, Cuff Size: Adult Regular)   Pulse 78   Temp 97.3  F (36.3  C) (Temporal)   Wt 65.8 kg (145 lb)   LMP  (LMP Unknown)   SpO2 97%   BMI 23.87 kg/m    Body mass index is 23.87 kg/m .  Physical Exam   GENERAL: healthy, alert and no distress  MS: no gross musculoskeletal defects noted, no edema  SKIN: 1 suture in place, left abdomen, no signs of infection. Mild jaundice   NEURO: Normal strength and tone, mentation intact and speech normal  PSYCH: mentation appears normal, affect normal/bright

## 2023-09-25 NOTE — LETTER
9/28/23    Stefani Crowell  [address]  [place] [address]      Dear Stefani,    Thank you for your interest in the Transplant Center at [facility]. We look forward to being a part of your care team and assisting you through the transplant process.    As we discussed, your transplant coordinator is Lexi Schumacher (Liver).  You may call your coordinator at any time with questions or concerns.  Your first scheduled call will be on 10/2/23 between 10am and 1pm.  If this needs to change, call [phone].    Please complete the following.    Fill out and return the enclosed forms  Authorization for Electronic Communication  Authorization to Discuss Protected Health Information  Authorization for Release of Protected Health Information  Authorization for Care Everywhere Release of Information    Sign up for:  [employer], access to your electronic medical record (see enclosed pamphlet)  [email or web address], a transplant education website        You can use these tools to learn more about your transplant, communicate with your care team, and track your medical details            Sincerely,      Solid Organ Transplant  [facility]    cc: Care Team

## 2023-09-27 NOTE — TELEPHONE ENCOUNTER
Pt's sister Kuldeep returning call  Is available anytime today  Pt has a paracentesis later today

## 2023-09-28 NOTE — TELEPHONE ENCOUNTER
Patient was asked the following questions during liver intake call.      Referring Provider: Christine Fairchild NP  Referring Diagnosis: alcoholic cirrhosis of liver with ascites  PCP:  Aliyah Marcus     1)Do you know why you have liver disease: yes, alcoholic cirrhosis      If Alcoholic Cirrhosis is present when was your last drink: 6/28/23      Have you ever been through treatment for alcohol: currently in rehab through [facility] ([place])  2) Presence of Ascites: yes Paracentesis: yes  3) Presence of Hepatic Encephalopathy: yes  Medications: lactulose  4) History of GI Bleeding: noticed blood in vomit 2-3 times in the past month, but has talked with her primary care provider about it.  5) Oxygen Use: no  6) EGD: yes, [facility]  7) Colonoscopy: 3/31/21  8) MELD Score: 26 (8/9/23)  9)  Labs available for review from PCP/GI:  yes  10)HCC Diagnosis: no                                    11)Insurance information: [facility]             Policy [place]: self             Subscriber/Policy/ID Number: [id number]             Group Number: [id number]     Referral intake process completed.  Patient is aware that after financial approval is received, medical records will be requested.   Patient confirmed for a callback from transplant coordinator on 10/2/23.  Tentative evaluation date TBD.     Confirmed coordinator will discuss evaluation process in more detail at the time of their call.   Patient is aware of the need to arrange age appropriate cancer screening, vaccinations, and dental care.  Reminded patient to complete questionnaire, complete medical records release, and review packet prior to evaluation visit .  Assessed patient for special needs (ie--wheelchair, assistance, guardian, and ):     Patient instructed to call [phone] with questions. Yes, wheelchair     Patient gave verbal consent during intake call to obtain medical records and documents outside of [facility]:   yes

## 2023-10-02 NOTE — TELEPHONE ENCOUNTER
LIVER DISEASE ETOH   REFERRING PROVIDER Christine Gurnider   [facility]  -----------------------------------------------------------------------------------------------------------------------------  MELD 23-26        ABO A        First dx a couple years ago  - was drinking heavily and tried to quit a few times but not successful until recently.  Was having brain fog, hungover.  No BLE edema or ascites issues at that time.  FOllowed with Aliyah Marcus- PCP ;  didn't have a lot of sx other than 'hangover' like sx.  Dealt with insomnia.  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  Went on mini vacation in April, was drinking heavier then started needing weekly courtney since that time.  .      Doing weekly courtney until June.  Says in June 'everything hit the fan'      Started with SOB.  Got courtney and then started getting thoras.      Our center has tried to get multiple attempts to schedule follow up since April    Referred to go to rehab right after hospitalization, had hard time finding spot that would take medical needs.  Currently in IOP  because of medical needs, but program on pause due to hospitalizations.        Thoras every 3-4 days  for past couple weeks.      Getting outpatient psychiatrist after discharge also    Reiterated 100% lifelong abstinence essential, lifelong    Now follows with Christine Gurinder at [facility] now, started seeing her this summer        Ascites  [place] Furosemide- on both    Courtney weekly and thoras every 3-4 days    TIPS no- was scheduled for consult at [facility]   HE yes   Kidney function current MATTIE  Variceal screening Last EGD- reports on recent admission at [facility] 8/11/23, no varices, repeat in 1 yr  HCC Screening   Alcohol use  Was enrolled in IOP but on hold with hospitalization  Went to  therapy off/on since teenager, AA in past.    Last ETOH reported as 6/28/23- prior was drinking vodka daily, at least three drinks per day.  A few times tried to quit but it didn't work.  Drinking for 20 plus years.        Hospitalizations    Multiple at [facility]  ---------------------------------------------------------------------------------------------------------------------------------  PMH  Cardiac- denies  Pulm- childhood asthma; frequent thoras now; had episodes with needing nebs and prednisone with asthma flares- hx of smoking, COPD, quit 7 weeks ago   Cancer- denies   Diabetes - denies   Abdominal surgery - bladder repair years ago due to incontinence issues   CRC Screening: March 2021 here   Vaccinations:  still needs shingles but otherwise up to date   PAP: - 2019, neg and neg HPV- hx of vaginal bleeding- see prior note from GUY Cary  Mammogram: over due - last one here 3/1/19, neg HPV, repeat in 5 yrs?     SHX    Daughter Angelia present in call  Sister Paulette   ---------------------------------------------------------------------------------------------------------------------------------  LDLT Discussed no, determine candidacy    PLAN    See Hepatology and SW 10/3/23, only these 2 and labs  Plus echo- needed if moving forward with TIPS

## 2023-10-02 NOTE — TELEPHONE ENCOUNTER
Patient currently admitted at [facility]    Discussed with Dr Lim, Lachelle has been in communication with her.  WIll discharge today,setting up for clinic tomorrow.    Patient has referral vs scheduled for TIPS at outside.  Ideally would get TIPS here, evaluate for transplant candidacy.  Frequent para and thora    MELD 23-26.

## 2023-10-03 NOTE — PROGRESS NOTES
"[facility] Liver Transplant Evaluation    Requesting Provider and Health System: Christine Fairchild NP [facility]  Liver Disease: alcohol related cirrhosis    A/P  Ms. Crowell is a 63 Y F with decompensated alcohol related cirrhosis c/b refractory ascites/HH and HE. MELD 3.0 29 ABO A    Last alcohol was 6/28/23. She had been diagnosed about 3 years ago and continued to drink alcohol until this time. She has been away from alcohol now for 3 months and may still improve from a portal hypertension and synthetic function. She has has a very slight improvement of TB from 6s to 4s.  INR has not improved and ascites/HH has worsened.    Ascites/HH refractory. Diuretics limited by MATTIE and hypoNa.  Decision regarding TIPS is not straightforward for this patient: her bilirubin is greater than 3, her \"MELD 1.0\" (that is, the MELD that was used for most studies to assess TIPS safety) is 23, which is above usual cutoff for low vs high risk.   And she has had HE.  She is not a LT candidate at this time with h/o known liver disease and continued alcohol use. She will need to complete CD programming(in process) and have at least 6 months of sobriety.    But the HH/ascites has significant impact on her quality of life, with need to thora 2x/week. Will start process of TIPS eval but would like to exercise extreme caution with decision making. I will message IR once we know who she will see. She has echo scheduled.    HE Controlled with lactulose and rifaximin. Discussed lactulose titration at length. Discussed asterixis/tremor    THROMBOCYTOPENIA Severe with plt 30-50. 2/2 portal HTN and alcohol.  ANEMIA Normocytic, normochromic. Ferritin 157 10/3/23. H/o B12 def. Last lab was 2010. Will complete lab eval with next labs.   HCC SCREENING US 7/16/23 no mass  VARICEAL SCREENING EGD 8/11/23 PHG. No varices  BONE HEALTH No record of DEXA.Will address in future    COLORECTAL CANCER SCREENING COLONOSCOPY 3/31/21 5 polyps, hyperplastic. Due " 2031    NUTRITIONAL STATUS mod-severe malnutrition. Discussed protein.    SOCIAL SUPPORT Excellent family involvement  FUNCTIONAL STATUS Good. Indep in ADLs  LIVER TRANSPLANT CANDIDACY Not a candidate at this time due to CD requirements, which she is working on.  Bev Boo [phone]    RTC in person or video in 3 m    Stephanie Lim MD  Hepatology/Liver Transplantation  Medical Director, Liver Transplantation  [facility]  ========================================================================    Subjective:  Ms. Crowell is a 63 Y F with decompensated alcohol related cirrhosis c/b ascites/HH and HE  First diagnosed with liver disease over 3 years ago.  She was seen by Dr. Annie Cary last fall and then saw Christine Fairchild at [facility].    She comes in today with  of 21 years Hari, daughter Angelia, sister Paulette, niece Shanita, and sister Bev is on the phone. All are involved in her care.    She is getting 2 thoras per week (about 2 L per time) for the last month. Prior to that it was 2 times June and July, then none until September. She is getting albumin if she gets a para as well as the thora at the same visit.  Last para was 9/13/23 was 2 L.     She has low BP on midodrine 10 qid. This was increased from 10 tid. She has not fallen but she gets dizzy sometimes in general. Not specifically when she gets up.     She uses a cane sometime for balance and weakness. She mostly is independent in ADLs.  She uses a stool in the bathtub and needs some standby support. She was discharged from PT and OT.      AUD  Last ETOH  6/2023  CD rx: in IOP now  Alcohol use was 3-4 drinks or more in the past. Withdrawal symptoms in past  See SW note    Ascites   spironolactone was stopped at this last admission due MATTIE  furosemide 40 bid  Na restricted diet follows. Gives herself 8 out of 10.     HE  Lactulose takes 30 ml tid. This was decreased a little in the hospital. She had some mild HE in the last week or  "so  Rifaximin 550 bid  BM has 3-5 per day    HCC screening US 23 no mass  EGD 23 PHG. No varices  COLONOSCOPY 3/31/21 5 polyps, hyperplastic. Due   KIDNEY FUNCTION see above  BONE DENSITY no record    Portal vein assessment: patent on US 7/10/23  Diabetes: no  Abdominal surgery: bladder repair with mesh  HCV neg  HBV neg  HIV neg    MELD 3.0: 29 at 10/3/2023  8:53 AM  MELD-Na: 29 at 10/3/2023  8:53 AM  Calculated from:  Serum Creatinine: 1.24 mg/dL at 10/3/2023  8:53 AM  Serum Sodium: 126 mmol/L at 10/3/2023  8:53 AM  Total Bilirubin: 4.3 mg/dL at 10/3/2023  8:53 AM  Serum Albumin: 3.9 g/dL (Using max of 3.5 g/dL) at 10/3/2023  8:53 AM  INR(ratio): 2.30 at 10/3/2023  8:53 AM  Age at listing (hypothetical): 63 years  Sex: Female at 10/3/2023  8:53 AM    PAST MEDICAL HISTORY  Anxiety  Depression  Hypothyroidism  Vit D def  Vit B12 def  SOCIAL HISTORY  Lives with Hari, her s/o of 20 years  Currently is not working. 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   Smokin/2 ppd until she quit 2023  FAMILY HISTORY  M cervicalcancer, thyroid disease, corneal trasnplanst  F HTN, aortic aneurysm  Sibs thyroid disesase  ROS 10 point ROS neg other than the symptoms noted above in the HPI.  EXAM  BP 97/57   Pulse 67   Ht 1.664 m (5' 5.5\")   Wt 62.3 kg (137 lb 4.8 oz)   LMP  (LMP Unknown)   SpO2 100%   BMI 22.50 kg/m      Gen: No acute distress. Seated in a wheelchair.  Head: Normocephalic atraumatic  Eyes: Sclera icteric  Neck: No cervical lymphadenopathy  Respiratory: Clear to auscultation bilaterally, no overt wheezing or rales  CV: RRR   Abdomen:  Soft, nontender, nondistended, normal bowel sounds  Extrem: No edema  Skin: Mild jaundice. spider angiomata and palmar erythema present.    Neuro: Alert and oriented. No asterixis. Very slight tremor    MSK: Moderate muscle wasting  Psych: Normal speech, normal affect    Time today in minutes " 145  Record review 35  Communication with care team 15  Face to face with patient 70  Documentation 25

## 2023-10-03 NOTE — PROGRESS NOTES
Psychosocial Assessment for Liver Transplant-Consult  Stefani Crowell was seen in the Transplant Center as part of his evaluation as a potential liver transplant recipient.  She was accompanied by her significant other Hari, daughter Angelia, sister Paulette, niece Emani and sister Bev (via phone-[place]).   Living Situation: Stefani lives with her significant other Hari in a house in [place], [place].  They have lived at this residence for the past twenty-three years.  Hari and daughter Angelia assist with medication management and transportation to medical appointments.  Stefani is not using an assistive device for ambulation, and she denies any recent falls.  Stefani is using a wheelchair today for her appointments.   She requires some supervision for bathing.  Stefani stopped driving then end of June 2023.  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    Financial /Income: Stefani reports she is living off her savings.  Stefani's sister Paulette assisted her in applying for SSDI benefits in August of this year.  Health Insurance:  Stefani has Blue [employer] [place].  Her sister Paulette helps manage finances but is not a POA.  This writer talked with Stefani and her support system about the financial risks of transplant, particularly about the high cost of transplant related medications and the importance of maintaining adequate health insurance coverage.  Family/Social Support:  Stefani and her significant other Hari have been in a relationship for the past twenty-one years.  Stefani describes him as her nurse.  He works full-time Monday-Friday (7:20am to 2:20pm).  He reports having sixty-five personal days he can use for care giving needs.  Stefani has one daughter, Angelia, who lives just fifteen minutes away in [place], [place].   Stefani also has two step daughters, Darby ([place], [place]) and Mary ([place], [place]), though she would not rely on them for care giving.  Stefani has two sisters who are involved and supportive.  Her sister Paulette lives in [place], [place] and is present today.  Stefani's sister Bev lives in [place] and participated by phone today.  Stefani's parents are .  This writer stressed the importance of having a stable and involved support network before and after transplant.  Provided Stefani and her support system with education about the relationship between a stable support system and better surgical and post-transplant outcomes compared to patients with a limited support system.    Chemical Dependency:  Stefani reports she quit smoking cigarettes two months ago, after smoking 1/3 pack of cigarettes daily for many years.  Stefani reports recreational use of cocaine in  but none since.  She denies any history of pain medication abuse.  Stefani reports sobriety from alcohol since 2023.  She acknowledges she was drinking too much.  Stefani reports she was consuming three vodka diets per day (doubles, so closer to 6 drinks per day).  Stefani has no prior history of substance use disorder treatment, but is currently engaged in IOP treatment at [facility].  She started this program mid 2023 and is close to completing phase one of treatment per patient's report.  Stefani has a history of one DUI in the .   Mental Health/Adjustment to Illness:: Stefani reports being diagnosed with depression and anxiety around the age of [age].  She has been prescribed citalopram for over ten years by her primary care provider.  Patient and family are unsure if Stefani's dose is high enough given she's been on the same dose for so many years.  I encouraged patient and family to discuss this further with Aliyah Marcus PA-C.  Stefani is [facility] health therapist and[facility]  [facility] and relapse prevention.  Stefani has no history of planning or attempting suicide though she does report having thoughts about not wanting to live when she is more symptomatic (shortness of breath, anxious, difficulty sleeping).  Stefani denies any past or current thoughts of wanting to harm herself.  Stefani  has shared this with her sister Bev, and Stefani agrees to share any future concerns with her sister or other family member.  This writer provided Stefani with supportive counseling throughout this interview.  She is eager to be considered for liver transplantation, and reports commitment to her sobriety.  She acknowledges difficulty maintaining sobriety in the past.  She was diagnosed with liver disease three years ago and Stefani reports she tried to quit drinking multiple times.    Impression/Recommendations:   Stefani and her support system verbalize understanding the psychosocial risks of transplant and teaching provided during this evaluation.  The Caregiver Agreement for Liver Transplantation was discussed.  Stefani has many identified care givers, evidenced by their presence today.  Her significant other, daughter Angelia, sister Paulette and niece Emani are all identified care givers.  Stefani has been sober from alcohol since June 29, 2023.  She does not yet meet sobriety criteria recommended for listing.  She needs to achieve at least six months of sobriety given her history of known liver disease and being previously counseled to abstain from alcohol.  Stefani reports she has known of her liver disease for three years, and tried multiple times to quit alcohol.  Stefani is engaged in intensive outpatient treatment at [facility].  A CARMEN was obtained to have communication with her treatment program.    Stefani has adequate finances and health insurance for transplant.  She has depression and anxiety, and should establish care with a mental health provider who also has  training with addictions.  Stefani is not currently a transplant candidate from a psychosocial perspective.  This writer will remain available to assist patient throughout the evaluation process and will follow patient through transplant if she is listed.  It was a pleasure to evaluate this patient for liver transplant.   Teaching completed during assessment:  1.     Housing and relocation needs post transplant.  2.     Caregiver needs post transplant.  3.     Financial issues related to transplant.  4.     Risks of alcohol use post transplant.  5.     Common psychosocial stressors pre/post transplant.         6.     Liver Transplant support group availability.         7.     Advanced Health Care Directive-form and education provided             Psychosocial Risks of Transplant Reviewed:  1.     Increased stress related to your emotional, family, social, employment, or   financial situation.  2.     Affect on work and/or disability benefits.  3.     Affect on future health and life insurance.  4.     Transplant outcome expectations may not be met.  5.     Mental Health risks: anxiety, depression, PTSD, guilt, grief and chronic fatigue.     KAR Morales, Dorothea Dix Psychiatric CenterSW

## 2023-10-03 NOTE — PROGRESS NOTES
Action 10.03.2023 RM   Action Taken Received IB to request CT abd/pelvis from 3/27/23 from Amy Larose at [phone] m requesting image      Action 10.03.2023 3:15p   Action Taken Images received and uploaded to chart

## 2023-10-03 NOTE — LETTER
10/3/2023         RE: Stefani Crowell  [address]  [place] [address]        Dear Colleague,    Thank you for referring your patient, Stefani Crowell, to the [facility]PL[facility]. Please see a copy of my visit note below.    [facility] Liver Transplant Evaluation    Requesting Provider and Health System: Christine Fairchild NP [facility]  Liver Disease: alcohol related cirrhosis    A/P  Ms. Crowell is a 63 Y F with decompensated alcohol related cirrhosis c/b refractory ascites/HH and HE.. MELD 3.0 29 ABO A    Last alcohol was 6/28/23 She had been diagnosed about 3 years ago and continued to drink alcohol until this time. She has been away from alcohol now for 3 months and may still improve from a portal hypertension and synthetic function. She has has a very slight improvement of TB from 6s to 4s.  INR has not improved.     ASCITES/HHMajor issue is need for at least twice weekly thoracentesis with paracentesis weekly.        HE    THROMBOCYTOPENIA  ANEMIA  HCC SCREENING  VARICEAL SCREENING    BONE HEALTH    COLORECTAL CANCER SCREENING      COVID VACCINATION Discussed that this is req for LT  OTHER VACCINATION influenza, HAV HBV    NUTRITIONAL STATUS mod-severe malnutrition. Discussed protein    SOCIAL SUPPORT  FUNCTIONAL STATUS  LIVER TRANSPLANT CANDIDACY    [phone]    Stephanie Lim MD  Hepatology/Liver Transplantation  Medical Director, Liver Transplantation  [facility]  ========================================================================    Subjective:  Ms. Crowell is a 63 Y F with decompensated alcohol related cirrhosis c/b ascites/HH.     She comes in today with  Hari 23 years, Angelia daughter, sister Paulette, niece Shanita, Alea sister.    She is getting 2 thoras per week (about 2 L per time). For the last 3-4 weeks. Prior to that it was 2 times June and July, then none until September. She is getting albumin if she gets a para as  "well as the thora.  Last para was 9/13/23 was 2 L.     She has low BP on midodrine 10 qid. This was increased from 10 tid. She has not fallen. She gets dizzy sometimes in general. Not specifically when she gets up.     She uses a cane sometime for balance and weakness. She mostly is independent in ADLs.  She uses a stool in the bathtub. She was discharged from PT and OT.    First diagnosed with liver disease over 3 years ago.  She was seen by   AUD  Last ETOH  6/2023  CD rx: no  See SW note    Ascites   spironolactone was stopped at this last admission  furosemide 40 bid  Na restricted diet follows. Gives herself 8 out of 10.     HE  Lactulose takes 30 ml tid. This was decreased a little in the hospital. She had some mild HE in the last week or so  Rifaximin 550 bid  BM has 3-5 per day    HCC screening  EGD  COLONOSCOPY  KIDNEY FUNCTION  BONE DENSITY    Portal vein assessment: patent on US   Diabetes:   Abdominal surgery:   HCV neg  HBV neg  HIV neg  Proph: flu shot    MELD 3.0: 29 at 10/3/2023  8:53 AM  MELD-Na: 29 at 10/3/2023  8:53 AM  Calculated from:  Serum Creatinine: 1.24 mg/dL at 10/3/2023  8:53 AM  Serum Sodium: 126 mmol/L at 10/3/2023  8:53 AM  Total Bilirubin: 4.3 mg/dL at 10/3/2023  8:53 AM  Serum Albumin: 3.9 g/dL (Using max of 3.5 g/dL) at 10/3/2023  8:53 AM  INR(ratio): 2.30 at 10/3/2023  8:53 AM  Age at listing (hypothetical): 63 years  Sex: Female at 10/3/2023  8:53 AM        PAST MEDICAL HISTORY    SOCIAL HISTORY    Lives with  Currently is not working. Works as for.  Smoking: No  Alcohol:    FAMILY HISTORY  M  F  Sibs  Children     ROS 10 point ROS neg other than the symptoms noted above in the HPI.    EXAM  BP 97/57   Pulse 67   Ht 1.664 m (5' 5.5\")   Wt 62.3 kg (137 lb 4.8 oz)   LMP  (LMP Unknown)   SpO2 100%   BMI 22.50 kg/m      Gen: No acute distress  Head: Normocephalic atraumatic  Eyes: Sclera anicteric  Neck: No cervical lymphadenopathy  Respiratory: Clear to auscultation " bilaterally, no overt wheezing or rales  CV: RRR   Abdomen:  Soft, nontender, nondistended, normal bowel sounds  Extrem: No edema  Skin: No jaundice, spider angiomata or palmar erythema.  No rashes.   Neuro: Alert and oriented. No asterixis.    MSK: Grossly Intact  Psych: Normal speech, normal affect          Psychosocial Assessment for Liver Transplant-Consult  Stefani Crowell was seen in the Transplant Center as part of his evaluation as a potential liver transplant recipient.  She was accompanied by her significant other Hari, daughter Angelia, sister Paulette, niece Emani and sister Bev (via phone-[place]).   Living Situation: Stefani lives with her significant other Hari in a house in [place], [place].  They have lived at this residence for the past twenty-three years.  Hari and daughter Angelia assist with medication management and transportation to medical appointments.  Stefani is not using an assistive device for ambulation, and she denies any recent falls.  Stefani is using a wheelchair today for her appointments.   She requires some supervision for bathing.  Stefani stopped driving then end of June 2023.  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    Financial /Income: Stefani reports she is living off her savings.  Stefani's sister Paulette assisted her in applying for SSDI benefits in August of this year.  Health Insurance:  Stefani has [employer] [place].  Her sister Paulette helps manage finances but is not a POA.  This writer talked with Stefani and her support system about the financial risks of transplant, particularly about the high cost of transplant related medications and the importance of maintaining adequate health insurance coverage.  Family/Social Support:  Stefani and her significant other Hari have been in a  relationship for the past twenty-one years.  Stefani describes him as her nurse.  He works full-time Monday-Friday (7:20am to 2:20pm).  He reports having sixty-five personal days he can use for care giving needs.  Stefani has one daughter, Angelia, who lives just fifteen minutes away in [place], [place].  Stefani also has two step daughters, Darby ([place], [place]) and Mary ([place], [place]), though she would not rely on them for care giving.  Stefani has two sisters who are involved and supportive.  Her sister Paulette lives in [place], [place] and is present today.  Stefani's sister Bev lives in [place] and participated by phone today.  Stefani's parents are .  This writer stressed the importance of having a stable and involved support network before and after transplant.  Provided Stefani and her support system with education about the relationship between a stable support system and better surgical and post-transplant outcomes compared to patients with a limited support system.    Chemical Dependency:  Stefani reports she quit smoking cigarettes two months ago, after smoking 1/3 pack of cigarettes daily for many years.  Stefani reports recreational use of cocaine in  but none since.  She denies any history of pain medication abuse.  Stefani reports sobriety from alcohol since 2023.  She acknowledges she was drinking too much.  Stefani reports she was consuming three vodka diets per day (doubles, so closer to 6 drinks per day).  Stefani has no prior history of substance use disorder treatment, but is currently engaged in IOP treatment at [facility].  She started this program mid 2023 and is close to completing phase one of treatment per patient's report.  Stefani has a history of one DUI in the .   Mental Health/Adjustment to Illness:: Stefani reports being diagnosed with depression and anxiety around the age of [age].  She has been prescribed citalopram for over ten  years by her primary care provider.  Patient and family are unsure if Stefani's dose is high enough given she's been on the same dose for so many years.  I encouraged patient and family to discuss this further with Aliyah Marcus PA-C.  Stefani is interested in a mental health therapist and[facility] and relapse prevention.  Stefani has no history of planning or attempting suicide though she does report having thoughts about not wanting to live when she is more symptomatic (shortness of breath, anxious, difficulty sleeping).  Stefani denies any past or current thoughts of wanting to harm herself.  Stefani  has shared this with her sister Bev, and Stefani agrees to share any future concerns with her sister or other family member.  This writer provided Stefani with supportive counseling throughout this interview.  She is eager to be considered for liver transplantation, and reports commitment to her sobriety.  She acknowledges difficulty maintaining sobriety in the past.  She was diagnosed with liver disease three years ago and Stefani reports she tried to quit drinking multiple times.    Impression/Recommendations:   Stefani and her support system verbalize understanding the psychosocial risks of transplant and teaching provided during this evaluation.  The Caregiver Agreement for Liver Transplantation was discussed.  Stefani has many identified care givers, evidenced by their presence today.  Her significant other, daughter Angelia, sister Paulette and niece Emani are all identified care givers.  Stefani has been sober from alcohol since June 29, 2023.  She does not yet meet sobriety criteria recommended for listing.  She needs to achieve at least six months of sobriety given her history of known liver disease and being previously counseled to abstain from alcohol.  Stefani reports she has known of her liver disease for three years, and tried multiple times to quit alcohol.  Stefani is  engaged in intensive outpatient treatment at [facility].  A CARMEN was obtained to have communication with her treatment program.    Stefani has adequate finances and health insurance for transplant.  She has depression and anxiety, and should establish care with a mental health provider who also has training with addictions.  Stefani is not currently a transplant candidate from a psychosocial perspective.  This writer will remain available to assist patient throughout the evaluation process and will follow patient through transplant if she is listed.  It was a pleasure to evaluate this patient for liver transplant.   Teaching completed during assessment:  1.     Housing and relocation needs post transplant.  2.     Caregiver needs post transplant.  3.     Financial issues related to transplant.  4.     Risks of alcohol use post transplant.  5.     Common psychosocial stressors pre/post transplant.         6.     Liver Transplant support group availability.         7.     Advanced Health Care Directive-form and education provided             Psychosocial Risks of Transplant Reviewed:  1.     Increased stress related to your emotional, family, social, employment, or   financial situation.  2.     Affect on work and/or disability benefits.  3.     Affect on future health and life insurance.  4.     Transplant outcome expectations may not be met.  5.     Mental Health risks: anxiety, depression, PTSD, guilt, grief and chronic fatigue.     KAR Morales, [facility] Medical CenterSW      Again, thank you for allowing me to participate in the care of your patient.      Sincerely,    Stephanie Lim MD

## 2023-10-03 NOTE — TELEPHONE ENCOUNTER
DATE:  10/3/2023     TIME OF RECEIPT FROM LAB:  9:15    ORDERING PROVIDER: GLADYS Lim    LAB TEST:  Platelets    LAB VALUE:  37    RESULTS GIVEN WITH READ-BACK TO (PROVIDER):  Jerica Schumacher    TIME LAB VALUE REPORTED TO PROVIDER:   9:20

## 2023-10-06 NOTE — LETTER
PHYSICIAN ORDERS Paracentesis weekly PRN for ascites   REVISED 10/9/23    Patient Name: Stefani Crowell   YOB: 1960     [facility] MR# [if applicable]: [id number]   Date & Time: October 6, 2023  1:54 PM  Expiration Date: 1 year after date issued      Diagnoses: Cirrhosis, ascites K0.31, R18.8        Lidocaine 1% solution 10 ml - 10 mL, Injection, ONCE. Starting when released.   Albumin 25% only when 4 liters or more of ascites is removed, up to 50 grams   4-5  L removed give 25 grams of albumin ANY TIME >/= 4 G FLUID REMOVED, COMBINED ASCITES FROM BOTH PARACENTESIS AND THORACENTESIS     Draw platelet count if has not been checked within 1 month of paracentesis   Maximum fluid volume to be removed: 5 liters  US paracentesis - 1 TIME IMAGING. Starting when released.   Reason for exam: ascites    If there are any signs of infection and/or SBP please add the following to assess fluid:   Cell count with diff (with WBC)   Total protein (protein fluid)    Gram stain and culture   Albumin fluid           If you have any questions, please call [facility]- [phone] or [phone], Fax- [phone].    .  Hepatology/Liver Transplant  Medical Director, Liver Transplantation  [facility]     normal...

## 2023-10-06 NOTE — LETTER
PHYSICIAN ORDERS Paracentesis weekly PRN for ascites     Patient Name: Stefani Crowell   YOB: 1960     [facility] MR# [if applicable]: [id number]   Date & Time: October 6, 2023  1:54 PM  Expiration Date: 1 year after date issued      Diagnoses: Cirrhosis, ascites K0.31, R18.8        Lidocaine 1% solution 10 ml - 10 mL, Injection, ONCE. Starting when released.   Albumin 25% only when 2 liters or more of ascites is removed, up to 50 grams   1-3 L removed give 12.5 grams   3-6  L removed give 25 grams      Draw platelet count if has not been checked within 1 month of paracentesis   Maximum fluid volume to be removed: 6 liters  US paracentesis - 1 TIME IMAGING. Starting when released.   Reason for exam: ascites    If there are any signs of infection and/or SBP please add the following to assess fluid:   Cell count with diff (with WBC)   Total protein (protein fluid)    Gram stain and culture   Albumin fluid    Please call Jerica Schumacher RN, at [phone] with clinical questions.         If you have any questions, please call [facility]- [phone] or [phone], Fax- [phone].    .  Hepatology/Liver Transplant  Medical Director, Liver Transplantation  [facility]

## 2023-10-06 NOTE — LETTER
PHYSICIAN ORDERS Thoracentesis twice weekly PRN for ascites   REVISED    Patient Name: Stefani Crowell   YOB: 1960     [facility] MR# [if applicable]: [id number]   Date & Time: October 6, 2023  1:57 PM  Expiration Date: 1 year after date issued      Diagnoses: Cirrhosis, ascites K74.60, R18.8        Thoracentesis twice weekly and PRN for pleural effusion/hydrothorax:        -Lidocaine 1% solution 10 ml - 10 mL, Injection, ONCE Starting when released  2. Platelet count - If has not been checked within 1 week of thoracentesis or labs per facility protocol  3. Maximum pleural fluids volume to drain: 2 liters   Patient should receive 50 g albumin 25% weekly (during thoracentesis or paracentesis)   4. US or IR thoracentesis - 1 TIME IMAGING Starting when released  Reason for exam: pleural effusion/hydrothorax   5. If there are any signs of infection please add the following to assess pleural fluid:  a. Cell count with diff (with WBC)   b. WBC (if not with cell count)   c. Total protein (protein fluid)   d. Albumin fluid   e. Culture       If you have any questions, please call [facility]- [phone] or [phone], Fax- [phone].    .  Hepatology/Liver Transplant  Medical Director, Liver Transplantation  [facility]

## 2023-10-06 NOTE — LETTER
PHYSICIAN ORDERS Thoracentesis twice weekly PRN for ascites     Patient Name: Stefani Crowell   YOB: 1960     [facility] MR# [if applicable]: [id number]   Date & Time: October 6, 2023  1:57 PM  Expiration Date: 1 year after date issued      Diagnoses: Cirrhosis, ascites K74.60, R18.8        Thoracentesis twice weekly and PRN for pleural effusion/hydrothorax:        -Lidocaine 1% solution 10 ml - 10 mL, Injection, ONCE Starting when released  2. Platelet count - If has not been checked within 1 week of thoracentesis or labs per facility protocol  3. Maximum pleural fluids volume to drain: dry (limit set by radiologist)  4. US or IR thoracentesis - 1 TIME IMAGING Starting when released  Reason for exam: pleural effusion/hydrothorax   5. If there are any signs of infection please add the following to assess pleural fluid:  a. Cell count with diff (with WBC)   b. WBC (if not with cell count)   c. Total protein (protein fluid)   d. Albumin fluid   e. Culture    Please call Jerica Schumacher RN at [phone] with any clinical questions.        If you have any questions, please call [facility]- [phone] or [phone], Fax- [phone].    .  Hepatology/Liver Transplant  Medical Director, Liver Transplantation  [facility]

## 2023-10-06 NOTE — TELEPHONE ENCOUNTER
Spoke with sister Bev    Needs updated orders- current orders from [facility]  for courtney and thoras    Orders sent to [facility]

## 2023-10-06 NOTE — LETTER
PHYSICIAN ORDERS Thoracentesis twice weekly PRN for ascites   REVISED    Patient Name: Stefani Crowell   YOB: 1960     [facility] MR# [if applicable]: [id number]   Date & Time: October 6, 2023  1:57 PM  Expiration Date: 1 year after date issued      Diagnoses: Cirrhosis, ascites K74.60, R18.8        Thoracentesis twice weekly and PRN for pleural effusion/hydrothorax:   RIGHT LUNG, OR AT SIDE TBD DETERMINED BY RADIOLOGIST BASED ON CURRENT IMAGING        -Lidocaine 1% solution 10 ml - 10 mL, Injection, ONCE Starting when released  2. Platelet count - If has not been checked within 1 week of thoracentesis or labs per facility protocol  3. Maximum pleural fluids volume to drain: 2 liters   Patient should receive 50 g albumin 25% weekly (during thoracentesis or paracentesis)   4. US or IR thoracentesis - 1 TIME IMAGING Starting when released  Reason for exam: pleural effusion/hydrothorax   5. If there are any signs of infection please add the following to assess pleural fluid:  a. Cell count with diff (with WBC)   b. WBC (if not with cell count)   c. Total protein (protein fluid)   d. Albumin fluid   e. Culture       If you have any questions, please call [facility]- [phone] or [phone], Fax- [phone].    .  Hepatology/Liver Transplant  Medical Director, Liver Transplantation  [facility]

## 2023-10-09 NOTE — TELEPHONE ENCOUNTER
Spoke to sister Bev. Patient at appointments this morning    They understand  order in place for courtney thoras now, getting last thora under old [facility] orders this morning      They are aware labs in 1-2 weeks, standing weekly orders in place to be done at any [facility] clinic     Discussed closing transplant referral for now, Clinic Care Coordinators will reach out for future needs/case management.  WIll not proceed with txp evaluation at this time.      Discussed ongoing sobriety critical , engagement in CD programmin critical.  IR referral in place to dscuss TIPS if/when MELD score decrease if candidate in the future.  They have not yet heard to schedule     Patient would benefit from ongoing PT. DIscussed importance of staying active, high protein/low Na+ diet.

## 2023-10-10 NOTE — PROGRESS NOTES
Per Dr. Lim, pt would benefit from enrollment in care coordination with the hepatology clinic. Pt is a 63 year old female with PMH significant for decompensated alcohol related cirrhosis c/b refractory ascites/hepatic hydrothorax and hepatic encephalopathy. Pt was recently seen for liver transplant evaluation but referral was closed due to recent alcohol use (6/2023). Pt had a clinic appointment on 10/3/23. Plan established at clinic visit per Dr. Lim:     1) IR referral for possible TIPS  2) Paracentesis up to twice weekly- 5 liter max with albumin infusion if 4 or more liters removed  3) Thoracentesis up to twice weekly- 2 liter max  4) Once weekly albumin infusion of 50 grams  5) Labs in 1-2 weeks   6) Continue lactulose and rifaxmin for management of HE  7) US abdomen complete with doppler complete on 10/16  8) Echo on 10/16   9) RTC (in person or virtual) in 3 months    Connected with pt's sisters Alea and Riri (listed on consent to communicate sent to writer via Jerica Schumacher Liver Transplant Eval Coordinator) to introduce self and RN Care Coordinator role as well as review the plan of care. Bev lives in [place] and assists with scheduling all of pt's appointments. iRri lives locally and coordinates with pt's significant other Hari and pt's daughter Angelia to transport pt to and from appointments. Per Riri, Angelia sets up pt's medications and Hari and Angelia work together to ensure pt is taking all medications as prescribed.     Pt had paracentesis and thoracentesis scheduled yesterday, 10/9, at [facility]. Pt did not have sufficient fluid to drain for paracentesis and therefore it was not performed. Pt had 2.9 liters of pleural fluid removed (most recent orders have a 2 liter max for thoracentesis). Per Riri, pt was told by [facility] that they do not give albumin infusions with thoracentesis and therefore pt not receive weekly albumin infusion ordered per Dr. Lim. Pt has next paracentesis and  thoracentesis scheduled for 10/13. Spoke with Christine, nurse with IR at [facility], and Christine stated that old orders from [facility] were linked to pt's appointments on 10/9 and therefore this is why pt did not get albumin infusion and had over 2 liters of pleural fluid removed. Christine confirmed that [facility] has the most up to date orders and [facility] will arrange for pt to get albumin infusion with thoracentesis on 10/13. Writer updated Bev on this.     Per Bev, pt is taking furosemide 40 mg by mouth 2 times daily and stopped spironolactone. This is consistent with Dr. Lim's note on 10/3 as well as pt's discharge summary from recent hospitalization. Writer updated pt's medication list.     Pt is adhering to 2 liter fluid restriction and trying to follow low Na diet. Discussed that pt needs repeat labs either this week or next. Bev will schedule pt a lab appointment within [facility] within 1 week.     Pt is taking lactulose 30 ml 3 times daily and averaging 3-5 BMs/day. Riri denied pt experiencing overt confusion but endorsed pt struggles with memory. Pt's family created a chart for pt and pt is the documenting the number of bowel movements each day. Reviewed that pt will need to titrate lactulose to achieve 3-5 BMs/day and to confusion.     Pt is currently in intensive outpatient treatment 3 days per week at [facility]. Per Riri, the program is decreasing pt to 2 days per week due to pt's progress. Riri stated that this will open up a day to focus on getting pt connected with a mental health specialist and participate in virtual therapy sessions.      Bev and Riri expressed concern with pt getting to outpatient physical therapy appointments. Per chart review, Dr. Lim placed this referral. Riri stated that it is difficult getting pt to appointments as is with the amount of appointments pt currently has and pt's frailty. Pt does not drive and pt's significant other works full time. Bev asked about home therapy and  writer reviewed that pt would need to be considered homebound for insurance to cover this. Bev plans to ask pt's PCP about home care referral. Riri stated that the family would try to work with pt on increasing muscle mass and endurance with a home exercise program in the mean time.     Will attempt to reach pt in 1-2 days and look for lab results.

## 2023-10-11 NOTE — TELEPHONE ENCOUNTER
Yes this was. TC was waiting for patient to respond on what medical supply store she would like DME order sent to.     DME order faxed to [phone]. Alea informed.

## 2023-10-11 NOTE — TELEPHONE ENCOUNTER
Alea calling to request an order for a four wheel walker - cued    Route to team:  Please fax to: [phone]    Call sister back when we send over order  [phone] - ok for detailed message     Sully Collazo RN  [facility]

## 2023-10-12 NOTE — TELEPHONE ENCOUNTER
Patient Quality Outreach    Patient is due for the following:   Asthma  -  ACT needed and AAP  Breast Cancer Screening - Mammogram  Physical Preventive Adult Physical      Topic Date Due    Hepatitis A Vaccine (1 of 2 - Risk 2-dose series) Never done    Zoster (Shingles) Vaccine (1 of 2) Never done    Hepatitis B Vaccine (1 of 3 - Risk 3-dose series) Never done         Type of outreach:    Sent [employer] message.      Questions for provider review:    None           Lashaun Jason MA  Chart routed to Care Team.

## 2023-10-12 NOTE — LETTER
November 17, 2023    To  Stefani Crowell  [address]  [place] [address]    Your team at [facility] cares about your health. We have reviewed your chart and based on our findings; we are making the following recommendations to better manage your health.     We see you have read your [employer] message but have not scheduled. Please call [phone] to schedule.     You are in particular need of attention regarding the following:     Office visit to follow up on asthma   Schedule Annual MAMMOGRAPHY. [facility] scheduling number is [phone] or schedule in [facility] (self referral).  Please schedule a Nurse Only Appointment with your primary care clinic to update your immunizations that are due.  PREVENTATIVE VISIT: Physical    If you have already completed these items, please contact the clinic via phone or   [facility] so your care team can review and update your records. Thank you for   choosing [facility] for your healthcare needs. For any questions,   concerns, or to schedule an appointment please contact our clinic.    Healthy Regards,      Your [facility] Care Team

## 2023-10-12 NOTE — TELEPHONE ENCOUNTER
Patient called would like her lab order faxed to [facility]. Caller gave the phone number of [phone] and is asking for it to be sent today.

## 2023-10-12 NOTE — CONFIDENTIAL NOTE
DATE/TIME OF CALL RECEIVED FROM LAB:  10/12/23 at 2:34 PM   LAB TEST:  Sodium  LAB VALUE:  118  PROVIDER NOTIFIED?: Yes  PROVIDER NAME: Dr. Stephanie Lim  DATE/TIME LAB VALUE REPORTED TO PROVIDER: 2:35 pm   MECHANISM OF PROVIDER NOTIFICATION: Phone Call  PROVIDER RESPONSE: Pt should hold lasix, follow 1.5 liter fluid restriction, and repeat Na lab on 10/13. Spoke with pt's sister, Bev, and reviewed instructions per Dr. Lim.  Bev verbalized understanding and will follow up with pt and pt's significant other. Also attempted to reach patient to review plan and no answer.

## 2023-10-12 NOTE — TELEPHONE ENCOUNTER
Spoke with pt's sister, Bev, regarding lab orders and Bev did not know about this call. Per Bev, pt must have called and may be confused about plan regarding labs. Bev scheduled pt a lab appointment today at [facility] and plans to keep this appointment. Bev will call pt and review the plan. No lab orders faxed to [facility] at this time.

## 2023-10-13 NOTE — PROGRESS NOTES
Received a call from Jerica Schumacher, transplant eval RN coordinator, stating that pt is currently in the ER at [facility] for further evaluation of a fall and repeat sodium is 119. Pt stood up while at chemical dependency treatment and fell to the ground. Per family, pt's legs gave out and pt did not lose consciousness. Discussed fall and pt's sodium level with Dr. Lim and Dr. Lim recommended pt be admitted for further management of pt's hyponatremia. Jerica Schumacher relayed this to [facility] ER provider and provider confirmed that pt would be admitted.     Reviewed plan of care with pt's sister, Bev. Will check in with pt on 10/16.

## 2023-10-13 NOTE — TELEPHONE ENCOUNTER
"Call from sister Bev.    She is not with pt, but concerned that her Na+ still low.  Stefani with other family, pt currently was in ER at [facility]    Per notes from yesterday, NA+ level was 118 yesterday. Plan was to hold diuretics and strict 1.5 L restriction, then repeat Na+ in 24 hrs.  Labs done show only minimal change from 118-119 in last 24 hrs despite intervention.     Patient was at [facility] ER due to fall at Rehab today.  (Of note, family plans to get her a walker and should be there next week.)    Dr Lim notified per Chayo Enciso, RN Coord.  Favor to admit patient given minimal improvement in Na+ and would need monitoring.     Since pt already in ER at [facility], called and spoke with ER provider at [facility].  Initially did not want to admit patient as under our care states 'labs were done out of courtesy\", however pt may have symptomatic hyponatremia given fall today which prompted patient going to ER.  Discussed with provider that stated they would admit her.                "

## 2023-10-17 NOTE — LETTER
PHYSICIAN ORDERS      DATE & TIME ISSUED: November 15, 2023 12:58 PM  PATIENT NAME: Stefani Crowell   : 1960     [facility] MR# : [id number]     DIAGNOSIS: cirrhosis, pleural effusions    ICD-10 CODE: K74.60, J90.0    Orders  1 year after issue    Thoracentesis 3 times weekly and PRN for pleural effusion/hydrothorax:  RIGHT LUNG, OR AT SIDE TBD DETERMINED BY RADIOLOGIST BASED ON CURRENT IMAGING  1. Lidocaine 1% solution 10 ml - 10 mL, Injection, ONCE Starting when released  2. Platelet count - If has not been checked within 1 week of thoracentesis or labs per facility protocol  3. Maximum pleural fluids volume to drain: 2 liters  4. Patient should receive 25 g albumin 25% with each thoracentesis   5. US or IR thoracentesis - 1 TIME IMAGING Starting when released  Reason for exam: pleural effusion/hydrothorax   6. If there are any signs of infection please add the following to assess pleural fluid:  a. Cell count with diff (with WBC)   b. WBC (if not with cell count)   c. Total protein (protein fluid)   d. Albumin fluid   e. Culture    FOR CLINICAL QUESTIONS, PLEASE CALL JUAN -[phone]    .  Hepatology/Liver Transplant  Medical Director, Liver Transplantation  [facility]

## 2023-10-17 NOTE — PROGRESS NOTES
Pt hospitalized 10/13-10/16 at [facility] with hyponatremia and MATTIE.     Hepatology post hospital discharge RNCC assessment    Post hospital discharge follow up:      Admission diagnosis:  Fall, ankle injury, and hyponatremia   Discharge diagnosis:  Hyponatremia, ankle sprain, MATTIE, and pleural effusion    Admitted on: 10/13/23  Discharged on: 10/16/23    Medication Review    Medication review completed.    Discharge medication changes reviewed.   Patient did have medication changes made upon discharge.  - Pt instructed to hold furosemide.  - Midodrine increased to 20 mg by mouth 4 times daily (previously taking 10 mg by mouth 4 times daily).     Follow Up Post Discharge    Reviewed discharge instructions with patient. Follow up appointments reviewed and transportation to appointments confirmed.   Patient able to verbalized understanding of discharge instructions including medications and follow up.      Care coordinator role and contact information reviewed, and pt encouraged to call with questions or concerns.     Symptom Review    1.  Ascites: Pt has twice weekly paracentesis still scheduled but has not required one since 9/27.   2.  Edema: Denied lower extremity edema.  3.  Encephalopathy:  Pt should continue to titrate lactulose to achieve 3-5 BMs/day. Pt is keeping track of bowel movements in a notebook. Denied confusion but did note intermittent trouble with articulation.   4.  Hepatic Hydrothorax: Pt had 2.7 liters of fluid removed at thoracentesis during hospitalization. Next thoracentesis scheduled on 10/20. Pt asked about increasing thoracentesis frequency to 3 times per week and Dr. Hays did not recommend increasing frequency at this time due to pt's frailty and protein losses each time with procedure. Pt should continue to get weekly albumin infusions.   5. MATTIE and hyponatremia: Per Dr. Hays, pt should continue to hold diuretics and repeat BMP in 3 days. Pt has lab appointment scheduled on 10/19. Pt  should also continue 1500 ml fluid restriction. Protein shakes do not count towards fluid restriction.   6. Mobility- Pt now has a 4 wheeled walker and feels steady when ambulating. Pt denied feeling lightheaded or dizzy and is taking midodrine as prescribed.     Collaboration    Above discussed with Dr. Hays (covering for Dr. Lim).  Orders received.      Plan    Repeat BMP on 10/19.  Twice weekly thoracentesis with weekly albumin infusion  Pt rescheduled abdominal ultrasound and echo.   IR appointment to discuss TIPS on 11/6.       Patient was given an opportunity to ask questions and have those questions answered to her satisfaction. Patient verbalized understanding of instructions provided and agreed to plan of care.

## 2023-10-20 NOTE — PROGRESS NOTES
Patient's sister called requesting a refill of midodrine 20 mg 4 times per day, as ordered upon discharge from [facility] on 10/16. Patient has been taking this dosage but had not received a prescription for this increased dose and will be running out of medication. Per Dr. Hays, patient should decrease this dosage to 15 mg 3 times per day, and repeat BMP on Monday 10/22. Left message with sister with this information. Was able to reach patient's daugther Angelia and reviewed plan. She verbalized understanding, will communicate with patient's spouse of the medication change, and with her aunt to set up lab appointment at [facility] for after patient's physical therapy 9-12 that day. Lab order will be faxed to [facility].

## 2023-10-20 NOTE — PROGRESS NOTES
Called patient's sister Alea for an update on patient and to discuss lab results from yesterday. She states patient has been working hard to stay below or at the 1500 ml fluid restriction, which does not include her protein drinks. She has also been working at staying below 2 gm sodium daily. Alea states that another local sister has been working closely with patient and her significant other on these goals for nutrition. Patient continues to hold her diuretics and is scheduled for twice weekly thoracentesis.  Per Dr Hays, continue with fluid and sodium restrictions, and continue to hold her diuretics given her hyponatremia. Alea verbalized understanding and will relay and reinforce this information to the local sister and to the patient. She will be having labs drawn again next week on Thursday.

## 2023-10-22 PROBLEM — J90 PLEURAL EFFUSION: Status: ACTIVE | Noted: 2023-01-01

## 2023-10-22 PROBLEM — K76.9 LIVER DISEASE: Status: ACTIVE | Noted: 2023-01-01

## 2023-10-22 PROBLEM — R06.00 DYSPNEA, UNSPECIFIED TYPE: Status: ACTIVE | Noted: 2023-01-01
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
## 2023-10-22 NOTE — ED TRIAGE NOTES
Patient BIBA from home with c/o Shortness of Breath. Patient reported she started feeling short of breath after her paracentesis appointment last Friday (10/20/23). Patient stated she is unable to lie down in bed and stayed in the recliner just to be able to breath. She also reported feeling more short of breath with exertion.
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
## 2023-10-22 NOTE — PLAN OF CARE
Goal Outcome Evaluation:      Plan of Care Reviewed With: patient, significant other    Overall Patient Progress: no changeOverall Patient Progress: no change    Outcome Evaluation: See RD note 10/22
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
## 2023-10-22 NOTE — PROGRESS NOTES
[facility]    Medicine Progress Note - Hospitalist Service, GOLD TEAM 9    Date of Admission:  10/22/2023    Assessment & Plan   Stefani Crowell is a 63 year old female with a pertinent past medical history of alcoholic cirrhosis, hepatorenal syndrome, hypothyroidism, recurrent hyponatremia, recurrent pleural effusions, COPD and recent admission for hyponatremia who was admitted on 10/22/2023 due to increased dyspnea related to R pleural effusion and hyponatremia.     CHANGES TO PLAN  -Hepatology following, their help is appreciated  -Started lasix 20mg BID and spironolactone 25mg daily  -Scheduled to get thoracentesis and paracentesis today    ACUTE PROBLEMS  #Dyspnea due to recurrent R pleural effusion secondary to cirrhosis  #History of COPD  #ESLD, alcoholic cirrhosis  #Ascites  MELD 3.0: 26 at 10/22/2023  9:44 AM  MELD-Na: 27 at 10/22/2023  9:44 AM  Calculated from:  Serum Creatinine: 1.04 mg/dL at 10/22/2023  1:50 AM  Serum Sodium: 121 mmol/L (Using min of 125 mmol/L) at 10/22/2023  1:50 AM  Total Bilirubin: 2.9 mg/dL at 10/22/2023  1:50 AM  Serum Albumin: 4.0 g/dL (Using max of 3.5 g/dL) at 10/22/2023  1:50 AM  INR(ratio): 1.96 at 10/22/2023  9:44 AM  Age at listing (hypothetical): 63 years  Sex: Female at 10/22/2023  9:44 AM  -Admitted with dypsnea and mild abdominal pain, recently stopped lasix  -Hepatology consulted, their help is appreciated   -Start lasix 20mg BID and spironolactone 25mg  -Scheduled to have thoracentesis and paracentesis today  -Continue home lactulose 20g TID, rifaximin 550mg BID, Breo ellipta 1 puff daily  -Has PRN duonebs    #Severe Hyponatremia, likely secondary to cirrhosis  #MATTIE, likely secondary to hepatorenal syndrome  #Hypokalemia, resolved  -Admit Na 121  -Baseline Cr 0.8, admit with 1.04  -On home midodrine 15mg TID  -Potassium repletion per RN protocol  -Monitor following initiation of diuretics and fluid removal via  procedures    #Acute on chronic normocytic anemia  #Thrombocytopenia  #Coagulopathy  -Admit Hgb 8.3, Plt 51  -Likely secondary to cirrhosis and dilution  -Will monitor following diuretics and fluid removal via procedures  -Transfuse for Hgb < 7 and Plt < 10. (Would likely need comitant diuretics)    #Elevated troponin  -Admit 27, 28. Likely secondary to poor kidney clearance  -Unremarkable EKG    CHRONIC PROBLEMS  #Hypothyroidism  Last TSH was 0.74 on 07/24/23  - Continue home levothyroxine 112 mcg po daily    #GERD  - Continue pantoprazole 40 mg po qdaily    #Anxiety  - Continue citalopram 40 mg po qdaily  - Continue trazodone 50 mg po qHS  - Continue lorazepam 0.5 mg po qdaily prn          Diet: 2 Gram Sodium Diet  Fluid restriction 1500 ML FLUID  Snacks/Supplements Adult: Ensure Enlive; Between Meals    DVT Prophylaxis: Pneumatic Compression Devices  Demarco Catheter: Not present  Lines: None     Cardiac Monitoring: None  Code Status: Full Code      Clinically Significant Risk Factors Present on Admission        # Hypokalemia: Lowest K = 3.3 mmol/L in last 2 days, will replace as needed  # Hyponatremia: Lowest Na = 121 mmol/L in last 2 days, will monitor as appropriate       # Coagulation Defect: INR = 1.96 (Ref range: 0.85 - 1.15) and/or PTT = N/A, will monitor for bleeding  # Thrombocytopenia: Lowest platelets = 51 in last 2 days, will monitor for bleeding            # Asthma: noted on problem list        Disposition Plan      Expected Discharge Date: 10/24/2023                    Paulo Adamson MD  Hospitalist Service, GOLD TEAM [address] [place] [address]  Securely message with [email or web address] (more info)  Text page via [facility] Paging/Directory   See signed in provider for up to date coverage information  ______________________________________________________________________    Interval History   -Admitted overnight, endorsed basic history of H&P  -Eager to get para and thora today  -All  questions answered    Physical Exam   Vital Signs: Temp: 97.7  F (36.5  C) Temp src: Oral BP: (!) 119/93 Pulse: 89   Resp: 18 SpO2: 99 % O2 Device: None (Room air)    Weight: 135 lbs 0 oz  Gen: Awake and alert, appears comfortable, appears stated age. Sitting up in bed.  HEENT: NCAT, sclerae anicteric.  CV: regular rhythm and rate,   Pulm: Normal work of breathing. Right lower lung field has dull breath sounds. Left lung has some crackles  GI: Distended but not tense abdomen. Mild point tenderness in RLQ and LLQ  Skin: Warm, dry  Neuro: AOx3, speech normal, moves all extremities symmetrically.  Psych: Mood is good, affect is congruent.      Medical Decision Making       45 MINUTES SPENT BY ME on the date of service doing chart review, history, exam, documentation & further activities per the note.      Data     I have personally reviewed the following data over the past 24 hrs:    7.1  \   8.6 (L)   / 52 (L)     121 (L) 90 (L) 30.3 (H) /  148 (H)   4.3 18 (L) 1.04 (H) \     ALT: 32 AST: 45 AP: 110 (H) TBILI: 2.9 (H)   ALB: 4.0 TOT PROTEIN: 5.8 (L) LIPASE: 116 (H)     Trop: 28 (H) BNP: N/A     INR:  1.96 (H) PTT:  N/A   D-dimer:  N/A Fibrinogen:  N/A       Imaging results reviewed over the past 24 hrs:   Recent Results (from the past 24 hour(s))   Chest XR,  PA & LAT    Narrative    EXAM: CHEST 2 VIEWS  LOCATION: [facility]  DATE/TIME: 10/22/2023 2:33 AM CDT    INDICATION: Shortness of breath.  COMPARISON: 10/14/2023.      Impression    IMPRESSION:   1. No convincing interval change since the comparison study.  2. Large right pleural effusion with adjacent atelectasis.  2. The left lung is clear.    US Abdomen Complete w Doppler Complete    Narrative    EXAMINATION: US ABDOMEN COMPLETE WITH DOPPLER COMPLETE 10/22/2023  11:40 AM     COMPARISON: CT abdomen/pelvis 3/27/2023, 9/10/2020 ultrasound    HISTORY: Evaluate ascites + r/o clot    TECHNIQUE: The abdomen was scanned in  standard fashion with  specialized ultrasound transducer(s) using both gray-scale, color  Doppler, and spectral flow techniques.    Findings:  Doppler Evaluation:   Splenic Vein: Patent with retrograde flow, 40 cm/sec.    Portal Venous Flow:  Main Portal Vein: Patent with antegrade flow, 18 cm/sec.  Right Portal Vein: Patent with antegrade flow, 27 cm/sec.  Left Portal Vein: Patent with antegrade flow, 28 cm/sec.    Hepatic Veins:  The right, middle, and left hepatic veins are patent with flow towards  the IVC. IVC is patent.    Hepatic Arterial Flow:  Hepatic Artery: Resistive Index of 0.70, peak systolic velocity  125  cm/sec.  Left and right hepatic arteries not identified.    Gray-scale Analysis:  Liver: Hepatic parenchyma demonstrates coarsened echotexture with a  nodular capsular contour. No focal hepatic lesion. The right hepatic  lobe measures 12.7 cm craniocaudally. Main portal vein measures 1.3 cm  in diameter.    Gallbladder: The gallbladder is well distended and of normal  morphology. There is no pericholecystic fluid or sonographic Dempsey's  sign. Shadowing stones in the lumen.    Bile Ducts: No intra- or extra-hepatic biliary dilation. Common bile  duct measures 5 mm..    Pancreas: Not visualized    Kidneys: Both kidneys are of normal echotexture.  No evidence of mass  or hydronephrosis. Right kidney measures 9.2 cm, left measures 11.5  cm.    Spleen: The spleen is of normal echogenicity. Measures 15.7 cm,  enlarged.    Aorta and IVC: The visualized portions of the aorta and IVC are  unremarkable. Proximal aorta measures 1.9 cm.    Free fluid in the right upper and lower quadrants. Right pleural  effusion.      Impression    Impression:   1. Cirrhotic hepatic morphology. No focal hepatic lesion.  2. Patent hepatic vasculature. Retrograde blood flow in the splenic  vein. Low velocity flow in the main portal vein at 18 cm/s.  3. Ascites and splenomegaly, compatible portal hypertension.  4.  Cholelithiasis.  5. Right pleural effusion.    I have personally reviewed the examination and initial interpretation  and I agree with the findings.    JENN BOWMAN,          SYSTEM ID:  [id number]
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
## 2023-10-22 NOTE — PROGRESS NOTES
Reason for admission: Liver disease  Admitted from: ED  Report received from:Amy RN  2 RN skin assessment completed by: Messi RN & Paulette RN      - Findings (add LDA if needed): Generalized bruising; Abrasions on bilateral knees and left forearm covered by mepilex- pt reports from arm, refused assessment of wounds  Bed algorithm reevaluated: Yes  Was Pulsate ordered?: No  Care plan (primary problem) and education initiated: Yes  MDRO education done if applicable: N/A  Pt informed about policy regarding no IV pumps off unit: Yes  Flu shot ordered? (October-April only): N/A  Detailed Belongings: cell phone, suitcase, purse, hairbrush, pants, compression socks, shirt, glasses, ring, notebook, shoes

## 2023-10-22 NOTE — PROCEDURES
[facility]    Thoracentesis at Bedside    Date/Time: 10/22/2023 2:26 PM    Performed by: Desi Price MD  Authorized by: Desi Price MD    Risks, benefits and alternatives discussed.      UNIVERSAL PROTOCOL   Site Marked: Yes  Prior Images Obtained and Reviewed:  Yes  Required items: Required blood products, implants, devices and special equipment available    Patient identity confirmed:  Verbally with patient, arm band, provided demographic data and hospital-assigned identification number  NA - No sedation, light sedation, or local anesthesia  Confirmation Checklist:  Patient's identity using two indicators, relevant allergies, procedure was appropriate and matched the consent or emergent situation and correct equipment/implants were available  Time out: Immediately prior to the procedure a time out was called    Universal Protocol: the Joint Commission Universal Protocol was followed    Preparation: Patient was prepped and draped in usual sterile fashion    ESBL (mL):  2  ANESTHESIA (see MAR for exact dosages):     Anesthesia method:  Local infiltration    Local anesthetic:  Lidocaine 1% w/o epi    PROCEDURE DETAILS      Preparation: Patient was prepped and draped in usual sterile fashion      Patient position:  Sitting    Location:  R posterior    Intercostal space:  7th    Puncture method:  Over-the-needle catheter    Ultrasound guidance: yes      Indwelling catheter placed: no      Needle gauge:  16    Catheter size:  6 Fr    Number of attempts:  1    Drainage characteristics:  Cloudy    POST PROCEDURE DETAILS     Chest x-ray performed: yes      Chest x-ray findings: pending.    Patient tolerance of procedure:  Patient tolerated the procedure well with no immediate complications      PROCEDURE  Describe Procedure: 2000 ml of cloudy pink fluid drained  Patient Tolerance:  Patient tolerated the procedure well with no immediate complications

## 2023-10-22 NOTE — PROGRESS NOTES
Report given to 6C. VSS on room air. A&Ox4. 1A GB and walker.Thoracentesis done today 2L removed. On fluid restriction 420cc given.

## 2023-10-22 NOTE — PROGRESS NOTES
"CLINICAL NUTRITION SERVICES - ASSESSMENT NOTE     Nutrition Prescription    RECOMMENDATIONS FOR MDs/PROVIDERS TO ORDER:  -Order zinc supplementation as pt is on lactulose.  -Consider ordering a multivitamin with minerals to help meet micronutrient needs  -Consider ordering the thiamine treatment for Wernicke encephalopathy panel, if warranted, or if deficiency is suspected.    Malnutrition Status:    Moderate malnutrition in the context of chronic illness    Recommendations already ordered by Registered Dietitian (RD):  Modified oral supplements    Recommendations already ordered by Registered Dietitian (RD):  -Rec continue current diet and fluid restriction, as ordered. If oral intake decreases, rec liberalize diet order. Encourage pt to self-select tolerated foods/beverages (avoid excessive fat intake if worsens GI sx). Rec small, frequent meals and use of oral supplements. Pt with increased protein/kcal needs. Order kcal counts if consuming less than 50% of meals.  -Continue iron supplementation per provider. Consider adding vitamin C.   -Monitor BG. BG of 148 on 10/22, HgbA1c of 4.7 on 7/24/2023.   -Check vitamin D status. Pt's 25 OH vitamin D total was less than 27 on 2/8/2010.   -Check methylmalonic acid. Pt's vitamin B12 was 436 on 2/8/2010.   -Monitor need for bowel regimen.   -Consider checking folic acid and methylmalonic labs. Supplement if low.      REASON FOR ASSESSMENT  Stefani Crowell is a/an 63 year old female assessed by the dietitian for Provider Order - \"Alcohol related cirrhosis.\" Admission nutrition risk screen is pending    NUTRITION/ADDITIONAL HISTORY  Pt not known to this service PTA.   Per H & P, \"Pertinent past medical history of alcoholic cirrhosis, hepatorenal syndrome, hypothyroidism, recurrent hyponatremia, recurrent pleural effusions, COPD and recent admission for hyponatremia who was admitted on 10/22/2023 due to increased dyspnea related to R pleural effusion and hyponatremia. " "The patient reports her dyspnea increased the day after the last procedure on Friday at [facility]. She reports that they took ~2.5 L of fluid off. She also reports intermittent abdominal pain which can sometimes be sharp on the side and which she reports she has had before.\" Elevated fasting glucose. Per chart, Noncompliance with medication regimen, Vitamin D and vitamin B12 deficiencies. Inadequate medical therapy related to insurance coverage. No alcohol since 6/28/23. No vomiting or diarrhea. Pt was ordered to take, noting, pepcid, iron, lasix, protonix, and lactulosePTA.   Per discussion with pt and her significant other, despite eating normal amounts she has lost wt. Clarified that was when she was drinking [etoh] in the past she ate much less. Has early satiety at times. Drinks two oral supplements/day at home daily (usually a banana supplement and a strawberry supplement for a total of two oral supplements daily, at breakfast and in the evening. She limits sodium. Focuses on eating protein. Pt's significant other mentioned there are already prepared low-sodium meals at a store near when they live. Pt eats at least 4 oz of protein at each meal. States she recently walked 1.5 miles with her daughter. Pt states, \"I've atrophied.\"     CURRENT NUTRITION ORDERS  Diet: 2 g Sodium and 1500 mL Fluid Restriction. Ordered to receive Ensure Enlive between meals.   Intake/Tolerance: Tolerating diet. Per nursing flowsheets (% intake), no data available so far (pt was just admitted)    LABS  Labs reviewed  Note, lipase of 116 on 10/22    MEDICATIONS  Medications reviewed    ANTHROPOMETRICS  Height: 165.1 cm (5' 5\")  Most Recent Weight: 61.2 kg (135 lb)    IBW: 56.8 kg    BMI: Normal BMI  Weight History: 78.5 kg (8/15/2022), 80.8 kg (3/10/23), 70.3 kg (7/24/2023), 65.8 kg (9/28/2023) - Pt has lost 13% of body wt over the last approximate three months. Suspect both actual and fluid wt loss.   Dosing Weight: 61 kg (based " on lowest wt so far this admission of 61.2 kg on 10/22)    ASSESSED NUTRITION NEEDS (for inpatient hospital stay)  Estimated Energy Needs: 2853-5019 kcals/day (30 - 35 kcals/kg)  Justification: Increased needs with liver disease, potential hypermetabolism  Estimated Protein Needs: 73-92 grams protein/day (1.2 - 1.5 grams of pro/kg)  Justification: Increased needs with liver disease but pending renal function  Estimated Fluid Needs: 1500 mL/day  Justification: On a fluid restriction    PHYSICAL FINDINGS/OTHER FINDINGS  See malnutrition section below.  Resp: No O2  GI: Unknown date of last stool. Abdominal distention.   Neuro: No asterixis per provider  WOC: Not following at this time    MALNUTRITION  % Intake: No decreased intake noted per pt  % Weight Loss: > 7.5% in 3 months (severe)  Subcutaneous Fat Loss: None observed but pt dressed in street clothes.   Muscle Loss: Thoracic region (clavicle, acromium bone, deltoid, trapezius, pectoral):  Mild and Dorsal hand:  Mild - Difficult to assess as pt dressed in street clothes.   Fluid Accumulation/Edema: None noted. Ascites is present  Malnutrition Diagnosis: Moderate malnutrition in the context of chronic illness    NUTRITION DIAGNOSIS  Unintended weight loss related to possible hypermetabolism as evidenced by pt has lost 13% of body wt over the last approximate three months.    INTERVENTIONS  Implementation  Medical food supplement therapy: Modified oral supplements to send Ensure Enlive at 10:00 and HS. Modified flavors to vanilla (or strawberry if available). Explained diet order.   Nutrition Education: Importance of adequate nutrition intake discussed with pt. Rec small, frequent meals to help increase oral intake. Encouraged high protein, low-sodium options.   Modify composition of meals/snacks: Gave sodium room service menu.      Goals  Patient to consume % of nutritionally adequate meal trays TID, or the equivalent with supplements/snacks.      Monitoring/Evaluation  Progress toward goals will be monitored and evaluated per protocol.       Nutrition will continue to follow.      6C (beds 2266-0773)/7C (beds 3588-2166)/ED   RD pager: [phone]  Weekend/Holiday RD pager: [phone]

## 2023-10-23 NOTE — PLAN OF CARE
"Goal Outcome Evaluation:      Plan of Care Reviewed With: patient    Overall Patient Progress: improving    Outcome Evaluation: see below    Time: 0401-0879    Admission/Diagnosis:  Hyponatremia [E87.1]  Liver disease [K76.9]  Pleural effusion [J90]  Dyspnea, unspecified type [R06.00]    /51 (BP Location: Left arm)   Pulse 81   Temp 97.7  F (36.5  C) (Oral)   Resp 18   Ht 1.651 m (5' 5\")   Wt 64.2 kg (141 lb 9.6 oz)   LMP  (LMP Unknown)   SpO2 99%   BMI 23.56 kg/m      Shift Updates: Pt A&Ox4, VSS on RA ex soft BPs (on scheduled midodrine), up ad aleena with walker. C/o wheezing, denies SOB, O2 sats stable and nebs and inhalers continued. Platelets critical low at 47 today, MD aware. PO Lasix and Spironolactone started for diuresis. EKG ordered to reassess QTc for resumption of PTA citalopram, continues to have prolonged QT. R PIV SL with dried drainage. No BMs this shift; on scheduled lactulose.  at bedside.     Plan: Hepatology team consulting.     Continue with POC.       "

## 2023-10-23 NOTE — PLAN OF CARE
Goal Outcome Evaluation:           Overall Patient Progress: no changeOverall Patient Progress: no change    Outcome Evaluation: patient denies pain.  up in room independently with walker.  some shortness of breath with activity.    A:  patient having SOB with activity but states breathing much better than yesterday.  O2 sat 95% on room air.  Paracentesis site Clean, dry and intact.    R:  continue to monitor and treat per plan of care.

## 2023-10-23 NOTE — UTILIZATION REVIEW
[facility] Utilization Review  Admission Status; Secondary Review Determination     Admission Date: 10/22/2023  1:20 AM      Under the authority of the Utilization Management Committee, the utilization review process indicated a secondary review on the above patient.  The review outcome is based on review of the medical records, discussions with staff, and applying clinical experience noted on the date of the review.        (X)      Inpatient Status Appropriate - This patient's medical care is consistent with medical management for inpatient care and reasonable inpatient medical practice.          RATIONALE FOR DETERMINATION   63-year-old female with history of alcoholic cirrhosis, hepatorenal syndrome, hypothyroidism, recurrent hyponatremia, recurrent pleural effusions, COPD, recent hyponatremia admitted with increased dyspnea related to pleural effusions and hyponatremia.  MELD score 27, started on Lasix twice daily, spironolactone, had 2 L removed via thoracentesis, not enough ascitic fluid to be removed.  Complex patient who has dyspnea due to recurrent right pleural effusions, needs ongoing diuresis with close monitoring of creatinine, volume status, and is at risk of acute decompensation, with anticipated length of stay more than 2 midnight and ongoing interventions, recommend continue inpatient status      The severity of illness, intensity of service provided, expected LOS and risk for adverse outcome make the care complex, high risk and appropriate for hospital admission.The patient requires hospital based medical care which is anticipated to require a stay of 2 or more midnights.        The information on this document is developed by the utilization review team in order for the business office to ensure compliance.  This only denotes the appropriateness of proper admission status and does not reflect the quality of care rendered.              Sincerely,       Dyllan Lai MD  Physician  Advisor  Utilization Review-[place]    Phone: [phone]

## 2023-10-23 NOTE — PROGRESS NOTES
[facility]  HEPATOLOGY PROGRESS NOTE  Stefani Crowell [id number]       IMPRESSION:  Stefani Crowell is a 63 year old female with a history of alcohol associated cirrhosis, last use 6/28/23 complicated by hepatic hydrothorax with twice weekly thoracentesis, hyponatremia, COPD, weight loss, ascites who was admitted with dyspnea. She has had continued hyponatremia.     RECOMMENDATIONS:  # Hyponatremia  - sodium level 122 today. Discussed with nephrology given tenuous course of hyponatremia. She did improve following restarting diuretics and remains on fluid restriction of 1.2L.   - continue lasix 20 mg BID and increase spironolactone to 50 mg daily.   - Ur Na <20. Cystatin C as outpatient. Creatinine improving.     # Hepatic hydrothorax  - recent thora 10/22 without evidence of SBE.   - has had 2 L removed each time. MELD 19, t. Bili is more indirect. Has apt to consider TIPS as outpatient.   - ordered peripheral smear, reticulocyte count to evaluate for cause of elevated t. Bili. Most recent iron saturations wnl on iron supplement.     # Alcohol associated cirrhosis  - currently engaged in [employer] programming. Peth 10/19 negative  - evaluated for liver transplant, not a current candidate for transplant, MELD 25.   - US 8/22 without HCC  - EGD 8/2023 with normal esophagus    # Hepatic encephalopathy  - continue lactulose and rifaximin. Mentation stable.     Patient was discussed with attending physician, Dr. Hays.  The above note reflects our joint plan.     Next follow up appointment: Dr. Stephanie Lim 1/2024    Thank you for the opportunity to be involved with  Stefani Crowell care. Please call with any questions or concerns.    ROD Urban, CNP  Inpatient Hepatology [facility]        SUBJECTIVE:  Reports having some difficulty with word finding at times. Denies fevers, nausea, vomiting. Has had more dyspnea with some movement.     ROS:  A 10-point review of systems was negative.    OBJECTIVE:  VS: BP  "(P) 92/46 (BP Location: Left arm)   Pulse (P) 82   Temp (P) 97.7  F (36.5  C) (Oral)   Resp (P) 18   Ht 1.651 m (5' 5\")   Wt 64.2 kg (141 lb 9.6 oz)   LMP  (LMP Unknown)   SpO2 (P) 100%   BMI 23.56 kg/m        General: In no acute distress, mild facial muscle wasting  Neuro: AOx3, No asterixis  HEENT:  Noscleral icterus, Nooral lesions  CV: . Skin warm and dry  Lungs:  Respirations even and nonlabored on room air. Lungs clear with right mid to lower base diminished.   Abd:  Nondistended, nontender   Extrem: Noperipheral edema   Skin: Nojaundice  Psych: pleasant    MEDICATIONS:  Current Facility-Administered Medications   Medication    acetaminophen (TYLENOL) tablet 650 mg    Or    acetaminophen (TYLENOL) Suppository 650 mg    acetaminophen (TYLENOL) tablet 325 mg    albuterol (PROVENTIL HFA/VENTOLIN HFA) inhaler    albuterol (PROVENTIL) neb solution 2.5 mg    [Held by provider] citalopram (celeXA) tablet 40 mg    ferrous sulfate (FEROSUL) tablet 325 mg    fluticasone-vilanterol (BREO ELLIPTA) 200-25 MCG/ACT inhaler 1 puff    furosemide (LASIX) tablet 20 mg    ipratropium - albuterol 0.5 mg/2.5 mg/3 mL (DUONEB) neb solution 3 mL    lactulose (CEPHULAC) Packet 20 g    levothyroxine (SYNTHROID/LEVOTHROID) tablet 112 mcg    LORazepam (ATIVAN) tablet 0.5 mg    melatonin tablet 1 mg    melatonin tablet 3 mg    midodrine (PROAMATINE) tablet 15 mg    montelukast (SINGULAIR) tablet 10 mg    pantoprazole (PROTONIX) EC tablet 40 mg    polyethylene glycol (MIRALAX) Packet 17 g    polyethylene glycol (MIRALAX) Packet 17 g    prochlorperazine (COMPAZINE) injection 5 mg    Or    prochlorperazine (COMPAZINE) tablet 5 mg    Or    prochlorperazine (COMPAZINE) suppository 25 mg    rifaximin (XIFAXAN) tablet 550 mg    spironolactone (ALDACTONE) tablet 25 mg    traZODone (DESYREL) tablet 50 mg       REVIEW OF LABORATORY, PATHOLOGY AND IMAGING RESULTS:  [facility]  Recent Labs   Lab 10/23/23  0606 10/22/23  1026 10/22/23  0150 " "10/19/23  1523   *  --  121* 123*   POTASSIUM 4.5 4.3 3.3* 4.1   CHLORIDE 92*  --  90* 90*   UMA 10.2  --  9.6 9.8   CO2 19*  --  18* 22   BUN 35.0*  --  30.3* 30.0*   CR 1.08*  --  1.04* 1.12*   GLC 75  --  148* 111*     CBC  Recent Labs   Lab 10/23/23  0606 10/22/23  0944 10/22/23  0150 10/19/23  1523   WBC 6.7 7.1 6.2 6.6   RBC 2.48* 2.72* 2.57* 2.78*   HGB 8.0* 8.6* 8.3* 8.8*   HCT 23.9* 25.7* 24.4* 26.8*   MCV 96 95 95 96   MCH 32.3 31.6 32.3 31.7   MCHC 33.5 33.5 34.0 32.8   RDW 15.5* 15.6* 15.7* 15.2*   PLT 47* 52* 51* 58*     INR  Recent Labs   Lab 10/22/23  0944 10/22/23  0150 10/19/23  1523   INR 1.96* 2.09* 1.89*     LFTs  Recent Labs   Lab 10/23/23  0606 10/22/23  0454 10/22/23  0150 10/19/23  1523   ALKPHOS 91  --  110* 111*   AST 45  --  45 48*   ALT 30  --  32 34   BILITOTAL 3.1*  --  2.9* 3.3*   PROTTOTAL 5.3* 5.8* 5.8* 6.0*   ALBUMIN 3.6  --  4.0 3.9        MELD 3.0: 26 at 10/23/2023  6:06 AM  MELD-Na: 27 at 10/23/2023  6:06 AM  Calculated from:  Serum Creatinine: 1.08 mg/dL at 10/23/2023  6:06 AM  Serum Sodium: 122 mmol/L (Using min of 125 mmol/L) at 10/23/2023  6:06 AM  Total Bilirubin: 3.1 mg/dL at 10/23/2023  6:06 AM  Serum Albumin: 3.6 g/dL (Using max of 3.5 g/dL) at 10/23/2023  6:06 AM  INR(ratio): 1.96 at 10/22/2023  9:44 AM  Age at listing (hypothetical): 63 years  Sex: Female at 10/23/2023  6:06 AM    Previous work-up:   Lab Results   Component Value Date    HEPBANG Nonreactive 02/18/2021    HBCAB Nonreactive 02/18/2021    HCVAB  08/03/2017     Nonreactive   Assay performance characteristics have not been established for newborns,   infants, and children      TANYA 157 10/03/2023    IRONSAT 28 10/03/2023    TSH 0.74 07/24/2023    CHOL 176 08/15/2022    HDL 44 (L) 08/15/2022     (H) 08/15/2022    TRIG 111 08/15/2022    A1C 4.7 07/24/2023    POPETH <10 10/19/2023    PLPETH <10 10/19/2023      No results found for: \"SPECDES\", \"LDRESULTS\"      Imaging Results:    US abd w doppler " 10/22/23  Impression:   1. Cirrhotic hepatic morphology. No focal hepatic lesion.  2. Patent hepatic vasculature. Retrograde blood flow in the splenic  vein. Low velocity flow in the main portal vein at 18 cm/s.  3. Ascites and splenomegaly, compatible portal hypertension.  4. Cholelithiasis.  5. Right pleural effusion.

## 2023-10-23 NOTE — PROGRESS NOTES
[facility]    Medicine Progress Note - Hospitalist Service, GOLD TEAM 9    Date of Admission:  10/22/2023    Assessment & Plan   Stefani Crowell is a 63 year old female with a pertinent past medical history of alcoholic cirrhosis, hepatorenal syndrome, hypothyroidism, recurrent hyponatremia, recurrent pleural effusions, COPD and recent admission for hyponatremia who was admitted on 10/22/2023 due to increased dyspnea related to R pleural effusion and hyponatremia.     CHANGES TO PLAN  -Labs largely unchanged, follow up with hepatology with any changes    ACUTE PROBLEMS  #Dyspnea due to recurrent R pleural effusion secondary to cirrhosis  #History of COPD  #ESLD, alcoholic cirrhosis  #Ascites  MELD 3.0: 26 at 10/22/2023  9:44 AM  MELD-Na: 27 at 10/22/2023  9:44 AM  Calculated from:  Serum Creatinine: 1.04 mg/dL at 10/22/2023  1:50 AM  Serum Sodium: 121 mmol/L (Using min of 125 mmol/L) at 10/22/2023  1:50 AM  Total Bilirubin: 2.9 mg/dL at 10/22/2023  1:50 AM  Serum Albumin: 4.0 g/dL (Using max of 3.5 g/dL) at 10/22/2023  1:50 AM  INR(ratio): 1.96 at 10/22/2023  9:44 AM  Age at listing (hypothetical): 63 years  Sex: Female at 10/22/2023  9:44 AM  -Admitted with dypsnea and mild abdominal pain, recently stopped lasix  -Hepatology consulted, their help is appreciated   -Start lasix 20mg BID and spironolactone 25mg  -10/22: Had 2L removed via thoracentesis (not enough fluid for para)   -Transudative, pH 7.6. initial micro studies are negative  -Continue home lactulose 20g TID, rifaximin 550mg BID, Breo ellipta 1 puff daily  -Has PRN duonebs    #Severe Hyponatremia, likely secondary to cirrhosis  #MATTIE, likely secondary to hepatorenal syndrome  #Hypokalemia, resolved  -Admit Na 121 (10/23: 122)  -Baseline Cr 0.8, admit with 1.04  -On home midodrine 15mg TID  -Potassium repletion per RN protocol  -Monitor following initiation of diuretics and fluid removal via  procedures    #Acute on chronic normocytic anemia  #Thrombocytopenia  #Coagulopathy  -Admit Hgb 8.3, Plt 51  -Likely secondary to cirrhosis and dilution  -Will monitor following diuretics and fluid removal via procedures  -Transfuse for Hgb < 7 and Plt < 10. (Would likely need comitant diuretics)    #Elevated troponin  -Admit 27, 28. Likely secondary to poor kidney clearance  -Unremarkable EKG    CHRONIC PROBLEMS  #Hypothyroidism  Last TSH was 0.74 on 07/24/23  - Continue home levothyroxine 112 mcg po daily    #GERD  - Continue pantoprazole 40 mg po qdaily    #Anxiety  - Continue citalopram 40 mg po qdaily  - Continue trazodone 50 mg po qHS  - Continue lorazepam 0.5 mg po qdaily prn          Diet: 2 Gram Sodium Diet  Fluid restriction 1500 ML FLUID  Snacks/Supplements Adult: Ensure Enlive; Between Meals    DVT Prophylaxis: Pneumatic Compression Devices  Demarco Catheter: Not present  Lines: None     Cardiac Monitoring: None  Code Status: Full Code      Clinically Significant Risk Factors        # Hypokalemia: Lowest K = 3.3 mmol/L in last 2 days, will replace as needed  # Hyponatremia: Lowest Na = 121 mmol/L in last 2 days, will monitor as appropriate         # Coagulation Defect: INR = 1.96 (Ref range: 0.85 - 1.15) and/or PTT = N/A, will monitor for bleeding  # Thrombocytopenia: Lowest platelets = 51 in last 2 days, will monitor for bleeding           # Moderate Malnutrition: based on nutrition assessment, PRESENT ON ADMISSION   # Asthma: noted on problem list        Disposition Plan     Expected Discharge Date: 10/24/2023                    Paulo Adamson MD  Hospitalist Service, GOLD TEAM 9  [facility]  Securely message with [email or web address] (more info)  Text page via [facility] Paging/Directory   See signed in provider for up to date coverage information  ______________________________________________________________________    Interval History   -Had thoracentesis yesterday, dyspnea  is improved. (Not enough fluid for para)  -No acute events overnight  -Today she feels much better  -Eager to talk with the hepatology team    Physical Exam   Vital Signs: Temp: (P) 97.7  F (36.5  C) Temp src: (P) Oral BP: (P) 92/46 Pulse: (P) 82   Resp: (P) 18 SpO2: (P) 100 % O2 Device: (P) None (Room air)    Weight: 141 lbs 9.6 oz  Gen: Awake and alert, appears comfortable, appears stated age. Sitting up in bed.   HEENT: NCAT, sclerae anicteric.  CV: regular rhythm and rate,   Pulm: Normal work of breathing. Lung have less crackles compared to 10/22. Right lung still has decreased breath sounds at base  GI: Non-distended, non-tender  Skin: Warm, dry  Neuro: AOx3, speech normal, moves all extremities symmetrically.  Psych: Mood is good, affect is congruent.      Medical Decision Making       30 MINUTES SPENT BY ME on the date of service doing chart review, history, exam, documentation & further activities per the note.      Data     I have personally reviewed the following data over the past 24 hrs:    7.1  \   8.6 (L)   / 52 (L)     N/A N/A N/A /  N/A   4.3 N/A N/A \     ALT: N/A AST: N/A AP: N/A TBILI: N/A   ALB: N/A TOT PROTEIN: N/A LIPASE: N/A     INR:  1.96 (H) PTT:  N/A   D-dimer:  N/A Fibrinogen:  N/A       Imaging results reviewed over the past 24 hrs:   Recent Results (from the past 24 hour(s))   POC US Guide for Thorcentesis    Impression    Limited chest ultrasound was performed and demonstrated an adequate fluid collection on the right hemithorax.     Thoracentesis Indication:pleural effusion    Doppler of the skin demonstrated an area at this site without significant vasculature.  A thoracentesis at this site was subsequently performed.  See images stored with POCUS for paracentesis for same date.    Ultrasound revealed normal lung sliding after the procedure.  Desi Price MD    US Abdomen Complete w Doppler Complete    Narrative    EXAMINATION: US ABDOMEN COMPLETE WITH DOPPLER COMPLETE  10/22/2023  11:40 AM     COMPARISON: CT abdomen/pelvis 3/27/2023, 9/10/2020 ultrasound    HISTORY: Evaluate ascites + r/o clot    TECHNIQUE: The abdomen was scanned in standard fashion with  specialized ultrasound transducer(s) using both gray-scale, color  Doppler, and spectral flow techniques.    Findings:  Doppler Evaluation:   Splenic Vein: Patent with retrograde flow, 40 cm/sec.    Portal Venous Flow:  Main Portal Vein: Patent with antegrade flow, 18 cm/sec.  Right Portal Vein: Patent with antegrade flow, 27 cm/sec.  Left Portal Vein: Patent with antegrade flow, 28 cm/sec.    Hepatic Veins:  The right, middle, and left hepatic veins are patent with flow towards  the IVC. IVC is patent.    Hepatic Arterial Flow:  Hepatic Artery: Resistive Index of 0.70, peak systolic velocity  125  cm/sec.  Left and right hepatic arteries not identified.    Gray-scale Analysis:  Liver: Hepatic parenchyma demonstrates coarsened echotexture with a  nodular capsular contour. No focal hepatic lesion. The right hepatic  lobe measures 12.7 cm craniocaudally. Main portal vein measures 1.3 cm  in diameter.    Gallbladder: The gallbladder is well distended and of normal  morphology. There is no pericholecystic fluid or sonographic Dempsey's  sign. Shadowing stones in the lumen.    Bile Ducts: No intra- or extra-hepatic biliary dilation. Common bile  duct measures 5 mm..    Pancreas: Not visualized    Kidneys: Both kidneys are of normal echotexture.  No evidence of mass  or hydronephrosis. Right kidney measures 9.2 cm, left measures 11.5  cm.    Spleen: The spleen is of normal echogenicity. Measures 15.7 cm,  enlarged.    Aorta and IVC: The visualized portions of the aorta and IVC are  unremarkable. Proximal aorta measures 1.9 cm.    Free fluid in the right upper and lower quadrants. Right pleural  effusion.      Impression    Impression:   1. Cirrhotic hepatic morphology. No focal hepatic lesion.  2. Patent hepatic vasculature. Retrograde  blood flow in the splenic  vein. Low velocity flow in the main portal vein at 18 cm/s.  3. Ascites and splenomegaly, compatible portal hypertension.  4. Cholelithiasis.  5. Right pleural effusion.    I have personally reviewed the examination and initial interpretation  and I agree with the findings.    JENN BOWMAN DO         SYSTEM ID:  [id number]   XR Chest Port 1 View    Narrative    Exam: XR CHEST PORT 1 VIEW, 10/22/2023 2:51 PM    Indication: right thoracentesis. r/o pneumothorax    Comparison: Earlier same day chest x-ray    Findings:   Decreased though persistent moderate to large right effusion. No  pneumothorax. Left lung is clear.      Impression    Impression:   1. No pneumothorax.  2. Decreased now moderate to large right pleural effusion with  continued adjacent atelectasis.    I have personally reviewed the examination and initial interpretation  and I agree with the findings.    JENN BOWMAN DO         SYSTEM ID:  [id number]

## 2023-10-24 NOTE — PLAN OF CARE
Assumed cares from 2300 to 0700    Pt is A/OX4, denies chest pain, N/V. VSS, with soft BP. Requested oxygen at night, placed 1.5L via NC. Up ad aleena with home walker, moving around in room. No BM. Pt has bandages applied over knees and L arm. Paracentesis done on 10/22 with 2L removed. Incision site CDI. No complains of pain. Pt is on 1.5 fluid restriction. Diet 2g Na+. R PIV SL. Q4VS, no BG checks. Continue to monitor.    Plan of Care Reviewed With: patient    Overall Patient Progress: no change

## 2023-10-24 NOTE — PROGRESS NOTES
[facility]  HEPATOLOGY PROGRESS NOTE  Stefani Crowell [id number]       IMPRESSION:  Stefani Crowell is a 63 year old female with a history of alcohol associated cirrhosis, last use 6/28/23 complicated by hepatic hydrothorax with twice weekly thoracentesis, hyponatremia, COPD, weight loss, ascites who was admitted with dyspnea. She has had continued hyponatremia.     RECOMMENDATIONS:  # Hyponatremia  - sodium level 122 today.  She did improve following restarting diuretics and remains on fluid restriction of 1.5L.   - continue lasix 20 mg BID and increase spironolactone to 50 mg daily.    - Ur Na <20. Cystatin C as outpatient. Creatinine stable.    # Hepatic hydrothorax  - recent thora 10/22 without evidence of SBE.   - has had 2 L removed twice weekly MELD 19, t. Bili is more indirect. Has apt to consider TIPS as outpatient.   -With her increased need for oxygen, recommend a thoracentesis and then a TTE with bubble study.  She does have a history of COPD.  -Peripheral smear pending.  Appropriate reticulocyte count    # Alcohol associated cirrhosis  - currently engaged in [employer] programming. Pet 10/19 negative  - evaluated for liver transplant, not a current candidate for transplant, MELD 27.   - US 8/22 without HCC  - EGD 8/2023 with normal esophagus  -Ultrasound complete with Doppler for evaluation of HCC and thrombosis    # Hepatic encephalopathy  - continue lactulose and rifaximin. Mentation stable.     Patient was discussed with attending physician, Dr. Hays.  The above note reflects our joint plan.     Next follow up appointment: Dr. Stephanie Lim 1/2024    Thank you for the opportunity to be involved with  Stefnai Crowell care. Please call with any questions or concerns.    ROD Urban, CNP  Inpatient Hepatology [facility]        SUBJECTIVE:  Reports continues to have dyspnea and feels that she needs a another thoracentesis soon.  Now on oxygen consistently.  Unable to take long walks due to  "dyspnea.    ROS:  A 10-point review of systems was negative.    OBJECTIVE:  VS: /54 (BP Location: Left arm)   Pulse 95   Temp 97.9  F (36.6  C) (Oral)   Resp 18   Ht 1.651 m (5' 5\")   Wt 64.4 kg (142 lb)   LMP  (LMP Unknown)   SpO2 97%   BMI 23.63 kg/m        General: In no acute distress, mild facial muscle wasting  Neuro: AOx3, No asterixis  HEENT:  Noscleral icterus, Nooral lesions  CV: . Skin warm and dry  Lungs:  Respirations even and nonlabored on 2L/NC   Abd:  Nondistended, nontender   Extrem: Noperipheral edema   Skin: Nojaundice  Psych: pleasant    MEDICATIONS:  Current Facility-Administered Medications   Medication    acetaminophen (TYLENOL) tablet 650 mg    Or    acetaminophen (TYLENOL) Suppository 650 mg    acetaminophen (TYLENOL) tablet 325 mg    albuterol (PROVENTIL HFA/VENTOLIN HFA) inhaler    albuterol (PROVENTIL) neb solution 2.5 mg    [Held by provider] citalopram (celeXA) tablet 40 mg    ferrous sulfate (FEROSUL) tablet 325 mg    fluticasone-vilanterol (BREO ELLIPTA) 200-25 MCG/ACT inhaler 1 puff    furosemide (LASIX) tablet 20 mg    ipratropium - albuterol 0.5 mg/2.5 mg/3 mL (DUONEB) neb solution 3 mL    lactulose (CHRONULAC) solution 20 g    levothyroxine (SYNTHROID/LEVOTHROID) tablet 112 mcg    LORazepam (ATIVAN) tablet 0.5 mg    melatonin tablet 1 mg    melatonin tablet 3 mg    midodrine (PROAMATINE) tablet 15 mg    montelukast (SINGULAIR) tablet 10 mg    pantoprazole (PROTONIX) EC tablet 40 mg    polyethylene glycol (MIRALAX) Packet 17 g    prochlorperazine (COMPAZINE) injection 5 mg    Or    prochlorperazine (COMPAZINE) tablet 5 mg    Or    prochlorperazine (COMPAZINE) suppository 25 mg    rifaximin (XIFAXAN) tablet 550 mg    spironolactone (ALDACTONE) tablet 50 mg    traZODone (DESYREL) half-tab 25-50 mg       REVIEW OF LABORATORY, PATHOLOGY AND IMAGING RESULTS:  [facility]  Recent Labs   Lab 10/24/23  0751 10/24/23  0707 10/23/23  0606 10/22/23  1026 10/22/23  0150 10/19/23  1523 "   NA  --  122* 122*  --  121* 123*   POTASSIUM  --  4.0 4.5 4.3 3.3* 4.1   CHLORIDE  --  90* 92*  --  90* 90*   UMA  --  10.3* 10.2  --  9.6 9.8   CO2  --  20* 19*  --  18* 22   BUN  --  38.2* 35.0*  --  30.3* 30.0*   CR  --  1.14* 1.08*  --  1.04* 1.12*   * 98 75  --  148* 111*     CBC  Recent Labs   Lab 10/24/23  0708 10/23/23  1224 10/23/23  0606 10/22/23  0944   WBC 7.1 6.7 6.7 7.1   RBC 2.72* 2.57* 2.48* 2.72*   HGB 8.7* 8.4* 8.0* 8.6*   HCT 26.0* 24.6* 23.9* 25.7*   MCV 96 96 96 95   MCH 32.0 32.7 32.3 31.6   MCHC 33.5 34.1 33.5 33.5   RDW 15.8* 15.6* 15.5* 15.6*   PLT 52* 52* 47* 52*     INR  Recent Labs   Lab 10/22/23  0944 10/22/23  0150 10/19/23  1523   INR 1.96* 2.09* 1.89*     LFTs  Recent Labs   Lab 10/24/23  0707 10/23/23  0606 10/22/23  0454 10/22/23  0150 10/19/23  1523   ALKPHOS 106* 91  --  110* 111*   AST 51* 45  --  45 48*   ALT 34 30  --  32 34   BILITOTAL 3.4* 3.1*  --  2.9* 3.3*   PROTTOTAL 5.8* 5.3* 5.8* 5.8* 6.0*   ALBUMIN 3.8 3.6  --  4.0 3.9        MELD 3.0: 27 at 10/24/2023  7:07 AM  MELD-Na: 28 at 10/24/2023  7:07 AM  Calculated from:  Serum Creatinine: 1.14 mg/dL at 10/24/2023  7:07 AM  Serum Sodium: 122 mmol/L (Using min of 125 mmol/L) at 10/24/2023  7:07 AM  Total Bilirubin: 3.4 mg/dL at 10/24/2023  7:07 AM  Serum Albumin: 3.8 g/dL (Using max of 3.5 g/dL) at 10/24/2023  7:07 AM  INR(ratio): 1.96 at 10/22/2023  9:44 AM  Age at listing (hypothetical): 63 years  Sex: Female at 10/24/2023  7:07 AM    Previous work-up:   Lab Results   Component Value Date    HEPBANG Nonreactive 02/18/2021    HBCAB Nonreactive 02/18/2021    HCVAB  08/03/2017     Nonreactive   Assay performance characteristics have not been established for newborns,   infants, and children      TANYA 157 10/03/2023    IRONSAT 28 10/03/2023    TSH 0.74 07/24/2023    CHOL 176 08/15/2022    HDL 44 (L) 08/15/2022     (H) 08/15/2022    TRIG 111 08/15/2022    A1C 4.7 07/24/2023    POPETH <10 10/22/2023    PLPETH <10  "10/22/2023      No results found for: \"SPECDES\", \"LDRESULTS\"      Imaging Results:  None current  "

## 2023-10-24 NOTE — PLAN OF CARE
Goal Outcome Evaluation:      Plan of Care Reviewed With: patient    Overall Patient Progress: no changeOverall Patient Progress: no change    Outcome Evaluation: Pt up w/home walker, moving around in room. 2x BM per pt, took scheduled lactulose. Knees had bandages over them from prev hosp that she previously did not want removed. She allowed removal tonight and skin is healing well, scabbing over, though pt picked L scab so it began bleeding again. New dressings applied. Pt req 25mg rather than 50mg for trazadone, dose adjusted. 1x PRN melatonin.

## 2023-10-24 NOTE — PROGRESS NOTES
[facility]    Medicine Progress Note - Hospitalist Service, GOLD TEAM 9    Date of Admission:  10/22/2023    Assessment & Plan   Stefani Crowell is a 63 year old female with a pertinent past medical history of alcoholic cirrhosis, hepatorenal syndrome, hypothyroidism, recurrent hyponatremia, recurrent pleural effusions, COPD and recent admission for hyponatremia who was admitted on 10/22/2023 due to increased dyspnea related to R pleural effusion and hyponatremia.     CHANGES TO PLAN  -Labs largely unchanged, follow up with hepatology with any changes    #Dyspnea due to recurrent R hepatic hydrothorax  #History of COPD  #ESLD, alcoholic cirrhosis  #Ascites  MELD 3.0: 27 at 10/24/2023  7:07 AM  MELD-Na: 28 at 10/24/2023  7:07 AM  Calculated from:  Serum Creatinine: 1.14 mg/dL at 10/24/2023  7:07 AM  Serum Sodium: 122 mmol/L (Using min of 125 mmol/L) at 10/24/2023  7:07 AM  Total Bilirubin: 3.4 mg/dL at 10/24/2023  7:07 AM  Serum Albumin: 3.8 g/dL (Using max of 3.5 g/dL) at 10/24/2023  7:07 AM  INR(ratio): 1.96 at 10/22/2023  9:44 AM  Age at listing (hypothetical): 63 years  Sex: Female at 10/24/2023  7:07 AM  -Admitted with dypsnea and mild abdominal pain, recently stopped lasix  -Hepatology consulted, their help is appreciated   -Start lasix 20mg BID and spironolactone 25mg  -10/22: Had 2L removed via thoracentesis (not enough fluid for para)   -Transudative, pH 7.6. initial micro studies are negative  -Continue home lactulose 20g TID, rifaximin 550mg BID, Breo ellipta 1 puff daily  -Has PRN duonebs  - will repeat another thoracentesis on 10-25-23 and check TTE with bubble study, US abdomen with doppler. Will discuss with GI about role of TIPS considering the persistence of hydrothorax requiring thoracentesis q48-72h. Continue lasix 20 mg BID and spironolactone 50 mg daily    #Severe Hyponatremia, likely secondary to cirrhosis  #MATTIE, likely secondary to hepatorenal  syndrome  #Hypokalemia, resolved  -Admit Na 121 (10/23: 122)  -Baseline Cr 0.8, admit with 1.04  -On home midodrine 15mg TID  -Potassium repletion per RN protocol  -Monitor following initiation of diuretics and fluid removal via procedures    #Acute on chronic normocytic anemia  #Thrombocytopenia  #Coagulopathy  -Admit Hgb 8.3, Plt 51  -Likely secondary to cirrhosis and dilution  -Will monitor following diuretics and fluid removal via procedures  -Transfuse for Hgb < 7 and Plt < 10. (Would likely need comitant diuretics)    #Elevated troponin  -Admit 27, 28. Likely secondary to poor kidney clearance  -Unremarkable EKG    #Hypothyroidism  Last TSH was 0.74 on 07/24/23  - Continue home levothyroxine 112 mcg po daily    #GERD  - Continue pantoprazole 40 mg po qdaily    #Anxiety  - Continue citalopram 40 mg po qdaily  - Continue trazodone 50 mg po qHS  - Continue lorazepam 0.5 mg po qdaily prn          Diet: 2 Gram Sodium Diet  Fluid restriction 1500 ML FLUID  Snacks/Supplements Adult: Ensure Enlive; Between Meals    DVT Prophylaxis: Pneumatic Compression Devices  Demarco Catheter: Not present  Lines: None     Cardiac Monitoring: None  Code Status: Full Code      Clinically Significant Risk Factors         # Hyponatremia: Lowest Na = 122 mmol/L in last 2 days, will monitor as appropriate   # Hypercalcemia: Highest Ca = 10.3 mg/dL in last 2 days, will monitor as appropriate       # Coagulation Defect: INR = 1.96 (Ref range: 0.85 - 1.15) and/or PTT = N/A, will monitor for bleeding  # Thrombocytopenia: Lowest platelets = 47 in last 2 days, will monitor for bleeding           # Moderate Malnutrition: based on nutrition assessment, PRESENT ON ADMISSION   # Asthma: noted on problem list        Disposition Plan      Expected Discharge Date: 10/25/2023        Discharge Comments: Dispo: home  Plan: Con't lactulose and rifaximin for hepatic encephalopathy, mentation stable. Ind.  Progression: Tolerated thora 10/22. Ind.             Robin Dotson MD  Hospitalist Service, GOLD TEAM 9  [facility]  Securely message with [employer] (more info)  Text page via [employer] Paging/Directory   See signed in provider for up to date coverage information  ______________________________________________________________________    Interval History   Persistent dyspnea. No new complaints. Frustrated about Bms with lactulose    Physical Exam   Vital Signs: Temp: 97.9  F (36.6  C) Temp src: Oral BP: 108/54 Pulse: 95   Resp: 18 SpO2: 97 % O2 Device: None (Room air) Oxygen Delivery: 1.5 LPM  Weight: 142 lbs 0 oz  Gen: Awake and alert, appears comfortable, appears stated age. Sitting up in bed.   HEENT: NCAT, sclerae anicteric.  CV: regular rhythm and rate,   Pulm: Normal work of breathing. Lung have less crackles compared to 10/22. Right lung still has decreased breath sounds at base  GI: Non-distended, non-tender  Skin: Warm, dry  Neuro: AOx3, speech normal, moves all extremities symmetrically.  Psych: Mood is good, affect is congruent.      Medical Decision Making       50 MINUTES SPENT BY ME on the date of service doing chart review, history, exam, documentation & further activities per the note.      Data     I have personally reviewed the following data over the past 24 hrs:    7.1  \   8.7 (L)   / 52 (L)     122 (L) 90 (L) 38.2 (H) /  102 (H)   4.0 20 (L) 1.14 (H) \     ALT: 34 AST: 51 (H) AP: 106 (H) TBILI: 3.4 (H)   ALB: 3.8 TOT PROTEIN: 5.8 (L) LIPASE: N/A       Imaging results reviewed over the past 24 hrs:   No results found for this or any previous visit (from the past 24 hour(s)).

## 2023-10-25 NOTE — PROGRESS NOTES
[facility]    Medicine Progress Note - Hospitalist Service, GOLD TEAM 9    Date of Admission:  10/22/2023    Assessment & Plan   Stefani Crowell is a 63 year old female with a pertinent past medical history of alcoholic cirrhosis, hepatorenal syndrome, hypothyroidism, recurrent hyponatremia, recurrent pleural effusions, COPD and recent admission for hyponatremia who was admitted on 10/22/2023 due to increased dyspnea related to R pleural effusion and hyponatremia.     #Dyspnea due to recurrent R hepatic hydrothorax  #History of COPD  #ESLD, alcoholic cirrhosis  #Ascites  MELD 3.0: 26 at 10/25/2023  6:09 AM  MELD-Na: 27 at 10/25/2023  6:09 AM  Calculated from:  Serum Creatinine: 1.11 mg/dL at 10/25/2023  6:09 AM  Serum Sodium: 120 mmol/L (Using min of 125 mmol/L) at 10/25/2023  6:09 AM  Total Bilirubin: 3.3 mg/dL at 10/25/2023  6:09 AM  Serum Albumin: 3.5 g/dL at 10/25/2023  6:09 AM  INR(ratio): 1.94 at 10/25/2023  6:09 AM  Age at listing (hypothetical): 63 years  Sex: Female at 10/25/2023  6:09 AM  -Admitted with dypsnea and mild abdominal pain, recently stopped lasix  -Hepatology consulted, their help is appreciated   -Start lasix 20mg BID and spironolactone 25mg  -10/22: Had 2L removed via thoracentesis (not enough fluid for para)   -Transudative, pH 7.6. initial micro studies are negative  -Continue home lactulose 20g TID, rifaximin 550mg BID, Breo ellipta 1 puff daily  -Has PRN duonebs  - s/p thoracentesis on 10-25-23, with bloody fluid. Will check routine studies including cytology. Will check TTE with bubble study. Recent US abdomen with doppler on 10-22-23 negative for thromboses. D/W GI and IR unable to get an inpatient TIPS considering the persistence of hydrothorax requiring thoracentesis q48-72h. Continue lasix 20 mg BID and spironolactone 50 mg daily. Increase spironolactone to 100 mg daily    #Severe Hyponatremia, likely secondary to cirrhosis  #MATTIE,  likely secondary to hepatorenal syndrome  #Hypokalemia, resolved  -Admit Na 121 (10/23: 122)  -Baseline Cr 0.8, admit with 1.04  -On home midodrine 15mg TID  -Potassium repletion per RN protocol  -Monitor following initiation of diuretics and fluid removal via procedures    #Acute on chronic normocytic anemia  #Thrombocytopenia  #Coagulopathy  -Admit Hgb 8.3, Plt 51  -Likely secondary to cirrhosis and dilution  -Will monitor following diuretics and fluid removal via procedures  -Transfuse for Hgb < 7 and Plt < 10. (Would likely need comitant diuretics)    #Elevated troponin  -Admit 27, 28. Likely secondary to poor kidney clearance  -Unremarkable EKG    #Hypothyroidism  Last TSH was 0.74 on 07/24/23  - Continue home levothyroxine 112 mcg po daily    #GERD  - Continue pantoprazole 40 mg po qdaily    #Anxiety  - Continue citalopram 40 mg po qdaily  - Continue trazodone 50 mg po qHS  - Continue lorazepam 0.5 mg po qdaily prn          Diet: 2 Gram Sodium Diet  Fluid restriction 1500 ML FLUID  Snacks/Supplements Adult: Ensure Enlive; Between Meals    DVT Prophylaxis: Pneumatic Compression Devices  Demarco Catheter: Not present  Lines: None     Cardiac Monitoring: None  Code Status: Full Code      Clinically Significant Risk Factors         # Hyponatremia: Lowest Na = 120 mmol/L in last 2 days, will monitor as appropriate   # Hypercalcemia: Highest Ca = 10.3 mg/dL in last 2 days, will monitor as appropriate       # Coagulation Defect: INR = 1.94 (Ref range: 0.85 - 1.15) and/or PTT = N/A, will monitor for bleeding  # Thrombocytopenia: Lowest platelets = 52 in last 2 days, will monitor for bleeding           # Moderate Malnutrition: based on nutrition assessment, PRESENT ON ADMISSION   # Asthma: noted on problem list        Disposition Plan      Expected Discharge Date: 10/26/2023        Discharge Comments: Dispo: home  Plan: Con't lactulose and rifaximin for hepatic encephalopathy, mentation stable. Ind.  Progression:  Tolerated thora 10/22. Ind.            Robin Dotson MD  Hospitalist Service, GOLD TEAM 9  [facility]  Securely message with [employer] (more info)  Text page via [email or web address] Paging/Directory   See signed in provider for up to date coverage information  ______________________________________________________________________    Interval History     Reports improvement with shortness of breath post thoracentesis    Physical Exam   Vital Signs: Temp: 97.6  F (36.4  C) Temp src: Oral BP: 98/44 Pulse: 87   Resp: 16 SpO2: 94 % O2 Device: None (Room air) Oxygen Delivery: 2 LPM  Weight: 142 lbs 0 oz  Gen: Awake and alert, appears comfortable, appears stated age. Sitting up in bed.   HEENT: NCAT, sclerae anicteric.  CV: regular rhythm and rate,   Pulm: Normal work of breathing. Lung have less crackles compared to 10/22. Right lung still has decreased breath sounds at base  GI: Non-distended, non-tender  Skin: Warm, dry  Neuro: AOx3, speech normal, moves all extremities symmetrically.  Psych: Mood is good, affect is congruent.      Medical Decision Making       58 MINUTES SPENT BY ME on the date of service doing chart review, history, exam, documentation & further activities per the note.      Data     I have personally reviewed the following data over the past 24 hrs:    7.7  \   8.1 (L)   / 57 (L)     120 (L) 92 (L) 43.3 (H) /  93   4.3 20 (L) 1.11 (H) \     ALT: 31 AST: 50 (H) AP: 106 (H) TBILI: 3.3 (H)   ALB: 3.5 TOT PROTEIN: 5.6 (L); 5.6 (L) LIPASE: N/A     INR:  1.94 (H) PTT:  N/A   D-dimer:  N/A Fibrinogen:  155 (L)     Ferritin:  N/A % Retic:  N/A LDH:  243       Imaging results reviewed over the past 24 hrs:   Recent Results (from the past 24 hour(s))   POC US GUIDE FOR THORACENTESIS    Impression    US Thoracentesis Indication:pleural effusion, dyspnea, hypoxia, and decompensated liver disease    Limited chest ultrasound was performed and demonstrated an adequate fluid  collection on the right hemithorax. Doppler of the skin demonstrated an area at this site without significant vasculature. A thoracentesis at this site was subsequently performed.  Post-procedure POCUS at the apex (while patient seated) showed adequate lung sliding in B-mode        XR Chest Port 1 View    Narrative    Portable chest    INDICATION: Post right thoracentesis    COMPARISON: 10/22/2023    FINDINGS: Little significant change in the prominent right hemithorax  meniscus and dense opacification with silhouetting of the right  hemidiaphragm period is consistent with large pleural effusion and  likely associated atelectasis. The right heart border is completely  obscured by this density. No ectopic air. Left lung appears clear.    IMPRESSION no significant change in the large right pleural effusion  and associated atelectasis with no visible pneumothorax.    JUSTINO SHEEHAN MD         SYSTEM ID:  [id number]

## 2023-10-25 NOTE — PROCEDURES
[facility]  CAPS PROCEDURE NOTE  Date of Admission:  10/22/2023  Consult Requested by: Medicine  Reason for Consult: diagnostic evaluation of pleural effusion and management of symptomatic pleural effusion    Indication/HPI:     62 yo F with PMH of etoh cirrhosis c/b ascites and hepatic hydrothx admitted with AHRF, pleural effusion and hypo Na+    Pre-Procedure Diagnosis: Right Pleural Effusion    Post-Procedure Diagnosis: Right Pleural Effusion    Risk Assessment: Average risk, Technically straightforward; patient's anticoagulation has been held according to guidelines based on the agent and platelets and coags are within guidelines    Procedure Outcome:  Diagnostic thoracentesis performed and Therapeutic thoracentesis performed with 2 liters removed. Spoke to Dr Dotson, pt husb noted color change (beer colored to now red orange) reviewed labs, no cytology in our system from effusion per my and dr Dotson's review. Add on cytology and labs today    See additional procedure details below.    The primary covering service should follow up and address any lab results as appropriate.    Mayito Wylie MD  [facility]  Securely message with [employer] (more info)  Text page via [employer] Paging/Directory   See signed in provider for up to date coverage information      [facility]    Thoracentesis at Bedside    Date/Time: 10/25/2023 1:27 PM    Performed by: Mayito Wylie MD  Authorized by: Mayito Wylie MD      UNIVERSAL PROTOCOL   Site Marked: Yes  Prior Images Obtained and Reviewed:  Yes  Required items: Required blood products, implants, devices and special equipment available    Patient identity confirmed:  Verbally with patient, provided demographic data and hospital-assigned identification number  NA - No sedation, light sedation, or local anesthesia  Confirmation Checklist:   Relevant allergies, patient's identity using two indicators, correct equipment/implants were available and procedure was appropriate and matched the consent or emergent situation  Time out: Immediately prior to the procedure a time out was called    Universal Protocol: the Joint Commission Universal Protocol was followed    Preparation: Patient was prepped and draped in usual sterile fashion    ESBL (mL):  0    Procedure purpose: diagnostic and therapeutic  Indications: pleural effusion       ANESTHESIA    Anesthesia:  Local infiltration  Local Anesthetic:  Lidocaine 1% without epinephrine  Anesthetic Total (mL):  4      SEDATION    Patient Sedated: No      PROCEDURE DETAILS   Preparation: skin prepped with ChloraPrep  Patient position: sitting  Ultrasound guidance: no  Location: right lateral  Puncture method: through-the-needle catheter  Catheter size: 5 Fr.  Number of attempts: 1  Drainage amount: 2000 ml  Chest x-ray performed: yes  Chest x-ray interpreted by radiologist.      PROCEDURE  Describe Procedure: Consult and Procedure Service Note    The risks and benefits of the procedure were explained to patient who expressed understanding and opted to proceed.  Confirmed that consent was obtained and placed in the chart and the patient was placed on pulse oximetry. A time out was performed.    An ultrasound probe was used to identify an area of pleural fluid in the RIGHT hemithorax.    This area was prepped and draped in the usual sterile fashion and 4 ml of 1% lidocaine was instilled and fluid located using ultrasound guidance.  A small incision was made with an 11 blade scalpel. The thoracentesis catheter and needle were then inserted above the rib until fluid obtained then the needle removed.  Using a one-way valve, 2000 ml of red orange colored fluid was removed.  The catheter was then removed with the patient exhaling and  area cleaned and an occlusive dressing placed in usual fashion   Post procedure POCUS  revealed normal lung sliding at apex while patient seated after the procedure and a chest radiograph is pending.  The specimen(s) were left secured in lab bag at bedside with RN  to be sent to lab for further analysis               Patient Tolerance:  Patient tolerated the procedure well with no immediate complications  Length of time physician/provider present for 1:1 monitoring during sedation: 0   Thank you for consulting the CAPS team. Please contact the Consult and Procedure Service if any concerns or complications arise.         POC US GUIDE FOR THORACENTESIS     Impression  US Thoracentesis Indication:pleural effusion, dyspnea, hypoxia, and decompensated liver disease    Limited chest ultrasound was performed and demonstrated an adequate fluid collection on the right hemithorax. Doppler of the skin demonstrated an area at this site without significant vasculature. A thoracentesis at this site was subsequently performed.  Post-procedure POCUS at the apex (while patient seated) showed adequate lung sliding in B-mode

## 2023-10-25 NOTE — PLAN OF CARE
Goal Outcome Evaluation:           Overall Patient Progress: improvingOverall Patient Progress: improving     Pt A&Ox4, VSS on RA. Soft BP scheduled midodrine administered. Thoracentesis done today, tolerated well. Independent in room. lactulose given x1. 3 BM. Mepilex over scabs in L arm and knees. R PIV SL, little bloody but flushes okay. 2g Sodium diet, tolerating. 1.5 fluid restriction.  Potassium WNL, am recheck. No acute changes this shift. POC continued.

## 2023-10-25 NOTE — PROGRESS NOTES
[facility]  HEPATOLOGY PROGRESS NOTE  Stefani Crowell [id number]       IMPRESSION:  Stefani Crowell is a 63 year old female with a history of alcohol associated cirrhosis, last use 6/28/23 complicated by hepatic hydrothorax with twice weekly thoracentesis, hyponatremia, COPD, weight loss, ascites who was admitted with dyspnea. She has had continued hyponatremia.     RECOMMENDATIONS:  # Hyponatremia  - sodium level 120 today.  She may have component of hypervolemic hyponatremia. fluid restriction of 1.5L.   - continue lasix 20 mg BID and increase spironolactone to 100 mg daily.    - Ur Na <20. Cystatin C as outpatient. Creatinine stable.    # Hepatic hydrothorax  - recent thora 10/22 without evidence of SBE.   - has had 2 L removed twice weekly MELD 19 (initial MELD), t. Bili is more indirect. Has apt to consider TIPS as outpatient.   -With her increased need for oxygen (sats remain stable), recommend a thoracentesis and then a TTE with bubble study.  She does have a history of COPD.  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 Peth 10/19 negative  - evaluated for liver transplant, not a current candidate for transplant, MELD 26.   - US 8/22 without HCC  - EGD 8/2023 with normal esophagus  -Ultrasound complete with Doppler for evaluation of HCC and thrombosis    # Hepatic encephalopathy  - continue lactulose and rifaximin. Mentation stable.     Patient was discussed with attending physician, Dr. Hays.  The above note reflects our joint plan.     Next follow up appointment: Dr. Stephanie Lim 1/2024    Thank you for the opportunity to be involved with  Stefani Crowell care. Please call with any questions or concerns.    ROD Urban, CNP  Inpatient Hepatology [facility]        SUBJECTIVE:  Complains of increase in dyspnea that is causing nausea. Has not been able to be up walking due to nausea  "and dyspnea. Limited oral intake. Has been limiting her oral intake of fluids. Per RN, has been using oxygen intermittently with oxygen levels wnl.     ROS:  A 10-point review of systems was negative.    OBJECTIVE:  VS: BP 98/44 (BP Location: Left arm)   Pulse 87   Temp 97.6  F (36.4  C) (Oral)   Resp 16   Ht 1.651 m (5' 5\")   Wt 64.4 kg (142 lb)   LMP  (LMP Unknown)   SpO2 97%   BMI 23.63 kg/m        General: In no acute distress, mild facial muscle wasting  Neuro: AOx3, No asterixis  HEENT:  Noscleral icterus, Nooral lesions  CV: . Skin warm and dry  Lungs:  Respirations even and nonlabored on 1.5L/NC   Abd:  Nondistended, nontender   Extrem: Noperipheral edema   Skin: Nojaundice  Psych: pleasant/ mildly anxious    MEDICATIONS:  Current Facility-Administered Medications   Medication    acetaminophen (TYLENOL) tablet 650 mg    Or    acetaminophen (TYLENOL) Suppository 650 mg    acetaminophen (TYLENOL) tablet 325 mg    albuterol (PROVENTIL HFA/VENTOLIN HFA) inhaler    albuterol (PROVENTIL) neb solution 2.5 mg    [Held by provider] citalopram (celeXA) tablet 40 mg    ferrous sulfate (FEROSUL) tablet 325 mg    fluticasone-vilanterol (BREO ELLIPTA) 200-25 MCG/ACT inhaler 1 puff    furosemide (LASIX) tablet 20 mg    ipratropium - albuterol 0.5 mg/2.5 mg/3 mL (DUONEB) neb solution 3 mL    lactulose (CHRONULAC) solution 20 g    levothyroxine (SYNTHROID/LEVOTHROID) tablet 112 mcg    LORazepam (ATIVAN) tablet 0.5 mg    melatonin tablet 1 mg    melatonin tablet 3 mg    midodrine (PROAMATINE) tablet 15 mg    montelukast (SINGULAIR) tablet 10 mg    pantoprazole (PROTONIX) EC tablet 40 mg    polyethylene glycol (MIRALAX) Packet 17 g    prochlorperazine (COMPAZINE) injection 5 mg    Or    prochlorperazine (COMPAZINE) tablet 5 mg    Or    prochlorperazine (COMPAZINE) suppository 25 mg    rifaximin (XIFAXAN) tablet 550 mg    spironolactone (ALDACTONE) tablet 50 mg    traZODone (DESYREL) half-tab 25-50 mg       REVIEW OF " LABORATORY, PATHOLOGY AND IMAGING RESULTS:  BMP  Recent Labs   Lab 10/25/23  0609 10/24/23  0751 10/24/23  0707 10/23/23  0606 10/22/23  1026 10/22/23  0150   *  --  122* 122*  --  121*   POTASSIUM 4.3  --  4.0 4.5 4.3 3.3*   CHLORIDE 92*  --  90* 92*  --  90*   UMA 10.1  --  10.3* 10.2  --  9.6   CO2 20*  --  20* 19*  --  18*   BUN 43.3*  --  38.2* 35.0*  --  30.3*   CR 1.11*  --  1.14* 1.08*  --  1.04*   GLC 93 102* 98 75  --  148*     CBC  Recent Labs   Lab 10/25/23  0609 10/24/23  0708 10/23/23  1224 10/23/23  0606   WBC 7.7 7.1 6.7 6.7   RBC 2.48* 2.72* 2.57* 2.48*   HGB 8.1* 8.7* 8.4* 8.0*   HCT 24.0* 26.0* 24.6* 23.9*   MCV 97 96 96 96   MCH 32.7 32.0 32.7 32.3   MCHC 33.8 33.5 34.1 33.5   RDW 15.3* 15.8* 15.6* 15.5*   PLT 57* 52* 52* 47*     INR  Recent Labs   Lab 10/25/23  0609 10/22/23  0944 10/22/23  0150 10/19/23  1523   INR 1.94* 1.96* 2.09* 1.89*     LFTs  Recent Labs   Lab 10/25/23  0609 10/24/23  0707 10/23/23  0606 10/22/23  0454 10/22/23  0150   ALKPHOS 106* 106* 91  --  110*   AST 50* 51* 45  --  45   ALT 31 34 30  --  32   BILITOTAL 3.3* 3.4* 3.1*  --  2.9*   PROTTOTAL 5.6* 5.8* 5.3* 5.8* 5.8*   ALBUMIN 3.5 3.8 3.6  --  4.0        MELD 3.0: 26 at 10/25/2023  6:09 AM  MELD-Na: 27 at 10/25/2023  6:09 AM  Calculated from:  Serum Creatinine: 1.11 mg/dL at 10/25/2023  6:09 AM  Serum Sodium: 120 mmol/L (Using min of 125 mmol/L) at 10/25/2023  6:09 AM  Total Bilirubin: 3.3 mg/dL at 10/25/2023  6:09 AM  Serum Albumin: 3.5 g/dL at 10/25/2023  6:09 AM  INR(ratio): 1.94 at 10/25/2023  6:09 AM  Age at listing (hypothetical): 63 years  Sex: Female at 10/25/2023  6:09 AM    Previous work-up:   Lab Results   Component Value Date    HEPBANG Nonreactive 02/18/2021    HBCAB Nonreactive 02/18/2021    HCVAB  08/03/2017     Nonreactive   Assay performance characteristics have not been established for newborns,   infants, and children      TANYA 157 10/03/2023    IRONSAT 28 10/03/2023    TSH 0.74 07/24/2023     "CHOL 176 08/15/2022    HDL 44 (L) 08/15/2022     (H) 08/15/2022    TRIG 111 08/15/2022    A1C 4.7 07/24/2023    POPETH <10 10/22/2023    PLPETH <10 10/22/2023      No results found for: \"SPECDES\", \"LDRESULTS\"      Imaging Results:  None current  "

## 2023-10-25 NOTE — PLAN OF CARE
Assumed cares from 1900 to 0700    Pt is A/OX4, denies chest pain, N/V. VSS, with soft BP. Requested oxygen at night, placed 1.5L via NC. Up ad aleena with home walker, moving around in room. No BM. Pt has bandages applied over knees and L arm. Paracentesis done on 10/22 with 2L removed.. Incision site CDI. Repeat thoracentesis on 10/25. No complains of pain. PRN Albuterol neb and Melatonin given per request. Pt is on 1.5 fluid restriction. Diet 2g Na+. R PIV SL. Q4VS, no BG checks. Continue to monitor.     Plan of Care Reviewed With: patient    Overall Patient Progress: no change

## 2023-10-25 NOTE — PHARMACY-ADMISSION MEDICATION HISTORY
Pharmacy Intern Admission Medication History    Admission medication history is complete. The information provided in this note is only as accurate as the sources available at the time of the update.    Information Source(s): Patient and Family member via in-person    Pertinent Information:   - patient takes ibuprofen approximately once monthly, but does not know what strength  - patient said she could not remember her medications off the top of her head. Was able to obtain list from  since he has medication list on phone  - not taking Breo Ellipta due to lack of coverage  - still has some lorazepam at home, but rarely takes it (could not say how often)  - has cyclobenzaprine at home, states rarely takes it  - states she was told by provider to stop taking urea d/t liver function  - instructed by provider to stop diuretics since 10/17. Patient thinks the doctor may restart.      Changes made to PTA medication list:  Added: ibuprofen, ensure, trazodone  Deleted:   Cyclobenzaprine  Famotidine  Fluticasone nasal  Fluticasone-vilanterol (BREO) - only 1 dispense in 6/2023 x30 days  Hypertonic nasal rinse kit  Lorazepam - no dispenses in past 365 days  Changed: ferrous sulfate, midodrine    Medication Affordability:       Allergies reviewed with patient and updates made in EHR: yes    Medication History Completed By: Kortney Baird 10/25/2023 2:43 PM      Prior to Admission medications    Medication Sig Last Dose Taking? Auth Provider Long Term End Date   albuterol (PROAIR HFA/PROVENTIL HFA/VENTOLIN HFA) 108 (90 Base) MCG/ACT inhaler Inhale 1-2 puffs into the lungs every 6 hours as needed for shortness of breath or wheezing  Yes Ninfa Mark PA-C Yes    albuterol (PROVENTIL) (2.5 MG/3ML) 0.083% neb solution Take 1 vial (2.5 mg) by nebulization every 4 hours as needed for shortness of breath or wheezing Use in neb machine  Yes Aliyah Marcus PA-C Yes    citalopram (CELEXA) 40 MG tablet Take 1 tablet (40 mg)  by mouth daily  Yes Ninfa Mark PA-C Yes    ferrous sulfate (FEROSUL) 325 (65 Fe) MG tablet Take 1 tablet (325 mg) by mouth daily (with breakfast) Take 1 tablet (325 mg) by mouth  Patient taking differently: Take 325 mg by mouth 2 times daily Take 1 tablet (325 mg) by mouth  Yes Aliyah Marcus PA-C     IBUPROFEN PO Take by mouth every 8 hours as needed for moderate pain  Yes Unknown, Entered By History     lactulose (CEPHULAC) 10 GM packet Take 2 packets (20 g) by mouth 3 times daily  Yes Ninfa Mark PA-C No    levothyroxine (SYNTHROID/LEVOTHROID) 112 MCG tablet Take 1 tablet (112 mcg) by mouth daily  Yes Aliyah Marcus PA-C Yes    midodrine (PROAMATINE) 10 MG tablet Take 15 mg by mouth 3 times daily  Yes Poonam Hays MD     montelukast (SINGULAIR) 10 MG tablet Take 1 tablet (10 mg) by mouth At Bedtime  Yes Ninfa Mark PA-C Yes    Nutritional Supplements (ENSURE COMPLETE PO) Take 1 Bottle by mouth 2 times daily  Yes Unknown, Entered By History     pantoprazole (PROTONIX) 40 MG EC tablet Take 1 tablet (40 mg) by mouth daily  Yes Ninfa Mark PA-C     rifaximin (XIFAXAN) 550 MG TABS tablet Take 1 tablet (550 mg) by mouth 2 times daily for 90 days  Yes Ninfa Mark PA-C  11/12/23   traZODone (DESYREL) 50 MG tablet Take 50 mg by mouth at bedtime  Yes Unknown, Entered By History     cyclobenzaprine (FLEXERIL) 10 MG tablet TAKE 1/2 TO 1 TABLET(5 TO 10 MG) BY MOUTH THREE TIMES DAILY AS NEEDED FOR MUSCLE SPASMS   Aliyah Marcus PA-C     famotidine (PEPCID) 20 MG tablet TAKE 1 TABLET BY MOUTH TWICE DAILY AS NEEDED FOR REFLUX, EPIGASTRIC DISCOMFORT  Patient not taking: Reported on 10/25/2023 Not Taking  Aliyah Marcus PA-C     fluticasone (FLONASE) 50 MCG/ACT nasal spray SHAKE LIQUID AND USE 1 TO 2 SPRAYS IN EACH NOSTRIL DAILY   Aliyah Marcus PA-C Yes    fluticasone-vilanterol (BREO ELLIPTA) 200-25 MCG/ACT inhaler INHALE 1 PUFFS BY MOUTH INTO LUNG DAILY    Aliyah Marcus PA-C     furosemide (LASIX) 40 MG tablet Take 1 tablet (40 mg) by mouth 2 times daily   Stephanie Lim MD Yes    Hypertonic Nasal Wash (SINUS RINSE BOTTLE KIT NA) Spray 1 Bottle in nostril daily as needed.   Reported, Patient     LORazepam (ATIVAN) 0.5 MG tablet Take 1 tablet by mouth daily as needed for panic attacks   Aliyah Marcus PA-C Yes    OVER-THE-COUNTER Place 1 drop into both eyes 3 times daily. Blink Tears, Systane Ultra, Systane Balance or Refresh Optive Artificial Tear   Xiomara Asencio G, OD     Urea (URE-NA) 15 g PACK packet Take 1 packet by mouth  Patient not taking: Reported on 10/3/2023   Reported, Patient
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
## 2023-10-26 NOTE — CARE PLAN
Reason for admission: ncreased dyspnea related to R pleural effusion and hyponatremia.     VS: VSS  Pain/Nausea: denies pain, denies nausea  Neuro: alert and oriented x4  Cardiac: WNL denies chest pain  Resp: on 2 L NC LS diminished, non productive cough, SOB  GI/: voids, 1 BM  Skin: bruises, scabs  Diet: 2gm sodium with 500ml FR    Activity: SBA and walker  LDA: PIV SL  Labs: reviewed  Na 117  Plan: nephrology consult,

## 2023-10-26 NOTE — PROGRESS NOTES
"[facility]  HEPATOLOGY PROGRESS NOTE  Stefani Crowell [id number]       IMPRESSION:  Stefani Crowell is a 63 year old female with a history of alcohol associated cirrhosis, last use 6/28/23 complicated by hepatic hydrothorax with twice weekly thoracentesis, hyponatremia, COPD, weight loss, ascites who was admitted with dyspnea. She has had continued hyponatremia.     RECOMMENDATIONS:  # Hyponatremia  - sodium level 117 today.  Diuretics now held and she remains on fluid restriction of 1.5L.   - Echo 10/25 with collapsible IVC, may have intravascular hypovolemia.   - Ur Na <20. Cystatin C as outpatient.   - nephrology following. Agree with albumin.     # Hepatic hydrothorax  - recent thora 10/25 without evidence of SBE.   - has had 2 L removed twice weekly MELD 19 (initial MELD), t. Bili is more indirect. Has apt to have discussion about TIPS on 11/6  as outpatient. Echo 10/25 without right sided dysfunction, RA 3. Bubble with evidence of \"medium\" PFO. She does not have evidence of heart failure or hypoxia. She has never had a stroke and no evidence of thrombus.   - Discussed with sister and patient higher mortality risk with TIPS and she may not be a candidate. She appears diuretic refractory due to hyponatremia.   - With plans for potential TIPS and further liver transplant evaluation, will consult cardiology for evaluation of PFO therapies and cardiac risk assessment.   - placed IR referral for consideration of \"staged\" thoras where she can possibly have >2L removed in one day.     # Alcohol associated cirrhosis  - currently engaged in CD programming. Pet 10/19 negative  - evaluated for liver transplant, not a current candidate for transplant, MELD 27. Also discussed with sister and patient that if decompensation occurs following TIPS, transplant would not be an option now. She needs to complete her CD program and abstinence period completion.   - US 8/22 without HCC  - EGD 8/2023 with normal " "esophagus  -Ultrasound complete with Doppler for evaluation of HCC and thrombosis    # Hepatic encephalopathy  - continue lactulose and rifaximin. Mentation stable.     Patient was discussed with attending physician, Dr. Annie Cary  The above note reflects our joint plan.     Next follow up appointment: Dr. Stephanie Lim 1/2024    Thank you for the opportunity to be involved with  Stefani rosas. Please call with any questions or concerns.    Yanelis Miller, APRN, CNP  Inpatient Hepatology [facility]      SUBJECTIVE:  States she continues to have dyspnea, with slight relief following her thora yesterday. She denies fevers, abdominal distention, nausea or vomiting.     ROS:  A 10-point review of systems was negative.    OBJECTIVE:  VS: BP 94/52 (BP Location: Right arm)   Pulse 90   Temp 98.1  F (36.7  C) (Oral)   Resp 18   Ht 1.651 m (5' 5\")   Wt 64.4 kg (142 lb)   LMP  (LMP Unknown)   SpO2 98%   BMI 23.63 kg/m        General: In no acute distress, mild facial muscle wasting  Neuro: AOx3, No asterixis  HEENT:  Noscleral icterus, Nooral lesions  CV: . Skin warm and dry  Lungs:  Respirations even and nonlabored on 1.5L/NC   Abd:  Nondistended, nontender   Extrem: Noperipheral edema   Skin: Nojaundice  Psych: pleasant    MEDICATIONS:  Current Facility-Administered Medications   Medication    acetaminophen (TYLENOL) tablet 650 mg    Or    acetaminophen (TYLENOL) Suppository 650 mg    albuterol (PROVENTIL HFA/VENTOLIN HFA) inhaler    albuterol (PROVENTIL) neb solution 2.5 mg    [Held by provider] citalopram (celeXA) tablet 40 mg    ferrous sulfate (FEROSUL) tablet 325 mg    fluticasone-vilanterol (BREO ELLIPTA) 200-25 MCG/ACT inhaler 1 puff    [Held by provider] furosemide (LASIX) tablet 20 mg    ipratropium - albuterol 0.5 mg/2.5 mg/3 mL (DUONEB) neb solution 3 mL    lactulose (CHRONULAC) solution 20 g    levothyroxine (SYNTHROID/LEVOTHROID) tablet 112 mcg    LORazepam (ATIVAN) tablet 0.5 mg    melatonin " tablet 3 mg    midodrine (PROAMATINE) tablet 15 mg    montelukast (SINGULAIR) tablet 10 mg    pantoprazole (PROTONIX) EC tablet 40 mg    polyethylene glycol (MIRALAX) Packet 17 g    prochlorperazine (COMPAZINE) injection 5 mg    Or    prochlorperazine (COMPAZINE) tablet 5 mg    Or    prochlorperazine (COMPAZINE) suppository 25 mg    rifaximin (XIFAXAN) tablet 550 mg    [Held by provider] spironolactone (ALDACTONE) tablet 100 mg    [Held by provider] traZODone (DESYREL) half-tab 25-50 mg       REVIEW OF LABORATORY, PATHOLOGY AND IMAGING RESULTS:  BMP  Recent Labs   Lab 10/26/23  0657 10/25/23  0609 10/24/23  0751 10/24/23  0707 10/23/23  0606   * 120*  --  122* 122*   POTASSIUM 4.4 4.3  --  4.0 4.5   CHLORIDE 88* 92*  --  90* 92*   UMA 9.8 10.1  --  10.3* 10.2   CO2 18* 20*  --  20* 19*   BUN 48.7* 43.3*  --  38.2* 35.0*   CR 1.13* 1.11*  --  1.14* 1.08*   GLC 93 93 102* 98 75     CBC  Recent Labs   Lab 10/26/23  0657 10/25/23  0609 10/24/23  0708 10/23/23  1224   WBC 8.0 7.7 7.1 6.7   RBC 2.50* 2.48* 2.72* 2.57*   HGB 8.2* 8.1* 8.7* 8.4*   HCT 24.1* 24.0* 26.0* 24.6*   MCV 96 97 96 96   MCH 32.8 32.7 32.0 32.7   MCHC 34.0 33.8 33.5 34.1   RDW 15.2* 15.3* 15.8* 15.6*   PLT 62* 57* 52* 52*     INR  Recent Labs   Lab 10/26/23  0657 10/25/23  0609 10/22/23  0944 10/22/23  0150   INR 2.00* 1.94* 1.96* 2.09*     LFTs  Recent Labs   Lab 10/26/23  0657 10/25/23  0609 10/24/23  0707 10/23/23  0606   ALKPHOS 112* 106* 106* 91   AST 47* 50* 51* 45   ALT 32 31 34 30   BILITOTAL 3.4* 3.3* 3.4* 3.1*   PROTTOTAL 5.5* 5.6*  5.6* 5.8* 5.3*   ALBUMIN 3.6 3.5 3.8 3.6        MELD 3.0: 27 at 10/26/2023  6:57 AM  MELD-Na: 28 at 10/26/2023  6:57 AM  Calculated from:  Serum Creatinine: 1.13 mg/dL at 10/26/2023  6:57 AM  Serum Sodium: 117 mmol/L (Using min of 125 mmol/L) at 10/26/2023  6:57 AM  Total Bilirubin: 3.4 mg/dL at 10/26/2023  6:57 AM  Serum Albumin: 3.6 g/dL (Using max of 3.5 g/dL) at 10/26/2023  6:57 AM  INR(ratio): 2.00  "at 10/26/2023  6:57 AM  Age at listing (hypothetical): 63 years  Sex: Female at 10/26/2023  6:57 AM    Previous work-up:   Lab Results   Component Value Date    HEPBANG Nonreactive 02/18/2021    HBCAB Nonreactive 02/18/2021    HCVAB  08/03/2017     Nonreactive   Assay performance characteristics have not been established for newborns,   infants, and children      TANYA 157 10/03/2023    IRONSAT 28 10/03/2023    TSH 1.75 10/26/2023    CHOL 176 08/15/2022    HDL 44 (L) 08/15/2022     (H) 08/15/2022    TRIG 111 08/15/2022    A1C 4.7 07/24/2023    POPETH <10 10/22/2023    PLPETH <10 10/22/2023      No results found for: \"SPECDES\", \"LDRESULTS\"      Imaging Results:  Echo 10/25/23  Interpretation Summary  Global and regional left ventricular function is hyperkinetic with an EF of  65-70%. Normal LV diastolic function with normal estimated left and right-  sided filling pressures.  Right ventricular function, chamber size, wall motion, and thickness are  normal.  No significant valvular abnormalities were noted.  Medium sized patent foramen ovale is seen on color Doppler and with agitated  saline bubble study.  "

## 2023-10-26 NOTE — PROGRESS NOTES
[facility]    Medicine Progress Note - Hospitalist Service, GOLD TEAM 9    Date of Admission:  10/22/2023    Assessment & Plan   Stefani Crowell is a 63 year old female with a pertinent past medical history of alcoholic cirrhosis, hepatorenal syndrome, hypothyroidism, recurrent hyponatremia, recurrent pleural effusions, COPD and recent admission for hyponatremia who was admitted on 10/22/2023 due to increased dyspnea related to R pleural effusion and hyponatremia.     #Severe Hyponatremia, likely secondary to cirrhosis  #MATTIE, likely secondary to hepatorenal syndrome  #Hypokalemia, resolved  -Admit Na 121 (10/23: 122)  -Baseline Cr 0.8, admit with 1.04  -On home midodrine 15mg TID  -Potassium repletion per RN protocol  -Monitor following initiation of diuretics and fluid removal via procedures  - acutely worsening inpatient day #4. Asymptomatic. Consulted Nephrology and likely reduced EABV secondary to cirrhosis. Will try an albumin challenge and monitor I/Os. Holding diuretics for now.     #Dyspnea due to recurrent R hepatic hydrothorax  #History of COPD  #ESLD, alcoholic cirrhosis  #Ascites  MELD 3.0: 27 at 10/26/2023  6:57 AM  MELD-Na: 28 at 10/26/2023  6:57 AM  Calculated from:  Serum Creatinine: 1.13 mg/dL at 10/26/2023  6:57 AM  Serum Sodium: 117 mmol/L (Using min of 125 mmol/L) at 10/26/2023  6:57 AM  Total Bilirubin: 3.4 mg/dL at 10/26/2023  6:57 AM  Serum Albumin: 3.6 g/dL (Using max of 3.5 g/dL) at 10/26/2023  6:57 AM  INR(ratio): 2.00 at 10/26/2023  6:57 AM  Age at listing (hypothetical): 63 years  Sex: Female at 10/26/2023  6:57 AM  -Admitted with dypsnea and mild abdominal pain, recently stopped lasix  -Hepatology consulted, their help is appreciated   -Start lasix 20mg BID and spironolactone 25mg  -10/22: Had 2L removed via thoracentesis (not enough fluid for para)   -Transudative, pH 7.6. initial micro studies are negative  -Continue home lactulose 20g  TID, rifaximin 550mg BID, Breo ellipta 1 puff daily  -Has PRN duonebs  - s/p thoracentesis on 10-25-23, with bloody fluid. Non-exudative per Light;'s criteria. Checking cytology. TTE with bubble study shows PFO. Consulting Cardiology in view of possible TIPS, pre-transplant eval. Recent US abdomen with doppler on 10-22-23 negative for thromboses. D/W GI and IR unable to get an inpatient TIPS considering the persistence of hydrothorax requiring thoracentesis q48-72h. Diuretics on hold as above    #Acute on chronic normocytic anemia  #Thrombocytopenia  #Coagulopathy  -Admit Hgb 8.3, Plt 51  -Likely secondary to cirrhosis and dilution  -Will monitor following diuretics and fluid removal via procedures  -Transfuse for Hgb < 7 and Plt < 10. (Would likely need comitant diuretics)    #Elevated troponin  -Admit 27, 28. Likely secondary to poor kidney clearance  -Unremarkable EKG    #Hypothyroidism  Last TSH was 0.74 on 07/24/23  - Continue home levothyroxine 112 mcg po daily    #GERD  - Continue pantoprazole 40 mg po qdaily    #Anxiety  - Continue citalopram 40 mg po qdaily  - Continue trazodone 50 mg po qHS  - Continue lorazepam 0.5 mg po qdaily prn        Diet: 2 Gram Sodium Diet  Fluid restriction 1500 ML FLUID  Snacks/Supplements Adult: Ensure Enlive; Between Meals  Fluid restriction 500 ML FLUID    DVT Prophylaxis: Pneumatic Compression Devices  Demarco Catheter: Not present  Lines: None     Cardiac Monitoring: None  Code Status: Full Code      Clinically Significant Risk Factors         # Hyponatremia: Lowest Na = 117 mmol/L in last 2 days, will monitor as appropriate         # Coagulation Defect: INR = 2.00 (Ref range: 0.85 - 1.15) and/or PTT = N/A, will monitor for bleeding  # Thrombocytopenia: Lowest platelets = 57 in last 2 days, will monitor for bleeding           # Moderate Malnutrition: based on nutrition assessment    # Asthma: noted on problem list        Disposition Plan     Expected Discharge Date: 10/26/2023         Discharge Comments: Dispo: home  Plan: Con't lactulose and rifaximin for hepatic encephalopathy, mentation stable. Ind.  Progression: Tolerated thora 10/22. Ind.            Robin Dotson MD  Hospitalist Service, GOLD TEAM 9  [facility]  Securely message with [employer] (more info)  Text page via [employer] Paging/Directory   See signed in provider for up to date coverage information  ______________________________________________________________________    Interval History     Reports generalized weakness and modest improvement post thoracentesis    Physical Exam   Vital Signs: Temp: 98.1  F (36.7  C) Temp src: Oral BP: 94/52 Pulse: 90   Resp: 18 SpO2: 98 % O2 Device: Nasal cannula Oxygen Delivery: 2 LPM  Weight: 142 lbs 0 oz  Gen: Awake and alert, appears comfortable, appears stated age. Sitting up in bed.   HEENT: NCAT, sclerae anicteric.  CV: regular rhythm and rate,   Pulm: Normal work of breathing. Lung have less crackles compared to 10/22. Right lung still has decreased breath sounds at base  GI: Non-distended, non-tender  Skin: Warm, dry  Neuro: AOx3, speech normal, moves all extremities symmetrically.  Psych: Mood is good, affect is congruent.      Medical Decision Making       55 MINUTES SPENT BY ME on the date of service doing chart review, history, exam, documentation & further activities per the note.      Data     I have personally reviewed the following data over the past 24 hrs:    8.0  \   8.2 (L)   / 62 (L)     117 (LL) 88 (L) 48.7 (H) /  93   4.4 18 (L) 1.13 (H) \     ALT: 32 AST: 47 (H) AP: 112 (H) TBILI: 3.4 (H)   ALB: 3.6 TOT PROTEIN: 5.5 (L) LIPASE: N/A     TSH: 1.75 T4: N/A A1C: N/A     INR:  2.00 (H) PTT:  N/A   D-dimer:  N/A Fibrinogen:  N/A     Ferritin:  N/A % Retic:  N/A LDH:  N/A       Imaging results reviewed over the past 24 hrs:   Recent Results (from the past 24 hour(s))   POC US GUIDE FOR THORACENTESIS    Impression    US Thoracentesis  Indication:pleural effusion, dyspnea, hypoxia, and decompensated liver disease    Limited chest ultrasound was performed and demonstrated an adequate fluid collection on the right hemithorax. Doppler of the skin demonstrated an area at this site without significant vasculature. A thoracentesis at this site was subsequently performed.  Post-procedure POCUS at the apex (while patient seated) showed adequate lung sliding in B-mode        XR Chest Port 1 View    Narrative    Portable chest    INDICATION: Post right thoracentesis    COMPARISON: 10/22/2023    FINDINGS: Little significant change in the prominent right hemithorax  meniscus and dense opacification with silhouetting of the right  hemidiaphragm period is consistent with large pleural effusion and  likely associated atelectasis. The right heart border is completely  obscured by this density. No ectopic air. Left lung appears clear.    IMPRESSION no significant change in the large right pleural effusion  and associated atelectasis with no visible pneumothorax.    JUSTINO SHEEHAN MD         SYSTEM ID:  [id number]   Echocardiogram Complete   Result Value    LVEF  65-70%    Narrative    [id number]  [id number]  [id number]  [id number]^RAUL^DARREN     [facility],[place]  Echocardiography Laboratory  [address] [place] [address]     Name: LIA TORRES  MRN: [id number]  : 1960  Study Date: 10/25/2023 03:35 PM  Age: 63 yrs  Gender: Female  Patient Location: [facility]  Reason For Study: Edema  Ordering Physician: DARREN CARTER  Performed By: Muriel Fuentes RDCS     BSA: 1.7 m2  Height: 65 in  Weight: 142 lb  BP: 98/44 mmHg  ______________________________________________________________________________  Procedure  Bubble Echocardiogram with two-dimensional, color and spectral Doppler  performed. Echocardiogram with two-dimensional, color and spectral  Doppler  performed.  ______________________________________________________________________________  Interpretation Summary  Global and regional left ventricular function is hyperkinetic with an EF of  65-70%. Normal LV diastolic function with normal estimated left and right-  sided filling pressures.  Right ventricular function, chamber size, wall motion, and thickness are  normal.  No significant valvular abnormalities were noted.  Medium sized patent foramen ovale is seen on color Doppler and with agitated  saline bubble study.  Previous study not available for comparison.  ______________________________________________________________________________  Left Ventricle  Global and regional left ventricular function is hyperkinetic with an EF of  65-70%. Left ventricular size is normal. Left ventricular wall thickness is  normal. Left ventricular diastolic function is normal. Diastolic Doppler  findings (E/E' ratio and/or other parameters) suggest left ventricular filling  pressures are normal.     Right Ventricle  Right ventricular function, chamber size, wall motion, and thickness are  normal.     Atria  Both atria appear normal. A medium patent foramen ovale is present. The patent  foramen ovale was demonstrated by color Doppler and agitated saline bubble  study .     Mitral Valve  The mitral valve is normal.     Aortic Valve  Aortic valve is normal in structure and function. The aortic valve is  tricuspid.     Tricuspid Valve  The tricuspid valve is normal.     Pulmonic Valve  The pulmonic valve is normal.     Vessels  The aorta root is normal. The thoracic aorta is normal. The pulmonary artery  cannot be assessed. The inferior vena cava was normal in size with preserved  respiratory variability. IVC diameter <2.1 cm collapsing >50% with sniff  suggests a normal RA pressure of 3 mmHg.     Pericardium  No pericardial effusion is present.     Compared to Previous Study  Previous study not available for  comparison.  ______________________________________________________________________________  MMode/2D Measurements & Calculations  IVSd: 0.87 cm  LVIDd: 4.5 cm  LVIDs: 2.7 cm  LVPWd: 0.92 cm  FS: 39.9 %  LV mass(C)d: 131.3 grams  LV mass(C)dI: 76.7 grams/m2  Ao root diam: 2.8 cm  asc Aorta Diam: 3.4 cm  LVOT diam: 2.0 cm  LVOT area: 3.1 cm2  Ao root diam index Ht(cm/m): 1.7  Ao root diam index BSA (cm/m2): 1.6  Asc Ao diam index BSA (cm/m2): 2.0  Asc Ao diam index Ht(cm/m): 2.1  LA Volume (BP): 46.9 ml     LA Volume Index (BP): 27.4 ml/m2  RV Base: 3.5 cm  RWT: 0.41  TAPSE: 3.4 cm     Doppler Measurements & Calculations  MV E max alejandra: 109.0 cm/sec  MV A max alejandra: 90.1 cm/sec  MV E/A: 1.2  MV dec slope: 562.3 cm/sec2  MV dec time: 0.19 sec  LV V1 max P.4 mmHg  LV V1 max: 208.5 cm/sec  LV V1 VTI: 35.1 cm  SV(LVOT): 108.0 ml  SI(LVOT): 63.2 ml/m2  PA acc time: 0.15 sec  E/E' av.1  Lateral E/e': 9.5     Medial E/e': 14.7     ______________________________________________________________________________  Report approved by: Candido Payton 10/25/2023 04:39 PM

## 2023-10-26 NOTE — PLAN OF CARE
"/53 (BP Location: Left arm)   Pulse 87   Temp 97.7  F (36.5  C) (Oral)   Resp 20   Ht 1.651 m (5' 5\")   Wt 64.4 kg (142 lb)   LMP  (LMP Unknown)   SpO2 100%   BMI 23.63 kg/m     Time: 0186-5874   Reason for admission: Liver disease.   Activity: Independent.   Pain: denied pain or discomfort.   Neuro: alert and oriented.   Cardiac: WDL  Resp: denied SOB, lung sounds clear bilaterally.   GI/: 2g Na diet, voids without difficulties, bowel sounds present x four quadrants.   Lines: PIV, saline locked.   Skin: WDL X  Labs/Imaging: no lab or imaging this shift.   New changes to shift: no change,   Plan: continue with current plan of cares.                         "

## 2023-10-27 NOTE — PROGRESS NOTES
Nephrology Progress Note  10/27/2023       Stefani Crowell is a 63 yof with hx of ETOH cirrhosis being worked up for transplant complicated by hydrothorax requiring at least twice weekly thoracenteses, COPD, recent wt loss who was admitted with SOB, found to be hyponatremic.  Na normal up until July 2023 when it has been on the decline, also has had rise in Cr from baseline of 0.7 to 1.1 during admission.  Nephrology consulted for management of hyponatremia.     Interval History :   Mrs Crowell's Na is up appropriately with fluid restriction and some albumin yesterday, would continue fluid restriction today and continue to try to support BP's.  We reinforced to drink ensure rather than water whenever possible, goal is to get her to 125-130 range at which point we can reduce Na checks (currently q6h) and may be able to allow a bit more H2O.  She had a very similar episode earlier this month with similar interventions, will be a challenge to keep her stable outside close monitoring in the hospital.        Assessment & Recommendations:   Hyponatremia-On the decline since July when it was normal, now quite low at 117 on our visit. Serum osm low confirming true hyponatremia.  Jaswinder <20 and UA shows hyaline casts which is consistent with either true hypovolemia or hypervolemic hyponatremia.  Giving albumin 50g (ordered at ~0930) and will recheck Na's q6h (ordered).  If Na drops below 116 would recommend 2% saline at ~30cc/h with goal to get Na >120 over the course of ~24h.  Will try to avoid this as giving Na to cirrhotic patients results in worsening ascites (or hydrothorax in this case) but would have little choice if her Na worsens.  Unable to use urea or vaptan due to liver disease, she is already on high dose midodrine to try to increase renal perfusion.                  -True hyponatremia, mixed picture of hypovolemic and hyper with liver disease, giving albumin today.  Drawing Na's q6h.                 -If Na  "worsens, would give 2% to help get Na at least to 120's although this has downside of likely worsening hydrothorax.                 -Q6h Na checks (ordered for you)           Renal Function-Cr up from baseline of 0.7 in July to ~1.1 during her admission.  Likely a reflection of worsening liver function.  Given her advanced liver disease and low muscle mass her gfr is likely much lower (~half) the 55ml/min/1.7m2.       Volume-Total body volume overloaded with recurrent hydrothorax but ECHO with collapsed IVC suggesting some degree of hypovolemia, Jaswinder is <20 and has hyaline casts on UA.  Giving albumin today to try to increase renal perfusion to help with hyponatremia.       K/pH-K 4.3, bicarb 20.       BMD-10.2 with normal albumin, Mg and Phos not checked today.      Anemia-Hgb 8.2, checking iron stores.       Nutrition-Taking PO, encouraged solute intake and avoiding H2O.       Time spent: 40 minutes on this date of encounter for chart review, physical exam, medical decision making and co-ordination of care.      Seen and discussed with Dr Mathews     Recommendations were communicated to primary team via verbal communication.        ROD Pena CNS  Clinical Nurse Specialist  [phone]    Review of Systems:   I reviewed the following systems:  Gen: No fevers or chills  CV: No CP at rest  Resp: No SOB at rest  GI: No N/V    Physical Exam:   I/O last 3 completed shifts:  In: 135 [P.O.:135]  Out: 300 [Urine:300]   BP 99/46 (BP Location: Left arm)   Pulse 87   Temp 97.6  F (36.4  C) (Axillary)   Resp 20   Ht 1.651 m (5' 5\")   Wt 65.1 kg (143 lb 8.3 oz)   LMP  (LMP Unknown)   SpO2 98%   BMI 23.88 kg/m       GENERAL APPEARANCE: alert and no distress, frail.    EYES: No scleral icterus  Pulmonary: Breathing comfortably on RA.   CV: Regular rhythm, normal rate   - Edema +1 generalized.   GI: soft, nontender, normal bowel sounds  MS: no evidence of inflammation in joints, no muscle tenderness  : No " Demarco  SKIN: no rash, warm, dry  NEURO: mentation intact and speech normal    Labs:   All labs reviewed by me  Electrolytes/Renal -   Recent Labs   Lab Test 10/27/23  0627 10/27/23  0034 10/26/23  1414 10/26/23  1311 10/26/23  0657 10/22/23  1026 10/22/23  0150 10/19/23  1523 10/03/23  0853 08/30/23  1202 08/18/23  1402 07/24/23  1457 07/06/23  1034   *  121* 120* 119* 118* 117*   < > 121*   < > 126*   < > 129*   < > 129*   POTASSIUM 4.3  --   --  4.4 4.4   < > 3.3*   < > 3.9   < > 3.1*   < > 3.4   CHLORIDE 90*  --   --  90* 88*   < > 90*   < > 91*   < > 94*   < > 94*   CO2 20*  --   --  18* 18*   < > 18*   < > 25   < > 22   < > 23   BUN 50.1*  --   --  48.5* 48.7*   < > 30.3*   < > 33.9*   < > 14.0  --  8.3   CR 1.11*  --   --  1.16* 1.13*   < > 1.04*   < > 1.24*   < > 0.62  --  0.68   *  --   --  115* 93   < > 148*   < > 98   < > 119*  --  113*   UMA 10.2  --   --  10.0 9.8   < > 9.6   < > 10.2   < > 9.5  --  9.2   MAG  --   --   --   --   --   --  2.6*  --   --   --  2.2  --  2.1   PHOS  --   --   --   --   --   --   --   --  2.6  --   --   --   --     < > = values in this interval not displayed.       CBC -   Recent Labs   Lab Test 10/26/23  0657 10/25/23  0609 10/24/23  0708   WBC 8.0 7.7 7.1   HGB 8.2* 8.1* 8.7*   PLT 62* 57* 52*       LFTs -   Recent Labs   Lab Test 10/27/23  0627 10/26/23  0657 10/25/23  0609   ALKPHOS 114* 112* 106*   BILITOTAL 3.4* 3.4* 3.3*   ALT 32 32 31   AST 42 47* 50*   PROTTOTAL 6.0* 5.5* 5.6*  5.6*   ALBUMIN 4.0 3.6 3.5       Iron Panel -   Recent Labs   Lab Test 10/26/23  1311 10/03/23  0853   IRON 62 43   IRONSAT 28 28   TANYA  --  157           Current Medications:   [Held by provider] citalopram  40 mg Oral Daily    ferrous sulfate  325 mg Oral Daily with breakfast    fluticasone-vilanterol  1 puff Inhalation Daily    [Held by provider] furosemide  20 mg Oral BID    ipratropium - albuterol 0.5 mg/2.5 mg/3 mL  3 mL Nebulization 4x daily    lactulose  20 g Oral TID     levothyroxine  112 mcg Oral Daily    midodrine  15 mg Oral TID    montelukast  10 mg Oral At Bedtime    pantoprazole  40 mg Oral Daily    rifaximin  550 mg Oral BID    [Held by provider] spironolactone  100 mg Oral Daily    [Held by provider] traZODone  25-50 mg Oral At Bedtime

## 2023-10-27 NOTE — PROGRESS NOTES
[facility]    Medicine Progress Note - Hospitalist Service, GOLD TEAM 9    Date of Admission:  10/22/2023    Assessment & Plan   Stefani Crowell is a 63 year old female with a pertinent past medical history of alcoholic cirrhosis, hepatorenal syndrome, hypothyroidism, recurrent hyponatremia, recurrent pleural effusions, COPD and recent admission for hyponatremia who was admitted on 10/22/2023 due to increased dyspnea related to R pleural effusion and hyponatremia.     #Severe Hyponatremia, likely secondary to cirrhosis  #MATTIE, likely secondary to hepatorenal syndrome  #Hypokalemia, resolved  -Admit Na 121 (10/23: 122)  -Baseline Cr 0.8, admit with 1.04  -On home midodrine 15mg TID  -Potassium repletion per RN protocol  -Monitor following initiation of diuretics and fluid removal via procedures  - acutely worsening inpatient day #4. Asymptomatic. Consulted Nephrology and likely reduced EABV secondary to cirrhosis. S/P Albumin with improvement in hyponatremia. monitor I/Os. Holding diuretics for now.   Will administer another dose of IV albumin post thoracentesis today    #Dyspnea due to recurrent R hepatic hydrothorax  #History of COPD  #ESLD, alcoholic cirrhosis  #Ascites  MELD 3.0: 27 at 10/27/2023 12:54 PM  MELD-Na: 28 at 10/27/2023 12:54 PM  Calculated from:  Serum Creatinine: 1.11 mg/dL at 10/27/2023  6:27 AM  Serum Sodium: 119 mmol/L (Using min of 125 mmol/L) at 10/27/2023 12:54 PM  Total Bilirubin: 3.4 mg/dL at 10/27/2023  6:27 AM  Serum Albumin: 4.0 g/dL (Using max of 3.5 g/dL) at 10/27/2023  6:27 AM  INR(ratio): 1.97 at 10/27/2023  6:27 AM  Age at listing (hypothetical): 63 years  Sex: Female at 10/27/2023 12:54 PM  -Admitted with dypsnea and mild abdominal pain, recently stopped lasix  -Hepatology consulted, their help is appreciated   -Start lasix 20mg BID and spironolactone 25mg  -10/22: Had 2L removed via thoracentesis (not enough fluid for  para)   -Transudative, pH 7.6. initial micro studies are negative  -Continue home lactulose 20g TID, rifaximin 550mg BID, Breo ellipta 1 puff daily  -Has PRN duonebs  - s/p thoracentesis on 10-25-23, with bloody fluid. Non-exudative per Light;'s criteria. Checking cytology. TTE with bubble study shows PFO. Consulting Cardiology in view of possible TIPS, pre-transplant eval. Recent US abdomen with doppler on 10-22-23 negative for thromboses. D/W GI and IR unable to get an inpatient TIPS considering the persistence of hydrothorax requiring thoracentesis q48-72h. Diuretics on hold as above  - will repeat another thoracentesis today    #Acute on chronic normocytic anemia  #Thrombocytopenia  #Coagulopathy  -Admit Hgb 8.3, Plt 51  -Likely secondary to cirrhosis and dilution  -Will monitor following diuretics and fluid removal via procedures  -Transfuse for Hgb < 7 and Plt < 10. (Would likely need comitant diuretics)    #Elevated troponin  -Admit 27, 28. Likely secondary to poor kidney clearance  -Unremarkable EKG    #Hypothyroidism  Last TSH was 0.74 on 07/24/23  - Continue home levothyroxine 112 mcg po daily    #GERD  - Continue pantoprazole 40 mg po qdaily    #Anxiety  - Continue citalopram 40 mg po qdaily  - Continue trazodone 50 mg po qHS  - Continue lorazepam 0.5 mg po qdaily prn        Diet: 2 Gram Sodium Diet  Snacks/Supplements Adult: Ensure Enlive; Between Meals  Fluid restriction 1000 ML FLUID    DVT Prophylaxis: Pneumatic Compression Devices  Demarco Catheter: Not present  Lines: None     Cardiac Monitoring: None  Code Status: Full Code      Clinically Significant Risk Factors         # Hyponatremia: Lowest Na = 117 mmol/L in last 2 days, will monitor as appropriate   # Hypercalcemia: Highest Ca = 10.2 mg/dL in last 2 days, will monitor as appropriate       # Coagulation Defect: INR = 1.97 (Ref range: 0.85 - 1.15) and/or PTT = N/A, will monitor for bleeding  # Thrombocytopenia: Lowest platelets = 62 in last 2  days, will monitor for bleeding           # Moderate Malnutrition: based on nutrition assessment    # Asthma: noted on problem list        Disposition Plan      Expected Discharge Date: 10/30/2023        Discharge Comments: Dispo: home  Plan: Neph consult d/t worsening hyponatremia. Hold diuretic. On 2LNC.  Progression: Con't lactulose and rifaximin for hepatic encephalopathy, mentation stable. Barry Dotson MD  Hospitalist Service, GOLD TEAM 9  [facility]  Securely message with ShareDesk (more info)  Text page via [facility] Paging/Directory   See signed in provider for up to date coverage information  ______________________________________________________________________    Interval History     Reports recurrence of shortness of breath    Physical Exam   Vital Signs: Temp: 97.6  F (36.4  C) Temp src: Axillary BP: 99/46 Pulse: 87   Resp: 20 SpO2: 98 % O2 Device: Nasal cannula Oxygen Delivery: 2 LPM  Weight: 143 lbs 8.31 oz  Gen: Awake and alert, appears comfortable, appears stated age. Sitting up in bed.   HEENT: NCAT, sclerae anicteric.  CV: regular rhythm and rate,   Pulm: Normal work of breathing. Lung have less crackles compared to 10/22. Right lung breath sounds reduced basal and midthoracic  GI: Non-distended, non-tender  Skin: Warm, dry  Neuro: AOx3, speech normal, moves all extremities symmetrically.  Psych: Mood is good, affect is congruent.      Medical Decision Making       53 MINUTES SPENT BY ME on the date of service doing chart review, history, exam, documentation & further activities per the note.      Data     I have personally reviewed the following data over the past 24 hrs:    N/A  \   N/A   / N/A     119 (LL) 90 (L) 50.1 (H) /  128 (H)   4.3 20 (L) 1.11 (H) \     ALT: 32 AST: 42 AP: 114 (H) TBILI: 3.4 (H)   ALB: 4.0 TOT PROTEIN: 6.0 (L) LIPASE: N/A     INR:  1.97 (H) PTT:  N/A   D-dimer:  N/A Fibrinogen:  N/A       Imaging results  reviewed over the past 24 hrs:   Recent Results (from the past 24 hour(s))   XR Chest Port 1 View    Narrative    EXAM: XR CHEST PORT 1 VIEW 10/27/2023 9:27 AM    DEMOGRAPHICS: Female of 63 years    INDICATION: Recurrent pleural effusion, shortness of breath    COMPARISON: Chest radiograph 10/25/2023, 10/22/2023.    TECHNIQUE: Single portable AP view of the chest.    FINDINGS:   Complete right lung opacification, increased from prior with no  apparent aeration in the right upper lung field as compared to prior.  Trachea is midline. Left lung is overall clear. Visible portion of the  cardiac silhouette is within normal limits. No pneumothorax. Upper  abdomen is unremarkable. No acute osseous or soft tissue abnormality.      Impression    IMPRESSION:   Increased, now complete right lung opacification with no mediastinal  shift. This is most consistent with severe, large right pleural  effusion. Central obstruction cannot be excluded. Recommend follow-up  to resolution.    I have personally reviewed the examination and initial interpretation  and I agree with the findings.    JUSTINO SHEEHAN MD         SYSTEM ID:  [id number]

## 2023-10-27 NOTE — PROGRESS NOTES
"[facility]  HEPATOLOGY PROGRESS NOTE  Stefani Crowell [id number]       IMPRESSION:  Stefani Crowell is a 63 year old female with a history of alcohol associated cirrhosis, last use 6/28/23 complicated by hepatic hydrothorax with twice weekly thoracentesis, hyponatremia, COPD, weight loss, ascites who was admitted with dyspnea. She has had continued hyponatremia.     RECOMMENDATIONS:  # Hyponatremia  - sodium level improved with holding diuretics and fluid restriction.    - Echo 10/25 with collapsible IVC, may have intravascular hypovolemia.   - Ur Na <20. Cystatin C as outpatient.   - nephrology following.     # Hepatic hydrothorax  - diuretic refractory due to hyponatremia. Would not restart diuretics.   - recent thora 10/25 without evidence of SBE.   - has had 2 L removed twice weekly MELD 19-20 (initial MELD), t. Bili is more indirect. Has apt to have discussion about TIPS on 11/6  as outpatient. Echo 10/25 without right sided dysfunction, RA 3. Bubble with evidence of \"medium\" PFO.  Seen by cardiology, no issue with PFO and TIPS.   - A staged thora is not available per IR.   - Patient is interested in learning more about the PAMELA-TIPS registry study being conducted at the .  A  will be in touch with additional information.     # Alcohol associated cirrhosis  - currently engaged in CD programming. Peth 10/19 negative  - evaluated for liver transplant in past, not a current candidate for transplant, MELD 27.  She needs to complete her CD program and abstinence period completion.   - EGD 8/2023 with normal esophagus  -Ultrasound complete with Doppler for evaluation of HCC and thrombosis negative. (10/22)    # Hepatic encephalopathy  - continue lactulose and rifaximin. Mentation stable.     Patient was discussed with attending physician, Dr. Annie Cary  The above note reflects our joint plan.     Next follow up appointment: Dr. Stephanie Lim 1/2024    Thank you for the opportunity " "to be involved with  Stefani Serrano Mervat rosas. Please call with any questions or concerns.    ROD Urban, CNP  Inpatient Hepatology [facility]      SUBJECTIVE:  Continues to feel fatigued and has been up to bathroom. She has had intermittent nonproductive coughing. No abdominal pain, melena or hematochezia.     ROS:  A 10-point review of systems was negative.    OBJECTIVE:  VS: BP 99/46 (BP Location: Left arm)   Pulse 87   Temp 97.6  F (36.4  C) (Axillary)   Resp 20   Ht 1.651 m (5' 5\")   Wt 65.1 kg (143 lb 8.3 oz)   LMP  (LMP Unknown)   SpO2 98%   BMI 23.88 kg/m        General: In no acute distress, mild facial muscle wasting  Neuro: AOx3, No asterixis  HEENT:  Noscleral icterus, Nooral lesions  CV: . Skin warm and dry  Lungs:  Respirations even and nonlabored on 1.5L/NC   Abd:  Nondistended, nontender   Extrem: Noperipheral edema   Skin: Nojaundice  Psych: pleasant    MEDICATIONS:  Current Facility-Administered Medications   Medication    acetaminophen (TYLENOL) tablet 650 mg    Or    acetaminophen (TYLENOL) Suppository 650 mg    albuterol (PROVENTIL HFA/VENTOLIN HFA) inhaler    albuterol (PROVENTIL) neb solution 2.5 mg    artificial saliva (BIOTENE DRY MOUTHWASH) liquid 5 mL    [Held by provider] citalopram (celeXA) tablet 40 mg    ferrous sulfate (FEROSUL) tablet 325 mg    fluticasone-vilanterol (BREO ELLIPTA) 200-25 MCG/ACT inhaler 1 puff    [Held by provider] furosemide (LASIX) tablet 20 mg    ipratropium - albuterol 0.5 mg/2.5 mg/3 mL (DUONEB) neb solution 3 mL    lactulose (CHRONULAC) solution 20 g    levothyroxine (SYNTHROID/LEVOTHROID) tablet 112 mcg    LORazepam (ATIVAN) tablet 0.5 mg    melatonin tablet 3 mg    midodrine (PROAMATINE) tablet 15 mg    montelukast (SINGULAIR) tablet 10 mg    pantoprazole (PROTONIX) EC tablet 40 mg    polyethylene glycol (MIRALAX) Packet 17 g    prochlorperazine (COMPAZINE) injection 5 mg    Or    prochlorperazine (COMPAZINE) tablet 5 mg    Or    prochlorperazine " (COMPAZINE) suppository 25 mg    rifaximin (XIFAXAN) tablet 550 mg    [Held by provider] spironolactone (ALDACTONE) tablet 100 mg    [Held by provider] traZODone (DESYREL) half-tab 25-50 mg       REVIEW OF LABORATORY, PATHOLOGY AND IMAGING RESULTS:  BMP  Recent Labs   Lab 10/27/23  0627 10/27/23  0034 10/26/23  1414 10/26/23  1311 10/26/23  0657 10/25/23  0609   *  121* 120* 119* 118* 117* 120*   POTASSIUM 4.3  --   --  4.4 4.4 4.3   CHLORIDE 90*  --   --  90* 88* 92*   UMA 10.2  --   --  10.0 9.8 10.1   CO2 20*  --   --  18* 18* 20*   BUN 50.1*  --   --  48.5* 48.7* 43.3*   CR 1.11*  --   --  1.16* 1.13* 1.11*   *  --   --  115* 93 93     CBC  Recent Labs   Lab 10/26/23  0657 10/25/23  0609 10/24/23  0708 10/23/23  1224   WBC 8.0 7.7 7.1 6.7   RBC 2.50* 2.48* 2.72* 2.57*   HGB 8.2* 8.1* 8.7* 8.4*   HCT 24.1* 24.0* 26.0* 24.6*   MCV 96 97 96 96   MCH 32.8 32.7 32.0 32.7   MCHC 34.0 33.8 33.5 34.1   RDW 15.2* 15.3* 15.8* 15.6*   PLT 62* 57* 52* 52*     INR  Recent Labs   Lab 10/27/23  0627 10/26/23  0657 10/25/23  0609 10/22/23  0944   INR 1.97* 2.00* 1.94* 1.96*     LFTs  Recent Labs   Lab 10/27/23  0627 10/26/23  0657 10/25/23  0609 10/24/23  0707   ALKPHOS 114* 112* 106* 106*   AST 42 47* 50* 51*   ALT 32 32 31 34   BILITOTAL 3.4* 3.4* 3.3* 3.4*   PROTTOTAL 6.0* 5.5* 5.6*  5.6* 5.8*   ALBUMIN 4.0 3.6 3.5 3.8        MELD 3.0: 27 at 10/27/2023  6:27 AM  MELD-Na: 28 at 10/27/2023  6:27 AM  Calculated from:  Serum Creatinine: 1.11 mg/dL at 10/27/2023  6:27 AM  Serum Sodium: 121 mmol/L (Using min of 125 mmol/L) at 10/27/2023  6:27 AM  Total Bilirubin: 3.4 mg/dL at 10/27/2023  6:27 AM  Serum Albumin: 4.0 g/dL (Using max of 3.5 g/dL) at 10/27/2023  6:27 AM  INR(ratio): 1.97 at 10/27/2023  6:27 AM  Age at listing (hypothetical): 63 years  Sex: Female at 10/27/2023  6:27 AM    Previous work-up:   Lab Results   Component Value Date    HEPBANG Nonreactive 02/18/2021    HBCAB Nonreactive 02/18/2021    HCVAB   "08/03/2017     Nonreactive   Assay performance characteristics have not been established for newborns,   infants, and children      TANYA 157 10/03/2023    IRONSAT 28 10/26/2023    TSH 1.75 10/26/2023    CHOL 176 08/15/2022    HDL 44 (L) 08/15/2022     (H) 08/15/2022    TRIG 111 08/15/2022    A1C 4.7 07/24/2023    POPETH <10 10/22/2023    PLPETH <10 10/22/2023      No results found for: \"SPECDES\", \"LDRESULTS\"      Imaging Results:  CXR 10/27/23  Large Right sided pleural effusion   "

## 2023-10-27 NOTE — PLAN OF CARE
No acute events this shift. Patient alert and oriented x4, calm ,pleasant ambulatory and cooperative. No c/o pain or discomfort and breathing comfortably in room air. Refused lactulose today as she claimed she had multiple BMs already. She complained of SOB with exertion and requested for thoracentesis today. MD informed. Thoracentesis done this afternoon and patient tolerated procedure well however hypotension noted. Scheduled midodrine given and 50 gms of albumin administered via PIV. Resting in bed as of this time.

## 2023-10-27 NOTE — PROGRESS NOTES
CLINICAL NUTRITION SERVICES - REASSESSMENT NOTE     Nutrition Prescription    RECOMMENDATIONS FOR MDs/PROVIDERS TO ORDER:  Consider to check recent zinc lab, patient on lactulose and  may need supplementation     Malnutrition Status:    Moderate malnutrition in the context of chronic illness    Recommendations already ordered by Registered Dietitian (RD):  Continue with ensure plus BID     Future/Additional Recommendations:  PO adequacy       EVALUATION OF THE PROGRESS TOWARD GOALS   Diet: 2 g Sodium diet + ensure enlive at HS and 8:00 am , 1000 ml FR   Intake: met with patient today. Patient was ordering a late lunch and busy on the phone. Patient noted, eating fairly and would like to continue with ensure supplements during the day        NEW FINDINGS   PMH of cirrhosis c/b HE, HRS, recurrent pleural effusions, and AHRF , COPD  Admitted on 10/22/2023 due to increased dyspnea related to R pleural effusion and hyponatremia.   Dyspnea due to recurrent R. Hepatic hydrothorax, S/P Therapeutic thoracentesis today with 2 liters of fluid removed     Meds:   Start lasix 20mg BID and spironolactone 25mg   Lactulose TID     Wt trend:  64.2 kg (141 lb 9.6 oz) standing wt on 10/22 --> 65.1 kg (143 lb 8.3 oz) standing wt on 10/27    Labs noted:  Na+: 119 (L)  BUN:50, Cr: 1.11, GFR: 56      MALNUTRITION  % Intake: < 75% for > 7 days (moderate)  % Weight Loss: > 7.5% in 3 months (severe)   Subcutaneous Fat Loss: unable to assess  Muscle Loss: Unable to assess  Fluid Accumulation/Edema: Ascites   Malnutrition Diagnosis: Moderate malnutrition in the context of chronic illness    Previous Goals   Patient to consume % of nutritionally adequate meal trays TID, or the equivalent with supplements/snacks.   Evaluation: Not met    Previous Nutrition Diagnosis  Unintended weight loss related to possible hypermetabolism as evidenced by pt has lost 13% of body wt over the last approximate three months.   Evaluation: No  change    CURRENT NUTRITION DIAGNOSIS  Inadequate oral intake related to likely early satiety with associated ascites as evidenced by variable intake since admit , need to optimize nutrition with oral supplements       INTERVENTIONS  Implementation  Medical food supplement therapy    Goals  Patient to consume % of nutritionally adequate meal trays TID, or the equivalent with supplements/snacks.    Monitoring/Evaluation  Progress toward goals will be monitored and evaluated per protocol.    Newton Partida RD/ANDRE  7C ([phone])/7D RD pager: [phone]  Weekend/Holiday RD pager: [phone]

## 2023-10-27 NOTE — PLAN OF CARE
Goal Outcome Evaluation:      Plan of Care Reviewed With: patient    Overall Patient Progress: no changeOverall Patient Progress: no change    Outcome Evaluation: Continue with POC    Patient AOx4, VSS on 2L NC. SOB with exertion. Uses personal walker to ambulate. R PIV saline locked. No BM this shift. No PRNs given. Call light in reach and able to make needs known.

## 2023-10-27 NOTE — PROCEDURES
[facility]  CAPS PROCEDURE NOTE  Date of Admission:  10/22/2023  Consult Requested by: Medicine  Reason for Consult: management of symptomatic pleural effusion    Indication/HPI:     63 yoF with PMH of cirrhosis c/b HE, recurrent pl effusions, and AHRF    Pre-Procedure Diagnosis: Right Pleural Effusion    Post-Procedure Diagnosis: Right Pleural Effusion    Risk Assessment: Average risk, Technically straightforward; patient's anticoagulation has been held according to guidelines based on the agent and platelets and coags are within guidelines    Procedure Outcome:  Therapeutic thoracentesis performed with 2 liters removed.   See additional procedure details below.    The primary covering service should follow up and address any lab results as appropriate.    Mayito Wylie MD  [facility]  Securely message with Vocera (more info)  Text page via [facility] Paging/Directory   See signed in provider for up to date coverage information      [facility]    Thoracentesis at Bedside    Date/Time: 10/27/2023 2:58 PM    Performed by: Mayito Wylie MD  Authorized by: Mayito Wylie MD      UNIVERSAL PROTOCOL   Site Marked: Yes  Prior Images Obtained and Reviewed:  Yes  Required items: Required blood products, implants, devices and special equipment available    Patient identity confirmed:  Provided demographic data, arm band and verbally with patient  NA - No sedation, light sedation, or local anesthesia  Confirmation Checklist:  Relevant allergies, patient's identity using two indicators, correct equipment/implants were available and procedure was appropriate and matched the consent or emergent situation  Time out: Immediately prior to the procedure a time out was called    Universal Protocol: the Joint Commission Universal Protocol was followed    Preparation: Patient was prepped and  draped in usual sterile fashion      Procedure purpose: therapeutic  Indications: pleural effusion       ANESTHESIA    Anesthesia:  Local infiltration  Local Anesthetic:  Lidocaine 1% without epinephrine  Anesthetic Total (mL):  8      SEDATION    Patient Sedated: No      PROCEDURE DETAILS   Preparation: skin prepped with ChloraPrep  Patient position: sitting  Ultrasound guidance: no  Location: right lateral  Puncture method: through-the-needle catheter  Catheter size: 5 Fr.  Drainage amount: 2000 ml  Fluid characteristics: cloudy red orange.      PROCEDURE    Patient Tolerance:  Patient tolerated the procedure well with no immediate complications  Length of time physician/provider present for 1:1 monitoring during sedation: 0   POCUS with +Lung sliding and appropriate M-mode noted post procedure at lung apex while patient seated        POC US GUIDE FOR THORACENTESIS     Impression  US Thoracentesis Indication:pleural effusion and decompensated liver disease    Limited chest ultrasound was performed and demonstrated an adequate fluid collection on the right hemithorax. Doppler of the skin demonstrated an area at this site without significant vasculature. A thoracentesis at this site was subsequently performed.  Post-procedure POCUS at the apex (while patient seated) showed adequate lung sliding in B-mode and M-mode      __________________________________________________________    Limited Bedside Abdominal Ultrasound, performed and interpreted by me.    Indication: Abdominal swelling. Evaluate for Free fluid.    With the patient in Trendelenburg, the RUQ, LUQ, (including the paracolic gutters) views were examined for free fluid. With the patient in reverse Trendelenburg, the super pubic view was examined for free fluid.    Findings    RUQ: medium abdominal fluid pocket, no loculations normal liver echotexture, mobile intestines  RLQ: trace abdominal fluid pocket, no loculations mobile intestines  LLQ:no abdominal  fluid pocket, no loculations mobile intestines  LUQ: no abdominal fluid pocket,mobile intestines, normal renal and splenic echotexture  Suprapubic: small ascites, bladder    IMPRESSION:  Free fluid present as noted above.      Mayito Wylie MD  10/27/2023

## 2023-10-28 NOTE — PROGRESS NOTES
"[facility]  HEPATOLOGY PROGRESS NOTE  Stefani Crowell [id number]       IMPRESSION:  Stefani Crowell is a 63 year old female with a history of alcohol associated cirrhosis, last use 6/28/23 complicated by hepatic hydrothorax with twice weekly thoracentesis, hyponatremia, COPD, weight loss, ascites who was admitted with dyspnea. She has had continued hyponatremia.     RECOMMENDATIONS:  # Hyponatremia  - sodium level improved/stable with holding diuretics, fluid restriction, albumin  - Echo 10/25 with collapsible IVC, may have intravascular hypovolemia.   - Ur Na <20. Cystatin C as outpatient.   - nephrology following.     # Hepatic hydrothorax  - diuretic refractory due to hyponatremia. Would not restart diuretics.   - recent thora 10/25 without evidence of SBE.   - has had 2 L removed twice weekly MELD 19-20 (initial MELD), t. Bili is more indirect. Has apt to have discussion about TIPS on 11/6  as outpatient. Echo 10/25 without right sided dysfunction, RA 3. Bubble with evidence of \"medium\" PFO.  Seen by cardiology, no issue with PFO and TIPS.  Discussed goal is to get her electrolytes stable so she can get her TIPS as an outpatient.  - A staged thora is not available per IR.   - Patient is interested in learning more about the PAMELA-TIPS registry study being conducted at the .  A  will be in touch with additional information.     # Alcohol associated cirrhosis  - currently engaged in CD programming. Peth 10/19 negative  - evaluated for liver transplant in past, not a current candidate for transplant, MELD 27.  She needs to complete her CD program and abstinence period completion.   - EGD 8/2023 with normal esophagus  -Ultrasound complete with Doppler for evaluation of HCC and thrombosis negative. (10/22)    # Hepatic encephalopathy  - continue lactulose and rifaximin. Mentation stable.     Dr. Annie Cary MD  Transplant Hepatology    SUBJECTIVE:  Feels okay today, day of moving.  " "Eating and having some issues with the fluid restriction  ROS:  A 10-point review of systems was negative.    OBJECTIVE:  VS: BP 90/45 (BP Location: Left arm, Patient Position: Sitting)   Pulse 78   Temp 97.7  F (36.5  C) (Oral)   Resp 16   Ht 1.651 m (5' 5\")   Wt 63.7 kg (140 lb 6.4 oz)   LMP  (LMP Unknown)   SpO2 94%   BMI 23.36 kg/m        General: In no acute distress, mild facial muscle wasting  Neuro: AOx3, No asterixis  HEENT:  Noscleral icterus, Nooral lesions  CV: . Skin warm and dry  Lungs:  Respirations even and nonlabored on oxygen  Abd:  Nondistended, nontender   Extrem: Noperipheral edema   Skin: Nojaundice  Psych: pleasant    MEDICATIONS:  Current Facility-Administered Medications   Medication     acetaminophen (TYLENOL) tablet 650 mg    Or     acetaminophen (TYLENOL) Suppository 650 mg     albumin human 25 % injection 50 g     albuterol (PROVENTIL HFA/VENTOLIN HFA) inhaler     albuterol (PROVENTIL) neb solution 2.5 mg     artificial saliva (BIOTENE DRY MOUTHWASH) liquid 5 mL     [Held by provider] citalopram (celeXA) tablet 40 mg     ferrous sulfate (FEROSUL) tablet 325 mg     fluticasone-vilanterol (BREO ELLIPTA) 200-25 MCG/ACT inhaler 1 puff     [Held by provider] furosemide (LASIX) tablet 20 mg     ipratropium - albuterol 0.5 mg/2.5 mg/3 mL (DUONEB) neb solution 3 mL     lactulose (CHRONULAC) solution 20 g     levothyroxine (SYNTHROID/LEVOTHROID) tablet 112 mcg     LORazepam (ATIVAN) tablet 0.5 mg     melatonin tablet 3 mg     midodrine (PROAMATINE) tablet 20 mg     montelukast (SINGULAIR) tablet 10 mg     pantoprazole (PROTONIX) EC tablet 40 mg     polyethylene glycol (MIRALAX) Packet 17 g     prochlorperazine (COMPAZINE) injection 5 mg    Or     prochlorperazine (COMPAZINE) tablet 5 mg    Or     prochlorperazine (COMPAZINE) suppository 25 mg     rifaximin (XIFAXAN) tablet 550 mg     [Held by provider] spironolactone (ALDACTONE) tablet 100 mg     [Held by provider] traZODone (DESYREL) " half-tab 25-50 mg       REVIEW OF LABORATORY, PATHOLOGY AND IMAGING RESULTS:  BMP  Recent Labs   Lab 10/28/23  1216 10/28/23  0719 10/28/23  0111 10/27/23  2012 10/27/23  1254 10/27/23  0627 10/26/23  1414 10/26/23  1311 10/26/23  0657   * 117* 121* 119*   < > 121*  121*   < > 118* 117*   POTASSIUM  --  4.8  4.8  --   --   --  4.3  --  4.4 4.4   CHLORIDE  --  89*  --   --   --  90*  --  90* 88*   UMA  --  9.9  --   --   --  10.2  --  10.0 9.8   CO2  --  19*  --   --   --  20*  --  18* 18*   BUN  --  57.9*  --   --   --  50.1*  --  48.5* 48.7*   CR  --  1.10*  --   --   --  1.11*  --  1.16* 1.13*   GLC  --  88  --   --   --  128*  --  115* 93    < > = values in this interval not displayed.     CBC  Recent Labs   Lab 10/26/23  0657 10/25/23  0609 10/24/23  0708 10/23/23  1224   WBC 8.0 7.7 7.1 6.7   RBC 2.50* 2.48* 2.72* 2.57*   HGB 8.2* 8.1* 8.7* 8.4*   HCT 24.1* 24.0* 26.0* 24.6*   MCV 96 97 96 96   MCH 32.8 32.7 32.0 32.7   MCHC 34.0 33.8 33.5 34.1   RDW 15.2* 15.3* 15.8* 15.6*   PLT 62* 57* 52* 52*     INR  Recent Labs   Lab 10/28/23  0719 10/27/23  0627 10/26/23  0657 10/25/23  0609   INR 2.19* 1.97* 2.00* 1.94*     LFTs  Recent Labs   Lab 10/28/23  0719 10/27/23  0627 10/26/23  0657 10/25/23  0609   ALKPHOS 103 114* 112* 106*   AST 41 42 47* 50*   ALT 29 32 32 31   BILITOTAL 3.2* 3.4* 3.4* 3.3*   PROTTOTAL 5.7* 6.0* 5.5* 5.6*  5.6*   ALBUMIN 4.0 4.0 3.6 3.5        MELD 3.0: 27 at 10/28/2023 12:16 PM  MELD-Na: 29 at 10/28/2023 12:16 PM  Calculated from:  Serum Creatinine: 1.10 mg/dL at 10/28/2023  7:19 AM  Serum Sodium: 119 mmol/L (Using min of 125 mmol/L) at 10/28/2023 12:16 PM  Total Bilirubin: 3.2 mg/dL at 10/28/2023  7:19 AM  Serum Albumin: 4.0 g/dL (Using max of 3.5 g/dL) at 10/28/2023  7:19 AM  INR(ratio): 2.19 at 10/28/2023  7:19 AM  Age at listing (hypothetical): 63 years  Sex: Female at 10/28/2023 12:16 PM    Previous work-up:   Lab Results   Component Value Date    HEPBANG Nonreactive  "02/18/2021    HBCAB Nonreactive 02/18/2021    HCVAB  08/03/2017     Nonreactive   Assay performance characteristics have not been established for newborns,   infants, and children      TANYA 157 10/03/2023    IRONSAT 28 10/26/2023    TSH 1.75 10/26/2023    CHOL 176 08/15/2022    HDL 44 (L) 08/15/2022     (H) 08/15/2022    TRIG 111 08/15/2022    A1C 4.7 07/24/2023    POPETH <10 10/22/2023    PLPETH <10 10/22/2023      No results found for: \"SPECDES\", \"LDRESULTS\"      Imaging Results:  CXR 10/27/23  Large Right sided pleural effusion   "

## 2023-10-28 NOTE — PLAN OF CARE
Patient alert and oriented x4, but bed alarm on for safety. Na 121 this morning. K recheck pending. BP soft, otherwise vitally stable on 3L O2. Calm ,pleasant, and cooperative. Denies pain. Up to BR with walker and SBA. X 1 after midnight. Thoracentesis site CDI. Slept in between cares. Continue to monitor pt and follow plan of care.

## 2023-10-28 NOTE — PROGRESS NOTES
[facility]    Medicine Progress Note - Hospitalist Service, GOLD TEAM 9    Date of Admission:  10/22/2023    Assessment & Plan   Stefani Crowell is a 63 year old female with a pertinent past medical history of alcoholic cirrhosis, hepatorenal syndrome, hypothyroidism, recurrent hyponatremia, recurrent pleural effusions, COPD and recent admission for hyponatremia who was admitted on 10/22/2023 due to increased dyspnea related to R pleural effusion and hyponatremia.     #Severe Hyponatremia, likely secondary to cirrhosis  #MATTIE, likely secondary to hepatorenal syndrome  #Hypokalemia, resolved  -Admit Na 121 (10/23: 122). Baseline Cr 0.8, admit with 1.04. Continued PTA midodrine  -Potassium repletion per RN protocol  -Monitored following initiation of diuretics and fluid removal via procedures  - acutely worsening inpatient day #4. Asymptomatic. Raised U Osm and low U Na suggestive of incresed ADH activity. Consulted Nephrology and likely reduced EABV secondary to cirrhosis. S/P Albumin with improvement in hyponatremia. monitor I/Os. Holding diuretics for now.   Start daily IV albumin.     #Dyspnea due to recurrent R hepatic hydrothorax  #History of COPD  #ESLD, alcoholic cirrhosis  #Ascites  MELD 3.0: 27 at 10/28/2023  7:19 AM  MELD-Na: 29 at 10/28/2023  7:19 AM  Calculated from:  Serum Creatinine: 1.10 mg/dL at 10/28/2023  7:19 AM  Serum Sodium: 117 mmol/L (Using min of 125 mmol/L) at 10/28/2023  7:19 AM  Total Bilirubin: 3.2 mg/dL at 10/28/2023  7:19 AM  Serum Albumin: 4.0 g/dL (Using max of 3.5 g/dL) at 10/28/2023  7:19 AM  INR(ratio): 2.19 at 10/28/2023  7:19 AM  Age at listing (hypothetical): 63 years  Sex: Female at 10/28/2023  7:19 AM  -Admitted with dypsnea and mild abdominal pain, recently stopped lasix  -Hepatology consulted, their help is appreciated   -Start lasix 20mg BID and spironolactone 25mg  -10/22: Had 2L removed via thoracentesis (not enough fluid  for para)   -Transudative, pH 7.6. initial micro studies are negative  -Continue home lactulose 20g TID, rifaximin 550mg BID, Breo ellipta 1 puff daily  -Has PRN duonebs  - s/p thoracentesis on 10-25-23, with bloody fluid. Non-exudative per Light;'s criteria. Checking cytology. TTE with bubble study shows PFO. Consulting Cardiology in view of possible TIPS, pre-transplant eval. Recent US abdomen with doppler on 10-22-23 negative for thromboses. D/W GI and IR unable to get an inpatient TIPS considering the persistence of hydrothorax requiring thoracentesis q48-72h. Diuretics on hold as above. Holding citalopram and trazodone  - Repeated thoracentesis on 10/27.  - Recurrent    #Acute on chronic normocytic anemia  #Thrombocytopenia  #Coagulopathy  -Admit Hgb 8.3, Plt 51  -Likely secondary to cirrhosis and dilution  -Will monitor following diuretics and fluid removal via procedures  -Transfuse for Hgb < 7 and Plt < 10. (Would likely need comitant diuretics)    #Elevated troponin  -Admit 27, 28. Likely secondary to poor kidney clearance  -Unremarkable EKG    #Hypothyroidism  Last TSH was 0.74 on 07/24/23  - Continue home levothyroxine 112 mcg po daily    #GERD  - Continue pantoprazole 40 mg po qdaily    #Anxiety  - Holding citalopram 40 mg po qdaily and trazodone 50 mg po at bedtime  - continue lorazepam 0.5 mg po qdaily prn        Diet: 2 Gram Sodium Diet  Snacks/Supplements Adult: Ensure Enlive; Between Meals  Fluid restriction 1000 ML FLUID    DVT Prophylaxis: Pneumatic Compression Devices  Demarco Catheter: Not present  Lines: None     Cardiac Monitoring: None  Code Status: Full Code      Clinically Significant Risk Factors         # Hyponatremia: Lowest Na = 117 mmol/L in last 2 days, will monitor as appropriate   # Hypercalcemia: Highest Ca = 10.2 mg/dL in last 2 days, will monitor as appropriate       # Coagulation Defect: INR = 2.19 (Ref range: 0.85 - 1.15) and/or PTT = N/A, will monitor for bleeding            #  Moderate Malnutrition: based on nutrition assessment    # Asthma: noted on problem list        Disposition Plan     Expected Discharge Date: 10/30/2023        Discharge Comments: Dispo: home  Plan: Neph consult d/t worsening hyponatremia. Hold diuretic. On 2LNC.  Progression: Con't lactulose and rifaximin for hepatic encephalopathy, mentation stable. Ind.            Robin Dotson MD  Hospitalist Service, GOLD TEAM 9  [facility]  Securely message with [employer] (more info)  Text page via [facility] Paging/Directory   See signed in provider for up to date coverage information  ______________________________________________________________________    Interval History     Modest improvement in shortness of breath. Reports improved appetite    Physical Exam   Vital Signs: Temp: 97.5  F (36.4  C) Temp src: Oral BP: 95/51 Pulse: 76   Resp: 20 SpO2: 100 % O2 Device: Nasal cannula Oxygen Delivery: 3 LPM  Weight: 140 lbs 6.4 oz  Gen: Awake and alert, appears comfortable, appears stated age. Sitting up in bed.   HEENT: NCAT, sclerae anicteric.  CV: regular rhythm and rate,   Pulm: Normal work of breathing. Lung have less crackles compared to 10/22. Right lung breath sounds reduced basal and midthoracic, presistent  GI: Non-distended, non-tender  Skin: Warm, dry  Neuro: AOx3, speech normal, moves all extremities symmetrically.  Psych: Mood is good, affect is congruent.      Medical Decision Making       55 MINUTES SPENT BY ME on the date of service doing chart review, history, exam, documentation & further activities per the note.      Data     I have personally reviewed the following data over the past 24 hrs:    N/A  \   N/A   / N/A     117 (LL) 89 (L) 57.9 (H) /  88   4.8; 4.8 19 (L) 1.10 (H) \     ALT: 29 AST: 41 AP: 103 TBILI: 3.2 (H)   ALB: 4.0 TOT PROTEIN: 5.7 (L) LIPASE: N/A     INR:  2.19 (H) PTT:  N/A   D-dimer:  N/A Fibrinogen:  N/A       Imaging results reviewed over the  past 24 hrs:   Recent Results (from the past 24 hour(s))   POC US GUIDE FOR THORACENTESIS    Impression    US Thoracentesis Indication:pleural effusion and decompensated liver disease    Limited chest ultrasound was performed and demonstrated an adequate fluid collection on the right hemithorax. Doppler of the skin demonstrated an area at this site without significant vasculature. A thoracentesis at this site was subsequently performed.  Post-procedure POCUS at the apex (while patient seated) showed adequate lung sliding in B-mode and M-mode      __________________________________________________________    Limited Bedside Abdominal Ultrasound, performed and interpreted by me.     Indication: Abdominal swelling. Evaluate for Free fluid.     With the patient in Trendelenburg, the RUQ, LUQ, (including the paracolic gutters) views were examined for free fluid. With the patient in reverse Trendelenburg, the super pubic view was examined for free fluid.     Findings     RUQ: medium abdominal fluid pocket, no loculations normal liver echotexture, mobile intestines  RLQ: trace abdominal fluid pocket, no loculations mobile intestines  LLQ:no abdominal fluid pocket, no loculations mobile intestines  LUQ: no abdominal fluid pocket,mobile intestines, normal renal and splenic echotexture   Suprapubic: small ascites, bladder      IMPRESSION:   Free fluid present as noted above.      Mayito Wylie MD   10/27/2023

## 2023-10-28 NOTE — PLAN OF CARE
Patient condition slightly improving. Patient appeared calm and comfortable in bed with no c/o pain or discomfort. Slight SOB breath with exertion and LS diminished, Intermittently using O2 to recover from activity and after independently going to bathroom and back to bed. Hyponatremia still persists and critical values relayed to primary team. Declined lactulose as patient reported 4 BMs already. Good appetite this shift. Soft BPs still noted, Albumin administered.  at bedside attentive to cares.

## 2023-10-28 NOTE — PROGRESS NOTES
Nephrology Progress Note  10/28/2023       Stefani Crowell is a 63 yof with hx of ETOH cirrhosis being worked up for transplant complicated by hydrothorax requiring at least twice weekly thoracenteses, COPD, recent wt loss who was admitted with SOB, found to be hyponatremic.  Na normal up until July 2023 when it has been on the decline, also has had rise in Cr from baseline of 0.7 to 1.1 during admission.  Nephrology consulted for management of hyponatremia.     Interval History :   Mrs Crowell's Na was up to 121 yesterday, down again to 117 despite getting 50g albumin in setting of thoracentesis yesterday.  Reinforced fluid restriction as much as possible as intervention options with liver failure (hypertonic saline, albumin) have down-sides particularly since she accumulates fluid in pleural space requiring frequent thora's.  Increased midodrine to 20mg TID as main intervention today, if Na still low tomorrow we will consider other things like albumin again or hypertonic saline although would have the risks as above.      Assessment & Recommendations:   Hyponatremia-On the decline since July when it was normal, now quite low at 117 on our visit. Serum osm low confirming true hyponatremia.  Jaswinder <20 and UA shows hyaline casts which is consistent with either true hypovolemia or hypervolemic hyponatremia.  Gave albumin 50g initially and will recheck Na's q6h (ordered).  If Na drops below 116 would recommend 2% saline at 34cc/h with goal to get Na >120 over the course of ~24h.  Will try to avoid this as giving Na to cirrhotic patients results in worsening ascites (or hydrothorax in this case) but would have little choice if her Na worsens.  Unable to use urea or vaptan due to liver disease, she is already on high dose midodrine to try to increase renal perfusion.                  -True hyponatremia due to low EABV, difficult to manage with liver disease but so far getting 50g albumin daily.                  -If Na  "worsens, would give 2% to help get Na at least to 120's although this has downside of likely worsening hydrothorax.                 -Q6h Na checks (ordered for you)           Renal Function-Cr up from baseline of 0.7 in July to ~1.1 during her admission.  Likely a reflection of worsening liver function.  Given her advanced liver disease and low muscle mass her gfr is likely much lower (~half?) the 55ml/min/1.7m2 suggested in chemistries.       Volume-Total body volume overloaded with recurrent hydrothorax (had para yesterday) but ECHO with collapsed IVC suggesting some degree of hypovolemia on 10/25, Jaswinder <20 and has hyaline casts on UA.       K/pH-K 4.3, bicarb 20.       BMD-9.9 with normal albumin, Mg and Phos not checked today.      Anemia-Hgb 8.2, checking iron stores.       Nutrition-Taking PO, encouraged solute intake and avoiding H2O.       Time spent: 40 minutes on this date of encounter for chart review, physical exam, medical decision making and co-ordination of care.      Seen and discussed with Dr Mathews     Recommendations were communicated to primary team via verbal communication.        ROD Pena CNS  Clinical Nurse Specialist  [phone]    Review of Systems:   I reviewed the following systems:  Gen: No fevers or chills  CV: No CP at rest  Resp: No SOB at rest  GI: No N/V    Physical Exam:   I/O last 3 completed shifts:  In: 720 [P.O.:720]  Out: -    BP (!) 92/38 (BP Location: Left arm, Patient Position: Semi-Beard's)   Pulse 83   Temp 97.4  F (36.3  C) (Oral)   Resp 20   Ht 1.651 m (5' 5\")   Wt 63.7 kg (140 lb 6.4 oz)   LMP  (LMP Unknown)   SpO2 98%   BMI 23.36 kg/m       GENERAL APPEARANCE: alert and no distress, frail.    EYES: No scleral icterus  Pulmonary: Breathing comfortably on RA.   CV: Regular rhythm, normal rate   - Edema +1 generalized.   GI: soft, nontender, normal bowel sounds  MS: no evidence of inflammation in joints, no muscle tenderness  : No Demarco  SKIN: no " rash, warm, dry  NEURO: mentation intact and speech normal    Labs:   All labs reviewed by me  Electrolytes/Renal -   Recent Labs   Lab Test 10/28/23  0719 10/28/23  0111 10/27/23  2012 10/27/23  1254 10/27/23  0627 10/26/23  1414 10/26/23  1311 10/22/23  1026 10/22/23  0150 10/19/23  1523 10/03/23  0853 08/30/23  1202 08/18/23  1402 07/24/23  1457 07/06/23  1034   * 121* 119*   < > 121*  121*   < > 118*   < > 121*   < > 126*   < > 129*   < > 129*   POTASSIUM 4.8  4.8  --   --   --  4.3  --  4.4   < > 3.3*   < > 3.9   < > 3.1*   < > 3.4   CHLORIDE 89*  --   --   --  90*  --  90*   < > 90*   < > 91*   < > 94*   < > 94*   CO2 19*  --   --   --  20*  --  18*   < > 18*   < > 25   < > 22   < > 23   BUN 57.9*  --   --   --  50.1*  --  48.5*   < > 30.3*   < > 33.9*   < > 14.0  --  8.3   CR 1.10*  --   --   --  1.11*  --  1.16*   < > 1.04*   < > 1.24*   < > 0.62  --  0.68   GLC 88  --   --   --  128*  --  115*   < > 148*   < > 98   < > 119*  --  113*   UMA 9.9  --   --   --  10.2  --  10.0   < > 9.6   < > 10.2   < > 9.5  --  9.2   MAG  --   --   --   --   --   --   --   --  2.6*  --   --   --  2.2  --  2.1   PHOS  --   --   --   --   --   --   --   --   --   --  2.6  --   --   --   --     < > = values in this interval not displayed.       CBC -   Recent Labs   Lab Test 10/26/23  0657 10/25/23  0609 10/24/23  0708   WBC 8.0 7.7 7.1   HGB 8.2* 8.1* 8.7*   PLT 62* 57* 52*       LFTs -   Recent Labs   Lab Test 10/28/23  0719 10/27/23  0627 10/26/23  0657   ALKPHOS 103 114* 112*   BILITOTAL 3.2* 3.4* 3.4*   ALT 29 32 32   AST 41 42 47*   PROTTOTAL 5.7* 6.0* 5.5*   ALBUMIN 4.0 4.0 3.6       Iron Panel -   Recent Labs   Lab Test 10/26/23  1311 10/03/23  0853   IRON 62 43   IRONSAT 28 28   TANYA  --  157           Current Medications:   [Held by provider] citalopram  40 mg Oral Daily    ferrous sulfate  325 mg Oral Daily with breakfast    fluticasone-vilanterol  1 puff Inhalation Daily    [Held by provider] furosemide  20 mg  Oral BID    ipratropium - albuterol 0.5 mg/2.5 mg/3 mL  3 mL Nebulization 4x daily    lactulose  20 g Oral TID    levothyroxine  112 mcg Oral Daily    midodrine  15 mg Oral TID    montelukast  10 mg Oral At Bedtime    pantoprazole  40 mg Oral Daily    rifaximin  550 mg Oral BID    [Held by provider] spironolactone  100 mg Oral Daily    [Held by provider] traZODone  25-50 mg Oral At Bedtime

## 2023-10-29 NOTE — PROGRESS NOTES
Nephrology Progress Note  10/29/2023       Stefani Crowell is a 63 yof with hx of ETOH cirrhosis being worked up for transplant complicated by hydrothorax requiring at least twice weekly thoracenteses, COPD, recent wt loss who was admitted with SOB, found to be hyponatremic.  Na normal up until July 2023 when it has been on the decline, also has had rise in Cr from baseline of 0.7 to 1.1 during admission.  Nephrology consulted for management of hyponatremia.     Interval History :   Mrs Crowell's Na is 120 today, stable to a bit improved over the past 3 days but is taking daily albumin to achieve this.  Difficult case as it will be difficult to achieve renal perfusion without causing worsening hydrothorax.  TIPS is being considered, I am not sure if pleurodesis would be an option.  Is more SOB today which I am concerned is return of pleural effusion, I did order 1x dose of lokelma for K of 5.2 (likely due to ensure/supplements).  Would still consider 2% saline should her Na get to 115 but will try to avoid if possible as this will cause worsening pleural effusion.      Assessment & Recommendations:   Hyponatremia-On the decline since July when it was normal, now quite low at 117 on our visit. Serum osm low confirming true hyponatremia.  Jaswinder <20 and UA shows hyaline casts which is consistent with either true hypovolemia or hypervolemic hyponatremia.  Gave albumin 50g initially and will recheck Na's q6h (ordered).  If Na drops below 116 would recommend 2% saline at 34cc/h with goal to get Na >120 over the course of ~24h.  Will try to avoid this as giving Na to cirrhotic patients results in worsening ascites (or hydrothorax in this case) but would have little choice if her Na worsens.  Unable to use urea or vaptan due to liver disease, she is already on high dose midodrine to try to increase renal perfusion.                  -True hyponatremia due to low EABV, difficult to manage with liver disease but so far getting  "50g albumin daily.                  -If Na worsens, would give 2% to help get Na at least to 120's although this has downside of likely worsening hydrothorax.                 -Q6h Na checks (ordered for you)           Renal Function-Cr up from baseline of 0.7 in July to ~1.1 during her admission.  Likely a reflection of worsening liver function.  Given her advanced liver disease and low muscle mass her gfr is likely much lower (~half?) the 55ml/min/1.7m2 suggested in chemistries.       Volume-Total body volume overloaded with recurrent hydrothorax but ECHO with collapsed IVC suggesting some degree of hypovolemia on 10/25, Jaswinder <20 and has hyaline casts on UA confirms renal hypoperfusion.       K/pH-K 5.2, giving lokelma 10g to avoid issue overnight, bicarb 20.       BMD-10.6 but actually corrects lower with high albumin, Mg and Phos not checked today.      Anemia-Hgb 8.2, iron sats 28%     Nutrition-Taking PO, encouraged solute intake and avoiding H2O.       Time spent: 40 minutes on this date of encounter for chart review, physical exam, medical decision making and co-ordination of care.      Seen and discussed with Dr Mathews     Recommendations were communicated to primary team via verbal communication.        Denny Arriola, ROD CNS  Clinical Nurse Specialist  [phone]    Review of Systems:   I reviewed the following systems:  Gen: No fevers or chills  CV: No CP at rest  Resp: No SOB at rest  GI: No N/V    Physical Exam:   I/O last 3 completed shifts:  In: 360 [P.O.:360]  Out: -    /75   Pulse 91   Temp 98  F (36.7  C) (Oral)   Resp 18   Ht 1.651 m (5' 5\")   Wt 63.7 kg (140 lb 6.4 oz)   LMP  (LMP Unknown)   SpO2 97%   BMI 23.36 kg/m       GENERAL APPEARANCE: alert and no distress, frail.    EYES: No scleral icterus  Pulmonary: Breathing comfortably on RA.   CV: Regular rhythm, normal rate   - Edema +1 generalized.   GI: soft, nontender, normal bowel sounds  MS: no evidence of inflammation in " joints, no muscle tenderness  : No Demarco  SKIN: no rash, warm, dry  NEURO: mentation intact and speech normal    Labs:   All labs reviewed by me  Electrolytes/Renal -   Recent Labs   Lab Test 10/29/23  0651 10/29/23  0059 10/28/23  1842 10/28/23  1216 10/28/23  0719 10/27/23  1254 10/27/23  0627 10/22/23  1026 10/22/23  0150 10/19/23  1523 10/03/23  0853 08/30/23  1202 08/18/23  1402 07/24/23  1457 07/06/23  1034   *  120* 119* 121*   < > 117*   < > 121*  121*   < > 121*   < > 126*   < > 129*   < > 129*   POTASSIUM 5.2  --   --   --  4.8  4.8  --  4.3   < > 3.3*   < > 3.9   < > 3.1*   < > 3.4   CHLORIDE 89*  --   --   --  89*  --  90*   < > 90*   < > 91*   < > 94*   < > 94*   CO2 20*  --   --   --  19*  --  20*   < > 18*   < > 25   < > 22   < > 23   BUN 62.7*  --   --   --  57.9*  --  50.1*   < > 30.3*   < > 33.9*   < > 14.0  --  8.3   CR 1.05*  --   --   --  1.10*  --  1.11*   < > 1.04*   < > 1.24*   < > 0.62  --  0.68   *  --   --   --  88  --  128*   < > 148*   < > 98   < > 119*  --  113*   UMA 10.6*  --   --   --  9.9  --  10.2   < > 9.6   < > 10.2   < > 9.5  --  9.2   MAG  --   --   --   --   --   --   --   --  2.6*  --   --   --  2.2  --  2.1   PHOS  --   --   --   --   --   --   --   --   --   --  2.6  --   --   --   --     < > = values in this interval not displayed.       CBC -   Recent Labs   Lab Test 10/26/23  0657 10/25/23  0609 10/24/23  0708   WBC 8.0 7.7 7.1   HGB 8.2* 8.1* 8.7*   PLT 62* 57* 52*       LFTs -   Recent Labs   Lab Test 10/29/23  0651 10/28/23  0719 10/27/23  0627   ALKPHOS 114* 103 114*   BILITOTAL 3.4* 3.2* 3.4*   ALT 26 29 32   AST 41 41 42   PROTTOTAL 6.1* 5.7* 6.0*   ALBUMIN 4.4 4.0 4.0       Iron Panel -   Recent Labs   Lab Test 10/26/23  1311 10/03/23  0853   IRON 62 43   IRONSAT 28 28   TANYA  --  157           Current Medications:   albumin human  50 g Intravenous Daily    [Held by provider] citalopram  40 mg Oral Daily    ferrous sulfate  325 mg Oral Daily with  breakfast    fluticasone-vilanterol  1 puff Inhalation Daily    [Held by provider] furosemide  20 mg Oral BID    ipratropium - albuterol 0.5 mg/2.5 mg/3 mL  3 mL Nebulization 4x daily    lactulose  20 g Oral TID    levothyroxine  112 mcg Oral Daily    midodrine  20 mg Oral TID    montelukast  10 mg Oral At Bedtime    pantoprazole  40 mg Oral Daily    rifaximin  550 mg Oral BID    sodium zirconium cyclosilicate  10 g Oral Once    [Held by provider] spironolactone  100 mg Oral Daily    [Held by provider] traZODone  25-50 mg Oral At Bedtime

## 2023-10-29 NOTE — PROGRESS NOTES
[facility]    Medicine Progress Note - Hospitalist Service, GOLD TEAM 9    Date of Admission:  10/22/2023    Assessment & Plan   Stefani Crowell is a 63 year old female with a pertinent past medical history of alcoholic cirrhosis, hepatorenal syndrome, hypothyroidism, recurrent hyponatremia, recurrent pleural effusions, COPD and recent admission for hyponatremia who was admitted on 10/22/2023 due to increased dyspnea related to R pleural effusion and hyponatremia.     #Severe Hyponatremia, likely secondary to cirrhosis  #MATTIE, likely secondary to hepatorenal syndrome  #Hypokalemia, resolved  -Admit Na 121 (10/23: 122). Baseline Cr 0.8, admit with 1.04. Continued PTA midodrine  -Potassium repletion per RN protocol  -Monitored following initiation of diuretics and fluid removal via procedures  - acutely worsening inpatient day #4. Asymptomatic. Raised U Osm and low U Na suggestive of incresed ADH activity. Consulted Nephrology and likely reduced EABV secondary to cirrhosis. S/P Albumin with improvement in hyponatremia. monitor I/Os. Holding diuretics for now.   Start daily IV albumin.   - persistent and continue to monitor and consider hypertonic saline if Na <115 and/or symptoms    #Dyspnea due to recurrent R hepatic hydrothorax  #History of COPD  #ESLD, alcoholic cirrhosis  #Ascites  MELD 3.0: 26 at 10/29/2023  6:51 AM  MELD-Na: 28 at 10/29/2023  6:51 AM  Calculated from:  Serum Creatinine: 1.05 mg/dL at 10/29/2023  6:51 AM  Serum Sodium: 120 mmol/L (Using min of 125 mmol/L) at 10/29/2023  6:51 AM  Total Bilirubin: 3.4 mg/dL at 10/29/2023  6:51 AM  Serum Albumin: 4.4 g/dL (Using max of 3.5 g/dL) at 10/29/2023  6:51 AM  INR(ratio): 2.08 at 10/29/2023  6:51 AM  Age at listing (hypothetical): 63 years  Sex: Female at 10/29/2023  6:51 AM  -Admitted with dypsnea and mild abdominal pain, recently stopped lasix  -Hepatology consulted, their help is appreciated   -Start lasix  20mg BID and spironolactone 25mg  -10/22: Had 2L removed via thoracentesis (not enough fluid for para)   -Transudative, pH 7.6. initial micro studies are negative  -Continue home lactulose 20g TID, rifaximin 550mg BID, Breo ellipta 1 puff daily  -Has PRN duonebs  - s/p thoracentesis on 10-25-23, with bloody fluid. Non-exudative per Light;'s criteria. Checking cytology. TTE with bubble study shows PFO. Consulting Cardiology in view of possible TIPS, pre-transplant eval. Recent US abdomen with doppler on 10-22-23 negative for thromboses. D/W GI and IR unable to get an inpatient TIPS considering the persistence of hydrothorax requiring thoracentesis q48-72h. Diuretics on hold as above. Holding citalopram and trazodone  - Repeated thoracentesis on 10/27.  - Recurrent and will evaluate for thoracentesis tomorrow    #Acute on chronic normocytic anemia  #Thrombocytopenia  #Coagulopathy  -Admit Hgb 8.3, Plt 51  -Likely secondary to cirrhosis and dilution  -Will monitor following diuretics and fluid removal via procedures  -Transfuse for Hgb < 7 and Plt < 10. (Would likely need comitant diuretics)    #Elevated troponin  -Admit 27, 28. Likely secondary to poor kidney clearance  -Unremarkable EKG    #Hypothyroidism  Last TSH was 0.74 on 07/24/23  - Continue home levothyroxine 112 mcg po daily    #GERD  - Continue pantoprazole 40 mg po qdaily    #Anxiety  - Holding citalopram 40 mg po qdaily and trazodone 50 mg po at bedtime  - continue lorazepam 0.5 mg po qdaily prn        Diet: 2 Gram Sodium Diet  Snacks/Supplements Adult: Ensure Enlive; Between Meals  Fluid restriction 1000 ML FLUID    DVT Prophylaxis: Pneumatic Compression Devices  Demarco Catheter: Not present  Lines: None     Cardiac Monitoring: None  Code Status: Full Code      Clinically Significant Risk Factors         # Hyponatremia: Lowest Na = 117 mmol/L in last 2 days, will monitor as appropriate   # Hypercalcemia: Highest Ca = 10.6 mg/dL in last 2 days, will monitor  as appropriate       # Coagulation Defect: INR = 2.08 (Ref range: 0.85 - 1.15) and/or PTT = N/A, will monitor for bleeding            # Moderate Malnutrition: based on nutrition assessment    # Asthma: noted on problem list        Disposition Plan     Expected Discharge Date: 10/30/2023        Discharge Comments: Dispo: home  Plan: Neph consult d/t worsening hyponatremia. Hold diuretic. On 2LNC.  Progression: Con't lactulose and rifaximin for hepatic encephalopathy, mentation stable. Ind.            Robin Dotson MD  Hospitalist Service, GOLD TEAM 9  [facility]  Securely message with [employer] (more info)  Text page via [facility] Paging/Directory   See signed in provider for up to date coverage information  ______________________________________________________________________    Interval History     Recurrence of shortness of breath and requesting albuterol nebulizer be stopped and continue albuterol HFA    Physical Exam   Vital Signs: Temp: 97.4  F (36.3  C) Temp src: Oral BP: 90/40 Pulse: 84   Resp: 16 SpO2: 98 % O2 Device: Nasal cannula Oxygen Delivery: 3 LPM  Weight: 140 lbs 6.4 oz  Gen: Awake and alert, appears comfortable, appears stated age. Sitting up in bed.   HEENT: NCAT, sclerae anicteric.  CV: regular rhythm and rate,   Pulm: Normal work of breathing. Lung have less crackles compared to 10/22. Right lung breath sounds reduced basal and midthoracic, presistent  GI: Non-distended, non-tender  Skin: Warm, dry  Neuro: AOx3, speech normal, moves all extremities symmetrically.  Psych: Mood is good, affect is congruent.      Medical Decision Making       45 MINUTES SPENT BY ME on the date of service doing chart review, history, exam, documentation & further activities per the note.      Data     I have personally reviewed the following data over the past 24 hrs:    N/A  \   N/A   / N/A     120 (L); 120 (L) 89 (L) 62.7 (H) /  103 (H)   5.2 20 (L) 1.05 (H) \     ALT: 26  AST: 41 AP: 114 (H) TBILI: 3.4 (H)   ALB: 4.4 TOT PROTEIN: 6.1 (L) LIPASE: N/A     INR:  2.08 (H) PTT:  N/A   D-dimer:  N/A Fibrinogen:  N/A       Imaging results reviewed over the past 24 hrs:   No results found for this or any previous visit (from the past 24 hour(s)).

## 2023-10-29 NOTE — PLAN OF CARE
D/I: Patient alert and oriented x4, but bed alarm on for safety. Na 119 after midnight. Another recheck pending this morning. BP much improved this shift 126/75 with other vitals also stable on 3L O2. Calm ,pleasant, and cooperative. Denies pain. Up to BR with walker and SBA. Loose BM X 1. Slept in between cares.   Plan: Continue to monitor pt and follow plan of care.

## 2023-10-29 NOTE — PLAN OF CARE
No acute events this shift. Patient alert and oriented x4, calm ,pleasant ambulatory and cooperative. No c/o pain or discomfort and breathing comfortably in room air. Refused Pm lactulose today as she claimed she had multiple BMs already. NA level ranging from 119 -120.Good appetite this shift. Was seen and rounded by primary and nephrology team no further orders placed. Resting in bed as of this time. Reminded of adherence to Fluid restrictions.

## 2023-10-30 NOTE — PROGRESS NOTES
[facility]    Medicine Progress Note - Hospitalist Service, GOLD TEAM 9    Date of Admission:  10/22/2023    Assessment & Plan   Stefani Crowell is a 63 year old female with a pertinent past medical history of alcoholic cirrhosis, hepatorenal syndrome, hypothyroidism, recurrent hyponatremia, recurrent pleural effusions, COPD and recent admission for hyponatremia who was admitted on 10/22/2023 due to increased dyspnea related to R pleural effusion and hyponatremia.     #Severe Hyponatremia, likely secondary to cirrhosis  #MATTIE, likely secondary to hepatorenal syndrome  #Hypokalemia, resolved  -Admit Na 121 (10/23: 122). Baseline Cr 0.8, admit with 1.04. Continued PTA midodrine  -Potassium repletion per RN protocol  -Monitored following initiation of diuretics and fluid removal via procedures  - acutely worsening inpatient day #4. Asymptomatic. Raised U Osm and low U Na suggestive of incresed ADH activity. Consulted Nephrology and likely reduced EABV secondary to cirrhosis. S/P Albumin with improvement in hyponatremia. monitor I/Os. Holding diuretics for now.   Started daily IV albumin to counter intravascular depletion.  - persistent and continue to monitor and consider hypertonic saline if Na <115 and/or symptoms    #Dyspnea due to recurrent R hepatic hydrothorax  #History of COPD  #ESLD, alcoholic cirrhosis  #Ascites  MELD 3.0: 27 at 10/30/2023  6:39 AM  MELD-Na: 29 at 10/30/2023  6:39 AM  Calculated from:  Serum Creatinine: 1.01 mg/dL at 10/30/2023  6:39 AM  Serum Sodium: 122 mmol/L (Using min of 125 mmol/L) at 10/30/2023  6:39 AM  Total Bilirubin: 3.8 mg/dL at 10/30/2023  6:39 AM  Serum Albumin: 4.5 g/dL (Using max of 3.5 g/dL) at 10/30/2023  6:39 AM  INR(ratio): 2.30 at 10/30/2023  6:39 AM  Age at listing (hypothetical): 63 years  Sex: Female at 10/30/2023  6:39 AM  -Admitted with dypsnea and mild abdominal pain, recently stopped lasix  -Hepatology consulted,  their help is appreciated   -Start lasix 20mg BID and spironolactone 25mg  -10/22: Had 2L removed via thoracentesis (not enough fluid for para)   -Transudative, pH 7.6. initial micro studies are negative  -Continue home lactulose 20g TID, rifaximin 550mg BID, Breo ellipta 1 puff daily  -Has PRN duonebs  - s/p thoracentesis on 10-25-23, with bloody fluid. Non-exudative per Light;'s criteria. Checking cytology. TTE with bubble study shows PFO. Consulting Cardiology in view of possible TIPS, pre-transplant eval. Recent US abdomen with doppler on 10-22-23 negative for thromboses. D/W GI and IR unable to get an inpatient TIPS considering the persistence of hydrothorax requiring thoracentesis q48-72h. Diuretics on hold as above. Holding citalopram and trazodone  - Repeated thoracentesis on 10/27.  - Recurrent and will request another thoracentesis today  - Patient is on schedule for outpatient thoracentesis on 11/6    #Acute on chronic normocytic anemia  #Thrombocytopenia  #Coagulopathy  -Admit Hgb 8.3, Plt 51  -Likely secondary to cirrhosis and dilution  -Will monitor following diuretics and fluid removal via procedures  -Transfuse for Hgb < 7 and Plt < 10. (Would likely need comitant diuretics)    #Elevated troponin  -Admit 27, 28. Likely secondary to poor kidney clearance  -Unremarkable EKG    #Hypothyroidism  Last TSH was 0.74 on 07/24/23  - Continue home levothyroxine 112 mcg po daily    #GERD  - Continue pantoprazole 40 mg po qdaily    #Anxiety  - Holding citalopram 40 mg po qdaily and trazodone 50 mg po at bedtime  - continue lorazepam 0.5 mg po qdaily prn        Diet: 2 Gram Sodium Diet  Snacks/Supplements Adult: Ensure Enlive; Between Meals  Fluid restriction 1000 ML FLUID    DVT Prophylaxis: Pneumatic Compression Devices  Demarco Catheter: Not present  Lines: None     Cardiac Monitoring: None  Code Status: Full Code      Clinically Significant Risk Factors         # Hyponatremia: Lowest Na = 119 mmol/L in last 2  days, will monitor as appropriate   # Hypercalcemia: Highest Ca = 10.9 mg/dL in last 2 days, will monitor as appropriate       # Coagulation Defect: INR = 2.30 (Ref range: 0.85 - 1.15) and/or PTT = N/A, will monitor for bleeding  # Thrombocytopenia: Lowest platelets = 43 in last 2 days, will monitor for bleeding           # Moderate Malnutrition: based on nutrition assessment    # Asthma: noted on problem list        Disposition Plan      Expected Discharge Date: 11/01/2023        Discharge Comments: Dispo: home, ind.  Plan: Na 122, con't fluid rxn regimen to improve Na. On 3LNC o/n, try to wean to RA.  Progression: Con't lactulose and rifaximin for hepatic encephalopathy, mentation stable. Will eval today if paracentesis is needed.            Robin Dotson MD  Hospitalist Service, GOLD TEAM 9  [facility]  Securely message with [employer] (more info)  Text page via [facility] Paging/Directory   See signed in provider for up to date coverage information  ______________________________________________________________________    Interval History     Worsening shortness of breath. No new complaints.    Physical Exam   Vital Signs: Temp: 97.4  F (36.3  C) Temp src: Oral BP: 99/40 Pulse: 84   Resp: 16 SpO2: 93 % O2 Device: None (Room air) Oxygen Delivery: 3 LPM  Weight: 143 lbs 12.8 oz  Gen: Awake and alert, appears comfortable, appears stated age. Sitting up in bed.   HEENT: NCAT, sclerae anicteric.  CV: regular rhythm and rate,   Pulm: Normal work of breathing. Right lung field - reduced breath sounds and bronchial breath sounds evident  GI: Non-distended, non-tender  Skin: Warm, dry  Neuro: AOx3, speech normal, moves all extremities symmetrically.  Psych: Mood is good, affect is congruent.      Medical Decision Making       43 MINUTES SPENT BY ME on the date of service doing chart review, history, exam, documentation & further activities per the note.      Data     I have  personally reviewed the following data over the past 24 hrs:    5.7  \   7.7 (L)   / 43 (LL)     122 (L); 122 (L) 90 (L) 74.0 (H) /  103 (H)   4.9 22 1.01 (H) \     ALT: 29 AST: 42 AP: 91 TBILI: 3.8 (H)   ALB: 4.5 TOT PROTEIN: 6.2 (L) LIPASE: N/A     INR:  2.30 (H) PTT:  N/A   D-dimer:  N/A Fibrinogen:  N/A       Imaging results reviewed over the past 24 hrs:   No results found for this or any previous visit (from the past 24 hour(s)).

## 2023-10-30 NOTE — PROCEDURES
[facility]  CAPS PROCEDURE NOTE  Date of Admission:  10/22/2023  Consult Requested by: Medicine  Reason for Consult: management of symptomatic pleural effusion    Indication/HPI:     63 yoF with PMH of cirrhosis c/b HE, recurrent pl effusions, and AHRF     Pre-Procedure Diagnosis: Right Pleural Effusion    Post-Procedure Diagnosis: Right Pleural Effusion    Risk Assessment: Average risk, Technically straightforward; patient's anticoagulation has been held according to guidelines based on the agent and platelets and coags are within guidelines    Procedure Outcome:  Therapeutic thoracentesis performed with 2 liters removed. Patrick casas bedside during procedure, dr orta also bedsdie thru out, NST Wayne bedside as well  See additional procedure details below.    The primary covering service should follow up and address any lab results as appropriate.    Mayito Wylie MD  [facility]  Securely message with Vocera (more info)  Text page via [facility] Paging/Directory   See signed in provider for up to date coverage information      [facility]    Thoracentesis at Bedside    Date/Time: 10/30/2023 4:28 PM    Performed by: Mayito Wylie MD  Authorized by: Mayito Wylie MD      UNIVERSAL PROTOCOL   Site Marked: Yes  Prior Images Obtained and Reviewed:  Yes  Required items: Required blood products, implants, devices and special equipment available    Patient identity confirmed:  Provided demographic data, arm band and verbally with patient  NA - No sedation, light sedation, or local anesthesia  Confirmation Checklist:  Relevant allergies, patient's identity using two indicators, correct equipment/implants were available and procedure was appropriate and matched the consent or emergent situation  Time out: Immediately prior to the procedure a time out was called    Universal Protocol:  the Joint Commission Universal Protocol was followed    Preparation: Patient was prepped and draped in usual sterile fashion      Procedure purpose: therapeutic  Indications: pleural effusion       ANESTHESIA    Anesthesia:  Local infiltration  Local Anesthetic:  Lidocaine 1% without epinephrine  Anesthetic Total (mL):  4      SEDATION    Patient Sedated: No      PROCEDURE DETAILS   Preparation: skin prepped with ChloraPrep  Patient position: sitting  Ultrasound guidance: no  Location: right posterior  Puncture method: through-the-needle catheter  Catheter size: 5 Fr.  Number of attempts: 1  Drainage characteristics: serosanguinous (red orange)      PROCEDURE    Patient Tolerance:  Patient tolerated the procedure well with no immediate complications  Length of time physician/provider present for 1:1 monitoring during sedation: 0   POCUS with +Lung slidingnoted post procedure at lung apex while patient seated        POC US GUIDE FOR THORACENTESIS     Impression  US Thoracentesis Indication:pleural effusion, dyspnea, and hypoxia    Limited chest ultrasound was performed and demonstrated an adequate fluid collection on the right hemithorax. Doppler of the skin demonstrated an area at this site without significant vasculature. A thoracentesis at this site was subsequently performed.  Post-procedure POCUS at the apex (while patient seated) showed adequate lung sliding in B-mode

## 2023-10-30 NOTE — PLAN OF CARE
D/I: Pt is A&Ox4, but bed alarm on for safety. Na 119, Dr Galdamez notified. Another na recheck pending this morning. Vitally stable on 3L O2. Calm, pleasant, and cooperative. Denies pain. Up to BR with walker and SBA. 2g Na diet and 1000 ml Fluid restriction. Loose BM X 1. Unable to collect sample for urine osmolarity and sodium random urine because urine was mixed with stool. New urine hat placed in bathroom and pt instructed to call RN after voiding. Slept in between cares.   Plan: Continue to monitor pt and follow plan of care. Urine osmolarity and sodium random urine sample still needs to be collected.

## 2023-10-30 NOTE — PROVIDER NOTIFICATION
Provider notified (name): Dr. Galdamez  Reason for notification: Sodium level is 119. Previous level 121. Alert and oriented x 4. Weakness at baseline. RN reinforcing fluid restriction.  Recommendation/request given to provider: Anything else you want done?   Response from provider:

## 2023-10-30 NOTE — PLAN OF CARE
Assumed Cares: [id number]  Vitals: VSS on 3 L NC   Pain: Denies   Neuro: A&Ox4  Cardiac: WDL ex soft BP  Respiratory: WDL ex SOB  GI/: Voids spontaneously. No BM this shift. Denies nausea   Skin: No new skin concerns  IV/Drains: R PIV SL  Activity: SBA  Events: No acute events this shift. Call light within reach. Able to make needs known.     Plan of Care: Possible thoracentesis today. Continue with POC.

## 2023-10-30 NOTE — PROGRESS NOTES
Nephrology Progress Note  10/30/2023       Stefani Crowell is a 63 yof with hx of ETOH cirrhosis being worked up for transplant complicated by hydrothorax requiring at least twice weekly thoracenteses, COPD, recent wt loss who was admitted with SOB, found to be hyponatremic.  Na normal up until July 2023 when it has been on the decline, also has had rise in Cr from baseline of 0.7 to 1.1 during admission.  Nephrology consulted for management of hyponatremia.     Interval History :   Mrs Crowell's Na is 122 today which is as high as she has been this admission.  Getting another dose of albumin on our visit.  Discussed with hepatology, looking into doing TIPS to try to help with volume accumulation (in the form of hydrothorax) which is her biggest issue.  Little to offer from nephrology standpoint as her hyponatremia is related to her liver disease and so urea (metabolizes into ammonia) or vaptans (cause liver disease) are not options for her.     Assessment & Recommendations:   Hyponatremia-On the decline since July when it was normal, now quite low at 117 on our visit. Serum osm low confirming true hyponatremia.  Jaswinder <20 and UA shows hyaline casts which is consistent with either true hypovolemia or hypervolemic hyponatremia.  Unable to use urea or vaptan due to liver disease, she is already on high dose midodrine to try to increase renal perfusion.                  - hyponatremia due to low EABV, difficult to manage with liver disease but so far getting 50g albumin daily but less likely to be helpful now that her alb is 4 (not an option as an outpt)  -  stable hyponatremia, can check lab q d  - U osm has not changed with stopping celexa so could restart this med if she needs it                  Renal Function-Cr up from baseline of 0.7 in July to ~1.1 during her admission.  Likely a reflection of worsening liver function.  Given her advanced liver disease and low muscle mass her gfr is likely much lower (~half?)  "the 55ml/min/1.7m2 suggested in chemistries.       Volume-Total body volume overloaded with recurrent hydrothorax.  Previously ECHO with collapsed IVC suggesting some degree of hypovolemia on 10/25, Jaswinder <20 and has hyaline casts on UA confirms renal hypoperfusion.  Needs very frequent thora's, looking into TIPS to try to reduce ascites/hydrothorax.      K/pH-K 4.9 after lokelma yesterday, bicarb 22.       BMD-10.9 but actually corrects lower with high albumin, Mg and Phos not checked today.      Anemia-Hgb 7.7, iron sats 28%     Nutrition-Taking PO, encouraged solute intake and avoiding H2O.       Time spent: 40 minutes on this date of encounter for chart review, physical exam, medical decision making and co-ordination of care.      Seen and discussed with Dr Leggett     Recommendations were communicated to primary team via verbal communication.        ROD Pena CNS  Clinical Nurse Specialist  [phone]    Review of Systems:   I reviewed the following systems:  Gen: No fevers or chills  CV: No CP at rest  Resp: No SOB at rest  GI: No N/V    Physical Exam:   I/O last 3 completed shifts:  In: 480 [P.O.:480]  Out: -    BP 99/40 (BP Location: Left arm)   Pulse 84   Temp 97.4  F (36.3  C) (Oral)   Resp 16   Ht 1.651 m (5' 5\")   Wt 65.2 kg (143 lb 12.8 oz)   LMP  (LMP Unknown)   SpO2 93%   BMI 23.93 kg/m       GENERAL APPEARANCE: alert and no distress, frail.    EYES: No scleral icterus  Pulmonary: Breathing comfortably on RA.   CV: Regular rhythm, normal rate   - Edema +1 generalized.   GI: soft, nontender, normal bowel sounds  MS: no evidence of inflammation in joints, no muscle tenderness  : No Demarco  SKIN: no rash, warm, dry  NEURO: mentation intact and speech normal    Labs:   All labs reviewed by me  Electrolytes/Renal -   Recent Labs   Lab Test 10/30/23  0639 10/30/23  0414 10/30/23  0049 10/29/23  1909 10/29/23  1327 10/29/23  0651 10/28/23  1216 10/28/23  0719 10/22/23  1026 10/22/23  0150 " 10/19/23  1523 10/03/23  0853 08/30/23  1202 08/18/23  1402 07/24/23  1457 07/06/23  1034   *  122*  --  119* 121*   < > 120*  120*   < > 117*   < > 121*   < > 126*   < > 129*   < > 129*   POTASSIUM 4.9  --   --   --   --  5.2  --  4.8  4.8   < > 3.3*   < > 3.9   < > 3.1*   < > 3.4   CHLORIDE 90*  --   --   --   --  89*  --  89*   < > 90*   < > 91*   < > 94*   < > 94*   CO2 22  --   --   --   --  20*  --  19*   < > 18*   < > 25   < > 22   < > 23   BUN 74.0*  --   --   --   --  62.7*  --  57.9*   < > 30.3*   < > 33.9*   < > 14.0  --  8.3   CR 1.01*  --   --   --   --  1.05*  --  1.10*   < > 1.04*   < > 1.24*   < > 0.62  --  0.68   * 83  --   --   --  103*  --  88   < > 148*   < > 98   < > 119*  --  113*   UMA 10.9*  --   --   --   --  10.6*  --  9.9   < > 9.6   < > 10.2   < > 9.5  --  9.2   MAG  --   --   --   --   --   --   --   --   --  2.6*  --   --   --  2.2  --  2.1   PHOS  --   --   --   --   --   --   --   --   --   --   --  2.6  --   --   --   --     < > = values in this interval not displayed.       CBC -   Recent Labs   Lab Test 10/30/23  0639 10/26/23  0657 10/25/23  0609   WBC 5.7 8.0 7.7   HGB 7.7* 8.2* 8.1*   PLT 43* 62* 57*       LFTs -   Recent Labs   Lab Test 10/30/23  0639 10/29/23  0651 10/28/23  0719   ALKPHOS 91 114* 103   BILITOTAL 3.8* 3.4* 3.2*   ALT 29 26 29   AST 42 41 41   PROTTOTAL 6.2* 6.1* 5.7*   ALBUMIN 4.5 4.4 4.0       Iron Panel -   Recent Labs   Lab Test 10/26/23  1311 10/03/23  0853   IRON 62 43   IRONSAT 28 28   TANYA  --  157           Current Medications:   albumin human  50 g Intravenous Daily    [Held by provider] citalopram  40 mg Oral Daily    ferrous sulfate  325 mg Oral Daily with breakfast    fluticasone-vilanterol  1 puff Inhalation Daily    [Held by provider] furosemide  20 mg Oral BID    ipratropium - albuterol 0.5 mg/2.5 mg/3 mL  3 mL Nebulization 4x daily    lactulose  20 g Oral TID    levothyroxine  112 mcg Oral Daily    midodrine  20 mg Oral TID     montelukast  10 mg Oral At Bedtime    pantoprazole  40 mg Oral Daily    rifaximin  550 mg Oral BID    [Held by provider] spironolactone  100 mg Oral Daily    [Held by provider] traZODone  25-50 mg Oral At Bedtime     Attestation:  I have seen and evaluated this patient with the [facility] as part of a shared visit.  I personally reviewed the vital signs, medications and labs, and interviewed the patient.  I have participated in the medical decision making as outlined in our joint note.    64 yo F with ESLD complicated by hydrothorax and CKD who now has hyponatremia.    Assessment and recommendations:    # hyponatremia appears to be hypervolemic with poor renal perfusion from HRS.  U osm has not changed stopping Celexa, so this may not be cause of inappropriate ADH production   - continue fluid restriction   - consider restarting Celexa if pt would benefit    Obdulia Leggett MD   -

## 2023-10-30 NOTE — PROGRESS NOTES
"[facility]  HEPATOLOGY PROGRESS NOTE  Stefani Crowell [id number]       IMPRESSION:  Stefani Crowell is a 63 year old female with a history of alcohol associated cirrhosis, last use 6/28/23 complicated by hepatic hydrothorax with twice weekly thoracentesis, hyponatremia, COPD, weight loss, ascites who was admitted with dyspnea. She has had continued hyponatremia.     RECOMMENDATIONS:  # Hyponatremia  - sodium level improved with holding diuretics and fluid restriction.  Recommend not restarting diuretics.   - Echo 10/25 with collapsible IVC, indicating intravascular hypovolemia.   - Ur Na <20. Cystatin C as outpatient.   - nephrology following.     # Hepatic hydrothorax  - diuretic refractory due to hyponatremia.   - recent thora 10/25 without evidence of SBE.   - has had 2 L removed twice weekly MELD 19-20 (initial MELD), t. Bili is more indirect. Has apt to have discussion about TIPS on 11/6  as outpatient. Echo 10/25 without right sided dysfunction, RA 3. Bubble with evidence of \"medium\" PFO.  Seen by cardiology, no issue with PFO and TIPS.   - A staged thora is not available per IR.     # Alcohol associated cirrhosis  - currently engaged in CD programming. Peth 10/19 negative  - evaluated for liver transplant in past, not a current candidate for transplant, MELD 27.  She needs to complete her CD program and abstinence period completion.   - EGD 8/2023 with normal esophagus  -Ultrasound complete with Doppler for evaluation of HCC and thrombosis negative. (10/22)    # Hepatic encephalopathy  - continue lactulose and rifaximin. Titrate for ~3-4 BM's per day. Mentation stable.     Patient was discussed with attending physician, Dr. Annie Cary  The above note reflects our joint plan.     Next follow up appointment: Dr. Stephanie Lim 1/2024    Thank you for the opportunity to be involved with  Stefani Crowell care. Please call with any questions or concerns.    ROD Urban, CNP  Inpatient " "Hepatology [facility]      SUBJECTIVE:  Feeling fatigued with increased work of breathing. Able to get up to the bathroom without difficulty. Not using oxygen with walking to bathroom.     ROS:  A 10-point review of systems was negative.    OBJECTIVE:  VS: BP 99/40 (BP Location: Left arm)   Pulse 84   Temp 97.4  F (36.3  C) (Oral)   Resp 16   Ht 1.651 m (5' 5\")   Wt 65.2 kg (143 lb 12.8 oz)   LMP  (LMP Unknown)   SpO2 93%   BMI 23.93 kg/m        General: In no acute distress, mild facial muscle wasting  Neuro: AOx3, No asterixis  HEENT:  Noscleral icterus, Nooral lesions  CV: . Skin warm and dry  Lungs:  Respirations even and nonlabored on 1.5L/NC   Abd:  Nondistended, nontender   Extrem: Noperipheral edema   Skin: Nojaundice  Psych: pleasant    MEDICATIONS:  Current Facility-Administered Medications   Medication    acetaminophen (TYLENOL) tablet 650 mg    Or    acetaminophen (TYLENOL) Suppository 650 mg    albumin human 25 % injection 50 g    albuterol (PROVENTIL HFA/VENTOLIN HFA) inhaler    artificial saliva (BIOTENE DRY MOUTHWASH) liquid 5 mL    [Held by provider] citalopram (celeXA) tablet 40 mg    ferrous sulfate (FEROSUL) tablet 325 mg    fluticasone-vilanterol (BREO ELLIPTA) 200-25 MCG/ACT inhaler 1 puff    [Held by provider] furosemide (LASIX) tablet 20 mg    ipratropium - albuterol 0.5 mg/2.5 mg/3 mL (DUONEB) neb solution 3 mL    lactulose (CHRONULAC) solution 20 g    levothyroxine (SYNTHROID/LEVOTHROID) tablet 112 mcg    LORazepam (ATIVAN) tablet 0.5 mg    melatonin tablet 3 mg    midodrine (PROAMATINE) tablet 20 mg    montelukast (SINGULAIR) tablet 10 mg    pantoprazole (PROTONIX) EC tablet 40 mg    polyethylene glycol (MIRALAX) Packet 17 g    prochlorperazine (COMPAZINE) injection 5 mg    Or    prochlorperazine (COMPAZINE) tablet 5 mg    Or    prochlorperazine (COMPAZINE) suppository 25 mg    rifaximin (XIFAXAN) tablet 550 mg    [Held by provider] spironolactone (ALDACTONE) tablet 100 mg    [Held by " provider] traZODone (DESYREL) half-tab 25-50 mg       REVIEW OF LABORATORY, PATHOLOGY AND IMAGING RESULTS:  BMP  Recent Labs   Lab 10/30/23  0639 10/30/23  0414 10/30/23  0049 10/29/23  1909 10/29/23  1327 10/29/23  0651 10/28/23  1216 10/28/23  0719 10/27/23  1254 10/27/23  0627   *  122*  --  119* 121* 120* 120*  120*   < > 117*   < > 121*  121*   POTASSIUM 4.9  --   --   --   --  5.2  --  4.8  4.8  --  4.3   CHLORIDE 90*  --   --   --   --  89*  --  89*  --  90*   UMA 10.9*  --   --   --   --  10.6*  --  9.9  --  10.2   CO2 22  --   --   --   --  20*  --  19*  --  20*   BUN 74.0*  --   --   --   --  62.7*  --  57.9*  --  50.1*   CR 1.01*  --   --   --   --  1.05*  --  1.10*  --  1.11*   * 83  --   --   --  103*  --  88  --  128*    < > = values in this interval not displayed.     CBC  Recent Labs   Lab 10/30/23  0639 10/26/23  0657 10/25/23  0609 10/24/23  0708   WBC 5.7 8.0 7.7 7.1   RBC 2.37* 2.50* 2.48* 2.72*   HGB 7.7* 8.2* 8.1* 8.7*   HCT 23.3* 24.1* 24.0* 26.0*   MCV 98 96 97 96   MCH 32.5 32.8 32.7 32.0   MCHC 33.0 34.0 33.8 33.5   RDW 15.4* 15.2* 15.3* 15.8*   PLT 43* 62* 57* 52*     INR  Recent Labs   Lab 10/30/23  0639 10/29/23  0651 10/28/23  0719 10/27/23  0627   INR 2.30* 2.08* 2.19* 1.97*     LFTs  Recent Labs   Lab 10/30/23  0639 10/29/23  0651 10/28/23  0719 10/27/23  0627   ALKPHOS 91 114* 103 114*   AST 42 41 41 42   ALT 29 26 29 32   BILITOTAL 3.8* 3.4* 3.2* 3.4*   PROTTOTAL 6.2* 6.1* 5.7* 6.0*   ALBUMIN 4.5 4.4 4.0 4.0        MELD 3.0: 27 at 10/30/2023  6:39 AM  MELD-Na: 29 at 10/30/2023  6:39 AM  Calculated from:  Serum Creatinine: 1.01 mg/dL at 10/30/2023  6:39 AM  Serum Sodium: 122 mmol/L (Using min of 125 mmol/L) at 10/30/2023  6:39 AM  Total Bilirubin: 3.8 mg/dL at 10/30/2023  6:39 AM  Serum Albumin: 4.5 g/dL (Using max of 3.5 g/dL) at 10/30/2023  6:39 AM  INR(ratio): 2.30 at 10/30/2023  6:39 AM  Age at listing (hypothetical): 63 years  Sex: Female at 10/30/2023  6:39  "AM    Previous work-up:   Lab Results   Component Value Date    HEPBANG Nonreactive 02/18/2021    HBCAB Nonreactive 02/18/2021    HCVAB  08/03/2017     Nonreactive   Assay performance characteristics have not been established for newborns,   infants, and children      TANYA 157 10/03/2023    IRONSAT 28 10/26/2023    TSH 1.75 10/26/2023    CHOL 176 08/15/2022    HDL 44 (L) 08/15/2022     (H) 08/15/2022    TRIG 111 08/15/2022    A1C 4.7 07/24/2023    POPETH <10 10/22/2023    PLPETH <10 10/22/2023      No results found for: \"SPECDES\", \"LDRESULTS\"      Imaging Results:  None current.   "

## 2023-10-31 NOTE — PROGRESS NOTES
[facility]    Medicine Progress Note - Hospitalist Service, GOLD TEAM 9    Date of Admission:  10/22/2023    Assessment & Plan   Stefani Crowell is a 63 year old female with a pertinent past medical history of alcoholic cirrhosis, hepatorenal syndrome, hypothyroidism, recurrent hyponatremia, recurrent pleural effusions, COPD and recent admission for hyponatremia who was admitted on 10/22/2023 due to increased dyspnea related to R pleural effusion and hyponatremia.     Severe Hyponatremia, likely secondary to cirrhosis  MATTIE, likely secondary to hepatorenal syndrome  Hypokalemia, resolved  Admit Na 121 (10/23: 122). Baseline Cr 0.8, admit with 1.04. Attempted to diurese patient aggressively to help with hepatic hydrothorax however Na dropped precipitously requiring stopping of diuresis and giving albumin. Now getting daily albumin.  -Potassium repletion per RN protocol  - daily albumin  - hold diuretics  - persistent and continue to monitor and consider hypertonic saline if Na <115 and/or symptoms    Dyspnea due to recurrent R hepatic hydrothorax  History of COPD  ESLD, alcoholic cirrhosis  Ascites  Admitted with dypsnea and mild abdominal pain, recently stopped lasix. Dyspnea secondary to hepatic hydrothorax which she was getting twice weekly thoracentesis for as an outpatient which did not relieve symptoms. Started aggressive diuresis however had to stop due to low sodiums and now getting a thora once ever 2-3 days for symptom management.   -Hepatology consulted, their help is appreciated  -Continue home lactulose 20g TID, rifaximin 550mg BID, Breo ellipta 1 puff daily  -Has PRN duonebs  -Discussed with hepatology who is going to have staff discuss options with IR   - will need to talk to management re TIPS if they are willing to do it but understaffed   - If IR will not due tips due to worsening clinical status will need to have goals of care convo     Acute on  chronic normocytic anemia  Thrombocytopenia  Coagulopathy  -Admit Hgb 8.3, Plt 51  -Likely secondary to cirrhosis and dilution  -Will monitor following diuretics and fluid removal via procedures  -Transfuse for Hgb < 7 and Plt < 10. (Would likely need comitant diuretics)    Elevated troponin  -Admit 27, 28. Likely secondary to poor kidney clearance  -Unremarkable EKG    Hypothyroidism  Last TSH was 0.74 on 07/24/23  - Continue home levothyroxine 112 mcg po daily    GERD  - Continue pantoprazole 40 mg po qdaily    Anxiety  - Holding citalopram 40 mg po qdaily and trazodone 50 mg po at bedtime  - continue lorazepam 0.5 mg po qdaily prn        Diet: 2 Gram Sodium Diet  Snacks/Supplements Adult: Ensure Enlive; Between Meals  Fluid restriction 1000 ML FLUID    DVT Prophylaxis: Pneumatic Compression Devices  Demarco Catheter: Not present  Lines: None     Cardiac Monitoring: None  Code Status: Full Code      Clinically Significant Risk Factors        # Hyperkalemia: Highest K = 5.7 mmol/L in last 2 days, will monitor as appropriate  # Hyponatremia: Lowest Na = 119 mmol/L in last 2 days, will monitor as appropriate   # Hypercalcemia: Highest Ca = 10.9 mg/dL in last 2 days, will monitor as appropriate       # Coagulation Defect: INR = 2.33 (Ref range: 0.85 - 1.15) and/or PTT = N/A, will monitor for bleeding  # Thrombocytopenia: Lowest platelets = 43 in last 2 days, will monitor for bleeding           # Moderate Malnutrition: based on nutrition assessment    # Asthma: noted on problem list        Disposition Plan      Expected Discharge Date: 11/08/2023        Discharge Comments: Dispo: home, ind.  Plan: Na 122, con't fluid rxn regimen to improve Na. On 3LNC o/n, try to wean to RA. Thora every few days may need to stay until 11/6 for TIPS  Progression: Con't lactulose and rifaximin for hepatic encephalopathy, mentation stable.            Blaise Fields DO  Hospitalist Service, GOLD TEAM 9  [facility]  [facility]  Securely message with [employer] (more info)  Text page via [facility] Paging/Directory   See signed in provider for up to date coverage information  ______________________________________________________________________    Interval History     Breathing stable today. Blood pressure quite low this AM however asymptomatic and improved slightly with albumin. Also noted that her creatinine is rising again. Discussed with hepatology who are going to reach out to IR to discuss TIPS. Also discussed with nephrology who do not think HD is an option with her low BP and CRRT would not be a long term solution.    Physical Exam   Vital Signs: Temp: 97.3  F (36.3  C) Temp src: Oral BP: (!) 86/37 Pulse: 78   Resp: 18 SpO2: 98 % O2 Device: Nasal cannula Oxygen Delivery: 3 LPM  Weight: 143 lbs 0 oz  Gen: Awake and alert, appears comfortable, appears stated age. Sitting up in bed.   HEENT: NCAT, sclerae anicteric.  CV: regular rhythm and rate,   Pulm: Normal work of breathing. Right lung field - reduced breath sounds and bronchial breath sounds evident  GI: Non-distended, non-tender  Skin: Warm, dry  Neuro: AOx3, speech normal, moves all extremities symmetrically.  Psych: Mood is good, affect is congruent.      Medical Decision Making       55 MINUTES SPENT BY ME on the date of service doing chart review, history, exam, documentation & further activities per the note.      Data     I have personally reviewed the following data over the past 24 hrs:    N/A  \   N/A   / N/A     120 (L) 88 (L) 93.3 (H) /  79   5.7 (H) 20 (L) 1.27 (H) \     ALT: 24 AST: 40 AP: 84 TBILI: 3.6 (H)   ALB: 4.2 TOT PROTEIN: 5.6 (L) LIPASE: N/A     INR:  2.33 (H) PTT:  N/A   D-dimer:  N/A Fibrinogen:  N/A       Imaging results reviewed over the past 24 hrs:   No results found for this or any previous visit (from the past 24 hour(s)).

## 2023-10-31 NOTE — PROGRESS NOTES
Nephrology Progress Note  10/31/2023       Stefani Crowell is a 63 yof with hx of ETOH cirrhosis being worked up for transplant complicated by hydrothorax requiring at least twice weekly thoracenteses, COPD, recent wt loss who was admitted with SOB, found to be hyponatremic.  Na normal up until July 2023 when it has been on the decline, also has had rise in Cr from baseline of 0.7 to 1.1 during admission.  Nephrology consulted for management of hyponatremia.     Interval History :   Mrs Crowell's Na is 120 today, stable in this range, can check daily.  Has Cr bump from 1.05=>1.27 the past 24h with low BP's last night (78/31 was the lowest) with some hyperkalemia which I ordered 15g lokelma to treat. I also started some low dose bicarb tabs as her bicarb is on low-normal side, this will provide a bit of Na as well.      I am concerned that we will have difficult time balancing getting her euvolemic while keeping hydrothorax under control as she currently is needing a thoracenteses every 2-3 days.  TIPS may be helpful with this but her MELD has increased since she was last considered for TIPS as an outpatient so hepatology is discussing with radiology about options.  It would be very reasonable to consult palliative as if she develops worsening renal failure and needs RRT she does not have BP's to support iHD and CRRT would not be an ideal option given she is not a liver transplant candidate.      Assessment & Recommendations:   Hyponatremia-On the decline since July when it was normal, now quite low at 117 on our visit. Serum osm low confirming true hyponatremia.  Jaswinder <20 and UA shows hyaline casts which is consistent with either true hypovolemia or hypervolemic hyponatremia.  Unable to use urea or vaptan due to liver disease, she is already on high dose midodrine to try to increase renal perfusion.                  - hyponatremia due to low EABV, difficult to manage with liver disease but so far getting 50g  "albumin daily but less likely to be helpful now that her alb is 4 (not an option as an outpt)  - Essentially stable in very low range, can check daily.   - U osm has not changed with stopping celexa so could restart this med if she needs it                  Renal Function-Cr up from baseline of 0.7 in July to ~1.1 during her admission and now up to ~1.3 in setting of low BP's overnight.  I am concerned that we are going to have difficulty keeping her intravascularly euvolemic (appears on dry side currently) while avoiding hydrothorax/frequent para's.  Considering TIPS but MELD is higher than one would like to do TIPS.       Volume-Total body volume overloaded with recurrent hydrothorax.  Previously ECHO with collapsed IVC suggesting some degree of hypovolemia on 10/25, Jaswinder <20 and has hyaline casts on UA confirms renal hypoperfusion.  Needs very frequent thora's, looking into TIPS to try to reduce ascites/hydrothorax.      K/pH-K 5.7, giving lokelma 15g today, bicarb 20, will start bicarb tabs 650mg TID although this will result in increased Na intake.       BMD-10.5 but actually corrects lower with high albumin, Mg and Phos not checked today.      Anemia-Hgb 7.7, iron sats 28%     Nutrition-Taking PO, encouraged solute intake and avoiding H2O.       Time spent: 40 minutes on this date of encounter for chart review, physical exam, medical decision making and co-ordination of care.      Seen and discussed with Dr Leggett     Recommendations were communicated to primary team via verbal communication.        ROD Pena CNS  Clinical Nurse Specialist  [phone]    Review of Systems:   I reviewed the following systems:  Gen: No fevers or chills  CV: No CP at rest  Resp: No SOB at rest  GI: No N/V    Physical Exam:   I/O last 3 completed shifts:  In: 277 [P.O.:277]  Out: -    BP (!) 84/42 (BP Location: Left arm)   Pulse 76   Temp 97.6  F (36.4  C) (Oral)   Resp 17   Ht 1.651 m (5' 5\")   Wt 66.5 kg (146 lb " 9.7 oz)   LMP  (LMP Unknown)   SpO2 97%   BMI 24.40 kg/m       GENERAL APPEARANCE: alert and no distress, frail.    EYES: No scleral icterus  Pulmonary: Breathing comfortably on RA.   CV: Regular rhythm, normal rate   - Edema +1 generalized.   GI: soft, nontender, normal bowel sounds  MS: no evidence of inflammation in joints, no muscle tenderness  : No Demarco  SKIN: no rash, warm, dry  NEURO: mentation intact and speech normal    Labs:   All labs reviewed by me  Electrolytes/Renal -   Recent Labs   Lab Test 10/31/23  0645 10/30/23  2350 10/30/23  1856 10/30/23  1251 10/30/23  0639 10/30/23  0414 10/29/23  1327 10/29/23  0651 10/22/23  1026 10/22/23  0150 10/19/23  1523 10/03/23  0853 08/30/23  1202 08/18/23  1402 07/24/23  1457 07/06/23  1034   *  120* 119* 121*   < > 122*  122*  --    < > 120*  120*   < > 121*   < > 126*   < > 129*   < > 129*   POTASSIUM 5.7*  --   --   --  4.9  --   --  5.2   < > 3.3*   < > 3.9   < > 3.1*   < > 3.4   CHLORIDE 88*  --   --   --  90*  --   --  89*   < > 90*   < > 91*   < > 94*   < > 94*   CO2 20*  --   --   --  22  --   --  20*   < > 18*   < > 25   < > 22   < > 23   BUN 93.3*  --   --   --  74.0*  --   --  62.7*   < > 30.3*   < > 33.9*   < > 14.0  --  8.3   CR 1.27*  --   --   --  1.01*  --   --  1.05*   < > 1.04*   < > 1.24*   < > 0.62  --  0.68   GLC 79  --   --   --  103* 83  --  103*   < > 148*   < > 98   < > 119*  --  113*   UMA 10.5*  --   --   --  10.9*  --   --  10.6*   < > 9.6   < > 10.2   < > 9.5  --  9.2   MAG  --   --   --   --   --   --   --   --   --  2.6*  --   --   --  2.2  --  2.1   PHOS  --   --   --   --   --   --   --   --   --   --   --  2.6  --   --   --   --     < > = values in this interval not displayed.       CBC -   Recent Labs   Lab Test 10/30/23  0639 10/26/23  0657 10/25/23  0609   WBC 5.7 8.0 7.7   HGB 7.7* 8.2* 8.1*   PLT 43* 62* 57*       LFTs -   Recent Labs   Lab Test 10/31/23  0645 10/30/23  0639 10/29/23  0651   ALKPHOS 84 91 114*    BILITOTAL 3.6* 3.8* 3.4*   ALT 24 29 26   AST 40 42 41   PROTTOTAL 5.6* 6.2* 6.1*   ALBUMIN 4.2 4.5 4.4       Iron Panel -   Recent Labs   Lab Test 10/26/23  1311 10/03/23  0853   IRON 62 43   IRONSAT 28 28   TANYA  --  157           Current Medications:   albumin human  50 g Intravenous Daily    [Held by provider] citalopram  40 mg Oral Daily    ferrous sulfate  325 mg Oral Daily with breakfast    fluticasone-vilanterol  1 puff Inhalation Daily    [Held by provider] furosemide  20 mg Oral BID    lactulose  20 g Oral TID    levothyroxine  112 mcg Oral Daily    midodrine  20 mg Oral TID    montelukast  10 mg Oral At Bedtime    pantoprazole  40 mg Oral Daily    rifaximin  550 mg Oral BID    sodium zirconium cyclosilicate  15 g Oral Once    [Held by provider] spironolactone  100 mg Oral Daily    [Held by provider] traZODone  25-50 mg Oral At Bedtime       Attestation:  I have seen and evaluated this patient with the [facility] as part of a shared visit.  I personally reviewed the vital signs, medications and labs, and interviewed the patient.  I have participated in the medical decision making as outlined in our joint note.     62 yo F with ESLD complicated by hydrothorax and CKD who now has hyponatremia. Less SOB today after thora yesterday.  BP low ON. Not dizzy.  Cr rising     Assessment and recommendations:    # hyponatremia appears to be hypervolemic with poor renal perfusion from HRS.  U osm has not changed stopping Celexa, so this may not be cause of inappropriate ADH production   - continue fluid restriction   - consider restarting Celexa if pt would benefit  # Volume status: she has no edema and minimal ascites.  BP low today. However she feels much better after thoracentesis   - decrease lactulose dose ( 5 stools yesterday)  - MATTIE: concern re HRS     Obdulia Leggett MD

## 2023-10-31 NOTE — PLAN OF CARE
Goal Outcome Evaluation:      Plan of Care Reviewed With: patient    Overall Patient Progress: no changeOverall Patient Progress: no change    Outcome Evaluation: A&Ox4, VSS on 2L NC with soft BPs. Na checks Q6; most recent was critical 119, labs just drawn at 0700 and pending.

## 2023-10-31 NOTE — PLAN OF CARE
Patient condition slightly improving. Patient appeared calm and comfortable in bed with no c/o pain or discomfort. Slight SOB breath with exertion and LS diminished, Intermittently using O2 to recover from activity and after independently going to bathroom and back to bed. Hyponatremia still persists MD aware.Hypotension noted at the start of the shift, Improved after getting midodrine and Albumin. Skin tear noted on anterior L shin. Patient claimed she bumped her shin on the bottom of the edge of bedside table. Simple wound care done, foam dressing applied. Resting in bed as of this time. No other calls made.

## 2023-10-31 NOTE — PROGRESS NOTES
"[facility]  HEPATOLOGY PROGRESS NOTE  Stefani Crowell [id number]       IMPRESSION:  Stefani Crowell is a 63 year old female with a history of alcohol associated cirrhosis, last use 6/28/23 complicated by hepatic hydrothorax with twice weekly thoracentesis, hyponatremia, COPD, weight loss, ascites who was admitted with dyspnea. She has had continued hyponatremia.     RECOMMENDATIONS:  # Hyponatremia  # MATTIE  - sodium level variable, no plans for restarting diuretics. Has continued with low sodium diet, fluid restriction.   - Ur Na <20. Cystatin C as outpatient. Now with increase in creat/BUN. Last normal creatinine was 8/30/23. Likely has some level of CKD and higher Has albumin level 4.2- has received prior albumin. Would not benefit from albumin challenge.   - nephrology following. Systolic BP has continues to be <90, on midodrine 20 mg TID    # Hepatic hydrothorax  - diuretic refractory due to hyponatremia.   - has needed three times weekly thora, removing 2 L each time. Last diagnostic testing 10/25 without SBE. Recommend inpatient thoras with diagnostic testing.   -  MELD 23 (initial MELD), t. Bili has been more indirect. Has apt to have discussion about TIPS on 11/6  as outpatient. With rising MELD, concerning that she may not be a candidate or be able to be discharged for apt. Echo 10/25 without right sided dysfunction, RA 3. Bubble with evidence of \"medium\" PFO.  Seen by cardiology, no issue with PFO and TIPS.   - With rising creatinine and continued higher t. Bili. Relayed higher risk for further decompensation that can cause death following TIPS .     # Alcohol associated cirrhosis  - currently engaged in CD programming. Peth 10/19 negative  - evaluated for liver transplant in past, not a current candidate for transplant, MELD 28.  She needs to complete her CD program and abstinence period completion.   - EGD 8/2023 with normal esophagus  -Ultrasound complete with Doppler for evaluation of HCC " "and thrombosis negative. (10/22)    # Hepatic encephalopathy  - continue lactulose and rifaximin. Titrate for ~3-4 BM's per day. Mentation stable.     Patient was discussed with attending physician, Dr. Annie Cary  The above note reflects our joint plan.     Next follow up appointment: Dr. Stephanie Lim 1/2024    Thank you for the opportunity to be involved with  Stefani rosas. Please call with any questions or concerns.    Yanelis Miller, APRN, CNP  Inpatient Hepatology [facility]      SUBJECTIVE:  Has been up walking in halls with walker. No fevers, abdominal pain, nausea or vomiting.  Has good appetite and taking in supplements.     ROS:  A 10-point review of systems was negative.    OBJECTIVE:  VS: BP (!) 84/42 (BP Location: Left arm)   Pulse 76   Temp 97.6  F (36.4  C) (Oral)   Resp 17   Ht 1.651 m (5' 5\")   Wt 66.5 kg (146 lb 9.7 oz)   LMP  (LMP Unknown)   SpO2 97%   BMI 24.40 kg/m        General: In no acute distress, mild facial muscle wasting  Neuro: AOx3, No asterixis  HEENT:  Noscleral icterus, Nooral lesions  CV: . Skin warm and dry  Lungs:  Respirations even and nonlabored on room air  Abd:  Nondistended, nontender   Extrem: Noperipheral edema   Skin: Nojaundice  Psych: pleasant    MEDICATIONS:  Current Facility-Administered Medications   Medication    acetaminophen (TYLENOL) tablet 650 mg    Or    acetaminophen (TYLENOL) Suppository 650 mg    albumin human 25 % injection 50 g    albuterol (PROVENTIL HFA/VENTOLIN HFA) inhaler    artificial saliva (BIOTENE DRY MOUTHWASH) liquid 5 mL    [Held by provider] citalopram (celeXA) tablet 40 mg    ferrous sulfate (FEROSUL) tablet 325 mg    fluticasone-vilanterol (BREO ELLIPTA) 200-25 MCG/ACT inhaler 1 puff    [Held by provider] furosemide (LASIX) tablet 20 mg    lactulose (CHRONULAC) solution 20 g    levothyroxine (SYNTHROID/LEVOTHROID) tablet 112 mcg    LORazepam (ATIVAN) tablet 0.5 mg    melatonin tablet 3 mg    midodrine (PROAMATINE) tablet 20 " mg    montelukast (SINGULAIR) tablet 10 mg    pantoprazole (PROTONIX) EC tablet 40 mg    polyethylene glycol (MIRALAX) Packet 17 g    prochlorperazine (COMPAZINE) injection 5 mg    Or    prochlorperazine (COMPAZINE) tablet 5 mg    Or    prochlorperazine (COMPAZINE) suppository 25 mg    rifaximin (XIFAXAN) tablet 550 mg    sodium zirconium cyclosilicate (LOKELMA) packet 15 g    [Held by provider] spironolactone (ALDACTONE) tablet 100 mg    [Held by provider] traZODone (DESYREL) half-tab 25-50 mg       REVIEW OF LABORATORY, PATHOLOGY AND IMAGING RESULTS:  BMP  Recent Labs   Lab 10/31/23  0645 10/30/23  2350 10/30/23  1856 10/30/23  1251 10/30/23  0639 10/30/23  0414 10/29/23  1327 10/29/23  0651 10/28/23  1216 10/28/23  0719   *  120* 119* 121* 122* 122*  122*  --    < > 120*  120*   < > 117*   POTASSIUM 5.7*  --   --   --  4.9  --   --  5.2  --  4.8  4.8   CHLORIDE 88*  --   --   --  90*  --   --  89*  --  89*   UMA 10.5*  --   --   --  10.9*  --   --  10.6*  --  9.9   CO2 20*  --   --   --  22  --   --  20*  --  19*   BUN 93.3*  --   --   --  74.0*  --   --  62.7*  --  57.9*   CR 1.27*  --   --   --  1.01*  --   --  1.05*  --  1.10*   GLC 79  --   --   --  103* 83  --  103*  --  88    < > = values in this interval not displayed.     CBC  Recent Labs   Lab 10/30/23  0639 10/26/23  0657 10/25/23  0609   WBC 5.7 8.0 7.7   RBC 2.37* 2.50* 2.48*   HGB 7.7* 8.2* 8.1*   HCT 23.3* 24.1* 24.0*   MCV 98 96 97   MCH 32.5 32.8 32.7   MCHC 33.0 34.0 33.8   RDW 15.4* 15.2* 15.3*   PLT 43* 62* 57*     INR  Recent Labs   Lab 10/31/23  0645 10/30/23  0639 10/29/23  0651 10/28/23  0719   INR 2.33* 2.30* 2.08* 2.19*     LFTs  Recent Labs   Lab 10/31/23  0645 10/30/23  0639 10/29/23  0651 10/28/23  0719   ALKPHOS 84 91 114* 103   AST 40 42 41 41   ALT 24 29 26 29   BILITOTAL 3.6* 3.8* 3.4* 3.2*   PROTTOTAL 5.6* 6.2* 6.1* 5.7*   ALBUMIN 4.2 4.5 4.4 4.0        MELD 3.0: 30 at 10/31/2023  6:45 AM  MELD-Na: 30 at 10/31/2023  6:45  "AM  Calculated from:  Serum Creatinine: 1.27 mg/dL at 10/31/2023  6:45 AM  Serum Sodium: 120 mmol/L (Using min of 125 mmol/L) at 10/31/2023  6:45 AM  Total Bilirubin: 3.6 mg/dL at 10/31/2023  6:45 AM  Serum Albumin: 4.2 g/dL (Using max of 3.5 g/dL) at 10/31/2023  6:45 AM  INR(ratio): 2.33 at 10/31/2023  6:45 AM  Age at listing (hypothetical): 63 years  Sex: Female at 10/31/2023  6:45 AM    Previous work-up:   Lab Results   Component Value Date    HEPBANG Nonreactive 02/18/2021    HBCAB Nonreactive 02/18/2021    HCVAB  08/03/2017     Nonreactive   Assay performance characteristics have not been established for newborns,   infants, and children      TANYA 157 10/03/2023    IRONSAT 28 10/26/2023    TSH 1.75 10/26/2023    CHOL 176 08/15/2022    HDL 44 (L) 08/15/2022     (H) 08/15/2022    TRIG 111 08/15/2022    A1C 4.7 07/24/2023    POPETH <10 10/22/2023    PLPETH <10 10/22/2023      No results found for: \"SPECDES\", \"LDRESULTS\"      Imaging Results:  None current.   "

## 2023-11-01 NOTE — PLAN OF CARE
Goal Outcome Evaluation:      Plan of Care Reviewed With: patient    Overall Patient Progress: no changeOverall Patient Progress: no change    Outcome Evaluation: A&Ox4, VSS on 3L NC with soft BPs. Up independently in room. Voiding spontaneously, no BM this shift.Pt refuse skin assessment, WOC consulted for tomorrow. No acute changes this shift. Continue with POC.

## 2023-11-01 NOTE — PLAN OF CARE
SHIFT: [id number]    Temp: 97.3  F (36.3  C) Temp src: Oral BP: 97/45 Pulse: 80   Resp: 16 SpO2: 97 % O2 Device: Nasal cannula Oxygen Delivery: 3 LPM     EVENTS: AM hgb 6.4, 1 unit RBCs transfused, consent signed & placed in chart. Knees & L elbow assessed by WOC, wound cares ordered. BPs stable today, midodrine held, started Terlipressin IV q6h, monitoring O2 continuously, stable on RA requested 3L NC back in veronica d/t SOB & wheezing- PRN albuterol inhaler given. Humidification added to NC after bloody nose in afternoon. C/o RLQ pain & headache in veronica, heating bad to abd, ice pack to back of neck & tylenol given. BM x2 so far today. Abiding by diet & fluid restriction, has 144mL left of fluids today- need clarification if nutritional drinks  count toward fluid intake for restriction. Family supportive at bedside     PLAN: Cont 2g Na diet & 1L FR, monitor Na daily. Work on weaning O2 to RA- possible thora next 1-2 days? Monitor BPs, creat & resp status on IV terlipressin. Discharge likely home once med stable     Goal Outcome Evaluation:  Plan of Care Reviewed With: patient  Overall Patient Progress: no change  Outcome Evaluation: AM hgb 6.4, 1 unit RBCs infused, recheck in AM. BPs stable, midodrine being held, new q6h IV terlipressin ordered- cont O2 monitoring in place while on this med- increased SOB in veronica, O2 needs unchanged.

## 2023-11-01 NOTE — PROVIDER NOTIFICATION
Johan provider [place]    Critial lab result at [phone] ADELINA has a hgb of 6.4 and platelet of 35, lab just called. Do we want to give blood?     0778 provider ordered a unit of blood, writer passed on to day nurse.

## 2023-11-01 NOTE — CONSULTS
[facility]  WO Nurse Inpatient Assessment     Consulted for: bilateral knees    Patient History (according to provider note(s):      Stefani Crowell is a 63 year old female with a pertinent past medical history of alcoholic cirrhosis, hepatorenal syndrome, hypothyroidism, recurrent hyponatremia, recurrent pleural effusions, COPD and recent admission for hyponatremia who was admitted on 10/22/2023 due to increased dyspnea related to R pleural effusion and hyponatremia.     Assessment:      Areas visualized during today's visit: Lower extremities  and Upper extremities     Wound location: bilateral knees and left elbow     Right knee    Left knee    Left elbow     Last photo: 11/1  Wound due to: Skin Tear and Trauma  Wound history/plan of care: pt has very fragile skin. Knees are from recent fall  Wound base: 50 % dermis, 50 % epidermis     Palpation of the wound bed: normal      Drainage: scant     Description of drainage: bloody     Measurements (length x width x depth, in cm): see photos      Tunneling: N/A     Undermining: N/A  Periwound skin: Intact      Color: purple and red      Temperature: normal   Odor: none  Pain: mild, tender  Pain interventions prior to dressing change: slow and gentle cares   Treatment goal: Heal  and Protection  STATUS: initial assessment  Supplies ordered: at bedside       Treatment Plan:     Bilateral knees wound(s): Daily  cleanse with saline and pat dry. Apply Aquaphor(order#[id number]) to wounds and leave JIMI.      Left elbow (or other skin tears) wound(s): Every 3 days and PRN cleanse with wound cleanser and pat dry. Apply adaptic over wound. Conform mepilex over wound. Place arrows to ensure new skin flap is not disturbed.      Orders: Written    RECOMMEND PRIMARY TEAM ORDER: None, at this time  Education provided: plan of care and wound progress  Discussed plan of care with: Patient and Nurse  WO nurse follow-up plan:  weekly  Notify [facility] if wound(s) deteriorate.  Nursing to notify the Provider(s) and re-consult the [facility] Nurse if new skin concern.    DATA:     Current support surface: Standard  Standard gel/foam mattress (IsoFlex, Atmos air, etc)  Containment of urine/stool: Incontinent pad in bed  BMI: Body mass index is 23.8 kg/m .   Active diet order: Orders Placed This Encounter      2 Gram Sodium Diet     Output: I/O last 3 completed shifts:  In: 1076 [P.O.:1076]  Out: -      Labs:   Recent Labs   Lab 11/01/23  0557 10/31/23  0645   ALBUMIN 4.6 4.2   HGB 6.4*  --    INR  --  2.33*   WBC 4.8  --      Pressure injury risk assessment:   Sensory Perception: 4-->no impairment  Moisture: 4-->rarely moist  Activity: 3-->walks occasionally  Mobility: 3-->slightly limited  Nutrition: 2-->probably inadequate  Friction and Shear: 3-->no apparent problem  Castillo Score: 19    Awilda Hernandez RN CWOCN   Pager no longer is use, please contact through [employer] group: [facility] Nurse [place]  Dept. Office Number: [phone]

## 2023-11-01 NOTE — PROGRESS NOTES
[facility]    Medicine Progress Note - Hospitalist Service, GOLD TEAM 9    Date of Admission:  10/22/2023    Assessment & Plan   Stefani Crowell is a 63 year old female with a pertinent past medical history of alcoholic cirrhosis, hepatorenal syndrome, hypothyroidism, recurrent hyponatremia, recurrent pleural effusions, COPD and recent admission for hyponatremia who was admitted on 10/22/2023 due to increased dyspnea related to R pleural effusion and hyponatremia.     MATTIE, likely secondary to hepatorenal syndrome  Baseline Cr 0.8, admit with 1.04 and now rising to 1.41 despite albumin. Was getting daily albumin and Cr continued to rise. Discussed with hepatology and nephrology and will start terlipressin given concern for HRS physiology. If she does not improve and ends up needing HD she is not a good candidate due to low BP and will have to have goals of care convo.  - daily albumin  - hold diuretics  - Will plan to start terlipressin 0.85 q 6 hours for 3 days.                 IF creatinine drops >30%, then continue at same dose, maximum 14 days.                 IF creatinine drops <30%, then increase to 1.7 mg q 6 hours                IF no change in creatinine or worsens, stop infusion  - stop terlipressin if creatinine is <1.5 for 2 consecutive days.     Dyspnea due to recurrent R hepatic hydrothorax  History of COPD  ESLD, alcoholic cirrhosis  Ascites  Admitted with dypsnea and mild abdominal pain, recently stopped lasix. Dyspnea secondary to hepatic hydrothorax which she was getting twice weekly thoracentesis for as an outpatient which did not relieve symptoms. Started aggressive diuresis however had to stop due to low sodiums and now getting a thora once ever 2-3 days for symptom management.   -Hepatology consulted, their help is appreciated  -Continue home lactulose 20g TID, rifaximin 550mg BID, Breo ellipta 1 puff daily  -Has PRN shirley  - IR unable to do  TIPS at this time due to worsening clinical status   - if she improves with terlipressin can readress     Severe Hyponatremia secondary to cirrhosis  Hovering around 120 however dropped due to aggressive diuresis so had to stop. Now stable around 120.  - daily bmp   - persistent and continue to monitor and consider hypertonic saline if Na <115 and/or symptoms    Acute on chronic normocytic anemia  Thrombocytopenia  Coagulopathy  -Admit Hgb 8.3, Plt 51  -Likely secondary to cirrhosis and dilution  -Will monitor following diuretics and fluid removal via procedures  -Transfuse for Hgb < 7 and Plt < 10. (Would likely need comitant diuretics)    Elevated troponin  -Admit 27, 28. Likely secondary to poor kidney clearance  -Unremarkable EKG    Hypothyroidism  Last TSH was 0.74 on 07/24/23  - Continue home levothyroxine 112 mcg po daily    GERD  - Continue pantoprazole 40 mg po qdaily    Anxiety  - Holding citalopram 40 mg po qdaily and trazodone 50 mg po at bedtime  - continue lorazepam 0.5 mg po qdaily prn    Hypokalemia, resolved  - replacement protocol        Diet: 2 Gram Sodium Diet  Snacks/Supplements Adult: Ensure Enlive; Between Meals  Fluid restriction 1000 ML FLUID    DVT Prophylaxis: Pneumatic Compression Devices  Demarco Catheter: Not present  Lines: None     Cardiac Monitoring: None  Code Status: Full Code      Clinically Significant Risk Factors        # Hyperkalemia: Highest K = 5.7 mmol/L in last 2 days, will monitor as appropriate  # Hyponatremia: Lowest Na = 119 mmol/L in last 2 days, will monitor as appropriate   # Hypercalcemia: Highest Ca = 11 mg/dL in last 2 days, will monitor as appropriate       # Coagulation Defect: INR = 2.33 (Ref range: 0.85 - 1.15) and/or PTT = N/A, will monitor for bleeding  # Thrombocytopenia: Lowest platelets = 35 in last 2 days, will monitor for bleeding  # Acute Kidney Injury, unspecified: based on a >150% or 0.3 mg/dL increase in last creatinine compared to past 90 day  average, will monitor renal function          # Moderate Malnutrition: based on nutrition assessment    # Financial/Environmental Concerns: none  # Asthma: noted on problem list        Disposition Plan      Expected Discharge Date: 11/08/2023      Destination: home with family  Discharge Comments: Dispo: home, ind.  Plan: Na 122, con't fluid rxn regimen to improve Na. On 3LNC o/n, try to wean to RA. Thora every few days may need to stay until 11/6 for TIPS  Progression: Con't lactulose and rifaximin for hepatic encephalopathy, mentation stable.            Blaise Fields, DO  Hospitalist Service, GOLD TEAM 9  [facility]  Securely message with iwi (more info)  Text page via [facility] Paging/Directory   See signed in provider for up to date coverage information  ______________________________________________________________________    Interval History     Breathing continues to be stable today. Hgb low this AM so getting blood. Will need to monitor closely given dyspnea and effusion. Cr also rising so starting terlipressin. I discussed with patient that TIPS is not an option right now due to her worsening clinical status. Will plan to talk to family about this as well when they are present or call.     Physical Exam   Vital Signs: Temp: 97.3  F (36.3  C) Temp src: Oral BP: 107/51 Pulse: 78   Resp: 16 SpO2: 96 % O2 Device: Nasal cannula Oxygen Delivery: 3 LPM  Weight: 145 lbs 1 oz  Gen: Awake and alert, appears comfortable, appears stated age. Sitting up in bed.   HEENT: NCAT, sclerae anicteric.  CV: regular rhythm and rate,   Pulm: Normal work of breathing. Right lung field - reduced breath sounds and bronchial breath sounds evident  GI: Non-distended, non-tender  Skin: Warm, dry  Neuro: AOx3, speech normal, moves all extremities symmetrically.  Psych: Mood is good, affect is congruent.      Medical Decision Making       55 MINUTES SPENT BY ME on the date of service doing chart  review, history, exam, documentation & further activities per the note.      Data     I have personally reviewed the following data over the past 24 hrs:    4.8  \   6.4 (LL)   / 35 (LL)     120 (L) 87 (L) 102.0 (H) /  91   5.0 21 (L) 1.41 (H) \     ALT: 27 AST: 38 AP: 100 TBILI: 3.8 (H)   ALB: 4.6 TOT PROTEIN: 6.2 (L) LIPASE: N/A       Imaging results reviewed over the past 24 hrs:   No results found for this or any previous visit (from the past 24 hour(s)).

## 2023-11-01 NOTE — PROGRESS NOTES
[facility]  HEPATOLOGY PROGRESS NOTE  Stefani Crowell [id number]       IMPRESSION:  Stefani Crowell is a 63 year old female with a history of alcohol associated cirrhosis, last use 6/28/23 complicated by hepatic hydrothorax with twice weekly thoracentesis, hyponatremia, COPD, weight loss, ascites who was admitted with dyspnea. She has had continued hyponatremia.     RECOMMENDATIONS:  # Hyponatremia  # MATTIE-HRS  - sodium level variable, no plans for restarting diuretics. Has continued with low sodium diet, fluid restriction.   - Ur Na <20. Cystatin C as outpatient. Now with continued increase in creat/BUN. Last normal creatinine was 8/30/23. Has albumin level 4.2- has received prior albumin challenge.   - With high BUN and underlying lower muscle mass, her creatinine is likely not representing level of kidney dysfunction. With her hypotension, more consistent with hepatorenal process.   - Will plan to start terlipressin 0.85 q 6 hours for 3 days.    IF creatinine drops >30%, then continue at same dose, maximum 14 days.    IF creatinine drops <30%, then increase to 1.7 mg q 6 hours   IF no change in creatinine or worsens, stop infusion  - stop terlipressin if creatinine is <1.5 for 2 consecutive days.   - hold midodrine while on terlipressin  - EKG 10/23 without overt cardiac disease. Monitor for hypoxia- will need to be on continuous oxygen monitoring while on terlipressin. Stop terlipressin if SaO2 <90%, evidence of ischemia.     # Hepatic hydrothorax  - diuretic refractory due to hyponatremia.   - has needed three times weekly thora, removing 2 L each time. Last diagnostic testing 10/25 without SBE. Recommend inpatient thoras with diagnostic testing.   -  MELD 24 (initial MELD), tVaughn Bili has been more indirect. Has apt to have discussion about TIPS on 11/6  as outpatient. With rising MELD, concerning that she may not be a candidate for TIPS or be able to be discharged for apt. Echo 10/25 without right  "sided dysfunction, RA 3. Bubble with evidence of \"medium\" PFO.  Seen by cardiology, no issue with PFO and TIPS.   - With elevated MELD, relayed higher risk for further decompensation that can cause death following TIPS .     # Alcohol associated cirrhosis  - currently engaged in CD programming. Peth 10/19 negative  - evaluated for liver transplant in past, not a current candidate for transplant, MELD 31.  She needs to complete her CD program and abstinence period completion.   - EGD 8/2023 with normal esophagus  -Ultrasound complete with Doppler for evaluation of HCC and thrombosis negative. (10/22)    # Hepatic encephalopathy  - continue lactulose and rifaximin. Titrate for ~3-4 BM's per day. Mentation stable.     Patient was discussed with attending physician, Dr. Annie Cary  The above note reflects our joint plan.     Next follow up appointment: Dr. Stephanie Lim 1/2024    Thank you for the opportunity to be involved with  Stefani Crowell [facility]. Please call with any questions or concerns.    ROD Urban, CNP  Inpatient Hepatology [facility]      SUBJECTIVE:  States she has increase in \"wheezing\" and heaviness. No melena or hematochezia. No fevers. Continues to take in liquids and supplement.     ROS:  A 10-point review of systems was negative.    OBJECTIVE:  VS: BP 95/42 (BP Location: Left arm)   Pulse 79   Temp 97.2  F (36.2  C) (Oral)   Resp 16   Ht 1.651 m (5' 5\")   Wt 64.9 kg (143 lb)   LMP  (LMP Unknown)   SpO2 96%   BMI 23.80 kg/m        General: In no acute distress, mild facial muscle wasting  Neuro: AOx3, No asterixis  HEENT:  Noscleral icterus, Nooral lesions  CV: . Skin warm and dry  Lungs:  Respirations even and nonlabored on room air  Abd:  Nondistended, nontender   Extrem: Noperipheral edema   Skin: Nojaundice  Psych: pleasant    MEDICATIONS:  Current Facility-Administered Medications   Medication    acetaminophen (TYLENOL) tablet 650 mg    Or    acetaminophen (TYLENOL) Suppository " 650 mg    albumin human 25 % injection 50 g    albuterol (PROVENTIL HFA/VENTOLIN HFA) inhaler    artificial saliva (BIOTENE DRY MOUTHWASH) liquid 5 mL    [Held by provider] citalopram (celeXA) tablet 40 mg    ferrous sulfate (FEROSUL) tablet 325 mg    fluticasone-vilanterol (BREO ELLIPTA) 200-25 MCG/ACT inhaler 1 puff    [Held by provider] furosemide (LASIX) tablet 20 mg    lactulose (CHRONULAC) solution 20 g    levothyroxine (SYNTHROID/LEVOTHROID) tablet 112 mcg    LORazepam (ATIVAN) tablet 0.5 mg    melatonin tablet 3 mg    midodrine (PROAMATINE) tablet 20 mg    montelukast (SINGULAIR) tablet 10 mg    pantoprazole (PROTONIX) EC tablet 40 mg    polyethylene glycol (MIRALAX) Packet 17 g    prochlorperazine (COMPAZINE) injection 5 mg    Or    prochlorperazine (COMPAZINE) tablet 5 mg    Or    prochlorperazine (COMPAZINE) suppository 25 mg    rifaximin (XIFAXAN) tablet 550 mg    [Held by provider] spironolactone (ALDACTONE) tablet 100 mg    [Held by provider] traZODone (DESYREL) half-tab 25-50 mg       REVIEW OF LABORATORY, PATHOLOGY AND IMAGING RESULTS:  [facility]  Recent Labs   Lab 11/01/23  0557 10/31/23  1309 10/31/23  0645 10/30/23  2350 10/30/23  1251 10/30/23  0639 10/30/23  0414 10/29/23  1327 10/29/23  0651   * 120* 120*  120* 119*   < > 122*  122*  --    < > 120*  120*   POTASSIUM 5.0  --  5.7*  --   --  4.9  --   --  5.2   CHLORIDE 87*  --  88*  --   --  90*  --   --  89*   UMA 11.0*  --  10.5*  --   --  10.9*  --   --  10.6*   CO2 21*  --  20*  --   --  22  --   --  20*   .0*  --  93.3*  --   --  74.0*  --   --  62.7*   CR 1.41*  --  1.27*  --   --  1.01*  --   --  1.05*   GLC 91  --  79  --   --  103* 83  --  103*    < > = values in this interval not displayed.     CBC  Recent Labs   Lab 11/01/23  0557 10/30/23  0639 10/26/23  0657   WBC 4.8 5.7 8.0   RBC 2.00* 2.37* 2.50*   HGB 6.4* 7.7* 8.2*   HCT 19.0* 23.3* 24.1*   MCV 95 98 96   MCH 32.0 32.5 32.8   MCHC 33.7 33.0 34.0   RDW 15.4* 15.4*  "15.2*   PLT 35* 43* 62*     INR  Recent Labs   Lab 10/31/23  0645 10/30/23  0639 10/29/23  0651 10/28/23  0719   INR 2.33* 2.30* 2.08* 2.19*     LFTs  Recent Labs   Lab 11/01/23  0557 10/31/23  0645 10/30/23  0639 10/29/23  0651   ALKPHOS 100 84 91 114*   AST 38 40 42 41   ALT 27 24 29 26   BILITOTAL 3.8* 3.6* 3.8* 3.4*   PROTTOTAL 6.2* 5.6* 6.2* 6.1*   ALBUMIN 4.6 4.2 4.5 4.4        MELD 3.0: 31 at 11/1/2023  5:57 AM  MELD-Na: 30 at 11/1/2023  5:57 AM  Calculated from:  Serum Creatinine: 1.41 mg/dL at 11/1/2023  5:57 AM  Serum Sodium: 120 mmol/L (Using min of 125 mmol/L) at 11/1/2023  5:57 AM  Total Bilirubin: 3.8 mg/dL at 11/1/2023  5:57 AM  Serum Albumin: 4.6 g/dL (Using max of 3.5 g/dL) at 11/1/2023  5:57 AM  INR(ratio): 2.33 at 10/31/2023  6:45 AM  Age at listing (hypothetical): 63 years  Sex: Female at 11/1/2023  5:57 AM    Previous work-up:   Lab Results   Component Value Date    HEPBANG Nonreactive 02/18/2021    HBCAB Nonreactive 02/18/2021    HCVAB  08/03/2017     Nonreactive   Assay performance characteristics have not been established for newborns,   infants, and children      TANYA 157 10/03/2023    IRONSAT 28 10/26/2023    TSH 1.75 10/26/2023    CHOL 176 08/15/2022    HDL 44 (L) 08/15/2022     (H) 08/15/2022    TRIG 111 08/15/2022    A1C 4.7 07/24/2023    POPETH <10 10/22/2023    PLPETH <10 10/22/2023      No results found for: \"SPECDES\", \"LDRESULTS\"      Imaging Results:  None current.   "

## 2023-11-01 NOTE — CONSULTS
Care Management Initial Consult    General Information  Assessment completed with: Patient, VM-chart review,    Type of CM/SW Visit: Initial Assessment    Primary Care Provider verified and updated as needed: Yes   Readmission within the last 30 days: previous discharge plan unsuccessful         Advance Care Planning: Advance Care Planning Reviewed: no concerns identified          Communication Assessment  Patient's communication style: spoken language (English or Bilingual)    Hearing Difficulty or Deaf: no   Wear Glasses or Blind: no    Cognitive  Cognitive/Neuro/Behavioral: WDL                      Living Environment:   People in home: significant other  Hari  Current living Arrangements: house      Able to return to prior arrangements: yes       Family/Social Support:  Care provided by: self, spouse/significant other  Provides care for: no one  Marital Status: Lives with Significant Other  Significant Other, Children, Sibling(s)          Description of Support System: Supportive, Involved    Support Assessment: Adequate family and caregiver support, Adequate social supports    Current Resources:   Patient receiving home care services: No     Community Resources: [employer]  Equipment currently used at home: walker, rolling  Supplies currently used at home: None    Employment/Financial:  Employment Status: disabled        Financial Concerns: none   Referral to Financial Worker: No       Does the patient's insurance plan have a 3 day qualifying hospital stay waiver?  No    Lifestyle & Psychosocial Needs:  Social Determinants of Health     Food Insecurity: Not on file   Depression: At risk (8/18/2023)    PHQ-2     PHQ-2 Score: 4   Housing Stability: Not on file   Tobacco Use: Medium Risk (10/3/2023)    Patient History     Smoking Tobacco Use: Former     Smokeless Tobacco Use: Never     Passive Exposure: Past   Financial Resource Strain: Not on file   Alcohol Use: Not on file   Transportation  Needs: Not on file   Physical Activity: Not on file   Interpersonal Safety: Not on file   Stress: Not on file   Social Connections: Not on file       Functional Status:  Prior to admission patient needed assistance:   Dependent ADLs:: Ambulation-walker  Dependent IADLs:: Cleaning, Laundry, Cooking, Shopping, Meal Preparation, Medication Management, Transportation       Mental Health Status:  Mental Health Status: No Current Concerns       Chemical Dependency Status:  Chemical Dependency Status: Past Concern  Chemical Dependency Management:  (Prior to be hospitalized was attending OP treatment)          Values/Beliefs:  Spiritual, Cultural Beliefs, Yarsanism Practices, Values that affect care:  (Not discussed at this time)               Additional Information:  Stefani Crowell is a 63 year old female with a pertinent past medical history of alcoholic cirrhosis, hepatorenal syndrome, hypothyroidism, recurrent hyponatremia, recurrent pleural effusions, COPD and recent admission for hyponatremia who was admitted on 10/22/2023 due to increased dyspnea related to R pleural effusion and hyponatremia.     SW met with patient at bedside to complete initial assessment. SW introduced self and explained their role in the patient's care. SW verified the patient's address, PCP, and insurance.    Patient lives in  a house with her significant other Hari. The patient and Hari have been together for 21 years, she will sometimes refer to him as her  since they have been together for so many years. The patient reported she uses a 4 wheeled walker at home and Hari will assist with ADLs and IADLs as needed. The patient reported her sister Paulette and Bev are very involved and supportive. Bev is coming from [place] to be with the patient. The patient is still in the process of applying for disability. The patient reported prior to be hospitalized she was attending outpatient CD treatment. The patient stated she has been  unable to go recently due to being hospitalized. At the time of discharge the patient's family will be able to provide transportation home.    The patient declines having any questions or concerns at this time.    SW will continue to follow for discharge needs.     JULIO Brock  [address]   Office: [phone]  Pager: [phone]  [email or web address]

## 2023-11-01 NOTE — PLAN OF CARE
Goal Outcome Evaluation:      Plan of Care Reviewed With: patient    Overall Patient Progress: no changeOverall Patient Progress: no change    Outcome Evaluation: Pt is A&Ox4 with VSS but soft BPs and 3L NC. pt reported 1x BM overnight. pt on a 1L FR, but did consistantly call for fluids and ice. In room writer notied 1 styrofoam cup of ice is 150mL of fluid. Pt slept most of shift. End of shift rounding complete, call light in reach and pt working on computer. Crital lab of 6.4 hemoglobin and 35 platelet @0701, team notified and unit of blood ordered. Plan is to have a TIPS prodecure on 11/6

## 2023-11-01 NOTE — PROGRESS NOTES
Nephrology Progress Note  11/01/2023       Stefani Crowell is a 63 yof with hx of ETOH cirrhosis being worked up for transplant complicated by hydrothorax requiring at least twice weekly thoracenteses, COPD, recent wt loss who was admitted with SOB, found to be hyponatremic.  Na normal up until July 2023 when it has been on the decline, also has had rise in Cr from baseline of 0.7 to 1.1 during admission.  Nephrology consulted for management of hyponatremia.     Interval History :   Mrs Crowell's Na remains stable at ~120, continuing fluid restriction and encouraging solute intake.  More concerning has been her Cr rise in the past 48h with hypotension, has proven very difficult to keep her volume status stable as she needs to be on hypovolemic side to keep hydrothorax from accumulating rapidly but results in hypovolemia with renal hypoperfusion.      Would not be a good candidate for iHD given her blood pressures and CRRT does not appear to be a bridge to improvement as she is not a liver transplant candidate.  In discussion with hepatology today we will start terlipressin as we feel she does have HRS physiology with hopes that we can stabilize her renal fx.  SOB is main complication of terlipressin, is on SpO2 monitoring.       Assessment & Recommendations:   Hyponatremia-On the decline since July when it was normal, now quite low at 117 on our visit. Serum osm low confirming true hyponatremia.  Jaswinder <20 and UA shows hyaline casts which is consistent with either true hypovolemia or hypervolemic hyponatremia.  Unable to use urea or vaptan due to liver disease, she is already on high dose midodrine to try to increase renal perfusion.                  - hyponatremia due to low EABV, difficult to manage with liver disease but so far getting 50g albumin daily but less likely to be helpful now that her alb is 4 (not an option as an outpt)  - Essentially stable in very low range, can check daily.   - U osm has not  "changed with stopping celexa so could restart this med if she needs it                  Renal Function-Cr up from baseline of 0.7 in July to ~1.1 during her admission and now up to ~1.3 in setting of low BP's overnight.  I am concerned that we are going to have difficulty keeping her intravascularly euvolemic (appears on dry side currently) while avoiding hydrothorax/frequent para's.  Considering TIPS but MELD is higher than one would like to do TIPS.       Volume-Total body volume overloaded with recurrent hydrothorax.  Previously ECHO with collapsed IVC suggesting some degree of hypovolemia on 10/25, Jaswinder <20 and has hyaline casts on UA confirms renal hypoperfusion.  Needs very frequent thora's, looking into TIPS to try to reduce ascites/hydrothorax.      K/pH-K 5.0, after lokelma 15g yesterday, bicarb 21, started bicarb tabs 650mg TID although this will result in increased Na intake.       BMD-  Uswcsxd30.0 but actually corrects lower with high albumin, Mg and Phos not checked today.      Anemia-Hgb 6.4, getting PRBC's, no clear source of bleeding currently, iron sats 28%     Nutrition-Taking PO, encouraged solute intake and avoiding H2O.       Time spent: 40 minutes on this date of encounter for chart review, physical exam, medical decision making and co-ordination of care.      Seen and discussed with Dr Leggett     Recommendations were communicated to primary team via verbal communication.        ROD Pena CNS  Clinical Nurse Specialist  [phone]    Review of Systems:   I reviewed the following systems:  Gen: No fevers or chills  CV: No CP at rest  Resp: No SOB at rest  GI: No N/V    Physical Exam:   I/O last 3 completed shifts:  In: 1076 [P.O.:1076]  Out: -    /51 (BP Location: Left arm, Cuff Size: Adult Small)   Pulse 78   Temp 97.3  F (36.3  C) (Oral)   Resp 16   Ht 1.651 m (5' 5\")   Wt 64.9 kg (143 lb)   LMP  (LMP Unknown)   SpO2 96%   BMI 23.80 kg/m       GENERAL APPEARANCE: " alert and no distress, frail.    EYES: No scleral icterus  Pulmonary: Breathing comfortably on RA.   CV: Regular rhythm, normal rate   - Edema +1 generalized.   GI: soft, nontender, normal bowel sounds  MS: no evidence of inflammation in joints, no muscle tenderness  : No Demarco  SKIN: no rash, warm, dry  NEURO: mentation intact and speech normal    Labs:   All labs reviewed by me  Electrolytes/Renal -   Recent Labs   Lab Test 11/01/23  0557 10/31/23  1309 10/31/23  0645 10/30/23  1251 10/30/23  0639 10/22/23  1026 10/22/23  0150 10/19/23  1523 10/03/23  0853 08/30/23  1202 08/18/23  1402 07/24/23  1457 07/06/23  1034   * 120* 120*  120*   < > 122*  122*   < > 121*   < > 126*   < > 129*   < > 129*   POTASSIUM 5.0  --  5.7*  --  4.9   < > 3.3*   < > 3.9   < > 3.1*   < > 3.4   CHLORIDE 87*  --  88*  --  90*   < > 90*   < > 91*   < > 94*   < > 94*   CO2 21*  --  20*  --  22   < > 18*   < > 25   < > 22   < > 23   .0*  --  93.3*  --  74.0*   < > 30.3*   < > 33.9*   < > 14.0  --  8.3   CR 1.41*  --  1.27*  --  1.01*   < > 1.04*   < > 1.24*   < > 0.62  --  0.68   GLC 91  --  79  --  103*   < > 148*   < > 98   < > 119*  --  113*   UMA 11.0*  --  10.5*  --  10.9*   < > 9.6   < > 10.2   < > 9.5  --  9.2   MAG  --   --   --   --   --   --  2.6*  --   --   --  2.2  --  2.1   PHOS  --   --   --   --   --   --   --   --  2.6  --   --   --   --     < > = values in this interval not displayed.       CBC -   Recent Labs   Lab Test 11/01/23  0557 10/30/23  0639 10/26/23  0657   WBC 4.8 5.7 8.0   HGB 6.4* 7.7* 8.2*   PLT 35* 43* 62*       LFTs -   Recent Labs   Lab Test 11/01/23  0557 10/31/23  0645 10/30/23  0639   ALKPHOS 100 84 91   BILITOTAL 3.8* 3.6* 3.8*   ALT 27 24 29   AST 38 40 42   PROTTOTAL 6.2* 5.6* 6.2*   ALBUMIN 4.6 4.2 4.5       Iron Panel -   Recent Labs   Lab Test 10/26/23  1311 10/03/23  0853   IRON 62 43   IRONSAT 28 28   TANYA  --  157           Current Medications:   albumin human  50 g Intravenous  Daily    [Held by provider] citalopram  40 mg Oral Daily    ferrous sulfate  325 mg Oral Daily with breakfast    fluticasone-vilanterol  1 puff Inhalation Daily    [Held by provider] furosemide  20 mg Oral BID    lactulose  20 g Oral TID    levothyroxine  112 mcg Oral Daily    midodrine  20 mg Oral TID    montelukast  10 mg Oral At Bedtime    pantoprazole  40 mg Oral Daily    rifaximin  550 mg Oral BID    [Held by provider] spironolactone  100 mg Oral Daily    terlipressin  0.85 mg Intravenous Q6H    [Held by provider] traZODone  25-50 mg Oral At Bedtime       Attestation:  I have seen and evaluated this patient with the MALOU as part of a shared visit.  I personally reviewed the vital signs, medications and labs, and interviewed the patient.  I have participated in the medical decision making as outlined in our joint note.     62 yo F with ESLD complicated by hydrothorax and CKD who now has hyponatremia. Less alert today.  BP low ON. Not dizzy.  Cr rising     Assessment and recommendations:    # hyponatremia appears to be hypervolemic with poor renal perfusion from HRS.  U osm has not changed stopping Celexa, so this may not be cause of inappropriate ADH production   - continue fluid restriction   - consider restarting Celexa if pt would benefit  # Volume status: she has no edema and minimal ascites.  BP low today. However she feels much better after thoracentesis   - titrate lactulose to 2-3 BMs daily  - MATTIE: concern re HRS     Obdulia Leggett MD

## 2023-11-02 NOTE — OR NURSING
Patient underwent EGD under conscious sedation. Tolerated procedure. Visible food in stomach, no blood visualized. Plan to prep patient for colonoscopy. Report given to Paulette QUINN RN. Transported back to PCU on 4 L NC.

## 2023-11-02 NOTE — PROVIDER NOTIFICATION
Provider notified (name): Donnie Prado message @ 0255: Pt is having bloody stools, this is the first one I've seen today, she said it just started today, but it's pretty dark red. Her platelets are infusing, I was planning on giving the blood once those were done  Response from provider: Provider said he would inform CODY Prado message @ 1668: It doesn't look like we have a hemoglobin recheck in for later today- her platelets should be done in about an hour and a half then I'll get her blood going which should take 2ish hours- do you want to get a recheck this veronica? I'm concerned that she might be actively bleeding and will require more blood products  Response from provider: Replied back, placed hgb recheck for this veronica. GI provider at bedside able to see sample of bloody stool saved- wants pt NPO for possible scope

## 2023-11-02 NOTE — PROGRESS NOTES
[facility]    Medicine Progress Note - Hospitalist Service, GOLD TEAM 9    Date of Admission:  10/22/2023    Assessment & Plan   Stefani Crowell is a 63 year old female with a pertinent past medical history of alcoholic cirrhosis, hepatorenal syndrome, hypothyroidism, recurrent hyponatremia, recurrent pleural effusions, COPD and recent admission for hyponatremia who was admitted on 10/22/2023 due to increased dyspnea related to R pleural effusion and hyponatremia.     MATTIE, likely secondary to hepatorenal syndrome  Baseline Cr 0.8, admit with 1.04 and now rising to 1.41 despite albumin. Was getting daily albumin and Cr continued to rise. Discussed with hepatology and nephrology and will start terlipressin given concern for HRS physiology. If she does not improve and ends up needing HD she is not a good candidate due to low BP and will have to have goals of care convo.  - daily albumin  - hold diuretics  - Will plan to start terlipressin 0.85 q 6 hours for 3 days.                 - dose adjustments and stopping per nephro and hepatology    Dyspnea due to recurrent R hepatic hydrothorax  History of COPD  ESLD, alcoholic cirrhosis  Ascites  Admitted with dypsnea and mild abdominal pain, recently stopped lasix. Dyspnea secondary to hepatic hydrothorax which she was getting twice weekly thoracentesis for as an outpatient which did not relieve symptoms. Started aggressive diuresis however had to stop due to low sodiums and now getting a thora once ever 2-3 days for symptom management.   -Hepatology consulted, their help is appreciated  -Continue home lactulose 20g TID, rifaximin 550mg BID, Breo ellipta 1 puff daily  -Has PRN shirley  - IR unable to do TIPS at this time due to worsening clinical status   - if she improves with terlipressin can readress   - CAPS consulted for thora 11/2    Severe Hyponatremia secondary to cirrhosis  Hovering around 120 however dropped due to  aggressive diuresis so had to stop. Now stable around 120.  - daily bmp   - persistent and continue to monitor and consider hypertonic saline if Na <115 and/or symptoms    Acute on chronic normocytic anemia  Likely lower GI bleed  Hgb dropping below 7 starting 11/1 without signs of bleeding. On 11/2 noted to have dark red bloody stools. Taken for EGD without signs of bleeding. Plan for bowel prep and colonoscopy tomorrow.  - Transfuse for Hgb < 7    - hgb recheck at 1800 tonight  - miralax bowel prep tonight  - NPO at midnight     Thrombocytopenia  Coagulopathy  -Likely secondary to cirrhosis   -Will monitor following diuretics and fluid removal via procedures  and Plt < 10. (Would likely need comitant diuretics)   - given a unit of plts on 11/2 due to being < 50 and needing thora    Elevated troponin  -Admit 27, 28. Likely secondary to poor kidney clearance  -Unremarkable EKG    Hypothyroidism  Last TSH was 0.74 on 07/24/23  - Continue home levothyroxine 112 mcg po daily    GERD  - Continue pantoprazole 40 mg po qdaily    Anxiety  - Holding citalopram 40 mg po qdaily and trazodone 50 mg po at bedtime  - continue lorazepam 0.5 mg po qdaily prn    Hypokalemia, resolved  - replacement protocol        Diet: Snacks/Supplements Adult: Ensure Enlive; Between Meals  Fluid restriction 1000 ML FLUID  NPO per Anesthesia Guidelines for Procedure/Surgery Except for: Meds, Ice Chips    DVT Prophylaxis: Pneumatic Compression Devices  Demarco Catheter: Not present  Lines: None     Cardiac Monitoring: None  Code Status: Full Code      Clinically Significant Risk Factors         # Hyponatremia: Lowest Na = 120 mmol/L in last 2 days, will monitor as appropriate   # Hypercalcemia: Highest Ca = 11.2 mg/dL in last 2 days, will monitor as appropriate       # Coagulation Defect: INR = 2.56 (Ref range: 0.85 - 1.15) and/or PTT = N/A, will monitor for bleeding  # Thrombocytopenia: Lowest platelets = 27 in last 2 days, will monitor for bleeding            # Moderate Malnutrition: based on nutrition assessment    # Financial/Environmental Concerns: none  # Asthma: noted on problem list        Disposition Plan      Expected Discharge Date: 11/10/2023      Destination: home with family  Discharge Comments: Dispo: home, ind.  Plan: Na 122, con't fluid rxn regimen to improve Na. On 3LNC o/n, try to wean to RA. Thora every few days may need to stay until 11/6 for TIPS  Progression: Con't lactulose and rifaximin for hepatic encephalopathy, mentation stable.            Blaise Fields DO  Hospitalist Service, GOLD TEAM 9  [facility]  Securely message with [employer] (more info)  Text page via [facility] Paging/Directory   See signed in provider for up to date coverage information  ______________________________________________________________________    Interval History     Breathing a little worse today on some oxygen mostly for comfort. CAPS consulted for thora. Discussed plan with patient and sister today who asked good questions. Creatinine improving. Having some RUQ pain. Later in the day had bloody stools so taken for EGD without bleeding. Plan for prep tonight and colonoscopy tomorrow.     Physical Exam   Vital Signs: Temp: 97.4  F (36.3  C) Temp src: Oral BP: 118/51 Pulse: 88   Resp: 22 SpO2: 98 % O2 Device: Nasal cannula Oxygen Delivery: 3 LPM  Weight: 146 lbs 6.17 oz  Gen: Awake and alert, appears comfortable, appears stated age. Sitting up in bed.   HEENT: NCAT, sclerae anicteric.  CV: regular rhythm and rate,   Pulm: Normal work of breathing. Right lung field - reduced breath sounds and bronchial breath sounds evident  GI: Non-distended, non-tender  Skin: Warm, dry  Neuro: AOx3, speech normal, moves all extremities symmetrically.  Psych: Mood is good, affect is congruent.      Medical Decision Making       55 MINUTES SPENT BY ME on the date of service doing chart review, history, exam, documentation & further activities  per the note.      Data     I have personally reviewed the following data over the past 24 hrs:    5.7  \   6.7 (LL)   / 27 (LL)     123 (L) 89 (L) 93.1 (H) /  118 (H)   5.1 22 1.13 (H) \     ALT: 25 AST: 37 AP: 77 TBILI: 5.4 (H); 5.5 (H)   ALB: 4.8 TOT PROTEIN: 6.1 (L) LIPASE: N/A     INR:  2.56 (H) PTT:  N/A   D-dimer:  N/A Fibrinogen:  144 (L)     Ferritin:  N/A % Retic:  4.9 (H) LDH:  162       Imaging results reviewed over the past 24 hrs:   Recent Results (from the past 24 hour(s))   XR Chest Port 1 View    Narrative    Portable chest    Indication short of breath    COMPARISON: 10/27/2023    FINDINGS: Improved aeration of the right upper lung. Decreased but  still prominent meniscus formation at the right costophrenic angle.  Rounded density overlying the left upper lung which may indicate  infectious consolidation.      Impression    IMPRESSION: Decreased right pleural effusion associated lung base  atelectasis. Short interval increase in conspicuity nodular density in  the left upper quadrant of the left midlung density comment this may  indicate developing infection or loculated fluid. Follow-up to  clearing.    JUSTINO SHEEHAN MD         SYSTEM ID:  [id number]

## 2023-11-02 NOTE — PROGRESS NOTES
Nephrology Progress Note  11/02/2023       Stefani Crowell is a 63 yof with hx of ETOH cirrhosis being worked up for transplant complicated by hydrothorax requiring at least twice weekly thoracenteses, COPD, recent wt loss who was admitted with SOB, found to be hyponatremic.  Na normal up until July 2023 when it has been on the decline, also has had rise in Cr from baseline of 0.7 to 1.1 during admission.  Nephrology consulted for management of hyponatremia.     Interval History :   Mrs Crowell started terlipressin yesterday, had significant improvement in BP's and Cr is down from 1.4=>1.1, Na also improved likely due to improved renal perfusion.  Continuing terlipressin again today, hopefull her improvement in renal fx will allow for consideration of TIPS as her main issue from overall standpoint is her need for frequent thora's.  Will take it day-by-day with terlipressin as it will be unclear when to stop it, would ideally like to see Cr trend back towards previous baseline and stabilize before stopping.      Assessment & Recommendations:   AK*I-Cr up from baseline of 0.7 in July to ~1.1 during her admission then up to ~1.4 in setting of low BP's.  Jaswinder's consistently <20 and UA's historically with hyaline casts consistent with HRS.  Started terlipressin 11/1 and have seen significant response with BP's and Cr.  Continuing.  Would be poor candidate for HD due to persistent hypotension.   -MATTIE, likely HRS based on clinical course and      Hyponatremia-On the decline since July when it was normal, now quite low at 117 on our visit. Serum osm low confirming true hyponatremia.  Jaswinder <20 and UA shows hyaline casts which is consistent with either true hypovolemia or hypervolemic hyponatremia.  Unable to use urea or vaptan due to liver disease, she is already on high dose midodrine to try to increase renal perfusion.                  - hyponatremia due to low EABV, difficult to manage with liver disease but slightly  "improved the past 24h after terlipressin initiation.   - U osm has not changed with stopping celexa so could restart this med if she needs it                 Volume-Total body volume overloaded with recurrent hydrothorax.  Previously ECHO with collapsed IVC suggesting some degree of hypovolemia on 10/25, Jaswinder <20 and has hyaline casts on UA confirms renal hypoperfusion.  Needs very frequent thora's, looking into TIPS to try to reduce ascites/hydrothorax.      K/pH-K 5.1, stable from 5.0 yesterday and is making more UOP, bicarb 21, started bicarb tabs 650mg TID although this will result in increased Na intake.       BMD-  [id number] but actually corrects lower with high albumin, will add iCal to labs tomorrow. Mg and Phos not checked today, also added for tomorrow.      Anemia-Hgb 6.7 despite getting 1u PRBC's yesterday, no clear source of bleeding currently, iron sats 28%     Nutrition-Taking PO, encouraged solute intake and avoiding H2O.       Time spent: 40 minutes on this date of encounter for chart review, physical exam, medical decision making and co-ordination of care.      Seen and discussed with Dr Leggett     Recommendations were communicated to primary team via verbal communication.        Denny Arriola, ROD CNS  Clinical Nurse Specialist  [phone]    Review of Systems:   I reviewed the following systems:  Gen: No fevers or chills  CV: No CP at rest  Resp: No SOB at rest  GI: No N/V    Physical Exam:   I/O last 3 completed shifts:  In: 840 [P.O.:530]  Out: 400 [Urine:400]   /55 (BP Location: Left arm)   Pulse 77   Temp 97.3  F (36.3  C) (Oral)   Resp 18   Ht 1.651 m (5' 5\")   Wt 65.8 kg (145 lb 1 oz)   LMP  (LMP Unknown)   SpO2 99%   BMI 24.14 kg/m       GENERAL APPEARANCE: alert and no distress, frail.    EYES: No scleral icterus  Pulmonary: Breathing comfortably on RA.   CV: Regular rhythm, normal rate   - Edema +1 generalized.   GI: soft, nontender, normal bowel sounds  MS: no evidence " of inflammation in joints, no muscle tenderness  : No Demarco  SKIN: no rash, warm, dry  NEURO: mentation intact and speech normal    Labs:   All labs reviewed by me  Electrolytes/Renal -   Recent Labs   Lab Test 11/02/23 0724 11/01/23  0557 10/31/23  1309 10/31/23  0645 10/22/23  1026 10/22/23  0150 10/19/23  1523 10/03/23  0853 08/30/23  1202 08/18/23  1402 07/24/23  1457 07/06/23  1034   * 120* 120* 120*  120*   < > 121*   < > 126*   < > 129*   < > 129*   POTASSIUM 5.1 5.0  --  5.7*   < > 3.3*   < > 3.9   < > 3.1*   < > 3.4   CHLORIDE 89* 87*  --  88*   < > 90*   < > 91*   < > 94*   < > 94*   CO2 22 21*  --  20*   < > 18*   < > 25   < > 22   < > 23   BUN 93.1* 102.0*  --  93.3*   < > 30.3*   < > 33.9*   < > 14.0  --  8.3   CR 1.13* 1.41*  --  1.27*   < > 1.04*   < > 1.24*   < > 0.62  --  0.68   * 91  --  79   < > 148*   < > 98   < > 119*  --  113*   UMA 11.2* 11.0*  --  10.5*   < > 9.6   < > 10.2   < > 9.5  --  9.2   MAG  --   --   --   --   --  2.6*  --   --   --  2.2  --  2.1   PHOS  --   --   --   --   --   --   --  2.6  --   --   --   --     < > = values in this interval not displayed.       CBC -   Recent Labs   Lab Test 11/01/23  0557 10/30/23  0639 10/26/23  0657   WBC 4.8 5.7 8.0   HGB 6.4* 7.7* 8.2*   PLT 35* 43* 62*       LFTs -   Recent Labs   Lab Test 11/02/23 0724 11/01/23  0557 10/31/23  0645   ALKPHOS 77 100 84   BILITOTAL 5.4* 3.8* 3.6*   ALT 25 27 24   AST 37 38 40   PROTTOTAL 6.1* 6.2* 5.6*   ALBUMIN 4.8 4.6 4.2       Iron Panel -   Recent Labs   Lab Test 10/26/23  1311 10/03/23  0853   IRON 62 43   IRONSAT 28 28   TANYA  --  157           Current Medications:   albumin human  50 g Intravenous Daily    [Held by provider] citalopram  40 mg Oral Daily    ferrous sulfate  325 mg Oral Daily with breakfast    fluticasone-vilanterol  1 puff Inhalation Daily    [Held by provider] furosemide  20 mg Oral BID    lactulose  20 g Oral TID    levothyroxine  112 mcg Oral Daily    [Held by  provider] midodrine  20 mg Oral TID    montelukast  10 mg Oral At Bedtime    pantoprazole  40 mg Oral Daily    rifaximin  550 mg Oral BID    [Held by provider] spironolactone  100 mg Oral Daily    terlipressin  0.85 mg Intravenous Q6H    [Held by provider] traZODone  25-50 mg Oral At Bedtime

## 2023-11-02 NOTE — PLAN OF CARE
"Goal Outcome Evaluation: 1900-700      Plan of Care Reviewed With: patient    Overall Patient Progress: no changeOverall Patient Progress: no change    Outcome Evaluation: A&Ox4. VSS on RA, ocassionally wearing O2 for comfort, SOB.  Removed compression socks per pt request. Up to bathroom SBA/I with walker. one BM overnight. Denies pain. 1L FL recording on board. Continue to monitor.     /55 (BP Location: Left arm)   Pulse 77   Temp 97.3  F (36.3  C) (Oral)   Resp 18   Ht 1.651 m (5' 5\")   Wt 65.8 kg (145 lb 1 oz)   LMP  (LMP Unknown)   SpO2 99%   BMI 24.14 kg/m         "

## 2023-11-02 NOTE — PROGRESS NOTES
"[facility]  HEPATOLOGY PROGRESS NOTE  Stefani Crowell [id number]       IMPRESSION:  Stefani Crowell is a 63 year old female with a history of alcohol associated cirrhosis, last use 6/28/23 complicated by hepatic hydrothorax with twice weekly thoracentesis, hyponatremia, COPD, weight loss, ascites who was admitted with dyspnea. She has had continued hyponatremia.     RECOMMENDATIONS:  # Hyponatremia  # MATTIE-HRS  - sodium level more stable, no plans for restarting diuretics. Has continued with low sodium diet, fluid restriction.   - Ur Na <20. Cystatin C as outpatient. Started terlipressin 11/1 for HRS physiology. BP improved and creat decreased today. Last normal creatinine was 8/30/23. Has albumin level 4.2- has received prior albumin challenge, recommend decreasing albumin to 25 gms daily.   - With high BUN and underlying lower muscle mass, her creatinine is likely not representing level of kidney dysfunction.    - Will plan to start terlipressin 0.85 q 6 hours for 3 days and assess need for continuing.   - hold midodrine while on terlipressin  - EKG 10/23 without overt cardiac disease. Monitor for hypoxia- will need to be on continuous oxygen monitoring while on terlipressin. Stop terlipressin if SaO2 <90%, evidence of ischemia.     # Hepatic hydrothorax  - diuretic refractory due to hyponatremia.   - has needed three times weekly thora, removing 2 L each time. Last diagnostic testing 10/25 without SBE. Recommend inpatient thoras with diagnostic testing.   -  MELD 25 (initial MELD), t. Kwasii has been more indirect. Has apt to have discussion about TIPS on 11/6  as outpatient. With rising MELD, concerning that she may not be a candidate for TIPS or be able to be discharged for apt. Echo 10/25 without right sided dysfunction, RA 3. Bubble with evidence of \"medium\" PFO.  Seen by cardiology, no issue with PFO and TIPS.   - With elevated MELD, relayed higher risk for further decompensation that can cause " "death following TIPS .     # Alcohol associated cirrhosis  - currently engaged in CD programming. Peth 10/19 negative  - evaluated for liver transplant in past, not a current candidate for transplant, MELD 30.  She needs to complete her CD program and abstinence period completion.   - EGD 8/2023 with normal esophagus  -Ultrasound complete with Doppler for evaluation of HCC and thrombosis negative. (10/22)    # Hepatic encephalopathy  - continue lactulose and rifaximin. Titrate for ~3-4 BM's per day. Mentation stable.     # Hematochezia  - now with new hematochezia, now receiving blood products for anemia. EGD today without findings of GI bleeding.   - plan for colonoscopy tomorrow, prep this evening.     Patient was discussed with attending physician, Dr. Stephanie Lim  The above note reflects our joint plan.     Next follow up appointment: Dr. Stephanie Lim 1/2024    Thank you for the opportunity to be involved with  Stefani rosas. Please call with any questions or concerns.    ROD Urban, CNP  Inpatient Hepatology [facility]      SUBJECTIVE:  Complains of RUQ consistent pain that started yesterday, no diffuse abdominal pain, hypoxia or chest pain. Increased work of breathing and feels she needs a thoracentesis. Continues to have watery stools, no melena or hematochezia.     ROS:  A 10-point review of systems was negative.    OBJECTIVE:  VS: /54 (BP Location: Left arm)   Pulse 82   Temp 97.5  F (36.4  C) (Oral)   Resp 24   Ht 1.651 m (5' 5\")   Wt 65.8 kg (145 lb 1 oz)   LMP  (LMP Unknown)   SpO2 98%   BMI 24.14 kg/m        General: In no acute distress, mild facial muscle wasting  Neuro: AOx3, No asterixis  HEENT:  Noscleral icterus, Nooral lesions  CV: . Skin warm and dry  Lungs:  Respirations even and nonlabored on 3L  Abd:  Nondistended, nontender   Extrem: Noperipheral edema   Skin: Nojaundice  Psych: pleasant    MEDICATIONS:  Current Facility-Administered Medications   Medication "    acetaminophen (TYLENOL) tablet 650 mg    Or    acetaminophen (TYLENOL) Suppository 650 mg    albumin human 25 % injection 50 g    albuterol (PROVENTIL HFA/VENTOLIN HFA) inhaler    artificial saliva (BIOTENE DRY MOUTHWASH) liquid 5 mL    [Held by provider] citalopram (celeXA) tablet 40 mg    ferrous sulfate (FEROSUL) tablet 325 mg    fluticasone-vilanterol (BREO ELLIPTA) 200-25 MCG/ACT inhaler 1 puff    [Held by provider] furosemide (LASIX) tablet 20 mg    lactulose (CHRONULAC) solution 20 g    levothyroxine (SYNTHROID/LEVOTHROID) tablet 112 mcg    LORazepam (ATIVAN) tablet 0.5 mg    melatonin tablet 3 mg    [Held by provider] midodrine (PROAMATINE) tablet 20 mg    montelukast (SINGULAIR) tablet 10 mg    pantoprazole (PROTONIX) EC tablet 40 mg    polyethylene glycol (MIRALAX) Packet 17 g    prochlorperazine (COMPAZINE) injection 5 mg    Or    prochlorperazine (COMPAZINE) tablet 5 mg    Or    prochlorperazine (COMPAZINE) suppository 25 mg    rifaximin (XIFAXAN) tablet 550 mg    [Held by provider] spironolactone (ALDACTONE) tablet 100 mg    terlipressin (TERLIVAZ) injection 0.85 mg    [Held by provider] traZODone (DESYREL) half-tab 25-50 mg       REVIEW OF LABORATORY, PATHOLOGY AND IMAGING RESULTS:  BMP  Recent Labs   Lab 11/02/23  0724 11/01/23  0557 10/31/23  1309 10/31/23  0645 10/30/23  1251 10/30/23  0639   * 120* 120* 120*  120*   < > 122*  122*   POTASSIUM 5.1 5.0  --  5.7*  --  4.9   CHLORIDE 89* 87*  --  88*  --  90*   UMA 11.2* 11.0*  --  10.5*  --  10.9*   CO2 22 21*  --  20*  --  22   BUN 93.1* 102.0*  --  93.3*  --  74.0*   CR 1.13* 1.41*  --  1.27*  --  1.01*   * 91  --  79  --  103*    < > = values in this interval not displayed.     CBC  Recent Labs   Lab 11/02/23  0846 11/02/23  0724 11/01/23  0557 10/30/23  0639   WBC 5.7 5.8 4.8 5.7   RBC 2.08* 2.13* 2.00* 2.37*   HGB 6.7* 6.7* 6.4* 7.7*   HCT 19.7* 19.8* 19.0* 23.3*   MCV 95 93 95 98   MCH 32.2 31.5 32.0 32.5   MCHC 34.0 33.8 33.7  "33.0   RDW 15.7* 15.5* 15.4* 15.4*   PLT 27* 27* 35* 43*     INR  Recent Labs   Lab 11/02/23  0846 10/31/23  0645 10/30/23  0639 10/29/23  0651   INR 2.56* 2.33* 2.30* 2.08*     LFTs  Recent Labs   Lab 11/02/23  0724 11/01/23  0557 10/31/23  0645 10/30/23  0639   ALKPHOS 77 100 84 91   AST 37 38 40 42   ALT 25 27 24 29   BILITOTAL 5.5*  5.4* 3.8* 3.6* 3.8*   PROTTOTAL 6.1* 6.2* 5.6* 6.2*   ALBUMIN 4.8 4.6 4.2 4.5        MELD 3.0: 30 at 11/2/2023  8:46 AM  MELD-Na: 30 at 11/2/2023  8:46 AM  Calculated from:  Serum Creatinine: 1.13 mg/dL at 11/2/2023  7:24 AM  Serum Sodium: 123 mmol/L (Using min of 125 mmol/L) at 11/2/2023  7:24 AM  Total Bilirubin: 5.4 mg/dL at 11/2/2023  7:24 AM  Serum Albumin: 4.8 g/dL (Using max of 3.5 g/dL) at 11/2/2023  7:24 AM  INR(ratio): 2.56 at 11/2/2023  8:46 AM  Age at listing (hypothetical): 63 years  Sex: Female at 11/2/2023  8:46 AM    Previous work-up:   Lab Results   Component Value Date    HEPBANG Nonreactive 02/18/2021    HBCAB Nonreactive 02/18/2021    HCVAB  08/03/2017     Nonreactive   Assay performance characteristics have not been established for newborns,   infants, and children      TANYA 157 10/03/2023    IRONSAT 28 10/26/2023    TSH 1.75 10/26/2023    CHOL 176 08/15/2022    HDL 44 (L) 08/15/2022     (H) 08/15/2022    TRIG 111 08/15/2022    A1C 4.7 07/24/2023    POPETH <10 10/22/2023    PLPETH <10 10/22/2023      No results found for: \"SPECDES\", \"LDRESULTS\"      Imaging Results:  None current.   "

## 2023-11-03 NOTE — PLAN OF CARE
"Goal Outcome Evaluation: [id number]      Plan of Care Reviewed With: patient    Overall Patient Progress: no changeOverall Patient Progress: no change    Outcome Evaluation: Finished bowel prep around 2200. Pt then drank scheduled ensure, MD notified. Decreased in noted blood in BMs overnight changed to watery yellow/brown however noted to still be brown and formed this AM. VSS On1-3L NC, wheezing abd muscle use, diminished. Utilizing bedside commode. NPO at midnight. 1L FR, frequently asking for ice chips difficult to measure exact intake. RUQ pain PRN tylenol and ativan given with relief. PRN albuterol utilized for SOB. Forearm dressings CDI. Critical hgb and platelets this morning. Plan for potential thora and colonoscopy today.     /61 (BP Location: Left arm)   Pulse 91   Temp 97.4  F (36.3  C) (Axillary)   Resp 20   Ht 1.651 m (5' 5\")   Wt 66.4 kg (146 lb 6.2 oz)   LMP  (LMP Unknown)   SpO2 93%   BMI 24.36 kg/m         "

## 2023-11-03 NOTE — PROCEDURES
[facility] [place]    Procedure: IR Procedure Note    Date/Time: 11/3/2023 2:51 PM    Performed by: Ninoska Marroquin PA-C  Authorized by: Ninoska Marroquin PA-C      UNIVERSAL PROTOCOL   Site Marked: NA  Prior Images Obtained and Reviewed:  Yes  Required items: Required blood products, implants, devices and special equipment available    Patient identity confirmed:  Verbally with patient, arm band, provided demographic data and hospital-assigned identification number  Patient was reevaluated immediately before administering moderate or deep sedation or anesthesia  Confirmation Checklist:  Patient's identity using two indicators, relevant allergies, procedure was appropriate and matched the consent or emergent situation and correct equipment/implants were available  Time out: Immediately prior to the procedure a time out was called    Universal Protocol: the Joint Commission Universal Protocol was followed    Preparation: Patient was prepped and draped in usual sterile fashion       ANESTHESIA    Anesthesia:  Local infiltration  Local Anesthetic:  Lidocaine 1% without epinephrine      SEDATION    Patient Sedated: No    See dictated procedure note for full details.  Findings: Local only     Specimens: none    Complications: None    Condition: Stable    Plan: Transport back to inpatient bed for recovery      PROCEDURE  Describe Procedure: Completed ultrasound-guided right therapeutic thoracentesis. A total of 2000 mL sanguinous fluid drained from the right pleural space.  No immediate complication.    See dictated note under imaging for more detailed information.      Patient Tolerance:  Patient tolerated the procedure well with no immediate complications  Length of time physician/provider present for 1:1 monitoring during sedation: 0

## 2023-11-03 NOTE — PROGRESS NOTES
Nephrology Progress Note  11/03/2023       Stefani Crowell is a 63 yof with hx of ETOH cirrhosis being worked up for transplant complicated by hydrothorax requiring at least twice weekly thoracenteses, COPD, recent wt loss who was admitted with SOB, found to be hyponatremic.  Na normal up until July 2023 when it has been on the decline, also has had rise in Cr from baseline of 0.7 to 1.1 during admission.  Nephrology consulted for management of hyponatremia.     Interval History :   Mrs Crowell has had a dramatic improvement in renal fx since starting terlipressin ~48h ago, BP's improved and Cr down from 1.4 to 0.8 in that time with increase in UOP, Na also increasing.  Getting bowel prep for colonoscopy today.      Will continue terlipressin, will be difficult to know when to stop as she is already below Cr goal per protocol but would consider keeping until Cr plateaus (is still improving) then can trial off.   Ultimately still likely needs TIPS to help with hydrothorax so she can resume chem dep with goal of liver transplant eventually.      Assessment & Recommendations:   AK*I-Cr up from baseline of 0.7 in July to ~1.1 during her admission then up to ~1.4 in setting of low BP's.  Jaswinder's consistently <20 and UA's historically with hyaline casts consistent with HRS.  Started terlipressin 11/1 and have seen significant response with BP's and Cr.  Continuing.  Would be poor candidate for HD due to persistent hypotension.   -MATTIE, likely HRS based on clinical course and poor renal perfusion.   -Started terlipressin 11/1, has had significant response.  Continuing today.       Hyponatremia-On the decline since July when it was normal, now quite low at 117 on our visit. Serum osm low confirming true hyponatremia.  Jaswinder <20 and UA shows hyaline casts which is consistent with either true hypovolemia or hypervolemic hyponatremia.  Unable to use urea or vaptan due to liver disease, she is already on high dose midodrine to try  "to increase renal perfusion.                  - hyponatremia due to low EABV, difficult to manage with liver disease but slightly improved the past 24h after terlipressin initiation.   - U osm has not changed with stopping celexa so could restart this med if she needs it                 Volume-Total body volume overloaded with recurrent hydrothorax but overall improving.  Jaswinder <20 and has hyaline casts on UA confirms renal hypoperfusion.  Needs very frequent thora's, looking into TIPS to try to reduce ascites/hydrothorax.      K/pH-K 4.3, making more UOP, bicarb 21, started bicarb tabs 650mg TID although this will result in increased Na intake.       BMD-  Calcium 11.1 but actually corrects lower with high albumin, will add iCal to labs tomorrow. Mg and Phos not checked today, also added for tomorrow.      Anemia-Hgb 6.7 despite getting 1u PRBC's yesterday, no clear source of bleeding currently, iron sats 28%     Nutrition-Taking PO, encouraged solute intake and avoiding H2O.       Time spent: 40 minutes on this date of encounter for chart review, physical exam, medical decision making and co-ordination of care.      Seen and discussed with Dr Leggett     Recommendations were communicated to primary team via verbal communication.        ROD Pena CNS  Clinical Nurse Specialist  [phone]    Review of Systems:   I reviewed the following systems:  Gen: No fevers or chills  CV: No CP at rest  Resp: No SOB at rest  GI: No N/V    Physical Exam:   I/O last 3 completed shifts:  In: 1483.42 [P.O.:788]  Out: 1820 [Urine:720; Other:1100]   /67   Pulse 92   Temp 98.4  F (36.9  C) (Axillary)   Resp 16   Ht 1.651 m (5' 5\")   Wt 66.4 kg (146 lb 6.2 oz)   LMP  (LMP Unknown)   SpO2 95%   BMI 24.36 kg/m       GENERAL APPEARANCE: alert and no distress, frail.    EYES: No scleral icterus  Pulmonary: Breathing comfortably on RA.   CV: Regular rhythm, normal rate   - Edema +1 generalized.   GI: soft, nontender, " normal bowel sounds  MS: no evidence of inflammation in joints, no muscle tenderness  : No Demarco  SKIN: no rash, warm, dry  NEURO: mentation intact and speech normal    Labs:   All labs reviewed by me  Electrolytes/Renal -   Recent Labs   Lab Test 11/03/23 0618 11/02/23 0724 11/01/23  0557 10/22/23  1026 10/22/23  0150 10/19/23  1523 10/03/23  0853 08/30/23  1202 08/18/23  1402 07/24/23  1457 07/06/23  1034   * 123* 120*   < > 121*   < > 126*   < > 129*   < > 129*   POTASSIUM 4.3 5.1 5.0   < > 3.3*   < > 3.9   < > 3.1*   < > 3.4   CHLORIDE 89* 89* 87*   < > 90*   < > 91*   < > 94*   < > 94*   CO2 21* 22 21*   < > 18*   < > 25   < > 22   < > 23   BUN 82.7* 93.1* 102.0*   < > 30.3*   < > 33.9*   < > 14.0  --  8.3   CR 0.83 1.13* 1.41*   < > 1.04*   < > 1.24*   < > 0.62  --  0.68   * 118* 91   < > 148*   < > 98   < > 119*  --  113*   UMA 11.1* 11.2* 11.0*   < > 9.6   < > 10.2   < > 9.5  --  9.2   MAG  --   --   --   --  2.6*  --   --   --  2.2  --  2.1   PHOS  --   --   --   --   --   --  2.6  --   --   --   --     < > = values in this interval not displayed.       CBC -   Recent Labs   Lab Test 11/03/23 0618 11/02/23  1814 11/02/23  0846 11/02/23 0724   WBC 7.9  --  5.7 5.8   HGB 6.8* 7.3* 6.7* 6.7*   PLT 39*  --  27* 27*       LFTs -   Recent Labs   Lab Test 11/03/23 0618 11/02/23 0724 11/01/23  0557   ALKPHOS 78 77 100   BILITOTAL 6.9* 5.5*  5.4* 3.8*   ALT 34 25 27   AST 47* 37 38   PROTTOTAL 6.6 6.1* 6.2*   ALBUMIN 5.1 4.8 4.6       Iron Panel -   Recent Labs   Lab Test 10/26/23  1311 10/03/23  0853   IRON 62 43   IRONSAT 28 28   TANYA  --  157           Current Medications:   [Held by provider] albumin human  25 g Intravenous Daily    citalopram  40 mg Oral Daily    ferrous sulfate  325 mg Oral Daily with breakfast    fluticasone-vilanterol  1 puff Inhalation Daily    lactulose  20 g Oral TID    levothyroxine  112 mcg Oral Daily    [Held by provider] midodrine  20 mg Oral TID    montelukast   10 mg Oral At Bedtime    pantoprazole  40 mg Oral Daily    rifaximin  550 mg Oral BID    terlipressin  0.85 mg Intravenous Q6H

## 2023-11-03 NOTE — OR NURSING
Pt had colonoscopy under moderate sedation, no endoscopic interventions. Pt tolerated procedure with some difficulty. Able to tolerate with consistent coaching and additional medications. Pt taken to IR for scheduled procedure. Procedural report given to Norm RN (IR), as well as Charge RN on 7C.

## 2023-11-03 NOTE — PROGRESS NOTES
"[facility]  HEPATOLOGY PROGRESS NOTE  Stefani Crowell [id number]       IMPRESSION:  Stefani Crowell is a 63 year old female with a history of alcohol associated cirrhosis, last use 6/28/23 complicated by hepatic hydrothorax with twice weekly thoracentesis, hyponatremia, COPD, weight loss, ascites who was admitted with dyspnea. She has had continued hyponatremia.     RECOMMENDATIONS:  # Hyponatremia  # MATTIE-HRS  - sodium level more stable, no plans for restarting diuretics. Has continued with low sodium diet, fluid restriction.   - Ur Na <20. Cystatin C as outpatient. Started terlipressin 11/1 for HRS physiology. BP improved and creat decreased today. Last normal creatinine was 8/30/23. Has albumin level 5.1- may consider holding albumin.    - With high BUN and underlying lower muscle mass, her creatinine is likely not representing level of kidney dysfunction.    - Will plan to start terlipressin 0.85 q 6 hours for 3 days and assess need for continuing.   - hold midodrine while on terlipressin  - Stop terlipressin if SaO2 <90%, evidence of ischemia.     # Hepatic hydrothorax  - diuretic refractory due to hyponatremia.   - has needed three times weekly thora, removing 2 L each time. Last diagnostic testing 10/25 without SBE. Recommend inpatient thoras with diagnostic testing.   -  MELD 23 (initial MELD), t. Bili has been more indirect. Has apt to have discussion about TIPS on 11/6  as outpatient. With rising MELD, concerning that she may not be a candidate for TIPS or be able to be discharged for apt. Discussed with IR, will review options next week. With current t. Bili, she is not a TIPS candidate. Echo 10/25 without right sided dysfunction, RA 3. Bubble with evidence of \"medium\" PFO.  Seen by cardiology, no issue with PFO and TIPS.   - With elevated MELD, relayed higher risk for further decompensation that can cause death following TIPS .     # Alcohol associated cirrhosis  - currently engaged in CD " "programming. Pet 10/19 negative  - evaluated for liver transplant in past, not a current candidate for transplant, MELD 30.  She needs to complete her CD program and abstinence period completion.   - EGD 8/2023 with normal esophagus  -Ultrasound complete with Doppler for evaluation of HCC and thrombosis negative. (10/22)    # Hepatic encephalopathy  - continue lactulose and rifaximin. Titrate for ~3-4 BM's per day. Mentation stable.     # Hematochezia  - EGD without source of bleeding. Colonoscopy without source of bleeding, portal colopathy. Continue to monitor and support.     Patient was discussed with attending physician, Dr. Stephanie Lim  The above note reflects our joint plan.     Next follow up appointment: Dr. Stephanie Lim 1/2024    Thank you for the opportunity to be involved with  Stefani Crowell [facility]. Please call with any questions or concerns.    ROD Urban, CNP  Inpatient Hepatology [facility]      SUBJECTIVE:  Denies further abdominal pain or discomfort. No fevers, nausea, or vomiting. No hematochezia overnight.     ROS:  A 10-point review of systems was negative.    OBJECTIVE:  VS: BP 90/40   Pulse 89   Temp 97.4  F (36.3  C) (Oral)   Resp 20   Ht 1.651 m (5' 5\")   Wt 65.5 kg (144 lb 6.4 oz)   LMP  (LMP Unknown)   SpO2 99%   BMI 24.03 kg/m        General: In no acute distress, mild facial muscle wasting  Neuro: AOx3, No asterixis  HEENT:  Noscleral icterus, Nooral lesions  CV: . Skin warm and dry  Lungs:  Respirations even and nonlabored on 3L  Abd:  Nondistended, nontender   Extrem: Noperipheral edema   Skin: Nojaundice  Psych: pleasant    MEDICATIONS:  Current Facility-Administered Medications   Medication    acetaminophen (TYLENOL) tablet 650 mg    Or    acetaminophen (TYLENOL) Suppository 650 mg    [Held by provider] albumin human 25 % injection 25 g    albuterol (PROVENTIL HFA/VENTOLIN HFA) inhaler    artificial saliva (BIOTENE DRY MOUTHWASH) liquid 5 mL    citalopram (celeXA) " tablet 40 mg    ferrous sulfate (FEROSUL) tablet 325 mg    fluticasone-vilanterol (BREO ELLIPTA) 200-25 MCG/ACT inhaler 1 puff    lactulose (CHRONULAC) solution 20 g    levothyroxine (SYNTHROID/LEVOTHROID) tablet 112 mcg    LORazepam (ATIVAN) tablet 0.5 mg    melatonin tablet 3 mg    [Held by provider] midodrine (PROAMATINE) tablet 20 mg    montelukast (SINGULAIR) tablet 10 mg    pantoprazole (PROTONIX) EC tablet 40 mg    polyethylene glycol (MIRALAX) Packet 17 g    prochlorperazine (COMPAZINE) injection 5 mg    Or    prochlorperazine (COMPAZINE) tablet 5 mg    Or    prochlorperazine (COMPAZINE) suppository 25 mg    rifaximin (XIFAXAN) tablet 550 mg    terlipressin (TERLIVAZ) injection 0.85 mg       REVIEW OF LABORATORY, PATHOLOGY AND IMAGING RESULTS:  BMP  Recent Labs   Lab 11/03/23  0618 11/02/23  0724 11/01/23  0557 10/31/23  1309 10/31/23  0645   * 123* 120* 120* 120*  120*   POTASSIUM 4.3 5.1 5.0  --  5.7*   CHLORIDE 89* 89* 87*  --  88*   UMA 11.1* 11.2* 11.0*  --  10.5*   CO2 21* 22 21*  --  20*   BUN 82.7* 93.1* 102.0*  --  93.3*   CR 0.83 1.13* 1.41*  --  1.27*   * 118* 91  --  79     CBC  Recent Labs   Lab 11/03/23  0618 11/02/23  1814 11/02/23  0846 11/02/23  0724 11/01/23  0557   WBC 7.9  --  5.7 5.8 4.8   RBC 2.20*  --  2.08* 2.13* 2.00*   HGB 6.8*   < > 6.7* 6.7* 6.4*   HCT 20.8*  --  19.7* 19.8* 19.0*   MCV 95  --  95 93 95   MCH 30.9  --  32.2 31.5 32.0   MCHC 32.7  --  34.0 33.8 33.7   RDW 16.6*  --  15.7* 15.5* 15.4*   PLT 39*  --  27* 27* 35*    < > = values in this interval not displayed.     INR  Recent Labs   Lab 11/03/23  0618 11/02/23  0846 10/31/23  0645 10/30/23  0639   INR 2.25* 2.56* 2.33* 2.30*     LFTs  Recent Labs   Lab 11/03/23  0618 11/02/23  0724 11/01/23  0557 10/31/23  0645   ALKPHOS 78 77 100 84   AST 47* 37 38 40   ALT 34 25 27 24   BILITOTAL 6.9* 5.5*  5.4* 3.8* 3.6*   PROTTOTAL 6.6 6.1* 6.2* 5.6*   ALBUMIN 5.1 4.8 4.6 4.2        MELD 3.0: 28 at 11/3/2023  6:18  "AM  MELD-Na: 30 at 11/3/2023  6:18 AM  Calculated from:  Serum Creatinine: 0.83 mg/dL (Using min of 1 mg/dL) at 11/3/2023  6:18 AM  Serum Sodium: 125 mmol/L at 11/3/2023  6:18 AM  Total Bilirubin: 6.9 mg/dL at 11/3/2023  6:18 AM  Serum Albumin: 5.1 g/dL (Using max of 3.5 g/dL) at 11/3/2023  6:18 AM  INR(ratio): 2.25 at 11/3/2023  6:18 AM  Age at listing (hypothetical): 63 years  Sex: Female at 11/3/2023  6:18 AM    Previous work-up:   Lab Results   Component Value Date    HEPBANG Nonreactive 02/18/2021    HBCAB Nonreactive 02/18/2021    HCVAB  08/03/2017     Nonreactive   Assay performance characteristics have not been established for newborns,   infants, and children      TANYA 157 10/03/2023    IRONSAT 28 10/26/2023    TSH 1.75 10/26/2023    CHOL 176 08/15/2022    HDL 44 (L) 08/15/2022     (H) 08/15/2022    TRIG 111 08/15/2022    A1C 4.7 07/24/2023    POPETH <10 10/22/2023    PLPETH <10 10/22/2023      No results found for: \"SPECDES\", \"LDRESULTS\"      Imaging Results:  None current.   "

## 2023-11-03 NOTE — PLAN OF CARE
SHIFT: [id number]    Temp: 97.3  F (36.3  C) Temp src: Oral BP: 134/67 Pulse: 85   Resp: 22 SpO2: 93 % O2 Device: Nasal cannula with humidification Oxygen Delivery: 2 LPM     EVENTS: AM hgb 6.7 & plt 27, 1 unit of each infused per order, hgb recheck in veronica 7.3. Pt hopeful for thora today however it did not happen, RN spoke w/ CAPS provider, Dr Price, who said pt would be first on list tomorrow AM for thora & would place order to ensure platelets improved after replacement today- SOB, wheezing, tachypneic, shallow breathing w/ abd muscle use noted- using albuterol inhaler PRN. BPs stable, O2 stable on 1-3L NC.  Pt having new bloody stools in afternoon, provider notified, pt made NPO & had EGD done this afternoon w/o significant findings. Remains NPO w/ miralax bowel prep for colonoscopy tomorrow- drank total of 64oz miralax mixed w/ gatorade not counting toward fluid restriction. C/o ongoing RUQ pain improved w/ tylenol & heating pad. UA collect & sent to lab. CXR done. K WNL, recheck in AM. L elbow dressing changed    PLAN: Monitor for ongoing bleeding, cont bowel prep on NPO status w/ plans for colonoscopy tomorrow. Thora tomorrow AM. Cont terlipressin, monitor for improved Creat, Na, BPs & stable resp status. Possible TIPS while IP if renal status stable    Goal Outcome Evaluation:  Plan of Care Reviewed With: patient  Overall Patient Progress: no change  Outcome Evaluation: Improved creat, Na & BPs on new IV terlipressin, O2 stable on 1-3L. AM hgb 6.7 & plts 27- 1 unit of each infused. C/o ongoing RUQ pain since yesterday. New bloody stools in afternoon, EGD done, NPO & bowel prep this veronica w/ plan for colonoscopy tomorrow. Plan for Thora tomorrow, increased SOB today.

## 2023-11-03 NOTE — CONSULTS
"    Interventional Radiology  [facility] Consult Service Note    Stefani Crowell  [id number]    11/03/23   12:30 PM    Consult Requested:  Consult Reason? Therapeutic thoracentesis, CAPS provider unable to get good windows to feel comfortable to do it.  FYI colonscopy scheduled at 1 pm     Patients clinical information/history? Cirrhosis with hepatic hydrothorax needing frequent thoras     Interventional Radiology Adult/Peds IP Consult: Which location will this be scheduled at? [place]; When do you want the patient seen? Routine within 24 hours; Consult Reason? Therapeutic thoracentesis, CAPS provider unable to get good windows t... [[id number]]    Electronically signed by: Blaise Fields DO on 11/03/23 113  Call Back # [phone]     ===  INR - 2.25  Plts - 39    ===  Patient has recurrent large volume thoracenteses RIGHT, last drained 10/30 for 2L. POCUS today shows large effusion and hospitalist uncomfortable with proceeding due to perceived poor window.    ===  Recommendations/Plan:    Patient is on IR add-on schedule TODAY FRI 11/3 for a RIGHT thoracentesis     *Please note the date of procedure is tentative and that the Timing of Procedure is TBD Based on Staffing/Schedule and Triage.*    Labs WNL for procedure     No NPO required.    Medications to be held include: None    Consent will be done prior to procedure.     Please contact the IR charge RN at [phone] for estimated time of procedure.     ===  Pertinent Imaging Reviewed:  POCUS 11/3        ===  Vitals:   /49   Pulse 86   Temp 98.6  F (37  C) (Axillary)   Resp 20   Ht 1.651 m (5' 5\")   Wt 66.4 kg (146 lb 6.2 oz)   LMP  (LMP Unknown)   SpO2 98%   BMI 24.36 kg/m      Pertinent Labs:   Lab Results   Component Value Date    WBC 7.9 11/03/2023    WBC 5.7 11/02/2023    WBC 5.8 11/02/2023    WBC 5.9 03/25/2021    WBC 4.9 02/18/2021    WBC 5.2 01/19/2021     Lab Results   Component Value Date    HGB 6.8 11/03/2023    HGB " 7.3 11/02/2023    HGB 6.7 11/02/2023    HGB 14.0 03/25/2021    HGB 14.1 02/18/2021    HGB 14.7 01/19/2021     Lab Results   Component Value Date    PLT 39 11/03/2023    PLT 27 11/02/2023    PLT 27 11/02/2023    PLT 56 03/25/2021    PLT 53 02/18/2021    PLT 51 01/19/2021     Lab Results   Component Value Date    INR 2.25 (H) 11/03/2023    INR 1.36 (H) 03/25/2021     Lab Results   Component Value Date    POTASSIUM 4.3 11/03/2023    POTASSIUM 4.5 03/25/2021        COVID-19 Antibody Results, Testing for Immunity         No data to display            COVID-19 PCR Results        10/22/2023    01:56   COVID-19 PCR Results   SARS CoV2 PCR Negative          Akash Young PA-C  Interventional Radiology  Pager: [phone]

## 2023-11-03 NOTE — PLAN OF CARE
"Blood pressure 110/45, pulse 84, temperature 98.4  F (36.9  C), temperature source Axillary, resp. rate 20, height 1.651 m (5' 5\"), weight 66.4 kg (146 lb 6.2 oz), SpO2 98%, Via 2L NC       Patient A & O X 4 , VSS able to make needs known. Abdominal distended patient received scheduled medications as ordered.  Had prep per order . Loose stool , watery , and one time black loose mixed with urine. Hgb 6.8  a unit PRBC  transfused  did rommel no S /Sx of reactions noted. 2PIV  SL . Patient left to Endoscopy for procedure. At ~ 13:00, via litter accompanied by transport and family. Continue carry on as order put in and update MD with change.           Goal Outcome Evaluation:      Plan of Care Reviewed With: patient, family    Overall Patient Progress: no change.            "

## 2023-11-03 NOTE — IR NOTE
Patient Name: Stefani Crowell  Medical Record Number: [id number]  Today's Date: 11/3/2023    Procedure: R Thoracentesis   Proceduralist: KAROLYN June and Lopez PARR  Pathology present: no    Procedure Start: 1430  Procedure end: 1445  Sedation medications administered: none   -2L R lung     Report given to:   : no    Other Notes: Pt arrived to IR room 6 from [facility]. Consent reviewed. Pt denies any questions or concerns regarding procedure. Pt positioned supine and monitored per protocol. Pt tolerated procedure without any noted complications. Pt transferred back to .

## 2023-11-03 NOTE — PROGRESS NOTES
Hospitalist cross cover note    Paged about Hb 6.8, Plt 39    - ordered 1 unit PRBC  - no significant bleeding, will defer platelet transfusion for now

## 2023-11-03 NOTE — PROGRESS NOTES
[facility]    Medicine Progress Note - Hospitalist Service, GOLD TEAM 9    Date of Admission:  10/22/2023    Assessment & Plan   Stefani Crowell is a 63 year old female with a pertinent past medical history of alcoholic cirrhosis, hepatorenal syndrome, hypothyroidism, recurrent hyponatremia, recurrent pleural effusions, COPD and recent admission for hyponatremia who was admitted on 10/22/2023 due to increased dyspnea related to R pleural effusion and hyponatremia.     MATTIE, likely secondary to hepatorenal syndrome  Baseline Cr 0.8, admit with 1.04 and now rising to 1.41 despite albumin. Was getting daily albumin and Cr continued to rise. Discussed with hepatology and nephrology and will start terlipressin given concern for HRS physiology. If she does not improve and ends up needing HD she is not a good candidate due to low BP and will have to have goals of care convo.  - holding albumin today given blood products two days in a row   - hold diuretics  - Continue terlipressin 0.85 q 6 hours                 - dose adjustments and stopping per nephro and hepatology    Dyspnea due to recurrent R hepatic hydrothorax  History of COPD  ESLD, alcoholic cirrhosis  Ascites  Admitted with dypsnea and mild abdominal pain, recently stopped lasix. Dyspnea secondary to hepatic hydrothorax which she was getting twice weekly thoracentesis for as an outpatient which did not relieve symptoms. Started aggressive diuresis however had to stop due to low sodiums and now getting a thora once ever 2-3 days for symptom management.   -Hepatology consulted, their help is appreciated  -Continue home lactulose 20g TID, rifaximin 550mg BID, Breo ellipta 1 puff daily  -Has PRN shirley  - IR unable to do TIPS at this time due to worsening clinical status   - if she improves with terlipressin can readress   - CAPS consulted for thora 11/2    Severe Hyponatremia secondary to cirrhosis  Hovering around  120 however dropped due to aggressive diuresis so had to stop. Now stable around 120.  - daily bmp   - persistent and continue to monitor and consider hypertonic saline if Na <115 and/or symptoms    Acute on chronic normocytic anemia  Likely lower GI bleed  Hgb dropping below 7 starting 11/1 without signs of bleeding. On 11/2 noted to have dark red bloody stools. Taken for EGD without signs of bleeding. Colonoscopy to be done 11/3.   - Transfuse for Hgb < 7   - restart diet post colonoscopy    Thrombocytopenia  Coagulopathy  -Likely secondary to cirrhosis   -Will monitor following diuretics and fluid removal via procedures  and Plt < 10. (Would likely need comitant diuretics)   - given a unit of plts on 11/2 due to being < 50 and needing thora    Elevated troponin  -Admit 27, 28. Likely secondary to poor kidney clearance  -Unremarkable EKG    Hypothyroidism  Last TSH was 0.74 on 07/24/23  - Continue home levothyroxine 112 mcg po daily    GERD  - Continue pantoprazole 40 mg po qdaily    Anxiety  - Holding citalopram 40 mg po qdaily and trazodone 50 mg po at bedtime  - continue lorazepam 0.5 mg po qdaily prn    Hypokalemia, resolved  - replacement protocol        Diet: Snacks/Supplements Adult: Ensure Enlive; Between Meals  Fluid restriction 1000 ML FLUID  NPO per Anesthesia Guidelines for Procedure/Surgery Except for: Meds, Ice Chips    DVT Prophylaxis: Pneumatic Compression Devices  Demarco Catheter: Not present  Lines: None     Cardiac Monitoring: None  Code Status: Full Code      Clinically Significant Risk Factors         # Hyponatremia: Lowest Na = 123 mmol/L in last 2 days, will monitor as appropriate   # Hypercalcemia: Highest Ca = 11.2 mg/dL in last 2 days, will monitor as appropriate       # Coagulation Defect: INR = 2.25 (Ref range: 0.85 - 1.15) and/or PTT = N/A, will monitor for bleeding  # Thrombocytopenia: Lowest platelets = 27 in last 2 days, will monitor for bleeding           # Moderate Malnutrition:  based on nutrition assessment    # Financial/Environmental Concerns: none  # Asthma: noted on problem list        Disposition Plan      Expected Discharge Date: 11/13/2023      Destination: home with family  Discharge Comments: Dispo: home, ind.  Plan: Keep in hospital until either gets well enough for TIPS or can come up with safe plan for thora's as OP, or decompensates  Progression: Con't lactulose and rifaximin for hepatic encephalopathy, mentation stable.            Blaise Fields, DO  Hospitalist Service, GOLD TEAM 9  [facility]  Securely message with [employer] (more info)  Text page via [facility] Paging/Directory   See signed in provider for up to date coverage information  ______________________________________________________________________    Interval History     No acute events overnight. CAPS unable to get comfortable views for thora so IR consulted. Kidney function continues to improve with terlipressin. Going for colonoscopy today.     Physical Exam   Vital Signs: Temp: 98.4  F (36.9  C) Temp src: Axillary BP: 101/45 Pulse: 84   Resp: 18 SpO2: 100 % O2 Device: Nasal cannula Oxygen Delivery: 4 LPM  Weight: 146 lbs 6.17 oz  Gen: Awake and alert, appears comfortable, appears stated age. Sitting up in bed.   HEENT: NCAT, sclerae anicteric.  CV: regular rhythm and rate,   Pulm: Normal work of breathing. Right lung field - reduced breath sounds and bronchial breath sounds evident  GI: Non-distended, non-tender  Skin: Warm, dry  Neuro: AOx3, speech normal, moves all extremities symmetrically.  Psych: Mood is good, affect is congruent.      Medical Decision Making       55 MINUTES SPENT BY ME on the date of service doing chart review, history, exam, documentation & further activities per the note.      Data     I have personally reviewed the following data over the past 24 hrs:    7.9  \   6.8 (LL)   / 39 (LL)     125 (L) 89 (L) 82.7 (H) /  100 (H)   4.3 21 (L) 0.83 \      ALT: 34 AST: 47 (H) AP: 78 TBILI: 6.9 (H)   ALB: 5.1 TOT PROTEIN: 6.6 LIPASE: N/A     INR:  2.25 (H) PTT:  N/A   D-dimer:  N/A Fibrinogen:  167 (L)       Imaging results reviewed over the past 24 hrs:   Recent Results (from the past 24 hour(s))   POC US Guide for Thorcentesis    Impression    Limited point of care pleural/lung ultrasound to evaluate for thoracentesis.    Thoracentesis Indication:pleural effusion     Views/Acquisition: Right  pleural space(s): upright.      Findings/Interpretation: No safe pocket identified for bedside thoracentesis    Desi Price MD     XR Chest Port 1 View    Narrative    Examination:  XR CHEST PORT 1 VIEW    Date:  11/3/2023 9:47 AM     Clinical Information: Pleural effusion, hypoxia     Additional Information: none    Comparison: Chest x-ray 11/2/2023,    Findings:     PA view of the chest. Silhouetting of the right heart border and right  hemidiaphragm. Leftward deviation of the trachea. Large right nearly  complete dense airway opacity with meniscus.Bilateral mixed diffuse  airspace opacities. The left costophrenic angle is not completely  visualized. No suspicious osseous lesions.      Impression    Impression:    1. Slightly increased right large pleural effusion.  2. Bilateral mixed air space opacities likely pulmonary edema.  3. Right basilar linear opacity, likely compressive atelectasis.    I have personally reviewed the examination and initial interpretation  and I agree with the findings.    MARIA FERNANDA FRITZ MD         SYSTEM ID:  [id number]

## 2023-11-03 NOTE — PLAN OF CARE
"Goal Outcome Evaluation:      Plan of Care Reviewed With: patient, sibling, spouse    Overall Patient Progress: improving    Outcome Evaluation: see note below    Time: 5169-0835    Admission/Diagnosis:  Hyponatremia [E87.1]  Liver disease [K76.9]  Pleural effusion [J90]  Dyspnea, unspecified type [R06.00]    BP 90/40   Pulse 89   Temp 97.4  F (36.3  C) (Oral)   Resp 20   Ht 1.651 m (5' 5\")   Wt 65.5 kg (144 lb 6.4 oz)   LMP  (LMP Unknown)   SpO2 98%   BMI 24.03 kg/m      Shift Updates: Pt A&Ox4, VSS on RA (weaned from O2), up SBA+W. Bed alarm on for safety. PRN Ativan and melatonin given at HS. PIVs SL.     Plan: Continue with POC.       "

## 2023-11-03 NOTE — PROGRESS NOTES
CLINICAL NUTRITION SERVICES - REASSESSMENT NOTE     Nutrition Prescription    RECOMMENDATIONS FOR MDs/PROVIDERS TO ORDER:  Diet as tolerated     Malnutrition Status:    Severe malnutrition in the context of chronic illness    Recommendations already ordered by Registered Dietitian (RD):  Will continue with Ensure enlive BID to optimize intake given low po and appetite     Future/Additional Recommendations:  PO adequacy       EVALUATION OF THE PROGRESS TOWARD GOALS   Diet: Was NPO for bowel prep on , patient NPO today for procedure  and repeat bowel prep  Intake: patient off floor for endoscopy procedure? Not available to see.    Per flow sheet review, patient previously on 2 gm Na+ with poor po intake         NEW FINDINGS   Chart reviewed:  PMH: Etoh cirrhosis with HRS, Ascites, recurrent hyponatremia, recurrent PE, COPD, hypothyroidism  - Admitted on 10/22/2023 due to increased dyspnea related to recurrent R pleural effusion, R.hepatic hydrothorax and hyponatremia. getting a thora once ever 2-3 days for symptom management. Last thora . Diuresis on hold  -  for TIPS   -  Plan for prep and colonoscopy , plan for thora potentially today       Meds:  lactulose 20g TID, rifaximin 550mg BID     GI/stool:  13 BM yesterday, 10 today -> lactulose?     Labs:  Na+: 125 (L),   Bicarb: 21 (L)  BUN: 82.7 (H), Cr: 0.83, GFR: 80  Total bili: 6.9 (H)  B (H), normal A1C     Wt trend:  64.2 kg (141 lb 9.6 oz) standing wt on 10/22  Driest wt this admit: 63.7 kg (140 lb 6.4 oz) standing wt on 10/28      MALNUTRITION  % Intake: </= 50% for >/= 5 days (severe)  % Weight Loss:  previously > 7.5% in 3 months (severe)    Subcutaneous Fat Loss: Unable to assess -> off floor   Muscle Loss: Unable to assess - off floor   Fluid Accumulation/Edema:Has Ascites   Malnutrition Diagnosis: Severe malnutrition in the context of chronic illness    Previous Goals   Patient to consume % of nutritionally adequate meal trays  TID, or the equivalent with supplements/snacks.   Evaluation: Not met    Previous Nutrition Diagnosis  Inadequate oral intake related to likely early satiety with associated ascites as evidenced by variable intake since admit , need to optimize nutrition with oral supplements      Evaluation: No change    CURRENT NUTRITION DIAGNOSIS  Malnutrition related to chronic illness with associated decrease in appetite as evidenced by poor po since admit, ongoing need to optimize nutrition with oral supplement      INTERVENTIONS  Implementation  Medical food supplement therapy: will continue with 2 ensure per day  Will monitor po improvement     Goals  Patient to consume % of nutritionally adequate meal trays TID, or the equivalent with supplements/snacks.    Monitoring/Evaluation  Progress toward goals will be monitored and evaluated per protocol.    Newton Partida RD/ANDRE  [address]B RD pager: [phone]  Weekend/Holiday RD pager: [phone]

## 2023-11-04 NOTE — PLAN OF CARE
Goal Outcome Evaluation:      Plan of Care Reviewed With: patient    Overall Patient Progress: no changeOverall Patient Progress: no change    Outcome Evaluation: 7066-8581: A&Ox4, VSS on RA w/terlipressin IV being given for low bp now instead of midodrine. This medication requires CPOX. Up to bathroom overnight w/2x loose/watery stools with (1) dark/bright red blood, (1) bright red blood. Provider advised; awaiting lab results to see if PRBC will be needed.

## 2023-11-04 NOTE — PROGRESS NOTES
[facility]    Medicine Progress Note - Hospitalist Service, GOLD TEAM 9    Date of Admission:  10/22/2023    Assessment & Plan   Stefani Crowell is a 63 year old female with a pertinent past medical history of alcoholic cirrhosis, hepatorenal syndrome, hypothyroidism, recurrent hyponatremia, recurrent pleural effusions, COPD and recent admission for hyponatremia who was admitted on 10/22/2023 due to increased dyspnea related to R pleural effusion and hyponatremia.     MATTIE, likely secondary to hepatorenal syndrome-- now improved  Baseline Cr 0.8, admit with 1.04 and now rising to 1.41 despite albumin. Was getting daily albumin and Cr continued to rise. Discussed with hepatology and nephrology and will start terlipressin given concern for HRS physiology. If she does not improve and ends up needing HD she is not a good candidate due to low BP and will have to have goals of care convo.  - hold diuretics  - Continue terlipressin 0.85 q 6 hours                 - dose adjustments and stopping per nephro and hepatology    Dyspnea due to recurrent R hepatic hydrothorax  History of COPD  ESLD, alcoholic cirrhosis  Ascites  Admitted with dypsnea and mild abdominal pain, recently stopped lasix. Dyspnea secondary to hepatic hydrothorax which she was getting twice weekly thoracentesis for as an outpatient which did not relieve symptoms. Started aggressive diuresis however had to stop due to low sodiums and now getting a thora once ever 2-3 days for symptom management.   -Hepatology consulted, their help is appreciated  -Continue home lactulose 20g TID, rifaximin 550mg BID, Breo ellipta 1 puff daily  -Has PRN shirley  - IR unable to do TIPS at this time due to worsening clinical status   - if she improves with terlipressin can readress  - underwent thoracentesis per IR on 11/4.-- cultures pending    Severe Hyponatremia secondary to cirrhosis, improving  Hovering around 120 however  dropped due to aggressive diuresis so had to stop. Now stable around 120.  - daily bmp   - persistent and continue to monitor and consider hypertonic saline if Na <115 and/or symptoms    Acute on chronic normocytic anemia  Likely lower GI bleed  Hgb dropping below 7 starting 11/1 without signs of bleeding. On 11/2 noted to have dark red bloody stools. Taken for EGD without signs of bleeding. Colonoscopy to be done 11/3.   - Transfuse for Hgb < 7   - continue diet    Thrombocytopenia  Coagulopathy  -Likely secondary to cirrhosis   -Will monitor following diuretics and fluid removal via procedures  and Plt < 10. (Would likely need comitant diuretics)    Elevated troponin  -Admit 27, 28. Likely secondary to poor kidney clearance  -Unremarkable EKG    Hypothyroidism  Last TSH was 0.74 on 07/24/23  - Continue home levothyroxine 112 mcg po daily    GERD  - Continue pantoprazole 40 mg po qdaily    Anxiety  - Holding citalopram 40 mg po qdaily and trazodone 50 mg po at bedtime  - continue lorazepam 0.5 mg po qdaily prn    Hypokalemia, resolved  - replacement protocol        Diet: Snacks/Supplements Adult: Ensure Enlive; Between Meals  Fluid restriction 1000 ML FLUID  2 Gram Sodium Diet    DVT Prophylaxis: Pneumatic Compression Devices  Demarco Catheter: Not present  Lines: None     Cardiac Monitoring: None  Code Status: Full Code      Clinically Significant Risk Factors         # Hyponatremia: Lowest Na = 125 mmol/L in last 2 days, will monitor as appropriate   # Hypercalcemia: Highest Ca = 11.1 mg/dL in last 2 days, will monitor as appropriate       # Coagulation Defect: INR = 2.68 (Ref range: 0.85 - 1.15) and/or PTT = N/A, will monitor for bleeding  # Thrombocytopenia: Lowest platelets = 39 in last 2 days, will monitor for bleeding           # Moderate Malnutrition: based on nutrition assessment    # Financial/Environmental Concerns: none  # Asthma: noted on problem list        Disposition Plan     Expected Discharge Date:  11/13/2023      Destination: home with family  Discharge Comments: Dispo: home, ind.  Plan: Keep in hospital until either gets well enough for TIPS or can come up with safe plan for thora's as OP, or decompensates  Progression: Con't lactulose and rifaximin for hepatic encephalopathy, mentation stable.            CELINA MOODY MD  Hospitalist Service, GOLD TEAM 9  [facility]  Securely message with [employer] (more info)  Text page via [facility] Paging/Directory   See signed in provider for up to date coverage information  ______________________________________________________________________    Interval History     No acute events overnight. CAPS unable to get comfortable views for thora so IR consulted. Kidney function continues to improve with terlipressin. Going for colonoscopy today.     Physical Exam   Vital Signs: Temp: 97.9  F (36.6  C) Temp src: Oral BP: 98/49 Pulse: 88   Resp: 18 SpO2: 97 % O2 Device: None (Room air)    Weight: 147 lbs 11.33 oz  Gen: Awake and alert, appears comfortable, appears stated age. Sitting up in bed.   HEENT: NCAT, sclerae anicteric.  CV: regular rhythm and rate,   Pulm: Normal work of breathing. Right lung field - reduced breath sounds and bronchial breath sounds evident  GI: Non-distended, non-tender  Skin: Warm, dry  Neuro: AOx3, speech normal, moves all extremities symmetrically.  Psych: Mood is good, affect is congruent.      Medical Decision Making       55 MINUTES SPENT BY ME on the date of service doing chart review, history, exam, documentation & further activities per the note.      Data     I have personally reviewed the following data over the past 24 hrs:    9.5  \   6.4 (LL)   / 40 (LL)     127 (L) 91 (L) 78.6 (H) /  110 (H)   4.2 25 0.78 \     ALT: 28 AST: 47 (H) AP: 67 TBILI: 6.3 (H)   ALB: 4.3 TOT PROTEIN: 5.5 (L) LIPASE: N/A     INR:  2.68 (H) PTT:  N/A   D-dimer:  N/A Fibrinogen:  N/A       Imaging results reviewed  over the past 24 hrs:   No results found for this or any previous visit (from the past 24 hour(s)).

## 2023-11-04 NOTE — PROGRESS NOTES
Medicine coverage note  - Fresh blood per rectum notes, awaiting CBC to decide on platelets or pack RBC trnasfusion

## 2023-11-04 NOTE — PLAN OF CARE
"Blood pressure 106/58, pulse 84, temperature 97.8  F (36.6  C), temperature source Oral, resp. rate 20, height 1.651 m (5' 5\"), weight 67 kg (147 lb 11.3 oz), SpO2 95%, Via 2L NC       Patient  A & O X 4 forgetful at time . Able to make needs known. Denies pain VSS with soft BP. Dyspnea on exertions . Patient Hgb 6.4  received one  unit PRBC per MD order . Patient tolerated transfusion without problem . No S/Sx of reactions noted. Patient appetite fair , needs encouragement as tolerated. Up to BSC with SBA , Loose stool mixed  with urine dark brown x 3  . Patient family was here to visit . Call light within reach, bed alarm on . Continue with POC and update MD with change.           Goal Outcome Evaluation:      Plan of Care Reviewed With: patient    Overall Patient Progress: no change.            "

## 2023-11-05 NOTE — PLAN OF CARE
Goal Outcome Evaluation:      Plan of Care Reviewed With: patient    Overall Patient Progress: no changeOverall Patient Progress: no change    Outcome Evaluation: a&Ox4, VSS on 2L O2 NC, denies pain. 2x soft stools overnight without blood. R PIV SL. 1x PRN Ativan and 1x PRN melatonin given at bedtime. Took 1/2 of her scheduled lactulose after much prompting.

## 2023-11-05 NOTE — PROGRESS NOTES
Nephrology Progress Note  11/04/2023       Stefani Crowell is a 63 yof with hx of ETOH cirrhosis being worked up for transplant complicated by hydrothorax requiring at least twice weekly thoracenteses, COPD, recent wt loss who was admitted with SOB, found to be hyponatremic.  Na normal up until July 2023 when it has been on the decline, also has had rise in Cr from baseline of 0.7 to 1.4 during admission.  Nephrology consulted for management of hyponatremia.     Interval History :   Mrs Crowell has had a dramatic improvement in renal fx since starting terlipressin BP's improved and Cr down from 1.4 to 0.8 in that time with increase in UOP, Na also increasing to 120 to 127 mEq/L.        Assessment & Recommendations:   AK*I-Cr up from baseline of 0.7 in July to ~1.4 during her admission .  Jaswinder's consistently <20 and UA's historically with hyaline casts consistent with HRS.  Started terlipressin 11/1 and have seen significant response with BP's and Cr.  Continuing.  Would be poor candidate for HD due to persistent hypotension.   -MATTIE, likely HRS based on clinical course and poor renal perfusion.   -Started terlipressin 11/1, has had significant response.  Continuing today.                 -Terlipressin and albumin per hepatology.  Would continue to hold diuresis.  Renal will sign off at this time.  Please call with any questions.      Hyponatremia-On the decline since July when it was normal, now quite low at 117 on our visit. Serum osm low confirming true hyponatremia.  Jaswinder <20 and UA shows hyaline casts which is consistent with hypervolemic hyponatremia.  Unable to use urea or vaptan due to liver disease, she is already on high dose midodrine to try to increase renal perfusion.                  - hyponatremia due to low EABV, difficult to manage with liver disease but slightly improved the with terlipressin initiation.   - ultimately we may need to except a serum sodium in the low 120's when off  "terlipressin  -would continue to restrict free water intake to < 1 L/day                 Volume-Total body volume overloaded with recurrent hydrothorax but overall improving.  Jaswinder <20 and has hyaline casts on UA confirms renal hypoperfusion.  Needs very frequent thora's, looking into TIPS to try to reduce ascites/hydrothorax.      K/pH-No pressing issues.        BMD-  Calcium 10.6 but actually corrects lower with high albumin  -could obtain iCal for further evaluation     Anemia-Hgb 6.4 despite getting PRBC's , no clear source of bleeding currently, iron sats 28%  -PRBC per primary team     Nutrition-Taking PO, encouraged solute intake and avoiding H2O.       Abraham Aguero MD   Pager: [phone]    Review of Systems:   I reviewed the following systems:  Gen: No fevers or chills  CV: No CP at rest  Resp: No SOB at rest  GI: No N/V    Physical Exam:   I/O last 3 completed shifts:  In: 710 [P.O.:680; I.V.:30]  Out: -    /58   Pulse 84   Temp 97.8  F (36.6  C) (Oral)   Resp 20   Ht 1.651 m (5' 5\")   Wt 67 kg (147 lb 11.3 oz)   LMP  (LMP Unknown)   SpO2 95%   BMI 24.58 kg/m       GENERAL APPEARANCE: alert and no distress, frail.    EYES: No scleral icterus  Pulmonary: Breathing comfortably on RA.   CV: Regular rhythm, normal rate   - Edema +1 generalized.   GI: soft, nontender,  MS: no evidence of inflammation in joints, no muscle tenderness  : No Demarco  SKIN: no concerning rash  NEURO: mentation intact and speech normal    Labs:   All labs reviewed by me  Electrolytes/Renal -   Recent Labs   Lab Test 11/04/23  0658 11/03/23  0618 11/02/23  0724 10/22/23  1026 10/22/23  0150 10/19/23  1523 10/03/23  0853 08/30/23  1202 08/18/23  1402 07/24/23  1457 07/06/23  1034   * 125* 123*   < > 121*   < > 126*   < > 129*   < > 129*   POTASSIUM 4.2 4.3 5.1   < > 3.3*   < > 3.9   < > 3.1*   < > 3.4   CHLORIDE 91* 89* 89*   < > 90*   < > 91*   < > 94*   < > 94*   CO2 25 21* 22   < > 18*   < > 25   " < > 22   < > 23   BUN 78.6* 82.7* 93.1*   < > 30.3*   < > 33.9*   < > 14.0  --  8.3   CR 0.78 0.83 1.13*   < > 1.04*   < > 1.24*   < > 0.62  --  0.68   * 100* 118*   < > 148*   < > 98   < > 119*  --  113*   UMA 10.8* 11.1* 11.2*   < > 9.6   < > 10.2   < > 9.5  --  9.2   MAG  --   --   --   --  2.6*  --   --   --  2.2  --  2.1   PHOS  --   --   --   --   --   --  2.6  --   --   --   --     < > = values in this interval not displayed.       CBC -   Recent Labs   Lab Test 11/04/23  0658 11/03/23  0618 11/02/23  1814 11/02/23  0846   WBC 9.5 7.9  --  5.7   HGB 6.4* 6.8* 7.3* 6.7*   PLT 40* 39*  --  27*       LFTs -   Recent Labs   Lab Test 11/04/23  0658 11/03/23  0618 11/02/23  0724   ALKPHOS 67 78 77   BILITOTAL 6.3* 6.9* 5.5*  5.4*   ALT 28 34 25   AST 47* 47* 37   PROTTOTAL 5.5* 6.6 6.1*   ALBUMIN 4.3 5.1 4.8       Iron Panel -   Recent Labs   Lab Test 10/26/23  1311 10/03/23  0853   IRON 62 43   IRONSAT 28 28   TANYA  --  157           Current Medications:    [Held by provider] albumin human  25 g Intravenous Daily     citalopram  40 mg Oral Daily     ferrous sulfate  325 mg Oral Daily with breakfast     fluticasone-vilanterol  1 puff Inhalation Daily     lactulose  20 g Oral TID     levothyroxine  112 mcg Oral Daily     [Held by provider] midodrine  20 mg Oral TID     montelukast  10 mg Oral At Bedtime     pantoprazole  40 mg Oral Daily     rifaximin  550 mg Oral BID     terlipressin  0.85 mg Intravenous Q6H

## 2023-11-05 NOTE — PROGRESS NOTES
[facility]    Medicine Progress Note - Hospitalist Service, GOLD TEAM 9    Date of Admission:  10/22/2023    Assessment & Plan   Stefani Crowell is a 63 year old female with a pertinent past medical history of alcoholic cirrhosis, hepatorenal syndrome, hypothyroidism, recurrent hyponatremia, recurrent pleural effusions, COPD and recent admission for hyponatremia who was admitted on 10/22/2023 due to increased dyspnea related to R pleural effusion and hyponatremia.     MATTIE, likely secondary to hepatorenal syndrome-- now improved  Baseline Cr 0.8, admit with 1.04 and now rising to 1.41 despite albumin. Was getting daily albumin and Cr continued to rise. Discussed with hepatology and nephrology and will start terlipressin given concern for HRS physiology. If she does not improve and ends up needing HD she is not a good candidate due to low BP and will have to have goals of care convo.  - hold diuretics  - Continue terlipressin 0.85 q 6 hours   - dose adjustments and stopping per nephro and hepatology    Dyspnea due to recurrent R hepatic hydrothorax  History of COPD  ESLD, alcoholic cirrhosis  Ascites  Admitted with dypsnea and mild abdominal pain, recently stopped lasix. Dyspnea secondary to hepatic hydrothorax which she was getting twice weekly thoracentesis for as an outpatient which did not relieve symptoms. Started aggressive diuresis however had to stop due to low sodiums and now getting a thora once ever 2-3 days for symptom management.   -Hepatology consulted, their help is appreciated  -Continue home lactulose 20g TID, rifaximin 550mg BID, Breo ellipta 1 puff daily  -Has PRN shirley  - IR unable to do TIPS at this time due to worsening clinical status   - If cr continues to improve, will reach out to IR on 11/6 regarding TIPS      Severe Hyponatremia secondary to cirrhosis, improving  Hovering around 120 however dropped due to aggressive diuresis so had to  stop. Now stable around 120.  - daily bmp   - persistent and continue to monitor and consider hypertonic saline if Na <115 and/or symptoms    Acute on chronic normocytic anemia  Likely lower GI bleed  Hgb dropping below 7 starting 11/1 without signs of bleeding. On 11/2 noted to have dark red bloody stools. Taken for EGD without signs of bleeding. Colonoscopy to be done 11/3.   - Transfuse for Hgb < 7   - continue diet    Thrombocytopenia  Coagulopathy  -Likely secondary to cirrhosis   -Will monitor following diuretics and fluid removal via procedures  and Plt < 10. (Would likely need comitant diuretics)    Elevated troponin  -Admit 27, 28. Likely secondary to poor kidney clearance  -Unremarkable EKG    Hypothyroidism  Last TSH was 0.74 on 07/24/23  - Continue home levothyroxine 112 mcg po daily    GERD  - Continue pantoprazole 40 mg po qdaily    Anxiety  - Holding citalopram 40 mg po qdaily and trazodone 50 mg po at bedtime  - continue lorazepam 0.5 mg po qdaily prn    Hypokalemia, resolved  - replacement protocol        Diet: Snacks/Supplements Adult: Ensure Enlive; Between Meals  Fluid restriction 1000 ML FLUID  2 Gram Sodium Diet    DVT Prophylaxis: Pneumatic Compression Devices  Demarco Catheter: Not present  Lines: None     Cardiac Monitoring: None  Code Status: Full Code      Clinically Significant Risk Factors         # Hyponatremia: Lowest Na = 127 mmol/L in last 2 days, will monitor as appropriate   # Hypercalcemia: Highest Ca = 10.8 mg/dL in last 2 days, will monitor as appropriate       # Coagulation Defect: INR = 2.16 (Ref range: 0.85 - 1.15) and/or PTT = N/A, will monitor for bleeding  # Thrombocytopenia: Lowest platelets = 40 in last 2 days, will monitor for bleeding           # Moderate Malnutrition: based on nutrition assessment    # Financial/Environmental Concerns: none  # Asthma: noted on problem list        Disposition Plan     Expected Discharge Date: 11/13/2023      Destination: home with  family  Discharge Comments: Dispo: home, ind.  Plan: Keep in hospital until either gets well enough for TIPS or can come up with safe plan for thora's as OP, or decompensates  Progression: Con't lactulose and rifaximin for hepatic encephalopathy, mentation stable.            CELINA MOODY MD  Hospitalist Service, GOLD TEAM 9  [facility]  Securely message with [employer] (more info)  Text page via [facility] Paging/Directory   See signed in provider for up to date coverage information  ______________________________________________________________________    Interval History     No acute events overnight. CAPS unable to get comfortable views for thora so IR consulted. Kidney function continues to improve with terlipressin. Going for colonoscopy today.     Physical Exam   Vital Signs: Temp: 98  F (36.7  C) Temp src: Oral BP: 112/60 Pulse: 91   Resp: 20 SpO2: 94 % O2 Device: Nasal cannula Oxygen Delivery: 2 LPM  Weight: 147 lbs 11.33 oz  Gen: Awake and alert, appears comfortable, appears stated age. Sitting up in bed.   HEENT: NCAT, sclerae anicteric.  CV: regular rhythm and rate,   Pulm: Normal work of breathing. Right lung field - reduced breath sounds and bronchial breath sounds evident  GI: Non-distended, non-tender  Skin: Warm, dry  Neuro: AOx3, speech normal, moves all extremities symmetrically.  Psych: Mood is good, affect is congruent.      Medical Decision Making       55 MINUTES SPENT BY ME on the date of service doing chart review, history, exam, documentation & further activities per the note.      Data     I have personally reviewed the following data over the past 24 hrs:    N/A  \   N/A   / N/A     127 (L) 91 (L) 62.2 (H) /  103 (H)   4.0 25 0.70 \     ALT: 28 AST: 47 (H) AP: 63 TBILI: 8.8 (H)   ALB: 4.2 TOT PROTEIN: 5.4 (L) LIPASE: N/A     INR:  2.16 (H) PTT:  N/A   D-dimer:  N/A Fibrinogen:  N/A       Imaging results reviewed over the past 24 hrs:   No  results found for this or any previous visit (from the past 24 hour(s)).

## 2023-11-06 NOTE — CONSULTS
"SPIRITUAL HEALTH SERVICES Consult Note  [facility] 7C  Referral Source/Reason for Visit - Routine Consult    Summary & Recommendations -     Lolly named a goal of care is to \"get my mobility back.\"  Lolly named she is \"feeling depressed\" and mentioned \"hopefully going back on anti-depressants will help.\"    Plan - [facility] remains available.    Berto Miranda  Chaplain Resident  Pager [phone]    * [facility] remains available 24/7 for emergent requests/referrals, either by having the switchboard page the on-call  or by entering an ASAP/STAT consult in Epic (this will also page the on-call ). Routine Epic consults receive an initial response within 24 hours.*    Saw pt Stefani \"Lolly\" Maggie Crowell per Routine Consult.    Patient/Family Understanding of Illness and Goals of Care - Lolly said she was here for \"liver failure. I'm really sick.\"    Distress and Loss - Lolly said she \"is feeling depressed. My soul feels damaged.\" Lolly said she is \"tired all the time. I just want to sleep all the time\" and that she is losing interest in things that used to bring her jaimie.    Strengths, Coping, and Resources  - Lolly's 2 sisters, , and daughter have been very supportive of her during this time.    Meaning, Beliefs, and Spirituality - Lolly identified herself as Ep[employer]alian and said the \"Meditation has been somewhat helpful.\"       "

## 2023-11-06 NOTE — PLAN OF CARE
"Blood pressure 107/49, pulse 84, temperature 98.5  F (36.9  C), temperature source Oral, resp. rate 16, height 1.651 m (5' 5\"), weight 64 kg (141 lb 1.5 oz), SpO2 94%, Via 2L NC         Patient  A & O X 4 forgetful at time . Able to make needs known. Denies pain VSS . Dyspnea on exertions . Received scheduled medications . C/o right chest  pain given Tylenol po PRN X 1 . Right PIV SL.  Skin Generalized bruises . Up with SBA to BSC appetite fair . On FR 1000 ml .  Hgb 7.3 . Patient family was here to visit . Call light within reach bed alarm on.       Goal Outcome Evaluation:      Plan of Care Reviewed With: patient    Overall Patient Progress: no change.        "

## 2023-11-06 NOTE — PLAN OF CARE
Goal Outcome Evaluation:      Plan of Care Reviewed With: patient    Overall Patient Progress: no changeOverall Patient Progress: no change    Outcome Evaluation: Continue to monitor Hgb, still needs 1-2 O2 supplement. Not ready for TIPS procedure as previously planned.    Loni Mayfield, RNCC  7C RN Care Coordinator  Phone: [phone]  Pager: [phone]  [place] [facility] [place] ([id number]) Saturday & Sunday; ([id number]) [facility] Recognized Holidays   Units: 5A, 5B & 5C  Pager: [phone]  Units: 6B, 6C & 6D    Pager: [phone]  Units: 7A, 7B, 7C & 7D    Pager: [phone]  Units: 6A & ICU   Pager: [phone]  Units: 5 Ortho, 5MS & WB ED Pager: [phone]  Units: 6MS, 8A & 10 ICU  Pager [phone]

## 2023-11-06 NOTE — PROGRESS NOTES
"[facility]  HEPATOLOGY PROGRESS NOTE  Stefani Crowell [id number]       IMPRESSION:  Stefani Crowell is a 63 year old female with a history of alcohol associated cirrhosis, last use 6/28/23 complicated by hepatic hydrothorax with twice weekly thoracentesis, hyponatremia, COPD, weight loss, ascites who was admitted with dyspnea. She has had continued hyponatremia along with worsening of her creatinine.     RECOMMENDATIONS:  # Hyponatremia  # MATTIE-HRS  - sodium level more stable, no plans for restarting diuretics. Has continued with low sodium diet, fluid restriction.   - Ur Na <20. Cystatin C as outpatient. Started terlipressin 11/1 for HRS physiology and has completed x5 days. Creatinine improved to baseline 0.7. BP also improved.  Now with increase in urine output.   - With recent high BUN and underlying lower muscle mass, her creatinine is likely not representing level of kidney dysfunction.    - Stopped terlipressin and restarted midodrine at 15 mg TID.   - discontinue continuous oxygen monitoring.     # Hepatic hydrothorax  - diuretic refractory due to hyponatremia.   - has needed three times weekly thora, removing 2 L each time. Last diagnostic testing 10/25 without SBE. Recommend inpatient thoras with diagnostic testing.   -  MELD 23 (initial MELD), t. Bili has been more indirect. Not able to be at OP apt with IR today. With t. Bili of 8, she would not be a candidate for TIPS at this time, especially with current MELD. Will continue to have conversations with IR for final determination. Echo 10/25 without right sided dysfunction, RA 3. Bubble with evidence of \"medium\" PFO.  Seen by cardiology, no issue with PFO and TIPS.     # Alcohol associated cirrhosis  - currently engaged in CD programming. Peth 10/19 negative  - evaluated for liver transplant in past, not a current candidate for transplant, MELD 28.  She needs to complete her CD program and abstinence period completion.   - EGD 8/2023 with " "normal esophagus  -Ultrasound complete with Doppler for evaluation of HCC and thrombosis negative. (10/22)    # Hepatic encephalopathy  - continue lactulose and rifaximin. Titrate for ~3-4 BM's per day. Mentation stable. Having an increase in sleepiness, unclear if related to recent drop in lactulose or prolonged hospitalization.     Patient was discussed with attending physician, Dr. Stephanie Lim  The above note reflects our joint plan.     Next follow up appointment: Dr. Stephanie Lim 1/2024    Thank you for the opportunity to be involved with  Stefani Crowell [facility]. Please call with any questions or concerns.    ROD Urban, CNP  Inpatient Hepatology [facility]      SUBJECTIVE:  Continues to have appetite. Mentation stable, falling asleep easily. Continues to have mild upper abdomen discomfort, not as severe as week prior. No melena or hematochezia.     ROS:  A 10-point review of systems was negative.    OBJECTIVE:  VS: /53   Pulse 82   Temp 97.5  F (36.4  C) (Oral)   Resp 16   Ht 1.651 m (5' 5\")   Wt 64 kg (141 lb 1.5 oz)   LMP  (LMP Unknown)   SpO2 93%   BMI 23.48 kg/m        General: In no acute distress, mild facial muscle wasting  Neuro: AOx3, No asterixis  HEENT:  Noscleral icterus, Nooral lesions  CV: . Skin warm and dry  Lungs:  Respirations even and nonlabored on 2L  Abd:  Nondistended, nontender   Extrem: Noperipheral edema   Skin: Nojaundice  Psych: pleasant    MEDICATIONS:  Current Facility-Administered Medications   Medication    acetaminophen (TYLENOL) tablet 650 mg    Or    acetaminophen (TYLENOL) Suppository 650 mg    [Held by provider] albumin human 25 % injection 25 g    albuterol (PROVENTIL HFA/VENTOLIN HFA) inhaler    artificial saliva (BIOTENE DRY MOUTHWASH) liquid 5 mL    citalopram (celeXA) tablet 40 mg    ferrous sulfate (FEROSUL) tablet 325 mg    fluticasone-vilanterol (BREO ELLIPTA) 200-25 MCG/ACT inhaler 1 puff    lactulose (CHRONULAC) solution 20 g    levothyroxine " (SYNTHROID/LEVOTHROID) tablet 112 mcg    LORazepam (ATIVAN) tablet 0.5 mg    melatonin tablet 3 mg    [Held by provider] midodrine (PROAMATINE) tablet 20 mg    montelukast (SINGULAIR) tablet 10 mg    pantoprazole (PROTONIX) EC tablet 40 mg    polyethylene glycol (MIRALAX) Packet 17 g    prochlorperazine (COMPAZINE) injection 5 mg    Or    prochlorperazine (COMPAZINE) tablet 5 mg    Or    prochlorperazine (COMPAZINE) suppository 25 mg    rifaximin (XIFAXAN) tablet 550 mg    terlipressin (TERLIVAZ) injection 0.85 mg       REVIEW OF LABORATORY, PATHOLOGY AND IMAGING RESULTS:  BMP  Recent Labs   Lab 11/06/23  0652 11/05/23  0656 11/04/23  0658 11/03/23  0618   * 127* 127* 125*   POTASSIUM 4.1 4.0 4.2 4.3   CHLORIDE 89* 91* 91* 89*   UMA 10.4* 10.6* 10.8* 11.1*   CO2 25 25 25 21*   BUN 61.1* 62.2* 78.6* 82.7*   CR 0.76 0.70 0.78 0.83   * 103* 110* 100*     CBC  Recent Labs   Lab 11/06/23  0652 11/05/23  0656 11/04/23  0658 11/03/23  0618   WBC 7.1 8.5 9.5 7.9   RBC 2.40* 2.41* 2.11* 2.20*   HGB 7.6* 7.3* 6.4* 6.8*   HCT 22.8* 21.9* 19.3* 20.8*   MCV 95 91 92 95   MCH 31.7 30.3 30.3 30.9   MCHC 33.3 33.3 33.2 32.7   RDW 17.9* 17.8* 19.0* 16.6*   PLT 44* 39* 40* 39*     INR  Recent Labs   Lab 11/06/23  0652 11/05/23  0656 11/04/23  0658 11/03/23  0618   INR 2.19* 2.16* 2.68* 2.25*     LFTs  Recent Labs   Lab 11/06/23  1025 11/06/23  0652 11/05/23  0656 11/04/23  0658 11/03/23  0618   ALKPHOS  --  61 63 67 78   AST  --  40 47* 47* 47*   ALT  --  28 28 28 34   BILITOTAL  --  8.5* 8.8* 6.3* 6.9*   PROTTOTAL 5.2* 5.5* 5.4* 5.5* 6.6   ALBUMIN  --  4.2 4.2 4.3 5.1        MELD 3.0: 28 at 11/6/2023  6:52 AM  MELD-Na: 30 at 11/6/2023  6:52 AM  Calculated from:  Serum Creatinine: 0.76 mg/dL (Using min of 1 mg/dL) at 11/6/2023  6:52 AM  Serum Sodium: 124 mmol/L (Using min of 125 mmol/L) at 11/6/2023  6:52 AM  Total Bilirubin: 8.5 mg/dL at 11/6/2023  6:52 AM  Serum Albumin: 4.2 g/dL (Using max of 3.5 g/dL) at 11/6/2023   "6:52 AM  INR(ratio): 2.19 at 11/6/2023  6:52 AM  Age at listing (hypothetical): 63 years  Sex: Female at 11/6/2023  6:52 AM    Previous work-up:   Lab Results   Component Value Date    HEPBANG Nonreactive 02/18/2021    HBCAB Nonreactive 02/18/2021    HCVAB  08/03/2017     Nonreactive   Assay performance characteristics have not been established for newborns,   infants, and children      TANYA 157 10/03/2023    IRONSAT 28 10/26/2023    TSH 1.75 10/26/2023    CHOL 176 08/15/2022    HDL 44 (L) 08/15/2022     (H) 08/15/2022    TRIG 111 08/15/2022    A1C 4.7 07/24/2023    POPETH <10 10/22/2023    PLPETH <10 10/22/2023      No results found for: \"SPECDES\", \"LDRESULTS\"      Imaging Results:  None current.   "

## 2023-11-06 NOTE — PROGRESS NOTES
Care Management Follow Up    Length of Stay (days): 15    Expected Discharge Date: 11/15/2023     Concerns to be Addressed: all concerns addressed in this encounter     Patient plan of care discussed at interdisciplinary rounds: Yes    Anticipated Discharge Disposition: Home, Outpatient Chemical Dependency     Anticipated Discharge Services: None  Anticipated Discharge DME: None    Patient/family educated on Medicare website which has current facility and service quality ratings: no  Education Provided on the Discharge Plan: Yes  Patient/Family in Agreement with the Plan: yes    Referrals Placed by CM/SW:  NA  Private pay costs discussed: Not applicable    Additional Information:    Patient's status reviewed by chart. Pt's not medically ready. Cr trending upward. She still needs O2 supply 1-2L. Appears depressed with current health status.    Continue to monitor and support for safe discharge planning.    Loni Mayfield, RN  7C RN Care Coordinator  Phone: [phone]  Pager: [phone]  [place] [facility] ([id number]) Saturday & Sunday; ([phone]) [facility] Recognized Holidays   Units: 5A, 5B & 5C  Pager: [phone]  Units: 6B, 6C & 6D    Pager: [phone]  Units: 7A, 7B, 7C & 7D    Pager: [phone]  Units: 6A & ICU   Pager: [phone]  Units: 5 Ortho, 5MS & WB ED Pager: [phone]  Units: 6MS, 8A & 10 ICU  Pager [phone]

## 2023-11-06 NOTE — PROCEDURES
[facility]    Thoracentesis at Bedside    Date/Time: 11/6/2023 5:35 PM    Performed by: Rogers Olea MD  Authorized by: Desi Price MD      UNIVERSAL PROTOCOL   Site Marked: Yes  Prior Images Obtained and Reviewed:  Yes  Required items: Required blood products, implants, devices and special equipment available    Patient identity confirmed:  Verbally with patient, arm band, provided demographic data and hospital-assigned identification number  NA - No sedation, light sedation, or local anesthesia  Confirmation Checklist:  Patient's identity using two indicators, relevant allergies and procedure was appropriate and matched the consent or emergent situation  Time out: Immediately prior to the procedure a time out was called    Universal Protocol: the Joint Commission Universal Protocol was followed    Preparation: Patient was prepped and draped in usual sterile fashion      Procedure purpose: therapeutic  Indications: pleural effusion       ANESTHESIA    Local Anesthetic:  Lidocaine 1% without epinephrine      SEDATION    Patient Sedated: No      PROCEDURE DETAILS   Preparation: skin prepped with ChloraPrep  Patient position: sitting  Ultrasound guidance: yes  Location: right posterior  Intercostal space: 8th  Puncture method: over-the-needle catheter  Number of attempts: 2  Drainage amount: 850 ml  Drainage characteristics: serosanguinous  Chest x-ray performed: yes  Chest x-ray interpreted by radiologist.  Chest x-ray findings: pneumothorax      PROCEDURE  Describe Procedure: 5 Danish catheter was used to drain 850 ml of pink serosanguinous fluid was drained   Length of time physician/provider present for 1:1 monitoring during sedation: 0   Small pneumothorax noted on CXR noted after the procedure. Vitals signs remained stable

## 2023-11-06 NOTE — PROGRESS NOTES
[facility]    Medicine Progress Note - Hospitalist Service, GOLD TEAM 9    Date of Admission:  10/22/2023    Assessment & Plan   Stefani Crowell is a 63 year old female with a pertinent past medical history of alcoholic cirrhosis, hepatorenal syndrome, hypothyroidism, recurrent hyponatremia, recurrent pleural effusions, COPD and recent admission for hyponatremia who was admitted on 10/22/2023 due to increased dyspnea related to R pleural effusion and hyponatremia.     MATTIE, likely secondary to hepatorenal syndrome-- now improved  Baseline Cr 0.8, admit with 1.04 and now rising to 1.41 despite albumin. Was getting daily albumin and Cr continued to rise. Discussed with hepatology and nephrology and will start terlipressin given concern for HRS physiology. If she does not improve and ends up needing HD she is not a good candidate due to low BP and will have to have goals of care convo.  - hold diuretics  - plan to discontinue terlipressin  - restart midodrine  - dose adjustments and stopping per nephro and hepatology    Dyspnea due to recurrent R hepatic hydrothorax  History of COPD  ESLD, alcoholic cirrhosis  Ascites  Admitted with dypsnea and mild abdominal pain, recently stopped lasix. Dyspnea secondary to hepatic hydrothorax which she was getting twice weekly thoracentesis for as an outpatient which did not relieve symptoms. Started aggressive diuresis however had to stop due to low sodiums and now getting a thora once ever 2-3 days for symptom management.   -Hepatology consulted, their help is appreciated  -Continue home lactulose 20g TID, rifaximin 550mg BID, Breo ellipta 1 puff daily  -Has PRN shirley  - IR unable to do TIPS at this time due to worsening clinical status  - Hepatology to reach out to IR regarding tips. Likely still poor candidate for thi  - CAPS consulted for thoracentesis (dx/tx)- since thoracentesis noted to be bloody, will monitor post procedure  CBC. On post thoracentesis CXR, patient also noted to have small apical pneumothorax. Will obtain repeat CXR in 2 hours and again in 6 hours to monitor.    Severe Hyponatremia secondary to cirrhosis, improving  Hovering around 120 however dropped due to aggressive diuresis so had to stop. Now stable around 120.  - daily bmp   - persistent and continue to monitor and consider hypertonic saline if Na <115 and/or symptoms    Acute on chronic normocytic anemia  Likely lower GI bleed  Hgb dropping below 7 starting 11/1 without signs of bleeding. On 11/2 noted to have dark red bloody stools. Taken for EGD without signs of bleeding. Colonoscopy without source of bleeding, portal colopathy .  - Transfuse for Hgb < 7   - continue diet  - continue to monitor for now    Thrombocytopenia  Coagulopathy  -Likely secondary to cirrhosis   -Will monitor following diuretics and fluid removal via procedures  and Plt < 10. (Would likely need comitant diuretics)    Elevated troponin  -Admit 27, 28. Likely secondary to poor kidney clearance  -Unremarkable EKG    Hypothyroidism  Last TSH was 0.74 on 07/24/23  - Continue home levothyroxine 112 mcg po daily    GERD  - Continue pantoprazole 40 mg po qdaily    Anxiety  - Holding citalopram 40 mg po qdaily and trazodone 50 mg po at bedtime  - continue lorazepam 0.5 mg po qdaily prn    Hypokalemia, resolved  - replacement protocol        Diet: Snacks/Supplements Adult: Ensure Enlive; Between Meals  Fluid restriction 1000 ML FLUID  2 Gram Sodium Diet    DVT Prophylaxis: Pneumatic Compression Devices  Demarco Catheter: Not present  Lines: None     Cardiac Monitoring: None  Code Status: Full Code      Clinically Significant Risk Factors         # Hyponatremia: Lowest Na = 124 mmol/L in last 2 days, will monitor as appropriate   # Hypercalcemia: Highest Ca = 10.6 mg/dL in last 2 days, will monitor as appropriate       # Coagulation Defect: INR = 2.19 (Ref range: 0.85 - 1.15) and/or PTT = N/A, will  monitor for bleeding  # Thrombocytopenia: Lowest platelets = 39 in last 2 days, will monitor for bleeding           # Moderate Malnutrition: based on nutrition assessment    # Financial/Environmental Concerns: none  # Asthma: noted on problem list        Disposition Plan     Expected Discharge Date: 11/13/2023      Destination: home with family  Discharge Comments: Dispo: home, ind.  Plan: Keep in hospital until either gets well enough for TIPS or can come up with safe plan for thora's as OP, or decompensates  Progression: Con't lactulose and rifaximin for hepatic encephalopathy, mentation stable.            CELINA MOODY MD  Hospitalist Service, GOLD TEAM 9  [facility]  Securely message with [employer] (more info)  Text page via [facility] Paging/Directory   See signed in provider for up to date coverage information  ______________________________________________________________________    Interval History     No acute events overnight. This morning, patient awake, alert. She stated that she was more tired and felt more out of breath. No worsening nasal congestion, rhinorrhea, coughing, abd distension, abd pain. No  nausea, emesis, diarrhea. No fevers, chills. Sisters at bedside, all questions answered.    Physical Exam   Vital Signs: Temp: 97.9  F (36.6  C) Temp src: Axillary BP: 100/49 Pulse: 79   Resp: 16 SpO2: 93 % O2 Device: Nasal cannula Oxygen Delivery: 2 LPM  Weight: 141 lbs 1.51 oz  Gen: Awake and alert, appears comfortable, appears stated age. Sitting up in bed.   HEENT: NCAT, sclerae anicteric.  CV: regular rhythm and rate,   Pulm: Normal work of breathing. Right lung field - reduced breath sounds and bronchial breath sounds evident  GI: Non-distended, non-tender  Skin: Warm, dry  Neuro: AOx3, speech normal, moves all extremities symmetrically.  Psych: Mood is good, affect is congruent.      Medical Decision Making       55 MINUTES SPENT BY ME on the date of  service doing chart review, history, exam, documentation & further activities per the note.      Data     I have personally reviewed the following data over the past 24 hrs:    7.1  \   7.6 (L)   / 44 (LL)     124 (L) 89 (L) 61.1 (H) /  127 (H)   4.1 25 0.76 \     ALT: 28 AST: 40 AP: 61 TBILI: 8.5 (H)   ALB: 4.2 TOT PROTEIN: 5.5 (L) LIPASE: N/A     INR:  2.19 (H) PTT:  N/A   D-dimer:  N/A Fibrinogen:  N/A       Imaging results reviewed over the past 24 hrs:   No results found for this or any previous visit (from the past 24 hour(s)).

## 2023-11-07 PROBLEM — N17.9 ACUTE KIDNEY FAILURE, UNSPECIFIED (H): Status: ACTIVE | Noted: 2023-01-01

## 2023-11-07 NOTE — PROGRESS NOTES
"[facility]  HEPATOLOGY PROGRESS NOTE  Stefani Crowell [id number]       IMPRESSION:  Stefani Crowell is a 63 year old female with a history of alcohol associated cirrhosis, last use 6/28/23 complicated by hepatic hydrothorax with twice weekly thoracentesis, hyponatremia, COPD, weight loss, ascites who was admitted with dyspnea. She has had continued hyponatremia along with worsening of her creatinine.     RECOMMENDATIONS:  # Hyponatremia  # MATTIE-HRS  - sodium level more stable, no plans for restarting diuretics. Has continued with low sodium diet, fluid restriction.   - Ur Na <20. Cystatin C as outpatient. Received terlipressin 11/1-11/6 with creatinine 0.7 at stopping. BP dropped following stopping and slight bump in creatinine today. She had an increase in urine output.   - With recent high BUN and underlying lower muscle mass, her creatinine is likely not representing level of kidney dysfunction.    - discontinue continuous oxygen monitoring for terlipressin     # Hepatic hydrothorax  - diuretic refractory due to hyponatremia.   - has needed three times weekly thora, removing ~2 L each time. Last diagnostic testing 10/25 without SBE. Thora on 11/6 with 498 PMN's. She did have 130,000 RBC on fluid. With correction, she does not meet criteria for SBE.  Recommend continuing inpatient thoras with diagnostic testing.   -  MELD 23 (initial MELD), t. Bili has been more indirect. Not able to be at OP apt with IR today. With t. Bili of 8, she would not be a candidate for TIPS at this time, especially with current MELD. Will continue to have conversations with IR for final determination. Echo 10/25 without right sided dysfunction, RA 3. Bubble with evidence of \"medium\" PFO.  Seen by cardiology, no issue with PFO and TIPS.     # Alcohol associated cirrhosis  - currently engaged in CD programming. Peth 10/19 negative  - evaluated for liver transplant in past, not a current candidate for transplant, MELD 29.  She " "needs to complete her CD program and abstinence period completion.   - EGD 11/2/23 with normal esophagus  -Ultrasound complete with Doppler for evaluation of HCC and thrombosis negative. (10/22)    # Hepatic encephalopathy  - continue lactulose and rifaximin. Titrate for ~3-4 BM's per day. Mentation stable.     Patient was discussed with attending physician, Dr. Stephanie Lim  The above note reflects our joint plan.     Next follow up appointment: Dr. Stephanie Lim 1/2024    Thank you for the opportunity to be involved with  Stefani Serrano Mervat [facility]. Please call with any questions or concerns.    Yanelis Miller, ROD, CNP  Inpatient Hepatology [facility]      SUBJECTIVE:  More active and continues to have an appetite. She is requesting working with PT.  No melena or hematochezia.     ROS:  A 10-point review of systems was negative.    OBJECTIVE:  VS: /40 (BP Location: Left arm)   Pulse 78   Temp 98.2  F (36.8  C) (Oral)   Resp 18   Ht 1.651 m (5' 5\")   Wt 68.3 kg (150 lb 8 oz)   LMP  (LMP Unknown)   SpO2 95%   BMI 25.04 kg/m        General: In no acute distress, mild facial muscle wasting  Neuro: AOx3, No asterixis  HEENT:  Noscleral icterus, Nooral lesions  CV: . Skin warm and dry  Lungs:  Respirations even and nonlabored on 2L  Abd:  Nondistended, nontender   Extrem: Noperipheral edema   Skin: Nojaundice  Psych: pleasant    MEDICATIONS:  Current Facility-Administered Medications   Medication    acetaminophen (TYLENOL) tablet 650 mg    Or    acetaminophen (TYLENOL) Suppository 650 mg    [Held by provider] albumin human 25 % injection 25 g    albuterol (PROVENTIL HFA/VENTOLIN HFA) inhaler    artificial saliva (BIOTENE DRY MOUTHWASH) liquid 5 mL    citalopram (celeXA) tablet 40 mg    ferrous sulfate (FEROSUL) tablet 325 mg    fluticasone-vilanterol (BREO ELLIPTA) 200-25 MCG/ACT inhaler 1 puff    lactulose (CHRONULAC) solution 20 g    levothyroxine (SYNTHROID/LEVOTHROID) tablet 112 mcg    LORazepam (ATIVAN) " tablet 0.5 mg    melatonin tablet 3 mg    midodrine (PROAMATINE) tablet 15 mg    montelukast (SINGULAIR) tablet 10 mg    pantoprazole (PROTONIX) EC tablet 40 mg    polyethylene glycol (MIRALAX) Packet 17 g    prochlorperazine (COMPAZINE) injection 5 mg    Or    prochlorperazine (COMPAZINE) tablet 5 mg    Or    prochlorperazine (COMPAZINE) suppository 25 mg    rifaximin (XIFAXAN) tablet 550 mg       REVIEW OF LABORATORY, PATHOLOGY AND IMAGING RESULTS:  BMP  Recent Labs   Lab 11/07/23  0650 11/06/23  0652 11/05/23  0656 11/04/23 0658   * 124* 127* 127*   POTASSIUM 4.7 4.1 4.0 4.2   CHLORIDE 88* 89* 91* 91*   UMA 10.3* 10.4* 10.6* 10.8*   CO2 24 25 25 25   BUN 73.8* 61.1* 62.2* 78.6*   CR 1.17* 0.76 0.70 0.78   GLC 77 127* 103* 110*     CBC  Recent Labs   Lab 11/07/23  0650 11/07/23  0118 11/06/23  1941 11/06/23  1554 11/06/23  0652 11/05/23  0656   WBC 8.4  --  8.6  --  7.1 8.5   RBC 2.29*  --  2.31*  --  2.40* 2.41*   HGB 7.4*   < > 7.1*  7.1*   < > 7.6* 7.3*   HCT 21.8*  --  21.8*  --  22.8* 21.9*   MCV 95  --  94  --  95 91   MCH 32.3  --  30.7  --  31.7 30.3   MCHC 33.9  --  32.6  --  33.3 33.3   RDW 18.6*  --  18.4*  --  17.9* 17.8*   PLT 52*  --  55*  --  44* 39*    < > = values in this interval not displayed.     INR  Recent Labs   Lab 11/07/23  0841 11/06/23  0652 11/05/23  0656 11/04/23 0658   INR 2.13* 2.19* 2.16* 2.68*     LFTs  Recent Labs   Lab 11/07/23  0650 11/06/23  1025 11/06/23  0652 11/05/23  0656 11/04/23  0658   ALKPHOS 76  --  61 63 67   AST 38  --  40 47* 47*   ALT 26  --  28 28 28   BILITOTAL 7.2*  --  8.5* 8.8* 6.3*   PROTTOTAL 5.4* 5.2* 5.5* 5.4* 5.5*   ALBUMIN 3.9  --  4.2 4.2 4.3        MELD 3.0: 29 at 11/7/2023  8:41 AM  MELD-Na: 30 at 11/7/2023  8:41 AM  Calculated from:  Serum Creatinine: 1.17 mg/dL at 11/7/2023  6:50 AM  Serum Sodium: 124 mmol/L (Using min of 125 mmol/L) at 11/7/2023  6:50 AM  Total Bilirubin: 7.2 mg/dL at 11/7/2023  6:50 AM  Serum Albumin: 3.9 g/dL (Using max  "of 3.5 g/dL) at 11/7/2023  6:50 AM  INR(ratio): 2.13 at 11/7/2023  8:41 AM  Age at listing (hypothetical): 63 years  Sex: Female at 11/7/2023  8:41 AM    Previous work-up:   Lab Results   Component Value Date    HEPBANG Nonreactive 02/18/2021    HBCAB Nonreactive 02/18/2021    HCVAB  08/03/2017     Nonreactive   Assay performance characteristics have not been established for newborns,   infants, and children      TANYA 157 10/03/2023    IRONSAT 28 10/26/2023    TSH 1.75 10/26/2023    CHOL 176 08/15/2022    HDL 44 (L) 08/15/2022     (H) 08/15/2022    TRIG 111 08/15/2022    A1C 4.7 07/24/2023    POPETH <10 10/22/2023    PLPETH <10 10/22/2023      No results found for: \"SPECDES\", \"LDRESULTS\"      Imaging Results:  None current.   "

## 2023-11-07 NOTE — PROGRESS NOTES
[facility]    Medicine Progress Note - Hospitalist Service, GOLD TEAM 9    Date of Admission:  10/22/2023    Assessment & Plan   Stefani Crowell is a 63 year old female with a pertinent past medical history of alcoholic cirrhosis, hepatorenal syndrome, hypothyroidism, recurrent hyponatremia, recurrent pleural effusions, COPD and recent admission for hyponatremia who was admitted on 10/22/2023 due to increased dyspnea related to R pleural effusion and hyponatremia.     MATTIE, likely secondary to hepatorenal syndrome  Hyponatremia  Suspect HRS. Had improvement with terlipressin (11/1-11/6), slight bump again in Cr once it was stopped.  - hold diuretics, no plans to resume per hepatology  - continue midodrine (resumed once off terlipressin)    Hepatic hydrothorax  Diuretic-refractory due to hyponatremia. Not currently a TIPS candidate due to elevation of bilirubin.  - Continue with thoracenteses as needed (has needed ~3 times per week)  - Diagnostics to be sent with each thora  - Note 498 PMNs on 11/6 thora, but with correction for RBCs this is not consistent with SBE    Post-thoracentesis pneumothorax  Noted after 11/6 thora. Respiratory status stable. Serial CXRs demonstrated improvement.  - Repeat CXR in AM  - Ideally avoid repeat thora until resolved    Decompensated alcoholic cirrhosis  Hx hepatic encephalopathy  Abd US negative for thrombosis and HCC. EGD 11/2 normal. Not a liver transplant candidate currently due to incomplete CD program and abstinence period. Engaged in CD program outpatient. Peth negative 10/19.  - Hepatology consulted, appreciate assistance  - Continue home lactulose 20g TID, rifaximin 550mg BID    Acute on chronic normocytic anemia  Concern for GIB, nothing identified on scope  Hgb dropping below 7 starting 11/1 without signs of bleeding. On 11/2 noted to have dark red bloody stools. Taken for EGD without signs of bleeding. Colonoscopy without  No specimen collected. Estimated Blood Loss: <30 mL. source of bleeding, portal colopathy .  - Transfuse for Hgb < 7     Thrombocytopenia  Coagulopathy  -Likely secondary to cirrhosis     Elevated troponin  -Admit 27, 28. Likely secondary to poor kidney clearance  -Unremarkable EKG    Hypothyroidism  Last TSH was 0.74 on 07/24/23  - Continue home levothyroxine 112 mcg po daily    GERD  - Continue pantoprazole 40 mg po qdaily    Anxiety  - Holding citalopram 40 mg po qdaily and trazodone 50 mg po at bedtime  - continue lorazepam 0.5 mg po qdaily prn    Hypokalemia, resolved  - replacement protocol          Diet: Snacks/Supplements Adult: Ensure Enlive; Between Meals  Fluid restriction 1000 ML FLUID  2 Gram Sodium Diet    DVT Prophylaxis: Pneumatic Compression Devices  Demarco Catheter: Not present  Lines: None     Cardiac Monitoring: None  Code Status: Full Code      Clinically Significant Risk Factors         # Hyponatremia: Lowest Na = 124 mmol/L in last 2 days, will monitor as appropriate   # Hypercalcemia: Highest Ca = 10.4 mg/dL in last 2 days, will monitor as appropriate     # Coagulation Defect: INR = 2.19 (Ref range: 0.85 - 1.15) and/or PTT = N/A, will monitor for bleeding  # Thrombocytopenia: Lowest platelets = 44 in last 2 days, will monitor for bleeding           # Moderate Malnutrition: based on nutrition assessment    # Financial/Environmental Concerns: none  # Asthma: noted on problem list        Disposition Plan     Expected Discharge Date: 11/15/2023      Destination: home with family  Discharge Comments: Dispo: TBD  Plan: Keep in hospital until either gets well enough for TIPS or can come up with safe plan for thora's as OP, or decompensates  Progression: Con't lactulose and rifaximin for hepatic encephalopathy, mentation stable.            Delma Barnett MD  Hospitalist Service, GOLD TEAM [address] [place] [address]  Securely message with [employer] (more info)  Text page via [employer] Paging/Directory   See signed in provider  for up to date coverage information  ______________________________________________________________________    Interval History   No acute events. Patient feeling well this morning. Denies significant pain. Breathing stable.     Physical Exam   Vital Signs: Temp: 98.2  F (36.8  C) Temp src: Oral BP: 90/45 Pulse: 73   Resp: 18 SpO2: 94 % O2 Device: Nasal cannula Oxygen Delivery: 1.5 LPM  Weight: 141 lbs 1.51 oz    Constitutional: awake, alert, cooperative, no apparent distress  Eyes: scleral icterus present  Respiratory: breathing non-labored, left lung fields CTA, right lung fields with greatly diminished breath sounds throughout  Cardiovascular: 4/6 systolic murmur present  GI: soft, non-distended, non-tender  Neurologic: alert and oriented x3, moving all extremities equally  Neuropsychiatric: calm, normal eye contact, affect normal      Medical Decision Making       **CLEAR ALL SELECTIONS**      Data     I have personally reviewed the following data over the past 24 hrs:    8.4  \   7.4 (L)   / 52 (L)     124 (L) 88 (L) 73.8 (H) /  77   4.7 24 1.17 (H) \     ALT: 26 AST: 38 AP: 76 TBILI: 7.2 (H)   ALB: 3.9 TOT PROTEIN: 5.4 (L) LIPASE: N/A     INR:  2.13 (H) PTT:  N/A   D-dimer:  N/A Fibrinogen:  N/A       Imaging results reviewed over the past 24 hrs:   Recent Results (from the past 24 hour(s))   XR Chest Port 1 View   Result Value    Radiologist flags Pneumothorax (Urgent)    Narrative    Examination: XR CHEST PORT 1 VIEW 11/6/2023 5:16 PM    Indication: s/p thoracentesis.    Comparison: X-ray 11/3/2023    Findings:  AP portable chest. Trachea is midline. Cardiac silhouette appears  stable, though partially obscured. Trace/mild decrease in the large  right pleural effusion. Slight decrease in the bilateral mixed  interstitial airspace opacities with relative peripheral sparing.  Trace left pleural effusion. Interval small right apical pneumothorax.  No left pneumothorax. Unchanged osseous structures.       Impression    Impression:   1. Small right apical pneumothorax following thoracentesis.  2. Mild decrease in the large right pleural effusion. Possible trace  left pleural effusion.  3. Decreased bilateral mixed interstitial and patchy airspace  opacities.    [Urgent Result: Pneumothorax]    Finding was identified on 11/6/2023 5:16 PM.     Dr. Olea was contacted by Dr. Blanco at 11/6/2023 5:18 PM and  verbalized understanding of the urgent finding.     I have personally reviewed the examination and initial interpretation  and I agree with the findings.    JENN BOWMAN DO         SYSTEM ID:  [id number]   XR Chest Port 1 View    Narrative    Exam: XR CHEST PORT 1 VIEW, 11/6/2023 7:05 PM    Comparison: Same day chest radiograph at 1642 hours    History: follow up post procedure small pneumothorax    Findings:  Single portable AP view of the chest with patient is 60 degrees. Small  right apical pneumothorax, slightly decreased since prior. Chest is  otherwise stable compared to same day radiograph at 1642 hours .      Impression    Impression:   Slightly decreased small right apical pneumothorax. Chest is otherwise  stable compared to same day radiograph at 1642 hours.    I have personally reviewed the examination and initial interpretation  and I agree with the findings.    JENN BOWMAN DO         SYSTEM ID:  [id number]   XR Chest Port 1 View    Narrative    XR CHEST PORT 1 VIEW  11/6/2023 11:32 PM     HISTORY:  follow up pneumothorax       COMPARISON:  11/06/2023    TECHNIQUE: XR CHEST PORT 1 VIEW    FINDINGS:   Persistent trace right apical pneumothorax. No left pneumothorax.  Patchy mixed interstitial and airspace opacities throughout the  aerated right lung apex and left lung diffusely. No worsening focal  air space disease. Lumbar right pleural effusion, stable. No left  pleural effusion. Cardiac silhouette is normal.        Impression    IMPRESSION:  1.  Persistent trace right apical pneumothorax, unchanged  versus  slightly decreased compared to same day prior radiograph  2.  Stable large right pleural effusion.  3.  Stable mixed airspace opacities throughout the aerated right lung  and throughout the left lung, slightly most prominent within the left  upper lung.    I have personally reviewed the examination and initial interpretation  and I agree with the findings.    DEEPAK BELL MD         SYSTEM ID:  [id number]

## 2023-11-07 NOTE — PROGRESS NOTES
CLINICAL NUTRITION SERVICES    Reason for Assessment:  Patient /family consult: Pt and family are interested in learning about the low Na diet recommendations     Diet History:  Pt reports no history of receiving low-sodium nutrition education in the past. Patient is ordering 3 meals per day and has a fair po and appetite at this time. Patient is able to drink 1 ensure plus per day. Family mix the ensure with pureed fresh fruit and make them in to a smoothies. Family in the room wanting to know more about low salt diet and whether they can bring high protein food from home.     Nutrition Diagnosis:  Food- and nutrition-related knowledge deficit r/t no previous knowledge of low-sodium diet AEB pt report of no previous formal low-sodium nutrition education.    Nutrition Prescription/Recs:  Continue low-sodium diet.      Interventions:  Nutrition Education  1. Provided verbal instruction on low-sodium meal planning ( < 2000 mg Na+ per day)   2. Encouraged patient to avoid processed foods and try to have one high protein food with each meals.   3. Ordered ensure max for patient to try + ordered high protein snacks to optimize intake     Goals:    Patient and family to understand low salt diet guidelines     Follow-up:   Patient to ask any further nutrition-related questions before discharge. In addition, pt may request outpatient RD appointment.       Newton Partida RD/ANDRE  7C [phone] RD pager: [phone]  Weekend/Holiday RD pager: [phone]

## 2023-11-07 NOTE — PROGRESS NOTES
CAPS Follow-up note:    Small apical right pneumothorax is decreasing is size (versus remaining stable)  Respiratory status is stable  Hemoglobin trend is stable    The apical pneumothorax will resolve in the coming days. Serial imaging is not required now that we have stable to decreasing imaging with clinical stability. If the patient remains hospitalized through tomrrow AM, a follow-up CXR could be considered tomorrow AM to follow continued resolution. This small pneumothorax is not a contraindication for repeat thoracentesis but ideally, the air will have resorbed prior to repeat intervention on the right side.     Please contact me directly if there are questions or concerns.     Umang Soto MD

## 2023-11-07 NOTE — PLAN OF CARE
Assumed Cares: [id number]  Vitals: VSS on 2 L NC  Pain: Denies   Neuro: A&Ox4, forgetful at times   Cardiac: WDL  Respiratory: WDL ex dyspnea on exertion   GI/: Voids spontaneously. BM x3 this shift. Denies nausea   Skin: Generalized bruising   IV/Drains: R PIV SL  Activity: SBA  Events: K therapeutic, AM draw ordered. Platelets administered around 1200. Thoracentesis done around 1445. Chest XR following thora showed small pneumothorax. q1hr vitals ordered. Additional chest XR ordered for 1900 and 2300. Continuous pulse ox ordered until AM    Plan of Care: Chest XR at 1900 and 2300. Continue q1 hr vitals until at least 2030. Hgb to be collected @ 2000. Continue to monitor. Continue with POC.

## 2023-11-07 NOTE — PROGRESS NOTES
"[facility]  CAPS PROCEDURE TEAM FOLLOW-UP NOTE    On 11/6/23 the CAPS team was consulted for thoracentesis at the bedside    On 11/6/23 I became aware of a small pneumothorax post thoracentesis which is a possible complication from the thoracentesis    The patient is currently on 2 L O2 and has been on this since prior to the procedure and is asymptomatic. She was explained that small pneumothorax was seen on post procedure CXR and the need for close monitoring.    /54 (BP Location: Left arm)   Pulse 80   Temp 98  F (36.7  C) (Oral)   Resp 16   Ht 1.651 m (5' 5\")   Wt 64 kg (141 lb 1.5 oz)   LMP  (LMP Unknown)   SpO2 97%   BMI 23.48 kg/m    Exam:  Constitutional: healthy, alert, and no distress  Head: Normocephalic. No masses, lesions, tenderness or abnormalities  Cardiovascular: S1S2 normal RRR  Respiratory: CTA bl  CNS: aaox3. Moving all 4 extremities spont  Psychiatric: mentation appears normal and affect normal/bright      Assessment and Plan:  Stefani Crowell is a 63 year old year old female who underwent bedside thoracentesis and has developed small pneumothorax    Recommendations:  CXR in 2 hours and again in 6 hours  Hgb in 6 hours post procedure   Continuous pulse ox overnight  Vitals q hour for next 4 hours ( can be discontinued in repeat CXR in 2 hours is stable)      "

## 2023-11-07 NOTE — PLAN OF CARE
Goal Outcome Evaluation: 1900-0700      Plan of Care Reviewed With: patient    Overall Patient Progress: no changeOverall Patient Progress: no change    Outcome Evaluation: A&ox4. VSS ex hypotension. 1.5-2L NC. Tylenol given for abd pain with relief per pt. 1 FR.  Up to bedside commode ind. HGB recheck 7.1. Chest X-RAY unchanged. MD notified for conflicting BP in alternating arms. Asymptomatic. Albumin ordered however BP Recheck 95/45, MD advised and discontinued. Vitals chnaged to Q4hrs checks. Continue to monitor.

## 2023-11-08 NOTE — PROGRESS NOTES
[facility]  HEPATOLOGY PROGRESS NOTE  Stefani Crowell [id number]       IMPRESSION:  Stefani Crowell is a 63 year old female with a history of alcohol associated cirrhosis, last use 6/28/23 complicated by hepatic hydrothorax with twice weekly thoracentesis, hyponatremia, COPD, weight loss, ascites who was admitted with dyspnea. She has had continued hyponatremia along with worsening of her creatinine.     RECOMMENDATIONS:  # Hyponatremia  # MATTIE-HRS  - sodium level more stable, no plans for restarting diuretics. Has continued with low sodium diet, fluid restriction.   - Ur Na <20. Cystatin C as outpatient. Received terlipressin 11/1-11/6 with creatinine 0.7 at stopping. BP dropped following stopping and slight bump in creatinine, again back to baseline. She had an increase in urine output.   - With recent high BUN and underlying lower muscle mass, her creatinine is likely not representing level of kidney dysfunction.      # Hepatic hydrothorax  - diuretic refractory due to hyponatremia.   - has needed three times weekly thora while inpatient,  removing ~2 L each time.  Thora on 11/6 with 498 PMN's. She did have 130,000 RBC on fluid. With correction, she does not meet criteria for SBE.  Recommend continuing inpatient thoras with diagnostic testing.   - Reports from CAPS team that she has some loculations in chest. Discussed with patient that treatment of loculations are limited- not a candidate for VATS and TPA would be higher risk. Recommend repeat thora as planned and have CT chest non con following to evaluate further.   -  MELD 20 (initial MELD), t. Bili has been more indirect. With t. Bili of 7.5, she would not be a candidate for TIPS at this time. Discussed with IR (Dr. Massey), she would need to have stabilization of her t. Bili and course prior to consideration of TIPS. (She is high risk). Discussed with patient and sister high risk status for procedure.   - Echo 10/25 without right sided  "dysfunction, RA 3. Bubble with evidence of \"medium\" PFO.  Seen by cardiology, no issue with PFO and TIPS.     # Alcohol associated cirrhosis  - currently engaged in CD programming. Peth 10/19 negative  - evaluated for liver transplant in past, not a current candidate for transplant, MELD 26  She needs to complete her CD program and abstinence period completion.   - EGD 11/2/23 with normal esophagus  -Ultrasound complete with Doppler for evaluation of HCC and thrombosis negative. (10/22)    # Hepatic encephalopathy  - continue lactulose and rifaximin. Titrate for ~3-4 BM's per day. Mentation stable.     Patient was discussed with attending physician, Dr. Stephanie Lim  The above note reflects our joint plan.     Next follow up appointment: Dr. Stephanie Lim 1/2024    Thank you for the opportunity to be involved with  Stefani Crowell [facility]. Please call with any questions or concerns.    ROD Urban, CNP  Inpatient Hepatology [facility]      SUBJECTIVE:  Having periods of sleepiness. Continues to have lower extremity edema and now on compression stockings. Up with walker for steadiness.     ROS:  A 10-point review of systems was negative.    OBJECTIVE:  VS: BP 98/50 (BP Location: Left arm)   Pulse 76   Temp 97.9  F (36.6  C) (Oral)   Resp 16   Ht 1.651 m (5' 5\")   Wt 68.4 kg (150 lb 12.8 oz)   LMP  (LMP Unknown)   SpO2 98%   BMI 25.09 kg/m        General: In no acute distress, mild facial muscle wasting  Neuro: AOx3, No asterixis  HEENT:  Noscleral icterus, Nooral lesions  CV: . Skin warm and dry  Lungs:  Respirations even and nonlabored on 2L  Abd:  Nondistended, nontender   Extrem: Noperipheral edema   Skin: Nojaundice  Psych: pleasant    MEDICATIONS:  Current Facility-Administered Medications   Medication    acetaminophen (TYLENOL) tablet 650 mg    Or    acetaminophen (TYLENOL) Suppository 650 mg    albuterol (PROVENTIL HFA/VENTOLIN HFA) inhaler    artificial saliva (BIOTENE DRY MOUTHWASH) liquid 5 mL "    citalopram (celeXA) tablet 40 mg    ferrous sulfate (FEROSUL) tablet 325 mg    fluticasone-vilanterol (BREO ELLIPTA) 200-25 MCG/ACT inhaler 1 puff    lactulose (CHRONULAC) solution 20 g    levothyroxine (SYNTHROID/LEVOTHROID) tablet 112 mcg    LORazepam (ATIVAN) tablet 0.5 mg    melatonin tablet 3 mg    midodrine (PROAMATINE) tablet 15 mg    montelukast (SINGULAIR) tablet 10 mg    pantoprazole (PROTONIX) EC tablet 40 mg    polyethylene glycol (MIRALAX) Packet 17 g    prochlorperazine (COMPAZINE) injection 5 mg    Or    prochlorperazine (COMPAZINE) tablet 5 mg    Or    prochlorperazine (COMPAZINE) suppository 25 mg    rifaximin (XIFAXAN) tablet 550 mg       REVIEW OF LABORATORY, PATHOLOGY AND IMAGING RESULTS:  BMP  Recent Labs   Lab 11/08/23  0701 11/07/23  0650 11/06/23  0652 11/05/23  0656   * 124* 124* 127*   POTASSIUM 4.6 4.7 4.1 4.0   CHLORIDE 87* 88* 89* 91*   UMA 10.1 10.3* 10.4* 10.6*   CO2 25 24 25 25   BUN 72.3* 73.8* 61.1* 62.2*   CR 0.97* 1.17* 0.76 0.70   GLC 91 77 127* 103*     CBC  Recent Labs   Lab 11/08/23  0701 11/07/23  0650 11/07/23  0118 11/06/23  1941 11/06/23  1554 11/06/23  0652   WBC 10.2 8.4  --  8.6  --  7.1   RBC 2.46* 2.29*  --  2.31*  --  2.40*   HGB 7.6* 7.4*   < > 7.1*  7.1*   < > 7.6*   HCT 23.0* 21.8*  --  21.8*  --  22.8*   MCV 94 95  --  94  --  95   MCH 30.9 32.3  --  30.7  --  31.7   MCHC 33.0 33.9  --  32.6  --  33.3   RDW 18.9* 18.6*  --  18.4*  --  17.9*   PLT 55* 52*  --  55*  --  44*    < > = values in this interval not displayed.     INR  Recent Labs   Lab 11/08/23  0702 11/07/23  0841 11/06/23  0652 11/05/23 0656   INR 1.74* 2.13* 2.19* 2.16*     LFTs  Recent Labs   Lab 11/08/23  0701 11/07/23  0650 11/06/23  1025 11/06/23 0652 11/05/23 0656   ALKPHOS 75 76  --  61 63   AST 38 38  --  40 47*   ALT 26 26  --  28 28   BILITOTAL 7.2* 7.2*  --  8.5* 8.8*   PROTTOTAL 5.3* 5.4* 5.2* 5.5* 5.4*   ALBUMIN 3.8 3.9  --  4.2 4.2        MELD 3.0: 26 at 11/8/2023  7:02  "AM  MELD-Na: 28 at 11/8/2023  7:02 AM  Calculated from:  Serum Creatinine: 0.97 mg/dL (Using min of 1 mg/dL) at 11/8/2023  7:01 AM  Serum Sodium: 123 mmol/L (Using min of 125 mmol/L) at 11/8/2023  7:01 AM  Total Bilirubin: 7.2 mg/dL at 11/8/2023  7:01 AM  Serum Albumin: 3.8 g/dL (Using max of 3.5 g/dL) at 11/8/2023  7:01 AM  INR(ratio): 1.74 at 11/8/2023  7:02 AM  Age at listing (hypothetical): 63 years  Sex: Female at 11/8/2023  7:02 AM    Previous work-up:   Lab Results   Component Value Date    HEPBANG Nonreactive 02/18/2021    HBCAB Nonreactive 02/18/2021    HCVAB  08/03/2017     Nonreactive   Assay performance characteristics have not been established for newborns,   infants, and children      TANYA 157 10/03/2023    IRONSAT 28 10/26/2023    TSH 1.75 10/26/2023    CHOL 176 08/15/2022    HDL 44 (L) 08/15/2022     (H) 08/15/2022    TRIG 111 08/15/2022    A1C 4.7 07/24/2023    POPETH <10 10/22/2023    PLPETH <10 10/22/2023      No results found for: \"SPECDES\", \"LDRESULTS\"      Imaging Results:  None current.   "

## 2023-11-08 NOTE — PROGRESS NOTES
[facility]    Medicine Progress Note - Hospitalist Service, GOLD TEAM 9    Date of Admission:  10/22/2023    Assessment & Plan   Stefani Crowell is a 63 year old female with a pertinent past medical history of alcoholic cirrhosis, hepatorenal syndrome, hypothyroidism, recurrent hyponatremia, recurrent pleural effusions, COPD and recent admission for hyponatremia who was admitted on 10/22/2023 due to increased dyspnea related to R pleural effusion and hyponatremia.     MATTIE, likely secondary to hepatorenal syndrome  Hyponatremia  Suspect HRS. Had improvement with terlipressin (11/1-11/6), slight bump again in Cr once it was stopped.  - hold diuretics, no plans to resume per hepatology  - continue midodrine (resumed once off terlipressin)    Hepatic hydrothorax  Acute hypoxic respiratory failure 2/2 above  Diuretic-refractory due to hyponatremia. Not currently a TIPS candidate due to elevation of bilirubin.  - Continue with thoracenteses as needed (has needed ~3 times per week)-- does not appear to need one today  - Diagnostics to be sent with each thora  - Note 498 PMNs on 11/6 thora, but with correction for RBCs this is not consistent with SBE  - Wean supplemental O2 as able --> may need to do home O2 eval if unable to wean off oxygen completely    Post-thoracentesis pneumothorax  Noted after 11/6 thora. Respiratory status stable. Serial CXRs demonstrated improvement.  - Repeat CXR in AM --> pneumothorax continues to improve but not completely resolved  - Will trend CXR again tomorrow  - Ideally avoid repeat thora until resolved    Decompensated alcoholic cirrhosis  Hx hepatic encephalopathy  Abd US negative for thrombosis and HCC. EGD 11/2 normal. Not a liver transplant candidate currently due to incomplete CD program and abstinence period. Engaged in CD program outpatient. Peth negative 10/19.  - Hepatology consulted, appreciate assistance  - Continue home lactulose  "20g TID, rifaximin 550mg BID    Acute on chronic normocytic anemia  Concern for GIB, nothing identified on scope  Hgb dropping below 7 starting 11/1 without signs of bleeding. On 11/2 noted to have dark red bloody stools. Taken for EGD without signs of bleeding. Colonoscopy without source of bleeding, portal colopathy .  - Transfuse for Hgb < 7     Thrombocytopenia  Coagulopathy  -Likely secondary to cirrhosis     Elevated troponin  -Admit 27, 28. Likely secondary to poor kidney clearance  -Unremarkable EKG    Hypothyroidism  Last TSH was 0.74 on 07/24/23  - Continue home levothyroxine 112 mcg po daily    GERD  - Continue pantoprazole 40 mg po qdaily    Anxiety  - Holding citalopram 40 mg po qdaily and trazodone 50 mg po at bedtime  - continue lorazepam 0.5 mg po qdaily prn    Hypokalemia, resolved  - replacement protocol          Diet: Fluid restriction 1000 ML FLUID  2 Gram Sodium Diet  Snacks/Supplements Adult: Other; Ensure Enlive at 8:00 ( vanilla or strawberry) patient likes this at 8:00 am ,, HS : Ensrue max vanilla; Between Meals  Snacks/Supplements Adult: Other; Hard boiled egg at HS + cup of fresh fruit; Between Meals    DVT Prophylaxis: Pneumatic Compression Devices  Demarco Catheter: Not present  Lines: None     Cardiac Monitoring: None  Code Status: Full Code      Clinically Significant Risk Factors         # Hyponatremia: Lowest Na = 124 mmol/L in last 2 days, will monitor as appropriate   # Hypercalcemia: Highest Ca = 10.3 mg/dL in last 2 days, will monitor as appropriate     # Coagulation Defect: INR = 1.74 (Ref range: 0.85 - 1.15) and/or PTT = N/A, will monitor for bleeding  # Thrombocytopenia: Lowest platelets = 52 in last 2 days, will monitor for bleeding          # Overweight: Estimated body mass index is 25.04 kg/m  as calculated from the following:    Height as of this encounter: 1.651 m (5' 5\").    Weight as of this encounter: 68.3 kg (150 lb 8 oz).   # Moderate Malnutrition: based on nutrition " assessment    # Financial/Environmental Concerns: none  # Asthma: noted on problem list        Disposition Plan     Expected Discharge Date: 11/16/2023      Destination: home with family  Discharge Comments: Dispo: TBD  Plan: Keep in hospital until either gets well enough for TIPS or can come up with safe plan for thora's as OP, or decompensates  Progression: Con't lactulose and rifaximin for hepatic encephalopathy, mentation stable.            Delma Barnett MD  Hospitalist Service, GOLD TEAM 9  [facility]  Securely message with [employer] (more info)  Text page via [facility] Paging/Directory   See signed in provider for up to date coverage information  ______________________________________________________________________    Interval History   No acute events. Feeling pretty well this morning. Reports breathing feels fairly comfortable. Does have some mild back pain that she thinks might be related to her last thora.     Physical Exam   Vital Signs: Temp: 97.9  F (36.6  C) Temp src: Oral BP: 94/40 Pulse: 72   Resp: 14 SpO2: 97 % O2 Device: Nasal cannula Oxygen Delivery: 1.5 LPM  Weight: 150 lbs 8 oz    Constitutional: awake, alert, cooperative, NAD  Eyes: scleral icterus present  Respiratory: breathing non-labored, left lung fields CTA, right lung fields with greatly diminished breath sounds throughout  Cardiovascular: 4/6 systolic murmur present  GI: soft, non-distended, non-tender  Neurologic: alert and oriented x3, moving all extremities equally  Neuropsychiatric: calm, normal eye contact, affect normal       Medical Decision Making       **CLEAR ALL SELECTIONS**      Data     I have personally reviewed the following data over the past 24 hrs:    10.2  \   7.6 (L)   / 55 (L)     123 (L) 87 (L) 72.3 (H) /  91   4.6 25 0.97 (H) \     ALT: 26 AST: 38 AP: 75 TBILI: 7.2 (H)   ALB: 3.8 TOT PROTEIN: 5.3 (L) LIPASE: N/A     INR:  1.74 (H) PTT:  N/A   D-dimer:  N/A Fibrinogen:   N/A       Imaging results reviewed over the past 24 hrs:   Recent Results (from the past 24 hour(s))   XR Chest Port 1 View    Narrative    Portable chest    Indication follow up pneumothorax    COMPARISON: 11/6/2023    FINDINGS: Continued meniscus formation of the right costophrenic angle  with obliteration of right hemidiaphragm and prominent lower lung zone  opacification. Continued left upper lung patchy opacities. Previous  small right apical pneumothorax there is slightly less conspicuous.  Right heart border remains obscured.    CONTINUED right pleural effusion and associated atelectasis decreased  visualization of right apical thorax. History edema/infection in the  left upper lung. Recommend follow-up to clearing.    JUSTINO SHEEHAN MD         SYSTEM ID:  [id number]

## 2023-11-08 NOTE — PLAN OF CARE
"Blood pressure 97/85, pulse 75, temperature 97.8  F (36.6  C), temperature source Oral, resp. rate 18, height 1.651 m (5' 5\"), weight 68.3 kg (150 lb 8 oz), SpO2 94%,via 2L NC .    Patient A & O X 4, forgetful at time , able to make needs known ,dyspnea on exertions . Denies pain . Received scheduled medications as order . Patient appetite fair on FR 1000 needs reinforcement with fluids .  Skin no new change Up to BSC with SBA  family at bedside . Family at bedside , call light within reach. Continue monitor closely and update MD with change.   Hgb  7.4 , Cr 1.17 , Na 124     Goal Outcome Evaluation:      Plan of Care Reviewed With: patient, family      No change          "

## 2023-11-08 NOTE — PLAN OF CARE
"Goal Outcome Evaluation: 1900-0700      Plan of Care Reviewed With: patient    Overall Patient Progress: no changeOverall Patient Progress: no change    Outcome Evaluation: A&ox4. VSS ex hypotension. 1.5-2L NC. Back pain, no prns needed, utilizing t-pump. 1l FR.  Up to bedside commode ind. 1BM overnight. BLE wound cares complete JIMI. no acute changes overnight. Continue POC.   BP 94/40 (BP Location: Left arm, Patient Position: Semi-Beard's, Cuff Size: Adult Small)   Pulse 72   Temp 97.9  F (36.6  C) (Oral)   Resp 14   Ht 1.651 m (5' 5\")   Wt 68.3 kg (150 lb 8 oz)   LMP  (LMP Unknown)   SpO2 97%   BMI 25.04 kg/m       "

## 2023-11-08 NOTE — CONSULTS
SPIRITUAL HEALTH SERVICES Consult Note  [facility] 7C    I saw Lolly per consult request. Lolly had a visitor and was tired. She requested for me to follow up tomorrow. I will be gone tomorrow but chaplain Unger will follow up with Lolly as he is able.    Rev. KAI Grace.  Chaplain Resident  Pager [phone]    * [facility] remains available 24/7 for emergent requests/referrals, either by having the switchboard page the on-call  or by entering an ASAP/STAT consult in Epic (this will also page the on-call ). Routine Epic consults receive an initial response within 24 hours.*

## 2023-11-08 NOTE — PROGRESS NOTES
Transplant Social Work Services Progress Note      Date of Initial Social Work Evaluation: 10/3/2023  Collaborated with: Yanelis Miller NP with Liver Transplant    Data: Stefani is being evaluated for a liver transplant.    Intervention: I met with Stefani, her sister Bev and sister-in-law for psychosocial follow up.  I also left a voice message for [facility] outpatient substance use disorder treatment programs to obtain an update on patient's progress with treatment ([place], [phone], CARMEN on file).  Health care directive received and sent to [facility] for their review.  Assessment: Stefani has been engaged in outpatient JEREMY treatment at [facility], though she has not been able to attend since around October 20, 2023 due to being hospitalized.  Stefani has been sober since from alcohol since June 29, 2023.  She does not yet meet sobriety criteria recommended for listing.  It is recommended Stefani achieve at least six months of sobriety given her history of known liver disease and being previously counseled to abstain from alcohol.  It is also recommended she satisfactorily complete her outpatient treatment program.  During Stefani's initial transplant evaluation I also recommended she establish care with a mental health provider..  Stefani's sister Bev reports they have found a provider (Stefani Escobar MA, [facility]) but have not yet scheduled an appointment.    Stefani and her family present today report Stefani could use more mental health support even while hospitalized.  Stefani is aware of the gravity of her health situation given she is not currently a transplant candidate, and she is wanting to discuss goals of care.  Education provided by : health psychology, palliative care  Plan: I requested health psychology and palliative care consults from Yanelis Miller NP.  I am awaiting an update from [facility] regarding Stefani's progress with outpatient treatment.      KAR Morales, [facility]  Liver  Transplant   Phone [phone]  Pager [phone]

## 2023-11-08 NOTE — PROGRESS NOTES
11/08/23 0818   Appointment Info   Signing Clinician's Name / Credentials (PT) GINA Lorenzana   Student Supervision Direct Patient Contact Provided;Therapy services provided with the co-signing licensed therapist guiding and directing the services, and providing the skilled judgement and assessment throughout the session   Living Environment   People in Home spouse   Current Living Arrangements house   Home Accessibility stairs within home  (stairs in garage)   Number of Stairs, Within Home, Primary three   Stair Railings, Within Home, Primary railing on right side (ascending)   Transportation Anticipated family or friend will provide   Living Environment Comments Pt lives in home with  with 3 stairs in the garage.   Self-Care   Usual Activity Tolerance moderate   Current Activity Tolerance poor   Equipment Currently Used at Home walker, rolling   Fall history within last six months yes   Number of times patient has fallen within last six months 1   Activity/Exercise/Self-Care Comment Pt reports fluctuating levels of independence at home. Within the last month, pt was receiving assistance from  with all ADLs, including transferring out of bed. Use of 4WW for household mobility. Prior to 1 mo ago, pt was mobilizing around house IND or modified IND with cane.   General Information   Onset of Illness/Injury or Date of Surgery 10/22/23   Referring Physician Delma Okeefe MD   Patient/Family Therapy Goals Statement (PT) To go home; feel stronger with mobility   Pertinent History of Current Problem (include personal factors and/or comorbidities that impact the POC) Stefani Crowell is a 63 year old female with a pertinent past medical history of alcoholic cirrhosis, hepatorenal syndrome, hypothyroidism, recurrent hyponatremia, recurrent pleural effusions, COPD and recent admission for hyponatremia who was admitted on 10/22/2023 due to increased dyspnea related to R pleural effusion and  hyponatremia.   Existing Precautions/Restrictions fall   General Observations Up ad aleena   Cognition   Affect/Mental Status (Cognition) WFL   Orientation Status (Cognition) oriented x 4   Cognitive Status Comments Grossly intact   Pain Assessment   Patient Currently in Pain Yes, see Vital Sign flowsheet   Integumentary/Edema   Integumentary/Edema no deficits were identifed   Posture    Posture Protracted shoulders;Forward head position   Range of Motion (ROM)   ROM Comment LE ROM grossly intact   Strength (Manual Muscle Testing)   Strength Comments BLE knee and hip extensors >3/5 as pt performs STS with CGA   Bed Mobility   Bed Mobility supine-sit   Comment, (Bed Mobility) IND with sit <> supine   Transfers   Comment, (Transfers) CGA and 4WW with STS   Gait/Stairs (Locomotion)   Comment, (Gait/Stairs) Pt amb 10' with 4WW and CGA on 2LPM via nc. Slow gait speed with decreased step length present. Step-through gait pattern.   Balance   Balance Comments Able to sit EOB with no midline LOB   Sensory Examination   Sensory Perception Comments Negative for LE quick screen   Clinical Impression   Criteria for Skilled Therapeutic Intervention Yes, treatment indicated   PT Diagnosis (PT) Impaired activity tolerance   Influenced by the following impairments weakness, fatigue, balance, O2 demand   Functional limitations due to impairments transfers, gait, stairs   Clinical Presentation (PT Evaluation Complexity) stable   Clinical Presentation Rationale pt presentation, clinical reasoning   Clinical Decision Making (Complexity) low complexity   Planned Therapy Interventions (PT) balance training;gait training;home exercise program;neuromuscular re-education;stair training;strengthening;transfer training;home program guidelines   Risk & Benefits of therapy have been explained evaluation/treatment results reviewed;care plan/treatment goals reviewed;risks/benefits reviewed;current/potential barriers reviewed;participants voiced  agreement with care plan;participants included;patient;sibling   Clinical Impression Comments Pt is a 63 y.o. female s/p increase dyspnea following pleural effusion presenting with general deconditioning, decrease BLE weakness, and balance impairments. This results in pt having decreased gait speed and transfer speed while on 2 LPM O2 via n/c. PT reports general fatigue and declining tolerance to ADLs. Pt would benefit from skilled PT for higher level stair and gait training along with dynamic balance.   PT Total Evaluation Time   PT Eval, Low Complexity Minutes (61088) 8   Physical Therapy Goals   PT Frequency 2x/week   PT Predicted Duration/Target Date for Goal Attainment 11/15/23   PT Goals Transfers;Gait;Stairs   PT: Transfers Independent;Sit to/from stand;Bed to/from chair   PT: Gait Modified independent;Greater than 200 feet;Rolling walker   PT: Stairs Modified independent;3 stairs;Rail on right   PT Discharge Planning   PT Plan Stairs, dynamic balance, progress gait distance   PT Discharge Recommendation (DC Rec) home with assist;home with home care physical therapy   PT Rationale for DC Rec Pt currently functioning below PLOF d/t LE weakness and general fatigue with O2 demands. Pt requires CGA for STS transfer and gait. Anticipate CGA-Kd x 1 for stairs. Depending on tolerance to PT during LOS, pt may benefit from additional assistance at home for transfer, gait, and stair training to improve IND and safety at home.   PT Brief overview of current status ax1, encourage mobility   Total Session Time   Total Session Time (sum of timed and untimed services) 8

## 2023-11-08 NOTE — PROGRESS NOTES
"[facility] Nurse Inpatient Assessment     Consulted for: bilateral knees    Patient History (according to provider note(s):      Steafni Crowell is a 63 year old female with a pertinent past medical history of alcoholic cirrhosis, hepatorenal syndrome, hypothyroidism, recurrent hyponatremia, recurrent pleural effusions, COPD and recent admission for hyponatremia who was admitted on 10/22/2023 due to increased dyspnea related to R pleural effusion and hyponatremia.     Assessment:      Areas visualized during today's visit: Lower extremities  and Upper extremities     Wound location: bilateral knees and left elbow      Right knee      Left knee      Knees 11/8       Left elbow 11/1 11/8    Last photo: 11/8  Wound due to: Skin Tear and Trauma  Wound history/plan of care: pt has very fragile skin. Knees are from recent fall. Left arm skin flap is now reattached and forming dry scab. No mepilex in place to left elbow skin tear at time of assessment. Patient reports Mepilex dressing \"hurt too much when they took it off \" so declined to have it replaced. Now that skin flap is reattached, will adjust orders for Aquaphor  Wound base: 30 % superficial scab,  70% epidermis     Palpation of the wound bed: normal      Drainage: none     Description of drainage: bloody     Measurements (length x width x depth, in cm): see photos      Tunneling: N/A     Undermining: N/A  Periwound skin: Intact      Color: red      Temperature: normal   Odor: none  Pain: mild, tender to left arm only  Pain interventions prior to dressing change: slow and gentle cares   Treatment goal: Heal  and Protection  STATUS: improving  Supplies ordered: at bedside       Treatment Plan:     Bilateral knees wound(s): Daily  cleanse with saline and pat dry. Apply Aquaphor(order#[id number]) to wounds and leave JIMI.      Left elbow (or other skin tears) wound(s):  Daily  cleanse with saline and pat dry. " Apply Aquaphor(order#[id number]) to wounds and leave JIMI.      Orders: Written    RECOMMEND PRIMARY TEAM ORDER: None, at this time  Education provided: plan of care and wound progress  Discussed plan of care with: Patient and Nurse  [facility] nurse follow-up plan: weekly  Notify WO if wound(s) deteriorate.  Nursing to notify the Provider(s) and re-consult the [facility] Nurse if new skin concern.    DATA:     Current support surface: Standard  Standard gel/foam mattress (IsoFlex, Atmos air, etc)  Containment of urine/stool: Incontinent pad in bed  BMI: Body mass index is 25.04 kg/m .   Active diet order: Orders Placed This Encounter      2 Gram Sodium Diet     Output: I/O last 3 completed shifts:  In: 800 [P.O.:800]  Out: -      Labs:   Recent Labs   Lab 11/08/23  0702 11/08/23  0701   ALBUMIN  --  3.8   HGB  --  7.6*   INR 1.74*  --    WBC  --  10.2     Pressure injury risk assessment:   Sensory Perception: 4-->no impairment  Moisture: 4-->rarely moist  Activity: 3-->walks occasionally  Mobility: 3-->slightly limited  Nutrition: 2-->probably inadequate  Friction and Shear: 3-->no apparent problem  Castillo Score: 19    Halina Webb, RN, BSN, CWON  Pager no longer is use, please contact through Contin[employer] Education [employer] Resources group: [facility] Nurse [place]  Dept. Office Number: [phone]

## 2023-11-09 NOTE — PLAN OF CARE
Goal Outcome Evaluation:      Plan of Care Reviewed With: patient    Overall Patient Progress: no changeOverall Patient Progress: no change    Outcome Evaluation: 8998-1581. A&ox4, VSS on 2-3L nc. Dyspnea on exertion. Denies pain, c/o abd and back discomfort. Held scheduled lactulose, 2 loose watery Bms overnight with no blood. Up to bedside commode w/SBA.  Small amount of blood while wiping, pt states its from her vagina. 1L fluid restriction continued. No acute changes, continue POC.

## 2023-11-09 NOTE — PROGRESS NOTES
Care Management Follow Up    Length of Stay (days): 18    Expected Discharge Date: 11/13/2023     Concerns to be Addressed: all concerns addressed in this encounter     Patient plan of care discussed at interdisciplinary rounds: Yes    Anticipated Discharge Disposition: Home, Outpatient Chemical Dependency     Anticipated Discharge Services: None  Anticipated Discharge DME: None    Patient/family educated on Medicare website which has current facility and service quality ratings: no  Education Provided on the Discharge Plan: Yes  Patient/Family in Agreement with the Plan: yes    Referrals Placed by CM/SW:    Private pay costs discussed: Not applicable    Additional Information:    Patient's status reviewed by chart. Writer met with the patient at bedside to discuss about discharge planning. Pt stated she is looking forward to thoracentesis today so she doesn't have to make lots effort to breathe. Writer shared with the pt that PT worked with the pt and recs home with HC PT. Pt agreed with HC PT. Writer placed a HC referral via [employer] via [employer] for RN to eval for home safety and PT to work with to get stronger, pending agency acceptance.    Per provider request, writer was able to make appointments for the pt to go to [facility] 2x next week for OP thora. See below. Pt and family are aware of this.    Addendum: 11/10 -  update: as of this time, we are yet unable to secure home RN/PT for the pt d/t payor/staffing/out of service area.     Continue to monitor.    OP Thoracentesis at [facility]  P: [phone]  F: [phone]  Appointments:   Tuesday 11/14/23, arrive @ 1:30PM, procedure @ 2:00PM  Friday 11/17/23, arrive @ 1:30PM, procedure @ 2:00PM    Loni Mayfield RN   7C RN Care Coordinator  Phone: [phone]  Pager: [phone]  [place] [facility] [place] (9700-1336) Saturday & Sunday; (2820-3000)  Recognized Holidays   Units: 5A, 5B & 5C  Pager: [phone]  Units: 6B, 6C & 6D     Pager: [phone]  Units: 7A, 7B, 7C & 7D    Pager: [phone]  Units: 6A & ICU   Pager: [phone]  Units: 5 Ortho, 5MS & WB ED Pager: [phone]  Units: 6MS, 8A & 10 ICU  Pager [phone]

## 2023-11-09 NOTE — PROGRESS NOTES
[facility]    Medicine Progress Note - Hospitalist Service, GOLD TEAM 9    Date of Admission:  10/22/2023    Assessment & Plan   Stefani Crowell is a 63 year old female with a pertinent past medical history of alcoholic cirrhosis, hepatorenal syndrome, hypothyroidism, recurrent hyponatremia, recurrent pleural effusions, COPD and recent admission for hyponatremia who was admitted on 10/22/2023 due to increased dyspnea related to R pleural effusion and hyponatremia.     MATTIE, likely secondary to hepatorenal syndrome  Hyponatremia  Suspect HRS. Had improvement with terlipressin (11/1-11/6), slight bump again in Cr once it was stopped.  - hold diuretics, no plans to resume per hepatology  - continue midodrine (resumed once off terlipressin)    Hepatic hydrothorax  Acute hypoxic respiratory failure 2/2 above  Diuretic-refractory due to hyponatremia. Not currently a TIPS candidate due to elevation of bilirubin.  - Continue with thoracenteses as needed (has needed ~3 times per week) --> CAPS consulted and performed repeat thora today with >2 L out  - Follow up latest diagnostic studies  - Diagnostics to be sent with each thora  - Wean supplemental O2 as able --> may need to do home O2 eval if unable to wean off oxygen completely  - Checking fibrinogen as the fluid was still quite hemorrhagic, can replace if low    Post-thoracentesis pneumothorax, resolved  Noted after 11/6 thora. Respiratory status stable. Serial CXRs demonstrated improvement.    Decompensated alcoholic cirrhosis  Hx hepatic encephalopathy  Abd US negative for thrombosis and HCC. EGD 11/2 normal. Not a liver transplant candidate currently due to incomplete CD program and abstinence period. Engaged in CD program outpatient. Peth negative 10/19.  Patient is currently not a transplant candidate due to not having completed CD treatment and the required period of sobriety. She is working on these, but needs to be  "outpatient in order to continue. Her hospital course has been challenging, and as TIPS is not a possibility she is aware that her situation is challenging. After discussion with rosa CORONA on 11/8, pt would like to have additional discussions about her overall GOC. Today she and her family shared with me after having met with hepatology that they feel like \"things aren't looking good\" and Lolly is questioning what next steps might be.  - Hepatology consulted, appreciate assistance  - Continue home lactulose 20g TID, rifaximin 550mg BID  - Palliative care consult today, appreciate assistance --> discussed with their team this morning, will be seeing in the afternoon  - Health psych consult    Acute on chronic normocytic anemia  Concern for GIB, nothing identified on scope  Hgb dropping below 7 starting 11/1 without signs of bleeding. On 11/2 noted to have dark red bloody stools. Taken for EGD without signs of bleeding. Colonoscopy without source of bleeding, portal colopathy .  - Transfuse for Hgb < 7     Thrombocytopenia  Coagulopathy  -Likely secondary to cirrhosis     Elevated troponin  -Admit 27, 28. Likely secondary to poor kidney clearance  -Unremarkable EKG    Hypothyroidism  Last TSH was 0.74 on 07/24/23  - Continue home levothyroxine 112 mcg po daily    GERD  - Continue pantoprazole 40 mg po qdaily    Anxiety  - Holding citalopram 40 mg po qdaily and trazodone 50 mg po at bedtime  - continue lorazepam 0.5 mg po qdaily prn    Hypokalemia, resolved  - replacement protocol          Diet: Fluid restriction 1000 ML FLUID  2 Gram Sodium Diet  Snacks/Supplements Adult: Other; Ensure Enlive at 8:00 ( vanilla or strawberry) patient likes this at 8:00 am ,, HS : Ensrue max vanilla; Between Meals  Snacks/Supplements Adult: Other; Hard boiled egg at HS + cup of fresh fruit; Between Meals    DVT Prophylaxis: Pneumatic Compression Devices  Demarco Catheter: Not present  Lines: None     Cardiac Monitoring: None  Code Status: " "Full Code      Clinically Significant Risk Factors         # Hyponatremia: Lowest Na = 123 mmol/L in last 2 days, will monitor as appropriate       # Coagulation Defect: INR = 1.74 (Ref range: 0.85 - 1.15) and/or PTT = N/A, will monitor for bleeding  # Thrombocytopenia: Lowest platelets = 55 in last 2 days, will monitor for bleeding          # Overweight: Estimated body mass index is 25.09 kg/m  as calculated from the following:    Height as of this encounter: 1.651 m (5' 5\").    Weight as of this encounter: 68.4 kg (150 lb 12.8 oz).   # Moderate Malnutrition: based on nutrition assessment    # Financial/Environmental Concerns: none  # Asthma: noted on problem list        Disposition Plan     Expected Discharge Date: 11/10/2023      Destination: home with family  Discharge Comments: Dispo: TBD  Plan: Keep in hospital until either gets well enough for TIPS or can come up with safe plan for thora's as OP, or decompensates  Progression: Con't lactulose and rifaximin for hepatic encephalopathy, mentation stable.            Delma Barnett MD  Hospitalist Service, GOLD TEAM [address] [place] [address]  Securely message with [employer] (more info)  Text page via [facility] Paging/Directory   See signed in provider for up to date coverage information  ______________________________________________________________________    Interval History   No acute events. Patient is feeling quite down and overwhelmed this morning on my visit. Tells me she just met with hepatology and that things aren't looking good. She and her family did not elaborate much on this but Lolly did say that she is thinking about how much of all this she should continue to be doing. She does also say that her breathing is much worse today and feels like she needs a thora again.    Physical Exam   Vital Signs: Temp: 97.3  F (36.3  C) Temp src: Oral BP: 106/44 Pulse: 71   Resp: 16 SpO2: 96 % O2 Device: Nasal cannula with " humidification Oxygen Delivery: 2 LPM  Weight: 150 lbs 12.8 oz    Constitutional: awake, alert, cooperative  Eyes: scleral icterus present  Respiratory: moderate respiratory distress with tachypnea, no audible breath sounds on the right  Cardiovascular: 4/6 systolic murmur present  GI: soft, non-distended, non-tender  Neurologic: alert and oriented x3, moving all extremities equally  Neuropsychiatric: sad affect          Medical Decision Making     **CLEAR ALL SELECTIONS**      Data     I have personally reviewed the following data over the past 24 hrs:    10.4  \   8.1 (L)   / 57 (L)     124 (L) 89 (L) 58.8 (H) /  110 (H)   4.7 25 0.74 \     ALT: 28 AST: 39 AP: 66 TBILI: 8.0 (H)   ALB: 3.8 TOT PROTEIN: 5.4 (L) LIPASE: N/A     INR:  2.05 (H) PTT:  N/A   D-dimer:  N/A Fibrinogen:  N/A       Imaging results reviewed over the past 24 hrs:   Recent Results (from the past 24 hour(s))   POC US Guide for Thorcentesis    Impression    Limited chest ultrasound was performed and demonstrated an adequate fluid collection on the right hemithorax.     Thoracentesis Indication:pleural effusion and hypoxia    Doppler of the skin demonstrated the intercostal vessel in the expected location along the inferior edge of the rib. Marked and entered over the superior aspect of the inferior rib within the selected intercostal space    A thoracentesis at this site was subsequently performed. 2300 mls removed    Large pleural fluid noted throughout the lung including at the apec limited the use of sliding sign. Some lung pulsation was noted   Umang Soto MD

## 2023-11-09 NOTE — PROGRESS NOTES
"SPIRITUAL HEALTH SERVICES - Progress Note   [facility] Unit [facility]    Referral Source/Reason for Visit: Palliative Follow Up     Summary and Recommendations -  Lolly recently decided to go DNR and spoke about being \"tired of fighting\".  She is grieving saying goodbye to her life and is worried about telling her daughter her news regarding her treatment.  I encouraged Lolly to explore what she wants from the remainder of her life.    Plan: I will follow up with Lolly on Saturday or Monday.     Ria Gr  Chaplain Resident  Pager [phone]    [facility] available 24/7 for emergent requests/referrals, either by paging the on-call  or by entering an ASAP/STAT consult in [employer], which will also page the on-call .      Assessment     Saw pt Stefani Crowell per palliative follow-up    Patient/Family Understanding of Illness and Goals of Care - Lolly told me that she recently decided to go DNR.  She told me she received bad news yesterday that a transplant was becoming less of an option.  She said that she does not want to go on living if her quality of life will be poor.    Distress and Loss - Lolly said that she has a lot to live for so she is sad that it seems there will not be a curative treatment in her future.  I affirmed that it is natural to grieve her own life. Lolly is also apprehensive about telling her daughter about her prognosis.  I encouraged her to allow her daughter to feel her feelings and to assure her daughter that she(Lolly) knows her own body best and what she(Lolly) needs.  Lolly also shared that the news hasn't really \"reached her mind yet\".    Strengths, Coping, and Resources - Lolly identifies as [employer]ism and meditation has been helpful for her.     Meaning, Beliefs, and Spirituality -  I asked Lolly if she knows what she wants the remainder of her life to look like.  Initially she said \"I don't know... I haven't thought about it\"   After a few moments she said that she hopes for " "\"everyone to gather\".  I validated this desire and I encouraged her to continue to ponder this question.      "

## 2023-11-09 NOTE — CONSULTS
Palliative Care Consultation Note  [facility]      Patient: Stefani Crowell  Date of Admission:  10/22/2023    Requesting Clinician / Team: medicine  Reason for consult:   Goals of care  Decisional support  Patient and family support       Recommendations & Counseling     GOALS OF CARE:   Lolly is processing the updated news that her path towards liver transplant is now more uncertain and less likely than she realized. She realizes this means most likely she will not get to transplant and that her overall health will continue to be poor and that her prognosis is limited. We didn't talk about time specifically today but she is preparing for the possibility that time could be short.  She says that if she cannot get a transplant that she does not want to live for a prolonged time in her current condition (with recurrent issues with respiratory failure and worsening fatigue). She says she does not want to suffer and does not want to be a burden to others with a prolonged poor quality of life. She is familiar with hospice and wonders about when she should consider hospice. We talked about hospice care in general and what it might look like for her. Discussed option of potential pleurX catheter to drain her pleural effusions. One of her parents was on hospice at a facility and recalls the support they received. She also relays how her father was on ventilator and could not wean off and does not want to be in that situation. Given that we talked about code status and she wants to be DNR/DNI.   Lolly is still processing her situation and we planned to follow up again tomorrow to continue the goals discussion.     ADVANCE CARE PLANNING:  Reportedly has a HCD at home that names her  Hari and daughter Angelia as healthcare agents  There is no POLST form on file, recommend to complete prior to DC.  Code status: No CPR- Do NOT Intubate - changed today    MEDICAL  MANAGEMENT:   We are not actively managing symptoms at this time.    PSYCHOSOCIAL/SPIRITUAL SUPPORT:  Family good support from , children, siblings  Spiritual   Says she follows some Budhist beliefs and would appreciate  support.  stopped by after our visit  She is open to palliative SW support as well - will involve SW team as above    Palliative Care will continue to follow. Thank you for the consult and allowing us to aid in the care of Stefani Crowell.    These recommendations have been discussed with medicine attending.    Poonam Duncan MD  Securely message with [employer] (more info)  Text page via [facility] Paging/Directory       Assessment      Stefani Crowell is a 63 year old female with a past medical history of alcoholic cirrhosis (last use 6/28/23) complicated by hepatic hydrothorax with twice weekly thoracentesis, hepatic encephalopathy managed by lactulose, hyponatremia, COPD, weight loss, ascites who was admitted 10/22 with dyspnea from right pleural effusion and hyponatremia. Hospital course complicated by MATTIE/HRS    Today, the patient was seen for:  Goals of care  Support  Cirrhosis with hepatic hydrothorax    Palliative Care Summary:   Met with patient and her sister at bedside.   I introduced our role as an extra layer of support and how we help patients and families dealing with serious, potentially life-limiting illnesses. I explained the composition of the palliative care team.  Palliative care helps patients and families navigate their care while focusing on the whole person; providing emotional, social and spiritual support  Palliative care often assists with symptom management, information sharing about what to expect from the illness, available treatment options and what effect those options may have on the disease course, and provide effective communication and caring support.    Reviewed case with medicine and hepatology teams -per hepatology -there is  concern that she is needing to spend so much time in hospital due to the respiratory issues from her hydrothorax that she has not been able to meet sobriety criteria and complete her CD requirements for transplant eligibility. Plus with her MELD in 20's she would likely have to wait on list for a while and with her complications with MATTIE/HRS, respiratory issues there is concern it is more likely she wont get to transplant. She has not been told she can't try for transplant but rather the chances are getting slimmer.  I reviewed this with patient and her sister and they confirm that this is their understanding as well.     Up until yesterday had been very hopeful for transplant so had not been thinking about having a limited prognosis. Now with discussion from liver team she is feelign less hopeful and considering what her wishes will be    Symptoms - some aching pains in back and abdomen at times. Primary issue is SOB due to hydrothorax. Feels better now after >2L removed on thoracentesis. Abdominal bloating and diarrhea from lactulose. Difficulty sleeping most nights - up and down.     Feeling overwhelmed and sad about her condition. Open to support from our team    Prognosis, Goals, & Planning:    Functional Status just prior to this current hospitalization:  Has been living at home with , more diffiuculty at home in recent weeks/months due to respiratory issues    Prognosis, Goals, and/or Advance Care Planning:  See above    Code Status was addressed today:   Yes, We discussed potential risks and rationale of attempting cardiac resuscitation, intubation, and mechanical ventilation.  We also discussed probability of survival as well as quality of life implications.  Based on this discussion, patient or surrogate response/decision: DNR/DNI      Patient's decision making preferences: shared with support from loved ones        Patient has decision-making capacity today for complex decisions:Intact             Coping, Meaning, & Spirituality:   Mood, coping, and/or meaning in the context of serious illness were addressed today: Yes    Social:   Living situation:lives with significant other/spouse  Important relationships/caregivers:daughter and 2 step children with 6 grandchildren, she is one of 5 siblings - her brothers and sisters are supportive  Occupation: has not worked since 2021    Medications:  I have reviewed this patient's medication profile and medications from this hospitalization. Notable medications:     ROS:  Comprehensive ROS is reviewed and is negative except as here & per HPI:     Physical Exam   Vital Signs with Ranges  Temp:  [97.3  F (36.3  C)-98.3  F (36.8  C)] 97.6  F (36.4  C)  Pulse:  [70-78] 70  Resp:  [16-18] 18  BP: ()/(44-61) 94/45  SpO2:  [92 %-97 %] 97 %  150 lbs 12.8 oz    PHYSICAL EXAM:  Gen: sitting/lying in bed. Appears comfortable   HEENT: NCAT. Conjunctiva clear. Sclera anicteric .  CV: RRR , Peripheral perfusion intact.   Resp: unlabored work of breathing, breath sounds more dull on right  Abd: soft, distended, nontender  Msk: no gross deformity, no sarcopenia  Skin:  no jaundice  Ext: warm, well perfused.   Neuro: face symmetric. EOM, vision, hearing grossly intact. Speech fluent. Moves all extremities   Mental status/Psych: alert. Oriented. Asks/answers questions appropriately. Affect is calm and engaged      Data reviewed:  ROUTINE ICU LABS (Last four results)  CMP  Recent Labs   Lab 11/09/23  0719 11/08/23  0701 11/07/23  0650 11/06/23  1025 11/06/23  0652   * 123* 124*  --  124*   POTASSIUM 4.7 4.6 4.7  --  4.1   CHLORIDE 89* 87* 88*  --  89*   CO2 25 25 24  --  25   ANIONGAP 10 11 12  --  10   * 91 77  --  127*   BUN 58.8* 72.3* 73.8*  --  61.1*   CR 0.74 0.97* 1.17*  --  0.76   GFRESTIMATED >90 66 53*  --  89   UMA 10.4* 10.1 10.3*  --  10.4*   PROTTOTAL 5.4* 5.3* 5.4* 5.2* 5.5*   ALBUMIN 3.8 3.8 3.9  --  4.2   BILITOTAL 8.0* 7.2* 7.2*  --  8.5*   ALKPHOS  66 75 76  --  61   AST 39 38 38  --  40   ALT 28 26 26  --  28     CBC  Recent Labs   Lab 11/09/23  0719 11/08/23  0701 11/07/23  0650 11/07/23  0118 11/06/23  1941   WBC 10.4 10.2 8.4  --  8.6   RBC 2.59* 2.46* 2.29*  --  2.31*   HGB 8.1* 7.6* 7.4* 7.1* 7.1*  7.1*   HCT 24.3* 23.0* 21.8*  --  21.8*   MCV 94 94 95  --  94   MCH 31.3 30.9 32.3  --  30.7   MCHC 33.3 33.0 33.9  --  32.6   RDW 19.2* 18.9* 18.6*  --  18.4*   PLT 57* 55* 52*  --  55*     INR  Recent Labs   Lab 11/09/23  0719 11/08/23  0702 11/07/23  0841 11/06/23  0652   INR 2.05* 1.74* 2.13* 2.19*     Arterial Blood GasNo lab results found in last 7 days.      Medical Decision Making       MANAGEMENT DISCUSSED with the following over the past 24 hours: medicine and hepatology and addiction medicine teams   NOTE(S)/MEDICAL RECORDS REVIEWED over the past 24 hours: medicine, hepatology,    Tests REVIEWED in the past 24 hours:  - CMP  - CBC  - Coags/INR  SUPPLEMENTAL HISTORY, in addition to the patient's history, over the past 24 hours obtained from:   - Sibling      Advance Care Planning Discussion 11/9/2023. IPoonam MD met with Patient and their family today at the hospital to discuss Advance Care Planning. Stefani Maggie Crowell does have decisional capacity and was present for this discussion.  Those present were informed of the voluntary nature of this discussion and wished to proceed.  The discussion included:  code status discussion with change in code status to DNR/DNI . This discussion began at 1345 and ended at 1410 for a total of 25 minutes.

## 2023-11-09 NOTE — PLAN OF CARE
Goal Outcome Evaluation:    Overall Patient Progress: improvingOverall Patient Progress: improving    Outcome Evaluation: Stable hgb/Cr today. On 1.5L today. Potential needs new O2 going home after this admission.    Loni Mayfield, RNCC  7C RN Care Coordinator  Phone: [phone]  Pager: [phone]  [facility] ([phone]) Saturday & Sunday; ([id number]) [facility] Recognized Holidays   Units: 5A, 5B & 5C  Pager: [phone]  Units: 6B, 6C & 6D    Pager: [phone]  Units: 7A, 7B, 7C & 7D    Pager: [phone]  Units: 6A & ICU   Pager: [phone]  Units: 5 Ortho, 5MS & WB ED Pager: [phone]  Units: 6MS, 8A & 10 ICU  Pager [phone]

## 2023-11-09 NOTE — PROGRESS NOTES
"[facility]  HEPATOLOGY PROGRESS NOTE  Stefani Crowell [id number]       IMPRESSION:  Stefani Crowell is a 63 year old female with a history of alcohol associated cirrhosis, last use 6/28/23 complicated by hepatic hydrothorax with twice weekly thoracentesis, hyponatremia, COPD, weight loss, ascites who was admitted with dyspnea. She has had continued hyponatremia along with worsening of her creatinine.     RECOMMENDATIONS:  # Hyponatremia  # MATTIE-HRS  - sodium level stable, no plans for restarting diuretics. Has continued with low sodium diet, fluid restriction.   - Ur Na <20. Cystatin C as outpatient. Received terlipressin 11/1-11/6 with creatinine 0.7 at stopping. BP dropped following stopping and slight bump in creatinine, again back to baseline. She had an increase in urine output.   - With recent high BUN and underlying lower muscle mass, her creatinine is likely not representing level of kidney dysfunction.      # Hepatic hydrothorax  - diuretic refractory due to hyponatremia.   - has continued to need ~3-4 times weekly thoras with 2+ removed. Had high WBC with last thora, corrected for RBC did not represent infection. Recommend continuing inpatient thoras with diagnostic testing.   - Repeat thora today.   -  MELD 22 (initial MELD), t. Bili has been more indirect. With t. Bili of 8, she would not be a candidate for TIPS at this time. Discussed with IR (Dr. Massey), she would need to have stabilization of her t. Bili and course prior to consideration of TIPS. (She is high risk).   - Echo 10/25 without right sided dysfunction, RA 3. Bubble with evidence of \"medium\" PFO.  Seen by cardiology, no issue with PFO and TIPS.     # Alcohol associated cirrhosis  - currently engaged in CD programming. Peth 10/19 negative  - evaluated for liver transplant in past, not a current candidate for transplant, MELD 27  She needs to complete her CD program and abstinence period completion.   - EGD 11/2/23 with normal " "esophagus  -Ultrasound complete with Doppler for evaluation of HCC and thrombosis negative. (10/22)    # Hepatic encephalopathy  - continue lactulose and rifaximin. Titrate for ~3-4 BM's per day. Mentation stable.     # Goals of care  - options for treatment of her hepatic hydrothorax involve higher risk and may not be an option for her given her elevated MELD/t.bili. She continues to require frequent thoras which would be difficult to continue as an outpatient. She voiced some mental health concerns. Healthy psychology and palliative care ordered for further discussions about future goals.     Patient was discussed with attending physician, Dr. Hays The above note reflects our joint plan.     Next follow up appointment: Dr. Stephanie Lim 1/2024    Thank you for the opportunity to be involved with  Stefani rosas. Please call with any questions or concerns.    ROD Urbna, CNP  Inpatient Hepatology [facility]      SUBJECTIVE:  Having heaviness to her breathing today. States she is concerned about all the tasks that she would need to do in order to get a transplant. \"I want to be done\".     ROS:  A 10-point review of systems was negative.    OBJECTIVE:  VS: BP 94/45 (BP Location: Right arm, Cuff Size: Adult Regular)   Pulse 70   Temp 97.6  F (36.4  C) (Axillary)   Resp 18   Ht 1.651 m (5' 5\")   Wt 68.4 kg (150 lb 12.8 oz)   LMP  (LMP Unknown)   SpO2 97%   BMI 25.09 kg/m        General: In no acute distress, mild facial muscle wasting  Neuro: AOx3, No asterixis  HEENT:  Noscleral icterus, Nooral lesions  CV: . Skin warm and dry  Lungs:  Respirations even and nonlabored on 2L  Abd:  Nondistended, nontender   Extrem: Noperipheral edema   Skin: Nojaundice  Psych: pleasant    MEDICATIONS:  Current Facility-Administered Medications   Medication    acetaminophen (TYLENOL) tablet 650 mg    Or    acetaminophen (TYLENOL) Suppository 650 mg    albuterol (PROVENTIL HFA/VENTOLIN HFA) inhaler    artificial " saliva (BIOTENE DRY MOUTHWASH) liquid 5 mL    citalopram (celeXA) tablet 40 mg    ferrous sulfate (FEROSUL) tablet 325 mg    fluticasone-vilanterol (BREO ELLIPTA) 200-25 MCG/ACT inhaler 1 puff    lactulose (CHRONULAC) solution 20 g    levothyroxine (SYNTHROID/LEVOTHROID) tablet 112 mcg    LORazepam (ATIVAN) tablet 0.5 mg    melatonin tablet 3 mg    midodrine (PROAMATINE) tablet 15 mg    montelukast (SINGULAIR) tablet 10 mg    pantoprazole (PROTONIX) EC tablet 40 mg    polyethylene glycol (MIRALAX) Packet 17 g    prochlorperazine (COMPAZINE) injection 5 mg    Or    prochlorperazine (COMPAZINE) tablet 5 mg    Or    prochlorperazine (COMPAZINE) suppository 25 mg    rifaximin (XIFAXAN) tablet 550 mg       REVIEW OF LABORATORY, PATHOLOGY AND IMAGING RESULTS:  BMP  Recent Labs   Lab 11/09/23  0719 11/08/23  0701 11/07/23  0650 11/06/23  0652   * 123* 124* 124*   POTASSIUM 4.7 4.6 4.7 4.1   CHLORIDE 89* 87* 88* 89*   UMA 10.4* 10.1 10.3* 10.4*   CO2 25 25 24 25   BUN 58.8* 72.3* 73.8* 61.1*   CR 0.74 0.97* 1.17* 0.76   * 91 77 127*     CBC  Recent Labs   Lab 11/09/23  0719 11/08/23  0701 11/07/23  0650 11/07/23  0118 11/06/23  1941   WBC 10.4 10.2 8.4  --  8.6   RBC 2.59* 2.46* 2.29*  --  2.31*   HGB 8.1* 7.6* 7.4*   < > 7.1*  7.1*   HCT 24.3* 23.0* 21.8*  --  21.8*   MCV 94 94 95  --  94   MCH 31.3 30.9 32.3  --  30.7   MCHC 33.3 33.0 33.9  --  32.6   RDW 19.2* 18.9* 18.6*  --  18.4*   PLT 57* 55* 52*  --  55*    < > = values in this interval not displayed.     INR  Recent Labs   Lab 11/09/23  0719 11/08/23  0702 11/07/23  0841 11/06/23  0652   INR 2.05* 1.74* 2.13* 2.19*     LFTs  Recent Labs   Lab 11/09/23  0719 11/08/23  0701 11/07/23  0650 11/06/23  1025 11/06/23 0652   ALKPHOS 66 75 76  --  61   AST 39 38 38  --  40   ALT 28 26 26  --  28   BILITOTAL 8.0* 7.2* 7.2*  --  8.5*   PROTTOTAL 5.4* 5.3* 5.4* 5.2* 5.5*   ALBUMIN 3.8 3.8 3.9  --  4.2        MELD 3.0: 27 at 11/9/2023  7:19 AM  MELD-Na: 30 at  "11/9/2023  7:19 AM  Calculated from:  Serum Creatinine: 0.74 mg/dL (Using min of 1 mg/dL) at 11/9/2023  7:19 AM  Serum Sodium: 124 mmol/L (Using min of 125 mmol/L) at 11/9/2023  7:19 AM  Total Bilirubin: 8.0 mg/dL at 11/9/2023  7:19 AM  Serum Albumin: 3.8 g/dL (Using max of 3.5 g/dL) at 11/9/2023  7:19 AM  INR(ratio): 2.05 at 11/9/2023  7:19 AM  Age at listing (hypothetical): 63 years  Sex: Female at 11/9/2023  7:19 AM    Previous work-up:   Lab Results   Component Value Date    HEPBANG Nonreactive 02/18/2021    HBCAB Nonreactive 02/18/2021    HCVAB  08/03/2017     Nonreactive   Assay performance characteristics have not been established for newborns,   infants, and children      TANYA 157 10/03/2023    IRONSAT 28 10/26/2023    TSH 1.75 10/26/2023    CHOL 176 08/15/2022    HDL 44 (L) 08/15/2022     (H) 08/15/2022    TRIG 111 08/15/2022    A1C 4.7 07/24/2023    POPETH <10 10/22/2023    PLPETH <10 10/22/2023      No results found for: \"SPECDES\", \"LDRESULTS\"      Imaging Results:  None current.   "

## 2023-11-09 NOTE — CONSULTS
SPIRITUAL HEALTH SERVICES Progress Note  [facility] 7C    Filling consult.  See progress note.    Riamegan Lancaster  Chaplain Resident  Pager [phone]    * [facility] remains available 24/7 for emergent requests/referrals, either by having the switchboard page the on-call  or by entering an ASAP/STAT consult in Epic (this will also page the on-call ). Routine Epic consults receive an initial response within 24 hours.*

## 2023-11-09 NOTE — PROCEDURES
[facility]  CAPS PROCEDURE NOTE  Date of Admission:  10/22/2023  Consult Requested by: Medicine  Reason for Consult: management of symptomatic pleural effusion    Indication/HPI: Recurrent large hepatic hydrothorax on the right    Pre-Procedure Diagnosis: Right Pleural Effusion    Post-Procedure Diagnosis: Right Pleural Effusion    Risk Assessment: Average risk, Technically straightforward; patient's anticoagulation has been held according to guidelines based on the agent and platelets and coags are within guidelines    Procedure Outcome:  Therapeutic thoracentesis performed with 2.3 liters removed. Fluid slightly hemorrhagic in appearance with plankton sign in pleural fluid on ultrasound. There were a few loose fibrinous strands but no clear loculated fluid collections noted before or after drainage. I am not certain on the technical challenges encountered with the prior thoracentesis. See additional procedure details below.    The primary covering service should follow up and address any lab results as appropriate.    Umang Soto MD  [facility]  Securely message with Vocera (more info)  Text page via [facility] Paging/Directory   See signed in provider for up to date coverage information      [facility]    Thoracentesis at Bedside    Date/Time: 11/9/2023 12:28 PM    Performed by: Umang Soto MD  Authorized by: Umang Soto MD      UNIVERSAL PROTOCOL   Site Marked: Yes  Prior Images Obtained and Reviewed:  Yes  Required items: Required blood products, implants, devices and special equipment available    Patient identity confirmed:  Verbally with patient  Patient was reevaluated immediately before administering moderate or deep sedation or anesthesia  Confirmation Checklist:  Patient's identity using two indicators, relevant allergies,  procedure was appropriate and matched the consent or emergent situation and correct equipment/implants were available  Time out: Immediately prior to the procedure a time out was called    Universal Protocol: the Joint Commission Universal Protocol was followed    Preparation: Patient was prepped and draped in usual sterile fashion      Procedure purpose: therapeutic  Indications: pleural effusion       ANESTHESIA    Anesthesia:  Local infiltration  Local Anesthetic:  Lidocaine 1% without epinephrine  Anesthetic Total (mL):  4      SEDATION    Patient Sedated: No      PROCEDURE DETAILS   Preparation: skin prepped with ChloraPrep  Patient position: sitting  Ultrasound guidance: yes  Location: right posterior  Puncture method: over-the-needle catheter  Needle gauge: 5 fr Zawatt catheter without skin genoveva.  Number of attempts: 1  Drainage amount: 2300 ml  Drainage characteristics: bloody      PROCEDURE    Patient Tolerance:  Patient tolerated the procedure well with no immediate complications  Length of time physician/provider present for 1:1 monitoring during sedation: 0        POC US GUIDE FOR THORACENTESIS     Impression  Limited chest ultrasound was performed and demonstrated an adequate fluid collection on the right hemithorax.    Thoracentesis Indication:pleural effusion and hypoxia    Doppler of the skin demonstrated the intercostal vessel in the expected location along the inferior edge of the rib. Marked and entered over the superior aspect of the inferior rib within the selected intercostal space    A thoracentesis at this site was subsequently performed. 2300 mls removed    Large pleural fluid noted throughout the lung including at the apec limited the use of sliding sign. Some lung pulsation was noted  Umang Soto MD

## 2023-11-09 NOTE — PLAN OF CARE
"Blood pressure 127/49, pulse 77, temperature 98.3  F (36.8  C), temperature source Oral, resp. rate 18, height 1.651 m (5' 5\"), weight 68.4 kg (150 lb 12.8 oz), SpO2 95%, Via 1.5L NC        Patient A & O X 4, able to make needs known some discomfort abdominal and back , hat PK applied on her back . Dyspnea on exertions . On oxygen 1 .5 L NC . Appetite fair , on FR 1L . Up to BSC bowel movement X 2 skin no new change Appetite fair . Patient family at bedside . Call light within reach .and hourly round completed . Continue with POC and update MD with change.     Lab Na , 123, Hgb 7.6, Plt 55.     Goal Outcome Evaluation:      Plan of Care Reviewed With: patient, family    Overall Patient Progress: no change.          "

## 2023-11-10 NOTE — PROGRESS NOTES
Care Management Follow Up     CHW was requested to send out additional home care referrals on the patient's behalf. See below for referrals:     Home Care Agency Referrals  [facility] (ph:[phone] fx: [phone])   11/10: CHW sent initial HC referral via [facility].     [facility] (ph:[phone] fx:[phone])   11/10: CHW sent initial HC referral via Epic.     [facility] (ph:[phone] fx: [phone])  11/10: CHW sent initial HC referral via Epic.     [facility] (ph:[phone] fx:[phone]) - call before sending referral  11/10: CHW sent initial HC referral via [facility].    [facility] (ph:[phone] fx: [phone])   11/10: CHW sent initial HC referral via Epic.     [facility] (ph:[phone] fx: [phone])   11/10: CHW sent initial HC referral via [facility].     [facility] (ph:[phone]  fx:[phone])  11/10: CHW sent initial HC referral via [facility].     [facility] (ph:[phone] fx:[phone])  11/10: CHW sent initial HC referral via [facility].     [facility] (ph:[phone] fx:[phone])  11/10: CHW sent initial HC referral via [facility].     [facility] (ph:[phone] fx:[phone]) ([place], [facility])  11/10: CHW sent initial HC referral via Epic.     [facility] (ph:[phone] fx:[phone])   11/10: CHW sent initial HC referral via [facility].     [facility] (ph:[phone] fx:[phone]) (RN & PT, no OT)  11/10: CHW sent initial HC referral via [facility].     [facility] (ph:[phone] fx:[phone])   11/10: CHW sent initial HC referral via [facility].     [facility] (ph:[phone] fx: [phone]) - prefers referrals via phone  11/10: CHW sent initial HC referral via [facility].     [facility] (ph: [phone] fx:[phone])  11/10: CHW sent initial HC referral via Epic.     Michael GarciaHugh [facility]  Worker and Phoebe   [facility] 7C & 7D

## 2023-11-10 NOTE — PLAN OF CARE
"Blood pressure 98/56, pulse 72, temperature 97.1  F (36.2  C), temperature source Axillary, resp. rate 18, height 1.651 m (5' 5\"), weight 68.4 kg (150 lb 12.8 oz), SpO2 95%, Via 1.5L NC .       Patient A & O X 4, able to make needs known , some abdominal discomfort . Received scheduled medication . Patient had Thoracentesis fluid removed  2.3L at bedside per MD  and tolerated procedure well. VSS appetite fair . Up to BSC  with SBA . Family at bedside . Code status changed to DNR / DNI . Spiritual seen her for emotional support. Skin no new change with generalized bruises and scab .Call light within reach, bed alarm on ,and hourly round completed. Continue with POC and update MD with change.        Goal Outcome Evaluation:    Plan of Care Reviewed With: patient, family    No change .            "

## 2023-11-10 NOTE — PROGRESS NOTES
PALLIATIVE CARE PROGRESS NOTE  [facility]     Patient Name: Stefani Crowell  Date of Admission: 10/22/2023   Today the patient was seen for: goals and support     Recommendations & Counseling       GOALS OF CARE:   Lolly is still trying to figure out what she wants to do. She wants to find a better way to manage her respiratory symptoms and wishes she could have 3x per week thoracentesis. She would be open to a pleurX catheter but we shared that typically this is placed when patient is going on hospice due to infection risk and she doesn't feel ready to transition to hospice yet. She does want to have good symptom control and spend time with family but it worried about going home because she has had recurrent times at home with shortness of breath and scared about having that happen again. She had wanted to get liver transplant and was willing to do what she needs to do to qualify but her understanding today is that transplant is becoming an unrealistic option.   After our visit I was able to connect with hepatology who is working on options and plans for managing her hydrothorax. Tentative plan is to stay in patient for now to do staged repeated thoracenteses to help more completely drain her pleural effusion and establish frequency of outpatient thoras going forward. The hope was to get to a TIPS procedure but that is deemed not safe unless her bilirubin is lower and blood pressures are stable so that is on hold.  Recommend that once we have more clarity on thoracentesis options and plan we should have a care conference with patient, family, and medical teams including medicine, hepatology, palliative care in the coming week    ADVANCE CARE PLANNING:  Reportedly has a HCD at home that names her  Hari and daughter Angelia as healthcare agents  There is no POLST form on file, recommend to complete prior to DC.  Code status: No CPR- Do NOT Intubate - changed  11/9.     MEDICAL MANAGEMENT:   We are not actively managing symptoms at this time.    PSYCHOSOCIAL/SPIRITUAL:  Family good support from , children, siblings  Spiritual --Says she follows some Budhist beliefs and would appreciate  support.  following  palliative SW is following for coping support      Palliative Care will continue to follow. Thank you for the consult and allowing us to aid in the care of Stefani Crowell.    These recommendations have been discussed with medicine and hepatology teams.    Poonam Duncan MD  Securely message with [employer] (more info)  Text page via [facility] Paging/Directory        Palliative Summary/HPI          Stefani Crowell is a 63 year old female with a past medical history of alcoholic cirrhosis (last use 6/28/23) complicated by hepatic hydrothorax with twice weekly thoracentesis, hepatic encephalopathy managed by lactulose, hyponatremia, COPD, weight loss, ascites who was admitted 10/22 with dyspnea from right pleural effusion and hyponatremia. Hospital course complicated by MATTIE/HRS     Today, the patient was seen for:  Goals of care  Support  Cirrhosis with hepatic hydrothorax                    Interval History:     Multidisciplinary collaboration:  Reviewed case with medicine and hepatology teams    Patient/family narrative  Walked with PT in halls today  Feels tired  No significant pain or other symptoms             Medications:     I have reviewed this patient's medication profile and medications from this hospitalization.     Notable medications:                   Physical Exam:   Temp:  [97.6  F (36.4  C)-98.2  F (36.8  C)] 97.6  F (36.4  C)  Pulse:  [100-116] 100  Resp:  [12-19] 19  BP: ()/(72-80) 110/80  SpO2:  [96 %-100 %] 99 %  199 lbs 15.32 oz    Gen: sitting/lying in bed. Appears comfortable   HEENT: NCAT. Conjunctiva clear. Sclera anicteric .  Resp: unlabored work of breathing,   Abd: soft, mildly distended  Msk: no gross  deformity  Skin:  mild jaundice  Ext: warm, well perfused.  Neuro: face symmetric. EOM, vision, hearing grossly intact. Speech fluent. Moves all extremities   Mental status/Psych: alert. Oriented. Asks/answers questions appropriately. Affect is calm, smiling                 Data Reviewed:     CMP  Recent Labs   Lab 11/10/23  0700 11/09/23  0719 11/08/23  0701 11/07/23  0650   * 124* 123* 124*   POTASSIUM 4.5 4.7 4.6 4.7   CHLORIDE 91* 89* 87* 88*   CO2 26 25 25 24   ANIONGAP 10 10 11 12   * 110* 91 77   BUN 44.3* 58.8* 72.3* 73.8*   CR 0.71 0.74 0.97* 1.17*   GFRESTIMATED >90 >90 66 53*   UMA 10.1 10.4* 10.1 10.3*   PROTTOTAL 5.2* 5.4* 5.3* 5.4*   ALBUMIN 3.7 3.8 3.8 3.9   BILITOTAL 6.9* 8.0* 7.2* 7.2*   ALKPHOS 75 66 75 76   AST 46* 39 38 38   ALT 31 28 26 26     CBC  Recent Labs   Lab 11/10/23  0700 11/09/23  0719 11/08/23  0701 11/07/23  0650   WBC 10.4 10.4 10.2 8.4   RBC 2.64* 2.59* 2.46* 2.29*   HGB 8.2* 8.1* 7.6* 7.4*   HCT 25.4* 24.3* 23.0* 21.8*   MCV 96 94 94 95   MCH 31.1 31.3 30.9 32.3   MCHC 32.3 33.3 33.0 33.9   RDW 19.3* 19.2* 18.9* 18.6*   PLT 62* 57* 55* 52*     INR  Recent Labs   Lab 11/10/23  0700 11/09/23  0719 11/08/23  0702 11/07/23  0841   INR 2.11* 2.05* 1.74* 2.13*     Arterial Blood GasNo lab results found in last 7 days.        Medical Decision Making       MANAGEMENT DISCUSSED with the following over the past 24 hours: medicine, hepatology   NOTE(S)/MEDICAL RECORDS REVIEWED over the past 24 hours: medicine, hepatology, IR, nursing  Tests REVIEWED in the past 24 hours:  - CMP  - CBC  - Coags/INR  SUPPLEMENTAL HISTORY, in addition to the patient's history, over the past 24 hours obtained from:   - Sibling

## 2023-11-10 NOTE — PROGRESS NOTES
"[facility]  HEPATOLOGY PROGRESS NOTE  Stefani Crowell [id number]       IMPRESSION:  Stefani Crowell is a 63 year old female with a history of alcohol associated cirrhosis, last use 6/28/23 complicated by hepatic hydrothorax with twice weekly thoracentesis, hyponatremia, COPD, weight loss, ascites who was admitted with dyspnea. She has had continued hyponatremia along with worsening of her creatinine.     RECOMMENDATIONS:  # Hyponatremia  # MATTIE-HRS  - sodium level stable, no plans for restarting diuretics. Has continued with low sodium diet, fluid restriction.   - Ur Na <20. Cystatin C as outpatient. Received terlipressin 11/1-11/6 with creatinine 0.7 at stopping. BP dropped following stopping and slight bump in creatinine, again back to baseline. She had an increase in urine output.   - With recent high BUN and underlying lower muscle mass, her creatinine is likely not representing level of kidney dysfunction.      # Hepatic hydrothorax  - diuretic refractory due to hyponatremia.   - has continued to need ~3 times weekly thoras with 2+ L removed. Repeat testing following high WBC with normal cell counts. Also noted to have blood in thora fluid. No infection noted. Recommend continuing inpatient thoras with diagnostic testing.   -  MELD 22 (initial MELD), t. Bili has been more indirect. With t. Bili of 6.9, she would not be a candidate for TIPS at this time. Discussed with IR (Dr. Rodríguez), she would need to have stabilization of her t. Bili (closer to baseline of 3.5), not consistently hypotensive  prior to consideration of TIPS. (Discussed she is high risk with family).   - Echo 10/25 without right sided dysfunction, RA 3. Bubble with evidence of \"medium\" PFO.  Seen by cardiology, no issue with PFO and TIPS.   - will plan for staged thora next week with IR to completely clear pleural fluid. She may need 2-3 x thoras as an outpatient.     # Alcohol associated cirrhosis  - currently engaged in CD " "programming. Peth 10/19 negative  - evaluated for liver transplant in past, not a current candidate for transplant, MELD 26 She needs to complete her CD program and abstinence period completion.   - EGD 11/2/23 with normal esophagus  -Ultrasound complete with Doppler for evaluation of HCC and thrombosis negative. (10/22)    # Hepatic encephalopathy  - continue lactulose and rifaximin. Titrate for ~3-4 BM's per day. Mentation stable.     # Goals of care  - discussed at length with daughter, sister and Lolly decision for \"consideration\" of TIPS on 12/6. She has been in contact with palliative care with the understanding that she may not be able to physically get to being able to receive a liver transplant. IF she is not a candidate for a TIPS due to instability,  she will likely pursue hospice care but would like to attempt this. She was made DNR/DNI, is not de escalating care at this time.      Patient was discussed with attending physician, Dr. Hays The above note reflects our joint plan.     Next follow up appointment: Dr. Stephanie Lim 1/2024    Thank you for the opportunity to be involved with  [facility]. Please call with any questions or concerns.    ROD Urban, CNP  Inpatient Hepatology [facility]      SUBJECTIVE:  Had thoracentesis yesterday with improvement in breathing today, not currently on oxygen. Has been up to bathroom and in hallway with walker. No dizziness, abdominal pain, nausea or vomiting. No melena or hematochezia. She has not had change in mentation.     ROS:  A 10-point review of systems was negative.    OBJECTIVE:  VS: /48 (BP Location: Left arm)   Pulse 69   Temp 98.5  F (36.9  C) (Oral)   Resp 16   Ht 1.651 m (5' 5\")   Wt 68.4 kg (150 lb 12.8 oz)   LMP  (LMP Unknown)   SpO2 96%   BMI 25.09 kg/m        General: In no acute distress, mild facial muscle wasting  Neuro: AOx3, No asterixis  HEENT:  Noscleral icterus, Nooral lesions  CV: . Skin warm and dry  Lungs:  " Respirations even and nonlabored on room air  Abd:  Nondistended, nontender   Extrem: Noperipheral edema   Skin: Nojaundice  Psych: pleasant    MEDICATIONS:  Current Facility-Administered Medications   Medication    acetaminophen (TYLENOL) tablet 650 mg    Or    acetaminophen (TYLENOL) Suppository 650 mg    albuterol (PROVENTIL HFA/VENTOLIN HFA) inhaler    artificial saliva (BIOTENE DRY MOUTHWASH) liquid 5 mL    citalopram (celeXA) tablet 40 mg    ferrous sulfate (FEROSUL) tablet 325 mg    fluticasone-vilanterol (BREO ELLIPTA) 200-25 MCG/ACT inhaler 1 puff    lactulose (CHRONULAC) solution 20 g    levothyroxine (SYNTHROID/LEVOTHROID) tablet 112 mcg    LORazepam (ATIVAN) tablet 0.5 mg    melatonin tablet 3 mg    midodrine (PROAMATINE) tablet 15 mg    montelukast (SINGULAIR) tablet 10 mg    pantoprazole (PROTONIX) EC tablet 40 mg    polyethylene glycol (MIRALAX) Packet 17 g    prochlorperazine (COMPAZINE) injection 5 mg    Or    prochlorperazine (COMPAZINE) tablet 5 mg    Or    prochlorperazine (COMPAZINE) suppository 25 mg    rifaximin (XIFAXAN) tablet 550 mg       REVIEW OF LABORATORY, PATHOLOGY AND IMAGING RESULTS:  BMP  Recent Labs   Lab 11/10/23  0700 11/09/23  0719 11/08/23  0701 11/07/23  0650   * 124* 123* 124*   POTASSIUM 4.5 4.7 4.6 4.7   CHLORIDE 91* 89* 87* 88*   UMA 10.1 10.4* 10.1 10.3*   CO2 26 25 25 24   BUN 44.3* 58.8* 72.3* 73.8*   CR 0.71 0.74 0.97* 1.17*   * 110* 91 77     CBC  Recent Labs   Lab 11/10/23  0700 11/09/23  0719 11/08/23  0701 11/07/23  0650   WBC 10.4 10.4 10.2 8.4   RBC 2.64* 2.59* 2.46* 2.29*   HGB 8.2* 8.1* 7.6* 7.4*   HCT 25.4* 24.3* 23.0* 21.8*   MCV 96 94 94 95   MCH 31.1 31.3 30.9 32.3   MCHC 32.3 33.3 33.0 33.9   RDW 19.3* 19.2* 18.9* 18.6*   PLT 62* 57* 55* 52*     INR  Recent Labs   Lab 11/10/23  0700 11/09/23  0719 11/08/23  0702 11/07/23  0841   INR 2.11* 2.05* 1.74* 2.13*     LFTs  Recent Labs   Lab 11/10/23  0700 11/09/23  0719 11/08/23  0701 11/07/23  0650  "  ALKPHOS 75 66 75 76   AST 46* 39 38 38   ALT 31 28 26 26   BILITOTAL 6.9* 8.0* 7.2* 7.2*   PROTTOTAL 5.2* 5.4* 5.3* 5.4*   ALBUMIN 3.7 3.8 3.8 3.9        MELD 3.0: 26 at 11/10/2023  7:00 AM  MELD-Na: 28 at 11/10/2023  7:00 AM  Calculated from:  Serum Creatinine: 0.71 mg/dL (Using min of 1 mg/dL) at 11/10/2023  7:00 AM  Serum Sodium: 127 mmol/L at 11/10/2023  7:00 AM  Total Bilirubin: 6.9 mg/dL at 11/10/2023  7:00 AM  Serum Albumin: 3.7 g/dL (Using max of 3.5 g/dL) at 11/10/2023  7:00 AM  INR(ratio): 2.11 at 11/10/2023  7:00 AM  Age at listing (hypothetical): 63 years  Sex: Female at 11/10/2023  7:00 AM    Previous work-up:   Lab Results   Component Value Date    HEPBANG Nonreactive 02/18/2021    HBCAB Nonreactive 02/18/2021    HCVAB  08/03/2017     Nonreactive   Assay performance characteristics have not been established for newborns,   infants, and children      TANYA 157 10/03/2023    IRONSAT 28 10/26/2023    TSH 1.75 10/26/2023    CHOL 176 08/15/2022    HDL 44 (L) 08/15/2022     (H) 08/15/2022    TRIG 111 08/15/2022    A1C 4.7 07/24/2023    POPETH <10 10/22/2023    PLPETH <10 10/22/2023      No results found for: \"SPECDES\", \"LDRESULTS\"      Imaging Results:  CXR 11/9/23  Impression:   1. Small to moderate right pleural effusion, markedly decreased.  2. Mild patchy opacities in the left upper lung zone, decreased from  prior.  "

## 2023-11-10 NOTE — PROGRESS NOTES
Palliative Care Initial Social Work Note  Location: [facility]    Patient Info:  Stefani Crowell is a 63 year old female with a past medical history of alcoholic cirrhosis (last use 6/28/23) complicated by hepatic hydrothorax with twice weekly thoracentesis, hepatic encephalopathy managed by lactulose, hyponatremia, COPD, weight loss, ascites who was admitted 10/22 with dyspnea from right pleural effusion and hyponatremia. Hospital course complicated by MATTIE/HRS     Brief summary of visit: Joint visit with Palliative Care MD, Lolly, & 2 sisters. Reviewed information shared with Lolly and family yesterday. Lolly is feeling like she understands the information, but her emotions are behind in understanding the changes expected. Discussed the uncertainty of what Lolly's medical care will continue to look like and if she's able to return home. Family requested a care conference so all family can be updated and have a chance to ask questions.        Date of Admission: 10/22/2023    Reason for consult: Goals of care  Decisional support  Patient and family support    Sources of information: Patient  Family member -2 sisters  Staff -Palliative Care MD  Other -chart reivew    Recommendations & Plan:  -Please page Palliative Care Team ([email or web address]) when daughter arrives if Lolly still wants emotional support during their visit. Lolly plans to tell Nikia the changes in her expectations of her care.   -PCSW will continue to be available for ongoing emotional support while Palliative Care Team is following.        Symptoms & Concerns Addressed Today:  Caregiver -Lolly's needs for assistance have been changing.   Emotional -Lolly was able to say that this has been emotionally difficult to hear that she might not survive to transplant. She's not sure she even has processed this information from an emotional perspective.   End of life -Lolly knows that not making it to transplant means she's closer to the end of her life than  she'd like.   Family    Strengths Identified:    -Good family support     Relationships & Support:  Aspects of relationships and support assessed today:  Identified family members: , adult daughter, step-children, grandchildren, siblings   Professional supports: Lolly had just stepped down in her CD treatment program. Working on connecting with a mental health provider outpatient.   Family coping: appropriately   Bereavement Risk concerns: low    Coping, Mental Health & Adjustment to Illness:   Adjustment to Illness/Hospitalization    Goals, Decision Making & Advance Care Planning:   Prognosis, Goals, and/or Advance Care Planning were assessed today: Yes  If yes, brief summary of discussion: Reviewed that Lolly is DNR/DNI. She is not ready for hospice level of care and hopes to improve and not need this level of care.   Preferred language: English  Patient's decision making preferences: shared with support from loved ones  I have concerns about the patient/family's health literacy today: No  Patient has a completed Health Care Directive: Yes, and on file.  Code status per chart review: No CPR / No Intubation      Clinical Social Work Interventions:   Assessment of palliative specific issues    Introduction of Palliative clinical social work interventions   Adjustment to illness counseling  Facilitation of processing of thoughts/feelings  Family communication facilitated  Psychoeducation  Re-framing      HERNESTO Kaur  [facility], Palliative Care  Securely message with the [employer] Web [employer] (learn more here) or  Text page via [facility] Paging/Directory

## 2023-11-10 NOTE — PLAN OF CARE
"Goal Outcome Evaluation:           Overall Patient Progress: improvingOverall Patient Progress: improving    Outcome Evaluation: A&ox4. VSS ex hypotension. 1.5L NC. Denies pain, no prns needed, utilizing t-pump. 1l FR.  Up to bedside commode ind. 1BM overnight. No acute changes overnight. Continue POC    BP (!) 102/38 (BP Location: Left arm)   Pulse 72   Temp 98.3  F (36.8  C) (Oral)   Resp 16   Ht 1.651 m (5' 5\")   Wt 68.4 kg (150 lb 12.8 oz)   LMP  (LMP Unknown)   SpO2 96%   BMI 25.09 kg/m       "

## 2023-11-10 NOTE — PROGRESS NOTES
CLINICAL NUTRITION SERVICES - REASSESSMENT NOTE     Nutrition Prescription    RECOMMENDATIONS FOR MDs/PROVIDERS TO ORDER:  Diet per team    Malnutrition Status:    Moderate malnutrition in the context of chronic illness    Recommendations already ordered by Registered Dietitian (RD):  Ordered string cheese with crackers at 2:00 snack + fresh fruit cup   Continue with hard boiled egg and fresh fruit cup at HS     Future/Additional Recommendations:  PO adequacy     EVALUATION OF THE PROGRESS TOWARD GOALS   Diet: 2 g Sodium + FR 1000 ml + high protein snack in between meals   Intake: Having a fair po and appetite. Consuming 50-75% of meals          NEW FINDINGS   Chart reviewed: PMH Etoh cirrhosis, complicated by hepatic hydrothorax with twice weekly thoracentesis, hepatic encephalopathy on lactulose, hyponatremia, COPD, weight loss, ascites    - Admitted 10/22 with dyspnea from right pleural effusion and hyponatremia. Hospital course complicated by MATTIE/HRS/hyponatremia   : Thoracentesis fluid removed  2.3L at bedside , continues with 3-4 times weekly thoracentesis with 2+ liter removed  - Not a current candidate for transplant, MELD 27  She needs to complete her CD program and abstinence period completion.       Wt trend:  64.2 kg (141 lb 9.6 oz) standing wt on admit on 10/22 --> 64.9 kg (143 lb) standing wt on 10/31 --> 68.4 kg (150 lb 12.8 oz) standing wt on       Labs noted:  Total bili: 6.9 (H)  B (H)    GI/stool:  X3 today, x6 yesterday ->  on lactulose and rifaximin for HE       MALNUTRITION  % Intake: < 75% for > 7 days (moderate)  % Weight Loss: Unable to assess due to volume fluctuation  Subcutaneous Fat Loss: None observed  Muscle Loss: Facial & jaw region:  Mild, Upper arm (bicep, tricep):  Mild, and Lower arm  (forearm):  Mild  Fluid Accumulation/Edema: large ascites   Malnutrition Diagnosis: Moderate malnutrition in the context of chronic illness      Previous Goals   Patient to consume  % of nutritionally adequate meal trays TID, or the equivalent with supplements/snacks.   Evaluation: met intermittently    Previous Nutrition Diagnosis  Malnutrition related to chronic illness with associated decrease in appetite as evidenced by poor po since admit, ongoing need to optimize nutrition with oral supplement  /snacks  Evaluation: No change    CURRENT NUTRITION DIAGNOSIS  Malnutrition related to chronic illness with associated decrease in appetite as evidenced by poor po since admit, ongoing need to optimize nutrition with oral supplement  /snacks    INTERVENTIONS  Implementation  Modify composition of meals/snacks    Goals  Patient to consume % of nutritionally adequate meal trays TID, or the equivalent with supplements/snacks.    Monitoring/Evaluation  Progress toward goals will be monitored and evaluated per protocol.      Newton Partida RD/ANDRE  7C (321-305)/7D RD pager: [phone]  Weekend/Holiday RD pager: [phone]

## 2023-11-10 NOTE — PROGRESS NOTES
PALLIATIVE CARE SOCIAL WORK Progress Note   Location: [facility]      PCSW did return later in the day when Lolly was talking with Angelia. She was able to express that she's made the decision to not move forward with any additional TIPS or transplant as she was informed that she was too sick to move forward. She doesn't want to do full hospice care yet. Angelia coped well with this new information. She reports that she's been working with her own therapist and is finding that helpful.     Plan: PCSW will continue to be available for ongoing patient and family support while Palliative Care Team is following.     Clinical Social Work Interventions:   Assessment of palliative specific issues    Introduction of Palliative clinical social work interventions   Adjustment to illness counseling  Facilitation of processing of thoughts/feelings  Family communication facilitated  Goals of care discussion/facilitation    HERNESTO Kaur  [facility], Palliative Care  Securely message with the [employer] (learn more here) or  Text page via [employer] Paging/Directory

## 2023-11-10 NOTE — PROGRESS NOTES
[facility]    Medicine Progress Note - Hospitalist Service, GOLD TEAM 9    Date of Admission:  10/22/2023    Assessment & Plan   Stefani Crowell is a 63 year old female with a pertinent past medical history of alcoholic cirrhosis, hepatorenal syndrome, hypothyroidism, recurrent hyponatremia, recurrent pleural effusions, COPD and recent admission for hyponatremia who was admitted on 10/22/2023 due to increased dyspnea related to R pleural effusion and hyponatremia.     MATTIE, likely secondary to hepatorenal syndrome  Hyponatremia  Suspect HRS. Had improvement with terlipressin (11/1-11/6), slight bump again in Cr once it was stopped.  - hold diuretics, no plans to resume per hepatology  - continue midodrine (resumed once off terlipressin)    Hepatic hydrothorax  Acute hypoxic respiratory failure 2/2 above  Diuretic-refractory due to hyponatremia. Not currently a TIPS candidate due to elevation of bilirubin.  - Continue with thoracenteses as needed (has needed ~3 times per week) --> last thora on 11/9  - Follow up latest diagnostic studies --> 11/9 thora without e/o SBE  - Diagnostics to be sent with each thora  - Wean supplemental O2 as able --> able to be on RA after 11/9 thora    Decompensated alcoholic cirrhosis  Hx hepatic encephalopathy  Abd US negative for thrombosis and HCC. EGD 11/2 normal. Not a liver transplant candidate currently due to incomplete CD program and abstinence period. Engaged in CD program outpatient. Peth negative 10/19.  Patient is currently not a transplant candidate due to not having completed CD treatment and the required period of sobriety. She is working on these, but needs to be outpatient in order to continue. Her hospital course has been challenging, and as TIPS is not a possibility she is aware that her situation is challenging. After discussion with rosa CORONA on 11/8, pt would like to have additional discussions about her overall GOC.  Palliative care involved on 11/9.  - Hepatology consulted, appreciate assistance  - Continue home lactulose 20g TID, rifaximin 550mg BID  - Palliative care following, appreciate assistance --> likely planning for care conference in the coming days  - Health psych consult    Acute on chronic normocytic anemia  Concern for GIB, nothing identified on scope  Hgb dropping below 7 starting 11/1 without signs of bleeding. On 11/2 noted to have dark red bloody stools. Taken for EGD without signs of bleeding. Colonoscopy without source of bleeding, portal colopathy .  - Transfuse for Hgb < 7     Thrombocytopenia  Coagulopathy  - Likely secondary to cirrhosis     Elevated troponin  - Admit 27, 28. Likely secondary to poor kidney clearance  - Unremarkable EKG    Hypothyroidism  Last TSH was 0.74 on 07/24/23  - Continue home levothyroxine 112 mcg po daily    GERD  - Continue pantoprazole 40 mg po qdaily    Anxiety  - Holding citalopram 40 mg po qdaily and trazodone 50 mg po at bedtime  - continue lorazepam 0.5 mg po qdaily prn    Hypokalemia, resolved  - replacement protocol    Post-thoracentesis pneumothorax, resolved  Noted after 11/6 thora. Respiratory status stable. Serial CXRs demonstrated improvement.          Diet: Fluid restriction 1000 ML FLUID  2 Gram Sodium Diet  Snacks/Supplements Adult: Other; Ensure Enlive at 8:00 ( vanilla or strawberry) patient likes this at 8:00 am ,, HS : Ensrue max vanilla; Between Meals  Snacks/Supplements Adult: Other; Hard boiled egg at HS + cup of fresh fruit; Between Meals    DVT Prophylaxis: Pneumatic Compression Devices  Demarco Catheter: Not present  Lines: None     Cardiac Monitoring: None  Code Status: No CPR- Do NOT Intubate      Clinically Significant Risk Factors         # Hyponatremia: Lowest Na = 124 mmol/L in last 2 days, will monitor as appropriate   # Hypercalcemia: Highest Ca = 10.4 mg/dL in last 2 days, will monitor as appropriate     # Coagulation Defect: INR = 2.11 (Ref  "range: 0.85 - 1.15) and/or PTT = N/A, will monitor for bleeding  # Thrombocytopenia: Lowest platelets = 57 in last 2 days, will monitor for bleeding          # Overweight: Estimated body mass index is 25.09 kg/m  as calculated from the following:    Height as of this encounter: 1.651 m (5' 5\").    Weight as of this encounter: 68.4 kg (150 lb 12.8 oz).   # Moderate Malnutrition: based on nutrition assessment    # Financial/Environmental Concerns: none  # Asthma: noted on problem list        Disposition Plan     Expected Discharge Date: 11/13/2023      Destination: home with family  Discharge Comments: Dispo: TBD  Plan: Keep in hospital until either gets well enough for TIPS or can come up with safe plan for thora's as OP, or decompensates  Progression: Con't lactulose and rifaximin for hepatic encephalopathy, mentation stable.            Delma Barnett MD  Hospitalist Service, GOLD TEAM 9  [facility]  Securely message with [employer] (more info)  Text page via [facility] Paging/Directory   See signed in provider for up to date coverage information  ______________________________________________________________________    Interval History   No acute events. Feeling much better today after the thora yesterday. Breathing is comfortable not on oxygen. Able to walk with PT even off O2 this morning. Tells me she is feeling okay about all the conversations she has been having the last couple of days. Visiting with sister and daughter.     Physical Exam   Vital Signs: Temp: 98.3  F (36.8  C) Temp src: Oral BP: (!) 102/38 Pulse: 72   Resp: 16 SpO2: 96 % O2 Device: Nasal cannula with humidification Oxygen Delivery: 1.5 LPM  Weight: 150 lbs 12.8 oz    Constitutional: awake, alert, cooperative, NAD  Eyes: scleral icterus present  Respiratory: breathing non-labored on RA  Cardiovascular: 4/6 systolic murmur   GI: soft, non-distended, non-tender  Neurologic: alert and oriented x3, moving all " extremities equally    Medical Decision Making       **CLEAR ALL SELECTIONS**      Data     I have personally reviewed the following data over the past 24 hrs:    10.4  \   8.2 (L)   / 62 (L)     127 (L) 91 (L) 44.3 (H) /  105 (H)   4.5 26 0.71 \     ALT: 31 AST: 46 (H) AP: 75 TBILI: 6.9 (H)   ALB: 3.7 TOT PROTEIN: 5.2 (L) LIPASE: N/A     INR:  2.11 (H) PTT:  N/A   D-dimer:  N/A Fibrinogen:  N/A

## 2023-11-11 NOTE — PROGRESS NOTES
Hepatology Follow up Note         Assessment and Plan:   Stefani Crowell is a 63 year old female with past medical history of decompensated alcohol-related cirrhosis complicated by hepatic encephalopathy, hepatic hydrothorax, ascites and hyponatremia.  Past medical history includes COPD and alcohol use disorder    #.  Decompensated alcohol-related cirrhosis  #.  Hepatic hydrothorax  #.  Ascites  #.  Hepatic encephalopathy  #.  Hyponatremia  #.  Acute kidney injury; s/p terlipressin 11/1-11/6 for presumed hepatorenal syndrome  #. Alcohol use disorder  MELD 3.0: 27  Patient has been admitted since October 22, 2023. She was initially admitted for dyspnea in the setting of her hepatic hydrothorax and hyponatremia.      Her hospitalization has been complicated by acute kidney injury thought to be related to HRS which was treated with terlipressin with some improvement.      Given her hyponatremia she has been unable to tolerate diuretics thus her hepatic hydrothorax and ascites has been controlled with 2-3 times weekly thoracenteses at which point 2 L were removed at each time.  No infection has been noted during her hospitalization.    She was being considered for TIPS placement however this has not been feasible at this time given her total bilirubin remains in the 7-8s despite being mostly unconjugated.  We have discussed her case with multiple interventional radiologists including Dr. Rodríguez on 11/10/2023.  Currently she is scheduled for TIPS placement on 12/6/2023 however this will only be possible if her total bilirubin comes down to 3-4, she has been stable from a pulmonary standpoint and she has had no further episodes of hypotension.  Our concern is that she may never be a TIPS candidate at this time.    She has been evaluated for liver transplantation but she is currently not a transplant candidate given she was told about her alcohol use disorder but then returned to use.  She needs to complete chemical  dependency programming prior to being considered for transplantation.    Given the above prolonged hospitalization with inability to safely discharge her we have had multiple goals of care conversations given it is uncertain if she will ever make it to TIPS and she is currently not a liver transplant candidate and has not been able to engage in chemical dependency programming while inpatient.  We appreciate palliative care involvement. Currently DNR/DNI as of 11/9/2023.          Recommendations:   -- Plan for staged thoracentesis early next week  -- In the outpatient setting she will need to be set up [place] [facility] [place] albumin replacement  -- Continue lactulose and rifaximin for hepatic encephalopathy management  -- She is currently scheduled for TIPS placement on 12/6/2023 however this will not occur unless she remains stable from a pulmonary and blood pressure standpoint and her total bilirubin comes down to 3-4.     Poonam Hays MD (Lizzie)   of Medicine  Advanced & Transplant Hepatology  [facility]         Subjective/Objective:   - 24 hour events + nursing notes reviewed   - Patient reports feeling discouraged but understands the plan.  She does not want to live like this for the rest of her life and if she is not a TIPS candidate she wants to consider pursuing palliative care and hospice and be home  - Didn't sleep well due to shortness of breath  - No fevers or chills         Medications:     Current Facility-Administered Medications   Medication    acetaminophen (TYLENOL) tablet 650 mg    Or    acetaminophen (TYLENOL) Suppository 650 mg    albuterol (PROVENTIL HFA/VENTOLIN HFA) inhaler    artificial saliva (BIOTENE DRY MOUTHWASH) liquid 5 mL    citalopram (celeXA) tablet 40 mg    ferrous sulfate (FEROSUL) tablet 325 mg    fluticasone-vilanterol (BREO ELLIPTA) 200-25 MCG/ACT inhaler 1 puff    lactulose (CHRONULAC) solution 20 g     "levothyroxine (SYNTHROID/LEVOTHROID) tablet 112 mcg    LORazepam (ATIVAN) tablet 0.5 mg    melatonin tablet 3 mg    midodrine (PROAMATINE) tablet 15 mg    montelukast (SINGULAIR) tablet 10 mg    pantoprazole (PROTONIX) EC tablet 40 mg    polyethylene glycol (MIRALAX) Packet 17 g    prochlorperazine (COMPAZINE) injection 5 mg    Or    prochlorperazine (COMPAZINE) tablet 5 mg    Or    prochlorperazine (COMPAZINE) suppository 25 mg    rifaximin (XIFAXAN) tablet 550 mg            Physical Exam:   VS:  BP (!) 93/38 (BP Location: Left arm, Cuff Size: Adult Small)   Pulse 70   Temp 98.1  F (36.7  C) (Oral)   Resp 18   Ht 1.651 m (5' 5\")   Wt 67 kg (147 lb 11.2 oz)   LMP  (LMP Unknown)   SpO2 97%   BMI 24.58 kg/m      Wt:   Wt Readings from Last 2 Encounters:   11/11/23 67 kg (147 lb 11.2 oz)   10/03/23 62.3 kg (137 lb 4.8 oz)      Constitutional: NAD  Eyes: Conjunctiva +icteric  HEENT: Mucus membranes moist  CV: RRR, III/VI systolic murmur  Respiratory: 2LNC, crackles at bases bilaterally  Abd: Distended, no rebound or guarding   Skin: +  jaundice  Neuro: AAO x 3, No asterixis         Laboratory:   BMP  Recent Labs   Lab 11/11/23  0704 11/10/23  0700 11/09/23  0719 11/08/23  0701   * 127* 124* 123*   POTASSIUM 4.7 4.5 4.7 4.6   CHLORIDE 90* 91* 89* 87*   UMA 10.0 10.1 10.4* 10.1   CO2 25 26 25 25   BUN 47.8* 44.3* 58.8* 72.3*   CR 0.77 0.71 0.74 0.97*   GLC 98 105* 110* 91     CBC  Recent Labs   Lab 11/11/23  0704 11/10/23  0700 11/09/23  0719 11/08/23  0701   WBC 11.2* 10.4 10.4 10.2   RBC 2.62* 2.64* 2.59* 2.46*   HGB 8.2* 8.2* 8.1* 7.6*   HCT 25.0* 25.4* 24.3* 23.0*   MCV 95 96 94 94   MCH 31.3 31.1 31.3 30.9   MCHC 32.8 32.3 33.3 33.0   RDW 19.2* 19.3* 19.2* 18.9*   PLT 65* 62* 57* 55*     INR  Recent Labs   Lab 11/11/23  0704 11/10/23  0700 11/09/23  0719 11/08/23  0702   INR 1.97* 2.11* 2.05* 1.74*     LFTs  Recent Labs   Lab 11/11/23  0704 11/10/23  0700 11/09/23  0719 11/08/23  0701   ALKPHOS 75 75 66 " 75   AST 45 46* 39 38   ALT 30 31 28 26   BILITOTAL 7.2* 6.9* 8.0* 7.2*   PROTTOTAL 5.3* 5.2* 5.4* 5.3*   ALBUMIN 3.7 3.7 3.8 3.8        Imaging/Procedures/Studies:   No pertinent studies in the last 24 hours

## 2023-11-11 NOTE — PLAN OF CARE
Assumed care [id number]     V/S: VSS on RA  Pain: Denies  Neuro: A&Ox4, denies N/T  Respiratory: Esteban, intermittent SOB at rest. 2L NC applied for comfort. LSCUA.  Cardiac: WDL ex soft BP; HR 70s, BP 90s/30-40s. Denies chest pain.  Skin: No new deficits  GI/: LBM 11/10. Voiding adequately. Denies N/V.  Nutrition: Tolerating 2gNa diet with 1L FR.  Lines/drains: R PIV SL  Activity: xSBA with FWW  Labs: K protocol     Events this shift: No acute events overnight. Pt refused PM lactulose; education reinforced. Pt comfortable on RA this evening; notified RN of SOB ~2310, 2L NC applied for comfort. Pt slept well between cares.    Plan: Care conference in coming week. Continue POC & notify providers with any acute changes.

## 2023-11-11 NOTE — PLAN OF CARE
Goal Outcome Evaluation:      Plan of Care Reviewed With: patient    Overall Patient Progress: improvingOverall Patient Progress: improving    Outcome Evaluation: have adequate BMs    Assumed cares [id number]. A&O and able to make needs known. VSS on RA. Weaned to RA this shift and pt is tolerating well. T refused her AM lactulose d/t several stool last night. Afternoon dose given and pt has had BM x1 this shift. K+ WNL with AM recheck. Family present at bedside and attentive to pt needs for most of shift. Fair appetite at meals. On 1L FR and has not yet met restriction. Continue with plan of care.

## 2023-11-11 NOTE — PROGRESS NOTES
[facility]    Medicine Progress Note - Hospitalist Service, GOLD TEAM 9    Date of Admission:  10/22/2023    Assessment & Plan   Stefani Crowell is a 63 year old female with a pertinent past medical history of alcoholic cirrhosis, hepatorenal syndrome, hypothyroidism, recurrent hyponatremia, recurrent pleural effusions, COPD and recent admission for hyponatremia who was admitted on 10/22/2023 due to increased dyspnea related to R pleural effusion and hyponatremia.     MATTIE, likely secondary to hepatorenal syndrome  Hyponatremia  Suspect HRS. Had improvement with terlipressin (11/1-11/6), slight bump again in Cr once it was stopped.  - hold diuretics, no plans to resume per hepatology  - continue midodrine (resumed once off terlipressin)    Hepatic hydrothorax  Acute hypoxic respiratory failure 2/2 above  Diuretic-refractory due to hyponatremia. Not currently a TIPS candidate due to elevation of bilirubin.  - Continue with thoracenteses as needed (has needed ~3 times per week) --> last thora on 11/9  - Diagnostics to be sent with each thora  - Wean supplemental O2 as able --> able to be on RA after 11/9 thora  - Per hepatology and IR: plan for staged thora next week, may need to start with one tomorrow  - Tentatively scheduled for TIPS on 12/6, though pt is aware that she may not be a candidate at that time    Decompensated alcoholic cirrhosis  Hx hepatic encephalopathy  Abd US negative for thrombosis and HCC. EGD 11/2 normal. Not a liver transplant candidate currently due to incomplete CD program and abstinence period. Engaged in CD program outpatient. Peth negative 10/19.  Patient is currently not a transplant candidate due to not having completed CD treatment and the required period of sobriety. She is working on these, but needs to be outpatient in order to continue. Her hospital course has been challenging, and as TIPS is not a possibility she is aware that her  situation is challenging. After discussion with rosa CORONA on 11/8, pt would like to have additional discussions about her overall GOC. Palliative care involved on 11/9.  - Hepatology consulted, appreciate assistance  - Continue home lactulose 20g TID, rifaximin 550mg BID  - Palliative care following, appreciate assistance --> likely planning for care conference next week  - Health psych consult    Moderate malnutrition in the context of chronic illness  - RD following    Acute on chronic normocytic anemia  Concern for GIB, nothing identified on scope  Hgb dropping below 7 starting 11/1 without signs of bleeding. On 11/2 noted to have dark red bloody stools. Taken for EGD without signs of bleeding. Colonoscopy without source of bleeding, portal colopathy .  - Transfuse for Hgb < 7     Thrombocytopenia  Coagulopathy  - Likely secondary to cirrhosis     Elevated troponin  - Admit 27, 28. Likely secondary to poor kidney clearance  - Unremarkable EKG    Hypothyroidism  Last TSH was 0.74 on 07/24/23  - Continue home levothyroxine 112 mcg po daily    GERD  - Continue pantoprazole 40 mg po qdaily    Anxiety  - Holding citalopram 40 mg po qdaily and trazodone 50 mg po at bedtime  - continue lorazepam 0.5 mg po qdaily prn    Hypokalemia, resolved  - replacement protocol    Post-thoracentesis pneumothorax, resolved  Noted after 11/6 thora. Respiratory status stable. Serial CXRs demonstrated improvement.          Diet: Fluid restriction 1000 ML FLUID  2 Gram Sodium Diet  Snacks/Supplements Adult: Other; Ensure Enlive at 8:00 ( vanilla or strawberry) patient likes this at 8:00 am ,, HS : Ensrue max vanilla; Between Meals  Snacks/Supplements Adult: Other; String cheese @ 2:00, Hard boiled egg at HS + cup of fresh fruit; Between Meals    DVT Prophylaxis: Pneumatic Compression Devices  Demarco Catheter: Not present  Lines: None     Cardiac Monitoring: None  Code Status: No CPR- Do NOT Intubate      Clinically Significant Risk Factors     "     # Hyponatremia: Lowest Na = 127 mmol/L in last 2 days, will monitor as appropriate       # Coagulation Defect: INR = 2.11 (Ref range: 0.85 - 1.15) and/or PTT = N/A, will monitor for bleeding  # Thrombocytopenia: Lowest platelets = 62 in last 2 days, will monitor for bleeding          # Overweight: Estimated body mass index is 25.09 kg/m  as calculated from the following:    Height as of this encounter: 1.651 m (5' 5\").    Weight as of this encounter: 68.4 kg (150 lb 12.8 oz).   # Moderate Malnutrition: based on nutrition assessment    # Financial/Environmental Concerns: none  # Asthma: noted on problem list        Disposition Plan     Expected Discharge Date: 11/14/2023      Destination: home with family  Discharge Comments: Dispo: home with HC, not yet secured  Plan:            Delma Barnett MD  Hospitalist Service, GOLD TEAM 9  [facility]  Securely message with [employer] (more info)  Text page via [employer] Paging/Directory   See signed in provider for up to date coverage information  ______________________________________________________________________    Interval History   No acute events. Put back on O2 overnight and does note that breathing is a bit worse again today though not as bad as before her last thora. Having back discomfort at the thora site.    Physical Exam   Vital Signs: Temp: 98.1  F (36.7  C) Temp src: Oral BP: (!) 93/38 Pulse: 70   Resp: 18 SpO2: 97 % O2 Device: Nasal cannula with humidification Oxygen Delivery: 2 LPM  Weight: 150 lbs 12.8 oz    Constitutional: awake, alert, cooperative, NAD  Eyes: scleral icterus present  Respiratory: mild resp distress, left lung fields clear, diminished breath sounds in upper right lung fields, no sounds in right base  Cardiovascular: 4/6 systolic murmur   GI: soft, non-distended, non-tender  Neurologic: alert and oriented x3, moving all extremities equally       Medical Decision Making     **CLEAR ALL " SELECTIONS**      Data     I have personally reviewed the following data over the past 24 hrs:    11.2 (H)  \   8.2 (L)   / 65 (L)     125 (L) 90 (L) 47.8 (H) /  98   4.7 25 0.77 \     ALT: 30 AST: 45 AP: 75 TBILI: 7.2 (H)   ALB: 3.7 TOT PROTEIN: 5.3 (L) LIPASE: N/A     INR:  1.97 (H) PTT:  N/A   D-dimer:  N/A Fibrinogen:  N/A

## 2023-11-11 NOTE — PROGRESS NOTES
SPIRITUAL HEALTH SERVICES Progress Note  [facility]    I visited Lolly per palliative follow up.  She said that she'd like me to come back another time.  I briefly introduced myself to her older sister and significant other.  I will check back in on Tuesday.    Ria Gr  Chaplain Resident  Pager [phone]    * [facility] remains available 24/7 for emergent requests/referrals, either by having the switchboard page the on-call  or by entering an ASAP/STAT consult in Epic (this will also page the on-call ). Routine Epic consults receive an initial response within 24 hours.*

## 2023-11-12 NOTE — PLAN OF CARE
Goal Outcome Evaluation:      Plan of Care Reviewed With: patient    Overall Patient Progress: no changeOverall Patient Progress: no change    Outcome Evaluation: wean O2 as able, adequate BMs on lactulose    Assumed cares [id number]. A&O and able to make needs known. VSS on 2 LPM via nc. Tylenol given x1 for abdominal pain. Adequate BM today having 4x. K+ WNL with AM recheck. Wound cares completed. Family present at bedside and attentive to pt needs for most of shift. Fair appetite at meals. On 1L FR and has not yet met restriction. Continue with plan of care

## 2023-11-12 NOTE — PROGRESS NOTES
[facility]    Medicine Progress Note - Hospitalist Service, GOLD TEAM 9    Date of Admission:  10/22/2023    Assessment & Plan   Stefani Crowell is a 63 year old female with a pertinent past medical history of alcoholic cirrhosis, hepatorenal syndrome, hypothyroidism, recurrent hyponatremia, recurrent pleural effusions, COPD and recent admission for hyponatremia who was admitted on 10/22/2023 due to increased dyspnea related to R pleural effusion and hyponatremia.     MATTIE, likely secondary to hepatorenal syndrome  Hyponatremia  Suspect HRS. Had improvement with terlipressin (11/1-11/6), slight bump again in Cr once it was stopped.  - hold diuretics, no plans to resume per hepatology  - continue midodrine (resumed once off terlipressin)    Hepatic hydrothorax  Acute hypoxic respiratory failure 2/2 above  Diuretic-refractory due to hyponatremia. Not currently a TIPS candidate due to elevation of bilirubin.  - Continue with thoracenteses as needed (has needed ~3 times per week) --> last thora on 11/9, plan for repeat today  - Diagnostics to be sent with each thora  - Wean supplemental O2 as able --> able to be on RA after 11/9 thora  - Per hepatology and IR: plan for staged thora next week  - CAPS consult for thoracentesis today due to increased dyspnea again  - Infectious studies on fluid ordered  - Tentatively scheduled for TIPS on 12/6, though pt is aware that she may not be a candidate at that time    Decompensated alcoholic cirrhosis  Hx hepatic encephalopathy  Abd US negative for thrombosis and HCC. EGD 11/2 normal. Not a liver transplant candidate currently due to incomplete CD program and abstinence period. Engaged in CD program outpatient. Peth negative 10/19.  Patient is currently not a transplant candidate due to not having completed CD treatment and the required period of sobriety. She is working on these, but needs to be outpatient in order to continue. Her  hospital course has been challenging, and as TIPS is not a possibility she is aware that her situation is challenging. After discussion with rosa CORONA on 11/8, pt would like to have additional discussions about her overall GOC. Palliative care involved on 11/9.  - Hepatology consulted, appreciate assistance  - Continue home lactulose 20g TID, rifaximin 550mg BID  - Palliative care following, appreciate assistance --> likely planning for care conference next week  - Health psych consult    Moderate malnutrition in the context of chronic illness  - RD following    Acute on chronic normocytic anemia  Concern for GIB, nothing identified on scope  Hgb dropping below 7 starting 11/1 without signs of bleeding. On 11/2 noted to have dark red bloody stools. Taken for EGD without signs of bleeding. Colonoscopy without source of bleeding, portal colopathy .  - Transfuse for Hgb < 7     Thrombocytopenia  Coagulopathy  - Likely secondary to cirrhosis     Elevated troponin  - Admit 27, 28. Likely secondary to poor kidney clearance  - Unremarkable EKG    Hypothyroidism  Last TSH was 0.74 on 07/24/23  - Continue home levothyroxine 112 mcg po daily    GERD  - Continue pantoprazole 40 mg po qdaily    Anxiety  - Holding citalopram 40 mg po qdaily and trazodone 50 mg po at bedtime  - continue lorazepam 0.5 mg po qdaily prn    Hypokalemia, resolved  - replacement protocol    Post-thoracentesis pneumothorax, resolved  Noted after 11/6 thora. Respiratory status stable. Serial CXRs demonstrated improvement.          Diet: Fluid restriction 1000 ML FLUID  2 Gram Sodium Diet  Snacks/Supplements Adult: Other; Ensure Enlive at 8:00 ( vanilla or strawberry) patient likes this at 8:00 am ,, HS : Ensrue max vanilla; Between Meals  Snacks/Supplements Adult: Other; String cheese @ 2:00, Hard boiled egg at HS + cup of fresh fruit; Between Meals    DVT Prophylaxis: Pneumatic Compression Devices  Demarco Catheter: Not present  Lines: None     Cardiac  Monitoring: None  Code Status: No CPR- Do NOT Intubate      Clinically Significant Risk Factors         # Hyponatremia: Lowest Na = 125 mmol/L in last 2 days, will monitor as appropriate       # Coagulation Defect: INR = 1.97 (Ref range: 0.85 - 1.15) and/or PTT = N/A, will monitor for bleeding  # Thrombocytopenia: Lowest platelets = 65 in last 2 days, will monitor for bleeding           # Moderate Malnutrition: based on nutrition assessment    # Financial/Environmental Concerns: none  # Asthma: noted on problem list        Disposition Plan     Expected Discharge Date: 11/14/2023      Destination: home with family  Discharge Comments: Dispo: home with HC, not yet secured  Plan:            Delma Barnett MD  Hospitalist Service, GOLD TEAM 9  [employer]  Securely message with [employer] (more info)  Text page via [employer] Paging/Directory   See signed in provider for up to date coverage information  ______________________________________________________________________    Interval History   No acute events. Had a good night, slept well. Feeling more short of breath again today. Pain is improved. SO brought her in food which she is excited about.     Physical Exam   Vital Signs: Temp: 97.7  F (36.5  C) Temp src: Oral BP: 106/52 Pulse: 73   Resp: 18 SpO2: 95 % O2 Device: Nasal cannula with humidification Oxygen Delivery: 2 LPM  Weight: 147 lbs 11.2 oz    Constitutional: awake, alert, cooperative, NAD  Eyes: scleral icterus present  Respiratory: mild resp distress, left lung fields clear, unable to hear breath sounds on the right  Cardiovascular: 4/6 systolic murmur   GI: soft, non-distended, non-tender  Neurologic: alert and oriented x3, moving all extremities equally       Medical Decision Making     **CLEAR ALL SELECTIONS**      Data     I have personally reviewed the following data over the past 24 hrs:    8.9  \   8.3 (L)   / 59 (L)     127 (L) 91 (L) 49.7 (H) /  86   4.4 26  0.77 \     ALT: 32 AST: 44 AP: 72 TBILI: 7.1 (H)   ALB: 3.7 TOT PROTEIN: 5.3 (L) LIPASE: N/A     INR:  1.81 (H) PTT:  N/A   D-dimer:  N/A Fibrinogen:  N/A

## 2023-11-12 NOTE — PLAN OF CARE
Assumed care [id number]     V/S: VSS on 2L NC  Pain: 5/10 thora site & back  Neuro: A&Ox4, denies N/T  Respiratory: 2L NC; Esteban, intermittent SOB at rest. Coarse L LS.  Cardiac: WDL ex soft BP; HR 60-70s, -110s/50s. Denies chest pain.  Skin: No new deficits  GI/: Loose BM x1. Voiding adequately. Denies N/V.  Nutrition: Tolerating 2gNa diet with 1L FR.  Lines/drains: R PIV SL  Activity: xSBA with FWW  Labs: K protocol    Events this shift: AQUILINO. PRN tylenol given x1. Pt slept well between cares.    Plan: Possible thora today. Tentatively scheduled for TIPS on 12/6. Care conference in coming week. Continue POC & notify providers with any acute changes.

## 2023-11-12 NOTE — PROCEDURES
[facility]  CAPS PROCEDURE NOTE  Date of Admission:  10/22/2023  Consult Requested by: Medicine  Reason for Consult: management of symptomatic pleural effusion    Indication/HPI: Refractory right hepatic hydrothorax    Pre-Procedure Diagnosis: Right Pleural Effusion    Post-Procedure Diagnosis: Right Pleural Effusion    Risk Assessment: Average risk, Technically straightforward; patient's anticoagulation has been held according to guidelines based on the agent and platelets and coags are within guidelines    Procedure Outcome:  Therapeutic paracentesis performed with 2.5 liters of ascites removed. Given chronic nature of the effusion with previously well tolerated large volume thora, 2.5 L limit was requested which seems reasonable, jaime if there is plan for pre-operative optimization.     See additional procedure details below.    The primary covering service should follow up and address any lab results as appropriate.    Umang Soto MD  [facility]  Securely message with Vocera (more info)  Text page via [employer] Paging/Directory   See signed in provider for up to date coverage information      [facility]    Thoracentesis at Bedside    Date/Time: 11/12/2023 1:44 PM    Performed by: Umang Soto MD  Authorized by: Umang Soto MD      UNIVERSAL PROTOCOL   Site Marked: Yes  Prior Images Obtained and Reviewed:  Yes  Required items: Required blood products, implants, devices and special equipment available    Patient identity confirmed:  Verbally with patient  Patient was reevaluated immediately before administering moderate or deep sedation or anesthesia  Confirmation Checklist:  Patient's identity using two indicators, relevant allergies, procedure was appropriate and matched the consent or emergent situation and correct equipment/implants  were available  Time out: Immediately prior to the procedure a time out was called    Universal Protocol: the Joint Commission Universal Protocol was followed    Preparation: Patient was prepped and draped in usual sterile fashion      Procedure purpose: therapeutic and diagnostic  Indications: pleural effusion       ANESTHESIA    Local Anesthetic:  Lidocaine 1% without epinephrine  Anesthetic Total (mL):  5      SEDATION    Patient Sedated: No      PROCEDURE DETAILS   Preparation: skin prepped with ChloraPrep  Ultrasound guidance: yes  Location: right posterior  Puncture method: over-the-needle catheter  Needle gauge: 5 fr venessa.  Number of attempts: 1  Drainage amount: 2500 ml  Drainage characteristics: bloody  Chest x-ray performed: yes      PROCEDURE    Patient Tolerance:  Patient tolerated the procedure well with no immediate complications  Length of time physician/provider present for 1:1 monitoring during sedation: 0        POC US GUIDE FOR THORACENTESIS     Impression  Limited chest ultrasound was performed and demonstrated an adequate fluid collection on the right hemithorax.    Thoracentesis Indication:pleural effusion    Doppler of the skin demonstrated an area at this site without significant vasculature.  A thoracentesis at this site was subsequently performed.    At the right apex, there was an area with pleura adjacent to the chest wall with normal sliding sign. However, given her prior hx of small apical pneumo and sig amounts of remaining pleural efluid, an xray was also ordered.    Umang Soto MD

## 2023-11-13 NOTE — PLAN OF CARE
"Blood pressure 110/51, pulse 74, temperature 97.5  F (36.4  C), temperature source Oral, resp. rate 18, height 1.651 m (5' 5\"), weight 69.1 kg (152 lb 5.4 oz), SpO2 99%, via 2L NC       Patient A & O X 4, able to make needs known. Dyspnea on exertions,  some abdominal discomfort and right chest pain . Offered Tylenol patient declined at this time. At beginning of shift Patient with low BP 76/32 83/33 MD aware and MD order LR bolus 1L, Hgb 6.4 MD order 2 unit PRBC . Patient received 2 unit PRBC without problem  no S/Sx of reactions noted , had chest CT done. Received scheduled medications as order. Bowel movement X 2 no bleeding noted. Urine sample sent. Patient sister at bedside . Call light within reach, hourly round completed continue monitor closely and update MD with concerns.     Lab Na 124, Crt, 1.19, WBC 15.5, Hgb 6.4 . Recheck Hgb , 8.7        Goal Outcome Evaluation:      Plan of Care Reviewed With: patient, family    Overall Patient Progress: no change.           "

## 2023-11-13 NOTE — PLAN OF CARE
Assumed care [id number]     V/S: VSS on 2L NC ex BP  Pain: 6-9/10 thora site, BLE cramping  Neuro: A&Ox4, intermittent anxiety. Denies N/T  Respiratory: 2L NC; Esteban, intermittent SOB at rest. LSCUA.  Cardiac: BP 70-90s/30-40s; MD aware. HR 70s. Denies chest pain.  Skin: No new deficits  GI/: Small loose BM x2. Voiding adequately. Intermittent nausea without emesis.  Nutrition: Tolerating 2gNa diet with 1L FR.  Lines/drains: R PIV SL  Activity: xSBA with FWW  Labs: K protocol    Events this shift: PRN tylenol given x2. Pt c/o anxiety r/t nausea, dry-heaving; PRN IV compazine given x1, PO ativan x1. Refused 0200 VS. Pt awake most of shift. BP 70-80s/30s, c/o dizziness this AM, MD aware; pt re-educated to call for help before getting OOB.    Plan: Tentatively scheduled for TIPS on 12/6. Care conference in coming week. Continue POC & notify providers with any acute changes.

## 2023-11-13 NOTE — PROGRESS NOTES
Care Management Follow Up    Length of Stay (days): 22    Expected Discharge Date: 11/17/2023     Concerns to be Addressed: all concerns addressed in this encounter     Patient plan of care discussed at interdisciplinary rounds: Yes     Anticipated Discharge Disposition: Home, Outpatient Chemical Dependency     Anticipated Discharge Services: Home care, pending status  Anticipated Discharge DME: None     Patient/family educated on Medicare website which has current facility and service quality ratings: no  Education Provided on the Discharge Plan: Yes  Patient/Family in Agreement with the Plan: yes     Referrals Placed by CM/SW:    Private pay costs discussed: Not applicable     Additional Information:    Patient's status reviewed by chart. Pt is not medically ready. Cr trends up and Hgb drops to 6.4 this morning. Writer called OP Thoracentesis at [facility] to rescheduled appointment of tomorrow and schedule for the week of 11/20.    OP Thoracentesis at [facility]  P: [phone]  F: [phone]  Appointments:   Friday 11/17/23, arrive @ 1:30PM, procedure @ 2:00PM  Monday 11/20/23, arrive @ 12:30PM, procedure @ 1:00PM  Wednesday 11/22/23, arrive @ 12:30PM, procedure @ 1:00PM  Friday 11/24/2023, arrive @ 12:30PM, procedure @ 1:00PM    Loni Mayfield RN  7C RN Care Coordinator  Phone: [phone]  Pager: [phone]  [facility] ([id number]) Saturday & Sunday; ([phone]) [facility] Recognized Holidays   Units: 5A, 5B & 5C  Pager: [phone]  Units: 6B, 6C & 6D    Pager: [phone]  Units: 7A, 7B, 7C & 7D    Pager: [phone]  Units: 6A & ICU   Pager: [phone]  Units: 5 Ortho, 5MS & WB ED Pager: [phone]  Units: 6MS, 8A & 10 ICU  Pager [phone]

## 2023-11-13 NOTE — PLAN OF CARE
Goal Outcome Evaluation:      Plan of Care Reviewed With: patient    Overall Patient Progress: decliningOverall Patient Progress: declining    Outcome Evaluation: Pt is not medically ready. Cr trends up and Hgb drops to 6.4 this morning.    Loni Mayfield RNCC

## 2023-11-13 NOTE — CONSULTS
Health Psychology                                                                                                                          Elisha Edwards, Ph.D., L.P [phone]  Renetta Alonzo, Ph.D., L.P. [phone]  Alexa Grubbs, Ph.D, L..P. [phone]  Awilda Wilson, Ph.D., L.P. [phone]  Kavon Arriaga, Ph.D., A.B.P.P., L.P. [phone]         Azra Matias, Ph.D., L.P. [phone]       Tasneem Andrews, Ph.D., A.B.P.P., LP [phone]           [facility], 3rd Floor  [address] [place] [address]       Inpatient Health Psychology Consultation      Date of Service:  11/13/23    Spoke with HERNESTO Suarez with Palliative Care. Kaur will be providing emotional support for Lolly for the time being. Health Psych will close the consult to not duplicate services.    Should things change and palliative sign off, please re-consult health psychology.    Alexa Grubbs, PhD, LP  Clinical Health Psychologist  Phone: [phone]  Pager: [phone]

## 2023-11-13 NOTE — PROGRESS NOTES
Care Management Follow Up       [facility] (ph:[phone] fx: [phone])   11/10: CHW sent initial HC referral via Epic.   11/13: CHW spoke with intake and they can accept the pt's payor source. They will call RNCC following when a decision is made.     [facility] (ph:[phone] fx:[phone]) - call before sending referral  11/10: CHW sent initial HC referral via Epic.  11/13: CHW resent failed referral via Epic.     [facility] (ph:[phone] fx: [phone])   11/10: CHW sent initial HC referral via Epic.   11/13: CHW spoke with intake they are not accepting referrals d/t staffing.     [facility] (ph:[phone] fx:[phone])  11/10: CHW sent initial HC referral via Epic.   11/13: CHW resent failed referral via Epic.     [facility] (ph:[phone] fx:[phone]) ([place], PMAP & [facility])  11/10: CHW sent initial HC referral via Epic.   11/13: CHW resent failed referral via Epic.    [facility] (ph:[phone] fx:[phone])   11/10: CHW sent initial HC referral via Epic.      [facility] (ph:[phone] fx:[phone]) (RN & PT, no OT)  11/10: CHW sent initial HC referral via Epic.   11/13: CHW LVM for admissions to f/u on referral; requested they call RNCC back.      [facility] (ph:[phone] fx:[phone])   11/10: CHW sent initial HC referral via Epic.   11/13: CHW LVM for admissions to f/u on referral; requested they call RNCC back.      [facility] (ph:[phone] fx: [phone]) - prefers referrals via phone  11/10: CHW sent initial HC referral via [employer]K.   11/13: CHW LVM for admissions to f/u on referral; requested they call RNCC back.    [facility] (ph: [phone] fx:[phone])  11/10: CHW sent initial HC referral via [employer] TEK.   11/13: CHW resent failed referral via Epic    Declined:  [facility] and RN Service: Out of service.  [facility] :Declined   [facility] : Declined   [facility] : Declined   [facility] : Declined      Michael Yates   Inpatient Community Health Worker and [place]   [facility] 7C & 7D

## 2023-11-13 NOTE — PROVIDER NOTIFICATION
Provider notified (name):Gm Orellana MD  Reason for notification: BP 70-80s/30s, MAP 57. Pt dizzy.  Recommendation/request given to provider: Assess  Response from provider: Called back nurses' station after RN-to-RN handoff, did not speak with this writer

## 2023-11-13 NOTE — PROGRESS NOTES
IR consult.    Discussed between hepatology (Yanelis Miller, [facility]) and IR staff- Dr. White. Will plan to proceed with stage right thoracentesis to drain as much fluid as patient can tolerate safely in IR. Has been tolerating 2.5L with CAPS team. Ideally she would discharge with plans for twice weekly thoracenteses, but unclear if this will be possible.    Will attempt staged thoracentesis in IR 11/15. Last thora was 11/12 with 2.5L drained. There was concern for bleeding after thoracentesis and CTA was completed. This was negative for active arterial bleeding.     Asia Mathis DNP, APRN  Interventional Radiology   IR on-call pager: [phone]

## 2023-11-13 NOTE — PROGRESS NOTES
[facility]    Medicine Progress Note - Hospitalist Service, GOLD TEAM 9    Date of Admission:  10/22/2023    Assessment & Plan   Stefani Crowell is a 63 year old female with a pertinent past medical history of alcoholic cirrhosis, hepatorenal syndrome, hypothyroidism, recurrent hyponatremia, recurrent pleural effusions, COPD and recent admission for hyponatremia who was admitted on 10/22/2023 due to increased dyspnea related to R pleural effusion and hyponatremia.     MATTIE, likely secondary to hepatorenal syndrome  Hyponatremia  Suspect HRS. Had improvement with terlipressin (11/1-11/6), slight bump again in Cr once it was stopped.  - hold diuretics, no plans to resume per hepatology  - continue midodrine (resumed once off terlipressin)    Hepatic hydrothorax  Acute hypoxic respiratory failure 2/2 above  Diuretic-refractory due to hyponatremia. Not currently a TIPS candidate due to elevation of bilirubin.  - Continue with thoracenteses as needed (has needed ~3 times per week) --> last thora on 11/12 with 2.5 L out  - Diagnostics to be sent with each thora --> 11/12 thora fluid w/o SBE  - Wean supplemental O2 as able  - Per hepatology and IR: plan for staged thoras with the first on 11/15  - Tentatively scheduled for TIPS on 12/6, though pt is aware that she may not be a candidate at that time    Hemothorax  On morning of 11/13 patient found to have hypotension with dizziness and was noted to have 2 g Hgb drop from the day prior. Despite recent thora, CXR with white-out right lung. CT chest confirmed presence of blood. CTA completed without evidence for active bleeding. Patient's BP improved with IVF bolus and a unit of pRBCs.  - Transfused x1 this morning  - CT chest, CTA chest completed  - Recheck Hgb this afternoon    Decompensated alcoholic cirrhosis  Hx hepatic encephalopathy  Abd US negative for thrombosis and HCC. EGD 11/2 normal. Not a liver transplant  candidate currently due to incomplete CD program and abstinence period. Engaged in CD program outpatient. Peth negative 10/19.  Patient is currently not a transplant candidate due to not having completed CD treatment and the required period of sobriety. She is working on these, but needs to be outpatient in order to continue. Her hospital course has been challenging, and as TIPS is not a possibility she is aware that her situation is challenging. After discussion with txp SW on 11/8, pt would like to have additional discussions about her overall GOC. Palliative care involved on 11/9.  - Hepatology consulted, appreciate assistance  - Continue home lactulose 20g TID, rifaximin 550mg BID  - Palliative care following, appreciate assistance --> likely planning for care conference this week  - Health psych consult    Moderate malnutrition in the context of chronic illness  - RD following    Acute on chronic normocytic anemia  Concern for GIB, nothing identified on scope  Hgb dropping below 7 starting 11/1 without signs of bleeding. On 11/2 noted to have dark red bloody stools. Taken for EGD without signs of bleeding. Colonoscopy without source of bleeding, portal colopathy .  - Transfuse for Hgb < 7     Thrombocytopenia  Coagulopathy  - Likely secondary to cirrhosis     Elevated troponin  - Admit 27, 28. Likely secondary to poor kidney clearance  - Unremarkable EKG    Hypothyroidism  Last TSH was 0.74 on 07/24/23  - Continue home levothyroxine 112 mcg po daily    GERD  - Continue pantoprazole 40 mg po qdaily    Anxiety  - Holding citalopram 40 mg po qdaily and trazodone 50 mg po at bedtime  - continue lorazepam 0.5 mg po qdaily prn    Hypokalemia, resolved  - replacement protocol    Post-thoracentesis pneumothorax, resolved  Noted after 11/6 thora. Respiratory status stable. Serial CXRs demonstrated improvement.          Diet: Fluid restriction 1000 ML FLUID  2 Gram Sodium Diet  Snacks/Supplements Adult: Other; Ensure  Enlive at 8:00 ( vanilla or strawberry) patient likes this at 8:00 am ,, HS : Ensrue max vanilla; Between Meals  Snacks/Supplements Adult: Other; String cheese @ 2:00, Hard boiled egg at HS + cup of fresh fruit; Between Meals    DVT Prophylaxis: Pneumatic Compression Devices  Demarco Catheter: Not present  Lines: None     Cardiac Monitoring: None  Code Status: No CPR- Do NOT Intubate      Clinically Significant Risk Factors         # Hyponatremia: Lowest Na = 127 mmol/L in last 2 days, will monitor as appropriate       # Coagulation Defect: INR = 2.41 (Ref range: 0.85 - 1.15) and/or PTT = N/A, will monitor for bleeding  # Thrombocytopenia: Lowest platelets = 59 in last 2 days, will monitor for bleeding           # Moderate Malnutrition: based on nutrition assessment    # Financial/Environmental Concerns: none  # Asthma: noted on problem list        Disposition Plan     Expected Discharge Date: 11/14/2023      Destination: home with family  Discharge Comments: Dispo: home with , not yet secured  Plan:            Delma Barnett MD  Hospitalist Service, GOLD TEAM [address] [place] [address]  Securely message with [employer] (more info)  Text page via [employer] Paging/Directory   See signed in provider for up to date coverage information  ______________________________________________________________________    Interval History   Patient having more dizziness and lightheadedness early this morning and overnight. Reports only mild discomfort of abdomen and right back. Feeling more short of breath again. No fevers or chills. BPs improved on reassessment later in the morning and patient was feeling a bit better again.     Physical Exam   Vital Signs: Temp: 97.6  F (36.4  C) Temp src: Oral BP: 103/43 Pulse: 70   Resp: 16 SpO2: 98 % O2 Device: Nasal cannula with humidification Oxygen Delivery: 2 LPM  Weight: 146 lbs 4.8 oz    Constitutional: awake, alert, appears fatigued  Eyes: scleral icterus  present  Respiratory: mild resp distress, left lung fields clear, minimal breath sounds on the right  Cardiovascular: 4/6 systolic murmur   GI: soft, non-distended, non-tender  Neurologic: alert and oriented x3, moving all extremities equally    Medical Decision Making     80 MINUTES SPENT BY ME on the date of service doing chart review, history, exam, documentation & further activities per the note.      Data     I have personally reviewed the following data over the past 24 hrs:    15.5 (H)  \   6.4 (LL)   / 92 (L)     124 (L) 90 (L) 54.8 (H) /  96   5.0 24 1.19 (H) \     ALT: 31 AST: 43 AP: 80 TBILI: 5.6 (H)   ALB: 3.1 (L) TOT PROTEIN: 4.6 (L) LIPASE: N/A     INR:  2.41 (H) PTT:  N/A   D-dimer:  N/A Fibrinogen:  N/A       Imaging results reviewed over the past 24 hrs:   Recent Results (from the past 24 hour(s))   XR Chest Port 1 View    Narrative    Examination: XR CHEST PORT 1 VIEW 11/12/2023 2:33 PM    Indication: s/p right thoracentesis    Comparison: X-ray 11/9/2023. Ultrasound thoracentesis 11/12/2023.    Findings:  AP portable chest. Trachea is midline. Cardiac silhouette is within  normal limits. No significant change in the moderate right likely  partially loculated pleural effusion status post thoracentesis. No  appreciable pneumothorax. No significant left pleural effusion. No  focal consolidation. Unchanged mild diffuse interstitial prominence.  Unchanged osseous structures.      Impression    Impression:   1. No significant change in the moderate partially loculated right  pleural effusion status post thoracentesis. No pneumothorax.  2. Unchanged mild diffuse interstitial edema.    I have personally reviewed the examination and initial interpretation  and I agree with the findings.    MARIA FERNANDA FRITZ MD         SYSTEM ID:  [id number]   CTA Chest with Contrast    Narrative    CTA CHEST 11/13/2023 1:15 PM    CLINICAL HISTORY: Evaluate for bleeding after thoracentesis.     COMPARISONS: CT chest without  contrast 11/13/2023 10:54. Ultrasound  image from right thoracentesis 1/12/2023.    REFERRING PROVIDER: ANNALISE KEYS    TECHNIQUE: Unenhanced CT performed through the chest. After the  uneventful administration of intravenous contrast, CTA performed  through the chest. CT repeated after a 70 second delay. Coronal and  sagittal reconstructions were produced. Lung MIP produced.    3D and multiplanar reconstructions were unavailable at the time of  dictation.    CONTRAST: 90 mL Isovue 370    DOSE (DLP): 309 mGy*cm    FINDINGS: CTA: Dependent blood products in the right effusion does not  enhance over time. No active extravasation demonstrated. Right  intercostal arteries appear intact.    Patent aorta. No dissection.    Normal aortic branching pattern. Right innominate, subclavian, and  carotid, left carotid, and left subclavian arteries patent. Bilateral  vertebral arteries patent.    Celiac, superior mesenteric, and visualized renal arteries patent.    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 Branch to  the superior spleen originates proximal to the aneurysm. All other  branches originate distal to the aneurysm.    CT: Large right effusion with dependent blood products. Complete  consolidation of the right lung. Mediastinal shift to the left. Left  lung central ground glass opacities.    Contrast or stones/sludge in the gallbladder.    Recannulized paraumbilical vein. Cirrhotic liver. No focal hepatic  lesion.    Ascites.      Impression    IMPRESSION:  1. Dependent blood products in the large right effusion. No active  extravasation. Right intercostal arteries appear intact.    2. 8 mm distal splenic artery aneurysm.    3. Total consolidation of the right lung. Left lung central ground  glass opacities. Correlate for pneumonia. Mediastinal shift to the  left.    4. Cirrhotic liver. No focal hepatic lesion. Hepatic hydrothorax.  Ascites. Recannulized paraumbilical vein.    5. Contrast, stones,  and/or sludge in the gallbladder.    YANET HERNANDEZ MD         SYSTEM ID:  [id number]

## 2023-11-13 NOTE — PROGRESS NOTES
"PALLIATIVE CARE SOCIAL WORK Progress Note   Location: [facility]      Met with Lolly and Iwona this morning for emotional support. Lolly has had a busy morning and is feeling a little crabby. She reports that she's waiting for a CT and \"waiting is hard\". We discussed how the lack of control in the hospital can be frustrating, validated her emotions. She didn't want to process anything further, but found expressing them to be helpful.     Plan: PCSW will continue to be available for ongoing emotional support while Palliative Care Team is following.     Clinical Social Work Interventions:   Assessment of palliative specific issues     Adjustment to illness counseling  Facilitation of processing of thoughts/feelings  Re-framing    HERNESTO Kaur  [facility], Palliative Care  Securely message with the [employer] Web Console (learn more here) or  Text page via [employer] Paging/Directory       "

## 2023-11-13 NOTE — PROGRESS NOTES
Hepatology Follow up Note         Assessment and Plan:   Stefani Crowell is a 63 year old female with past medical history of decompensated alcohol-related cirrhosis complicated by hepatic encephalopathy, hepatic hydrothorax, ascites and hyponatremia.  Past medical history includes COPD and alcohol use disorder.     #.  Decompensated alcohol-related cirrhosis  #.  Hepatic hydrothorax  #.  Ascites  #.  Hepatic encephalopathy  #.  Hyponatremia  #.  Acute kidney injury; s/p terlipressin 11/1-11/6 for presumed hepatorenal syndrome  #.  Alcohol use disorder  MELD 3.0: 31 at 11/13/2023  6:46 AM  MELD-Na: 30 at 11/13/2023  6:46 AM  Calculated from:  Serum Creatinine: 1.19 mg/dL at 11/13/2023  6:46 AM  Serum Sodium: 124 mmol/L (Using min of 125 mmol/L) at 11/13/2023  6:46 AM  Total Bilirubin: 5.6 mg/dL at 11/13/2023  6:46 AM  Serum Albumin: 3.1 g/dL at 11/13/2023  6:46 AM  INR(ratio): 2.41 at 11/13/2023  6:46 AM  Age at listing (hypothetical): 63 years  Sex: Female at 11/13/2023  6:46 AM      Patient has been admitted since October 22, 2023. She was initially admitted for dyspnea in the setting of her hepatic hydrothorax and hyponatremia.      Her hospitalization has been complicated by acute kidney injury thought to be related to HRS which was treated with terlipressin with some improvement.      Given her hyponatremia she has been unable to tolerate diuretics thus her hepatic hydrothorax and ascites has been controlled with 2-3 times weekly thoracenteses at which point 2 L were removed at each time.  No infection has been noted during her hospitalization. She did develop acute on chronic anemia with hgb drop (8.3->6.4) and hypotension and worsening right sided pleural fluid on 11/13, concerning for hemothorax. New MATTIE likely re-renal due to hypotension which is treated with RBC and fluid resuscitation.     She was being considered for TIPS placement however this has not been feasible at this time given her total  bilirubin remains in the 7-8s despite being mostly unconjugated.  We have discussed her case with multiple interventional radiologists including Dr. Rodríguez on 11/10/2023.  Currently she is scheduled for TIPS placement on 12/6/2023 however this will only be possible if her total bilirubin comes down to 3-4, she has been stable from a pulmonary standpoint and she has had no further episodes of hypotension.  Our concern is that she may never be a TIPS candidate at this time.    She has been evaluated for liver transplantation but she is currently not a transplant candidate given she was told about her alcohol use disorder but then returned to use.  She needs to complete chemical dependency programming prior to being considered for transplantation.    Given the above prolonged hospitalization with inability to safely discharge her we have had multiple goals of care conversations given it is uncertain if she will ever make it to TIPS and she is currently not a liver transplant candidate and has not been able to engage in chemical dependency programming while inpatient.  We appreciate palliative care involvement. Currently DNR/DNI as of 11/9/2023.          Recommendations:   -- Obtain CT chest with non-contrast to evaluate for hemothorax   -- Appreciate medicine team's resuscitation with blood and fluid transfusion. She may benefit from colloid fluid such as 5% albumin  -- Continue lactulose and rifaximin for hepatic encephalopathy management  - Watch for sign of any GI bleeding.       Outpatient:   -- In the outpatient setting she will need to be set up for thoracenteses 2-3x/week at [facility] [place] with albumin replacement  -- She is currently scheduled for TIPS placement on 12/6/2023 however this will not occur unless she remains stable from a pulmonary and blood pressure standpoint and her total bilirubin comes down to 3-4.     The patient was discussed and plan agreed upon with GI staff, Dr. Poonam Hays.     Latoya  "Vikki LEAL PhD  GI Fellow   [phone]          Subjective/Objective:   Doing well yesterday but had a bad night with leg cramps per her oldest sister. BP was noted low at 80/40. Sister also noticed more labored breathing. She reported some pain at the thora site from yesterday. No bloody bowel movement.          Medications:     Current Facility-Administered Medications   Medication    acetaminophen (TYLENOL) tablet 650 mg    Or    acetaminophen (TYLENOL) Suppository 650 mg    albuterol (PROVENTIL HFA/VENTOLIN HFA) inhaler    artificial saliva (BIOTENE DRY MOUTHWASH) liquid 5 mL    citalopram (celeXA) tablet 40 mg    ferrous sulfate (FEROSUL) tablet 325 mg    fluticasone-vilanterol (BREO ELLIPTA) 200-25 MCG/ACT inhaler 1 puff    lactulose (CHRONULAC) solution 20 g    levothyroxine (SYNTHROID/LEVOTHROID) tablet 112 mcg    LORazepam (ATIVAN) tablet 0.5 mg    melatonin tablet 3 mg    midodrine (PROAMATINE) tablet 15 mg    montelukast (SINGULAIR) tablet 10 mg    pantoprazole (PROTONIX) EC tablet 40 mg    polyethylene glycol (MIRALAX) Packet 17 g    prochlorperazine (COMPAZINE) injection 5 mg    Or    prochlorperazine (COMPAZINE) tablet 5 mg    Or    prochlorperazine (COMPAZINE) suppository 25 mg    rifaximin (XIFAXAN) tablet 550 mg    sodium chloride 0.9% BOLUS 1,000 mL            Physical Exam:   VS:  BP (!) 80/37 (BP Location: Left leg, Cuff Size: Adult Regular)   Pulse 74   Temp 97.6  F (36.4  C) (Oral)   Resp 18   Ht 1.651 m (5' 5\")   Wt 66.4 kg (146 lb 4.8 oz)   LMP  (LMP Unknown)   SpO2 97%   BMI 24.35 kg/m      Wt:   Wt Readings from Last 2 Encounters:   11/12/23 66.4 kg (146 lb 4.8 oz)   10/03/23 62.3 kg (137 lb 4.8 oz)      Constitutional: NAD  Eyes: Conjunctiva +icteric  HEENT: Mucus membranes moist  CV: RRR, III/VI systolic murmur  Respiratory: 2LNC, poor air movement of the right lung, basilar crackles in the lef tlung  Abd: Distended, no rebound or guarding   Skin: +  jaundice  Neuro: AAO x 3, No " asterixis         Laboratory:   BMP  Recent Labs   Lab 11/13/23  0646 11/12/23  0707 11/11/23  0704 11/10/23  0700   * 127* 125* 127*   POTASSIUM 5.0 4.4 4.7 4.5   CHLORIDE 90* 91* 90* 91*   UMA 9.5 10.1 10.0 10.1   CO2 24 26 25 26   BUN 54.8* 49.7* 47.8* 44.3*   CR 1.19* 0.77 0.77 0.71   GLC 96 86 98 105*     CBC  Recent Labs   Lab 11/13/23  0646 11/12/23  0707 11/11/23  0704 11/10/23  0700   WBC 15.5* 8.9 11.2* 10.4   RBC 2.02* 2.61* 2.62* 2.64*   HGB 6.4* 8.3* 8.2* 8.2*   HCT 19.6* 25.2* 25.0* 25.4*   MCV 97 97 95 96   MCH 31.7 31.8 31.3 31.1   MCHC 32.7 32.9 32.8 32.3   RDW 19.2* 19.1* 19.2* 19.3*   PLT 92* 59* 65* 62*     INR  Recent Labs   Lab 11/13/23  0646 11/12/23  0707 11/11/23  0704 11/10/23  0700   INR 2.41* 1.81* 1.97* 2.11*     LFTs  Recent Labs   Lab 11/13/23  0646 11/12/23  0707 11/11/23  0704 11/10/23  0700   ALKPHOS 80 72 75 75   AST 43 44 45 46*   ALT 31 32 30 31   BILITOTAL 5.6* 7.1* 7.2* 6.9*   PROTTOTAL 4.6* 5.3* 5.3* 5.2*   ALBUMIN 3.1* 3.7 3.7 3.7        Imaging/Procedures/Studies:   CXR: left lung filled with fluid.

## 2023-11-13 NOTE — PLAN OF CARE
Goal Outcome Evaluation:      Plan of Care Reviewed With: patient    Overall Patient Progress: no changeOverall Patient Progress: no change    Outcome Evaluation: wean O2 as able, have adequate BMs on Lactulose    Assumed cares [id number]. A&O and able to make needs known. VSS on 2 LPM via nc. Denied any pain. Adequate BM today having 4x. afternoon Lactulose held. Thoracentesis completed today with 2.5 L off. CXR done. K+ WNL with AM recheck. Wound cares completed. Family present at bedside and attentive to pt needs for most of shift. Fair appetite at meals. On 1L FR and has not yet met restriction. Continue with plan of care

## 2023-11-14 NOTE — PLAN OF CARE
Goal Outcome Evaluation:      Plan of Care Reviewed With: patient    Overall Patient Progress: no changeOverall Patient Progress: no change    Outcome Evaluation: AOx4, forgetful. 2L NC overnight. BP stable overnight. Waiting for thorcentesis today. SOB with known plueral effusion. Will continue to monitor and follow POC.

## 2023-11-14 NOTE — PROGRESS NOTES
[facility]    Medicine Progress Note - Hospitalist Service, GOLD TEAM 9    Date of Admission:  10/22/2023    Assessment & Plan   Stefani Crowell is a 63 year old female with a pertinent past medical history of alcoholic cirrhosis, hepatorenal syndrome, hypothyroidism, recurrent hyponatremia, recurrent pleural effusions, COPD and recent admission for hyponatremia who was admitted on 10/22/2023 due to increased dyspnea related to R pleural effusion and hyponatremia.     MATTIE, likely secondary to hepatorenal syndrome  Hyponatremia  Suspect HRS. Had improvement with terlipressin (11/1-11/6), slight bump again in Cr once it was stopped.  - hold diuretics, no plans to resume per hepatology  - continue midodrine (resumed once off terlipressin)    Hepatic hydrothorax  Acute hypoxic respiratory failure 2/2 above  Diuretic-refractory due to hyponatremia. Not currently a TIPS candidate due to elevation of bilirubin.  - Continue with thoracenteses as needed (has needed ~3 times per week) --> last thora on 11/12 with 2.5 L out  - Diagnostics to be sent with each thora --> 11/12 thora fluid w/o SBE  - Wean supplemental O2 as able  - Per hepatology and IR: plan for staged thoras with the first on 11/15  - Tentatively scheduled for TIPS on 12/6, though pt is aware that she may not be a candidate at that time  - persistent    Hemothorax  On morning of 11/13 patient found to have hypotension with dizziness and was noted to have 2 g Hgb drop from the day prior. Despite recent thora, CXR with white-out right lung. CT chest confirmed presence of blood. CTA completed without evidence for active bleeding. Patient's BP improved with IVF bolus and a unit of pRBCs.  - Transfused x1 this morning  - CT chest, CTA chest completed  - no recurrence of hemothorax and H/H stable    Decompensated alcoholic cirrhosis  Hx hepatic encephalopathy  Abd US negative for thrombosis and HCC. EGD 11/2 normal.  Not a liver transplant candidate currently due to incomplete CD program and abstinence period. Engaged in CD program outpatient. Peth negative 10/19.  Patient is currently not a transplant candidate due to not having completed CD treatment and the required period of sobriety. She is working on these, but needs to be outpatient in order to continue. Her hospital course has been challenging, and as TIPS is not a possibility she is aware that her situation is challenging. After discussion with txp SW on 11/8, pt would like to have additional discussions about her overall GOC. Palliative care involved on 11/9.  - Hepatology consulted, appreciate assistance  - Continue home lactulose 20g TID, rifaximin 550mg BID  - Palliative care following, appreciate assistance --> likely planning for care conference this week  - Health psych consult    Moderate malnutrition in the context of chronic illness  - RD following    Acute on chronic normocytic anemia  Concern for GIB, nothing identified on scope  Hgb dropping below 7 starting 11/1 without signs of bleeding. On 11/2 noted to have dark red bloody stools. Taken for EGD without signs of bleeding. Colonoscopy without source of bleeding, portal colopathy .  - Transfuse for Hgb < 7     Thrombocytopenia  Coagulopathy  - Likely secondary to cirrhosis     Elevated troponin  - Admit 27, 28. Likely secondary to poor kidney clearance  - Unremarkable EKG    Hypothyroidism  Last TSH was 0.74 on 07/24/23  - Continue home levothyroxine 112 mcg po daily    GERD  - Continue pantoprazole 40 mg po qdaily    Anxiety  - Holding citalopram 40 mg po qdaily and trazodone 50 mg po at bedtime  - continue lorazepam 0.5 mg po qdaily prn    Hypokalemia, resolved  - replacement protocol    Post-thoracentesis pneumothorax, resolved  Noted after 11/6 thora. Respiratory status stable. Serial CXRs demonstrated improvement.          Diet: Fluid restriction 1000 ML FLUID  2 Gram Sodium Diet  Snacks/Supplements  "Adult: Other; Ensure Enlive at 8:00 ( vanilla or strawberry) patient likes this at 8:00 am ,, HS : Ensrue max vanilla; Between Meals  Snacks/Supplements Adult: Other; String cheese @ 2:00, Hard boiled egg at HS + cup of fresh fruit; Between Meals    DVT Prophylaxis: Pneumatic Compression Devices  Demarco Catheter: Not present  Lines: None     Cardiac Monitoring: None  Code Status: No CPR- Do NOT Intubate      Clinically Significant Risk Factors         # Hyponatremia: Lowest Na = 124 mmol/L in last 2 days, will monitor as appropriate   # Hypercalcemia: corrected calcium is >10.1, will monitor as appropriate    # Hypoalbuminemia: Lowest albumin = 3.1 g/dL at 11/13/2023  6:46 AM, will monitor as appropriate    # Coagulation Defect: INR = 2.22 (Ref range: 0.85 - 1.15) and/or PTT = N/A, will monitor for bleeding  # Thrombocytopenia: Lowest platelets = 77 in last 2 days, will monitor for bleeding          # Overweight: Estimated body mass index is 25.35 kg/m  as calculated from the following:    Height as of this encounter: 1.651 m (5' 5\").    Weight as of this encounter: 69.1 kg (152 lb 5.4 oz).   # Moderate Malnutrition: based on nutrition assessment    # Financial/Environmental Concerns: none  # Asthma: noted on problem list        Disposition Plan      Expected Discharge Date: 11/17/2023      Destination: home with family  Discharge Comments: Dispo: home with , not yet secured            Robin Dotson MD  Hospitalist Service, GOLD TEAM 9  [facility]  Securely message with [employer] (more info)  Text page via [facility] Paging/Directory   See signed in provider for up to date coverage information  ______________________________________________________________________    Interval History     Reports recurrence of shortness of breath. Motivated to eat well    Physical Exam   Vital Signs: Temp: 98  F (36.7  C) Temp src: Oral BP: 101/45 Pulse: 74   Resp: 20 SpO2: 99 % O2 Device: " Nasal cannula Oxygen Delivery: 2 LPM  Weight: 152 lbs 5.41 oz    Constitutional: awake, alert, appears fatigued  Eyes: scleral icterus present  Respiratory: mild resp distress, left lung fields clear. R lung field with bronchial breath sounds  Cardiovascular: 4/6 systolic murmur   GI: soft, non-distended, non-tender  Neurologic: alert and oriented x3, moving all extremities equally    Medical Decision Making     55 MINUTES SPENT BY ME on the date of service doing chart review, history, exam, documentation & further activities per the note.      Data     I have personally reviewed the following data over the past 24 hrs:    16.3 (H)  \   8.4 (L)   / 77 (L)     125 (L) 91 (L) 59.4 (H) /  101 (H)   4.1 23 0.96 (H) \     ALT: 39 AST: 52 (H) AP: 84 TBILI: 8.9 (H)   ALB: 3.5 TOT PROTEIN: 5.0 (L) LIPASE: N/A     INR:  2.22 (H) PTT:  N/A   D-dimer:  N/A Fibrinogen:  N/A       Imaging results reviewed over the past 24 hrs:   Recent Results (from the past 24 hour(s))   CTA Chest with Contrast    Narrative    CTA CHEST 11/13/2023 1:15 PM    CLINICAL HISTORY: Evaluate for bleeding after thoracentesis.     COMPARISONS: CT chest without contrast 11/13/2023 10:54. Ultrasound  image from right thoracentesis 1/12/2023.    REFERRING PROVIDER: ANNALISE KEYS    TECHNIQUE: Unenhanced CT performed through the chest. After the  uneventful administration of intravenous contrast, CTA performed  through the chest. CT repeated after a 70 second delay. Coronal and  sagittal reconstructions were produced. Lung MIP produced.    3D and multiplanar reconstructions were unavailable at the time of  dictation.    CONTRAST: 90 mL Isovue 370    DOSE (DLP): 309 mGy*cm    FINDINGS: CTA: Dependent blood products in the right effusion does not  enhance over time. No active extravasation demonstrated. Right  intercostal arteries appear intact.    Patent aorta. No dissection.    Normal aortic branching pattern. Right innominate, subclavian, and  carotid,  left carotid, and left subclavian arteries patent. Bilateral  vertebral arteries patent.    Celiac, superior mesenteric, and visualized renal arteries patent.    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 Branch to  the superior spleen originates proximal to the aneurysm. All other  branches originate distal to the aneurysm.    CT: Large right effusion with dependent blood products. Complete  consolidation of the right lung. Mediastinal shift to the left. Left  lung central ground glass opacities.    Contrast or stones/sludge in the gallbladder.    Recannulized paraumbilical vein. Cirrhotic liver. No focal hepatic  lesion.    Ascites.      Impression    IMPRESSION:  1. Dependent blood products in the large right effusion. No active  extravasation. Right intercostal arteries appear intact.    2. 8 mm distal splenic artery aneurysm.    3. Total consolidation of the right lung. Left lung central ground  glass opacities. Correlate for pneumonia. Mediastinal shift to the  left.    4. Cirrhotic liver. No focal hepatic lesion. Hepatic hydrothorax.  Ascites. Recannulized paraumbilical vein.    5. Contrast, stones, and/or sludge in the gallbladder.    YANET HERNANDEZ MD         SYSTEM ID:  [id number]

## 2023-11-14 NOTE — PROGRESS NOTES
Care Management Follow Up        [facility] (ph:[phone] fx: [phone])   11/10: CHW sent initial HC referral via Epic.   11/13: CHW spoke with intake and they can accept the pt's payor source. They will call RNCC following when a decision is made.     [facility] (ph:[phone] fx:[phone]) - call before sending referral  11/10: CHW sent initial HC referral via Epic.  11/13: CHW resent failed referral via Epic.   11/14: CHW LVM for admissions to f/u on referral; requested they call RNCC back.      [facility] (ph:[phone] fx:[phone])  11/10: CHW sent initial HC referral via Epic.   11/13: CHW resent failed referral via Epic.   11/14: CHW LVM for admissions to f/u on referral; requested they call RNCC back.     [facility] (ph:[phone] fx:[phone]) ([place])  11/10: CHW sent initial HC referral via Epic.   11/13: CHW resent failed referral via Epic.   11/14: CHW LVM for admissions to f/u on referral; requested they call RNCC back.      [facility] (ph:[phone] fx:[phone]) (RN & PT, no OT)  11/10: CHW sent initial HC referral via Epic.   11/13: CHW LVM for admissions to f/u on referral; requested they call RNCC back.    11/14: CHW LVM for admissions to f/u on referral; requested they call RNCC back.      [facility] (ph:[phone] fx:[phone])   11/10: CHW sent initial HC referral via Epic.   11/13: CHW LVM for admissions to f/u on referral; requested they call RNCC back.    11/14:CHW spoke with admissions and they declined d/t location.    [facility] (ph:[phone] fx: [phone]) - prefers referrals via phone  11/10: CHW sent initial HC referral via [facility].   11/13: CHW LVM for admissions to f/u on referral; requested they call RNCC back.  11/14: CHW LVM for admissions to f/u on referral; requested they call RNCC back.      [facility] (ph: [phone] fx:[phone])  11/10: CHW sent  initial HC referral via Epic.   11/13: CHW resent failed referral via Epic     Declined:  [facility] and RN Service: Out of service.  [facility] :Declined   [facility] : Declined   [facility] : Declined   [facility] they are not accepting referrals d/t staffing.    Michael Yates   Inpatient Community Health Worker and [place]   [facility] 7C & 7D

## 2023-11-14 NOTE — PLAN OF CARE
"Blood pressure 112/49, pulse 76, temperature 97.9  F (36.6  C), temperature source Oral, resp. rate 19, height 1.651 m (5' 5\"), weight 69.1 kg (152 lb 5.4 oz), SpO2 98%, Via 2L NC .      Patient A & O X 4, forgetful at time , able to make needs known. C/o back and right chest pain given Tylenol  po X 1 PRN. Received Lactulose X 1 and refused at 14:00, had 3 loose stool no blood in it . Void adequate. Up with Ax 1 to [facility] and bathroom . 3PIV patent . Appetite fair on FL 1000 ml . Skin with multiple scab and generalized bruises . Family at bedside attentive with care . Call light within reach, bed alarm on , and hourly round completed . Continue with POC update MD with change.           Goal Outcome Evaluation:      Plan of Care Reviewed With: patient, family    Overall Patient Progress: no change.            "

## 2023-11-14 NOTE — PROGRESS NOTES
Hepatology Follow up Note         Assessment and Plan:   Stefani Crowell is a 63 year old female with past medical history of decompensated alcohol-related cirrhosis complicated by hepatic encephalopathy, hepatic hydrothorax, ascites and hyponatremia.  Past medical history includes COPD and alcohol use disorder    #.  Decompensated alcohol-related cirrhosis  #.  Hepatic hydrothorax  #.  Ascites  #.  Hepatic encephalopathy  #.  Hyponatremia  #.  Acute kidney injury; s/p terlipressin 11/1-11/6 for presumed HRS  #. Alcohol use disorder  MELD 3.0: 28    Following thoracentesis on 11/12 the patient developed a concern for hemothorax on CT chest on 11/13/2023.  CTA was performed to evaluate for active extravasation but nothing was visualized.  The patient's hemoglobin has been stable without any further drop following PRBC transfusions.  The patient now has a leukocytosis to 16.3 but without any fevers or chills.    The patient remains hypoxic and on 2 L in the setting of hepatic hydrothorax.  She is limited in terms of diuretics given her severe hyponatremia.    Patient has been admitted since October 22, 2023. She was initially admitted for dyspnea in the setting of her hepatic hydrothorax and hyponatremia.      The patient is being considered for liver transplantation.  Given this she is not a candidate for a pleural catheter given the infectious risk.  If she is not deemed to be a liver transplant candidate and she does not wish to pursue this avenue can consider this palliative approach at that time.         Recommendations:   -- Plan for staged thoracentesis with interventional radiology this week.  -- Continue lactulose and rifaximin for hepatic encephalopathy management    Outpatient:  * Schedule for possible TIPS 12/6/2023 -however this would not be a possibility unless she remained stable from a pulmonary and blood pressure standpoint and her total bilirubin improved to 3-4  * Plan thoracenteses 2-3x/week at  "[facility] [place] with albumin replacement    Poonam Hays MD (Lizzie)   of Medicine  Advanced & Transplant Hepatology  [facility]         Subjective/Objective:   - 24 hour events + nursing notes reviewed   - Patient was out and walking the stairs with occupational therapy  - Ongoing 2 L nasal cannula  - Denies any fevers or chills  - Eating well without any nausea or vomiting         Medications:     Current Facility-Administered Medications   Medication     acetaminophen (TYLENOL) tablet 650 mg    Or     acetaminophen (TYLENOL) Suppository 650 mg     albuterol (PROVENTIL HFA/VENTOLIN HFA) inhaler     artificial saliva (BIOTENE DRY MOUTHWASH) liquid 5 mL     citalopram (celeXA) tablet 40 mg     ferrous sulfate (FEROSUL) tablet 325 mg     fluticasone-vilanterol (BREO ELLIPTA) 200-25 MCG/ACT inhaler 1 puff     lactulose (CHRONULAC) solution 20 g     levothyroxine (SYNTHROID/LEVOTHROID) tablet 112 mcg     LORazepam (ATIVAN) tablet 0.5 mg     melatonin tablet 3 mg     midodrine (PROAMATINE) tablet 15 mg     montelukast (SINGULAIR) tablet 10 mg     pantoprazole (PROTONIX) EC tablet 40 mg     polyethylene glycol (MIRALAX) Packet 17 g     prochlorperazine (COMPAZINE) injection 5 mg    Or     prochlorperazine (COMPAZINE) tablet 5 mg    Or     prochlorperazine (COMPAZINE) suppository 25 mg     rifaximin (XIFAXAN) tablet 550 mg            Physical Exam:   VS:  /49 (BP Location: Left arm)   Pulse 76   Temp 97.9  F (36.6  C) (Oral)   Resp 19   Ht 1.651 m (5' 5\")   Wt 69.1 kg (152 lb 5.4 oz)   LMP  (LMP Unknown)   SpO2 98%   BMI 25.35 kg/m      Wt:   Wt Readings from Last 2 Encounters:   11/13/23 69.1 kg (152 lb 5.4 oz)   10/03/23 62.3 kg (137 lb 4.8 oz)      Constitutional: NAD  Eyes: Conjunctiva +icteric  HEENT: Mucus membranes moist  CV: RRR, III/VI systolic murmur  Respiratory: 2 L nasal cannula, crackles bilaterally  Abd: Distended, no rebound or guarding   Skin: +  " jaundice  Neuro: AAO x 3, No asterixis         Laboratory:   BMP  Recent Labs   Lab 11/14/23  0542 11/13/23  0646 11/12/23  0707 11/11/23  0704   * 124* 127* 125*   POTASSIUM 4.1 5.0 4.4 4.7   CHLORIDE 91* 90* 91* 90*   UMA 9.5 9.5 10.1 10.0   CO2 23 24 26 25   BUN 59.4* 54.8* 49.7* 47.8*   CR 0.96* 1.19* 0.77 0.77   * 96 86 98     CBC  Recent Labs   Lab 11/14/23  0542 11/13/23  1609 11/13/23  0646 11/12/23  0707 11/11/23  0704   WBC 16.3*  --  15.5* 8.9 11.2*   RBC 2.70*  --  2.02* 2.61* 2.62*   HGB 8.4* 8.7* 6.4* 8.3* 8.2*   HCT 25.0*  --  19.6* 25.2* 25.0*   MCV 93  --  97 97 95   MCH 31.1  --  31.7 31.8 31.3   MCHC 33.6  --  32.7 32.9 32.8   RDW 18.3*  --  19.2* 19.1* 19.2*   PLT 77*  --  92* 59* 65*     INR  Recent Labs   Lab 11/14/23  0542 11/13/23  0646 11/12/23  0707 11/11/23  0704   INR 2.22* 2.41* 1.81* 1.97*     LFTs  Recent Labs   Lab 11/14/23  0542 11/13/23  0646 11/12/23  0707 11/11/23  0704   ALKPHOS 84 80 72 75   AST 52* 43 44 45   ALT 39 31 32 30   BILITOTAL 8.9* 5.6* 7.1* 7.2*   PROTTOTAL 5.0* 4.6* 5.3* 5.3*   ALBUMIN 3.5 3.1* 3.7 3.7        Imaging/Procedures/Studies:   CTA chest 11/13/2023  1. Dependent blood products in the large right effusion. No active  extravasation. Right intercostal arteries appear intact.     2. 8 mm distal splenic artery aneurysm.     3. Total consolidation of the right lung. Left lung central ground  glass opacities. Correlate for pneumonia. Mediastinal shift to the  left.     4. Cirrhotic liver. No focal hepatic lesion. Hepatic hydrothorax.  Ascites. Recannulized paraumbilical vein.

## 2023-11-14 NOTE — PROGRESS NOTES
"SPIRITUAL HEALTH SERVICES Progress Note  [facility]C    I visited Lolly per palliative follow up.  Lolly told me that while she appreciated the support of all her visitors it became exhausting for her especially because it's difficult for her to talk right now. However, she said she is getting better at saying no. Lolly shared that yesterday was a hard day.   Lolly said that talking to her daughter about her health choices went good.  Lolly said she \"took it very well\".  Lolly told me that her daughter expressed support in what made her mom happy.    Ria Gr  Chaplain Resident  Pager [phone]    * [facility] remains available 24/7 for emergent requests/referrals, either by having the switchboard page the on-call  or by entering an ASAP/STAT consult in Epic (this will also page the on-call ). Routine Epic consults receive an initial response within 24 hours.*    "

## 2023-11-14 NOTE — PROGRESS NOTES
[facility]  Palliative Care Daily Progress Note       Recommendations & Counseling     Goals of care / Palliative Care encounter:  Lolly is still hoping to have better way to manage her respiratory symptoms. She states she felt terrible yesterday but today feels better and able to work with PT this morning. She was rather sleepy during our encounter and stated she just wants to feel better.   Pt's older sister told me that she understands that pleurx drain might be the best way to manage her recurrent pleural effusions and that in order for Lolly to have the drain, she needs to be enrolled in hospice and for now, they are still trying to figure out if there are other ways to manage her recurring pleural effusions without enrolling in hospice.   Plan for care conference on Thursday to support patient and family determining next steps.  Advanced care planning:   Shivam care directive now on file  Code status: DNR/DNI - changed on 11/9. Will need POLST prior to discharge    Symptom management:  We are not actively managing symptoms at this time.     PSYCHOSOCIAL/SPIRITUAL:  Family good support from , children, siblings  Spiritual --Says she follows some Budhist beliefs and would appreciate  support.  following  palliative SW is following for coping support       Thank you for the opportunity to continue to participate in the care of this patient and family.  Please feel free to contact on-call palliative provider with any emergent needs.  We can be reached via Securely message with the Vocera Web Console (learn more here)    Poonam Ortiz MD / Palliative Medicine         MANAGEMENT DISCUSSED with the following over the past 24 hours: Medicine team, gastroenterology team   NOTE(S)/MEDICAL RECORDS REVIEWED over the past 24 hours: Nursing notes, care coordinator note, interventional radiology, gastroenterology, medicine  Tests REVIEWED in the past  24 hours:  - CMP  - CBC  SUPPLEMENTAL HISTORY, in addition to the patient's history, over the past 24 hours obtained from:   - Sibling         Assessments          Stefani Crowell is a 63 year old female with a past medical history of alcoholic cirrhosis (last use 6/28/23) complicated by hepatic hydrothorax with twice weekly thoracentesis, hepatic encephalopathy managed by lactulose, hyponatremia, COPD, weight loss, ascites who was admitted 10/22 with dyspnea from right pleural effusion and hyponatremia. Hospital course complicated by MATTIE/HRS.      Today, the patient was seen for:  Goals of care  Support  Cirrhosis with hepatic hydrothorax  Recurrent pleural effusions   Dyspnea  Hyponatremia                Interval History:     Chart review/discussion with unit or clinical team members:   Events of yesterday reviewed including decreased blood pressure, bloodly pleural effusion and concern for active bleeding  Patient stabilized after IV fluids and transfusion    GI note from 11/13:   She was being considered for TIPS placement however this has not been feasible at this time given her total bilirubin remains in the 7-8s despite being mostly unconjugated.  We have discussed her case with multiple interventional radiologists including Dr. Rodríguez on 11/10/2023.  Currently she is scheduled for TIPS placement on 12/6/2023 however this will only be possible if her total bilirubin comes down to 3-4, she has been stable from a pulmonary standpoint and she has had no further episodes of hypotension.  Our concern is that she may never be a TIPS candidate at this time.     She has been evaluated for liver transplantation but she is currently not a transplant candidate given she was told about her alcohol use disorder but then returned to use.  She needs to complete chemical dependency programming prior to being considered for transplantation.     Given the above prolonged hospitalization with inability to safely discharge  her we have had multiple goals of care conversations given it is uncertain if she will ever make it to TIPS and she is currently not a liver transplant candidate and has not been able to engage in chemical dependency programming while inpatient.  We appreciate palliative care involvement. Currently DNR/DNI as of 11/9/2023.    Per patient or family/caregivers today:  Patient feeling much better today than yesterday.  Worked with physical therapy this morning             Review of Systems:     Besides above, a complete 10+ ROS was reviewed and is unremarkable           Medications:     I have reviewed this patient's medication profile and medications during this hospitalization.             Physical Exam:   Vitals were reviewed  Temp: 97.9  F (36.6  C) Temp src: Oral BP: 112/49 Pulse: 76   Resp: 19 SpO2: 98 % O2 Device: Nasal cannula Oxygen Delivery: 2 LPM  Gen: Lying in bed, tired, intermittently falling asleep, appears comfortable, in no acute distress, sensorium not fully intact and needs help with complex medical decision making             Data Reviewed:     Reviewed recent labs and pertinent imaging  ROUTINE ICU LABS (Last four results)  CMP  Recent Labs   Lab 11/14/23  0542 11/13/23  0646 11/12/23  0707 11/11/23  0704   * 124* 127* 125*   POTASSIUM 4.1 5.0 4.4 4.7   CHLORIDE 91* 90* 91* 90*   CO2 23 24 26 25   ANIONGAP 11 10 10 10   * 96 86 98   BUN 59.4* 54.8* 49.7* 47.8*   CR 0.96* 1.19* 0.77 0.77   GFRESTIMATED 67 52* 87 87   UMA 9.5 9.5 10.1 10.0   PROTTOTAL 5.0* 4.6* 5.3* 5.3*   ALBUMIN 3.5 3.1* 3.7 3.7   BILITOTAL 8.9* 5.6* 7.1* 7.2*   ALKPHOS 84 80 72 75   AST 52* 43 44 45   ALT 39 31 32 30     CBC  Recent Labs   Lab 11/14/23  0542 11/13/23  1609 11/13/23  0646 11/12/23  0707 11/11/23  0704   WBC 16.3*  --  15.5* 8.9 11.2*   RBC 2.70*  --  2.02* 2.61* 2.62*   HGB 8.4* 8.7* 6.4* 8.3* 8.2*   HCT 25.0*  --  19.6* 25.2* 25.0*   MCV 93  --  97 97 95   MCH 31.1  --  31.7 31.8 31.3   MCHC 33.6  --   32.7 32.9 32.8   RDW 18.3*  --  19.2* 19.1* 19.2*   PLT 77*  --  92* 59* 65*     INR  Recent Labs   Lab 11/14/23  0542 11/13/23  0646 11/12/23  0707 11/11/23  0704   INR 2.22* 2.41* 1.81* 1.97*     Arterial Blood GasNo lab results found in last 7 days.

## 2023-11-15 NOTE — PLAN OF CARE
"Blood pressure 102/44, pulse 72, temperature 97  F (36.1  C), temperature source Axillary, resp. rate 26, height 1.651 m (5' 5\"), weight 69.1 kg (152 lb 5.4 oz), SpO2 97%,Via 2L NC.      Patient A & O X 4, intermittent forgetful. Lung sound wheezes dyspnea on exertions . Received scheduled medications . 3 PIV SL. Patient left to IR via litter accompanied by transport to IR for thoracentesis procedure. Report given to IR RN.  Patient Sister in room. Continue carry on as order put in, and update MD with change.           Goal Outcome Evaluation:      Plan of Care Reviewed With: patient, family    Overall Patient Progress: no change. Patient left to IR for Thoracentesis.            "

## 2023-11-15 NOTE — IR NOTE
Patient Name: Stefani Crowell  Medical Record Number: [id number]  Today's Date: 11/15/2023    Procedure: Right staged thoracentesis  Proceduralist: Dr. Wallace Alvarado  Pathology present: NA    Procedure Start: 0957  Procedure end: 1322  Sedation medications administered: NA     Report given to: Jeannie MCCLURE 7C  : ARTURO    Other Notes: Pt arrived to IR room 6 from . Consent reviewed. Pt denies any questions or concerns regarding procedure. Pt positioned sitting and monitored per protocol. Pt tolerated procedure without any noted complications. Pt transferred back to .     12.5 g 25% albumin given iv during procedure.    3.5 L pleural fluid removed from right side.

## 2023-11-15 NOTE — PROGRESS NOTES
Hepatology Follow up Note         Assessment and Plan:   Stefani Crowell is a 63 year old female who has been admitted since October 22, 2023 for dyspnea in the setting of her hepatic hydrothorax and hyponatremia. She has a past medical history of decompensated alcohol-related cirrhosis complicated by hepatic encephalopathy, hepatic hydrothorax, ascites and hyponatremia.  Past medical history includes COPD and alcohol use disorder.     #.  Decompensated alcohol-related cirrhosis  #.  Hepatic hydrothorax  #.  Ascites  #.  Hepatic encephalopathy  #.  Hyponatremia  #.  Acute kidney injury; s/p terlipressin 11/1-11/6 for presumed HRS  #. Alcohol use disorder  MELD 3.0: 27 at 11/15/2023  6:34 AM  MELD-Na: 29 at 11/15/2023  6:34 AM  Calculated from:  Serum Creatinine: 0.74 mg/dL (Using min of 1 mg/dL) at 11/15/2023  6:34 AM  Serum Sodium: 123 mmol/L (Using min of 125 mmol/L) at 11/15/2023  6:34 AM  Total Bilirubin: 7.3 mg/dL at 11/15/2023  6:34 AM  Serum Albumin: 3.4 g/dL at 11/15/2023  6:34 AM  INR(ratio): 2.02 at 11/15/2023  6:34 AM  Age at listing (hypothetical): 63 years  Sex: Female at 11/15/2023  6:34 AM      The patient remains hypoxic and on 2 L in the setting of hepatic hydrothorax.  She is limited in terms of diuretics given her severe hyponatremia.    Following thoracentesis on 11/12 the patient developed a concern for hemothorax on CT chest on 11/13/2023.  CTA was performed to evaluate for active extravasation but nothing was visualized.  The patient's hemoglobin has been stable without any further drop following PRBC transfusions.  The patient had elevated WBC but without any fevers or chills. Leukocytosis is improving therefore more suggestive of an acute response.     She was being considered for TIPS placement however this has not been feasible at this time given her total bilirubin remains in the 7-8s despite being mostly unconjugated.  We have discussed her case with multiple interventional  radiologists including Dr. Rodríguez on 11/10/2023.  Currently she is scheduled for TIPS placement on 12/6/2023 however this will only be possible if her total bilirubin comes down to 3-4, she has been stable from a pulmonary standpoint and she has had no further episodes of hypotension.  Our concern is that she may never be a TIPS candidate at this time.     She has been evaluated for liver transplantation but she is currently not a transplant candidate given she was told about her alcohol use disorder but then returned to use.  She needs to complete chemical dependency programming prior to being considered for transplantation.     The patient is being considered for liver transplantation pending completion of outpatient discontinue treatment.  Given this she is not a candidate for a pleural catheter given the infectious risk.  If she is not deemed to be a liver transplant candidate or she does not wish to pursue this avenue can consider this palliative approach at that time.         Recommendations:   -- Plan for staged thoracentesis with interventional radiology this week  -- Continue lactulose and rifaximin for hepatic encephalopathy management    Outpatient:  * Schedule for possible TIPS 12/6/2023 -however this would not be a possibility unless she remained stable from a pulmonary and blood pressure standpoint and her total bilirubin improved to 3-4  * Plan thoracenteses 3x/week at [facility] [place] with albumin replacement (message sent to liver clinic coordinator)       Thank you for involving us in this patient's care. Please do not hesitate to contact the GI service with any questions or concerns.     Pt care plan discussed with Dr. Poonam Hays, GI staff physician.    Latoya Florez MD PhD  GI Fellow   [phone]          Subjective/Objective:   No acute events. Remains on 2L of O2.            Medications:     Current Facility-Administered Medications   Medication    acetaminophen (TYLENOL) tablet 650 mg    Or     "acetaminophen (TYLENOL) Suppository 650 mg    albuterol (PROVENTIL HFA/VENTOLIN HFA) inhaler    artificial saliva (BIOTENE DRY MOUTHWASH) liquid 5 mL    citalopram (celeXA) tablet 40 mg    ferrous sulfate (FEROSUL) tablet 325 mg    fluticasone-vilanterol (BREO ELLIPTA) 200-25 MCG/ACT inhaler 1 puff    lactulose (CHRONULAC) solution 20 g    levothyroxine (SYNTHROID/LEVOTHROID) tablet 112 mcg    LORazepam (ATIVAN) tablet 0.5 mg    melatonin tablet 3 mg    midodrine (PROAMATINE) tablet 15 mg    montelukast (SINGULAIR) tablet 10 mg    pantoprazole (PROTONIX) EC tablet 40 mg    polyethylene glycol (MIRALAX) Packet 17 g    prochlorperazine (COMPAZINE) injection 5 mg    Or    prochlorperazine (COMPAZINE) tablet 5 mg    Or    prochlorperazine (COMPAZINE) suppository 25 mg    rifaximin (XIFAXAN) tablet 550 mg            Physical Exam:   VS:  /56 (Cuff Size: Adult Regular)   Pulse 70   Temp 97  F (36.1  C) (Axillary)   Resp 22   Ht 1.651 m (5' 5\")   Wt 69.1 kg (152 lb 5.4 oz)   LMP  (LMP Unknown)   SpO2 99%   BMI 25.35 kg/m      Wt:   Wt Readings from Last 2 Encounters:   11/13/23 69.1 kg (152 lb 5.4 oz)   10/03/23 62.3 kg (137 lb 4.8 oz)      Constitutional: NAD  Eyes: Conjunctiva +icteric  HEENT: Mucus membranes moist  CV: RRR, III/VI systolic murmur  Respiratory: 2 L nasal cannula, crackles bilaterally  Abd: Distended, no rebound or guarding   Skin: +  jaundice  Neuro: AAO x 3, No asterixis         Laboratory:   [facility]  Recent Labs   Lab 11/15/23  0634 11/14/23  0542 11/13/23  0646 11/12/23  0707   * 125* 124* 127*   POTASSIUM 4.0 4.1 5.0 4.4   CHLORIDE 91* 91* 90* 91*   UMA 9.6 9.5 9.5 10.1   CO2 24 23 24 26   BUN 53.3* 59.4* 54.8* 49.7*   CR 0.74 0.96* 1.19* 0.77   * 101* 96 86     CBC  Recent Labs   Lab 11/15/23  0634 11/14/23  0542 11/13/23  1609 11/13/23  0646 11/12/23  0707   WBC 11.6* 16.3*  --  15.5* 8.9   RBC 2.64* 2.70*  --  2.02* 2.61*   HGB 8.4* 8.4* 8.7* 6.4* 8.3*   HCT 25.2* 25.0*  --  " 19.6* 25.2*   MCV 96 93  --  97 97   MCH 31.8 31.1  --  31.7 31.8   MCHC 33.3 33.6  --  32.7 32.9   RDW 18.3* 18.3*  --  19.2* 19.1*   PLT 62* 77*  --  92* 59*     INR  Recent Labs   Lab 11/15/23  0634 11/14/23  0542 11/13/23  0646 11/12/23  0707   INR 2.02* 2.22* 2.41* 1.81*     LFTs  Recent Labs   Lab 11/15/23  0634 11/14/23  0542 11/13/23  0646 11/12/23  0707   ALKPHOS 71 84 80 72   AST 45 52* 43 44   ALT 37 39 31 32   BILITOTAL 7.3* 8.9* 5.6* 7.1*   PROTTOTAL 5.1* 5.0* 4.6* 5.3*   ALBUMIN 3.4* 3.5 3.1* 3.7        Imaging/Procedures/Studies:   CTA chest 11/13/2023  1. Dependent blood products in the large right effusion. No active  extravasation. Right intercostal arteries appear intact.     2. 8 mm distal splenic artery aneurysm.     3. Total consolidation of the right lung. Left lung central ground  glass opacities. Correlate for pneumonia. Mediastinal shift to the  left.     4. Cirrhotic liver. No focal hepatic lesion. Hepatic hydrothorax.  Ascites. Recannulized paraumbilical vein.

## 2023-11-15 NOTE — PLAN OF CARE
Goal Outcome Evaluation:      Plan of Care Reviewed With: patient    Overall Patient Progress: no changeOverall Patient Progress: no change    Outcome Evaluation: AOx4, forgetful at times. 2L NC overnight. BP stable this shift. Waiting for thorcentesis hopeful for today. SOB with known R sided plueral effusion--pain at right chest/back as well related to plueral effusion. LS deminished. 1 BM this shift. Audible wheezing heard this AM. Mild increase in work of breathing. Albuterol given 2 puffs. MD paged to pass importance of thora today.  Possible thoracentesis in IR today. Continue to provide further education and rational for fluid restriction. Will continue to monitor and follow POC.

## 2023-11-15 NOTE — PROGRESS NOTES
[facility]    Medicine Progress Note - Hospitalist Service, GOLD TEAM 9    Date of Admission:  10/22/2023    Assessment & Plan   Stefani Crowell is a 63 year old female with a pertinent past medical history of alcoholic cirrhosis, hepatorenal syndrome, hypothyroidism, recurrent hyponatremia, recurrent pleural effusions, COPD and recent admission for hyponatremia who was admitted on 10/22/2023 due to increased dyspnea related to R pleural effusion and hyponatremia.     Goals of care conversation:  Discussed about various options. Discussed why TIPS may not be a feasible option, since spontaneous improvement in bilirubin is unlikely. We also discussed that she would be a liver candidate under the present circumstance is low. D/W Hepatology and if the thrice weekly staged paracentesis works, then likely be pursued as outpatient. Will discuss again tomorrow    MATTIE, likely secondary to hepatorenal syndrome  Hyponatremia  Suspect HRS. Had improvement with terlipressin (11/1-11/6), slight bump again in Cr once it was stopped.  - hold diuretics, no plans to resume per hepatology  - continue midodrine (resumed once off terlipressin)    Hepatic hydrothorax  Acute hypoxic respiratory failure 2/2 above  Diuretic-refractory due to hyponatremia. Not currently a TIPS candidate due to elevation of bilirubin.  - Continue with thoracenteses as needed (has needed ~3 times per week) --> last thora on 11/12 with 2.5 L out  - Diagnostics to be sent with each thora --> 11/12 thora fluid w/o SBE  - Wean supplemental O2 as able  - Per hepatology and IR: plan for staged thoras and tolerated session on 11/15 with nearly 3 L out.   - Tentatively scheduled for TIPS on 12/6, though pt is aware that she may not be a candidate at that time  - persistent    Hemothorax  On morning of 11/13 patient found to have hypotension with dizziness and was noted to have 2 g Hgb drop from the day prior. Despite  recent thora, CXR with white-out right lung. CT chest confirmed presence of blood. CTA completed without evidence for active bleeding. Patient's BP improved with IVF bolus and a unit of pRBCs.  - Transfused x1 this morning  - CT chest, CTA chest completed  - no recurrence of hemothorax and H/H stable    Decompensated alcoholic cirrhosis  Hx hepatic encephalopathy  Abd US negative for thrombosis and HCC. EGD 11/2 normal. Not a liver transplant candidate currently due to incomplete CD program and abstinence period. Engaged in CD program outpatient. Peth negative 10/19.  Patient is currently not a transplant candidate due to not having completed CD treatment and the required period of sobriety. She is working on these, but needs to be outpatient in order to continue. Her hospital course has been challenging, and as TIPS is not a possibility she is aware that her situation is challenging. After discussion with rosa CORONA on 11/8, pt would like to have additional discussions about her overall GOC. Palliative care involved on 11/9.  - Hepatology consulted, appreciate assistance  - Continue home lactulose 20g TID, rifaximin 550mg BID  - Palliative care following, appreciate assistance --> likely planning for care conference this week  - Health psych consult    Moderate malnutrition in the context of chronic illness  - RD following    Acute on chronic normocytic anemia  Concern for GIB, nothing identified on scope  Hgb dropping below 7 starting 11/1 without signs of bleeding. On 11/2 noted to have dark red bloody stools. Taken for EGD without signs of bleeding. Colonoscopy without source of bleeding, portal colopathy .  - Transfuse for Hgb < 7     Thrombocytopenia  Coagulopathy  - Likely secondary to cirrhosis     Elevated troponin  - Admit 27, 28. Likely secondary to poor kidney clearance  - Unremarkable EKG    Hypothyroidism  Last TSH was 0.74 on 07/24/23  - Continue home levothyroxine 112 mcg po daily    GERD  - Continue  "pantoprazole 40 mg po qdaily    Anxiety  - Holding citalopram 40 mg po qdaily and trazodone 50 mg po at bedtime  - continue lorazepam 0.5 mg po qdaily prn    Hypokalemia, resolved  - replacement protocol    Post-thoracentesis pneumothorax, resolved  Noted after 11/6 thora. Respiratory status stable. Serial CXRs demonstrated improvement.          Diet: Fluid restriction 1000 ML FLUID  2 Gram Sodium Diet  Snacks/Supplements Adult: Other; pudding at 2:00, applesauce at HS; Between Meals  Snacks/Supplements Adult: Other; Ensure Enlive at 8:00 ( vanilla or strawberry) patient likes this at 8:00 am , 2:00 pm ensure enlive,  HS : Ensrue max vanilla; Between Meals    DVT Prophylaxis: Pneumatic Compression Devices  Demarco Catheter: Not present  Lines: None     Cardiac Monitoring: None  Code Status: No CPR- Do NOT Intubate      Clinically Significant Risk Factors         # Hyponatremia: Lowest Na = 123 mmol/L in last 2 days, will monitor as appropriate      # Hypoalbuminemia: Lowest albumin = 3.1 g/dL at 11/13/2023  6:46 AM, will monitor as appropriate    # Coagulation Defect: INR = 2.02 (Ref range: 0.85 - 1.15) and/or PTT = N/A, will monitor for bleeding  # Thrombocytopenia: Lowest platelets = 62 in last 2 days, will monitor for bleeding          # Overweight: Estimated body mass index is 25.35 kg/m  as calculated from the following:    Height as of this encounter: 1.651 m (5' 5\").    Weight as of this encounter: 69.1 kg (152 lb 5.4 oz).   # Moderate Malnutrition: based on nutrition assessment    # Financial/Environmental Concerns: none  # Asthma: noted on problem list        Disposition Plan      Expected Discharge Date: 11/17/2023      Destination: home with family  Discharge Comments: Dispo: home with , not yet secured            Robin Dotson MD  Hospitalist Service, GOLD TEAM [address] [place] [address]  Securely message with [email or web address] (more info)  Text page via [facility] Paging/Directory "   See signed in provider for up to date coverage information  ______________________________________________________________________    Interval History     Reports good response in shortness of breath post thoracentesis    Physical Exam   Vital Signs: Temp: 97.6  F (36.4  C) Temp src: Oral BP: (!) 90/36 Pulse: 70   Resp: 16 SpO2: 100 % O2 Device: Nasal cannula with humidification Oxygen Delivery: 2 LPM  Weight: 152 lbs 5.41 oz    Constitutional: awake, alert, appears fatigued  Eyes: scleral icterus present  Respiratory: mild resp distress, left lung fields clear. Improved aeration in R lung fields  Cardiovascular: 4/6 systolic murmur   GI: soft, non-distended, non-tender  Neurologic: alert and oriented x3, moving all extremities equally    Medical Decision Making     50 MINUTES SPENT BY ME on the date of service doing chart review, history, exam, documentation & further activities per the note.      Data     I have personally reviewed the following data over the past 24 hrs:    11.6 (H)  \   8.4 (L)   / 62 (L)     123 (L) 91 (L) 53.3 (H) /  110 (H)   4.0 24 0.74 \     ALT: 37 AST: 45 AP: 71 TBILI: 7.3 (H)   ALB: 3.4 (L) TOT PROTEIN: 5.1 (L) LIPASE: N/A     INR:  2.02 (H) PTT:  N/A   D-dimer:  N/A Fibrinogen:  N/A       Imaging results reviewed over the past 24 hrs:   No results found for this or any previous visit (from the past 24 hour(s)).

## 2023-11-15 NOTE — PROCEDURES
[facility]    Procedure: IR Procedure Note    Date/Time: 11/15/2023 12:55 PM    Performed by: Oni Gonsalez MD  Authorized by: Oni Gonsalez MD  IR Fellow Physician: Oni Gonsalez  Other(s) attending procedure: Igor Whiet      UNIVERSAL PROTOCOL   Site Marked: NA  Prior Images Obtained and Reviewed:  Yes  Required items: Required blood products, implants, devices and special equipment available    Patient identity confirmed:  Verbally with patient, arm band, provided demographic data and hospital-assigned identification number  Patient was reevaluated immediately before administering moderate or deep sedation or anesthesia  Confirmation Checklist:  Patient's identity using two indicators, relevant allergies, procedure was appropriate and matched the consent or emergent situation and correct equipment/implants were available  Time out: Immediately prior to the procedure a time out was called    Universal Protocol: the Joint Commission Universal Protocol was followed    Preparation: Patient was prepped and draped in usual sterile fashion    ESBL (mL):  1     ANESTHESIA    Anesthesia: Local infiltration  Local Anesthetic:  Lidocaine 1% without epinephrine  Anesthetic Total (mL):  6      SEDATION    Patient Sedated: No    See dictated procedure note for full details.  Findings: Large right sided pleural effusion    Specimens: none    Complications: None    Condition: Stable    Plan: Return to patient care unit.  If patient remains inpatient on Friday we will plan to perform another large volume staged thoracentesis      PROCEDURE  Describe Procedure: Image guided large volume thoracentesis via 7 Fr non-locking pigtail catheter. A total of 3.5 L was aspirated over a period of several hours, with intermittent breaks.  Patient Tolerance:  Patient tolerated the procedure well with no immediate complications  Length of time physician/provider present for 1:1 monitoring  during sedation: 0

## 2023-11-16 NOTE — DISCHARGE INSTRUCTIONS
Recommendations:  - Follow up with GI outpatient.  - Follow up with your primary care physician  - follow up with [facility] for your thoracentesis every Monday, wed, Friday  - Recommend to recheck hemoglobin and electrolytes on 11/27 (ordered)- can go to any [place] lab to get this drawn)  - please  omeprazole 20mg. If insurance does not cover omeprazole prescription, please purchase Omeprazole 20mg tablets over the counter and take 1 capsule/tablet by mouth in the morning and one tablet by mouth at night.    Out Patient Thoracentesis at [facility]   P: [phone]  F: [phone]  Appointments    Friday 11/24/2023, arrive @ 12:30PM, procedure @ 1:00PM    Monday 11/27/23, arrive @ 1:30PM, procedure @ 2:00PM     Wednesday 11/29/23, arrive @ 10:30AM, procedure @ 11:00AM    Friday 12/01/2023, arrive @ 08:30AM, procedure @ 10:00AM    Monday 12/04/23, arrive @ 12:30PM, procedure @ 1:00PM     Wednesday 12/06/23, arrive @ 12:30PM, procedure @ 1:00PM     Friday 12/08/2023, arrive @ 12:30PM, procedure @ 1:00PM

## 2023-11-16 NOTE — PROGRESS NOTES
[facility]  Palliative Care Daily Progress Note       Recommendations & Counseling     Goals of care / Palliative Care encounter:  Care conference with Lolly and her two sisters and her significant other and her daughter as well as primary care team, Dr. Soto, and Palliative team regarding next steps and options in Lolly's care. See details below.  Continue all current cares with plan for repeat thoracentesis tomorrow, 11/17.  Due to refractory hydrothorax, constant need for multiple thoracentesis (at least 3 times weekly), TIPS not being feasible due to hyperbilirubinemia, we discussed two different options: A) Will do a thoracentesis tomorrow, 11/17, and if patient tolerates with minimal O2 supplementation over the weekend (3-day interval) without significant re accumulation,  will plan for potential discharge on Monday 11/20 with follow up (thoracentesis outpatient 3 times/weekly + working towards liver transplant) vs B) If worsening clinical status, more O2 needs, rapidly declining Na, will move towards hospice discharge plan and place an indwelling pleural catheter to help patient be as comfortable as possible for whatever time she has left to live.   Family would like patient would work with PT this weekend to best understand her needs before discharging home.  DNI/DNR  Will need POLST prior to discharge    Symptom management:  We are not actively managing symptoms at this time.         Thank you for the opportunity to continue to participate in the care of this patient and family.  Please feel free to contact on-call palliative provider with any emergent needs.  We can be reached via Securely message with the Vocera Web Console (learn more here)    Dr Uriah Chandler, PGY III    Thank you for the opportunity to continue to participate in the care of this patient and family.  Please feel free to contact on-call palliative provider with any emergent needs.  We  "can be reached via Securely message with the [employer] Web Console (learn more here) or Text page via [facility] Paging/Directory   Physician Attestation:   I, Poonam Ortiz MD, saw this patient and agree with Dr. Uriah Chandler's findings and plan of care as documented in the note. Total time spent was 65 minutes spent regarding goals of care, plan of care on the date of the encounter.   Poonam Ortiz MD / Palliative Medicine          Assessments          Stefani Maggie Crowell is a 63 year old female with a past medical history of alcoholic cirrhosis (last use 6/28/23) complicated by hepatic hydrothorax with twice weekly thoracentesis, hepatic encephalopathy managed by lactulose, hyponatremia, COPD, weight loss, ascites who was admitted 10/22 with dyspnea from right pleural effusion and hyponatremia. Hospital course complicated by MATTIE/HRS     Today, the patient was seen for:  Goals of care  Support  Cirrhosis with hepatic hydrothorax  Hyponatremia  Recurrent pleural effusions                Interval History:     Chart review/discussion with unit or clinical team members:   No acute events overnight. Patient continues SOB with activity, does not use O2 at rest.  Stable over the past few days, vitals signs are stable.     Per patient or family/caregivers today:  Patient and family fully understands that Lolly is not a candidate for TIPS due to significant values of hyperbilirubinemia, she does not meet criteria by IR. Lolly has expressed \"I just wanna go home and do what I want\" \"I am just sick of all of this\" even if that means going toward a more comfortable approach. When primary team gave her options to respect her wishes of she really wanted to go home, she was completely agreeable with both of them. Her option was to do a thoracentesis tomorrow on 11/17, give her a 3-day interval over the weekend (which would be one of her longest), since she has been needing at least 6 times over the past 2 weeks. If there " is not clinical status worsening, no more O2 needs, we would likely discharge her on Monday with strict follow up as it means at least 3 times thoracentesis M/W/F per week, follow up with liver transplant team and Palliative care team. If everything goes to the opposite direction by Monday, she is agreeable to move forward a tunneled chest catheter and home hospice.     I asked patient if she wanted to discuss prognosis if she chose not to pursue 3 x weekly thoracentesis or if she was not able to tolerate 3 times weekly thoracentesis. Lolly did not want to discuss prognosis. After care conference, her sister told me that they believe Lolly would only have 1-2 weeks left to live without 3 x weekly thoracentesis and I agreed with this prognosis.              Review of Systems:     Besides above, a complete 10+ ROS was reviewed and is unremarkable           Medications:     I have reviewed this patient's medication profile and medications during this hospitalization.               Physical Exam:   Vitals were reviewed  Patient Vitals for the past 12 hrs:   BP Temp Temp src Pulse Resp SpO2   11/16/23 1441 98/40 97.7  F (36.5  C) Axillary 74 16 94 %   11/16/23 0507 92/40 97.5  F (36.4  C) Axillary 65 14 93 %        Constitutional: awake, alert, appears fatigued  Eyes: scleral icterus present  Respiratory: mild resp distress, left lung fields clear. Improved aeration in R lung fields  Cardiovascular: 4/6 systolic murmur   GI: soft, non-distended, non-tender  Neurologic: alert and oriented x3, moving all extremities equally             Data Reviewed:     Reviewed recent labs and pertinent imaging  8.7  \   7.9 (L)   / 62 (L)      123 (L) 91 (L) 54.1 (H) /  71   4.6 25 0.78 \      ALT: 34 AST: 40 AP: 67 TBILI: 5.4 (H)   ALB: 3.2 (L) TOT PROTEIN: 4.8 (L) LIPASE: N/A      INR:  1.95 (H) PTT:  N/A   D-dimer:  N/A Fibrinogen:  N/A

## 2023-11-16 NOTE — PROGRESS NOTES
"[facility] Nurse Inpatient Assessment     Consulted for: bilateral knees    Patient History (according to provider note(s):      Stefani Crowell is a 63 year old female with a pertinent past medical history of alcoholic cirrhosis, hepatorenal syndrome, hypothyroidism, recurrent hyponatremia, recurrent pleural effusions, COPD and recent admission for hyponatremia who was admitted on 10/22/2023 due to increased dyspnea related to R pleural effusion and hyponatremia.     Assessment:      Areas visualized during today's visit: Lower extremities  and Upper extremities     Wound location: bilateral knees and left elbow      Right knee          Left knee    Knees 11/8       Left elbow    11/8    Last photo: 11/16  Wound due to: Skin Tear and Trauma  Wound history/plan of care: pt has very fragile skin. Knees are from recent fall. Left arm skin flap is now reattached and forming dry scab. No mepilex in place to left elbow skin tear at time of assessment. Patient reports Mepilex dressing \"hurt too much when they took it off \" so declined to have it replaced. Now that skin flap is reattached, will adjust orders for Aquaphor  Wound base: 10 % superficial scab,  90% epidermis     Palpation of the wound bed: normal      Drainage: none     Description of drainage: none     Measurements (length x width x depth, in cm): see photos      Tunneling: N/A     Undermining: N/A  Periwound skin: Intact      Color: red      Temperature: normal   Odor: none  Pain: mild, tender to left arm only  Pain interventions prior to dressing change: slow and gentle cares   Treatment goal: Heal  and Protection  STATUS: improving  Supplies ordered: at bedside       Treatment Plan:     Bilateral knees and left forearm/ elbow wound(s): Daily  cleanse with saline and pat dry. Apply Aquaphor(order#[id number]) to wounds and leave JIMI.      Orders: Updated    RECOMMEND PRIMARY TEAM ORDER: None, at this " time  Education provided: plan of care and wound progress  Discussed plan of care with: Patient and Nurse  [employer] nurse follow-up plan: weekly  Notify WOC if wound(s) deteriorate.  Nursing to notify the Provider(s) and re-consult the WO Nurse if new skin concern.    DATA:     Current support surface: Standard  Standard gel/foam mattress (IsoFlex, Atmos air, etc)  Containment of urine/stool: Incontinent pad in bed  BMI: Body mass index is 25.35 kg/m .   Active diet order: Orders Placed This Encounter      2 Gram Sodium Diet     Output: I/O last 3 completed shifts:  In: 370 [P.O.:370]  Out: 3750 [Urine:250; Drains:3500]     Labs:   Recent Labs   Lab 11/16/23  0618   ALBUMIN 3.2*   HGB 7.9*   INR 1.95*   WBC 8.7     Pressure injury risk assessment:   Sensory Perception: 4-->no impairment  Moisture: 4-->rarely moist  Activity: 3-->walks occasionally  Mobility: 3-->slightly limited  Nutrition: 2-->probably inadequate  Friction and Shear: 2-->potential problem  Castillo Score: 18    Awilda Hernandez RN CWOCN   Pager no longer is use, please contact through [employer] group: [facility] [employer] Nurse [place]  Dept. Office Number: [phone]

## 2023-11-16 NOTE — PROGRESS NOTES
Hepatology Follow up Note         Assessment and Plan:   Stefani Crowell is a 63 year old female who has been admitted since October 22, 2023 for dyspnea in the setting of her hepatic hydrothorax and hyponatremia. She has a past medical history of decompensated alcohol-related cirrhosis complicated by hepatic encephalopathy, hepatic hydrothorax, ascites and hyponatremia.  Past medical history includes COPD and alcohol use disorder.     #.  Decompensated alcohol-related cirrhosis  #.  Hepatic hydrothorax  #.  Ascites  #.  Hepatic encephalopathy  #.  Hyponatremia  #.  Acute kidney injury; s/p terlipressin 11/1-11/6 for presumed HRS  #. Alcohol use disorder  MELD 3.0: 27 at 11/16/2023  6:18 AM  MELD-Na: 29 at 11/16/2023  6:18 AM  Calculated from:  Serum Creatinine: 0.78 mg/dL (Using min of 1 mg/dL) at 11/16/2023  6:18 AM  Serum Sodium: 123 mmol/L (Using min of 125 mmol/L) at 11/16/2023  6:18 AM  Total Bilirubin: 5.4 mg/dL at 11/16/2023  6:18 AM  Serum Albumin: 3.2 g/dL at 11/16/2023  6:18 AM  INR(ratio): 1.95 at 11/16/2023  6:18 AM  Age at listing (hypothetical): 63 years  Sex: Female at 11/16/2023  6:18 AM      The patient remains hypoxic and on 2 L in the setting of hepatic hydrothorax.  She is limited in terms of diuretics given her severe hyponatremia.    Following thoracentesis on 11/12 the patient developed a concern for hemothorax on CT chest on 11/13/2023.  CTA was performed to evaluate for active extravasation but nothing was visualized.  The patient's hemoglobin has been stable without any further drop following PRBC transfusions.  The patient had elevated WBC but without any fevers or chills. Leukocytosis is improving therefore more suggestive of an acute response.     She was being considered for TIPS placement however this has not been feasible at this time given her total bilirubin remains in the 7-8s despite being mostly unconjugated.  We have discussed her case with multiple interventional  radiologists including Dr. Rodríguez on 11/10/2023.  Currently she is scheduled for TIPS placement on 12/6/2023 however this will only be possible if her total bilirubin comes down to 3-4, she has been stable from a pulmonary standpoint and she has had no further episodes of hypotension.  Our concern is that she may never be a TIPS candidate at this time.     She has been evaluated for liver transplantation but she is currently not a transplant candidate given she was told about her alcohol use disorder but then returned to use.  She needs to complete chemical dependency programming prior to being considered for transplantation.     The patient is being considered for liver transplantation pending completion of outpatient discontinue treatment.  Given this she is not a candidate for a pleural catheter given the infectious risk.  If she is not deemed to be a liver transplant candidate or she does not wish to pursue this avenue can consider this palliative approach at that time.         Recommendations:   -- Continue the staged thoracentesis with interventional radiology M, W, F  -- Plan to discharge next Monday after thora   -- Continue lactulose and rifaximin for hepatic encephalopathy management    Outpatient:  * Schedule for possible TIPS 12/6/2023 -however this would not be a possibility unless she remained stable from a pulmonary and blood pressure standpoint and her total bilirubin improved to 3-4  * Already set up thoracenteses 3x/week (M, W. F) at [facility] with albumin replacement (notified sister Riri)       Thank you for involving us in this patient's care. Please do not hesitate to contact the GI service with any questions or concerns.     Pt care plan discussed with Dr. Powers, GI staff physician.    Latoya Florez MD PhD  GI Fellow   [phone]          Subjective/Objective:   No acute events. Now off O2 after 3.5L of pleural fluid removed. Some pain at the thora site.            Medications:     Current  "Facility-Administered Medications   Medication    acetaminophen (TYLENOL) tablet 650 mg    Or    acetaminophen (TYLENOL) Suppository 650 mg    albuterol (PROVENTIL HFA/VENTOLIN HFA) inhaler    artificial saliva (BIOTENE DRY MOUTHWASH) liquid 5 mL    citalopram (celeXA) tablet 40 mg    ferrous sulfate (FEROSUL) tablet 325 mg    fluticasone-vilanterol (BREO ELLIPTA) 200-25 MCG/ACT inhaler 1 puff    lactulose (CHRONULAC) solution 20 g    levothyroxine (SYNTHROID/LEVOTHROID) tablet 112 mcg    LORazepam (ATIVAN) tablet 0.5 mg    melatonin tablet 3 mg    midodrine (PROAMATINE) tablet 15 mg    montelukast (SINGULAIR) tablet 10 mg    pantoprazole (PROTONIX) EC tablet 40 mg    polyethylene glycol (MIRALAX) Packet 17 g    prochlorperazine (COMPAZINE) injection 5 mg    Or    prochlorperazine (COMPAZINE) tablet 5 mg    Or    prochlorperazine (COMPAZINE) suppository 25 mg    rifaximin (XIFAXAN) tablet 550 mg            Physical Exam:   VS:  BP 98/40 (BP Location: Left arm)   Pulse 74   Temp 97.7  F (36.5  C) (Axillary)   Resp 16   Ht 1.651 m (5' 5\")   Wt 69.1 kg (152 lb 5.4 oz)   LMP  (LMP Unknown)   SpO2 94%   BMI 25.35 kg/m      Wt:   Wt Readings from Last 2 Encounters:   11/13/23 69.1 kg (152 lb 5.4 oz)   10/03/23 62.3 kg (137 lb 4.8 oz)      Constitutional: NAD  Eyes: Conjunctiva +icteric  HEENT: Mucus membranes moist  CV: RRR, III/VI systolic murmur  Respiratory: 2 L nasal cannula, crackles bilaterally  Abd: Distended, no rebound or guarding   Skin: +  jaundice  Neuro: AAO x 3, No asterixis         Laboratory:   [facility]  Recent Labs   Lab 11/16/23  0618 11/15/23  0634 11/14/23  0542 11/13/23  0646   * 123* 125* 124*   POTASSIUM 4.6 4.0 4.1 5.0   CHLORIDE 91* 91* 91* 90*   UMA 9.4 9.6 9.5 9.5   CO2 25 24 23 24   BUN 54.1* 53.3* 59.4* 54.8*   CR 0.78 0.74 0.96* 1.19*   GLC 71 110* 101* 96     CBC  Recent Labs   Lab 11/16/23  0618 11/15/23  0634 11/14/23  0542 11/13/23  1609 11/13/23  0646   WBC 8.7 11.6* 16.3*  --  " 15.5*   RBC 2.47* 2.64* 2.70*  --  2.02*   HGB 7.9* 8.4* 8.4* 8.7* 6.4*   HCT 23.4* 25.2* 25.0*  --  19.6*   MCV 95 96 93  --  97   MCH 32.0 31.8 31.1  --  31.7   MCHC 33.8 33.3 33.6  --  32.7   RDW 18.5* 18.3* 18.3*  --  19.2*   PLT 62* 62* 77*  --  92*     INR  Recent Labs   Lab 11/16/23  0618 11/15/23  0634 11/14/23  0542 11/13/23  0646   INR 1.95* 2.02* 2.22* 2.41*     LFTs  Recent Labs   Lab 11/16/23 0618 11/15/23  0634 11/14/23  0542 11/13/23  0646   ALKPHOS 67 71 84 80   AST 40 45 52* 43   ALT 34 37 39 31   BILITOTAL 5.4* 7.3* 8.9* 5.6*   PROTTOTAL 4.8* 5.1* 5.0* 4.6*   ALBUMIN 3.2* 3.4* 3.5 3.1*        Imaging/Procedures/Studies:   CTA chest 11/13/2023  1. Dependent blood products in the large right effusion. No active  extravasation. Right intercostal arteries appear intact.     2. 8 mm distal splenic artery aneurysm.     3. Total consolidation of the right lung. Left lung central ground  glass opacities. Correlate for pneumonia. Mediastinal shift to the  left.     4. Cirrhotic liver. No focal hepatic lesion. Hepatic hydrothorax.  Ascites. Recannulized paraumbilical vein.

## 2023-11-16 NOTE — PROGRESS NOTES
Per Dr. Hays, pt may discharge from hospital on 11/17 after thoracentesis. Pt will need 3 times weekly thoracentesis with 25 gram albumin infusion with each. Updated thoracentesis orders placed and faxed to [facility] (fax: [phone]). [facility] confirmed that orders were received and pt is scheduled for M,W,F thoracentesis with albumin through 12/8. Updated pt's sister, Riri, who verbalized understanding of plan. Will follow up with pt to check in and review plan of care upon discharge.

## 2023-11-16 NOTE — PLAN OF CARE
Goal Outcome Evaluation:     Plan of Care Reviewed With: patient     Outcome Evaluation: No change    Family conference (two sisters with patient and her partner) at bedside with team, palliative, and SW this afternoon. Will stay through Monday; plan to complete staged thoracentesis tomorrow with IR. Pt has been compliant with medications today and has been appropriate with orientation questions and behavior. Wounds improving, cares performed.

## 2023-11-16 NOTE — PROGRESS NOTES
[facility]    Medicine Progress Note - Hospitalist Service, GOLD TEAM 9    Date of Admission:  10/22/2023    Assessment & Plan   Stefani Crowell is a 63 year old female with a pertinent past medical history of alcoholic cirrhosis, hepatorenal syndrome, hypothyroidism, recurrent hyponatremia, recurrent pleural effusions, COPD and recent admission for hyponatremia who was admitted on 10/22/2023 due to increased dyspnea related to R pleural effusion and hyponatremia.     Goals of care conversation:  Discussed with Palliative care team and reiterated our options, jaime. With the fact that TIPS may not be a feasible option, since spontaneous improvement in bilirubin is unlikely. Discussed about option 1 - to see how see does over the weekend and on Monday, if tolerated her thoracentesis with supplemental oxygen, continue outpatient 3/week thoracentesis, working towards a liver transplant. Discussed about how hyponatremia, recurrent infections, continued adherence to lactulose and dietary restrictions could be a challenge with this option. Option 2- would be proceed with hospice with a indwelling pleural catheter if option 1 fails. Appreciate palliative care assistance. Either way, the plan is for Monday discharge    MATTIE, likely secondary to hepatorenal syndrome  Hyponatremia  Suspect HRS. Had improvement with terlipressin (11/1-11/6), slight bump again in Cr once it was stopped.  - hold diuretics, no plans to resume per hepatology  - continue midodrine (resumed once off terlipressin)    Hepatic hydrothorax  Acute hypoxic respiratory failure 2/2 above  Diuretic-refractory due to hyponatremia. Not currently a TIPS candidate due to elevation of bilirubin.  - Continue with thoracenteses as needed (has needed ~3 times per week) --> last thora on 11/12 with 2.5 L out  - Diagnostics to be sent with each thora --> 11/12 thora fluid w/o SBE  - Wean supplemental O2 as able  - Per  hepatology and IR: plan for staged thoras and tolerated session on 11/15 with nearly 3 L out.   - Tentatively scheduled for TIPS on 12/6, though pt is aware that she may not be a candidate at that time  - persistent    Hemothorax  On morning of 11/13 patient found to have hypotension with dizziness and was noted to have 2 g Hgb drop from the day prior. Despite recent thora, CXR with white-out right lung. CT chest confirmed presence of blood. CTA completed without evidence for active bleeding. Patient's BP improved with IVF bolus and a unit of pRBCs.  - Transfused x1 this morning  - CT chest, CTA chest completed  - no recurrence of hemothorax and H/H stable    Decompensated alcoholic cirrhosis  Hx hepatic encephalopathy  Abd US negative for thrombosis and HCC. EGD 11/2 normal. Not a liver transplant candidate currently due to incomplete CD program and abstinence period. Engaged in CD program outpatient. Peth negative 10/19.  Patient is currently not a transplant candidate due to not having completed CD treatment and the required period of sobriety. She is working on these, but needs to be outpatient in order to continue. Her hospital course has been challenging, and as TIPS is not a possibility she is aware that her situation is challenging. After discussion with rosa CORONA on 11/8, pt would like to have additional discussions about her overall GOC. Palliative care involved on 11/9.  - Hepatology consulted, appreciate assistance  - Continue home lactulose 20g TID, rifaximin 550mg BID  - Palliative care following, appreciate assistance --> likely planning for care conference this week  - Health psych consult    Moderate malnutrition in the context of chronic illness  - RD following    Acute on chronic normocytic anemia  Concern for GIB, nothing identified on scope  Hgb dropping below 7 starting 11/1 without signs of bleeding. On 11/2 noted to have dark red bloody stools. Taken for EGD without signs of bleeding.  "Colonoscopy without source of bleeding, portal colopathy .  - Transfuse for Hgb < 7     Thrombocytopenia  Coagulopathy  - Likely secondary to cirrhosis     Elevated troponin  - Admit 27, 28. Likely secondary to poor kidney clearance  - Unremarkable EKG    Hypothyroidism  Last TSH was 0.74 on 07/24/23  - Continue home levothyroxine 112 mcg po daily    GERD  - Continue pantoprazole 40 mg po qdaily    Anxiety  - Holding citalopram 40 mg po qdaily and trazodone 50 mg po at bedtime  - continue lorazepam 0.5 mg po qdaily prn    Hypokalemia, resolved  - replacement protocol    Post-thoracentesis pneumothorax, resolved  Noted after 11/6 thora. Respiratory status stable. Serial CXRs demonstrated improvement.          Diet: Fluid restriction 1000 ML FLUID  2 Gram Sodium Diet  Snacks/Supplements Adult: Other; pudding at 2:00, applesauce at HS; Between Meals  Snacks/Supplements Adult: Other; Ensure Enlive at 8:00 ( vanilla or strawberry) patient likes this at 8:00 am , 2:00 pm ensure enlive,  HS : Ensrue max vanilla; Between Meals    DVT Prophylaxis: Pneumatic Compression Devices  Demarco Catheter: Not present  Lines: None     Cardiac Monitoring: None  Code Status: No CPR- Do NOT Intubate      Clinically Significant Risk Factors         # Hyponatremia: Lowest Na = 123 mmol/L in last 2 days, will monitor as appropriate   # Hypercalcemia: corrected calcium is >10.1, will monitor as appropriate    # Hypoalbuminemia: Lowest albumin = 3.1 g/dL at 11/13/2023  6:46 AM, will monitor as appropriate    # Coagulation Defect: INR = 1.95 (Ref range: 0.85 - 1.15) and/or PTT = N/A, will monitor for bleeding  # Thrombocytopenia: Lowest platelets = 62 in last 2 days, will monitor for bleeding          # Overweight: Estimated body mass index is 25.35 kg/m  as calculated from the following:    Height as of this encounter: 1.651 m (5' 5\").    Weight as of this encounter: 69.1 kg (152 lb 5.4 oz).   # Moderate Malnutrition: based on nutrition " assessment    # Financial/Environmental Concerns: none  # Asthma: noted on problem list        Disposition Plan      Expected Discharge Date: 11/18/2023      Destination: home with family  Discharge Comments: Dispo: home with HC, not yet secured            Robin Dotson MD  Hospitalist Service, GOLD TEAM 9  [facility]  Securely message with [employer] (more info)  Text page via [employer] Paging/Directory   See signed in provider for up to date coverage information  ______________________________________________________________________    Interval History     Stable shortness of breath and no worsening.     Physical Exam   Vital Signs: Temp: 97.7  F (36.5  C) Temp src: Axillary BP: 98/40 Pulse: 74   Resp: 16 SpO2: 94 % O2 Device: None (Room air)    Weight: 152 lbs 5.41 oz    Constitutional: awake, alert, appears fatigued  Eyes: scleral icterus present  Respiratory: mild resp distress, left lung fields clear. Improved aeration in R lung fields  Cardiovascular: 4/6 systolic murmur   GI: soft, non-distended, non-tender  Neurologic: alert and oriented x3, moving all extremities equally    Medical Decision Making     65 MINUTES SPENT BY ME on the date of service doing chart review, history, exam, documentation & further activities per the note.      Data     I have personally reviewed the following data over the past 24 hrs:    8.7  \   7.9 (L)   / 62 (L)     123 (L) 91 (L) 54.1 (H) /  71   4.6 25 0.78 \     ALT: 34 AST: 40 AP: 67 TBILI: 5.4 (H)   ALB: 3.2 (L) TOT PROTEIN: 4.8 (L) LIPASE: N/A     INR:  1.95 (H) PTT:  N/A   D-dimer:  N/A Fibrinogen:  N/A       Imaging results reviewed over the past 24 hrs:   No results found for this or any previous visit (from the past 24 hour(s)).

## 2023-11-16 NOTE — PLAN OF CARE
Goal Outcome Evaluation:      Plan of Care Reviewed With: patient          Outcome Evaluation: patient alert and oriented times 4.  Refused HS lactulose.  [place] score 0.    A:  patient having pain at thoracentesis site and tylenol given with relief.  Eating ice chips small amount when awake.  Up in room independently and had one loose stool overnight.    R:  continue to monitor and treat per plan of care.

## 2023-11-16 NOTE — PROGRESS NOTES
Care Management Follow Up       [facility] (ph:[phone] fx:[phone]) - call before sending referral  11/10: CHW sent initial HC referral via Epic.  11/13: CHW resent failed referral via Epic.   11/14: CHW LVM for admissions to f/u on referral; requested they call RNCC back.   11/16: CHW LVM for admissions to f/u on referral; requested they call RNCC back.    [facility] (ph:[phone] fx:[phone])  11/10: CHW sent initial HC referral via Epic.   11/13: CHW resent failed referral via Epic.   11/14: CHW LVM for admissions to f/u on referral; requested they call RNCC back.   11/16: CHW spoke with admissions and they declined with no reason given.      [facility] (ph:[phone] fx:[phone]) ([place], [facility])  11/10: CHW sent initial HC referral via Epic.   11/13: CHW resent failed referral via Epic.   11/14: CHW LVM for admissions to f/u on referral; requested they call RNCC back.    11/16: CHW spoke with intake and they are accepting HC referrals in the patient area. Intake suggested we fax to the following number [phone]. CHW hard faxed hc referral.     [facility] (ph:[phone] fx:[phone]) (RN & PT, no OT)  11/10: CHW sent initial HC referral via Epic.   11/13: CHW LVM for admissions to f/u on referral; requested they call RNCC back.    11/14: CHW LVM for admissions to f/u on referral; requested they call RNCC back.    11/16:CHW LVM for admissions to f/u on referral; requested they call RNCC back.       [facility] (ph:[phone] fx: [phone]) - prefers referrals via phone  11/10: CHW sent initial HC referral via Epic.   11/13: CHW LVM for admissions to f/u on referral; requested they call RNCC back.  11/14: CHW LVM for admissions to f/u on referral; requested they call RNCC back.    11/16:CHW LVM for admissions to f/u on referral; requested they call RNCC back.   11/16: Declined d/t payor source     [facility]  (ph: [phone] fx:[phone])  11/10: CHW sent initial HC referral via Epic.   11/13: CHW resent failed referral via Epic   11/16: CHW spoke with Rosa they declined d/t location.     Declined:  [facility] and RN Service: Out of service.  [facility] :Declined   [facility] : Declined   [facility] : Declined   [facility] they are not accepting referrals d/t staffing.  [facility]:Declined d/t unable to staff the [facility]  Declined d/t location.    Michael Yates   Inpatient Community Health Worker and [place]   [facility] 7C & 7D

## 2023-11-16 NOTE — PROGRESS NOTES
Care Management Follow Up    Length of Stay (days): 25    Expected Discharge Date: 11/18/2023     Concerns to be Addressed: all concerns addressed in this encounter     Patient plan of care discussed at interdisciplinary rounds: Yes     Anticipated Discharge Disposition: Home, Outpatient Chemical Dependency     Anticipated Discharge Services: Home care, pending status  Anticipated Discharge DME: None     Patient/family educated on Medicare website which has current facility and service quality ratings: no  Education Provided on the Discharge Plan: Yes  Patient/Family in Agreement with the Plan: yes     Referrals Placed by CM/SW:    Private pay costs discussed: Not applicable     Additional Information:    Patient's status reviewed by chart. Writer met with the patient, her sister, and her SO at bedside to discuss discharge planning. Pt and family are aware of appointments for next week. They are also aware that up to this time, we cannot secure a home care agency to provide RN/PT service d/t variable reasons and this will not be a barrier for discharge when medically ready. Pt and family acknowledged the information and don't want to pursue OP therapy at this time. Pt and family stated they will work among family members and friends to support the pt.    Medicine team updated writer that pt will have a thoracentesis tomorrow  and Monday and will likely be discharge on Monday when remains stable.    Care management continue to monitor.    Out Patient Thoracentesis at [facility]   P: [phone]  F: [phone]  Appointments- Already put them in the discharge instruction   Wednesday 11/22/23, arrive @ 12:30PM, procedure @ 1:00PM  Friday 11/24/2023, arrive @ 12:30PM, procedure @ 1:00PM  Monday 11/27/23, arrive @ 1:30PM, procedure @ 2:00PM   Wednesday 11/29/23, arrive @ 10:30AM, procedure @ 11:00AM  Friday 12/01/2023, arrive @ 08:30AM, procedure @ 10:00AM    Loni Mayfield RN  7C RN Care Coordinator  Phone:  [phone]  Pager: [phone]  [place] ([phone]) Saturday & Sunday; ([phone]) [facility] Recognized Holidays   Units: 5A, 5B & 5C  Pager: [phone]  Units: 6B, 6C & 6D    Pager: [phone]  Units: 7A, 7B, 7C & 7D    Pager: [phone]  Units: 6A & ICU   Pager: [phone]  Units: 5 Ortho, 5MS & WB ED Pager: [phone]  Units: 6MS, 8A & 10 ICU  Pager [phone]

## 2023-11-16 NOTE — PLAN OF CARE
"Cares 1100 to 1900    /45   Pulse 70   Temp 97.6  F (36.4  C) (Oral)   Resp 16   Ht 1.651 m (5' 5\")   Wt 69.1 kg (152 lb 5.4 oz)   LMP  (LMP Unknown)   SpO2 100%   BMI 25.35 kg/m      Goal Outcome Evaluation:    Plan of Care Reviewed With: patient    Overall Patient Progress: no changeOverall Patient Progress: no change    Outcome Evaluation: Alert & oriented, forgetful, intermittent tremors in upper extremities. Thoracentesis done today, 3.5L fluid removed. Denies SOB, on room air. BM 1x this shift. Refused wound cares/assessment on knees.      "

## 2023-11-17 NOTE — PROCEDURES
Stefani Crowell  [id number]    Completed right chest tube placement for planned staged thoracentesis.  Red-colored fluid draining from the 7 German chest tube.      We will plan to cap chest tube at 1.5 L or when patient discomfort reached.      Drainage can resume after 1 hour capping if the patient is stable and fluid remains.      Ultrasound shows very large effusion.    ===  Staged thoracentesis completed in 3 stages  by 60 minutes.  A total of 4000 mL drained.  Chest tube removed.

## 2023-11-17 NOTE — PROGRESS NOTES
CLINICAL NUTRITION SERVICES - REASSESSMENT NOTE     Nutrition Prescription    RECOMMENDATIONS FOR MDs/PROVIDERS TO ORDER:  Diet per team    Malnutrition Status:    Moderate malnutrition in the context of chronic illness    Recommendations already ordered by Registered Dietitian (RD):  Continue with 3 ensures per day     Future/Additional Recommendations:  PO adequacy     EVALUATION OF THE PROGRESS TOWARD GOALS   Diet: 2 g Sodium + FR 1000 ml + oral supplements ( ensure plus at 8:00, ensure plus at 2:00, ensure max at HS) .   Intake: Having a fair po and appetite, eating about 50% of meals. Patient would like to discontinue eggs and cheese snacks. She has been utilizing 2-3 ensure per day and eating small meals. Patient would like pudding to be set up for snacks and to continue with ensure supplements daily           NEW FINDINGS   Chart reviewed: PMH Etoh cirrhosis, complicated by hepatic hydrothorax with twice weekly thoracentesis, hepatic encephalopathy on lactulose, hyponatremia, COPD, weight loss, ascites    - Admitted 10/22 with dyspnea from right pleural effusion and hyponatremia.   - Hospital course complicated by MATTIE due to HRS/hyponatremia   - Hepatic hydrothorax with AHRF -> Continues with 3-4 times weekly thoracentesis with 2+ liter removed  - Working towards a liver transplant. Tentatively scheduled for TIPS on      Wt trend:  64.2 kg (141 lb 9.6 oz) standing wt on admit on 10/22 --> 63.9 kg (140 lb 14 oz) standing wt on       Labs noted:  Total bili: 6.3(H)  B  Na+: 122 (L) -> on FR  BUN:53.1, Cr: 0.81, GFR:82    GI/stool:  Last BM: x3  on lactulose and rifaximin for HE       MALNUTRITION  % Intake: likely meeting needs with small meals + 2-3 ensure supplements daily   % Weight Loss: Unable to assess due to volume fluctuation  Subcutaneous Fat Loss: None observed  Muscle Loss: Facial & jaw region:  Mild, Upper arm (bicep, tricep):  Mild, and Lower arm  (forearm):  Mild  Fluid  Accumulation/Edema: large ascites   Malnutrition Diagnosis: Moderate malnutrition in the context of chronic illness      Previous Goals   Patient to consume % of nutritionally adequate meal trays TID, or the equivalent with supplements/snacks.   Evaluation: met intermittently      Previous Nutrition Diagnosis  Malnutrition related to chronic illness with associated decrease in appetite as evidenced by poor po since admit, ongoing need to optimize nutrition with oral supplement  /snacks  Evaluation: No change    CURRENT NUTRITION DIAGNOSIS  Predicted inadequate oral intake related to chronic illness with associated decrease in appetite as evidenced by patient with fair po intake with ongoing need to optimize nutrition with oral supplement  /snacks    INTERVENTIONS  Implementation  Discontinued high protein snacks and ordered pudding     Goals  Patient to consume % of nutritionally adequate meal trays TID, or the equivalent with supplements/snacks.    Monitoring/Evaluation  Progress toward goals will be monitored and evaluated per protocol.      Newton Partida RD/ANDRE  7C ([id number])/7D RD pager: [phone]  Weekend/Holiday RD pager: [phone]

## 2023-11-17 NOTE — PROGRESS NOTES
Care Management Follow Up    Length of Stay (days): 29    Expected Discharge Date: 11/21/2023     Concerns to be Addressed: all concerns addressed in this encounter     Patient plan of care discussed at interdisciplinary rounds: Yes    Anticipated Discharge Disposition: Home, Outpatient Chemical Dependency     Anticipated Discharge Services: None  Anticipated Discharge DME: None    Patient/family educated on Medicare website which has current facility and service quality ratings: no  Education Provided on the Discharge Plan: Yes  Patient/Family in Agreement with the Plan: yes    Referrals Placed by CM/SW:    Private pay costs discussed: Not applicable    Additional Information:      Received a voicemail from [facility]  at [phone] declined patient.     Samira Cross RN, PHN, BSN   Float Nurse Care Coordinator  Covering for Unit 7C  Phone [phone]

## 2023-11-17 NOTE — PLAN OF CARE
Goal Outcome Evaluation:      Plan of Care Reviewed With: patient          Outcome Evaluation: patient oriented times 4 .  states pain at thoracentesis site decreased.    A;  Patient up in room independently.  Short of breath with activity.  Refused HS lactulose. [place] score 0.    R:  continue to monitor and treat per plan of care.

## 2023-11-17 NOTE — PROGRESS NOTES
[facility]    Medicine Progress Note - Hospitalist Service, GOLD TEAM 9    Date of Admission:  10/22/2023    Assessment & Plan   Stefani Crowell is a 63 year old female with a pertinent past medical history of alcoholic cirrhosis, hepatorenal syndrome, hypothyroidism, recurrent hyponatremia, recurrent pleural effusions, COPD and recent admission for hyponatremia who was admitted on 10/22/2023 due to increased dyspnea related to R pleural effusion and hyponatremia.     Goals of care conversation:  Discussed with Palliative care team (11/17) and reiterated our options, jaime. With the fact that TIPS may not be a feasible option, since spontaneous improvement in bilirubin is unlikely. Discussed about option 1 - to see how see does over the weekend and on Monday, if tolerated her thoracentesis with supplemental oxygen, continue outpatient 3/week thoracentesis, working towards a liver transplant. Discussed about how hyponatremia, recurrent infections, continued adherence to lactulose and dietary restrictions could be a challenge with this option. Option 2- would be proceed with hospice with a indwelling pleural catheter if option 1 fails. Appreciate palliative care assistance. Either way, the plan is for Monday discharge    MATTIE, likely secondary to hepatorenal syndrome  Hyponatremia  Suspect HRS. Had improvement with terlipressin (11/1-11/6), slight bump again in Cr once it was stopped.  - hold diuretics, no plans to resume per hepatology  - continue midodrine (resumed once off terlipressin)    Hepatic hydrothorax  Acute hypoxic respiratory failure 2/2 above  Diuretic-refractory due to hyponatremia. Not currently a TIPS candidate due to elevation of bilirubin.  - Continue with thoracenteses as needed (has needed ~3 times per week) --> last thora on 11/12 with 2.5 L out  - Diagnostics to be sent with each thora --> 11/12 thora fluid w/o SBE  - Wean supplemental O2 as able  -  Per hepatology and IR: plan for staged thoras and tolerated session on 11/15 with nearly 3 L out. Repeated staged thora today (11/17) with 4 L removed  - Tentatively scheduled for TIPS on 12/6, though pt is aware that she may not be a candidate at that time  - persistent    Hemothorax  On morning of 11/13 patient found to have hypotension with dizziness and was noted to have 2 g Hgb drop from the day prior. Despite recent thora, CXR with white-out right lung. CT chest confirmed presence of blood. CTA completed without evidence for active bleeding. Patient's BP improved with IVF bolus and 2 units of PRBC (11/13).  - - CT chest, CTA chest completed  - no recurrence of hemothorax and H/H stable    Decompensated alcoholic cirrhosis  Hx hepatic encephalopathy  Abd US negative for thrombosis and HCC. EGD 11/2 normal. Not a liver transplant candidate currently due to incomplete CD program and abstinence period. Engaged in CD program outpatient. Peth negative 10/19.  Patient is currently not a transplant candidate due to not having completed CD treatment and the required period of sobriety. She is working on these, but needs to be outpatient in order to continue. Her hospital course has been challenging, and as TIPS is not a possibility she is aware that her situation is challenging. After discussion with rosa CORONA on 11/8, pt would like to have additional discussions about her overall GOC. Palliative care involved on 11/9.  - Hepatology consulted, appreciate assistance  - Continue home lactulose 20g TID, rifaximin 550mg BID  - Palliative care following, appreciate assistance  - Health psych consult    Moderate malnutrition in the context of chronic illness  - RD following    Acute on chronic normocytic anemia  Concern for GIB, nothing identified on scope  Hgb dropping below 7 starting 11/1 without signs of bleeding. On 11/2 noted to have dark red bloody stools. Taken for EGD without signs of bleeding. Colonoscopy without  source of bleeding, portal colopathy .  - Transfuse for Hgb < 7     Thrombocytopenia  Coagulopathy  - Likely secondary to cirrhosis     Elevated troponin  - Admit 27, 28. Likely secondary to poor kidney clearance  - Unremarkable EKG    Hypothyroidism  Last TSH was 0.74 on 07/24/23  - Continue home levothyroxine 112 mcg po daily    GERD  - Continue pantoprazole 40 mg po qdaily    Anxiety  - Holding citalopram 40 mg po qdaily and trazodone 50 mg po at bedtime  - continue lorazepam 0.5 mg po qdaily prn    Hypokalemia, resolved  - replacement protocol    Post-thoracentesis pneumothorax, resolved  Noted after 11/6 thora. Respiratory status stable. Serial CXRs demonstrated improvement.          Diet: Fluid restriction 1000 ML FLUID  2 Gram Sodium Diet  Snacks/Supplements Adult: Other; pudding at 2:00, applesauce at HS; Between Meals  Snacks/Supplements Adult: Other; Ensure Enlive at 8:00 ( vanilla or strawberry) patient likes this at 8:00 am , 2:00 pm ensure enlive,  HS : Ensrue max vanilla; Between Meals    DVT Prophylaxis: Pneumatic Compression Devices  Demarco Catheter: Not present  Lines: None     Cardiac Monitoring: None  Code Status: No CPR- Do NOT Intubate      Clinically Significant Risk Factors         # Hyponatremia: Lowest Na = 122 mmol/L in last 2 days, will monitor as appropriate   # Hypercalcemia: corrected calcium is >10.1, will monitor as appropriate    # Hypoalbuminemia: Lowest albumin = 3.1 g/dL at 11/13/2023  6:46 AM, will monitor as appropriate    # Coagulation Defect: INR = 1.93 (Ref range: 0.85 - 1.15) and/or PTT = N/A, will monitor for bleeding  # Thrombocytopenia: Lowest platelets = 62 in last 2 days, will monitor for bleeding           # Moderate Malnutrition: based on nutrition assessment    # Financial/Environmental Concerns: none  # Asthma: noted on problem list        Disposition Plan      Expected Discharge Date: 11/19/2023      Destination: home with family  Discharge Comments: Dispo: home  with HC, not yet secured            Robin Dotson MD  Hospitalist Service, GOLD TEAM 9  [facility]  Securely message with [email or web address] (more info)  Text page via [email or web address] Paging/Directory   See signed in provider for up to date coverage information  ______________________________________________________________________    Interval History     Reports good response to thoracentesis and not requiring any supplemental oxygen    Physical Exam   Vital Signs: Temp: 97.7  F (36.5  C) Temp src: Oral BP: 102/43 Pulse: 69   Resp: 18 SpO2: 99 % O2 Device: None (Room air) Oxygen Delivery: 2 LPM  Weight: 140 lbs 13.98 oz    Constitutional: awake, alert, appears fatigued  Eyes: scleral icterus present  Respiratory: mild resp distress, left lung fields clear. Improved aeration in R lung fields  Cardiovascular: 4/6 systolic murmur   GI: soft, non-distended, non-tender  Neurologic: alert and oriented x3, moving all extremities equally    Medical Decision Making     45 MINUTES SPENT BY ME on the date of service doing chart review, history, exam, documentation & further activities per the note.      Data     I have personally reviewed the following data over the past 24 hrs:    8.3  \   8.6 (L)   / 84 (L)     122 (L) 88 (L) 53.1 (H) /  95   4.7 23 0.81 \     ALT: 40 AST: 45 AP: 73 TBILI: 6.3 (H)   ALB: 3.4 (L) TOT PROTEIN: 5.2 (L) LIPASE: N/A     INR:  1.93 (H) PTT:  N/A   D-dimer:  N/A Fibrinogen:  N/A       Imaging results reviewed over the past 24 hrs:   No results found for this or any previous visit (from the past 24 hour(s)).

## 2023-11-17 NOTE — TELEPHONE ENCOUNTER
Patient Quality Outreach    Type of outreach:    Sent letter.    Next Steps:  Reach out within 90 days via Phone, MyChart, and Letter.    Max number of attempts reached: No. Will try again in 90 days if patient still on fail list.        Lashaun Jason MA  Chart routed to Care Team.

## 2023-11-17 NOTE — PROGRESS NOTES
Hepatology Follow up Note         Assessment and Plan:   Stefani Crowell is a 63 year old female who has been admitted since October 22, 2023 for dyspnea in the setting of her hepatic hydrothorax and hyponatremia. She has a past medical history of decompensated alcohol-related cirrhosis complicated by hepatic encephalopathy, hepatic hydrothorax, ascites and hyponatremia.  Past medical history includes COPD and alcohol use disorder.     #.  Decompensated alcohol-related cirrhosis  #.  Hepatic hydrothorax  #.  Ascites  #.  Hepatic encephalopathy  #.  Hyponatremia  #.  Acute kidney injury; s/p terlipressin 11/1-11/6 for presumed HRS  #. Alcohol use disorder  MELD 3.0: 26 at 11/17/2023  6:09 AM  MELD-Na: 29 at 11/17/2023  6:09 AM  Calculated from:  Serum Creatinine: 0.81 mg/dL (Using min of 1 mg/dL) at 11/17/2023  6:09 AM  Serum Sodium: 122 mmol/L (Using min of 125 mmol/L) at 11/17/2023  6:09 AM  Total Bilirubin: 6.3 mg/dL at 11/17/2023  6:09 AM  Serum Albumin: 3.4 g/dL at 11/17/2023  6:09 AM  INR(ratio): 1.93 at 11/17/2023  6:09 AM  Age at listing (hypothetical): 63 years  Sex: Female at 11/17/2023  6:09 AM      The patient remains hypoxic and on 2 L in the setting of hepatic hydrothorax.  She is limited in terms of diuretics given her severe hyponatremia.    Following thoracentesis on 11/12 the patient developed a concern for hemothorax on CT chest on 11/13/2023.  CTA was performed to evaluate for active extravasation but nothing was visualized.  The patient's hemoglobin has been stable without any further drop following PRBC transfusions.  The patient had elevated WBC but without any fevers or chills. Leukocytosis is improving therefore more suggestive of an acute response.     She was being considered for TIPS placement however this has not been feasible at this time given her total bilirubin remains in the 7-8s despite being mostly unconjugated.  We have discussed her case with multiple interventional  radiologists including Dr. Rodríguez on 11/10/2023.  Currently she is scheduled for TIPS placement on 12/6/2023 however this will only be possible if her total bilirubin comes down to 3-4, she has been stable from a pulmonary standpoint and she has had no further episodes of hypotension.  Our concern is that she may never be a TIPS candidate at this time.     She has been evaluated for liver transplantation but she is currently not a transplant candidate given she was told about her alcohol use disorder but then returned to use.  She needs to complete chemical dependency programming prior to being considered for transplantation.     The patient is being considered for liver transplantation pending completion of outpatient discontinue treatment.  Given this she is not a candidate for a pleural catheter given the infectious risk.  If she is not deemed to be a liver transplant candidate or she does not wish to pursue this avenue can consider this palliative approach at that time.    For discharge planning, per discussion with medicine and palliative care, the patient and family opted to stay over the weekend to see if she is able to go without thora for 2 days. If she is doing well over the weekend, she will then discharge on Monday after the staged thora by IR.            Recommendations:   -- Continue the staged thoracentesis with interventional radiology M, W, F  -- Plan to discharge next Monday after thora   -- Continue lactulose and rifaximin for hepatic encephalopathy management    Outpatient:  * Schedule for possible TIPS 12/6/2023 -however this would not be a possibility unless she remained stable from a pulmonary and blood pressure standpoint and her total bilirubin improved to 3-4  * Already set up thoracenteses 3x/week (M, W. F) starting on 11/22 at [facility] with albumin replacement (notified sister Riri)       Thank you for involving us in this patient's care. Please do not hesitate to contact the GI service with  "any questions or concerns.     Pt care plan discussed with Dr. Powers, GI staff physician.    Latoya Florez MD PhD  GI Fellow   [phone]          Subjective/Objective:   Patient was disappointed that she would not be discharge today as hepatology planned for. She told me that I should get my \"facts\" straight. Patient and sister also said that they would not do TIPS as people said that her liver won't be able take that.     Remains on room air.            Medications:     Current Facility-Administered Medications   Medication    acetaminophen (TYLENOL) tablet 650 mg    Or    acetaminophen (TYLENOL) Suppository 650 mg    albuterol (PROVENTIL HFA/VENTOLIN HFA) inhaler    artificial saliva (BIOTENE DRY MOUTHWASH) liquid 5 mL    citalopram (celeXA) tablet 40 mg    ferrous sulfate (FEROSUL) tablet 325 mg    fluticasone-vilanterol (BREO ELLIPTA) 200-25 MCG/ACT inhaler 1 puff    lactulose (CHRONULAC) solution 20 g    levothyroxine (SYNTHROID/LEVOTHROID) tablet 112 mcg    LORazepam (ATIVAN) tablet 0.5 mg    melatonin tablet 3 mg    midodrine (PROAMATINE) tablet 15 mg    montelukast (SINGULAIR) tablet 10 mg    pantoprazole (PROTONIX) EC tablet 40 mg    polyethylene glycol (MIRALAX) Packet 17 g    prochlorperazine (COMPAZINE) injection 5 mg    Or    prochlorperazine (COMPAZINE) tablet 5 mg    Or    prochlorperazine (COMPAZINE) suppository 25 mg    rifaximin (XIFAXAN) tablet 550 mg            Physical Exam:   VS:  /43 (BP Location: Left arm)   Pulse 69   Temp 97.7  F (36.5  C) (Oral)   Resp 18   Ht 1.651 m (5' 5\")   Wt 63.9 kg (140 lb 14 oz)   LMP  (LMP Unknown)   SpO2 99%   BMI 23.44 kg/m      Wt:   Wt Readings from Last 2 Encounters:   11/17/23 63.9 kg (140 lb 14 oz)   10/03/23 62.3 kg (137 lb 4.8 oz)      Constitutional: NAD  Eyes: Conjunctiva +icteric  HEENT: Mucus membranes moist  CV: RRR, III/VI systolic murmur  Respiratory: 2 L nasal cannula, crackles bilaterally  Abd: Distended, no rebound or guarding "   Skin: +  jaundice  Neuro: AAO x 3, No asterixis         Laboratory:   BMP  Recent Labs   Lab 11/17/23  0609 11/16/23  0618 11/15/23  0634 11/14/23  0542   * 123* 123* 125*   POTASSIUM 4.7 4.6 4.0 4.1   CHLORIDE 88* 91* 91* 91*   UMA 9.7 9.4 9.6 9.5   CO2 23 25 24 23   BUN 53.1* 54.1* 53.3* 59.4*   CR 0.81 0.78 0.74 0.96*   GLC 95 71 110* 101*     CBC  Recent Labs   Lab 11/17/23  0609 11/16/23  0618 11/15/23  0634 11/14/23  0542   WBC 8.3 8.7 11.6* 16.3*   RBC 2.68* 2.47* 2.64* 2.70*   HGB 8.6* 7.9* 8.4* 8.4*   HCT 26.4* 23.4* 25.2* 25.0*   MCV 99 95 96 93   MCH 32.1 32.0 31.8 31.1   MCHC 32.6 33.8 33.3 33.6   RDW 18.8* 18.5* 18.3* 18.3*   PLT 84* 62* 62* 77*     INR  Recent Labs   Lab 11/17/23  0609 11/16/23  0618 11/15/23  0634 11/14/23  0542   INR 1.93* 1.95* 2.02* 2.22*     LFTs  Recent Labs   Lab 11/17/23  0609 11/16/23  0618 11/15/23  0634 11/14/23  0542   ALKPHOS 73 67 71 84   AST 45 40 45 52*   ALT 40 34 37 39   BILITOTAL 6.3* 5.4* 7.3* 8.9*   PROTTOTAL 5.2* 4.8* 5.1* 5.0*   ALBUMIN 3.4* 3.2* 3.4* 3.5        Imaging/Procedures/Studies:   CTA chest 11/13/2023  1. Dependent blood products in the large right effusion. No active  extravasation. Right intercostal arteries appear intact.     2. 8 mm distal splenic artery aneurysm.     3. Total consolidation of the right lung. Left lung central ground  glass opacities. Correlate for pneumonia. Mediastinal shift to the  left.     4. Cirrhotic liver. No focal hepatic lesion. Hepatic hydrothorax.  Ascites. Recannulized paraumbilical vein.

## 2023-11-17 NOTE — IR NOTE
Patient Name: Stefani Crowell  Medical Record Number: [id number]  Today's Date: 11/17/2023    Procedure: Right sided thoracentesis  Proceduralist: Tank Young PA-C  Pathology present: N/A    Procedure Start: 1027  Procedure end: 1235  Sedation medications administered: N/A, local only     Report given to: Suzette FENG RN  : N/A    Other Notes: Pt arrived to IR room 7 from 7C. Consent reviewed. Pt denies any questions or concerns regarding procedure. Pt positioned sitting and monitored per protocol. Pt tolerated procedure without any noted complications. Pt transferred back to .     4 L of serosanguinous fluid removed. 12.5 g of albumin given.

## 2023-11-18 NOTE — PROGRESS NOTES
[facility]    Medicine Progress Note - Hospitalist Service, GOLD TEAM 9    Date of Admission:  10/22/2023    Assessment & Plan   Stefani Crowell is a 63 year old female with a pertinent past medical history of alcoholic cirrhosis, hepatorenal syndrome, hypothyroidism, recurrent hyponatremia, recurrent pleural effusions, COPD and recent admission for hyponatremia who was admitted on 10/22/2023 due to increased dyspnea related to R pleural effusion and hyponatremia.     Goals of care conversation:  Discussed with Palliative care team (11/17) and reiterated our options, jaime. With the fact that TIPS may not be a feasible option, since spontaneous improvement in bilirubin is unlikely. Discussed about option 1 - to see how see does over the weekend and on Monday, if tolerated her thoracentesis with supplemental oxygen, continue outpatient 3/week thoracentesis, working towards a liver transplant. Discussed about how hyponatremia, recurrent infections, continued adherence to lactulose and dietary restrictions could be a challenge with this option. Option 2- would be proceed with hospice with a indwelling pleural catheter if option 1 fails. Appreciate palliative care assistance. Either way, the plan is for Monday discharge    MATTIE, likely secondary to hepatorenal syndrome  Hyponatremia  Suspect HRS. Had improvement with terlipressin (11/1-11/6), slight bump again in Cr once it was stopped.  - hold diuretics, no plans to resume per hepatology  - continue midodrine (resumed once off terlipressin)  - S Creat has improved but hyponatremia persistent    Hepatic hydrothorax  Acute hypoxic respiratory failure 2/2 above  Diuretic-refractory due to hyponatremia. Not currently a TIPS candidate due to elevation of bilirubin.  - Continue with thoracenteses as needed (has needed ~3 times per week) --> last thora on 11/12 with 2.5 L out  - Diagnostics to be sent with each thora --> 11/12  thora fluid w/o SBE  - Wean supplemental O2 as able  - Per hepatology and IR: plan for staged thoras and tolerated session on 11/15 with nearly 3 L out. Repeated staged thora today (11/17) with 4 L removed  - Tentatively scheduled for TIPS on 12/6, though pt is aware that she may not be a candidate at that time, since per IR elevated bili (>3) is a contraindication for TIPS  - persistent    Hemothorax  On morning of 11/13 patient found to have hypotension with dizziness and was noted to have 2 g Hgb drop from the day prior. Despite recent thora, CXR with white-out right lung. CT chest confirmed presence of blood. CTA completed without evidence for active bleeding. Patient's BP improved with IVF bolus and 2 units of PRBC (11/13).  - - CT chest, CTA chest completed  - no recurrence of hemothorax and H/H stable    Decompensated alcoholic cirrhosis  Hx hepatic encephalopathy  Abd US negative for thrombosis and HCC. EGD 11/2 normal. Not a liver transplant candidate currently due to incomplete CD program and abstinence period. Engaged in CD program outpatient. Peth negative 10/19.  Patient is currently not a transplant candidate due to not having completed CD treatment and the required period of sobriety. She is working on these, but needs to be outpatient in order to continue. Her hospital course has been challenging, and as TIPS is not a possibility she is aware that her situation is challenging. After discussion with rosa CORONA on 11/8, pt would like to have additional discussions about her overall GOC. Palliative care involved on 11/9.  - Hepatology consulted, appreciate assistance  - Continue home lactulose 20g TID, rifaximin 550mg BID  - Palliative care following, appreciate assistance  - Health psych consult    Moderate malnutrition in the context of chronic illness  - RD following    Acute on chronic normocytic anemia  Concern for GIB, nothing identified on scope  Hgb dropping below 7 starting 11/1 without signs of  bleeding. On 11/2 noted to have dark red bloody stools. Taken for EGD without signs of bleeding. Colonoscopy without source of bleeding, portal colopathy .  - Transfuse for Hgb < 7     Thrombocytopenia  Coagulopathy  - Likely secondary to cirrhosis     Elevated troponin  - Admit 27, 28. Likely secondary to poor kidney clearance  - Unremarkable EKG    Hypothyroidism  Last TSH was 0.74 on 07/24/23  - Continue home levothyroxine 112 mcg po daily    GERD  - Continue pantoprazole 40 mg po qdaily    Anxiety  - Holding citalopram 40 mg po qdaily and trazodone 50 mg po at bedtime  - continue lorazepam 0.5 mg po qdaily prn    Hypokalemia, resolved  - replacement protocol    Post-thoracentesis pneumothorax, resolved  Noted after 11/6 thora. Respiratory status stable. Serial CXRs demonstrated improvement.          Diet: Fluid restriction 1000 ML FLUID  2 Gram Sodium Diet  Snacks/Supplements Adult: Other; pudding at 2:00, applesauce at HS; Between Meals  Snacks/Supplements Adult: Other; Ensure Enlive at 8:00 ( vanilla or strawberry) patient likes this at 8:00 am , 2:00 pm ensure enlive,  HS : Ensrue max vanilla; Between Meals    DVT Prophylaxis: Pneumatic Compression Devices  Demarco Catheter: Not present  Lines: None     Cardiac Monitoring: None  Code Status: No CPR- Do NOT Intubate      Clinically Significant Risk Factors         # Hyponatremia: Lowest Na = 122 mmol/L in last 2 days, will monitor as appropriate      # Hypoalbuminemia: Lowest albumin = 3.1 g/dL at 11/13/2023  6:46 AM, will monitor as appropriate    # Coagulation Defect: INR = 1.98 (Ref range: 0.85 - 1.15) and/or PTT = N/A, will monitor for bleeding  # Thrombocytopenia: Lowest platelets = 72 in last 2 days, will monitor for bleeding           # Moderate Malnutrition: based on nutrition assessment    # Financial/Environmental Concerns: none  # Asthma: noted on problem list        Disposition Plan     Expected Discharge Date: 11/20/2023      Destination: home with  family  Discharge Comments: Dispo: home with HC, not yet secured            Robin Dotson MD  Hospitalist Service, GOLD TEAM 9  [facility]  Securely message with [employer] (more info)  Text page via [employer] Paging/Directory   See signed in provider for up to date coverage information  ______________________________________________________________________    Interval History     Continued improvement in shortness of breath and no worsening. Not required oxygen since thoracentesis    Physical Exam   Vital Signs: Temp: 98  F (36.7  C) Temp src: Oral BP: 90/47 Pulse: 72   Resp: 16 SpO2: 99 % O2 Device: None (Room air)    Weight: 140 lbs 13.98 oz    Constitutional: awake, alert, appears fatigued  Eyes: scleral icterus present  Respiratory: mild resp distress, left lung fields clear. Improved aeration in R lung fields  Cardiovascular: 4/6 systolic murmur   GI: soft, non-distended, non-tender  Neurologic: alert and oriented x3, moving all extremities equally    Medical Decision Making     50 MINUTES SPENT BY ME on the date of service doing chart review, history, exam, documentation & further activities per the note.      Data     I have personally reviewed the following data over the past 24 hrs:    8.0  \   8.5 (L)   / 72 (L)     122 (L) 89 (L) 51.1 (H) /  128 (H)   4.6 25 0.77 \     ALT: 36 AST: 39 AP: 83 TBILI: 5.2 (H)   ALB: 3.4 (L) TOT PROTEIN: 5.0 (L) LIPASE: N/A     INR:  1.98 (H) PTT:  N/A   D-dimer:  N/A Fibrinogen:  N/A       Imaging results reviewed over the past 24 hrs:   No results found for this or any previous visit (from the past 24 hour(s)).

## 2023-11-18 NOTE — PLAN OF CARE
Goal Outcome Evaluation:      Plan of Care Reviewed With: patient    Overall Patient Progress: improvingOverall Patient Progress: improving    Outcome Evaluation: No acute changes this shift. A&O x4. Denies pain. Mild nausea this morning- PRN PO compazine given w/ good relief. VSS on RA, soft BPs on scheduled midodrine. Pt reports having 4 BMs today. Wound cares completed. Family at bedside and supportive with cares.

## 2023-11-18 NOTE — PLAN OF CARE
"Goal Outcome Evaluation:      Plan of Care Reviewed With: patient    Alert and oriented x4, called to make needs known. Soft BP, scheduled midodrine given. Remained stable on RA. Pleasant this morning and reports feeling \"good despite waking up a little too early\".     BP 90/47 (BP Location: Right arm)   Pulse 72   Temp 98  F (36.7  C) (Oral)   Resp 16   Ht 1.651 m (5' 5\")   Wt 63.9 kg (140 lb 14 oz)   LMP  (LMP Unknown)   SpO2 99%   BMI 23.44 kg/m      "

## 2023-11-19 NOTE — PROGRESS NOTES
[facility]    Medicine Progress Note - Hospitalist Service, GOLD TEAM 9    Date of Admission:  10/22/2023    Assessment & Plan   Stefani Crowell is a 63 year old female with a pertinent past medical history of alcoholic cirrhosis, hepatorenal syndrome, hypothyroidism, recurrent hyponatremia, recurrent pleural effusions, COPD and recent admission for hyponatremia who was admitted on 10/22/2023 due to increased dyspnea related to R pleural effusion and hyponatremia.     Goals of care conversation:  Discussed with Palliative care team (11/17) and reiterated our options, jaime. With the fact that TIPS may not be a feasible option, since spontaneous improvement in bilirubin is unlikely. Discussed about option 1 - to see how see does over the weekend and on Monday, if tolerated her thoracentesis with supplemental oxygen, continue outpatient 3/week thoracentesis, working towards a liver transplant. Discussed about how hyponatremia, recurrent infections, continued adherence to lactulose and dietary restrictions could be a challenge with this option. Option 2- would be proceed with hospice with a indwelling pleural catheter if option 1 fails. Appreciate palliative care assistance. Either way, the plan is for Monday discharge    MATTIE, likely secondary to hepatorenal syndrome  Hyponatremia  Suspect HRS. Had improvement with terlipressin (11/1-11/6), slight bump again in Cr once it was stopped.  - hold diuretics, no plans to resume per hepatology  - continue midodrine (resumed once off terlipressin)  - S Creat has improved but hyponatremia persistent  - now worsening hyponatremia which has responded to IV albumin, but IV albumin may risk increasing pleural effusion, necessitating repeat thoracentesis, implying that outpatient staged thoracentesis may not be practical.  Will check sodium q8h for now     Hepatic hydrothorax  Acute hypoxic respiratory failure 2/2  above  Diuretic-refractory due to hyponatremia. Not currently a TIPS candidate due to elevation of bilirubin.  - Continue with thoracenteses as needed (has needed ~3 times per week) --> last thora on 11/12 with 2.5 L out  - Diagnostics to be sent with each thora --> 11/12 thora fluid w/o SBE  - Wean supplemental O2 as able  - Per hepatology and IR: plan for staged thoras and tolerated session on 11/15 with nearly 3 L out. Repeated staged thora today (11/17) with 4 L removed  - Tentatively scheduled for TIPS on 12/6, though pt is aware that she may not be a candidate at that time, since per IR elevated bili (>3) is a contraindication for TIPS  - persistent    Decompensated alcoholic cirrhosis  Hx hepatic encephalopathy  Abd US negative for thrombosis and HCC. EGD 11/2 normal. Not a liver transplant candidate currently due to incomplete CD program and abstinence period. Engaged in CD program outpatient. Peth negative 10/19.  Patient is currently not a transplant candidate due to not having completed CD treatment and the required period of sobriety. She is working on these, but needs to be outpatient in order to continue. Her hospital course has been challenging, and as TIPS is not a possibility she is aware that her situation is challenging. After discussion with rosa CORONA on 11/8, pt would like to have additional discussions about her overall GOC. Palliative care involved on 11/9.  - Hepatology consulted, appreciate assistance  - Continue home lactulose 20g TID, rifaximin 550mg BID  - Palliative care following, appreciate assistance  - Health psych consult      Hemothorax  On morning of 11/13 patient found to have hypotension with dizziness and was noted to have 2 g Hgb drop from the day prior. Despite recent thora, CXR with white-out right lung. CT chest confirmed presence of blood. CTA completed without evidence for active bleeding. Patient's BP improved with IVF bolus and 2 units of PRBC (11/13).  - - CT chest, CTA  chest completed  - no recurrence of hemothorax and H/H stable    Moderate malnutrition in the context of chronic illness  - RD following    Acute on chronic normocytic anemia  Concern for GIB, nothing identified on scope  Hgb dropping below 7 starting 11/1 without signs of bleeding. On 11/2 noted to have dark red bloody stools. Taken for EGD without signs of bleeding. Colonoscopy without source of bleeding, portal colopathy .  - Transfuse for Hgb < 7     Thrombocytopenia  Coagulopathy  - Likely secondary to cirrhosis     Elevated troponin  - Admit 27, 28. Likely secondary to poor kidney clearance  - Unremarkable EKG    Hypothyroidism  Last TSH was 0.74 on 07/24/23  - Continue home levothyroxine 112 mcg po daily    GERD  - Continue pantoprazole 40 mg po qdaily    Anxiety  - Holding citalopram 40 mg po qdaily and trazodone 50 mg po at bedtime  - continue lorazepam 0.5 mg po qdaily prn    Hypokalemia, resolved  - replacement protocol    Post-thoracentesis pneumothorax, resolved  Noted after 11/6 thora. Respiratory status stable. Serial CXRs demonstrated improvement.          Diet: Fluid restriction 1000 ML FLUID  2 Gram Sodium Diet  Snacks/Supplements Adult: Other; pudding at 2:00, applesauce at HS; Between Meals  Snacks/Supplements Adult: Other; Ensure Enlive at 8:00 ( vanilla or strawberry) patient likes this at 8:00 am , 2:00 pm ensure enlive,  HS : Ensrue max vanilla; Between Meals    DVT Prophylaxis: Pneumatic Compression Devices  Demarco Catheter: Not present  Lines: None     Cardiac Monitoring: None  Code Status: No CPR- Do NOT Intubate      Clinically Significant Risk Factors         # Hyponatremia: Lowest Na = 119 mmol/L in last 2 days, will monitor as appropriate      # Hypoalbuminemia: Lowest albumin = 3.1 g/dL at 11/13/2023  6:46 AM, will monitor as appropriate    # Coagulation Defect: INR = 1.89 (Ref range: 0.85 - 1.15) and/or PTT = N/A, will monitor for bleeding  # Thrombocytopenia: Lowest platelets = 72 in  last 2 days, will monitor for bleeding           # Moderate Malnutrition: based on nutrition assessment    # Financial/Environmental Concerns: none  # Asthma: noted on problem list        Disposition Plan     Expected Discharge Date: 11/20/2023      Destination: home with family  Discharge Comments: Dispo: home with HC, not yet secured            Robin Dotson MD  Hospitalist Service, GOLD TEAM 9  [facility]  Securely message with [employer] (more info)  Text page via [employer] Paging/Directory   See signed in provider for up to date coverage information  ______________________________________________________________________    Interval History     No new complaints. Reports good appetite and PO intake. No worsening shortness of breath and not needed supplemental oxygen in some time    Physical Exam   Vital Signs: Temp: 98.2  F (36.8  C) Temp src: Oral BP: 106/42 Pulse: 80   Resp: 18 SpO2: 95 % O2 Device: None (Room air)    Weight: 133 lbs 9.6 oz    Constitutional: awake, alert, appears fatigued  Eyes: scleral icterus present  Respiratory: mild resp distress, left lung fields clear. Improved aeration in R lung fields  Cardiovascular: 4/6 systolic murmur   GI: soft, non-distended, non-tender  Neurologic: alert and oriented x3, moving all extremities equally    Medical Decision Making     55 MINUTES SPENT BY ME on the date of service doing chart review, history, exam, documentation & further activities per the note.      Data     I have personally reviewed the following data over the past 24 hrs:    9.1  \   9.2 (L)   / 84 (L)     119 (LL) 88 (L) 58.4 (H) /  87   5.1 23 0.88 \     ALT: 38 AST: 42 AP: 80 TBILI: 5.9 (H)   ALB: 3.5 TOT PROTEIN: 5.5 (L) LIPASE: N/A     INR:  1.89 (H) PTT:  N/A   D-dimer:  N/A Fibrinogen:  N/A       Imaging results reviewed over the past 24 hrs:   No results found for this or any previous visit (from the past 24 hour(s)).

## 2023-11-19 NOTE — PROGRESS NOTES
[facility]  Palliative Care Daily Progress Note       Recommendations & Counseling     Goals of care / Palliative Care encounter:  Lolly hopes to be able to get through the rest of the weekend without significant shortness of breath and feel well enough on Monday to get thoracentesis and then discharge home with plans for M, W, F outpatient thoracentesis.  Her goal is to be able to go home and continue outpatient thoracenteses with the hope that she will have more time to live.  If her clinical status deteriorates significantly before Monday's thoracentesis or her sodium level is too low to be able to repeat thoracentesis or causes mental status changes, we will have to readdress goals of care and at that time patient would likely elect to have a Pleurx catheter placed and go home with hospice care.  For now, continue all current medical cares as per medicine team, Dr. Dotson  DNI/DNR  POLST completed and copied and placed in paper chart for discharge    Symptom management:  We are not actively managing symptoms at this time.      Thank you for the opportunity to continue to participate in the care of this patient and family.  Please feel free to contact on-call palliative provider with any emergent needs.  We can be reached via Securely message with the Vocera Web Console (learn more here) or Text page via [facility] Paging/y     Poonam Ortiz MD / Palliative Medicine   Medical Decision Making       Please see A&P for additional details of medical decision making.  MANAGEMENT DISCUSSED with the following over the past 24 hours: Bedside RN, medicine team   NOTE(S)/MEDICAL RECORDS REVIEWED over the past 24 hours: Nursing, medicine, GI  Tests REVIEWED in the past 24 hours:  - CMP  - CBC         Assessments          Stefani Crowell is a 63 year old female with a past medical history of alcoholic cirrhosis (last use 6/28/23) complicated by hepatic  hydrothorax with twice weekly thoracentesis, hepatic encephalopathy managed by lactulose, hyponatremia, COPD, weight loss, ascites who was admitted 10/22 with dyspnea from right pleural effusion and hyponatremia. Hospital course complicated by MATTIE/HRS     Today, the patient was seen for:  Goals of care  Support  Cirrhosis with hepatic hydrothorax  Hyponatremia  Recurrent pleural effusions  Shortness of breath                 Interval History:     Chart review/discussion with unit or clinical team members:   No acute events overnight.   Sodium levels are decreasing and now being followed every 8 hours    Per patient or family/caregivers today:  Lolly feels well without shortness of breath and remains anxious to go home tomorrow  She wanted to know what she could do to help her sodium levels and states she does not feel any effects from the low sodium levels             Review of Systems:     Besides above, a complete 10+ ROS was reviewed and is unremarkable           Medications:     I have reviewed this patient's medication profile and medications during this hospitalization.               Physical Exam:   Vitals were reviewed  Patient Vitals for the past 12 hrs:   BP Temp Temp src Pulse Resp SpO2   11/19/23 0523 106/42 98.2  F (36.8  C) Oral 80 18 95 %     General: Alert, sitting up in bed, appears stated age, comfortable, in no acute distress, engaged, appropriate, sensorium intact             Data Reviewed:     ROUTINE ICU LABS (Last four results)  CMP  Recent Labs   Lab 11/19/23  0722 11/18/23  0711 11/17/23  0609 11/16/23  0618   * 122* 122* 123*   POTASSIUM 5.1 4.6 4.7 4.6   CHLORIDE 88* 89* 88* 91*   CO2 23 25 23 25   ANIONGAP 8 8 11 7   GLC 87 128* 95 71   BUN 58.4* 51.1* 53.1* 54.1*   CR 0.88 0.77 0.81 0.78   GFRESTIMATED 74 87 82 86   UMA 10.0 9.6 9.7 9.4   PROTTOTAL 5.5* 5.0* 5.2* 4.8*   ALBUMIN 3.5 3.4* 3.4* 3.2*   BILITOTAL 5.9* 5.2* 6.3* 5.4*   ALKPHOS 80 83 73 67   AST 42 39 45 40   ALT 38 36 40  34     CBC  Recent Labs   Lab 11/19/23  0721 11/18/23  0711 11/17/23  0609 11/16/23  0618   WBC 9.1 8.0 8.3 8.7   RBC 2.85* 2.61* 2.68* 2.47*   HGB 9.2* 8.5* 8.6* 7.9*   HCT 28.0* 25.7* 26.4* 23.4*   MCV 98 99 99 95   MCH 32.3 32.6 32.1 32.0   MCHC 32.9 33.1 32.6 33.8   RDW 18.7* 18.8* 18.8* 18.5*   PLT 84* 72* 84* 62*     INR  Recent Labs   Lab 11/19/23  0721 11/18/23  0711 11/17/23  0609 11/16/23  0618   INR 1.89* 1.98* 1.93* 1.95*     Arterial Blood GasNo lab results found in last 7 days.

## 2023-11-19 NOTE — PLAN OF CARE
"Goal Outcome Evaluation:      Plan of Care Reviewed With: patient    Overall Patient Progress: improving    Alert and oriented x4, called to make needs known. Soft BP, scheduled midodrine given. Remained stable on RA and denied SOB. Mild pain near thoracentesis site-tylenol given with some relief. Per MD note, planning for discharge Monday if she continues to go without another thoracentesis. VSS on RA.    /42 (BP Location: Left arm)   Pulse 80   Temp 98.2  F (36.8  C) (Oral)   Resp 18   Ht 1.651 m (5' 5\")   Wt 60.6 kg (133 lb 9.6 oz)   LMP  (LMP Unknown)   SpO2 95%   BMI 22.23 kg/m      "

## 2023-11-20 PROBLEM — K76.9 PLEURAL EFFUSION ASSOCIATED WITH HEPATIC DISORDER: Status: ACTIVE | Noted: 2023-01-01

## 2023-11-20 PROBLEM — J91.8 PLEURAL EFFUSION ASSOCIATED WITH HEPATIC DISORDER: Status: ACTIVE | Noted: 2023-01-01

## 2023-11-20 NOTE — PROGRESS NOTES
"SPIRITUAL HEALTH SERVICES - Progress Note   [facility] ([place]) [address]    Referral Source/Reason for Visit: Follow up     I spoke with Lolly and then her sister and sister in law separately.  Lolly  told me that she'd had a lot of disappointment this past week.  She was supposed to go home on Friday, but wasn't able to.  She hopes tomorrow she will be able to go home.  I affirmed that this was frustrating.  Lolly misses the comfort of her own home, and having some control in her life.    Lolly's sister in law and sister expressed concern over whether or not Lolly is processing her approaching death and whether or not she understood the seriousness of the situation.  I let them know I could not share what Lolly shares with me, but that I do know she is very tired and that may contribute to her quietness. They also expressed that they were trying to \"carry Lolly through this\" and \"put a cushion between us and [the situation]\" and that they planned to deal with their grief in post. I shared with them a prompt that they may want to consider before Lolly's death; \"I forgive you, I'm sorry, thank you, I love you\".    Lolly's sister in law asked if a death  would be helpful for Lolly.  I said that I felt death  were a great resource, but it was up to Lolly.      Plan: If Lolly's stay is extended I will follow up in a few days.     Ria Gr  Chaplain Resident  Pager [phone]    [facility] available 24/7 for emergent requests/referrals, either by paging the on-call  or by entering an ASAP/STAT consult in [employer], which will also page the on-call .     "

## 2023-11-20 NOTE — PLAN OF CARE
Goal Outcome Evaluation:      Plan of Care Reviewed With: patient    Overall Patient Progress: no changeOverall Patient Progress: no change    Outcome Evaluation: A&O x4. Denies pain.VSS on RA, soft BPs on scheduled midodrine. Pt reports having 4 BMs today. Wound cares completed. Worked with OT today, ambulated around unit. Family at bedside and supportive with cares. Critical Na level: 119, plan to trend Na Q8. Last Na: 120.

## 2023-11-20 NOTE — PLAN OF CARE
Goal Outcome Evaluation:      Plan of Care Reviewed With: patient    Overall Patient Progress: no changeOverall Patient Progress: no change    Outcome Evaluation: Plan to d/c on 11/21 after thoracentesis.    Loni Mayfield RNCC

## 2023-11-20 NOTE — PLAN OF CARE
"Goal Outcome Evaluation:      Plan of Care Reviewed With: patient    Overall Patient Progress: no change    No significant changes overnight. Alert and oriented x4, called to make needs known. Remained stable on RA and reports that breathing has remained the same throughout the weekend and denied SOB. Pain at thoracentesis site improving (rates 2/10). Site appears to be healing well. Per MD note, planning for possible discharge today pending next sodium result and need for additional thoracentesis before discharge. VSS on RA.       /48 (BP Location: Left arm)   Pulse 79   Temp 98.2  F (36.8  C) (Oral)   Resp 16   Ht 1.651 m (5' 5\")   Wt 61.6 kg (135 lb 14.4 oz)   LMP  (LMP Unknown)   SpO2 97%   BMI 22.61 kg/m      "

## 2023-11-20 NOTE — PLAN OF CARE
Oxygen Documentation  I certify that this patient, Stefani Crowell has been under my care (or a nurse practitioner or physican's assistant working with me). This is the face-to-face encounter for oxygen medical necessity.      At the time of this encounter supplemental oxygen is reasonable and necessary and is expected to improve the patient's condition in a home setting.       Patient has continued oxygen desaturation due to Chronic Respiratory Failure with Hypoxia J96.11  Pleural effusion associated with hepatic disorder K76.9, J91.8    If portability is ordered, is the patient mobile within the home? yes      Robin Dotson MD

## 2023-11-20 NOTE — PROGRESS NOTES
Patient has been assessed for Home Oxygen needs. Oxygen readings:    *Pulse oximetry (SpO2) = 94% on room air at rest while awake.    *SpO2 improved to NA% on NAliters/minute at rest.    *SpO2 = 91% on room air during activity/with exercise.    *SpO2 improved to NA% on NAliters/minute during activity/with exercise.

## 2023-11-20 NOTE — PROGRESS NOTES
[facility]    Medicine Progress Note - Hospitalist Service, GOLD TEAM 9    Date of Admission:  10/22/2023    Assessment & Plan   Stefani Crowell is a 63 year old female with a pertinent past medical history of alcoholic cirrhosis, hepatorenal syndrome, hypothyroidism, recurrent hyponatremia, recurrent pleural effusions, COPD and recent admission for hyponatremia who was admitted on 10/22/2023 due to increased dyspnea related to R pleural effusion and hyponatremia.     Goals of care conversation:  Discussed with Palliative care team (11/17) and reiterated our options, jaime. With the fact that TIPS may not be a feasible option, since spontaneous improvement in bilirubin is unlikely. Discussed about option 1 - to see how see does over the weekend and on Monday, if tolerated her thoracentesis with supplemental oxygen, continue outpatient 3/week thoracentesis, working towards a liver transplant. Discussed about how hyponatremia, recurrent infections, continued adherence to lactulose and dietary restrictions could be a challenge with this option. Option 2- would be procedure    MATTIE, likely secondary to hepatorenal syndrome  Hyponatremia  Suspect HRS. Had improvement with terlipressin (11/1-11/6), slight bump again in Cr once it was stopped.  - hold diuretics, no plans to resume per hepatology  - continue midodrine (resumed once off terlipressin)  - S Creat has improved but hyponatremia persistent  - now worsening hyponatremia which has responded to IV albumin, but IV albumin may risk increasing pleural effusion, necessitating repeat thoracentesis so will be judicious  - improving without any intervention    Hepatic hydrothorax  Acute hypoxic respiratory failure 2/2 above  Diuretic-refractory due to hyponatremia. Not currently a TIPS candidate due to elevation of bilirubin.  - Continue with thoracenteses as needed (has needed ~3 times per week) --> last thora on 11/12 with  2.5 L out  - Diagnostics to be sent with each thora --> 11/12 thora fluid w/o SBE  - Wean supplemental O2 as able  - Per hepatology and IR: plan for staged thoras and tolerated session on 11/15 with nearly 3 L out. Repeated staged thora today (11/17) with 4 L removed  - Tentatively scheduled for TIPS on 12/6, though pt is aware that she may not be a candidate at that time, since per IR elevated bili (>3) is a contraindication for TIPS  - Due for IR staged thoracentesis tomorrow and discharge thereafter. CAPS tried thora today but unable to get good views  - Has outpatient follow up at [facility] on 11/23/23 for evaluation   - will arrange home oxygen    Decompensated alcoholic cirrhosis  Hx hepatic encephalopathy  Abd US negative for thrombosis and HCC. EGD 11/2 normal. Not a liver transplant candidate currently due to incomplete CD program and abstinence period. Engaged in CD program outpatient. Peth negative 10/19.  Patient is currently not a transplant candidate due to not having completed CD treatment and the required period of sobriety. She is working on these, but needs to be outpatient in order to continue. Her hospital course has been challenging, and as TIPS is not a possibility she is aware that her situation is challenging. After discussion with rosa CORONA on 11/8, pt would like to have additional discussions about her overall GOC. Palliative care involved on 11/9.  - Hepatology consulted, appreciate assistance  - Continue home lactulose 20g TID, rifaximin 550mg BID  - Palliative care following, appreciate assistance  - Health psych consult    Hemothorax  On morning of 11/13 patient found to have hypotension with dizziness and was noted to have 2 g Hgb drop from the day prior. Despite recent thora, CXR with white-out right lung. CT chest confirmed presence of blood. CTA completed without evidence for active bleeding. Patient's BP improved with IVF bolus and 2 units of PRBC (11/13).  - - CT chest, CTA chest  completed  - no recurrence of hemothorax and H/H stable    Moderate malnutrition in the context of chronic illness  - RD following    Acute on chronic normocytic anemia  Concern for GIB, nothing identified on scope  Hgb dropping below 7 starting 11/1 without signs of bleeding. On 11/2 noted to have dark red bloody stools. Taken for EGD without signs of bleeding. Colonoscopy without source of bleeding, portal colopathy .  - Transfuse for Hgb < 7     Thrombocytopenia  Coagulopathy  - Likely secondary to cirrhosis     Elevated troponin  - Admit 27, 28. Likely secondary to poor kidney clearance  - Unremarkable EKG    Hypothyroidism  Last TSH was 0.74 on 07/24/23  - Continue home levothyroxine 112 mcg po daily    GERD  - Continue pantoprazole 40 mg po qdaily    Anxiety  - Holding citalopram 40 mg po qdaily and trazodone 50 mg po at bedtime  - continue lorazepam 0.5 mg po qdaily prn    Hypokalemia, resolved  - replacement protocol    Post-thoracentesis pneumothorax, resolved  Noted after 11/6 thora. Respiratory status stable. Serial CXRs demonstrated improvement.          Diet: Fluid restriction 1000 ML FLUID  2 Gram Sodium Diet  Snacks/Supplements Adult: Other; pudding at 2:00, applesauce at HS; Between Meals  Snacks/Supplements Adult: Other; Ensure Enlive at 8:00 ( vanilla or strawberry) patient likes this at 8:00 am , 2:00 pm ensure enlive,  HS : Ensrue max vanilla; Between Meals    DVT Prophylaxis: Pneumatic Compression Devices  Demarco Catheter: Not present  Lines: None     Cardiac Monitoring: None  Code Status: No CPR- Do NOT Intubate      Clinically Significant Risk Factors         # Hyponatremia: Lowest Na = 119 mmol/L in last 2 days, will monitor as appropriate      # Hypoalbuminemia: Lowest albumin = 3.1 g/dL at 11/13/2023  6:46 AM, will monitor as appropriate    # Coagulation Defect: INR = 1.74 (Ref range: 0.85 - 1.15) and/or PTT = N/A, will monitor for bleeding  # Thrombocytopenia: Lowest platelets = 83 in last  2 days, will monitor for bleeding           # Moderate Malnutrition: based on nutrition assessment    # Financial/Environmental Concerns: none  # Asthma: noted on problem list        Disposition Plan      Expected Discharge Date: 11/21/2023      Destination: home with family  Discharge Comments: Dispo: home with HC, not yet secured            Robin Dotson MD  Hospitalist Service, GOLD TEAM 9  [facility]  Securely message with [employer] (more info)  Text page via [employer] Paging/Directory   See signed in provider for up to date coverage information  ______________________________________________________________________    Interval History     Frustrated about not getting her IR staged thoracentesis today.     Physical Exam   Vital Signs: Temp: 97.7  F (36.5  C) Temp src: Oral BP: 103/48 Pulse: 76   Resp: 16 SpO2: 97 % O2 Device: Nasal cannula Oxygen Delivery: 2 LPM  Weight: 136 lbs 14.49 oz    Constitutional: awake, alert, appears fatigued  Eyes: scleral icterus present  Respiratory: mild resp distress, left lung fields clear. Improved aeration in R lung fields  Cardiovascular: 4/6 systolic murmur   GI: soft, non-distended, non-tender  Neurologic: alert and oriented x3, moving all extremities equally    Medical Decision Making     50 MINUTES SPENT BY ME on the date of service doing chart review, history, exam, documentation & further activities per the note.      Data     I have personally reviewed the following data over the past 24 hrs:    9.6  \   9.4 (L)   / 83 (L)     120 (L) 87 (L) 56.0 (H) /  89   5.0 25 0.80 \     ALT: 41 AST: 46 (H) AP: 103 TBILI: 5.3 (H)   ALB: 3.7 TOT PROTEIN: 5.7 (L) LIPASE: N/A     INR:  1.74 (H) PTT:  N/A   D-dimer:  N/A Fibrinogen:  N/A       Imaging results reviewed over the past 24 hrs:   Recent Results (from the past 24 hour(s))   POC US Guide for Thorcentesis    Impression    Limited point of care pleural/lung ultrasound to evaluate for  thoracentesis.    Thoracentesis Indication:pleural effusion     Views/Acquisition: Right  pleural space(s): upright.      Findings/Interpretation: No safe pocket found to perform bedside thoracentesis    Desi Price MD

## 2023-11-20 NOTE — PROGRESS NOTES
Hepatology Follow up Note         Assessment and Plan:   Stefani Crowell is a 63 year old female who has been admitted since October 22, 2023 for dyspnea in the setting of her hepatic hydrothorax and hyponatremia. She has a past medical history of decompensated alcohol-related cirrhosis complicated by hepatic encephalopathy, hepatic hydrothorax, ascites and hyponatremia.  Past medical history includes COPD and alcohol use disorder.     #.  Decompensated alcohol-related cirrhosis  #.  Hepatic hydrothorax  #.  Ascites  #.  Hepatic encephalopathy  #.  Hyponatremia  #.  Acute kidney injury; s/p terlipressin 11/1-11/6 for presumed HRS  #. Alcohol use disorder  MELD 3.0: 25 at 11/20/2023  1:50 PM  MELD-Na: 27 at 11/20/2023  1:50 PM  Calculated from:  Serum Creatinine: 0.80 mg/dL (Using min of 1 mg/dL) at 11/20/2023  6:02 AM  Serum Sodium: 120 mmol/L (Using min of 125 mmol/L) at 11/20/2023  1:50 PM  Total Bilirubin: 5.3 mg/dL at 11/20/2023  6:02 AM  Serum Albumin: 3.7 g/dL (Using max of 3.5 g/dL) at 11/20/2023  6:02 AM  INR(ratio): 1.74 at 11/20/2023  6:02 AM  Age at listing (hypothetical): 63 years  Sex: Female at 11/20/2023  1:50 PM      The patient remains hypoxic at times and needs 2 L in the setting of hepatic hydrothorax.  She is limited in terms of diuretics given her severe hyponatremia.    Following thoracentesis on 11/12 the patient developed a concern for hemothorax on CT chest on 11/13/2023.  CTA was performed to evaluate for active extravasation but nothing was visualized.  The patient's hemoglobin has been stable without any further drop following PRBC transfusions.  The patient had elevated WBC but without any fevers or chills. Leukocytosis is improving therefore more suggestive of an acute response.     She was being considered for TIPS placement however this has not been feasible at this time given her total bilirubin remains in the 7-8s despite being mostly unconjugated.  We have discussed her case  with multiple interventional radiologists including Dr. Rodríguez on 11/10/2023.  Currently she is scheduled for TIPS placement on 12/6/2023 however this will only be possible if her total bilirubin comes down to 3-4, she has been stable from a pulmonary standpoint and she has had no further episodes of hypotension.  Our concern is that she may never be a TIPS candidate at this time.     She has been evaluated for liver transplantation but she is currently not a transplant candidate given she was told about her alcohol use disorder but then returned to use.  She needs to complete chemical dependency programming prior to being considered for transplantation.     The patient is being considered for liver transplantation pending completion of outpatient discontinue treatment.  Given this she is not a candidate for a pleural catheter given the infectious risk.  If she is not deemed to be a liver transplant candidate or she does not wish to pursue this avenue can consider this palliative approach at that time.    For discharge planning, per discussion with medicine and palliative care, the patient and family opted to stay over the weekend to see if she is able to go without thora for 2 days. If she is doing well over the weekend, she will then discharge on Monday after the staged thora by IR.            Recommendations:   -- Agree with discharge later today if doing well after bedside thoracentesis   -- Continue lactulose and rifaximin for hepatic encephalopathy management  -- No diuretics given hyponatremia    Outpatient:  * Schedule for possible TIPS 12/6/2023 -however this would not be a possibility unless she remained stable from a pulmonary and blood pressure standpoint and her total bilirubin improved to 3-4  * Already set up thoracenteses 3x/week (M, W. F) starting on 11/22 at [facility] with albumin replacement (notified sister Riri)       Thank you for involving us in this patient's care. Please do not hesitate to  "contact the GI service with any questions or concerns.     Pt care plan discussed with Dr. Powers, GI staff physician.    Latoya Florez MD PhD  GI Fellow   [phone]          Subjective/Objective:   She was doing well this weekend without oxygen. Had more shortness of breath this morning.            Medications:     Current Facility-Administered Medications   Medication    acetaminophen (TYLENOL) tablet 650 mg    Or    acetaminophen (TYLENOL) Suppository 650 mg    albuterol (PROVENTIL HFA/VENTOLIN HFA) inhaler    artificial saliva (BIOTENE DRY MOUTHWASH) liquid 5 mL    citalopram (celeXA) tablet 40 mg    ferrous sulfate (FEROSUL) tablet 325 mg    fluticasone-vilanterol (BREO ELLIPTA) 200-25 MCG/ACT inhaler 1 puff    lactulose (CHRONULAC) solution 20 g    levothyroxine (SYNTHROID/LEVOTHROID) tablet 112 mcg    LORazepam (ATIVAN) tablet 0.5 mg    melatonin tablet 3 mg    midodrine (PROAMATINE) tablet 15 mg    montelukast (SINGULAIR) tablet 10 mg    pantoprazole (PROTONIX) EC tablet 40 mg    polyethylene glycol (MIRALAX) Packet 17 g    prochlorperazine (COMPAZINE) injection 5 mg    Or    prochlorperazine (COMPAZINE) tablet 5 mg    Or    prochlorperazine (COMPAZINE) suppository 25 mg    rifaximin (XIFAXAN) tablet 550 mg            Physical Exam:   VS:  /48 (BP Location: Left arm)   Pulse 76   Temp 97.7  F (36.5  C) (Oral)   Resp 16   Ht 1.651 m (5' 5\")   Wt 62.1 kg (136 lb 14.5 oz)   LMP  (LMP Unknown)   SpO2 97%   BMI 22.78 kg/m      Wt:   Wt Readings from Last 2 Encounters:   11/20/23 62.1 kg (136 lb 14.5 oz)   10/03/23 62.3 kg (137 lb 4.8 oz)      Constitutional: NAD  Eyes: Conjunctiva +icteric  HEENT: Mucus membranes moist  CV: RRR, III/VI systolic murmur  Respiratory: 2 L nasal cannula, crackles bilaterally  Abd: Distended, no rebound or guarding   Skin: +  jaundice  Neuro: AAO x 3, No asterixis         Laboratory:   BMP  Recent Labs   Lab 11/20/23  1350 11/20/23  0602 11/19/23  2215 11/19/23  1349 " 11/19/23  0722 11/18/23  0711 11/17/23  0609   * 121*  121* 120* 120* 119* 122* 122*   POTASSIUM  --  5.0  --   --  5.1 4.6 4.7   CHLORIDE  --  87*  --   --  88* 89* 88*   UMA  --  10.1  --   --  10.0 9.6 9.7   CO2  --  25  --   --  23 25 23   BUN  --  56.0*  --   --  58.4* 51.1* 53.1*   CR  --  0.80  --   --  0.88 0.77 0.81   GLC  --  89  --   --  87 128* 95     CBC  Recent Labs   Lab 11/20/23  0602 11/19/23  0721 11/18/23  0711 11/17/23  0609   WBC 9.6 9.1 8.0 8.3   RBC 2.93* 2.85* 2.61* 2.68*   HGB 9.4* 9.2* 8.5* 8.6*   HCT 28.6* 28.0* 25.7* 26.4*   MCV 98 98 99 99   MCH 32.1 32.3 32.6 32.1   MCHC 32.9 32.9 33.1 32.6   RDW 18.6* 18.7* 18.8* 18.8*   PLT 83* 84* 72* 84*     INR  Recent Labs   Lab 11/20/23  0602 11/19/23  0721 11/18/23  0711 11/17/23  0609   INR 1.74* 1.89* 1.98* 1.93*     LFTs  Recent Labs   Lab 11/20/23  0602 11/19/23  0722 11/18/23  0711 11/17/23  0609   ALKPHOS 103 80 83 73   AST 46* 42 39 45   ALT 41 38 36 40   BILITOTAL 5.3* 5.9* 5.2* 6.3*   PROTTOTAL 5.7* 5.5* 5.0* 5.2*   ALBUMIN 3.7 3.5 3.4* 3.4*        Imaging/Procedures/Studies:   CTA chest 11/13/2023  1. Dependent blood products in the large right effusion. No active  extravasation. Right intercostal arteries appear intact.     2. 8 mm distal splenic artery aneurysm.     3. Total consolidation of the right lung. Left lung central ground  glass opacities. Correlate for pneumonia. Mediastinal shift to the  left.     4. Cirrhotic liver. No focal hepatic lesion. Hepatic hydrothorax.  Ascites. Recannulized paraumbilical vein.

## 2023-11-21 NOTE — PROVIDER NOTIFICATION
"Provider notified regarding pt having a large black stool with blood streaks. Stool was loose and had a coffee ground appearance. Pt previous stools were charted as brown. Also notified that pt was unable to complete walk test due to feeling light headed and dizzy when standing. Scheduled midodrine given.     BP (!) 90/30 (BP Location: Left arm)   Pulse 66   Temp 97.6  F (36.4  C) (Axillary)   Resp 18   Ht 1.651 m (5' 5\")   Wt 62.1 kg (136 lb 14.5 oz)   LMP  (LMP Unknown)   SpO2 100%   BMI 22.78 kg/m       Provider ordered IV abx, labs, and for pt to be NPO.   "

## 2023-11-21 NOTE — PLAN OF CARE
Goal Outcome Evaluation:      Plan of Care Reviewed With: patient    Overall Patient Progress: no changeOverall Patient Progress: no change    Outcome Evaluation: No acute changes overnight. Denies pain. Follow POC. Possible discharge today.

## 2023-11-21 NOTE — PROGRESS NOTES
"[facility]  WO Nurse Inpatient Assessment     Consulted for: bilateral knees    Patient History (according to provider note(s):      Stefani Crowell is a 63 year old female with a pertinent past medical history of alcoholic cirrhosis, hepatorenal syndrome, hypothyroidism, recurrent hyponatremia, recurrent pleural effusions, COPD and recent admission for hyponatremia who was admitted on 10/22/2023 due to increased dyspnea related to R pleural effusion and hyponatremia.     Assessment:      Areas visualized during today's visit: Lower extremities  and Upper extremities     Wound location: bilateral knees and left elbow       Right knee           Left knee    Last photo: 11/21  Wound due to: Skin Tear and Trauma  Wound history/plan of care: pt has very fragile skin. Knees are from recent fall. Left arm skin flap is now reattached and forming dry scab. No mepilex in place to left elbow skin tear at time of assessment. Patient reports Mepilex dressing \"hurt too much when they took it off \" so declined to have it replaced. Now that skin flap is reattached, will adjust orders for Aquaphor.  11/21: bilateral martha skin tears area improving and healing. Left elbow skin tear is healed.  Wound base:  100% epidermis     Palpation of the wound bed: normal      Drainage: none     Description of drainage: none     Measurements (length x width x depth, in cm): see photos      Tunneling: N/A     Undermining: N/A  Periwound skin: Intact      Color: red      Temperature: normal   Odor: none  Pain: mild, tender to left arm only  Pain interventions prior to dressing change: slow and gentle cares   Treatment goal: Heal  and Protection  STATUS: healing and healed left elbow skin tear is healed  Supplies ordered: at bedside     11/21: [facility] to sign off for left elbow, will continue to monitor knee skin tears.    Treatment Plan:     Bilateral knees and left forearm wound(s): Daily  cleanse with " saline and pat dry. Apply Aquaphor(order#[id number]) to wounds and leave JIMI.      Orders: Reviewed    RECOMMEND PRIMARY TEAM ORDER: None, at this time  Education provided: plan of care and wound progress  Discussed plan of care with: Patient and Nurse  [facility] nurse follow-up plan: weekly  Notify WOC if wound(s) deteriorate.  Nursing to notify the Provider(s) and re-consult the WO Nurse if new skin concern.    DATA:     Current support surface: Standard  Standard gel/foam mattress (IsoFlex, Atmos air, etc)  Containment of urine/stool: Incontinent pad in bed  BMI: Body mass index is 22.78 kg/m .   Active diet order: Orders Placed This Encounter      2 Gram Sodium Diet      Diet     Output: I/O last 3 completed shifts:  In: 1100 [P.O.:1100]  Out: -      Labs:   Recent Labs   Lab 11/21/23  0647   ALBUMIN 3.5   HGB 8.7*   INR 1.85*   WBC 9.1     Pressure injury risk assessment:   Sensory Perception: 4-->no impairment  Moisture: 4-->rarely moist  Activity: 3-->walks occasionally  Mobility: 3-->slightly limited  Nutrition: 2-->probably inadequate  Friction and Shear: 2-->potential problem  Castillo Score: 18    Halina Webb RN, BSN, CWON  Pager no longer is use, please contact through [employer] group: [facility] Nurse [place]  Dept. Office Number: [phone]

## 2023-11-21 NOTE — PROGRESS NOTES
[facility]  Palliative Care Daily Progress Note       Recommendations & Counseling     Goals of care / Palliative Care encounter:    Lolly is still hoping to have better way to manage her respiratory symptoms. She states she felt terrible yesterday but today feels better and able to work with PT this morning. She was rather sleepy during our encounter and stated she just wants to feel better.     Pt's older sister told me that she understands that pleurx drain might be the best way to manage her recurrent pleural effusions and that in order for Lolly to have the drain, she needs to be enrolled in hospice and for now, they are still trying to figure out if there are other ways to manage her recurring pleural effusions without enrolling in hospice.     Plan for care conference on Thursday to support patient and family determining next steps.  Advanced care planning:     Shivam care directive now on file    Code status: DNR/DNI - changed on 11/9. Will need POLST prior to discharge    Symptom management:    We are not actively managing symptoms at this time.     PSYCHOSOCIAL/SPIRITUAL:    Family good support from , children, siblings    Spiritual --Says she follows some Budhist beliefs and would appreciate  support.  following    palliative SW is following for coping support       Thank you for the opportunity to continue to participate in the care of this patient and family.  Please feel free to contact on-call palliative provider with any emergent needs.  We can be reached via Securely message with the Vocera Web Console (learn more here)    Poonam Ortiz MD / Palliative Medicine         MANAGEMENT DISCUSSED with the following over the past 24 hours: Medicine team, gastroenterology team   NOTE(S)/MEDICAL RECORDS REVIEWED over the past 24 hours: Nursing notes, care coordinator note, interventional radiology, gastroenterology, medicine  Tests  REVIEWED in the past 24 hours:  - CMP  - CBC  SUPPLEMENTAL HISTORY, in addition to the patient's history, over the past 24 hours obtained from:   - Sibling         Assessments          Stefani Crowell is a 63 year old female with a past medical history of alcoholic cirrhosis (last use 6/28/23) complicated by hepatic hydrothorax with twice weekly thoracentesis, hepatic encephalopathy managed by lactulose, hyponatremia, COPD, weight loss, ascites who was admitted 10/22 with dyspnea from right pleural effusion and hyponatremia. Hospital course complicated by MATTIE/HRS.      Today, the patient was seen for:  Goals of care  Support  Cirrhosis with hepatic hydrothorax  Recurrent pleural effusions   Dyspnea  Hyponatremia                Interval History:     Chart review/discussion with unit or clinical team members:   Events of yesterday reviewed including decreased blood pressure, bloodly pleural effusion and concern for active bleeding  Patient stabilized after IV fluids and transfusion    GI note from 11/13:   She was being considered for TIPS placement however this has not been feasible at this time given her total bilirubin remains in the 7-8s despite being mostly unconjugated.  We have discussed her case with multiple interventional radiologists including Dr. Rodríguez on 11/10/2023.  Currently she is scheduled for TIPS placement on 12/6/2023 however this will only be possible if her total bilirubin comes down to 3-4, she has been stable from a pulmonary standpoint and she has had no further episodes of hypotension.  Our concern is that she may never be a TIPS candidate at this time.     She has been evaluated for liver transplantation but she is currently not a transplant candidate given she was told about her alcohol use disorder but then returned to use.  She needs to complete chemical dependency programming prior to being considered for transplantation.     Given the above prolonged hospitalization with inability  to safely discharge her we have had multiple goals of care conversations given it is uncertain if she will ever make it to TIPS and she is currently not a liver transplant candidate and has not been able to engage in chemical dependency programming while inpatient.  We appreciate palliative care involvement. Currently DNR/DNI as of 11/9/2023.    Per patient or family/caregivers today:  Patient feeling much better today than yesterday.  Worked with physical therapy this morning             Review of Systems:     Besides above, a complete 10+ ROS was reviewed and is unremarkable           Medications:     I have reviewed this patient's medication profile and medications during this hospitalization.             Physical Exam:   Vitals were reviewed  Temp: 97.4  F (36.3  C) Temp src: Axillary BP: (!) 80/34 Pulse: 73   Resp: 26 SpO2: 98 % O2 Device: Nasal cannula Oxygen Delivery: 3 LPM  Gen: Lying in bed, tired, intermittently falling asleep, appears comfortable, in no acute distress, sensorium not fully intact and needs help with complex medical decision making             Data Reviewed:     Reviewed recent labs and pertinent imaging  ROUTINE ICU LABS (Last four results)  CMP  Recent Labs   Lab 11/21/23  0647 11/20/23  1350 11/20/23  0602 11/19/23  2215 11/19/23  1349 11/19/23  0722 11/18/23  0711   * 120* 121*  121* 120*   < > 119* 122*   POTASSIUM 5.2  --  5.0  --   --  5.1 4.6   CHLORIDE 86*  --  87*  --   --  88* 89*   CO2 24  --  25  --   --  23 25   ANIONGAP 9  --  9  --   --  8 8   *  --  89  --   --  87 128*   BUN 67.4*  --  56.0*  --   --  58.4* 51.1*   CR 0.81  --  0.80  --   --  0.88 0.77   GFRESTIMATED 82  --  83  --   --  74 87   UMA 9.9  --  10.1  --   --  10.0 9.6   PROTTOTAL 5.5*  --  5.7*  --   --  5.5* 5.0*   ALBUMIN 3.5  --  3.7  --   --  3.5 3.4*   BILITOTAL 5.6*  --  5.3*  --   --  5.9* 5.2*   ALKPHOS 78  --  103  --   --  80 83   AST 44  --  46*  --   --  42 39   ALT 39  --  41  --    --  38 36    < > = values in this interval not displayed.     CBC  Recent Labs   Lab 11/21/23  0647 11/20/23  0602 11/19/23  0721 11/18/23  0711   WBC 9.1 9.6 9.1 8.0   RBC 2.71* 2.93* 2.85* 2.61*   HGB 8.7* 9.4* 9.2* 8.5*   HCT 26.2* 28.6* 28.0* 25.7*   MCV 97 98 98 99   MCH 32.1 32.1 32.3 32.6   MCHC 33.2 32.9 32.9 33.1   RDW 18.6* 18.6* 18.7* 18.8*   PLT 86* 83* 84* 72*     INR  Recent Labs   Lab 11/21/23  0647 11/20/23  0602 11/19/23  0721 11/18/23  0711   INR 1.85* 1.74* 1.89* 1.98*     Arterial Blood GasNo lab results found in last 7 days.

## 2023-11-21 NOTE — DISCHARGE SUMMARY
[facility]  Hospitalist Discharge Summary      Date of Admission:  10/22/2023  Date of Discharge:  11/21/2023  Discharging Provider: CELINA MOODY MD  Discharge Service: Hospitalist Service, GOLD TEAM 9    Discharge Diagnoses   Hepatic Hydrothorax  Alcoholic Cirrhosis  HRS- Cr improved      Clinically Significant Risk Factors     # Moderate Malnutrition: based on nutrition assessment      Follow-ups Needed After Discharge   Follow-up Appointments     Adult [facility] Follow-up and recommended labs and tests      Follow up with primary care provider, Aliyah Marcus, within 7 days   for hospital follow- up.  The following labs/tests are recommended:   comprehensive metabolic panel.    Please follow up with GI medicine as outpatient and with [facility] for   thoracentesis on 11/22/23          Appointments on [place] and/or [facility] (with [facility] or [facility]   provider or service). Call [phone] if you haven't heard regarding   these appointments within 7 days of discharge.        PCP to follow up on:  - repeat CBC and BMP on 11/27  - continue omeprazole 20mg BID (patient to purchase over the counter)  - follow up with [facility] on every M, W, F for thoracentesis. During admission, patient used to have up to 4L safely removed at each session.    Unresulted Labs Ordered in the Past 30 Days of this Admission       Date and Time Order Name Status Description    11/6/2023  9:59 PM Prepare red blood cells (unit) Preliminary         These results will be followed up by PCP    Discharge Disposition   Discharged to home  Condition at discharge: Stable    Hospital Course   Stefani Crowell is a 63 year old female with a pertinent past medical history of alcoholic cirrhosis, hepatorenal syndrome, hypothyroidism, recurrent hyponatremia, recurrent pleural effusions, COPD and recent admission for hyponatremia who was admitted on 10/22/2023 due to increased  dyspnea related to R pleural effusion and hyponatremia. At the time of admission, patient was noted to have HRS which was managed. Cr improved throughout her hospitalization. Na remained low but stable. Low Na was thought to be due to her cirrhosis. Patient's acute hypoxia was due to hydrothorax. She underwent thoracentesis which helped her hypoxia. Patient was noted to need thoracentesis 3 times per week in hospital and once discharged home. On 11/21 patient was noted to have melanic stools and required 3 units of prbcs. Per chart review she was noted to have a small esophageal varice on EGD and diverticulosis on colonoscopy done 3 weeks earlier. Given her liver disease, patient was started on octreotide, ceftriaxone for variceal bleeding. She underwent EGD which did not not acute variceal bleed. On 11/23 she received another prbc due to down trending hgb. Since then her hgb remained stable and hgb improved higher than expected with 1 unit of prbc. Since patient was also sx improving and no longer having melena, she was safely discharged home on 11/23/2023 with recommendations to take BID omeprazole, repeat cbc in 4 days and follow up with PCP and hepatology. Patient also had established appnts with [facility] for thoracentesis 3x per week (Monday Wednesday Friday).    Consultations This Hospital Stay   NURSING TO CONSULT FOR VASCULAR ACCESS CARE IP CONSULT  INTERNAL MEDICINE PROCEDURE TEAM ADULT IP CONSULT [place] - THORACENTESIS  INTERNAL MEDICINE PROCEDURE TEAM ADULT IP CONSULT [place] - PARACENTESIS  RESPIRATORY CARE IP CONSULT  GI HEPATOLOGY ADULT IP CONSULT  NUTRITION SERVICES ADULT IP CONSULT  INTERNAL MEDICINE PROCEDURE TEAM ADULT IP CONSULT [place] - THORACENTESIS  INTERNAL MEDICINE PROCEDURE TEAM ADULT IP CONSULT [facility] THORACENTESIS  NEPHROLOGY GENERAL ADULT IP CONSULT  CARDIOLOGY GENERAL ADULT IP CONSULT  INTERNAL MEDICINE PROCEDURE TEAM ADULT IP CONSULT [facility]  THORACENTESIS  INTERNAL MEDICINE PROCEDURE TEAM ADULT IP CONSULT [facility] - THORACENTESIS  WOUND OSTOMY CONTINENCE NURSE  IP CONSULT  CARE MANAGEMENT / SOCIAL WORK IP CONSULT  INTERNAL MEDICINE PROCEDURE TEAM ADULT IP CONSULT [facility] - THORACENTESIS  NURSING TO CONSULT FOR VASCULAR ACCESS CARE IP CONSULT  INTERVENTIONAL RADIOLOGY ADULT/PEDS IP CONSULT  NURSING TO CONSULT FOR VASCULAR ACCESS CARE IP CONSULT  SPIRITUAL HEALTH SERVICES IP CONSULT  INTERNAL MEDICINE PROCEDURE TEAM ADULT IP CONSULT [facility] - THORACENTESIS  PHYSICAL THERAPY ADULT IP CONSULT  NUTRITION SERVICES ADULT IP CONSULT  SPIRITUAL HEALTH SERVICES IP CONSULT  PSYCHOLOGY ADULT IP CONSULT  PALLIATIVE CARE ADULT IP CONSULT  INTERNAL MEDICINE PROCEDURE TEAM ADULT IP CONSULT [facility] - THORACENTESIS  SPIRITUAL HEALTH SERVICES IP CONSULT  INTERNAL MEDICINE PROCEDURE TEAM ADULT IP CONSULT [facility] - THORACENTESIS  NURSING TO CONSULT FOR VASCULAR ACCESS CARE IP CONSULT  INTERNAL MEDICINE PROCEDURE TEAM ADULT IP CONSULT [facility] - THORACENTESIS    Code Status   No CPR- Do NOT Intubate    Time Spent on this Encounter   ICELINA MD, personally saw the patient today and spent greater than 30 minutes discharging this patient.       CELINA MOODY MD  [address] MED SURG  [address] [facility] [address]  Phone: [phone]  ______________________________________________________________________    Physical Exam   Vital Signs: Temp: 97.4  F (36.3  C) Temp src: Axillary BP: (!) 91/35 Pulse: 66   Resp: 18 SpO2: 100 % O2 Device: Nasal cannula Oxygen Delivery: 3 LPM  Weight: 136 lbs 14.49 oz  Constitutional: awake, alert, more interactive  Eyes: scleral icterus present  Respiratory: mild resp distress, left lung fields clear. Improved aeration in R lung fields  Cardiovascular: 4/6 systolic murmur   GI: soft, non-distended, non-tender  Neurologic: alert and oriented x3, moving all extremities equally       Primary Care  Physician   Aliyah Marcus    Discharge Orders      Reason for your hospital stay    You were admitted for decompensated cirrhosis with recurrence R sided hepatic hydrothorax     Activity    Your activity upon discharge: activity as tolerated     Adult [facility] Follow-up and recommended labs and tests    Follow up with primary care provider, Aliyah Marcus, within 7 days for hospital follow- up.  The following labs/tests are recommended: comprehensive metabolic panel.    Please follow up with GI medicine as outpatient and with [facility] for thoracentesis on 11/22/23          Appointments on [place] and/or [place] [facility] (with [facility] or [facility] provider or service). Call [phone] if you haven't heard regarding these appointments within 7 days of discharge.     Oxygen Order    Oxygen Documentation  I certify that this patient, Stefani Crowell has been under my care (or a nurse practitioner or physican's assistant working with me). This is the face-to-face encounter for oxygen medical necessity.      At the time of this encounter supplemental oxygen is reasonable and necessary and is expected to improve the patient's condition in a home setting.       Patient has continued oxygen desaturation due to Chronic Respiratory Failure with Hypoxia J96.11  Pleural effusion associated with hepatic disorder K76.9, J91.8    If portability is ordered, is the patient mobile within the home? yes     Diet    Follow this diet upon discharge: Orders Placed This Encounter      Fluid restriction 1000 ML FLUID      Snacks/Supplements Adult: Other; pudding at 2:00, applesauce at HS; Between Meals      Snacks/Supplements Adult: Other; Ensure Enlive at 8:00 ( vanilla or strawberry) patient likes this at 8:00 am , 2:00 pm ensure enlive,  HS : Ensrue max vanilla; Between Meals      2 Gram Sodium Diet       Significant Results and Procedures   Results for orders placed or performed during the hospital encounter of 10/22/23    Chest XR,  PA & LAT    Narrative    EXAM: CHEST 2 VIEWS  LOCATION: [facility]  DATE/TIME: 10/22/2023 2:33 AM CDT    INDICATION: Shortness of breath.  COMPARISON: 10/14/2023.      Impression    IMPRESSION:   1. No convincing interval change since the comparison study.  2. Large right pleural effusion with adjacent atelectasis.  2. The left lung is clear.    POC US GUIDE FOR PARACENTESIS    Impression    US Indication: decompensated liver disease    Limited abdominal ultrasound was performed to evaluate for ascites and need for paracentesis.     Views/Acquisition: hepatorenal/RUQ, right lower quadrant, parasplenic/LUQ, and left lower quadrant    Findings/Interpretation: No significant ascites    Desi Price MD     POC US Guide for Thorcentesis    Impression    Limited chest ultrasound was performed and demonstrated an adequate fluid collection on the right hemithorax.     Thoracentesis Indication:pleural effusion    Doppler of the skin demonstrated an area at this site without significant vasculature.  A thoracentesis at this site was subsequently performed.  See images stored with POCUS for paracentesis for same date.    Ultrasound revealed normal lung sliding after the procedure.  Desi Price MD    US Abdomen Complete w Doppler Complete    Narrative    EXAMINATION: US ABDOMEN COMPLETE WITH DOPPLER COMPLETE 10/22/2023  11:40 AM     COMPARISON: CT abdomen/pelvis 3/27/2023, 9/10/2020 ultrasound    HISTORY: Evaluate ascites + r/o clot    TECHNIQUE: The abdomen was scanned in standard fashion with  specialized ultrasound transducer(s) using both gray-scale, color  Doppler, and spectral flow techniques.    Findings:  Doppler Evaluation:   Splenic Vein: Patent with retrograde flow, 40 cm/sec.    Portal Venous Flow:  Main Portal Vein: Patent with antegrade flow, 18 cm/sec.  Right Portal Vein: Patent with antegrade flow, 27 cm/sec.  Left Portal Vein: Patent with antegrade flow,  28 cm/sec.    Hepatic Veins:  The right, middle, and left hepatic veins are patent with flow towards  the IVC. IVC is patent.    Hepatic Arterial Flow:  Hepatic Artery: Resistive Index of 0.70, peak systolic velocity  125  cm/sec.  Left and right hepatic arteries not identified.    Gray-scale Analysis:  Liver: Hepatic parenchyma demonstrates coarsened echotexture with a  nodular capsular contour. No focal hepatic lesion. The right hepatic  lobe measures 12.7 cm craniocaudally. Main portal vein measures 1.3 cm  in diameter.    Gallbladder: The gallbladder is well distended and of normal  morphology. There is no pericholecystic fluid or sonographic Dempsey's  sign. Shadowing stones in the lumen.    Bile Ducts: No intra- or extra-hepatic biliary dilation. Common bile  duct measures 5 mm..    Pancreas: Not visualized    Kidneys: Both kidneys are of normal echotexture.  No evidence of mass  or hydronephrosis. Right kidney measures 9.2 cm, left measures 11.5  cm.    Spleen: The spleen is of normal echogenicity. Measures 15.7 cm,  enlarged.    Aorta and IVC: The visualized portions of the aorta and IVC are  unremarkable. Proximal aorta measures 1.9 cm.    Free fluid in the right upper and lower quadrants. Right pleural  effusion.      Impression    Impression:   1. Cirrhotic hepatic morphology. No focal hepatic lesion.  2. Patent hepatic vasculature. Retrograde blood flow in the splenic  vein. Low velocity flow in the main portal vein at 18 cm/s.  3. Ascites and splenomegaly, compatible portal hypertension.  4. Cholelithiasis.  5. Right pleural effusion.    I have personally reviewed the examination and initial interpretation  and I agree with the findings.    JENN BOWMAN DO         SYSTEM ID:  [id number]   XR Chest Port 1 View    Narrative    Exam: XR CHEST PORT 1 VIEW, 10/22/2023 2:51 PM    Indication: right thoracentesis. r/o pneumothorax    Comparison: Earlier same day chest x-ray    Findings:   Decreased though  persistent moderate to large right effusion. No  pneumothorax. Left lung is clear.      Impression    Impression:   1. No pneumothorax.  2. Decreased now moderate to large right pleural effusion with  continued adjacent atelectasis.    I have personally reviewed the examination and initial interpretation  and I agree with the findings.    JENN BOWMAN DO         SYSTEM ID:  [id number]   POC US GUIDE FOR THORACENTESIS    Impression    US Thoracentesis Indication:pleural effusion, dyspnea, hypoxia, and decompensated liver disease    Limited chest ultrasound was performed and demonstrated an adequate fluid collection on the right hemithorax. Doppler of the skin demonstrated an area at this site without significant vasculature. A thoracentesis at this site was subsequently performed.  Post-procedure POCUS at the apex (while patient seated) showed adequate lung sliding in B-mode        XR Chest Port 1 View    Narrative    Portable chest    INDICATION: Post right thoracentesis    COMPARISON: 10/22/2023    FINDINGS: Little significant change in the prominent right hemithorax  meniscus and dense opacification with silhouetting of the right  hemidiaphragm period is consistent with large pleural effusion and  likely associated atelectasis. The right heart border is completely  obscured by this density. No ectopic air. Left lung appears clear.    IMPRESSION no significant change in the large right pleural effusion  and associated atelectasis with no visible pneumothorax.    JUSTINO SHEEHAN MD         SYSTEM ID:  [id number]   XR Chest Port 1 View    Narrative    EXAM: XR CHEST PORT 1 VIEW 10/27/2023 9:27 AM    DEMOGRAPHICS: Female of 63 years    INDICATION: Recurrent pleural effusion, shortness of breath    COMPARISON: Chest radiograph 10/25/2023, 10/22/2023.    TECHNIQUE: Single portable AP view of the chest.    FINDINGS:   Complete right lung opacification, increased from prior with no  apparent aeration in the right upper  lung field as compared to prior.  Trachea is midline. Left lung is overall clear. Visible portion of the  cardiac silhouette is within normal limits. No pneumothorax. Upper  abdomen is unremarkable. No acute osseous or soft tissue abnormality.      Impression    IMPRESSION:   Increased, now complete right lung opacification with no mediastinal  shift. This is most consistent with severe, large right pleural  effusion. Central obstruction cannot be excluded. Recommend follow-up  to resolution.    I have personally reviewed the examination and initial interpretation  and I agree with the findings.    JUSTINO SHEEHAN MD         SYSTEM ID:  [id number]   POC US GUIDE FOR THORACENTESIS    Impression    US Thoracentesis Indication:pleural effusion and decompensated liver disease    Limited chest ultrasound was performed and demonstrated an adequate fluid collection on the right hemithorax. Doppler of the skin demonstrated an area at this site without significant vasculature. A thoracentesis at this site was subsequently performed.  Post-procedure POCUS at the apex (while patient seated) showed adequate lung sliding in B-mode and M-mode      __________________________________________________________    Limited Bedside Abdominal Ultrasound, performed and interpreted by me.     Indication: Abdominal swelling. Evaluate for Free fluid.     With the patient in Trendelenburg, the RUQ, LUQ, (including the paracolic gutters) views were examined for free fluid. With the patient in reverse Trendelenburg, the super pubic view was examined for free fluid.     Findings     RUQ: medium abdominal fluid pocket, no loculations normal liver echotexture, mobile intestines  RLQ: trace abdominal fluid pocket, no loculations mobile intestines  LLQ:no abdominal fluid pocket, no loculations mobile intestines  LUQ: no abdominal fluid pocket,mobile intestines, normal renal and splenic echotexture   Suprapubic: small ascites, bladder       IMPRESSION:   Free fluid present as noted above.      Mayito Wylie MD   10/27/2023           POC US GUIDE FOR THORACENTESIS    Impression    US Thoracentesis Indication:pleural effusion, dyspnea, and hypoxia    Limited chest ultrasound was performed and demonstrated an adequate fluid collection on the right hemithorax. Doppler of the skin demonstrated an area at this site without significant vasculature. A thoracentesis at this site was subsequently performed.  Post-procedure POCUS at the apex (while patient seated) showed adequate lung sliding in B-mode          XR Chest Port 1 View    Narrative    Portable chest    Indication short of breath    COMPARISON: 10/27/2023    FINDINGS: Improved aeration of the right upper lung. Decreased but  still prominent meniscus formation at the right costophrenic angle.  Rounded density overlying the left upper lung which may indicate  infectious consolidation.      Impression    IMPRESSION: Decreased right pleural effusion associated lung base  atelectasis. Short interval increase in conspicuity nodular density in  the left upper quadrant of the left midlung density comment this may  indicate developing infection or loculated fluid. Follow-up to  clearing.    JUSTINO SHEEHAN MD         SYSTEM ID:  [id number]   POC US Guide for Thorcentesis    Impression    Limited point of care pleural/lung ultrasound to evaluate for thoracentesis.    Thoracentesis Indication:pleural effusion     Views/Acquisition: Right  pleural space(s): upright.      Findings/Interpretation: No safe pocket identified for bedside thoracentesis    Desi Price MD     XR Chest Port 1 View    Narrative    Examination:  XR CHEST PORT 1 VIEW    Date:  11/3/2023 9:47 AM     Clinical Information: Pleural effusion, hypoxia     Additional Information: none    Comparison: Chest x-ray 11/2/2023,    Findings:     PA view of the chest. Silhouetting of the right heart border and right  hemidiaphragm. Leftward  deviation of the trachea. Large right nearly  complete dense airway opacity with meniscus.Bilateral mixed diffuse  airspace opacities. The left costophrenic angle is not completely  visualized. No suspicious osseous lesions.      Impression    Impression:    1. Slightly increased right large pleural effusion.  2. Bilateral mixed air space opacities likely pulmonary edema.  3. Right basilar linear opacity, likely compressive atelectasis.    I have personally reviewed the examination and initial interpretation  and I agree with the findings.    MARIA FERNANDA FRITZ MD         SYSTEM ID:  [id number]   IR Thoracentesis    Narrative    PROCEDURE: Ultrasound-guided thoracentesis, right     Procedural Personnel  Advanced practice provider: Ninoska Marroquin PA-C and Marilyn June PA-C    Pre-procedure diagnosis:  Right pleural effusion  Post-procedure diagnosis:  Same  Indication: 63 year old?female?with a pertinent past medical history  of alcoholic cirrhosis, hepatorenal syndrome, hypothyroidism,  recurrent hyponatremia, recurrent pleural effusions, COPD and recent  admission for hyponatremia who was?admitted on?10/22/2023?due to  increased dyspnea related to R pleural effusion and hyponatremia.   Additional clinical history: None    Complications: No immediate complications.      Impression    IMPRESSION:    Ultrasound-guided thoracentesis with drainage of 2000 mL of   serosanguinous fluid.    Plan: Thoracentesis site covered with an air occlusive dressing.  Resume care by clinical team.  _______________________________________________________________    PROCEDURE SUMMARY:  - Ultrasound-guided thoracentesis  - Additional procedure(s): None    PROCEDURE DETAILS:    Pre-procedure  Consent: Informed consent for the procedure including risks, benefits  and alternatives was obtained and time-out was performed prior to the  procedure.  Preparation: The site was prepared and draped using maximal sterile  barrier technique including cutaneous  antisepsis.    Anesthesia/sedation  Level of anesthesia/sedation: 1% lidocaine was utilized, 1ml.   Anesthesia/sedation administered by: Not applicable  Total intra-service sedation time (minutes): Not applicable    Limited thoracic ultrasound  Limited thoracic ultrasound was performed.   Left hemithorax findings: Not investigated  Right hemithorax findings: Large pleural effusion    Thoracentesis  Local anesthesia was administered. A safe window for thoracentesis was  identified with ultrasound. The pleural space was accessed and fluid  return confirmed position. The fluid was drained.  The catheter was  removed and a sterile dressing was applied.  Catheter size (Fr): 5FR  Catheter valve: No  Fluid appearance: serosanguinous  Volume drained (mL): 2000  Post-drainage ultrasound: Moderate effusion    Additional Details  Additional description of procedure: None  Equipment details: None  Specimens removed: None.   Estimated blood loss (mL): Less than 10  Standardized report: SIR_Thoracentesis_v3.1    Attestation  Signer name: VERÓNICA ARANGO PA         SYSTEM ID:  [id number]   POC US Guide for Thorcentesis    Impression    Limited chest ultrasound was performed and demonstrated an adequate fluid collection on the right hemithorax. Doppler of the skin demonstrated an area at this site without significant vasculature. A thoracentesis at this site was subsequently performed.   XR Chest Port 1 View     Value    Radiologist flags Pneumothorax (Urgent)    Narrative    Examination: XR CHEST PORT 1 VIEW 11/6/2023 5:16 PM    Indication: s/p thoracentesis.    Comparison: X-ray 11/3/2023    Findings:  AP portable chest. Trachea is midline. Cardiac silhouette appears  stable, though partially obscured. Trace/mild decrease in the large  right pleural effusion. Slight decrease in the bilateral mixed  interstitial airspace opacities with relative peripheral sparing.  Trace left pleural effusion. Interval small right  apical pneumothorax.  No left pneumothorax. Unchanged osseous structures.      Impression    Impression:   1. Small right apical pneumothorax following thoracentesis.  2. Mild decrease in the large right pleural effusion. Possible trace  left pleural effusion.  3. Decreased bilateral mixed interstitial and patchy airspace  opacities.    [Urgent Result: Pneumothorax]    Finding was identified on 11/6/2023 5:16 PM.     Dr. Olea was contacted by Dr. Blanco at 11/6/2023 5:18 PM and  verbalized understanding of the urgent finding.     I have personally reviewed the examination and initial interpretation  and I agree with the findings.    JENN BOWMAN DO         SYSTEM ID:  [id number]   XR Chest Port 1 View    Narrative    XR CHEST PORT 1 VIEW  11/6/2023 11:32 PM     HISTORY:  follow up pneumothorax       COMPARISON:  11/06/2023    TECHNIQUE: XR CHEST PORT 1 VIEW    FINDINGS:   Persistent trace right apical pneumothorax. No left pneumothorax.  Patchy mixed interstitial and airspace opacities throughout the  aerated right lung apex and left lung diffusely. No worsening focal  air space disease. Lumbar right pleural effusion, stable. No left  pleural effusion. Cardiac silhouette is normal.        Impression    IMPRESSION:  1.  Persistent trace right apical pneumothorax, unchanged versus  slightly decreased compared to same day prior radiograph  2.  Stable large right pleural effusion.  3.  Stable mixed airspace opacities throughout the aerated right lung  and throughout the left lung, slightly most prominent within the left  upper lung.    I have personally reviewed the examination and initial interpretation  and I agree with the findings.    DEEPAK BELL MD         SYSTEM ID:  [id number]   XR Chest Port 1 View    Narrative    Exam: XR CHEST PORT 1 VIEW, 11/6/2023 7:05 PM    Comparison: Same day chest radiograph at 1642 hours    History: follow up post procedure small pneumothorax    Findings:  Single portable AP view of  the chest with patient is 60 degrees. Small  right apical pneumothorax, slightly decreased since prior. Chest is  otherwise stable compared to same day radiograph at 1642 hours .      Impression    Impression:   Slightly decreased small right apical pneumothorax. Chest is otherwise  stable compared to same day radiograph at 1642 hours.    I have personally reviewed the examination and initial interpretation  and I agree with the findings.    JENN BOWMAN DO         SYSTEM ID:  [id number]   XR Chest Port 1 View    Narrative    Portable chest    Indication follow up pneumothorax    COMPARISON: 11/6/2023    FINDINGS: Continued meniscus formation of the right costophrenic angle  with obliteration of right hemidiaphragm and prominent lower lung zone  opacification. Continued left upper lung patchy opacities. Previous  small right apical pneumothorax there is slightly less conspicuous.  Right heart border remains obscured.    CONTINUED right pleural effusion and associated atelectasis decreased  visualization of right apical thorax. History edema/infection in the  left upper lung. Recommend follow-up to clearing.    JUSTINO SHEEHAN MD         SYSTEM ID:  [id number]   XR Chest Port 1 View    Narrative    Examination:  XR CHEST PORT 1 VIEW    Date:  11/9/2023 8:16 AM     Clinical Information: follow ptx     Additional Information: none    Comparison: Chest x-ray 11/8/2023    Findings:     AP view chest. Stable cardiac silhouette. Complete opacification of  the right hemithorax. Hazy opacities of the left upper lung field,  decreased from prior. No pneumothorax. No suspicious osseous lesions.        Impression    Impression:    1. Complete opacification of the right hemithorax concerning for  worsening right pleural effusion versus central obstruction.  2. Stable left apical patchy opacities, likely edema/infection.  Recommend follow-up to clearance.    I have personally reviewed the examination and initial  interpretation  and I agree with the findings.    MARIA FERNANDA FRITZ MD         SYSTEM ID:  [id number]   POC US Guide for Thorcentesis    Impression    Limited chest ultrasound was performed and demonstrated an adequate fluid collection on the right hemithorax.     Thoracentesis Indication:pleural effusion and hypoxia    Doppler of the skin demonstrated the intercostal vessel in the expected location along the inferior edge of the rib. Marked and entered over the superior aspect of the inferior rib within the selected intercostal space    A thoracentesis at this site was subsequently performed. 2300 mls removed    Large pleural fluid noted throughout the lung including at the apec limited the use of sliding sign. Some lung pulsation was noted   Umang Soto MD    XR Chest Port 1 View    Narrative    Exam: XR CHEST PORT 1 VIEW, 11/9/2023 12:23 PM    Comparison: Same day chest x-ray    History: statu post right thoracentesis    Findings:  Single portable AP upright view of the chest. Trachea is midline.  Cardiomediastinal silhouette is within normal limits. Mild patchy  opacities in the left upper lung zone, less prominent..  Small-to-moderate right pleural effusion, markedly decreased from  prior. No left pleural effusion. No pneumothorax. The visualized upper  abdomen is unremarkable. No acute osseous abnormalities.      Impression    Impression:   1. Small to moderate right pleural effusion, markedly decreased.  2. Mild patchy opacities in the left upper lung zone, decreased from  prior.    I have personally reviewed the examination and initial interpretation  and I agree with the findings.    MARIA FERNANDA FRITZ MD         SYSTEM ID:  [id number]   POC US GUIDE FOR THORACENTESIS    Impression    Limited chest ultrasound was performed and demonstrated an adequate fluid collection on the right hemithorax.     Thoracentesis Indication:pleural effusion    Doppler of the skin demonstrated an area at this site without  significant vasculature.  A thoracentesis at this site was subsequently performed.    At the right apex, there was an area with pleura adjacent to the chest wall with normal sliding sign. However, given her prior hx of small apical pneumo and sig amounts of remaining pleural efluid, an xray was also ordered.     Umang Soto MD    XR Chest Port 1 View    Narrative    Examination: XR CHEST PORT 1 VIEW 11/12/2023 2:33 PM    Indication: s/p right thoracentesis    Comparison: X-ray 11/9/2023. Ultrasound thoracentesis 11/12/2023.    Findings:  AP portable chest. Trachea is midline. Cardiac silhouette is within  normal limits. No significant change in the moderate right likely  partially loculated pleural effusion status post thoracentesis. No  appreciable pneumothorax. No significant left pleural effusion. No  focal consolidation. Unchanged mild diffuse interstitial prominence.  Unchanged osseous structures.      Impression    Impression:   1. No significant change in the moderate partially loculated right  pleural effusion status post thoracentesis. No pneumothorax.  2. Unchanged mild diffuse interstitial edema.    I have personally reviewed the examination and initial interpretation  and I agree with the findings.    MARIA FERNANDA FRITZ MD         SYSTEM ID:  [id number]   XR Chest Port 1 View    Narrative    Examination:  XR CHEST PORT 1 VIEW    Date:  11/13/2023 9:48 AM     Clinical Information: hypotension, history of chronic liver disease     Additional Information: none    Comparison: Chest x-ray 11/12/2023, CT chest 11/13/2023    Findings:     AP view of the chest. Silhouetting of the right heart border. The left  heart border is deviated compared to prior. Silhouetting of the right  hemidiaphragm. Opacification of the entire right hemithorax with  leftward tracheal deviation. No pneumothorax. Scattered patchy  opacities in the left lung. No suspicious osseous lesions.      Impression    Impression:   Large  right pleural effusion opacifying the entire right hemithorax  with left tracheal deviation.    I have personally reviewed the examination and initial interpretation  and I agree with the findings.    JENN APARICIO COLBY          SYSTEM ID:  [id number]   IR Thoracentesis    Narrative    PROCEDURE: Ultrasound-guided thoracentesis    Procedural Personnel  Attending physician(s): STEPHANIE White MD  Fellow physician(s): RODOLFO Gonsalez MD  Resident physician(s): None  Advanced practice provider(s): None    Pre-procedure diagnosis: Cirrhosis  Post-procedure diagnosis: Same  Indication: Therapeutic  Additional clinical history: Patient with persistent hypoxia due to  large right pleural effusion. Patient has not technically been ruled  out as a transplant candidate so want to avoid PleurX. Plan for staged  large volume thoracentesis.    Complications: No immediate complications.      Impression    IMPRESSION:  Ultrasound-guided thoracentesis with drainage of 3500 mL of bloody  fluid, consistent with known hemothorax superimposed on the massive  right sided effusion.    Plan:   - Resume care by clinical team.  - IR will plan for tentative thoracentesis on Friday if patient  remains inpatient.  _______________________________________________________________    PROCEDURE SUMMARY:  - Ultrasound-guided thoracentesis  - Additional procedure(s): None    PROCEDURE DETAILS:    Pre-procedure  Consent: Informed consent for the procedure including risks, benefits  and alternatives was obtained and time-out was performed prior to the  procedure.  Preparation: The site was prepared and draped using maximal sterile  barrier technique including cutaneous antisepsis.    Anesthesia/sedation  Level of anesthesia/sedation: No sedation  Anesthesia/sedation administered by: Not applicable  Total intra-service sedation time (minutes): Not applicable    Limited thoracic ultrasound  Limited thoracic ultrasound was performed.   Left hemithorax findings: Not  investigated  Right hemithorax findings: Not investigated    Thoracentesis  Local anesthesia was administered. A safe window for thoracentesis was  identified with ultrasound. The pleural space was accessed and fluid  return confirmed position. The fluid was drained.  The catheter was  removed and a sterile dressing was applied.  Catheter size (Fr):7  Catheter valve: No  Fluid appearance: bloody  Volume drained (mL): 3500  Post-drainage ultrasound: Not performed    Additional Details  Additional description of procedure: None  Registry event: V/3/f  Device used: None  Equipment details: None  Specimens removed: Aspirated fluid was sent for analysis.  Estimated blood loss (mL): Less than 10  Standardized report: SIR_Thoracentesis_v3.1    Attestation  Signer name: JACKSON BOYKIN MD  I, JACKSON BOYKIN MD, attest that I was present for all critical  portions of the procedure and was immediately available to provide  guidance and assistance during the remainder of the procedure.        I have personally reviewed the examination and initial interpretation  and I agree with the findings.    JACKSON BOYKIN MD         SYSTEM ID:  [id number]   CT Chest w/o Contrast     Value    Radiologist flags (Urgent)     Right pneumothorax and complete collapse of the right    Narrative    EXAM: CT CHEST W/O CONTRAST 11/13/2023 11:01 AM    HISTORY: 63 years Female white out right lung, acute hgb drop.     Additional HISTORY per the EMR: Underwent right thoracentesis one day  prior.    COMPARISON: Same day chest x-ray. Chest x-ray 11/12/2023..    TECHNIQUE: Helical CT imaging of the chest was obtained WITHOUT  contrast. Multiplanar post-processed and MIP reformats were obtained  reviewed.    FINDINGS:    LINES/TUBES: None.    LUNG/Pleura: Larger right pleural effusion with associated complete  collapse of the right lung. Hyperattenuating material within the right  lower effusion measuring a mean CARMEN of 61.2. Scattered  peribronchovascular  groundglass opacities in the left upper and left  lower lobes with peripheral sparing. No pneumothorax.    LARGE AIRWAYS: Slight leftward tracheal deviation.    VESSELS: Normal size/configuration of the great vessels.    HEART: No cardiomegaly. No pericardial effusion. Mild coronary artery  atherosclerotic calcifications.    MEDIASTINUM AND RAFITA: No suspicious lymphadenopathy. Mild leftward  mediastinal shift.    CHEST WALL: Posterior right skin defect (series 3, image 248).     LOWER NECK: Thyroid unremarkable.    UPPER ABDOMEN: Limited evaluation due to lack of contrast. Small  amount of ascites in the right upper quadrant. Cholelithiasis.  Cirrhotic hepatic morphology with recanalized umbilical vein.    BONES: Mild thoracic spine degenerative changes. No acute osseous  abnormality. No suspicious bony lesions.      Impression    IMPRESSION:   1. Large right pleural effusion with complete collapse of the right  lung. Hyperdense components in the lower right pleural space,  suggestive of hemothorax. In the context of a previous right  thoracentesis, increased suspicion for possible vessel injury.   2. Patchy groundglass opacities in the perihilar left lung, likely  infectious/inflammatory and/or pulmonary edema.    [Urgent Result: Right pneumothorax and complete collapse of the right  lung]    Finding was identified on 11/13/2023 11:16 AM.     Delma Okeefe  was contacted by Dr. Dias at 11/13/2023 11:22 AM and  verbalized understanding of the urgent finding.     I have personally reviewed the examination and initial interpretation  and I agree with the findings.    JENN BOWMAN DO         SYSTEM ID:  [id number]   CTA Chest with Contrast    Narrative    CTA CHEST 11/13/2023 1:15 PM    CLINICAL HISTORY: Evaluate for bleeding after thoracentesis.     COMPARISONS: CT chest without contrast 11/13/2023 10:54. Ultrasound  image from right thoracentesis 1/12/2023.    REFERRING PROVIDER: DELMA OKEEFE    TECHNIQUE:  Unenhanced CT performed through the chest. After the  uneventful administration of intravenous contrast, CTA performed  through the chest. CT repeated after a 70 second delay. Coronal and  sagittal reconstructions were produced. Lung MIP produced.    3D and multiplanar reconstructions were unavailable at the time of  dictation.    CONTRAST: 90 mL Isovue 370    DOSE (DLP): 309 mGy*cm    FINDINGS: CTA: Dependent blood products in the right effusion does not  enhance over time. No active extravasation demonstrated. Right  intercostal arteries appear intact.    Patent aorta. No dissection.    Normal aortic branching pattern. Right innominate, subclavian, and  carotid, left carotid, and left subclavian arteries patent. Bilateral  vertebral arteries patent.    Celiac, superior mesenteric, and visualized renal arteries patent.    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 Branch to  the superior spleen originates proximal to the aneurysm. All other  branches originate distal to the aneurysm.    CT: Large right effusion with dependent blood products. Complete  consolidation of the right lung. Mediastinal shift to the left. Left  lung central ground glass opacities.    Contrast or stones/sludge in the gallbladder.    Recannulized paraumbilical vein. Cirrhotic liver. No focal hepatic  lesion.    Ascites.      Impression    IMPRESSION:  1. Dependent blood products in the large right effusion. No active  extravasation. Right intercostal arteries appear intact.    2. 8 mm distal splenic artery aneurysm.    3. Total consolidation of the right lung. Left lung central ground  glass opacities. Correlate for pneumonia. Mediastinal shift to the  left.    4. Cirrhotic liver. No focal hepatic lesion. Hepatic hydrothorax.  Ascites. Recannulized paraumbilical vein.    5. Contrast, stones, and/or sludge in the gallbladder.    YANET HERNANDEZ MD         SYSTEM ID:  [id number]   IR Thoracentesis    Narrative    Stefani Crowell    [id number]      [id number]    LIA TORRES  [id number]  1960;  63 years    IR THORACENTESIS  staged right thoracentesis    -----    PRE-OPERATIVE DIAGNOSIS:  Large pleural effusion    POST-OPERATIVE DIAGNOSIS:  Same    PROCEDURE:  Image guided chest tube placement for staged thoracentesis      Impression    IMPRESSION:  Completed right chest tube placement for planned staged  thoracentesis. ?Red-colored fluid draining from the 7 English chest  tube.      We will plan to cap chest tube at 1.5 L or when patient discomfort  reached. ?    Drainage can resume after 1 hour capping if the patient is stable and  fluid remains. ?    Ultrasound shows very large effusion.     ===   Staged thoracentesis completed in 3 stages  by 60 minutes. ?A  total of 4000 mL drained. ?Chest tube removed.     -----    CLINICAL HISTORY:  Large pleural effusion; chest tube placement  requested.  Staged thoracentesis planned to reduce risk of  re-expansion pulmonary edema.    PERFORMED BY:  SINCERE Uriostegui PA-C (Interventional  Radiology)    CONSENT:  The patient understood the limitations, alternatives, and  risks of the procedure and agreed to the procedure.  Written informed  consent was obtained and is documented in the patient record.    MEDICATIONS:  1% lidocaine was used for local anesthesia    NURSING:  The patient was placed on continuous vital signs monitoring.   Vital signs were monitored by nursing staff under my supervision.    DESCRIPTION:  Ultrasound evaluation revealed a fluid collection in the  pleural space.  The skin overlying the fluid collection was prepped  and draped in the usual sterile fashion and anesthetized.    A small skin nick was made with a #11 scalpel blade.  Under continuous  ultrasound visualization, a 7 English trocar pigtail drain was advanced  into the fluid.  The catheter was advanced off the trocar into the  pleural space and trocar removed.  Fluid aspirated.    Drain capped.  Area covered with sterile dressing and site secured.    Patient transferred  for post-op observation for greater than 1 hour  where condition and vital signs monitored. Patient returned to  procedure where aspiration continued through uncapped chest tube. The  catheter was then removed on patient respiratory exhalation.  The  puncture site was covered with air-occlusive dressing.    COMPLICATIONS:  No immediate complication;  the patient remained  stable throughout the procedure    ESTIMATED BLOOD LOSS:  Minimal    SPECIMENS:  None    -----  [ The physician assistant/associate (PA) who performed this procedure  and signed the above report is licensed to practice in the [facility] pursuant to [place] Statute 147A.09. ]    -----    DOROTHY TAFOYA PA-C         SYSTEM ID:  [id number]   IR Thoracentesis    Narrative    Patient name: Stefani Crowell  : 1960  Date of Service: 23    DIAGNOSIS: Pleural effusion    PROCEDURE: IR THORACENTESIS    Impression    IMPRESSION: Completed ultrasound-guided bilateral thoracenteses. Total  of 3700 mL drained from the right side in a staged manner (1900ml at  hour 0, 1500ml at hour 1, 300cc at hour 2) with scant pleural effusion  upon repeat ultrasound imaging. Patient had soft blood pressures  throughout the procedure, but was asymptomatic. She was given albumin  12.5gm IV.   ----------    CLINICAL HISTORY: Patient with persistent hypoxia due to large right  pleural effusion. Plan for staged  large volume thoracentesis.    PERFORMED BY: Ninoska Marroquin PA-C and All Haddad PA-C     CONSENT: Written informed consent was obtained and is documented in  the patient record.    MEDICATIONS: 5 mL 1% lidocaine subcutaneous. Albumin 12.5 gm IV was  given during this procedure.     NURSING: Patient continuously monitored by trained, independent  observer.    DESCRIPTION: Patient positioned upright. Ultrasound was used to  evaluate a right pleural fluid collection. The skin  overlying the  fluid collection was prepped and draped in the usual sterile fashion  and anesthetized.    Under continuous ultrasound visualization, a centesis needle/catheter  system was advanced into the fluid collection on a slip tip syringe.  Once fluid was aspirated, the catheter was advanced off the needle  into the pleural space and the needle was removed. A 3-way stopcock  and extension tubing was connected to the catheter and fluid was  drained. The catheter was then removed on patient respiratory  expiration. The puncture site was dressed in the usual sterile  fashion.    COMPLICATIONS: No immediate concerns, the patient remained stable  throughout the procedure and tolerated it well.    ESTIMATED BLOOD LOSS: Minimal    SPECIMENS: None    VERÓNICA ARANGO PA         SYSTEM ID:  [id number]   POC US Guide for Thorcentesis    Impression    Limited point of care pleural/lung ultrasound to evaluate for thoracentesis.    Thoracentesis Indication:pleural effusion     Views/Acquisition: Right  pleural space(s): upright.      Findings/Interpretation: No safe pocket found to perform bedside thoracentesis    Desi Price MD     Echocardiogram Complete     Value    LVEF  65-70%    Narrative    [id number]  [facility]  [id number]  [id number]^RAUL^DARREN     [facility],[place]  Echocardiography Laboratory  [address] [place] [address]     Name: LIA TORRES  MRN: [id number]  : 1960  Study Date: 10/25/2023 03:35 PM  Age: 63 yrs  Gender: Female  Patient Location: [facility]  Reason For Study: Edema  Ordering Physician: DARREN CARTER  Performed By: Muriel Fuentes RDCS     BSA: 1.7 m2  Height: 65 in  Weight: 142 lb  BP: 98/44 mmHg  ______________________________________________________________________________  Procedure  Bubble Echocardiogram with two-dimensional, color and spectral Doppler  performed. Echocardiogram with two-dimensional, color and spectral  Doppler  performed.  ______________________________________________________________________________  Interpretation Summary  Global and regional left ventricular function is hyperkinetic with an EF of  65-70%. Normal LV diastolic function with normal estimated left and right-  sided filling pressures.  Right ventricular function, chamber size, wall motion, and thickness are  normal.  No significant valvular abnormalities were noted.  Medium sized patent foramen ovale is seen on color Doppler and with agitated  saline bubble study.  Previous study not available for comparison.  ______________________________________________________________________________  Left Ventricle  Global and regional left ventricular function is hyperkinetic with an EF of  65-70%. Left ventricular size is normal. Left ventricular wall thickness is  normal. Left ventricular diastolic function is normal. Diastolic Doppler  findings (E/E' ratio and/or other parameters) suggest left ventricular filling  pressures are normal.     Right Ventricle  Right ventricular function, chamber size, wall motion, and thickness are  normal.     Atria  Both atria appear normal. A medium patent foramen ovale is present. The patent  foramen ovale was demonstrated by color Doppler and agitated saline bubble  study .     Mitral Valve  The mitral valve is normal.     Aortic Valve  Aortic valve is normal in structure and function. The aortic valve is  tricuspid.     Tricuspid Valve  The tricuspid valve is normal.     Pulmonic Valve  The pulmonic valve is normal.     Vessels  The aorta root is normal. The thoracic aorta is normal. The pulmonary artery  cannot be assessed. The inferior vena cava was normal in size with preserved  respiratory variability. IVC diameter <2.1 cm collapsing >50% with sniff  suggests a normal RA pressure of 3 mmHg.     Pericardium  No pericardial effusion is present.     Compared to Previous Study  Previous study not available for  comparison.  ______________________________________________________________________________  MMode/2D Measurements & Calculations  IVSd: 0.87 cm  LVIDd: 4.5 cm  LVIDs: 2.7 cm  LVPWd: 0.92 cm  FS: 39.9 %  LV mass(C)d: 131.3 grams  LV mass(C)dI: 76.7 grams/m2  Ao root diam: 2.8 cm  asc Aorta Diam: 3.4 cm  LVOT diam: 2.0 cm  LVOT area: 3.1 cm2  Ao root diam index Ht(cm/m): 1.7  Ao root diam index BSA (cm/m2): 1.6  Asc Ao diam index BSA (cm/m2): 2.0  Asc Ao diam index Ht(cm/m): 2.1  LA Volume (BP): 46.9 ml     LA Volume Index (BP): 27.4 ml/m2  RV Base: 3.5 cm  RWT: 0.41  TAPSE: 3.4 cm     Doppler Measurements & Calculations  MV E max alejandra: 109.0 cm/sec  MV A max alejandra: 90.1 cm/sec  MV E/A: 1.2  MV dec slope: 562.3 cm/sec2  MV dec time: 0.19 sec  LV V1 max P.4 mmHg  LV V1 max: 208.5 cm/sec  LV V1 VTI: 35.1 cm  SV(LVOT): 108.0 ml  SI(LVOT): 63.2 ml/m2  PA acc time: 0.15 sec  E/E' av.1  Lateral E/e': 9.5     Medial E/e': 14.7     ______________________________________________________________________________  Report approved by: Candido Payton 10/25/2023 04:39 PM             Discharge Medications   Current Discharge Medication List        START taking these medications    Details   artificial saliva (BIOTENE DRY MOUTHWASH) LIQD liquid Swish and spit 5 mLs in mouth 4 times daily as needed for dry mouth  Qty: 473 mL, Refills: 3    Associated Diagnoses: Pleural effusion associated with hepatic disorder      fluticasone-salmeterol (ADVAIR) 250-50 MCG/ACT inhaler Inhale 1 puff into the lungs every 12 hours for 30 days  Qty: 60 each, Refills: 0    Associated Diagnoses: Severe persistent asthma, unspecified whether complicated (H28)           CONTINUE these medications which have CHANGED    Details   !! LORazepam (ATIVAN) 0.5 MG tablet Take 1 tablet (0.5 mg) by mouth daily as needed for anxiety  Qty: 20 tablet, Refills: 0    Associated Diagnoses: Pleural effusion associated with hepatic disorder      rifaximin  (XIFAXAN) 550 MG TABS tablet Take 1 tablet (550 mg) by mouth 2 times daily  Qty: 180 tablet, Refills: 0    Associated Diagnoses: Hepatic encephalopathy (H)       !! - Potential duplicate medications found. Please discuss with provider.        CONTINUE these medications which have NOT CHANGED    Details   albuterol (PROAIR HFA/PROVENTIL HFA/VENTOLIN HFA) 108 (90 Base) MCG/ACT inhaler Inhale 1-2 puffs into the lungs every 6 hours as needed for shortness of breath or wheezing  Qty: 18 g, Refills: 0    Comments: Pharmacy may dispense brand covered by insurance (Proair, or proventil or ventolin or generic albuterol inhaler)  Associated Diagnoses: Severe persistent asthma without complication (H28)      albuterol (PROVENTIL) (2.5 MG/3ML) 0.083% neb solution Take 1 vial (2.5 mg) by nebulization every 4 hours as needed for shortness of breath or wheezing Use in neb machine  Qty: 30 mL, Refills: 0    Associated Diagnoses: Uncomplicated severe persistent asthma (H28)      citalopram (CELEXA) 40 MG tablet Take 1 tablet (40 mg) by mouth daily  Qty: 90 tablet, Refills: 0    Associated Diagnoses: STEPHANIE (generalized anxiety disorder); Mild major depression (H24)      cyclobenzaprine (FLEXERIL) 10 MG tablet TAKE 1/2 TO 1 TABLET(5 TO 10 MG) BY MOUTH THREE TIMES DAILY AS NEEDED FOR MUSCLE SPASMS  Qty: 30 tablet, Refills: 5    Associated Diagnoses: Muscle tightness      ferrous sulfate (FEROSUL) 325 (65 Fe) MG tablet Take 1 tablet (325 mg) by mouth daily (with breakfast) Take 1 tablet (325 mg) by mouth  Qty: 90 tablet, Refills: 0    Associated Diagnoses: Low hemoglobin      lactulose (CEPHULAC) 10 GM packet Take 2 packets (20 g) by mouth 3 times daily  Qty: 180 packet, Refills: 2    Associated Diagnoses: Hepatic encephalopathy (H)      levothyroxine (SYNTHROID/LEVOTHROID) 112 MCG tablet Take 1 tablet (112 mcg) by mouth daily  Qty: 90 tablet, Refills: 0    Associated Diagnoses: Hypothyroidism, unspecified type      !! LORazepam (ATIVAN)  0.5 MG tablet Take 1 tablet by mouth daily as needed for panic attacks  Qty: 20 tablet, Refills: 0    Associated Diagnoses: STEPHANIE (generalized anxiety disorder)      midodrine (PROAMATINE) 10 MG tablet Take 15 mg by mouth 3 times daily      montelukast (SINGULAIR) 10 MG tablet Take 1 tablet (10 mg) by mouth At Bedtime  Qty: 90 tablet, Refills: 0    Associated Diagnoses: Allergic rhinitis due to animal dander; Severe persistent asthma without complication (H28)      Nutritional Supplements (ENSURE COMPLETE PO) Take 1 Bottle by mouth 2 times daily      pantoprazole (PROTONIX) 40 MG EC tablet Take 1 tablet (40 mg) by mouth daily  Qty: 90 tablet, Refills: 0    Associated Diagnoses: Alcoholic cirrhosis of liver with ascites (H)      traZODone (DESYREL) 50 MG tablet Take 50 mg by mouth at bedtime      Hypertonic Nasal Wash (SINUS RINSE BOTTLE KIT NA) Spray 1 Bottle in nostril daily as needed.    Associated Diagnoses: Seasonal allergic rhinitis      OVER-THE-COUNTER Place 1 drop into both eyes 3 times daily. Blink Tears, Systane Ultra, Systane Balance or Refresh Optive Artificial Tear  Qty: 1 Bottle    Associated Diagnoses: Dry eye syndrome       !! - Potential duplicate medications found. Please discuss with provider.        STOP taking these medications       fluticasone-vilanterol (BREO ELLIPTA) 200-25 MCG/ACT inhaler Comments:   Reason for Stopping:         furosemide (LASIX) 40 MG tablet Comments:   Reason for Stopping:         ibuprofen (ADVIL/MOTRIN) 200 MG tablet Comments:   Reason for Stopping:             Allergies   Allergies   Allergen Reactions    Azithromycin Nausea    Dust Mite Extract     Dust Mites      Other Reaction(s): Not available    Pollen Extract      Other Reaction(s): Not available    Ragweeds     Tetracycline Other (See Comments)     Unknown if allergic- not listed on H&P from 3/25/2021

## 2023-11-21 NOTE — PROGRESS NOTES
Patient Name: Stefani Crowell  Medical Record Number: [id number]  Today's Date: 11/21/2023    Procedure: Image guided thoracentesis  Proceduralist: Ninoska Paulino PA-C  Pathology present: N/A    Procedure Start: 0848  Procedure end: 1142  Sedation medications administered: Local only     Report given to: Suzette DONATO RN  : N/A    Other Notes: Pt arrived to IR room 7 from . Consent reviewed. Pt denies any questions or concerns regarding procedure. Pt positioned sitting and monitored per protocol. 3700 mls of serosanguinous fluid drained in stages.  12.5 gms of albumin given IV.  Pt tolerated procedure without any noted complications. Pt transferred back to .

## 2023-11-21 NOTE — PROGRESS NOTES
[facility]    Medicine Progress Note - Hospitalist Service, GOLD TEAM 9    Date of Admission:  10/22/2023    Assessment & Plan   Stefani Crowell is a 63 year old female with a pertinent past medical history of alcoholic cirrhosis, hepatorenal syndrome, hypothyroidism, recurrent hyponatremia, recurrent pleural effusions, COPD and recent admission for hyponatremia who was admitted on 10/22/2023 due to increased dyspnea related to R pleural effusion and hyponatremia.    MATTIE, likely secondary to hepatorenal syndrome, improved  Hyponatremia, stable  Suspect HRS. Had improvement with terlipressin (11/1-11/6), slight bump again in Cr once it was stopped.  - hold diuretics, no plans to resume per hepatology  - continue midodrine (resumed once off terlipressin)  - S Creat has improved but hyponatremia persistent  - now worsening hyponatremia which has responded to IV albumin, but IV albumin may risk increasing pleural effusion, necessitating repeat thoracentesis so will be judicious  - improving without any intervention    Hepatic hydrothorax  Acute hypoxic respiratory failure 2/2 above  Diuretic-refractory due to hyponatremia. Not currently a TIPS candidate due to elevation of bilirubin.  - Tentatively scheduled for TIPS on 12/6, though pt is aware that she may not be a candidate at that time, since per IR elevated bili (>3) is a contraindication for TIPS  - Underwent thoracentesis on 11/21. Plan will be for her to undergo outpatient thoracentesis every M, W,F.  - Attempted O2 test this afternoon, but unable to tolerate due to lightheadedness when standing. Will need to redo in order to determine if she will qualify for home oxygen    Decompensated alcoholic cirrhosis  Hx hepatic encephalopathy  Abd US negative for thrombosis and HCC. EGD 11/2 normal. Not a liver transplant candidate currently due to incomplete CD program and abstinence period. Engaged in CD program  outpatient. Peth negative 10/19.  Patient is currently not a transplant candidate due to not having completed CD treatment and the required period of sobriety. She is working on these, but needs to be outpatient in order to continue. Her hospital course has been challenging, and as TIPS is not a possibility she is aware that her situation is challenging. After discussion with txp SW on 11/8, pt would like to have additional discussions about her overall GOC. Palliative care involved on 11/9.  - Hepatology consulted, appreciate assistance  - Continue home lactulose 20g TID, rifaximin 550mg BID  - Palliative care following, appreciate assistance  - Health psych consult    Hemothorax  On morning of 11/13 patient found to have hypotension with dizziness and was noted to have 2 g Hgb drop from the day prior. Despite recent thora, CXR with white-out right lung. CT chest confirmed presence of blood. CTA completed without evidence for active bleeding. Patient's BP improved with IVF bolus and 2 units of PRBC (11/13).  - - CT chest, CTA chest completed  - no recurrence of hemothorax and H/H stable    Moderate malnutrition in the context of chronic illness  - RD following    Acute on chronic normocytic anemia, symptomatic- worsening  Concern for GIB, nothing identified on scope 3 weeks prior. However, noted to have diverticulosis on colonoscopy and small variceals on EGD.  - Hgb acutely decreased on 11/21. Given that she has high risk for variceal bleeding, is sx and less than 8, will tranfuse 1 unit prbc  - begin octreotide for empirically for variceal bleeding  - continue IV ppi BID  - ceftriaxone 1gm every 24 hours  - h/h every 8 hours  - reached out to GI hepatology-- planning to make NPO at midnight. Low risk for significant variceal bleeding. In addition, since patient has not had emesis since yesterday, I have low concern for significant variceal bleeding     Thrombocytopenia  Coagulopathy  - Likely secondary to  cirrhosis     Elevated troponin  - Admit 27, 28. Likely secondary to poor kidney clearance  - Unremarkable EKG    Hypothyroidism  Last TSH was 0.74 on 07/24/23  - Continue home levothyroxine 112 mcg po daily    GERD  - Continue pantoprazole 40 mg po qdaily    Anxiety  - Holding citalopram 40 mg po qdaily and trazodone 50 mg po at bedtime  - continue lorazepam 0.5 mg po qdaily prn    Hypokalemia, resolved  - replacement protocol    Post-thoracentesis pneumothorax, resolved  Noted after 11/6 thora. Respiratory status stable. Serial CXRs demonstrated improvement.          Diet: Fluid restriction 1000 ML FLUID  Snacks/Supplements Adult: Other; pudding at 2:00, applesauce at HS; Between Meals  Snacks/Supplements Adult: Other; Ensure Enlive at 8:00 ( vanilla or strawberry) patient likes this at 8:00 am , 2:00 pm ensure enlive,  HS : Ensrue max vanilla; Between Meals  Diet  2 Gram Sodium Diet  Fluid restriction 1000 ML FLUID    DVT Prophylaxis: Pneumatic Compression Devices  Demarco Catheter: Not present  Lines: None     Cardiac Monitoring: None  Code Status: No CPR- Do NOT Intubate      Clinically Significant Risk Factors         # Hyponatremia: Lowest Na = 119 mmol/L in last 2 days, will monitor as appropriate   # Hypercalcemia: corrected calcium is >10.1, will monitor as appropriate    # Hypoalbuminemia: Lowest albumin = 3.1 g/dL at 11/13/2023  6:46 AM, will monitor as appropriate    # Coagulation Defect: INR = 1.85 (Ref range: 0.85 - 1.15) and/or PTT = N/A, will monitor for bleeding  # Thrombocytopenia: Lowest platelets = 83 in last 2 days, will monitor for bleeding           # Moderate Malnutrition: based on nutrition assessment    # Financial/Environmental Concerns: none  # Asthma: noted on problem list        Disposition Plan      Expected Discharge Date: 11/21/2023      Destination: home with family  Discharge Comments: Dispo: home with HC, not yet secured            CELINA MOODY MD  Hospitalist Service,  GOLD TEAM 9  [facility]  Securely message with [employer] (more info)  Text page via [employer] Paging/Directory   See signed in provider for up to date coverage information  ______________________________________________________________________    Interval History     Frustrated about not being able to go home today. Admits to 1 episode of bloody emesis yesterday (though also had tomato soup that was mixed in so unsure how much is blood), 1 episode of melanic stool. No diarrhea. No BRBPR. No sob, amanda, chest pain. SOB has improving.    Physical Exam   Vital Signs: Temp: 97.5  F (36.4  C) Temp src: Oral BP: (!) 92/32 Pulse: 62   Resp: 18 SpO2: 100 % O2 Device: None (Room air) Oxygen Delivery: 3 LPM  Weight: 136 lbs 14.49 oz    Constitutional: awake, alert, appears fatigued  Eyes: scleral icterus present  Respiratory: mild resp distress, left lung fields clear. Improved aeration in R lung fields  Cardiovascular: 4/6 systolic murmur   GI: soft, non-distended, non-tender  Neurologic: alert and oriented x3, moving all extremities equally    Medical Decision Making     50 MINUTES SPENT BY ME on the date of service doing chart review, history, exam, documentation & further activities per the note.      Data     I have personally reviewed the following data over the past 24 hrs:    9.0  \   7.5 (L)   / 84 (L)     119 (LL) 87 (L) 83.4 (H) /  127 (H)   5.3 25 0.87 \     ALT: 31 AST: 35 AP: 73 TBILI: 4.3 (H)   ALB: 3.2 (L) TOT PROTEIN: 4.8 (L) LIPASE: N/A     INR:  1.85 (H) PTT:  N/A   D-dimer:  N/A Fibrinogen:  N/A     Ferritin:  N/A % Retic:  7.2 (H) LDH:  N/A       Imaging results reviewed over the past 24 hrs:   Recent Results (from the past 24 hour(s))   IR Thoracentesis    Narrative    Patient name: Stefani rCowell  : 1960  Date of Service: 23    DIAGNOSIS: Pleural effusion    PROCEDURE: IR THORACENTESIS    Impression    IMPRESSION: Completed ultrasound-guided bilateral  thoracenteses. Total  of 3700 mL drained from the right side in a staged manner (1900ml at  hour 0, 1500ml at hour 1, 300cc at hour 2) with scant pleural effusion  upon repeat ultrasound imaging. Patient had soft blood pressures  throughout the procedure, but was asymptomatic. She was given albumin  12.5gm IV.   ----------    CLINICAL HISTORY: Patient with persistent hypoxia due to large right  pleural effusion. Plan for staged  large volume thoracentesis.    PERFORMED BY: Ninoska Marroquin PA-C and All Haddad PA-C     CONSENT: Written informed consent was obtained and is documented in  the patient record.    MEDICATIONS: 5 mL 1% lidocaine subcutaneous. Albumin 12.5 gm IV was  given during this procedure.     NURSING: Patient continuously monitored by trained, independent  observer.    DESCRIPTION: Patient positioned upright. Ultrasound was used to  evaluate a right pleural fluid collection. The skin overlying the  fluid collection was prepped and draped in the usual sterile fashion  and anesthetized.    Under continuous ultrasound visualization, a centesis needle/catheter  system was advanced into the fluid collection on a slip tip syringe.  Once fluid was aspirated, the catheter was advanced off the needle  into the pleural space and the needle was removed. A 3-way stopcock  and extension tubing was connected to the catheter and fluid was  drained. The catheter was then removed on patient respiratory  expiration. The puncture site was dressed in the usual sterile  fashion.    COMPLICATIONS: No immediate concerns, the patient remained stable  throughout the procedure and tolerated it well.    ESTIMATED BLOOD LOSS: Minimal    SPECIMENS: None    VERÓNICA RAANGO PA         SYSTEM ID:  [id number]

## 2023-11-21 NOTE — PROGRESS NOTES
Hepatology Follow up Note         Assessment and Plan:   Stefani Crowell is a 63 year old female who has been admitted since October 22, 2023 for dyspnea in the setting of her hepatic hydrothorax and hyponatremia. She has a past medical history of decompensated alcohol-related cirrhosis complicated by hepatic encephalopathy, hepatic hydrothorax, ascites and hyponatremia.  Past medical history includes COPD and alcohol use disorder.     #. Melena   Had hematochezia on 11/1. Had EGD and colonoscopy. EGD had small EV and colonoscopy was unremarkable except very friable mucosa. Now developed new melena 11/21 PM with decrease of hgb and increasing BUN, indicating UGIB. Hemodynamically stable. Could be portal hypertensive gastropathy bleeding vs peptic ulcer.     #.  Decompensated alcohol-related cirrhosis  #.  Hepatic hydrothorax  #.  Ascites  #.  Hepatic encephalopathy  #.  Hyponatremia  #.  Acute kidney injury; s/p terlipressin 11/1-11/6 for presumed HRS  #. Alcohol use disorder  MELD 3.0: 26 at 11/21/2023  3:03 PM  MELD-Na: 27 at 11/21/2023  3:03 PM  Calculated from:  Serum Creatinine: 0.87 mg/dL (Using min of 1 mg/dL) at 11/21/2023  3:03 PM  Serum Sodium: 119 mmol/L (Using min of 125 mmol/L) at 11/21/2023  3:03 PM  Total Bilirubin: 4.3 mg/dL at 11/21/2023  3:03 PM  Serum Albumin: 3.2 g/dL at 11/21/2023  3:03 PM  INR(ratio): 1.85 at 11/21/2023  6:47 AM  Age at listing (hypothetical): 63 years  Sex: Female at 11/21/2023  3:03 PM      The patient remains hypoxic at times and needs 2 L in the setting of hepatic hydrothorax.  She is limited in terms of diuretics given her severe hyponatremia.    Following thoracentesis on 11/12 the patient developed a concern for hemothorax on CT chest on 11/13/2023.  CTA was performed to evaluate for active extravasation but nothing was visualized.  The patient's hemoglobin has been stable without any further drop following PRBC transfusions.  The patient had elevated WBC but without  any fevers or chills. Leukocytosis is improving therefore more suggestive of an acute response.     She was being considered for TIPS placement however this has not been feasible at this time given her total bilirubin remains in the 7-8s despite being mostly unconjugated.  We have discussed her case with multiple interventional radiologists including Dr. Rodríguez on 11/10/2023.  Currently she is scheduled for TIPS placement on 12/6/2023 however this will only be possible if her total bilirubin comes down to 3-4, she has been stable from a pulmonary standpoint and she has had no further episodes of hypotension.  Our concern is that she may never be a TIPS candidate at this time.     She has been evaluated for liver transplantation but she is currently not a transplant candidate given she was told about her alcohol use disorder but then returned to use.  She needs to complete chemical dependency programming prior to being considered for transplantation.     The patient is being considered for liver transplantation pending completion of outpatient discontinue treatment.  Given this she is not a candidate for a pleural catheter given the infectious risk.  If she is not deemed to be a liver transplant candidate or she does not wish to pursue this avenue can consider this palliative approach at that time.    For discharge planning, per discussion with medicine and palliative care, the patient and family opted to stay over the weekend to see if she is able to go without thora for 2 days. If she is doing well over the weekend, she will then discharge on Monday after the staged thora by IR.            Recommendations:   -- Agree with IV PPI BID and octreotide and ceftriaxone   -- NPO at MN for possible EGD tmr (TBD)  -- Continue lactulose and rifaximin for hepatic encephalopathy management  -- No diuretics given hyponatremia    Outpatient:  * Schedule for possible TIPS 12/6/2023 -however this would not be a possibility unless  "she remained stable from a pulmonary and blood pressure standpoint and her total bilirubin improved to 3-4  * Already set up thoracenteses 3x/week (M, W. F) starting on 11/22 at [place] with albumin replacement (notified sister Riri)       Thank you for involving us in this patient's care. Please do not hesitate to contact the GI service with any questions or concerns.     Pt care plan discussed with Dr. Powers, GI staff physician.    Latoya Florez MD PhD  GI Fellow   [phone]          Subjective/Objective:   Feeling tired. RN reported a large dark stool today; previously brown.            Medications:     Current Facility-Administered Medications   Medication    acetaminophen (TYLENOL) tablet 650 mg    Or    acetaminophen (TYLENOL) Suppository 650 mg    albuterol (PROVENTIL HFA/VENTOLIN HFA) inhaler    artificial saliva (BIOTENE DRY MOUTHWASH) liquid 5 mL    cefTRIAXone (ROCEPHIN) 1 g vial to attach to  mL bag for ADULTS or NS 50 mL bag for PEDS    citalopram (celeXA) tablet 40 mg    ferrous sulfate (FEROSUL) tablet 325 mg    fluticasone-vilanterol (BREO ELLIPTA) 200-25 MCG/ACT inhaler 1 puff    lactulose (CHRONULAC) solution 20 g    levothyroxine (SYNTHROID/LEVOTHROID) tablet 112 mcg    lidocaine (PF) (XYLOCAINE) 1 % injection 1-30 mL    LORazepam (ATIVAN) tablet 0.5 mg    melatonin tablet 3 mg    midodrine (PROAMATINE) tablet 15 mg    montelukast (SINGULAIR) tablet 10 mg    octreotide (sandoSTATIN) 1,250 mcg in D5W 250 mL    pantoprazole (PROTONIX) IV push injection 40 mg    polyethylene glycol (MIRALAX) Packet 17 g    prochlorperazine (COMPAZINE) injection 5 mg    Or    prochlorperazine (COMPAZINE) tablet 5 mg    Or    prochlorperazine (COMPAZINE) suppository 25 mg    rifaximin (XIFAXAN) tablet 550 mg            Physical Exam:   VS:  BP (!) 90/30 (BP Location: Left arm)   Pulse 66   Temp 97.6  F (36.4  C) (Axillary)   Resp 18   Ht 1.651 m (5' 5\")   Wt 62.1 kg (136 lb 14.5 oz)   LMP  (LMP Unknown)   " SpO2 100%   BMI 22.78 kg/m      Wt:   Wt Readings from Last 2 Encounters:   11/20/23 62.1 kg (136 lb 14.5 oz)   10/03/23 62.3 kg (137 lb 4.8 oz)      Constitutional: NAD  Eyes: Conjunctiva +icteric  HEENT: Mucus membranes moist  CV: RRR, III/VI systolic murmur  Respiratory: 2 L nasal cannula, crackles bilaterally  Abd: Distended, no rebound or guarding   Skin: +  jaundice  Neuro: AAO x 3, No asterixis         Laboratory:   BMP  Recent Labs   Lab 11/21/23  1503 11/21/23  0647 11/20/23  1350 11/20/23  0602 11/19/23  1349 11/19/23  0722   * 119* 120* 121*  121*   < > 119*   POTASSIUM 5.3 5.2  --  5.0  --  5.1   CHLORIDE 87* 86*  --  87*  --  88*   UMA 9.5 9.9  --  10.1  --  10.0   CO2 25 24  --  25  --  23   BUN 83.4* 67.4*  --  56.0*  --  58.4*   CR 0.87 0.81  --  0.80  --  0.88   * 100*  --  89  --  87    < > = values in this interval not displayed.     CBC  Recent Labs   Lab 11/21/23  1503 11/21/23  0647 11/20/23  0602 11/19/23  0721   WBC 9.0 9.1 9.6 9.1   RBC 2.33* 2.71* 2.93* 2.85*   HGB 7.5* 8.7* 9.4* 9.2*   HCT 22.9* 26.2* 28.6* 28.0*   MCV 98 97 98 98   MCH 32.2 32.1 32.1 32.3   MCHC 32.8 33.2 32.9 32.9   RDW 18.4* 18.6* 18.6* 18.7*   PLT 84* 86* 83* 84*     INR  Recent Labs   Lab 11/21/23  0647 11/20/23  0602 11/19/23  0721 11/18/23  0711   INR 1.85* 1.74* 1.89* 1.98*     LFTs  Recent Labs   Lab 11/21/23  1503 11/21/23  0647 11/20/23  0602 11/19/23  0722   ALKPHOS 73 78 103 80   AST 35 44 46* 42   ALT 31 39 41 38   BILITOTAL 4.3* 5.6* 5.3* 5.9*   PROTTOTAL 4.8* 5.5* 5.7* 5.5*   ALBUMIN 3.2* 3.5 3.7 3.5        Imaging/Procedures/Studies:   CTA chest 11/13/2023  1. Dependent blood products in the large right effusion. No active  extravasation. Right intercostal arteries appear intact.     2. 8 mm distal splenic artery aneurysm.     3. Total consolidation of the right lung. Left lung central ground  glass opacities. Correlate for pneumonia. Mediastinal shift to the  left.     4. Cirrhotic liver.  No focal hepatic lesion. Hepatic hydrothorax.  Ascites. Recannulized paraumbilical vein.         Detail Level: Detailed Depth Of Biopsy: dermis Was A Bandage Applied: Yes Size Of Lesion In Cm: 0 Biopsy Type: H and E Biopsy Method: Dermablade Anesthesia Type: 1% lidocaine with epinephrine Anesthesia Volume In Cc (Will Not Render If 0): 0.5 Hemostasis: Drysol Wound Care: Petrolatum Dressing: bandage Destruction After The Procedure: No Type Of Destruction Used: Curettage Curettage Text: The wound bed was treated with curettage after the biopsy was performed. Cryotherapy Text: The wound bed was treated with cryotherapy after the biopsy was performed. Electrodesiccation Text: The wound bed was treated with electrodesiccation after the biopsy was performed. Electrodesiccation And Curettage Text: The wound bed was treated with electrodesiccation and curettage after the biopsy was performed. Silver Nitrate Text: The wound bed was treated with silver nitrate after the biopsy was performed. Lab: 6 Consent: Written consent was obtained and risks were reviewed including but not limited to scarring, infection, bleeding, scabbing, incomplete removal, nerve damage and allergy to anesthesia. Post-Care Instructions: I reviewed with the patient in detail post-care instructions. Patient is to keep the biopsy site dry overnight, and then apply bacitracin twice daily until healed. Patient may apply hydrogen peroxide soaks to remove any crusting. Notification Instructions: Patient will be notified of biopsy results. However, patient instructed to call the office if not contacted within 2 weeks. Billing Type: Third-Party Bill Information: Selecting Yes will display possible errors in your note based on the variables you have selected. This validation is only offered as a suggestion for you. PLEASE NOTE THAT THE VALIDATION TEXT WILL BE REMOVED WHEN YOU FINALIZE YOUR NOTE. IF YOU WANT TO FAX A PRELIMINARY NOTE YOU WILL NEED TO TOGGLE THIS TO 'NO' IF YOU DO NOT WANT IT IN YOUR FAXED NOTE.

## 2023-11-21 NOTE — PLAN OF CARE
Goal Outcome Evaluation:      Plan of Care Reviewed With: patient    Overall Patient Progress: no changeOverall Patient Progress: no change    Outcome Evaluation: Admitted for increased dyspnea r/t R pleural effusion and hyponatremia. Na 120. Pt vomiting blood. MD aware. Administered IV compazine. CAPS consulted for thora, but not completed. Plan for thora tomorrow and then discharge home.

## 2023-11-21 NOTE — PROCEDURES
[facility]    Procedure: IR Procedure Note    Date/Time: 11/21/2023 9:27 AM    Performed by: Ninoska Marroquin PA-C  Authorized by: Ninoska Marroquin PA-C  IR Fellow Physician:  Other(s) attending procedure: All Haddad PA-C      UNIVERSAL PROTOCOL   Site Marked: NA  Prior Images Obtained and Reviewed:  Yes  Required items: Required blood products, implants, devices and special equipment available    Patient identity confirmed:  Verbally with patient, arm band, provided demographic data and hospital-assigned identification number  Patient was reevaluated immediately before administering moderate or deep sedation or anesthesia  Confirmation Checklist:  Patient's identity using two indicators, relevant allergies, procedure was appropriate and matched the consent or emergent situation and correct equipment/implants were available  Time out: Immediately prior to the procedure a time out was called    Universal Protocol: the Joint Commission Universal Protocol was followed    Preparation: Patient was prepped and draped in usual sterile fashion       ANESTHESIA    Anesthesia:  Local infiltration  Local Anesthetic:  Lidocaine 1% without epinephrine  Anesthetic Total (mL):  5      SEDATION    Patient Sedated: No    See dictated procedure note for full details.  Findings: Large right pleural effusion.     Specimens: none    Complications: None    Condition: Stable    Plan: Patient to remain in IR for 1 hour, then plan to reassess. Three-way stopcock in place with caps. Plan to connect back to suction in 1 hour pending patient's condition for her staged thoracentesis.       PROCEDURE  Describe Procedure: Ultrasound guided right sided thoracentesis, 1.9L serosang fluid removed. Three way stopcock placed. Caps placed on two ports. Will plan to reassess in 1 hour.   Patient Tolerance:  Patient tolerated the procedure well with no immediate complications  Length of time  physician/provider present for 1:1 monitoring during sedation: 0      ADDENDUM: 11/21/23    Second interval drainage: Stopcock from pigtail drain was connected to vacuum container and approximately 1500ml was removed. We elected to stop drainage at a maximum of 1500ml with plans to repeat a third interval drainage 1 hour later.     Third interval drainage: Stopcock from pigtail drain was connected to vacuum container and approximately 300ml was removed and drainage stopped.  Re-evaluation of the pleural effusion showed scant pleural effusion. Therefore, pigtail catheter was removed, and an air occlusive dressing was placed.     A total of 12.5gm of albumin was given IV during this procedure.     Plan:  Plan to return to hospital room. Monitoring per protocol. Orders placed for transfer.

## 2023-11-22 NOTE — PROGRESS NOTES
Care Management Follow Up    Length of Stay (days): 31    Expected Discharge Date: 11/24/2023     Concerns to be Addressed: discharge planning  Patient plan of care discussed at interdisciplinary rounds: Yes     Anticipated Discharge Disposition: Home, Outpatient Chemical Dependency     Anticipated Discharge Services: Home care, pending status  Anticipated Discharge DME: None     Patient/family educated on Medicare website which has current facility and service quality ratings: no  Education Provided on the Discharge Plan: Yes  Patient/Family in Agreement with the Plan: yes     Referrals Placed by CM/SW:    Private pay costs discussed: Not applicable     Additional Information:     Patient's status reviewed by chart and discuss discharge planning with provider. Pt had EGD this morning with no intervention. Medicine team plan for potential discharge home tomorrow 11/23/2023. Family will provide pt a discharge ride.     Care management continue to monitor.     Out Patient Thoracentesis at [facility]   P: [phone]  F: [phone]  Appointments- Placed them in the discharge instruction and informed the pt.  Friday 11/24/2023, arrive @ 12:30PM, procedure @ 1:00PM  Monday 11/27/23, arrive @ 1:30PM, procedure @ 2:00PM   Wednesday 11/29/23, arrive @ 10:30AM, procedure @ 11:00AM  Friday 12/01/2023, arrive @ 08:30AM, procedure @ 10:00AM  Monday 12/04/23, arrive @ 12:30PM, procedure @ 1:00PM   Wednesday 12/06/23, arrive @ 12:30PM, procedure @ 1:00PM   Friday 12/08/2023, arrive @ 12:30PM, procedure @ 1:00PM     Loni Mayfield RN  7C RN Care Coordinator  Phone: [phone]  Pager: [phone]  [place] ([id number]) Saturday & Sunday; ([phone]) [facility] Recognized Holidays   Units: 5A, 5B & 5C  Pager: [phone]  Units: 6B, 6C & 6D    Pager: [phone]  Units: 7A, 7B, 7C & 7D    Pager: [phone]  Units: 6A & ICU   Pager: [phone]  Units: 5 Ortho, 5MS & WB ED Pager: [phone]  Units: 6MS, 8A &  10 ICU  Pager [phone]

## 2023-11-22 NOTE — PLAN OF CARE
Goal Outcome Evaluation:      Plan of Care Reviewed With: patient    Overall Patient Progress: no changeOverall Patient Progress: no change    Outcome Evaluation: Pt A&Ox4 with soft BP, lethargic at the beginning of the shift, but became more alert and less flat throughout shift. Denies pain. Pt NPO at midnight with oral swabs for dry mouth. Hgb 7.3 @2300 recheck,1 unit of RBC was given at 0234, tolerated. Hbg AM was 9.5, @0630 recheck was 9.3. Pt continued to be SBP in the 80s with no s/s. Provider came to assess patient. Passed on to day nurse provider ordered albumin and fluid bolus for soft BP. 3x large brown/black coffee ground like BM with red streaks on shift- strong metallic smell, team paged and aware. Called sister Paulette to update as requested, pt okay for Paulette and Bev (sisters) to be updated. Plan is to transfer patient to .

## 2023-11-22 NOTE — PLAN OF CARE
Goal Outcome Evaluation:           Overall Patient Progress: no changeOverall Patient Progress: no change    Outcome Evaluation: Pt's Hbg dropped overnight, transfused. Had EGD with no intervention.    Loni Mayfield RNCC

## 2023-11-22 NOTE — PROGRESS NOTES
"PALLIATIVE CARE SOCIAL WORK Progress Note   Location: [facility]      Brief visit with Lolly's SO and sister. Lolly had just returned from procedure and was sleeping through our visit. Family shared that she was so disappointed to not be able to discharge yesterday. Family feels that they're coping ok and taking it \"one hour at a time\".     Plan: PCSW will continue to be available for ongoing emotional support while Palliative Care Team is following.     Clinical Social Work Interventions:   Assessment of palliative specific issues     Adjustment to illness counseling  Facilitation of processing of thoughts/feelings    HERNESTO Kaur  [facility], Palliative Care  Securely message with the [employer] Web Console (learn more here) or  Text page via [facility] Paging/Directory       "

## 2023-11-22 NOTE — PROGRESS NOTES
Notified [facility] that pt did not discharge on 11/21 and will need to cancel outpatient thoracentesis on 11/22. Will ensure pt and family know to cancel outpatient thoracentesis on 11/24 if pt is still admitted.

## 2023-11-22 NOTE — PROGRESS NOTES
Brief Cross Cover Note    Worsening black and bloody stool overnight with increasing bloody output. Hgb 7.3 last night with ongoing bleeding. Transfused 1 unit, awaiting STAT recheck, but ordered 2nd unit to be given ASAP given concerning for ongoing bleeding. BP soft with 80s/30-40s even with blood product. Ordered transfer to [place].     Addendum: Hgb recheck >9. No second unit administered. Awaiting plan from GI for endoscopy. Is NPO for possible EGD today. Order 25g albumin for ongoing low BPs.     Donna Red MD

## 2023-11-22 NOTE — PLAN OF CARE
Goal Outcome Evaluation:      Plan of Care Reviewed With: patient, sibling    Overall Patient Progress: decliningOverall Patient Progress: declining    Outcome Evaluation: Oriented x4 today, although more lethargic as shift progressed. Denies pain. Some nausea this afternoon- PRN compazine given. Pt had thora in IR today, 3.7 L removed. Soft blood pressures and large black coffee ground stool after thora. See prev. note. Hgb down trending, ordered 1 unit of RBC. Octerotide & IV abx ordered. Before transfusion BP 82/28, pt asymptomatic. 15 minute recheck BP 94/33, plan to closly monitor BP.  Family at bedside and supportive with cares. Critical Na level: 119. Plan to montior labs Q8.

## 2023-11-22 NOTE — PROGRESS NOTES
[facility]    Medicine Progress Note - Hospitalist Service, GOLD TEAM 9    Date of Admission:  10/22/2023    Assessment & Plan   Stefani Crowell is a 63 year old female with a pertinent past medical history of alcoholic cirrhosis, hepatorenal syndrome, hypothyroidism, recurrent hyponatremia, recurrent pleural effusions, COPD and recent admission for hyponatremia who was admitted on 10/22/2023 due to increased dyspnea related to R pleural effusion and hyponatremia.    MATTIE, likely secondary to hepatorenal syndrome, improved  Hyponatremia, stable  Suspect HRS. Had improvement with terlipressin (11/1-11/6), slight bump again in Cr once it was stopped.  - hold diuretics, no plans to resume per hepatology  - continue midodrine (resumed once off terlipressin)  - S Creat has improved but hyponatremia persistent  - now worsening hyponatremia which has responded to IV albumin, but IV albumin may risk increasing pleural effusion, necessitating repeat thoracentesis so will be judicious  - improving without any intervention  - repeat BMP in AM    Hepatic hydrothorax  Acute hypoxic respiratory failure 2/2 above  Diuretic-refractory due to hyponatremia. Not currently a TIPS candidate due to elevation of bilirubin.  - Tentatively scheduled for TIPS on 12/6, though pt is aware that she may not be a candidate at that time, since per IR elevated bili (>3) is a contraindication for TIPS  - Underwent thoracentesis on 11/21. Plan will be for her to undergo outpatient thoracentesis every M, W,F.  - underwent thoracentesis on 11/21.  - will need to continue M, W, F thoracentesis via [place] when discharged    Decompensated alcoholic cirrhosis  Hx hepatic encephalopathy  Abd US negative for thrombosis and HCC. EGD 11/2 normal. Not a liver transplant candidate currently due to incomplete CD program and abstinence period. Engaged in CD program outpatient. Peth negative 10/19.  Patient is  currently not a transplant candidate due to not having completed CD treatment and the required period of sobriety. She is working on these, but needs to be outpatient in order to continue. Her hospital course has been challenging, and as TIPS is not a possibility she is aware that her situation is challenging. After discussion with rosa CORONA on 11/8, pt would like to have additional discussions about her overall GOC. Palliative care involved on 11/9.  - Hepatology consulted, appreciate assistance  - Continue home lactulose 20g TID, rifaximin 550mg BID  - Palliative care following, appreciate assistance  - Health psych consult    Hemothorax  On morning of 11/13 patient found to have hypotension with dizziness and was noted to have 2 g Hgb drop from the day prior. Despite recent thora, CXR with white-out right lung. CT chest confirmed presence of blood. CTA completed without evidence for active bleeding. Patient's BP improved with IVF bolus and 2 units of PRBC (11/13).  - no recurrence of hemothorax and H/H stable    Moderate malnutrition in the context of chronic illness  - RD following    Acute on chronic normocytic anemia, symptomatic- worsening  Concern for GIB, nothing identified on scope 3 weeks prior. However, noted to have diverticulosis on colonoscopy and small variceals on EGD.  - Hgb acutely decreased on 11/21. Given that she has high risk for variceal bleeding, is sx and less than 8, will tranfuse 1 unit prbc  - discontinued octreotide and ceftriaxone since no variceal bleeding noted on EGD  - continue po PPI BID now and at discharge  - h/h every 8 hours-- continue to trend overnight  - If hgb continues to trend down, will reach out to GI once again regarding colonoscopy    Thrombocytopenia  Coagulopathy  - Likely secondary to cirrhosis     Elevated troponin  - Admit 27, 28. Likely secondary to poor kidney clearance  - Unremarkable EKG    Hypothyroidism  Last TSH was 0.74 on 07/24/23  - Continue home  levothyroxine 112 mcg po daily    GERD  - Continue pantoprazole 40 mg po BID    Anxiety  - Holding citalopram 40 mg po qdaily and trazodone 50 mg po at bedtime  - continue lorazepam 0.5 mg po qdaily prn    Hypokalemia, resolved  - replacement protocol    Post-thoracentesis pneumothorax, resolved  Noted after 11/6 thora. Respiratory status stable. Serial CXRs demonstrated improvement.          Diet: Fluid restriction 1000 ML FLUID  Snacks/Supplements Adult: Other; pudding at 2:00, applesauce at HS; Between Meals  Snacks/Supplements Adult: Other; Ensure Enlive at 8:00 ( vanilla or strawberry) patient likes this at 8:00 am , 2:00 pm ensure enlive,  HS : Ensrue max vanilla; Between Meals  Diet  Fluid restriction 1000 ML FLUID  NPO per Anesthesia Guidelines for Procedure/Surgery Except for: Meds    DVT Prophylaxis: Pneumatic Compression Devices  Demarco Catheter: Not present  Lines: None     Cardiac Monitoring: None  Code Status: No CPR- Do NOT Intubate      Clinically Significant Risk Factors         # Hyponatremia: Lowest Na = 119 mmol/L in last 2 days, will monitor as appropriate   # Hypercalcemia: corrected calcium is >10.1, will monitor as appropriate    # Hypoalbuminemia: Lowest albumin = 3.1 g/dL at 11/13/2023  6:46 AM, will monitor as appropriate    # Coagulation Defect: INR = 1.85 (Ref range: 0.85 - 1.15) and/or PTT = N/A, will monitor for bleeding  # Thrombocytopenia: Lowest platelets = 82 in last 2 days, will monitor for bleeding           # Moderate Malnutrition: based on nutrition assessment    # Financial/Environmental Concerns: none  # Asthma: noted on problem list        Disposition Plan             CELINA MOODY MD  Hospitalist Service, GOLD TEAM [address] [place] [address]  Securely message with [employer] (more info)  Text page via [facility] Paging/Directory   See signed in provider for up to date coverage  information  ______________________________________________________________________    Interval History     Feels much more energetic today. No longer having cp, sob, alfredo, orthopnea, pnd. Still having bloody stools. No diarrhea. No emesis. No fevers.    Physical Exam   Vital Signs: Temp: 97.4  F (36.3  C) Temp src: Oral BP: (!) 87/37 Pulse: 62   Resp: 12 SpO2: 97 % O2 Device: None (Room air) Oxygen Delivery: 3 LPM  Weight: 136 lbs 14.49 oz    Constitutional: awake, alert, appears fatigued  Eyes: scleral icterus present  Respiratory: mild resp distress, left lung fields clear. Improved aeration in R lung fields  Cardiovascular: 4/6 systolic murmur   GI: soft, non-distended, non-tender  Neurologic: alert and oriented x3, moving all extremities equally    Medical Decision Making     50 MINUTES SPENT BY ME on the date of service doing chart review, history, exam, documentation & further activities per the note.      Data     I have personally reviewed the following data over the past 24 hrs:    9.0  \   9.3 (L)   / 82 (L)     119 (LL) 87 (L) 83.4 (H) /  127 (H)   5.3 25 0.87 \     ALT: 31 AST: 35 AP: 73 TBILI: 4.3 (H)   ALB: 3.2 (L) TOT PROTEIN: 4.8 (L) LIPASE: N/A     Ferritin:  N/A % Retic:  7.2 (H) LDH:  N/A       Imaging results reviewed over the past 24 hrs:   Recent Results (from the past 24 hour(s))   IR Thoracentesis    Narrative    Patient name: Stefani Crowell  : 1960  Date of Service: 23    DIAGNOSIS: Pleural effusion    PROCEDURE: IR THORACENTESIS    Impression    IMPRESSION: Completed ultrasound-guided bilateral thoracenteses. Total  of 3700 mL drained from the right side in a staged manner (1900ml at  hour 0, 1500ml at hour 1, 300cc at hour 2) with scant pleural effusion  upon repeat ultrasound imaging. Patient had soft blood pressures  throughout the procedure, but was asymptomatic. She was given albumin  12.5gm IV.   ----------    CLINICAL HISTORY: Patient with persistent hypoxia due to  large right  pleural effusion. Plan for staged  large volume thoracentesis.    PERFORMED BY: Ninoska Marroquin PA-C and All Haddad PA-C     CONSENT: Written informed consent was obtained and is documented in  the patient record.    MEDICATIONS: 5 mL 1% lidocaine subcutaneous. Albumin 12.5 gm IV was  given during this procedure.     NURSING: Patient continuously monitored by trained, independent  observer.    DESCRIPTION: Patient positioned upright. Ultrasound was used to  evaluate a right pleural fluid collection. The skin overlying the  fluid collection was prepped and draped in the usual sterile fashion  and anesthetized.    Under continuous ultrasound visualization, a centesis needle/catheter  system was advanced into the fluid collection on a slip tip syringe.  Once fluid was aspirated, the catheter was advanced off the needle  into the pleural space and the needle was removed. A 3-way stopcock  and extension tubing was connected to the catheter and fluid was  drained. The catheter was then removed on patient respiratory  expiration. The puncture site was dressed in the usual sterile  fashion.    COMPLICATIONS: No immediate concerns, the patient remained stable  throughout the procedure and tolerated it well.    ESTIMATED BLOOD LOSS: Minimal    SPECIMENS: None    VERÓNICA ARANGO PA         SYSTEM ID:  [id number]

## 2023-11-22 NOTE — OR NURSING
EGD with no interventions performed under moderate sedation, patient tolerated well. Transported to  and report given to REN Bradford.

## 2023-11-23 NOTE — SUMMARY OF CARE
Ambulatory to and from bathroom without diff        Toni Bell RN  03/30/22 4540 Patient has been assessed for Home Oxygen needs. Oxygen readings:    *Pulse oximetry (SpO2) = 100% on room air at rest while awake.    *SpO2 improved to n/a% on n/aliters/minute at rest.    *SpO2 = 97% on room air during activity/with exercise.    *SpO2 improved to n/a% on n/aliters/minute during activity/with exercise.

## 2023-11-23 NOTE — PLAN OF CARE
Goal Outcome Evaluation:      Plan of Care Reviewed With: patient    Overall Patient Progress: no changeOverall Patient Progress: no change    Outcome Evaluation: Continue with POC    Patient Aox4, on RA running soft BP. L and R PIV saline locked. Up independently to commode. Black reddish loose stools. No complaints of pain. Call light in reach and able to make needs known.

## 2023-11-23 NOTE — PLAN OF CARE
"Cares 1500 to 2300    /48 (BP Location: Left leg)   Pulse 65   Temp 97.4  F (36.3  C) (Oral)   Resp 16   Ht 1.651 m (5' 5\")   Wt 60.2 kg (132 lb 11.5 oz)   LMP  (LMP Unknown)   SpO2 98%   BMI 22.09 kg/m      Goal Outcome Evaluation:    Plan of Care Reviewed With: patient    Overall Patient Progress: no changeOverall Patient Progress: no change    Outcome Evaluation: Soft BP's, asymptomatic. BM> 5 today, evening lactulose held per orders & pt refusal. A&o x4. Black/reddish stools, Hgb recheck @0000.      "

## 2023-11-24 NOTE — PROGRESS NOTES
[facility]    Background: Transitional Care Management program auto-identified and prompting a chart review by [facility] team.    Assessment: Upon chart review, [facility] Team member will Enroll this episode of Transitional Care Management program due to reason below:    Upon chart review, patient is followed by Solid Organ Transplant/Gastro team who follow their patients closely. [facility] will not conduct outreach to avoid duplication of outreach to patient.     Plan: Transitional Care Management episode enrolled per reason above.      *Connected Care Resource Team does NOT follow patient ongoing. Referrals are identified based on internal discharge reports and the outreach is to ensure patient has an understanding of their discharge instructions.

## 2023-11-24 NOTE — PLAN OF CARE
Physical Therapy Discharge Summary    Reason for therapy discharge:    Discharged to home with home therapy.    Progress towards therapy goal(s). See goals on Care Plan in [facility] electronic health record for goal details.  Goals partially met.  Barriers to achieving goals:   discharge from facility.    Therapy recommendation(s):    Continued therapy is recommended.  Rationale/Recommendations:  Pt would benefit from continued PT intervention to maximize IND with functional mobility for return to PLOF.

## 2023-11-24 NOTE — PLAN OF CARE
Discharge to: home  Transportation:   Time: 1600  Prescriptions: given to pt  Belongings:taken with pt  Access: 2 PIV's removed  Care plan and education discontinued: yes  Paperwork: AVS reviewed with pt and . No questions.   Walk test revealed no need for oxygen at this time.

## 2023-11-27 NOTE — PROGRESS NOTES
Per Dr. Lim, sodium level is stable and no interventions needed at this time. Reviewed results with pt's sister Bev and no need for changes to plan of care at this time.

## 2023-11-27 NOTE — PROGRESS NOTES
DATE/TIME OF CALL RECEIVED FROM LAB:  11/27/23 at 2:58 PM   LAB TEST:  Sodium  LAB VALUE:  119  PROVIDER NOTIFIED?: Yes  PROVIDER NAME: Stephanie Lim MD/ NGHIA Bravo for patient  DATE/TIME LAB VALUE REPORTED TO PROVIDER: 11/27/23 @ 3:02 pm  MECHANISM OF PROVIDER NOTIFICATION:  Message sent to patient's RNCC  & Dr. Lim  PROVIDER RESPONSE:

## 2023-11-27 NOTE — TELEPHONE ENCOUNTER
Left Voicemail (2nd Attempt)  Fran Bansal on 11/28/2023 at 11:17 AM      Left Voicemail (1st Attempt) and Sent Mychart (1st Attempt)   for the patient to call back and schedule the following:Consult for TIPS     Appointment type:New pt consult in person  Provider:    Return date: 11/30/23  Specialty phone number: [phone]  Additional appointment(s) needed: No  Additonal Notes: N/a  Fran Bansal on 11/27/2023 at 12:12 PM

## 2023-11-27 NOTE — TELEPHONE ENCOUNTER
----- Message from Darleen Amanda RN sent at 11/27/2023  9:32 AM CST -----  Regarding: New TIPS consult  HI please call pt to schedule labs and in person clinic visit with Dr White for this week 11/30, to discuss TIPS.     Thanks,     RODOLFO Amanda, RN, BSN  Interventional Radiology Care Coordinator   Phone:  [phone]       Results for orders placed or performed in visit on 08/04/23   Cardiac EP device report    Narrative    SJM-DUAL-PM/ NOT MRI CONDITIONAL  DEVICE INTERROGATED IN THE [facility] OFFICE. BATTERY VOLTAGE ADEQUATE (1 YR); AP 0%  98% (>40%/DDDR 60PPM/MODE SWITCH); ALL LEAD PARAMETERS WITHIN NORMAL LIMITS/STABLE; 1 AMS EPISODE WITH PERSISTENT AFLUTTER - 100% BURDEN >1 YR; NO SIGNIFICANT HIGH RATE EPISODES; PT IS NOT AN AC DUE TO GI BLEEDS, FALL RISK. PT TAKES ASA 81, DILTIAZEM, METOPROLOL TART. PACING MODE CHANGED TO VVIR 60PPM; NO OTHER PROGRAMMING CHANGES MADE TO DEVICE PARAMETERS. PACEMAKER FUNCTIONING APPROPRIATELY.    ES

## 2023-11-28 NOTE — PROGRESS NOTES
Per chart review, pt discharged from hospital on 11/23. Pt's sister Bev called and asked when pt needs repeat labs drawn. Reviewed that discharge summary noted that pt should have CBC and CMP completed on 11/27. Discussed with Bev that writer will fax lab orders to [facility] nurses station and request that these labs be drawn while at thoracentesis appointment on 11/27. Bev stated that family received a voicemail from IR scheduling regarding an IR consult for TIPS. Bev stated that pt and family were under the impression that the TIPS was no longer on the table. Notified Bev that TIPS is still scheduled for 12/6 but, per inpatient team notes, TIPS will only be possible if pt remains stable from a pulmonary and blood pressure standpoint and total bilirubin has improved to 3-4. Bev requested writer speak with Dr. Lim and IR staff on whether it makes sense to schedule IR consult appointment if TIPS is likely not going to happen. Will talk with Dr. Lim and IR and reach out to pt with additional information.

## 2023-11-28 NOTE — PROGRESS NOTES
"Connected with pt and pt's sisters Bev and Kaitlin to discuss TIPS and plan of care. Reviewed the low likelihood of liver transplant, where labs would need to be for TIPS to occur, possible further decompensation s/p TIPS vs possible symptom relief with time. Pt stated that she \"currently feels good\" and that the TIPS is \"too risky\". Pt reported that she does not want to proceed with TIPS consult and TIPS procedure. Pt requested that writer assist with getting TIPS on 12/6 cancelled. Message sent to Dr. Lim and IR staff regarding pt's decision to cancel TIPS procedure. Will follow up with pt in 1 week. Pt and family verbalized understanding and are agreeable to plan of care.         "

## 2023-11-29 NOTE — CONFIDENTIAL NOTE
Spoke with Azra, GEE nurse at [facility], who stated that radiologist would like to discuss finding on imaging with Dr. Stephanie Lim. Per Dr. Lim, okay to give direct contact information. Information provided to Azra.

## 2023-11-29 NOTE — TELEPHONE ENCOUNTER
[facility] Call Center    Phone Message    May a detailed message be left on voicemail: yes     Reason for Call: Other: Received call from [facility] looking to speak with the Pt's care team regarding an urgent finding on Imaging. Writer was unable to contact back line at time of call, so TE being sent. Please call back as soon as possible to discuss.     Action Taken: Message routed to:  [facility] ([facility]): Hep    Travel Screening: Not Applicable

## 2023-11-30 NOTE — PROGRESS NOTES
Per chart review, pt has a small pneumothorax s/p thoracentesis. Dr. Lim requested writer check in with pt and assist getting pt scheduled with Nadine Sellers PA-C on 12/8 for hospital follow up. Connected with pt who reported feeling well. Denied shortness of breath and pleuritic chest pain. Denied overt confusion and is taking xifaxan and lactulose as prescribed. Pt is averaging 3-5 BMs/day with lactulose 3 times daily. Denied hematemesis, hematochezia, and melena. Pt stated that her appetite is good. Pt is drinking ensure throughout the day and following a low Na diet. Discussed fluid restriction and pt reported adherence to 1,000 ml restriction. Pt is ambulating with assist of walker. Denied any recent falls.     Reviewed appointment with Nadine Sellers on 12/8 with pt and pt's sister Bev. Will plan to get labs drawn on 12/6 at thoracentesis appointment. Pt and Bev verbalized understanding and are agreeable to plan of care.

## 2023-12-04 NOTE — TELEPHONE ENCOUNTER
Transplant Social Work Services Progress Note      Date of Initial Social Work Evaluation: 10/3/2023  Collaborated with: Dr. Lim, Luis Alberto Enciso-RN Care Coordinator, Jerica Schumacher-RN transplant coordinator    Data/Intervention: I called Lolly to follow up on her progress with sobriety and outpatient treatment. I have not yet received an update from [facility] outpatient substance use disorder treatment programs, therefore I contacted the program again today ([facility], [phone]), and faxed a CARMEN ([phone]).   Assessment: Lolly's sister Paulette answered, and she reports Lolly is having a thoracentesis and unable to talk today.    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  PEth tests have been negative. It is recommended Stefani achieve at least six months of sobriety given her history of known liver disease and being previously counseled to abstain from alcohol.  Lolly started IOP treatment at [facility] around mid July 2023, and last attended around October 20, 2023.  Lolly was hospitalized 10/22/2023-11/23/2023.   Plan: I am awaiting an update from [facility] outpatient substance use disorder treatment program.  Paulette and I set up a time to talk along with Lolly on Thursday, 12/7 at 1:30pm.        KAR Morales, [facility]  Liver Transplant   Phone [phone]
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
## 2023-12-06 NOTE — CONFIDENTIAL NOTE
"Connected with pt and reviewed recommendations to go to the ER. Pt stated \"I do not want to sit in the hospital for days but will go\". Pt aware that writer notified [facility] ER of pt's plan.  "

## 2023-12-06 NOTE — ED TRIAGE NOTES
Referred from clinic for hyponatremia of 115  Thora today, removed 2L       Triage Assessment (Adult)       Row Name 12/06/23 1602          Triage Assessment    Airway WDL WDL        Respiratory WDL    Respiratory WDL WDL        Skin Circulation/Temperature WDL    Skin Circulation/Temperature WDL WDL        Cardiac WDL    Cardiac WDL WDL        Peripheral/Neurovascular WDL    Peripheral Neurovascular WDL WDL        Cognitive/Neuro/Behavioral WDL    Cognitive/Neuro/Behavioral WDL WDL

## 2023-12-06 NOTE — TELEPHONE ENCOUNTER
DATE/TIME OF CALL RECEIVED FROM LAB:  12/06/23 at 1:45 PM   LAB TEST:  Sodium  LAB VALUE:  115  PROVIDER NOTIFIED?: Yes  PROVIDER NAME: Dr. Stephanie Lim  DATE/TIME LAB VALUE REPORTED TO PROVIDER: 1:30 pm  MECHANISM OF PROVIDER NOTIFICATION: Phone Call  PROVIDER RESPONSE: Per Dr. Lim, pt needs to go to the ER (preferably [facility]) for further evaluation. Spoke with Crystal, IR nurse at [facility], and relayed recommendations. Crystal will review recommendations with pt after thoracentesis. Also spoke with pt's sister, Bev, who will also relay this information to pt. Writer will notify [facility] ER of pt's plan of care.

## 2023-12-07 NOTE — CONSULTS
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 patient with severe systemic                          disease. After reviewing the risks and benefits, the                          patient was deemed in satisfactory condition to                          undergo the procedure. The anesthesia plan was to use                          moderate sedation / analgesia (conscious sedation).                          Immediately prior to administration of medications,                          the patient was re-assessed for adequacy to receive                          sedatives. The heart rate, respiratory rate, oxygen                          saturations, blood pressure, adequacy of pulmonary                          ventilation, and response to care were  monitored                          throughout the procedure. The physical status of the                          patient was re-assessed after the procedure.                         After obtaining informed consent, the colonoscope  was                          passed under direct vision. Throughout the procedure,                          the patient's blood pressure, pulse, and oxygen                          saturations were monitored continuously. The                          Colonoscope was introduced through the anus and                          advanced to the cecum, identified by appendiceal                          orifice and ileocecal valve. The colonoscopy was                          performed without difficulty. The patient tolerated                          the procedure well. The quality of the bowel                          preparation was fair.                                                                                   Findings:       A scattered area of mildly f riable mucosa with contact bleeding was        found in the entire colon.       A few small-mouthed diverticula were found in the sigmoid colon.       Small, non-bleeding rectal varices were found.                                                                                   Impression:            - Preparation of the colon was fair.                         - Friability with contact bleeding in the entire                          examined colon.                         - Diverticulosis in the sigmoid colon.                         - No specimens collected.                         - There were possible polyps. This exam would not                          suffice for screening.  Recommendation:        - Return patient to [facility].                                                                                     electronically signed by Dmitri [facility]  ______________________________  DMITRI FITZPATRICK MD  11/3/2023 2:11:55 PM  I was physically present  for the entire viewing portion of the exam.  __________________________  Signature of teaching physician  Randa/Michelle FITZPATRICK MD  Number of Addenda: 0    Note Initiated On:  11/3/2023 12:04 PM  Scope In: 1:40:29 PM  Scope Out: 1:54:22 PM       No results found for this or any previous visit.      IMAGING:  No results found for this or any previous visit.    Results for orders placed during the hospital encounter of 10/22/23    XR Chest Port 1 View    Narrative  Examination:  XR CHEST PORT 1 VIEW    Date:  11/13/2023 9:48 AM    Clinical Information: hypotension, history of chronic liver disease    Additional Information: none    Comparison: Chest x-ray 11/12/2023, CT chest 11/13/2023    Findings:    AP view of the chest. Silhouetting of the right heart border. The left  heart border is deviated compared to prior. Silhouetting of the right  hemidiaphragm. Opacification of the entire right hemithorax with  leftward tracheal deviation. No pneumothorax. Scattered patchy  opacities in the left lung. No suspicious osseous lesions.    Impression  Impression:  Large right pleural effusion opacifying the entire right hemithorax  with left tracheal deviation.    I have personally reviewed the examination and initial interpretation  and I agree with the findings.    JENN BOWMAN       SYSTEM ID:  [id number]    Results for orders placed during the hospital encounter of 10/22/23    US Abdomen Complete w Doppler Complete    Narrative  EXAMINATION: US ABDOMEN COMPLETE WITH DOPPLER COMPLETE 10/22/2023  11:40 AM    COMPARISON: CT abdomen/pelvis 3/27/2023, 9/10/2020 ultrasound    HISTORY: Evaluate ascites + r/o clot    TECHNIQUE: The abdomen was scanned in standard fashion with  specialized ultrasound transducer(s) using both gray-scale, color  Doppler, and spectral flow techniques.    Findings:  Doppler Evaluation:  Splenic Vein: Patent with retrograde flow, 40 cm/sec.    Portal Venous Flow:  Main Portal Vein: Patent with antegrade flow, 18 cm/sec.  Right Portal Vein: Patent with antegrade flow, 27 cm/sec.  Left Portal Vein: Patent with antegrade flow, 28 cm/sec.    Hepatic Veins:  The right, middle,  and left hepatic veins are patent with flow towards  the IVC. IVC is patent.    Hepatic Arterial Flow:  Hepatic Artery: Resistive Index of 0.70, peak systolic velocity  125  cm/sec.  Left and right hepatic arteries not identified.    Gray-scale Analysis:  Liver: Hepatic parenchyma demonstrates coarsened echotexture with a  nodular capsular contour. No focal hepatic lesion. The right hepatic  lobe measures 12.7 cm craniocaudally. Main portal vein measures 1.3 cm  in diameter.    Gallbladder: The gallbladder is well distended and of normal  morphology. There is no pericholecystic fluid or sonographic Dempsey's  sign. Shadowing stones in the lumen.    Bile Ducts: No intra- or extra-hepatic biliary dilation. Common bile  duct measures 5 mm..    Pancreas: Not visualized    Kidneys: Both kidneys are of normal echotexture.  No evidence of mass  or hydronephrosis. Right kidney measures 9.2 cm, left measures 11.5  cm.    Spleen: The spleen is of normal echogenicity. Measures 15.7 cm,  enlarged.    Aorta and IVC: The visualized portions of the aorta and IVC are  unremarkable. Proximal aorta measures 1.9 cm.    Free fluid in the right upper and lower quadrants. Right pleural  effusion.    Impression  Impression:  1. Cirrhotic hepatic morphology. No focal hepatic lesion.  2. Patent hepatic vasculature. Retrograde blood flow in the splenic  vein. Low velocity flow in the main portal vein at 18 cm/s.  3. Ascites and splenomegaly, compatible portal hypertension.  4. Cholelithiasis.  5. Right pleural effusion.    I have personally reviewed the examination and initial interpretation  and I agree with the findings.    JENN BOWMAN,       SYSTEM ID:  [id number]    No results found for this or any previous visit.    XR CHEST POST PROCEDURE    Result Date: 12/6/2023  For Patients: As a result of the 21st Century Cures Act, medical imaging exams and procedure reports are released immediately into your electronic medical record. You may  view this report before your referring provider. If you have questions, please contact your health care provider. EXAM: XR CHEST POST PROCEDURE LOCATION: [facility] DATE: 12/6/2023 INDICATION: Post procedure status post large volume right thoracentesis COMPARISON: 12/04/2023    Unchanged appearance of the right chest status post thoracentesis relative to 12/04/2023 post procedural radiograph. There is incomplete reexpansion of the lung, with a apical pneumothorax that is similar to prior. There is some residual loculated pleural fluid inferolaterally and there are are some linear scar like opacities in the midlung laterally. The left lung remains clear. Heart size and pulmonary vasculature remain normal.    US THORACENTESIS RIGHT    Result Date: 12/6/2023  For Patients: As a result of the 21st Century Cures Act, medical imaging exams and procedure reports are released immediately into your electronic medical record. You may view this report before your referring provider. If you have questions, please contact your health care provider. EXAM: 1. RIGHT THORACENTESIS 2. ULTRASOUND GUIDANCE LOCATION: [facility] DATE: 12/6/2023 INDICATION: Pleural effusion. PROCEDURE: Informed consent obtained. Time out performed. The chest was prepped and draped in sterile fashion. 10 mL of 1% lidocaine was infused into the local soft tissues. Under direct ultrasound guidance, a 5 Croatian catheter system was placed into the pleural effusion. 2.0 liters of serosanguineous fluid were removed. Patient tolerated procedure well. Ultrasound imaging was obtained and placed in the patient's permanent medical record.    Status post right ultrasound-guided thoracentesis.    US THORACENTESIS RIGHT    Result Date: 12/4/2023  For Patients: As a result of the 21st Century Cures Act, medical imaging exams and procedure reports are released immediately into your electronic medical record. You may view this report before your referring provider.  If you have questions, please contact your health care provider. EXAM: 1. RIGHT THORACENTESIS 2. ULTRASOUND GUIDANCE LOCATION: [facility] DATE: 12/4/2023 INDICATION: Pleural effusion. PROCEDURE: Informed consent obtained. Time out performed. The chest was prepped and draped in sterile fashion. 10 mL of 1% lidocaine was infused into the local soft tissues. Under direct ultrasound guidance, a 5 Lithuanian catheter system was placed into the pleural effusion. 2 liters of blood-tinged fluid were removed and sent to lab, if requested. Patient tolerated procedure well. Ultrasound imaging was obtained and placed in the patient's permanent medical record.    1.  Status post right ultrasound-guided thoracentesis.    XR CHEST POST PROCEDURE    Result Date: 12/4/2023  For Patients: As a result of the 21st Century Cures Act, medical imaging exams and procedure reports are released immediately into your electronic medical record. You may view this report before your referring provider. If you have questions, please contact your health care provider. EXAM: XR CHEST POST PROCEDURE LOCATION: [facility] DATE: 12/4/2023 INDICATION: Post procedure. Status post right thoracentesis. COMPARISON: 11/29/2023.    The exam is performed after a large volume (2 L) right thoracentesis. Ex vacuo pneumothorax has redeveloped on today's exam, although it is smaller today than on 11/29/2023 or 11/24/2023. There is also somewhat improved expansion of the parenchyma in the right lung base on today's exam compared with previous post procedure radiographs. Some overall volume loss in the right hemithorax persists, with some mild stable shift of cardiomediastinal structures from left to right. The left lung remains clear. No evidence of left pleural fluid. Normal heart size and pulmonary vascularity.    US THORACENTESIS RIGHT    Result Date: 12/1/2023  For Patients: As a result of the 21st Century Cures Act, medical imaging exams and procedure  reports are released immediately into your electronic medical record. You may view this report before your referring provider. If you have questions, please contact your health care provider. EXAM: 1. RIGHT THORACENTESIS 2. ULTRASOUND GUIDANCE LOCATION: [facility] DATE: 12/1/2023 INDICATION: Pleural effusion. PROCEDURE: Informed consent obtained. Time out performed. The chest was prepped and draped in sterile fashion. 10 mL of 1% lidocaine was infused into the local soft tissues. Under direct ultrasound guidance, a 5 [place]er catheter system was placed into the pleural effusion. 2.0 liters of clear yellow fluid were removed and sent to lab, if requested. Patient tolerated procedure well. Ultrasound imaging was obtained and placed in the patient's permanent medical record.    1.  Status post right ultrasound-guided thoracentesis. 2.  Patient's known post thoracentesis ex vacuo pneumothorax discussed with the patient. Previously she was completely asymptomatic. If she remains asymptomatic, a chest x-ray will not be repeated knowing the likely findings. If there is desire for attempts at more definitive treatment, that would require hospitalization with chest tube in place, patient is not interested in that at this time.

## 2023-12-07 NOTE — PROGRESS NOTES
Transplant Social Work Services Progress Note      Date of Initial Social Work Evaluation: 10/3/2023  Collaborated with: Dr. Cary, Dr. Lim, Yanelis Miller NP, Jerica Schumacher, pre-liver transplant coordinator    Data/Intervention:  I received a discharge summary from [facility] and shared this information with the liver transplant team members listed above. I met with patient, daughter Angelia, significant other Hari in person, and included sister Paulette by phone.   A summary of Lolly's discharge summary was shared with patient and her support system.  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  PEth tests have been negative. Lolly started programming at [facility] on 7/24/2023) and she completed 90.5 hours of outpatient treatment prior to being discharged due to hospitalization.  Jason Burns [facility] states patient is ready to move to phase 4 of IOP when she is medically stable and able to return to treatment. Her relapse potential (dimension 5) on discharge is a 1, minimal relapse risk. Lolly has been hospitalized frequently making it very difficult for her to re-engage in treatment.    Plan: To address re-engagement in outpatient treatment next week.          KAR Morales, [facility]  Liver Transplant   Phone [phone]  Pager [phone]     Addendum on 12/11/2023: Yanelis Miller NP and I met with patient and her sisters, Bev and Paulette. Yanelis explained our team's recommendation would be for Lolly to engage in Phase 4 post transplant, and Lolly reports willingness to follow this recommendation.  I called [facility] and learned Phase 4 would be 2 hour group sessions weekly for ten weeks. Patient would also need a new comprehensive assessment.      Yanelis Miller NP will also place a consult for health psychology to help support patient's mental health needs.

## 2023-12-07 NOTE — PROGRESS NOTES
[facility]    Medicine Progress Note - Hospitalist Service, GOLD TEAM 10    Date of Admission:  12/6/2023    Assessment & Plan   Stefani Crowell is a 63 year old female admitted on 12/6/2023. She has a PMH of alcoholic cirrhosis, hepatorenal syndrome, chronic hyponatremia, recurrent pleural effusion (requiring MWF thoracentesis), hypothyroidism, COPD, and anxiety. She is admitted for acute hyponatremia (114) noted on routine labs associated w/ the thoracentesis she received on day of admission, 12/6/23.     CHANGES TODAY  Stop trazodone as it can precipitate hyponatremia   Discussed with nephrology. Will transition to 2% saline through PIV. Q 2 hours sodium checks. Monitor for rate of correction  Hepatology recommendations appreciated. Titrating lactulose to 3-5 soft bowel movements   Monitor anemia daily  Appears to additionally have a dissociation of potassium. Will check cortisol levels      #Severe hyponatremia (114)  #Alcoholic cirrhosis  #Hepatic hydrothorax ISO cirrhosis  #Hx of hepatorenal syndrome  Recurrent episodes of hyponatremia, BL mid 120s. Usually attributed to low effective arterial blood volume iso cirrhosis and post thoracentesis (2L removed thrice weekly). Nephrology consulted while patient in ED, suspect same etiology. Plan as below.  Pt presenting w/o notable altered mental status or neurologic changes. Mild increase in confusion, some lightheadedness/unsteadiness, and less than goal BM over last several days. Pt w/ alcoholic cirrhosis, requires thrice weekly thoracenteses for hepatic hydrothorax, diagnosed during most recent hospitalization from 10/22/23- 11/21/23 (also dx'd w/ hepatorenal syndrome during recent admission). Previously underwent regular paracenteses, but no longer. On 1L fluid restriction PTA, adheres well. On PTA lactulose, regularly having 3-4 BM daily. Not meeting goal last 2 days. No appreciable asterixis or tongue  fasciculations on exam. Mild to moderate ascites, non tender. No focal neurologic deficits on exam, occasionally staring off into distance and missing questions, but no clearly altered mental status.  Current LFTs near baseline. BL Cr 0.7-0.8, on arrival, 0.98.      MELD 3.0: 25 at 12/6/2023  6:43 PM  MELD-Na: 27 at 12/6/2023  6:43 PM  Calculated from:  Serum Creatinine: 0.95 mg/dL (Using min of 1 mg/dL) at 12/6/2023  6:43 PM  Serum Sodium: 117 mmol/L (Using min of 125 mmol/L) at 12/6/2023  6:43 PM  Total Bilirubin: 4.5 mg/dL at 12/6/2023  6:43 PM  Serum Albumin: 4.0 g/dL (Using max of 3.5 g/dL) at 12/6/2023  6:43 PM  INR(ratio): 1.74 at 12/6/2023  4:08 PM  Age at listing (hypothetical): 63 years  Sex: Female at 12/6/2023  6:43 PM     - Nephrology consulted, appreciate continued recs:              -- Bolus 100cc 3% hypertonic saline, recheck Na 30 min after infusion  -- 24H Na goal = 120  -- Q2H Na checks until at goal, then Q4H  -- If NOT at target after bolus, contact nephrology fellow overnight for consideration of second hypertonic saline bolus   - Hepatology consulted, appreciate recs  - PTA lactulose 20g TID AND PRN lactulose if < 3 BM in 24H              -- Bedside commode and bedpan, at least while in ED ambulance garage  - PTA rifaximin 550mg BID  - PTA midodrine 15mg TID  - Maintain 1L fluid restriction for now, adjust as appropriate based on hospital course  - Monitor kidney and liver function, assess response to fluids     #Esophageal varix  #Diverticulosis  #Chronic normocytic anemia  Melanic stools on 11/21 (during hospitalization). Previous EGD/colonoscopy w/ small esophageal varix and diverticulosis. EGD during most recent hospitalization w/o active bleed. Tx w/ octreotide and ceftriaxone at the time. Required several pRBCs, discharged w/ stable hgb.   BL hgb 8-9, on admit 7.8. No overt S/S bleeding at this time.   - PTA omeprazole (subbed her w/ pantoprazole 40mg BID)  - CTM for signs of bleeding,  melena, hematemesis, hemodynamic changes  - Monitor hgb w/ daily CBC     #RLE ecchymosis/hematoma  Incurred last week while transferring into car. Appearance not consistent w/ infection, appears to be hematoma w/ break in skin. See photo in objective portion of note.  - Dress w/ non stick bandages per patient preference  - Heat, ice, tylenol prn     #Hypermagnesemia  Mg 2.9 on arrival.   - Repeat in AM     #Hypothyroidism  - PTA levothyroxine 112mcg daily     #Allergic rhinitis  #COPD  - PTA montelukast 10mg at bedtime   - albuterol inhaler and neb solution PRN     #Back pain, muscle spasms  - PTA cyclobenzaprine 5-10mg Q8H PRN     #Anxiety  #Depression  #Insomnia  - PTA citalopram 40mg daily  - PTA trazodone 50mg at bedtime   - PTA lorazepam 0.5mg Q8H PRN for panic attacks          Diet: Fluid restriction 1000 ML FLUID  Regular Diet Adult    DVT Prophylaxis:   Demarco Catheter: Not present  Lines: None     Cardiac Monitoring: None  Code Status: Full Code      Clinically Significant Risk Factors Present on Admission         # Hyponatremia: Lowest Na = 114 mmol/L in last 2 days, will monitor as appropriate   # Hypercalcemia: Highest Ca = 10.3 mg/dL in last 2 days, will monitor as appropriate     # Coagulation Defect: INR = 1.74 (Ref range: 0.85 - 1.15) and/or PTT = N/A, will monitor for bleeding  # Thrombocytopenia: Lowest platelets = 62 in last 2 days, will monitor for bleeding            # Financial/Environmental Concerns:    # Asthma: noted on problem list        Disposition Plan      Expected Discharge Date: 12/09/2023                    Irving Mayo MD  Hospitalist Service, GOLD TEAM [address] [place] [address]  Securely message with [employer] (more info)  Text page via [facility] Paging/Directory   See signed in provider for up to date coverage information  ______________________________________________________________________    Interval History   Alert and oriented   No fever or  chills  Denies bowel or urinary issues  Denies Dizziness of lightheadedness    Physical Exam   Vital Signs: Temp: 97.3  F (36.3  C) Temp src: Oral BP: 100/49 Pulse: 69   Resp: 13 SpO2: 100 % O2 Device: None (Room air)    Weight: 135 lbs 0 oz    Physical Exam  Constitutional:       General: She is not in acute distress.     Appearance: She is not ill-appearing or toxic-appearing.   HENT:      Head: Normocephalic.      Mouth/Throat:      Mouth: Mucous membranes are moist.   Cardiovascular:      Rate and Rhythm: Normal rate.      Heart sounds: No murmur heard.     No friction rub. No gallop.   Pulmonary:      Effort: Pulmonary effort is normal. No respiratory distress.      Breath sounds: No stridor. No wheezing.   Abdominal:      General: There is distension.      Palpations: Abdomen is soft. There is no mass.      Hernia: No hernia is present.   Musculoskeletal:      Right lower leg: No edema.      Left lower leg: No edema.   Neurological:      General: No focal deficit present.      Mental Status: She is alert.      Comments: Some degree of pleasant confusion however doesn't appear grossly encephalopathic    Psychiatric:         Mood and Affect: Mood normal.         Behavior: Behavior normal.           Medical Decision Making             Data
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
## 2023-12-07 NOTE — ED NOTES
DATE/TIME OF CALL RECEIVED FROM LAB:  12/07/23 at 4:41 PM   LAB TEST:  sadium  LAB VALUE:  118  PROVIDER NOTIFIED?: Yes  PROVIDER NAME: mychal  DATE/TIME LAB VALUE REPORTED TO PROVIDER: 4:41 PM   MECHANISM OF PROVIDER NOTIFICATION: Page  PROVIDER RESPONSE: pending

## 2023-12-07 NOTE — ED NOTES
DATE/TIME OF CALL RECEIVED FROM LAB:  12/07/23 at 1:39 PM   LAB TEST:  sodium  LAB VALUE:  119  PROVIDER NOTIFIED?: Yes  PROVIDER NAME: jeremie  DATE/TIME LAB VALUE REPORTED TO PROVIDER: 12/7/23 1:38 PM   MECHANISM OF PROVIDER NOTIFICATION: Page  PROVIDER RESPONSE: pending

## 2023-12-07 NOTE — PROGRESS NOTES
"Hospital Medicine  Following hyponatremia throughout the night.  Remains slightly better than admission - still severely low.  I've ordered 50cc 3% saline aliqot - and repeat sodium / Na was 116/117.  Neurologically still ok per RN.  Does not have a central line - is in the ER.  Discussed indications for ICU and central line with Dr. Sterling (ICU attending) and we both agree that if patient is otherwise stable and doing ok and neurologically stable, she does not need ICU or central line.  She does have some hepatic encephalopathy - though per admission note, was noted by admitting team to be: \"No focal neurologic deficits on exam, occasionally staring off into distance and missing questions, but no clearly altered mental status\"    Assessment: hyponatremia, severe    Plan:  continue with small aliqots of 3% saline (50-100cc) run over 1-2 hours then hold, recheck serum sodium and monitor closely.  Goal increase(s) in meq ~ 6 meq of rise over 24 hours.  3% saline 100cc X 1 over 2 hours --- repeat serum sodium soon thereafter and recalibrate plan.  I don't think that the patient absolutely needs ICU just for 3% saline and does not need central line.  Repeat serum sodium soon after the next 100cc of 3% saline is in.  Case discussed with Dr. Sterling from MICU who agrees with this plan.    Jason Orellana MD    "

## 2023-12-07 NOTE — MEDICATION SCRIBE - ADMISSION MEDICATION HISTORY
"Medication Scribe Admission Medication History    Admission medication history is complete. The information provided in this note is only as accurate as the sources available at the time of the update.    Information Source(s): Family member and CareEverywhere/SureScripts via in-person    Pertinent Information: Patient's partner/spouse handed me a med grid on his phone with times for medication and said \"that's all she is taking\". Updated PTA using that med grid. Patient also states \" no more Trazodone\", no reason given.    Changes made to PTA medication list:  Added: None  Deleted: None  Changed: None    Medication Affordability:  Not including over the counter (OTC) medications, was there a time in the past 3 months when you did not take your medications as prescribed because of cost?: No    Allergies reviewed with patient and updates made in EHR: yes    Medication History Completed By: Felipa Martins 12/7/2023 12:39 PM    Prior to Admission medications    Medication Sig Last Dose Taking? Auth Provider Long Term End Date   albuterol (PROAIR HFA/PROVENTIL HFA/VENTOLIN HFA) 108 (90 Base) MCG/ACT inhaler Inhale 1-2 puffs into the lungs every 6 hours as needed for shortness of breath or wheezing Unknown at Unknown Yes Aliyah Marcus PA-C Yes    albuterol (PROVENTIL) (2.5 MG/3ML) 0.083% neb solution Take 1 vial (2.5 mg) by nebulization every 4 hours as needed for shortness of breath or wheezing Use in neb machine Unknown at Unknown Yes Aliyah Marcus PA-C Yes    artificial saliva (BIOTENE DRY MOUTHWASH) LIQD liquid Swish and spit 5 mLs in mouth 4 times daily as needed for dry mouth Unknown at Unknown Yes Robin Dotson MD     citalopram (CELEXA) 40 MG tablet Take 1 tablet (40 mg) by mouth daily 12/6/2023 at 0830 Yes Ninfa Mark PA-C Yes    cyclobenzaprine (FLEXERIL) 10 MG tablet TAKE 1/2 TO 1 TABLET(5 TO 10 MG) BY MOUTH THREE TIMES DAILY AS NEEDED FOR MUSCLE SPASMS Unknown at Unknown Yes " Aliyah Marcus PA-C     ferrous sulfate (FEROSUL) 325 (65 Fe) MG tablet Take 1 tablet (325 mg) by mouth daily (with breakfast) Take 1 tablet (325 mg) by mouth 12/6/2023 at 0830 Yes Aliyah Marcus PA-C     fluticasone-salmeterol (ADVAIR) 250-50 MCG/ACT inhaler Inhale 1 puff into the lungs every 12 hours for 30 days 12/6/2023 at 2030 Yes Carlito Arciniega MD Yes 12/21/23   Hypertonic Nasal Wash (SINUS RINSE BOTTLE KIT NA) Spray 1 Bottle in nostril daily as needed. Unknown at Unknown Yes Reported, Patient     lactulose (CEPHULAC) 10 GM packet Take 2 packets (20 g) by mouth 3 times daily 12/6/2023 at 1630 Yes Ninfa Mark PA-C No    levothyroxine (SYNTHROID/LEVOTHROID) 112 MCG tablet Take 1 tablet (112 mcg) by mouth daily 12/6/2023 at 0830 Yes Aliyah Marcus PA-C Yes    LORazepam (ATIVAN) 0.5 MG tablet Take 1 tablet (0.5 mg) by mouth daily as needed for anxiety Unknown at Unknown Yes Robin Dotson MD Yes    LORazepam (ATIVAN) 0.5 MG tablet Take 1 tablet by mouth daily as needed for panic attacks Unknown at Unknown Yes Aliyah Marcus PA-C Yes    midodrine (PROAMATINE) 10 MG tablet Take 15 mg by mouth 3 times daily 12/6/2023 at 1630 Yes Poonam Hays MD     montelukast (SINGULAIR) 10 MG tablet Take 1 tablet (10 mg) by mouth At Bedtime 12/6/2023 at 2030 Yes Ninfa Mark PA-C Yes    Nutritional Supplements (ENSURE COMPLETE PO) Take 1 Bottle by mouth 2 times daily Unknown at Unknown Yes Unknown, Entered By History     omeprazole (PRILOSEC) 20 MG DR capsule Take 1 capsule (20 mg) by mouth 2 times daily 12/6/2023 at 1630 Yes Carlito Arciniega MD     OVER-THE-COUNTER Place 1 drop into both eyes 3 times daily. Blink Tears, Systane Ultra, Systane Balance or Refresh Optive Artificial Tear 12/6/2023 at Unknown Yes Xiomara Asencio, OD     rifaximin (XIFAXAN) 550 MG TABS tablet Take 1 tablet (550 mg) by mouth 2 times daily 12/6/2023 at 1630 Yes Robin Dotson MD      traZODone (DESYREL) 50 MG tablet Take 50 mg by mouth at bedtime 12/6/2023 at 2030 Yes Unknown, Entered By History

## 2023-12-07 NOTE — ED NOTES
DATE/TIME OF CALL RECEIVED FROM LAB:  12/07/23 at 10:03 AM   LAB TEST:  sodium  LAB VALUE:  115  PROVIDER NOTIFIED?: Yes  PROVIDER NAME: mychal  DATE/TIME LAB VALUE REPORTED TO PROVIDER: 1000  MECHANISM OF PROVIDER NOTIFICATION: Page  PROVIDER RESPONSE: pending
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
## 2023-12-07 NOTE — ED NOTES
DATE/TIME OF CALL RECEIVED FROM LAB:  12/07/23 at 7:20 AM   LAB TEST:  sodium  LAB VALUE:  114  PROVIDER NOTIFIED?: Yes  PROVIDER NAME: gold team  DATE/TIME LAB VALUE REPORTED TO PROVIDER: 12/7/23 7:20  MECHANISM OF PROVIDER NOTIFICATION: Page  PROVIDER RESPONSE: pending
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
## 2023-12-07 NOTE — PLAN OF CARE
"/50   Pulse 64   Temp 97.8  F (36.6  C)   Resp 18   Ht 1.651 m (5' 5\")   Wt 61.2 kg (135 lb)   LMP  (LMP Unknown)   SpO2 100%   BMI 22.47 kg/m      5583-2356    Presented to ED for abnormal lab results, hyponatremia (Na+ 115). Patient also reported unsteadiness. A&Ox4, denied pain. Generalized bruises and open skin/tear on right upper shin. Jaundice skin color. NS 2% 150 ml total given. Sodium level monitoring q2h per order. Lactulose 20 mg po given. Will continue to monitor. Urine specimen needed.   "

## 2023-12-08 NOTE — PROGRESS NOTES
Medicine cross cover note    Paged by RN around 11:30p about Na 118. This is a slight drop from 120 around 6pm.   2% Na was stopped around 7:30p when Na 120 was resulted.    - I resumed 2% na at 30 ml/hr (was on 50 ml/hr prior to stopping).  - re check Na around 2am    Recheck 1.5hr into 2% infusion remained at 118.    - continue current infusion  - re check in 4 hours

## 2023-12-08 NOTE — CONSULTS
"Care Management Initial Consult    General Information  Assessment completed with: Patient, VM-chart review, Lolly  Type of CM/SW Visit: Initial Assessment    Primary Care Provider verified and updated as needed: Yes (Pt reports that she has been seeing her \"liver\" team more frequently than her PCP.)   Readmission within the last 30 days: unable to assess      Reason for Consult: other (see comments) (elevated risk score)  Advance Care Planning: Advance Care Planning Reviewed: present on chart, no concerns identified          Communication Assessment  Patient's communication style: spoken language (English or Bilingual)    Hearing Difficulty or Deaf: no   Wear Glasses or Blind: yes    Cognitive  Cognitive/Neuro/Behavioral: WDL                      Living Environment:   People in home: significant other  Hari  Current living Arrangements: house      Able to return to prior arrangements: yes       Family/Social Support:  Care provided by: self, spouse/significant other  Provides care for: no one  Marital Status: Lives with Significant Other  Significant Other, Children, Sibling(s) (Pt reports having a good support system with family. This includes her s/o (Hari) and her two sisters (Bev and Paulette) who are involved and supportive.)       Hari  Description of Support System: Supportive, Involved         Current Resources:   Patient receiving home care services: No     Community Resources: [facility]  Equipment currently used at home: walker, rolling, cane, straight  Supplies currently used at home: None    Employment/Financial:  Employment Status: disabled (Pt reports that she is in the process of applying for SSDI. Believes she started the application process in July 2023. Feels like she has good support with this.)        Financial Concerns: none   Referral to Financial Worker: No       Does the patient's insurance plan have a 3 day qualifying hospital stay waiver?  No    Lifestyle & Psychosocial " "Needs:  Social Determinants of Health     Food Insecurity: Not on file   Depression: At risk (8/18/2023)    PHQ-2     PHQ-2 Score: 4   Housing Stability: Not on file   Tobacco Use: Medium Risk (12/7/2023)    Patient History     Smoking Tobacco Use: Former     Smokeless Tobacco Use: Never     Passive Exposure: Past   Financial Resource Strain: Not on file   Alcohol Use: Not on file   Transportation Needs: Not on file   Physical Activity: Not on file   Interpersonal Safety: Not on file   Stress: Not on file   Social Connections: Not on file       Functional Status:  Prior to admission patient needed assistance:   Dependent ADLs:: Ambulation-walker  Dependent IADLs:: Cleaning, Laundry, Cooking, Shopping, Meal Preparation, Medication Management, Transportation       Mental Health Status:  Mental Health Status: No Current Concerns       Chemical Dependency Status:  Chemical Dependency Status: Past Concern  Chemical Dependency Management: Other (see comment) (Pt currently receiving OP CD Tx with [facility]. Sober since June 29, 2023)          Values/Beliefs:  Spiritual, Cultural Beliefs, Mormonism Practices, Values that affect care:                 Additional Information:  Per H&P, \"Stefani Crowell is a 63 year old female admitted on 12/6/2023. She has a PMH of alcoholic cirrhosis, hepatorenal syndrome, chronic hyponatremia, recurrent pleural effusion (requiring MWF thoracentesis), hypothyroidism, COPD, and anxiety. She is admitted for acute hyponatremia (114) noted on routine labs associated w/ the thoracentesis she received on day of admission, 12/6/23. \"    SW met with pt at bedside to complete Care Management Assessment d/t elevated readmission risk score. Pt reported that she was tired and just woke up from a nap but agreeable to answer SW questions. Pt receives regularly scheduled thoracentesis at [facility] typically on Mondays, Wednesdays and Fridays. Pt currently receiving IOP CD " treatment through [facility]. Pt had no questions or concerns for SW at this time. Discharge needs unknown at this time. CM team to follow should discharge needs arise. Pt reports that family can provide transportation at discharge.     ELAINA Chavis   [facility]   [address] Ph: [phone]

## 2023-12-08 NOTE — PROGRESS NOTES
"CLINICAL NUTRITION SERVICES - ASSESSMENT NOTE     Nutrition Prescription    Malnutrition Status:    Severe malnutrition in setting of chronic illness    Recommendations already ordered by Registered Dietitian (RD):  Encourage at least 1  high protein food per meal; small frequent meal pattern encouraged  Send Greek Vanilla Yogurt BID between meals (10 AM, 8 PM)  Send Ensure Plus and Ensure Max Protein at 2 PM daily to optimize Kcal/protein intake (timing was per pt preference)    Future/Additional Recommendations:  Monitor nutrition-related findings and follow pt per protocol     REASON FOR ASSESSMENT  Stefani Crowell is a/an 63 year old female assessed by the dietitian for RN Consult - (verbal) pt requesting oral nutrition supplements     CLINICAL HISTORY  Hx of Etoh cirrhosis, hepatorenal syndrome, chronic hyponatremia, recurrent PE (requiring MWF thoracentesis), hypothyroidism, COPD, and anxiety, admitted to [facility] for acute hyponatremia with Na of 114 upon admit.      NUTRITION HISTORY  Pt recently discharged from prolonged hospitalization. Severe malnutrition with reliance on small frequent meals and ONS to optimize nutrition. Has only been eating 1 meal per day so far since admit (was boarding in ED yesterday per pt report). Pt requesting ONS and high protein snacks between meals. Endorses severe wt loss and reports \"I have liver disease, I am dying\". Severe global muscle/fat depletion per NFPE. Will arrange ONS/snack set up, do not count towards Na restriction at this time.     CURRENT NUTRITION ORDERS  Diet: Regular, 1000 mL fluid restriction   Supplements: RD placed PRN supplement order per pt/RN request this AM   Intake/Tolerance: No intake documented to I/O yet since admission. Ordered breakfast yesterday and breakfast today but no additional meal orders per room service order hx review.       GI  No BM documentation yet since admit      LABS:  Na remains low, current level 120     MEDICATIONS  Ferosul " "325 mg daily   Lactulose TID   IVF - Hypertonic 2% saline infusion (currently stopped per MAR)    SKIN  WOCN was following at recent admission but no current admit notes  Previously documented bilateral knee/elbow skin impairment; skin tear/trauma. Noted as healing per note 11/21/23.      ANTHROPOMETRICS  Height: 165.1 cm (5' 5\")  Admit wt 61.2 kg (135 lbs) 12/6  Most Recent Weight: 61 kg (134 lb 7.7 oz)  IBW: 56.8 kg  107%IBW   BMI: Normal BMI    Weight History:   Weight stable over past 2-months (slight depletion but not significant)  Severe loss of ~13-17% body weight when comparing current wt to measure obtained 4-5 months ago.   Fluctuation likely 2/2 fluid status variance given frequent thoracentesis.   Wt Readings from Last 15 Encounters:   12/08/23 61 kg (134 lb 7.7 oz)   11/23/23 61.2 kg (135 lb) -Suspect reported   10/03/23 62.3 kg (137 lb 4.8 oz)   09/28/23 65.8 kg (145 lb)   08/30/23 65.8 kg (145 lb)   08/18/23 74.3 kg (163 lb 12.8 oz)   07/24/23 70.3 kg (155 lb)   07/06/23 70.1 kg (154 lb 9.6 oz)   04/19/23 78.2 kg (172 lb 8 oz)   03/10/23 80.8 kg (178 lb 3.2 oz)   08/15/22 78.5 kg (173 lb)   03/31/21 73.5 kg (162 lb)   03/25/21 73.5 kg (162 lb)   02/18/21 75.5 kg (166 lb 6 oz)   01/19/21 73 kg (161 lb)     Dosing Weight: 61 kg (admit wt, IBW 56.8 kg)    ASSESSED NUTRITION NEEDS  Estimated Energy Needs: 5826-9666 kcals/day (25 - 30 kcals/kg)  Justification: Maintenance  Estimated Protein Needs: 70-90 grams protein/day (1.2 - 1.5 grams of pro/kg)  Justification: Lean body mass preservation and increased needs d/t pt condition  Estimated Fluid Needs: 1000 mL per fluid restriction   Justification: On a fluid restriction and Per provider pending fluid status    PHYSICAL FINDINGS  See malnutrition section below.    MALNUTRITION  % Intake: </=75% for >/= 1 month (severe)  % Weight Loss: > 10% in 6 months (severe) but stable over past 2-months.   Subcutaneous Fat Loss: severe, global  Muscle Loss: Severe, " global  Fluid Accumulation/Edema: None noted  Malnutrition Diagnosis: Severe malnutrition in the context of chronic illness    NUTRITION DIAGNOSIS  Malnutrition related to reduced appetite, chronic illness (ESLD) as evidenced by reliance on small freq meals/ONS to optimize intake, wt loss >10% in less than 6 months, severe global muscle/fat loss per NFPE.    INTERVENTIONS  Implementation  Nutrition education for nutrition relationship to health/disease   Medical food supplement therapy - initiate  Modify composition of meals/snacks - initiate    Goals  Patient to consume % of nutritionally adequate meal trays TID, or the equivalent with supplements/snacks.     Monitoring/Evaluation  Progress toward goals will be monitored and evaluated per protocol.  Nuno Esparza RDN, LD, CNSC  Available via phone or [email or web address] chat, and pager  6B work-room RD phone: *[phone]   6B/Obs RD pager: [phone]         Weekend/Holiday RD pager [phone]

## 2023-12-08 NOTE — PROVIDER NOTIFICATION
Time of notification: 11:17 PM  Provider notified: Dr. Cabral  Patient status: Critical Nn=[id number]. Page was sent to the above provider.  Orders received:  Awaiting call back

## 2023-12-08 NOTE — PLAN OF CARE
Goal Outcome Evaluation:      Plan of Care Reviewed With: patient    Overall Patient Progress: no changeOverall Patient Progress: no change    Outcome Evaluation: See RD note 12/8 for full assessment/recs

## 2023-12-08 NOTE — PROGRESS NOTES
[facility] [place]  HEPATOLOGY PROGRESS NOTE  Stefani Crowell [id number]       IMPRESSION:  Stefani Crowell is a 63 year old female with a history of alcohol use disorder and alcohol associated cirrhosis, last use 6/28/23. Her liver disease has been complicated by ascites, diuretic refractory hepatic hydrothorax with 3x weekly thoracentesis, COPD, hyponatremia, HE, EV who was admitted following a thoracentesis and noted to have a serum sodium of 114. She has received 3% saline without significant improvement in serum sodium level.      RECOMMENDATIONS:  # Hyponatremia  - appricated nephrology following. With 2% saline, has increased to 120.   - has not been on diuretics, fluid restriction at 1L  - now with holding trazodone, consider stopping citalopram     # Hepatic hydrothorax  - does not feel dyspneic today. Last thora 12/6.  Repeat thora as needed, please send diagnostic sample with each thora.   - has needed x3 weekly thoras prior to admission    # Alcohol associated cirrhosis  # Alcohol use disorder  - last use 6/28/23. Has been involved in CD program prior to worsening status.   - MELD 3.0 24. ABO A. Had been evaluated for liver transplant in the past- needed to complete CD program and prolonged sobriety prior to re consideration. Has been seen by Tx SW due to declining status. Will discuss at liver transplant conference 12/12. Still would need cardiology risk assessment when determined she could have repeat evaluation.   - US abd w doppler 10/22 with patent vasculature, no lesions     # Hepatic encephalopathy  - mentation more stable, continues to have response out of context when asked targeted questions.   - continue with lactulose and rifaximin. Goal stool output with 3-5 soft/loose BM's per day.    Patient was discussed with attending physician, Dr. Annie Cary.  The above note reflects our joint plan.     Next follow up appointment: Dr. Stephanie Lim 1/29/24    Thank you for the opportunity to be  "involved with  Stefani rosas. Please call with any questions or concerns.    ROD Urban, CNP  Inpatient Hepatology [facility]        SUBJECTIVE:  Reports feeling close to baseline. Denies abdominal pain, nausea or vomiting. She has not been sleeping well here, improvement in mentation since admission. Reports mentation close to baseline. Was able to be up to bedside commode with minimal assistance. Complains about fluid restriction. No increase in dyspnea or fluid volume overload    ROS:  A 10-point review of systems was negative.    OBJECTIVE:  VS: BP 93/53 (BP Location: Left arm)   Pulse 78   Temp 98.2  F (36.8  C) (Oral)   Resp 16   Ht 1.651 m (5' 5\")   Wt 61 kg (134 lb 7.7 oz)   LMP  (LMP Unknown)   SpO2 100%   BMI 22.38 kg/m     Date 12/08/23 0700 - 12/09/23 0659   Shift 1386-1972 7728-4650 1883-5438 24 Hour Total   INTAKE   P.O. 160   160   Shift Total(mL/kg) 160(2.62)   160(2.62)   OUTPUT   Urine 600   600   Shift Total(mL/kg) 600(9.84)   600(9.84)   Weight (kg) 61 61 61 61      General: In no acute distress, mild facial muscle wasting  Neuro: AOx2-3, No asterixis  HEENT:  Noscleral icterus, Nooral lesions  CV:  Skin warm and dry  Lungs:  Respirations even and nonlabored on room air  Abd:   Nontender, mildly distended  Extrem:  trace  peripheral edema   Skin: Nojaundice  Psych: pleasant    MEDICATIONS:  Current Facility-Administered Medications   Medication    acetaminophen (TYLENOL) tablet 650 mg    Or    acetaminophen (TYLENOL) Suppository 650 mg    albuterol (PROVENTIL HFA/VENTOLIN HFA) inhaler    albuterol (PROVENTIL) neb solution 2.5 mg    calcium carbonate (TUMS) chewable tablet 1,000 mg    citalopram (celeXA) tablet 40 mg    cyclobenzaprine (FLEXERIL) tablet 5-10 mg    ferrous sulfate (FEROSUL) tablet 325 mg    fluticasone-vilanterol (BREO ELLIPTA) 100-25 MCG/ACT inhaler 1 puff    lactulose (CEPHULAC) Packet 20 g    lactulose (CEPHULAC) Packet 20 g    levothyroxine " (SYNTHROID/LEVOTHROID) tablet 112 mcg    lidocaine (LMX4) cream    lidocaine 1 % 0.1-1 mL    LORazepam (ATIVAN) tablet 0.5 mg    melatonin tablet 5 mg    midodrine (PROAMATINE) tablet 15 mg    montelukast (SINGULAIR) tablet 10 mg    ondansetron (ZOFRAN ODT) ODT tab 4 mg    Or    ondansetron (ZOFRAN) injection 4 mg    pantoprazole (PROTONIX) EC tablet 40 mg    prochlorperazine (COMPAZINE) injection 10 mg    Or    prochlorperazine (COMPAZINE) tablet 10 mg    Or    prochlorperazine (COMPAZINE) suppository 25 mg    rifaximin (XIFAXAN) tablet 550 mg    senna-docusate (SENOKOT-S/PERICOLACE) 8.6-50 MG per tablet 1 tablet    Or    senna-docusate (SENOKOT-S/PERICOLACE) 8.6-50 MG per tablet 2 tablet    sodium chloride (NEBUSAL) 3 % neb solution 3 mL    sodium chloride (PF) 0.9% PF flush 3 mL    sodium chloride (PF) 0.9% PF flush 3 mL    sodium chloride 2% infusion       REVIEW OF LABORATORY, PATHOLOGY AND IMAGING RESULTS:  BMP  Recent Labs   Lab 12/08/23  0959 12/08/23  0555 12/08/23  0158 12/07/23  2220 12/07/23  0846 12/07/23  0614 12/06/23  2329 12/06/23  1843 12/06/23  1608   * 120*  120* 118* 118*   < > 114*   < > 117* 114*   POTASSIUM  --  5.1  --   --   --  5.2  --  5.0 4.9   CHLORIDE  --  90*  --   --   --  85*  --  84* 81*   UMA  --  10.0  --   --   --  9.9  --  10.3* 10.3*   CO2  --  22  --   --   --  22 -- 23 24   BUN  --  56.1*  --   --   --  71.2*  --  74.7* 73.8*   CR  --  0.73  --   --   --  0.82  --  0.95 0.98*   GLC  --  90  --   --   --  80  --  106* 109*    < > = values in this interval not displayed.     CBC  Recent Labs   Lab 12/08/23  0555 12/07/23  0614 12/06/23  1608   WBC 6.7 8.3 8.2   RBC 2.32* 2.31* 2.38*   HGB 7.5* 7.5* 7.8*   HCT 22.5* 22.4* 22.5*   MCV 97 97 95   MCH 32.3 32.5 32.8   MCHC 33.3 33.5 34.7   RDW 16.0* 15.9* 15.9*   PLT 57* 62* 72*     INR  Recent Labs   Lab 12/06/23  1608   INR 1.74*     LFTs  Recent Labs   Lab 12/07/23  0614 12/06/23  1843 12/06/23  1608   ALKPHOS 64 65 66  "  AST 41 43 41   ALT 29 31 31   BILITOTAL 3.9* 4.5* 4.2*   PROTTOTAL 5.2* 6.0* 5.9*   ALBUMIN 3.5 4.0 4.0        MELD 3.0: 24 at 12/8/2023  9:59 AM  MELD-Na: 27 at 12/8/2023  9:59 AM  Calculated from:  Serum Creatinine: 0.73 mg/dL (Using min of 1 mg/dL) at 12/8/2023  5:55 AM  Serum Sodium: 120 mmol/L (Using min of 125 mmol/L) at 12/8/2023  9:59 AM  Total Bilirubin: 3.9 mg/dL at 12/7/2023  6:14 AM  Serum Albumin: 3.5 g/dL at 12/7/2023  6:14 AM  INR(ratio): 1.74 at 12/6/2023  4:08 PM  Age at listing (hypothetical): 63 years  Sex: Female at 12/8/2023  9:59 AM    Previous work-up:   Lab Results   Component Value Date    HEPBANG Nonreactive 02/18/2021    HBCAB Nonreactive 02/18/2021    HCVAB  08/03/2017     Nonreactive   Assay performance characteristics have not been established for newborns,   infants, and children      TANYA 157 10/03/2023    IRONSAT 26 11/23/2023    TSH 3.98 12/06/2023    CHOL 176 08/15/2022    HDL 44 (L) 08/15/2022     (H) 08/15/2022    TRIG 32 12/06/2023    A1C 4.7 07/24/2023    POPETH <10 10/22/2023    PLPETH <10 10/22/2023      No results found for: \"SPECDES\", \"LDRESULTS\"      Imaging Results:  None current    "

## 2023-12-08 NOTE — PLAN OF CARE
[id number]  Neuro: A&Ox4.   Cardiac: Not on tele. Q8 vitals.VSS.   Respiratory: Sating >95% on RA. Spot checks.  GI/: Adequate urine output. BM X2 watery.  Diet/appetite: Tolerating regular diet with 1L FR.   Activity:  SBA, up to bedside commode.  Pain: At acceptable level on current regimen.   Skin: No new deficits noted.  LDA's: 1 R PIV infusing Sodium Chloride 2% @ 30ml/hr. Waiting on next Na result.    Plan: Continue with POC. Notify primary team with changes.

## 2023-12-08 NOTE — PLAN OF CARE
Goal Outcome Evaluation:      Plan of Care Reviewed With: patient          Outcome Evaluation: SW completed Care Management Assesssment d/t elevated readmission risk score. CMA to follow.

## 2023-12-08 NOTE — PROCEDURES
[facility]  CAPS PROCEDURE NOTE  Date of Admission:  12/6/2023  Consult Requested by: Medicine  Reason for Consult: management of symptomatic pleural effusion    Indication/HPI: cirrhosis with recurrent hepatic hydrothorax    Pre-Procedure Diagnosis: Right Pleural Effusion    Post-Procedure Diagnosis: Right Pleural Effusion    Risk Assessment: Higher bleeding risk. Fibrinogen, platelet count and INR within acceptable ranges.     Procedure Outcome:  Therapeutic thoracentesis performed with 1.5 liters removed. Post procedural chest xray ordered and not completed as of this note writing.   See additional procedure details below.    The primary covering service should follow up and address any lab results as appropriate.    Johnny Lyons MD  [facility]  Securely message with Vocera (more info)  Text page via [facility] Paging/Directory   See signed in provider for up to date coverage information      [facility]    Thoracentesis at Bedside    Date/Time: 12/8/2023 5:02 PM    Performed by: Johnny Lyons MD  Authorized by: Johnny Lyons MD      UNIVERSAL PROTOCOL   Site Marked: Yes  Prior Images Obtained and Reviewed:  Yes  Required items: Required blood products, implants, devices and special equipment available    Patient identity confirmed:  Verbally with patient, arm band and hospital-assigned identification number  Patient was reevaluated immediately before administering moderate or deep sedation or anesthesia  Confirmation Checklist:  Patient's identity using two indicators, relevant allergies, procedure was appropriate and matched the consent or emergent situation and correct equipment/implants were available  Time out: Immediately prior to the procedure a time out was called    Universal Protocol: the Joint Commission Universal Protocol was followed    Preparation: Patient was  prepped and draped in usual sterile fashion      Procedure purpose: therapeutic  Indications: pleural effusion       ANESTHESIA    Local Anesthetic:  Lidocaine 1% without epinephrine  Anesthetic Total (mL):  6      SEDATION    Patient Sedated: No      PROCEDURE DETAILS   Preparation: skin prepped with ChloraPrep  Patient position: sitting  Ultrasound guidance: yes  Location: right posterior  Intercostal space: 7th  Puncture method: over-the-needle catheter  Needle gauge: 25.  Catheter size: 8 [place]  Number of attempts: 1  Drainage amount: 1500 ml  Fluid characteristics: que.  Chest x-ray performed: yes  Chest x-ray interpreted by: ordered at time of writing.  Chest x-ray findings: chest xray ordered, not yet completed.      PROCEDURE    Patient Tolerance:  Patient tolerated the procedure well with no immediate complications  Length of time physician/provider present for 1:1 monitoring during sedation: 0        No results found for this or any previous visit from the past 1 day.

## 2023-12-08 NOTE — PROGRESS NOTES
"Ms. Crowell is a 62 yo with ESLD and chronic hyponatremia we were asked to see for hyponatremia management. She was on 2% NS overnight and serum sodium is up to 120 from a catrina of 114 and has remained stable. She has been advised each hospitalization for the same regarding appropriate fluid restriction.     ROS: sleeping when I enter. No sob.  No chest pain. No change in abdoment. Always feels thirsty    Vital signs:  Temp: 98.2  F (36.8  C) Temp src: Oral BP: 93/53 Pulse: 78   Resp: 16 SpO2: 100 % O2 Device: None (Room air)   Height: 165.1 cm (5' 5\") Weight: 61 kg (134 lb 7.7 oz)  Estimated body mass index is 22.38 kg/m  as calculated from the following:    Height as of this encounter: 1.651 m (5' 5\").    Weight as of this encounter: 61 kg (134 lb 7.7 oz).      Gen: NAD   Ext: no LE edema  Skin; spider angiomas. Bruises over lower ext and arms  Abd; ascites noted  Neuro: no myoclonus    Last Comprehensive Metabolic Panel:  Lab Results   Component Value Date     (L) 12/08/2023    POTASSIUM 5.1 12/08/2023    CHLORIDE 90 (L) 12/08/2023    CO2 22 12/08/2023    ANIONGAP 8 12/08/2023    GLC 90 12/08/2023    BUN 56.1 (H) 12/08/2023    CR 0.73 12/08/2023    GFRESTIMATED >90 12/08/2023    UMA 10.0 12/08/2023         A/p:   Chronic  Hyponatremia: She has been as high as the 127 range in 11/2023. Currently stable at the low end of target.   *Consider additional 2% at 75 mls/hour to achieve a serum sodium of 125 (since was above this one month ago can assume we can achieve this now)  *continue fluid restriction.   *her low serum chloride and low urinary sodium suggests an ongoing pre-renal component in the setting of liver dysfunction.     Sharyn ortega MD MS    Nephrology will sign off. Don't hesitate to call with questions.   "

## 2023-12-08 NOTE — PLAN OF CARE
Goal Outcome Evaluation:    0700-1900:  Neuro: A&Ox4. Patient sleepy at times.   Cardiac: No tele orders. BP's soft but stable, on scheduled midodrine.    Respiratory: Sating > 92% on RA. No complaints of SOB.  GI/: Adequate urine output. BM X1  Diet/appetite: Tolerating regular diet w/ a 1000 mL fluid restriction. Eating well.  Activity:  Assist of 1 up to chair and in halls.  Pain: At acceptable level on current regimen. No complaints of pain this shift.   Skin: No new deficits noted.  LDA's: R PIV - SL.     Plan: Continue with POC. Notify primary team with changes.

## 2023-12-08 NOTE — PROGRESS NOTES
[facility]    Medicine Progress Note - Hospitalist Service, GOLD TEAM 10    Date of Admission:  12/6/2023    Assessment & Plan   Stefani Crowell is a 63 year old female admitted on 12/6/2023. She has a PMH of alcoholic cirrhosis, hepatorenal syndrome, chronic hyponatremia, recurrent pleural effusion (requiring MWF thoracentesis), hypothyroidism, COPD, and anxiety. She is admitted for acute hyponatremia (114) noted on routine labs associated w/ the thoracentesis she received on day of admission, 12/6/23.     CHANGES TODAY  Monitor for improvement in anticholinergic effect of trazodone cessation. No additional sleep agents desired  at this time.   Continue 2% saline until consistently above 120. Resumed this AM secondary to a decrease in sodium. Half life of trazadone is 10-12 hours. Anticipate stabilization shortly if anticholinergics are playing a role.    Large pleural effusion on right without subjective dyspena. Will schedule a thoracentesis for tomorrow.   Cortisol levels are not suggestive adrenal insufficiency      #Severe hyponatremia (114)  #Alcoholic cirrhosis  #Hepatic hydrothorax ISO cirrhosis  #Hx of hepatorenal syndrome  Recurrent episodes of hyponatremia, BL mid 120s. Usually attributed to low effective arterial blood volume iso cirrhosis and post thoracentesis (2L removed thrice weekly). Nephrology consulted while patient in ED, suspect same etiology. Plan as below.  Pt presenting w/o notable altered mental status or neurologic changes. Mild increase in confusion, some lightheadedness/unsteadiness, and less than goal BM over last several days. Pt w/ alcoholic cirrhosis, requires thrice weekly thoracenteses for hepatic hydrothorax, diagnosed during most recent hospitalization from 10/22/23- 11/21/23 (also dx'd w/ hepatorenal syndrome during recent admission). Previously underwent regular paracenteses, but no longer. On 1L fluid restriction PTA, adheres  well. On PTA lactulose, regularly having 3-4 BM daily. Not meeting goal last 2 days. No appreciable asterixis or tongue fasciculations on exam. Mild to moderate ascites, non tender. No focal neurologic deficits on exam, occasionally staring off into distance and missing questions, but no clearly altered mental status.  Current LFTs near baseline. BL Cr 0.7-0.8, on arrival, 0.98.      MELD 3.0: 25 at 12/6/2023  6:43 PM  MELD-Na: 27 at 12/6/2023  6:43 PM  Calculated from:  Serum Creatinine: 0.95 mg/dL (Using min of 1 mg/dL) at 12/6/2023  6:43 PM  Serum Sodium: 117 mmol/L (Using min of 125 mmol/L) at 12/6/2023  6:43 PM  Total Bilirubin: 4.5 mg/dL at 12/6/2023  6:43 PM  Serum Albumin: 4.0 g/dL (Using max of 3.5 g/dL) at 12/6/2023  6:43 PM  INR(ratio): 1.74 at 12/6/2023  4:08 PM  Age at listing (hypothetical): 63 years  Sex: Female at 12/6/2023  6:43 PM     - Nephrology consulted, appreciate continued recs:              -- Bolus 100cc 3% hypertonic saline, recheck Na 30 min after infusion  -- 24H Na goal = 120  -- Q2H Na checks until at goal, then Q4H  -- If NOT at target after bolus, contact nephrology fellow overnight for consideration of second hypertonic saline bolus   - Hepatology consulted, appreciate recs  - PTA lactulose 20g TID AND PRN lactulose if < 3 BM in 24H              -- Bedside commode and bedpan, at least while in ED ambulance garage  - PTA rifaximin 550mg BID  - PTA midodrine 15mg TID  - Maintain 1L fluid restriction for now, adjust as appropriate based on hospital course  - Monitor kidney and liver function, assess response to fluids     #Esophageal varix  #Diverticulosis  #Chronic normocytic anemia  Melanic stools on 11/21 (during hospitalization). Previous EGD/colonoscopy w/ small esophageal varix and diverticulosis. EGD during most recent hospitalization w/o active bleed. Tx w/ octreotide and ceftriaxone at the time. Required several pRBCs, discharged w/ stable hgb.   BL hgb 8-9, on admit 7.8. No  overt S/S bleeding at this time.   - PTA omeprazole (subbed her w/ pantoprazole 40mg BID)  - CTM for signs of bleeding, melena, hematemesis, hemodynamic changes  - Monitor hgb w/ daily CBC     #RLE ecchymosis/hematoma  Incurred last week while transferring into car. Appearance not consistent w/ infection, appears to be hematoma w/ break in skin. See photo in objective portion of note.  - Dress w/ non stick bandages per patient preference  - Heat, ice, tylenol prn     #Hypermagnesemia  Mg 2.9 on arrival.   - Repeat in AM     #Hypothyroidism  - PTA levothyroxine 112mcg daily     #Allergic rhinitis  #COPD  - PTA montelukast 10mg at bedtime   - albuterol inhaler and neb solution PRN     #Back pain, muscle spasms  - PTA cyclobenzaprine 5-10mg Q8H PRN     #Anxiety  #Depression  #Insomnia  - PTA citalopram 40mg daily  - PTA trazodone 50mg at bedtime   - PTA lorazepam 0.5mg Q8H PRN for panic attacks          Diet: Fluid restriction 1000 ML FLUID  Regular Diet Adult  Snacks/Supplements Adult: Other; Allow pt to order supplements if requested; do not count toward fluid restriction at this time; Between Meals    DVT Prophylaxis:   Demarco Catheter: Not present  Lines: None     Cardiac Monitoring: None  Code Status: Full Code      Clinically Significant Risk Factors         # Hyponatremia: Lowest Na = 114 mmol/L in last 2 days, will monitor as appropriate   # Hypercalcemia: Highest Ca = 10.3 mg/dL in last 2 days, will monitor as appropriate       # Coagulation Defect: INR = 1.74 (Ref range: 0.85 - 1.15) and/or PTT = N/A, will monitor for bleeding  # Thrombocytopenia: Lowest platelets = 57 in last 2 days, will monitor for bleeding              # Financial/Environmental Concerns: none  # Asthma: noted on problem list        Disposition Plan      Expected Discharge Date: 12/11/2023      Destination: home with family  Discharge Comments: pending sodium labs            Irving Mayo MD  Hospitalist Service, GOLD TEAM [address] [place] [address]  [facility]  Securely message with [employer] (more info)  Text page via [facility] Paging/Directory   See signed in provider for up to date coverage information  ______________________________________________________________________    Interval History   Alert and oriented   No fever or chills  Denies bowel or urinary issues  Denies Dizziness of lightheadedness    Physical Exam   Vital Signs: Temp: 98.2  F (36.8  C) Temp src: Oral BP: 93/53 Pulse: 78   Resp: 16 SpO2: 100 % O2 Device: None (Room air)    Weight: 134 lbs 7.69 oz    Physical Exam  Constitutional:       General: She is not in acute distress.     Appearance: She is not ill-appearing or toxic-appearing.   HENT:      Head: Normocephalic.      Mouth/Throat:      Mouth: Mucous membranes are moist.   Cardiovascular:      Rate and Rhythm: Normal rate.      Heart sounds: No murmur heard.     No friction rub. No gallop.   Pulmonary:      Effort: Pulmonary effort is normal. No respiratory distress.      Breath sounds: No stridor. No wheezing.   Abdominal:      General: There is distension.      Palpations: Abdomen is soft. There is no mass.      Hernia: No hernia is present.   Musculoskeletal:      Right lower leg: No edema.      Left lower leg: No edema.   Neurological:      General: No focal deficit present.      Mental Status: She is alert.   Psychiatric:         Mood and Affect: Mood normal.         Behavior: Behavior normal.           Medical Decision Making             Data

## 2023-12-08 NOTE — PLAN OF CARE
Admission          12/6/2023  9:10 PM  -----------------------------------------------------------  Reason for admission:  Primary team notified of pt arrival.  Admitted from: ER  Via: bed  Accompanied by: No one  Belongings: Placed in closet; valuables sent home with family  Admission Profile: complete  Teaching: orientation to unit and call light- call light within reach, call don't fall, use of console, meal times, when to call for the RN, and enforced importance of safety   Access: PIV  Telemetry: No tele orders  Ht./Wt.: complete  Code Status verified on armband: yes  2 RN Skin Assessment Completed By: Geraldine Moran & Stefani Moran  Med Rec completed: yes  Suction/Ambu bag/Flowmeter at bedside: yes  Is patient having diarrhea upon admission- no    C. Diff Testing Algorithm (MUST be marked YES)   3 or more loose stools in 24 hrs. [] Yes [] No       Additional symptoms:(At least ONE must be marked yes)   Abdominal pain/discomfort [] Yes [] No   Fever at least 38C (100.4 F) [] Yes [] No   Elevated WBC(>11,000) [] Yes [] No       Exclusion Criteria:  (MUST be marked YES)   Off laxatives for at least 48 hrs. [] Yes [] No       Pt status:  A&OX4. VSS. Pt on RA satting >95% during spot checks. Not On tele HR 72 when checked on pulse oximetry.    Temp:  [97.3  F (36.3  C)-97.9  F (36.6  C)] 97.7  F (36.5  C)  Pulse:  [69-77] 72  Resp:  [12-17] 17  BP: ()/(48-52) 109/52  SpO2:  [99 %-100 %] 100 %

## 2023-12-08 NOTE — PROGRESS NOTES
Sodium was 118, resumed 2%, next check 120 and stopped 2%    Changed checks to q4h    Kenton Weiss DO  Hospitalist    Medicine and Pediatrics  [email or web address]  Pager: [phone]  Available on [employer]

## 2023-12-09 NOTE — PLAN OF CARE
Goal Outcome Evaluation:    9373-1216:  Neuro: A&Ox4. Patient sleepy at times.   Cardiac: No tele orders. BP's soft but stable, on scheduled midodrine.        Respiratory: Sating > 92% on 4L NC. No complaints of SOB.  GI/: Adequate urine output. BM X 1 today.   Diet/appetite: Tolerating regular diet w/ a 1000 mL fluid restriction. Eating well.  Activity:  Assist of 1 up to chair and in halls.  Pain: At acceptable level on current regimen.   Skin: No new deficits noted.  LDA's: R & L PIV - NaCl 2% running at 75 mL/hr.     Q4 Na draws. Most recent 120.

## 2023-12-09 NOTE — PROVIDER NOTIFICATION
"Dr. Kenton Weiss paged the following @ 1773:     \"Per radiology - patient has a small R pneumothorax. Patient sating well on RA. VSS.\"     -Provider response: \"okay, will follow\"      "

## 2023-12-09 NOTE — PROGRESS NOTES
After careful interrogation, pt confessed to drinking water, a bottle of Gingerale, and a box of Ensure at once. Hence, Dr. Worrell was right with his clinical assessment of the situation - REN Arroyo

## 2023-12-09 NOTE — PLAN OF CARE
Neuro: A&Ox4, able to make needs known, and calls appropriately.   Cardiac: Not on tele, Afebrile, soft BP, VSS.   Respiratory: Sating well on RA  GI/: Voiding spontaneously, large soft BM x 1.  Diet/appetite: Currently on NPO with sip of water with medication due to low Na+. Appears to be non-compliant with fluid restriction  Activity: Up with assist x1   Pain: Denies   Skin: No new deficits noted.  Lines: Piv x 1, R with 2% sodium running at 30mL/hr to bring pt Na up  Labs: Na 114, restarted 2% sodium iv, Hgb 6.9, no new order as per MD Reyna Gomez    Plan: Monitor patient sodium, notify provider with a change in status as needed    Problem: Adult Inpatient Plan of Care  Goal: Optimal Comfort and Wellbeing  Intervention: Monitor Pain and Promote Comfort  Recent Flowsheet Documentation  Taken 12/9/2023 0000 by Kathrine Vaughan RN  Pain Management Interventions: emotional support  Taken 12/8/2023 2000 by Kathrine Vaughan RN  Pain Management Interventions: emotional support     Problem: Comorbidity Management  Goal: Maintenance of COPD Symptom Control  Intervention: Maintain COPD Symptom Control  Recent Flowsheet Documentation  Taken 12/9/2023 0405 by Kathrine Vaughan RN  Supportive Measures:   active listening utilized   decision-making supported   self-care encouraged  Medication Review/Management: medications reviewed  Taken 12/9/2023 0000 by Kathrine Vaughan RN  Supportive Measures:   active listening utilized   decision-making supported   self-care encouraged  Medication Review/Management: medications reviewed  Taken 12/8/2023 2000 by Kathrine Vaughan RN  Supportive Measures:   active listening utilized   decision-making supported   self-care encouraged  Medication Review/Management: medications reviewed     Problem: Liver Failure  Goal: Fluid and Electrolyte Balance  Intervention: Monitor and Manage Fluid and Electrolyte Balance  Recent Flowsheet Documentation  Taken 12/9/2023 0405 by  Kathrine Vaughan RN  Fluid/Electrolyte Management: fluids restricted  Taken 12/9/2023 0000 by Kathrine Vaughan RN  Fluid/Electrolyte Management: fluids restricted  Taken 12/8/2023 2000 by Kathrine Vaughan RN  Fluid/Electrolyte Management: fluids restricted   Goal Outcome Evaluation: Not progressing, Na 116 and 114. We restarted 2% sodium infusion as per order

## 2023-12-09 NOTE — PROGRESS NOTES
[id number]. Pt sodium came back critical (116), just wondering if we should restart 2%sodium fluid, please advise. Thank you  REN Arroyo  [phone]  Paged: Dr. Reyna [email or web address]@2016

## 2023-12-09 NOTE — PROGRESS NOTES
[facility]    Medicine Progress Note - Hospitalist Service, GOLD TEAM 10    Date of Admission:  12/6/2023    Assessment & Plan   Stefani Crowell is a 63 year old female admitted on 12/6/2023. She has a PMH of alcoholic cirrhosis, hepatorenal syndrome, chronic hyponatremia, recurrent pleural effusion (requiring MWF thoracentesis), hypothyroidism, COPD, and anxiety. She is admitted for acute hyponatremia (114) noted on routine labs associated w/ the thoracentesis she received on day of admission, 12/6/23.     CHANGES TODAY  Restart Diet  Monitor free water intake. Excess water ingested last night with significant decline in sodium.   Restart 2% with goal to 120-121  Pneumothorax s/p thora with air fluid level concerning to a procedural pneumothorax vs ex vacuo. In future may need staged thoracentesis as the patient has incomplete resolution of pleural effusion. Will start 4L NC for washout of pneumothorax     #Severe hyponatremia (114)  #Alcoholic cirrhosis  #Hepatic hydrothorax ISO cirrhosis  #Hx of hepatorenal syndrome  Recurrent episodes of hyponatremia, BL mid 120s. Usually attributed to low effective arterial blood volume iso cirrhosis and post thoracentesis (2L removed thrice weekly). Nephrology consulted while patient in ED, suspect same etiology. Plan as below.  Pt presenting w/o notable altered mental status or neurologic changes. Mild increase in confusion, some lightheadedness/unsteadiness, and less than goal BM over last several days. Pt w/ alcoholic cirrhosis, requires thrice weekly thoracenteses for hepatic hydrothorax, diagnosed during most recent hospitalization from 10/22/23- 11/21/23 (also dx'd w/ hepatorenal syndrome during recent admission). Previously underwent regular paracenteses, but no longer. On 1L fluid restriction PTA, adheres well. On PTA lactulose, regularly having 3-4 BM daily. Not meeting goal last 2 days. No appreciable asterixis or  tongue fasciculations on exam. Mild to moderate ascites, non tender. No focal neurologic deficits on exam, occasionally staring off into distance and missing questions, but no clearly altered mental status.  Current LFTs near baseline. BL Cr 0.7-0.8, on arrival, 0.98.      MELD 3.0: 25 at 12/6/2023  6:43 PM  MELD-Na: 27 at 12/6/2023  6:43 PM  Calculated from:  Serum Creatinine: 0.95 mg/dL (Using min of 1 mg/dL) at 12/6/2023  6:43 PM  Serum Sodium: 117 mmol/L (Using min of 125 mmol/L) at 12/6/2023  6:43 PM  Total Bilirubin: 4.5 mg/dL at 12/6/2023  6:43 PM  Serum Albumin: 4.0 g/dL (Using max of 3.5 g/dL) at 12/6/2023  6:43 PM  INR(ratio): 1.74 at 12/6/2023  4:08 PM  Age at listing (hypothetical): 63 years  Sex: Female at 12/6/2023  6:43 PM     - Nephrology consulted, appreciate continued recs:              -- Bolus 100cc 3% hypertonic saline, recheck Na 30 min after infusion  -- 24H Na goal = 120  -- Q2H Na checks until at goal, then Q4H  -- If NOT at target after bolus, contact nephrology fellow overnight for consideration of second hypertonic saline bolus   - Hepatology consulted, appreciate recs  - PTA lactulose 20g TID AND PRN lactulose if < 3 BM in 24H              -- Bedside commode and bedpan, at least while in ED ambulance garage  - PTA rifaximin 550mg BID  - PTA midodrine 15mg TID  - Maintain 1L fluid restriction for now, adjust as appropriate based on hospital course  - Monitor kidney and liver function, assess response to fluids     #Esophageal varix  #Diverticulosis  #Chronic normocytic anemia  Melanic stools on 11/21 (during hospitalization). Previous EGD/colonoscopy w/ small esophageal varix and diverticulosis. EGD during most recent hospitalization w/o active bleed. Tx w/ octreotide and ceftriaxone at the time. Required several pRBCs, discharged w/ stable hgb.   BL hgb 8-9, on admit 7.8. No overt S/S bleeding at this time.   - PTA omeprazole (subbed her w/ pantoprazole 40mg BID)  - CTM for signs of  bleeding, melena, hematemesis, hemodynamic changes  - Monitor hgb w/ daily CBC     #RLE ecchymosis/hematoma  Incurred last week while transferring into car. Appearance not consistent w/ infection, appears to be hematoma w/ break in skin. See photo in objective portion of note.  - Dress w/ non stick bandages per patient preference  - Heat, ice, tylenol prn     #Hypermagnesemia  Mg 2.9 on arrival.   - Repeat in AM     #Hypothyroidism  - PTA levothyroxine 112mcg daily     #Allergic rhinitis  #COPD  - PTA montelukast 10mg at bedtime   - albuterol inhaler and neb solution PRN     #Back pain, muscle spasms  - PTA cyclobenzaprine 5-10mg Q8H PRN     #Anxiety  #Depression  #Insomnia  - PTA citalopram 40mg daily  - PTA trazodone 50mg at bedtime   - PTA lorazepam 0.5mg Q8H PRN for panic attacks          Diet: Fluid restriction 1000 ML FLUID  Snacks/Supplements Adult: Other; See comments below; Between Meals  Regular Diet Adult    DVT Prophylaxis:   Demarco Catheter: Not present  Lines: None     Cardiac Monitoring: None  Code Status: Full Code      Clinically Significant Risk Factors         # Hyponatremia: Lowest Na = 113 mmol/L in last 2 days, will monitor as appropriate         # Coagulation Defect: INR = 1.72 (Ref range: 0.85 - 1.15) and/or PTT = N/A, will monitor for bleeding  # Thrombocytopenia: Lowest platelets = 53 in last 2 days, will monitor for bleeding           # Severe Malnutrition: based on nutrition assessment, PRESENT ON ADMISSION   # Financial/Environmental Concerns: none  # Asthma: noted on problem list        Disposition Plan      Expected Discharge Date: 12/11/2023      Destination: home with family  Discharge Comments: pending sodium labs            Irving Mayo MD  Hospitalist Service, GOLD TEAM 10  [facility]  Securely message with [employer] (more info)  Text page via [facility] Paging/Directory   See signed in provider for up to date coverage  information  ______________________________________________________________________    Interval History   Alert and oriented   No fever or chills  Denies bowel or urinary issues  Denies Dizziness of lightheadedness  No CP   Drank excessive free water overnight    Physical Exam   Vital Signs: Temp: 98.5  F (36.9  C) Temp src: Oral BP: 105/47 Pulse: 65   Resp: 18 SpO2: 100 % O2 Device: Nasal cannula Oxygen Delivery: 4 LPM  Weight: 134 lbs 7.69 oz    Physical Exam  Constitutional:       General: She is not in acute distress.     Appearance: She is not ill-appearing or toxic-appearing.   HENT:      Head: Normocephalic.      Mouth/Throat:      Mouth: Mucous membranes are moist.   Cardiovascular:      Rate and Rhythm: Normal rate.      Heart sounds: No murmur heard.     No friction rub. No gallop.   Pulmonary:      Effort: Pulmonary effort is normal. No respiratory distress.      Breath sounds: No stridor. No wheezing.   Abdominal:      General: There is distension.      Palpations: Abdomen is soft. There is no mass.      Hernia: No hernia is present.   Musculoskeletal:      Right lower leg: No edema.      Left lower leg: No edema.   Neurological:      General: No focal deficit present.      Mental Status: She is alert.   Psychiatric:         Mood and Affect: Mood normal.         Behavior: Behavior normal.           Medical Decision Making             Data

## 2023-12-09 NOTE — PROVIDER NOTIFICATION
"Dr. Weiss paged the following @ [phone]:     \"Do we want to do a HgB redraw tonight? Also patients MAPS in low 60's - scheduled midodrine given at 2:45 pm\"    Provider response: No recheck unless acute signs of bleeding, pressures on track with yesterday.      "

## 2023-12-10 NOTE — PROGRESS NOTES
"[id number]          Neuro: A&Ox4. Patient slow to respond at times.     Cardiac: No telemetry.   /48 (BP Location: Left arm)   Pulse 70   Temp 98.2  F (36.8  C) (Oral)   Resp 20   Ht 1.651 m (5' 5\")   Wt 61 kg (134 lb 7.7 oz)   LMP  (LMP Unknown)   SpO2 96%   BMI 22.38 kg/m       Respiratory: Sating 96% on 4L NC  .  GI/: Adequate urine output. BM X1 Large soft brown stool    Diet/appetite: Tolerating Regular diet. 1LFR     Activity:  Assist of 1, up to chair and in halls.    Pain: At acceptable level on current regimen.     Skin: No new deficits noted.    LDA's: Right/Left PIV-SL     Plan:   -Monitor sodium last known Na 120   -  "

## 2023-12-10 NOTE — PLAN OF CARE
Goal Outcome Evaluation:    Neuro: A&Ox4. Patient uses call light appropriately.   Cardiac: SR. VSS. BP soft but stable.   Respiratory: Sating > 92% on RA.  GI/: Adequate urine output. BM X1  Diet/appetite: Tolerating regular diet, w/ 1000 mL free water fluid restriction. Eating well.  Activity:  Assist of SB up to chair and in halls.  Pain: At acceptable level on current regimen.   Skin: No new deficits noted.  LDA's: R & L PIV - 2% NaCL running at 75 mL/hr.     Q4hr sodium checks. Goal Na 125, Last check 123.     Plan: Continue with POC. Notify primary team with changes.

## 2023-12-10 NOTE — PROGRESS NOTES
[facility]    Medicine Progress Note - Hospitalist Service, GOLD TEAM 10    Date of Admission:  12/6/2023    Assessment & Plan   Stefani Crowell is a 63 year old female admitted on 12/6/2023. She has a PMH of alcoholic cirrhosis, hepatorenal syndrome, chronic hyponatremia, recurrent pleural effusion (requiring MWF thoracentesis), hypothyroidism, COPD, and anxiety. She is admitted for acute hyponatremia (114) noted on routine labs associated w/ the thoracentesis she received on day of admission, 12/6/23.     CHANGES TODAY  Restart Diet  Monitor free water intake.   Restart 2% with goal to 125. Ok to stop 2% with at sodium of 125  Resolution of pneumothorax. Ok to top supplement oxygen for washout  Nose bleed. Afrin. Anticipate improvement off oxygen  Insomnia- start melatonin    POC of abdomen is showing trace edema no amenable to para     #Severe hyponatremia (114)  #Alcoholic cirrhosis  #Hepatic hydrothorax ISO cirrhosis  #Hx of hepatorenal syndrome  Recurrent episodes of hyponatremia, BL mid 120s. Usually attributed to low effective arterial blood volume iso cirrhosis and post thoracentesis (2L removed thrice weekly). Nephrology consulted while patient in ED, suspect same etiology. Plan as below.  Pt presenting w/o notable altered mental status or neurologic changes. Mild increase in confusion, some lightheadedness/unsteadiness, and less than goal BM over last several days. Pt w/ alcoholic cirrhosis, requires thrice weekly thoracenteses for hepatic hydrothorax, diagnosed during most recent hospitalization from 10/22/23- 11/21/23 (also dx'd w/ hepatorenal syndrome during recent admission). Previously underwent regular paracenteses, but no longer. On 1L fluid restriction PTA, adheres well. On PTA lactulose, regularly having 3-4 BM daily. Not meeting goal last 2 days. No appreciable asterixis or tongue fasciculations on exam. Mild to moderate ascites, non tender. No  focal neurologic deficits on exam, occasionally staring off into distance and missing questions, but no clearly altered mental status.  Current LFTs near baseline. BL Cr 0.7-0.8, on arrival, 0.98.      MELD 3.0: 25 at 12/6/2023  6:43 PM  MELD-Na: 27 at 12/6/2023  6:43 PM  Calculated from:  Serum Creatinine: 0.95 mg/dL (Using min of 1 mg/dL) at 12/6/2023  6:43 PM  Serum Sodium: 117 mmol/L (Using min of 125 mmol/L) at 12/6/2023  6:43 PM  Total Bilirubin: 4.5 mg/dL at 12/6/2023  6:43 PM  Serum Albumin: 4.0 g/dL (Using max of 3.5 g/dL) at 12/6/2023  6:43 PM  INR(ratio): 1.74 at 12/6/2023  4:08 PM  Age at listing (hypothetical): 63 years  Sex: Female at 12/6/2023  6:43 PM     #Esophageal varix  #Diverticulosis  #Chronic normocytic anemia  Melanic stools on 11/21 (during hospitalization). Previous EGD/colonoscopy w/ small esophageal varix and diverticulosis. EGD during most recent hospitalization w/o active bleed. Tx w/ octreotide and ceftriaxone at the time. Required several pRBCs, discharged w/ stable hgb.   BL hgb 8-9, on admit 7.8. No overt S/S bleeding at this time.   - PTA omeprazole (subbed her w/ pantoprazole 40mg BID)  - CTM for signs of bleeding, melena, hematemesis, hemodynamic changes  - Monitor hgb w/ daily CBC     #RLE ecchymosis/hematoma  Incurred last week while transferring into car. Appearance not consistent w/ infection, appears to be hematoma w/ break in skin. See photo in objective portion of note.  - Dress w/ non stick bandages per patient preference  - Heat, ice, tylenol prn     #Hypermagnesemia  Mg 2.9 on arrival.   - Repeat in AM     #Hypothyroidism  - PTA levothyroxine 112mcg daily     #Allergic rhinitis  #COPD  - PTA montelukast 10mg at bedtime   - albuterol inhaler and neb solution PRN     #Back pain, muscle spasms  - PTA cyclobenzaprine 5-10mg Q8H PRN     #Anxiety  #Depression  #Insomnia  - PTA citalopram 40mg daily  - PTA trazodone 50mg at bedtime   - PTA lorazepam 0.5mg Q8H PRN for panic  attacks          Diet: Fluid restriction 1000 ML FLUID  Snacks/Supplements Adult: Other; See comments below; Between Meals  Regular Diet Adult    DVT Prophylaxis:   Demarco Catheter: Not present  Lines: None     Cardiac Monitoring: None  Code Status: Full Code      Clinically Significant Risk Factors         # Hyponatremia: Lowest Na = 113 mmol/L in last 2 days, will monitor as appropriate         # Coagulation Defect: INR = 1.72 (Ref range: 0.85 - 1.15) and/or PTT = N/A, will monitor for bleeding  # Thrombocytopenia: Lowest platelets = 51 in last 2 days, will monitor for bleeding           # Severe Malnutrition: based on nutrition assessment, PRESENT ON ADMISSION   # Financial/Environmental Concerns: none  # Asthma: noted on problem list        Disposition Plan      Expected Discharge Date: 12/11/2023      Destination: home with family  Discharge Comments: pending sodium labs            Irving Mayo MD  Hospitalist Service, GOLD TEAM 10  [facility]  Securely message with [employer] (more info)  Text page via [employer] Paging/Directory   See signed in provider for up to date coverage information  ______________________________________________________________________    Interval History   Alert and oriented   No fever or chills  Denies bowel or urinary issues  Denies Dizziness of lightheadedness  No CP   Nose bleed    Physical Exam   Vital Signs: Temp: 98.3  F (36.8  C) Temp src: Oral BP: 104/51 Pulse: 67   Resp: 18 SpO2: 99 % O2 Device: None (Room air) Oxygen Delivery: 4 LPM  Weight: 141 lbs 14.4 oz    Physical Exam  Constitutional:       General: She is not in acute distress.     Appearance: She is not ill-appearing or toxic-appearing.   HENT:      Head: Normocephalic.      Mouth/Throat:      Mouth: Mucous membranes are moist.   Cardiovascular:      Rate and Rhythm: Normal rate.      Heart sounds: No murmur heard.     No friction rub. No gallop.   Pulmonary:      Effort:  Pulmonary effort is normal. No respiratory distress.      Breath sounds: No stridor. No wheezing.   Abdominal:      General: There is distension.      Palpations: Abdomen is soft. There is no mass.      Hernia: No hernia is present.   Musculoskeletal:      Right lower leg: No edema.      Left lower leg: No edema.   Neurological:      General: No focal deficit present.      Mental Status: She is alert.   Psychiatric:         Mood and Affect: Mood normal.         Behavior: Behavior normal.           Medical Decision Making             Data

## 2023-12-10 NOTE — PROVIDER NOTIFICATION
"Dr. Mayo paged the following @ [phone]:    \"6B.Room 6228. J.J.    Do you mind changing FR to a fluid restriction of free water only in the orders?     Thanks!    Josseline Flowers\"    -FR order changed    "

## 2023-12-11 NOTE — IR NOTE
Right Therapeutic Staged Thoracentesis    1st aspiration: 1800 mL  2nd aspiration, after 30 minutes: +300 mL    Total aspirated: 2100 mL

## 2023-12-11 NOTE — PROGRESS NOTES
[facility]    Medicine Progress Note - Hospitalist Service, GOLD TEAM 10    Date of Admission:  12/6/2023    Assessment & Plan   Stefani Crowell is a 63 year old female admitted on 12/6/2023. She has a PMH of alcoholic cirrhosis, hepatorenal syndrome, chronic hyponatremia, recurrent pleural effusion (requiring MWF thoracentesis), hypothyroidism, COPD, and anxiety. She is admitted for acute hyponatremia (114) noted on routine labs associated w/ the thoracentesis she received on day of admission, 12/6/23.     CHANGES TODAY  Extensive discussion with family and hepatology team. Discuss that despite the patient needing a sodium restriction for cirrhosis, we would like her to consume up to the level of sodium restriction. Continue free water fluid restriction of 1000ml/24 hours.  Will need a staged thora. Discussed with IR.   Pending sodium check this afternoon. If greater than 125 then hold 2%  Discussed sleep hygiene and melatonin for insomnia.        #Severe hyponatremia (114)  #Alcoholic cirrhosis  #Hepatic hydrothorax ISO cirrhosis  #Hx of hepatorenal syndrome  Recurrent episodes of hyponatremia, BL mid 120s. Usually attributed to low effective arterial blood volume iso cirrhosis and post thoracentesis (2L removed thrice weekly). Nephrology consulted while patient in ED, suspect same etiology. Plan as below.  Pt presenting w/o notable altered mental status or neurologic changes. Mild increase in confusion, some lightheadedness/unsteadiness, and less than goal BM over last several days. Pt w/ alcoholic cirrhosis, requires thrice weekly thoracenteses for hepatic hydrothorax, diagnosed during most recent hospitalization from 10/22/23- 11/21/23 (also dx'd w/ hepatorenal syndrome during recent admission). Previously underwent regular paracenteses, but no longer. On 1L fluid restriction PTA, adheres well. On PTA lactulose, regularly having 3-4 BM daily. Not meeting goal  last 2 days. No appreciable asterixis or tongue fasciculations on exam. Mild to moderate ascites, non tender. No focal neurologic deficits on exam, occasionally staring off into distance and missing questions, but no clearly altered mental status.  Current LFTs near baseline. BL Cr 0.7-0.8, on arrival, 0.98.      MELD 3.0: 25 at 12/6/2023  6:43 PM  MELD-Na: 27 at 12/6/2023  6:43 PM  Calculated from:  Serum Creatinine: 0.95 mg/dL (Using min of 1 mg/dL) at 12/6/2023  6:43 PM  Serum Sodium: 117 mmol/L (Using min of 125 mmol/L) at 12/6/2023  6:43 PM  Total Bilirubin: 4.5 mg/dL at 12/6/2023  6:43 PM  Serum Albumin: 4.0 g/dL (Using max of 3.5 g/dL) at 12/6/2023  6:43 PM  INR(ratio): 1.74 at 12/6/2023  4:08 PM  Age at listing (hypothetical): 63 years  Sex: Female at 12/6/2023  6:43 PM     #Esophageal varix  #Diverticulosis  #Chronic normocytic anemia  Melanic stools on 11/21 (during hospitalization). Previous EGD/colonoscopy w/ small esophageal varix and diverticulosis. EGD during most recent hospitalization w/o active bleed. Tx w/ octreotide and ceftriaxone at the time. Required several pRBCs, discharged w/ stable hgb.   BL hgb 8-9, on admit 7.8. No overt S/S bleeding at this time.   - PTA omeprazole (subbed her w/ pantoprazole 40mg BID)  - CTM for signs of bleeding, melena, hematemesis, hemodynamic changes  - Monitor hgb w/ daily CBC     #RLE ecchymosis/hematoma  Incurred last week while transferring into car. Appearance not consistent w/ infection, appears to be hematoma w/ break in skin. See photo in objective portion of note.  - Dress w/ non stick bandages per patient preference  - Heat, ice, tylenol prn     #Hypermagnesemia  Mg 2.9 on arrival.   - Repeat in AM     #Hypothyroidism  - PTA levothyroxine 112mcg daily     #Allergic rhinitis  #COPD  - PTA montelukast 10mg at bedtime   - albuterol inhaler and neb solution PRN     #Back pain, muscle spasms  - PTA cyclobenzaprine 5-10mg Q8H PRN      #Anxiety  #Depression  #Insomnia  - PTA citalopram 40mg daily  - PTA trazodone 50mg at bedtime   - PTA lorazepam 0.5mg Q8H PRN for panic attacks          Diet: Snacks/Supplements Adult: Other; See comments below; Between Meals  Regular Diet Adult  Fluid restriction OTHER (SEE COMMENTS)    DVT Prophylaxis:   Demarco Catheter: Not present  Lines: None     Cardiac Monitoring: None  Code Status: Full Code      Clinically Significant Risk Factors         # Hyponatremia: Lowest Na = 118 mmol/L in last 2 days, will monitor as appropriate      # Hypoalbuminemia: Lowest albumin = 3.4 g/dL at 12/11/2023  8:12 AM, will monitor as appropriate     # Thrombocytopenia: Lowest platelets = 46 in last 2 days, will monitor for bleeding           # Severe Malnutrition: based on nutrition assessment    # Financial/Environmental Concerns: none  # Asthma: noted on problem list        Disposition Plan      Expected Discharge Date: 12/12/2023      Destination: home with family  Discharge Comments: pending sodium labs            Irving Mayo MD  Hospitalist Service, [address]  Securely message with [employer] (more info)  Text page via [employer] Paging/Directory   See signed in provider for up to date coverage information  ______________________________________________________________________    Interval History   Alert and oriented   No fever or chills  Denies bowel or urinary issues  Denies Dizziness of lightheadedness  No CP   Nose bleed    Physical Exam   Vital Signs: Temp: 98.4  F (36.9  C) Temp src: Oral BP: 105/59 Pulse: 70   Resp: 20 SpO2: 100 % O2 Device: None (Room air)    Weight: 146 lbs 6.17 oz    Physical Exam  Constitutional:       General: She is not in acute distress.     Appearance: She is not ill-appearing or toxic-appearing.   HENT:      Head: Normocephalic.      Mouth/Throat:      Mouth: Mucous membranes are moist.   Cardiovascular:      Rate and Rhythm: Normal rate.       Heart sounds: No murmur heard.     No friction rub. No gallop.   Pulmonary:      Effort: Pulmonary effort is normal. No respiratory distress.      Breath sounds: No stridor. No wheezing.   Abdominal:      General: There is distension.      Palpations: Abdomen is soft. There is no mass.      Hernia: No hernia is present.   Musculoskeletal:      Right lower leg: No edema.      Left lower leg: No edema.   Neurological:      General: No focal deficit present.      Mental Status: She is alert.   Psychiatric:         Mood and Affect: Mood normal.         Behavior: Behavior normal.           Medical Decision Making             Data

## 2023-12-11 NOTE — PROGRESS NOTES
[id number]. FYI,There is consent for blood already signed, we just need order to transfuse  Thank you  REN Arroyo  Paged: Dr. Orellana @[phone]  Order to transfuse received

## 2023-12-11 NOTE — PROGRESS NOTES
[facility]  HEPATOLOGY PROGRESS NOTE  Stefani Crowell [id number]       IMPRESSION:  Stefani Crowell is a 63 year old female with a history of alcohol use disorder and alcohol associated cirrhosis, last use 6/28/23. Her liver disease has been complicated by ascites, diuretic refractory hepatic hydrothorax with 3x weekly thoracentesis, COPD, hyponatremia, HE, EV who was admitted following a thoracentesis and noted to have a serum sodium of 114. She has received 3% saline without significant improvement in serum sodium level.  She has been on a 2% sodium infusion with fluid restriction.     RECOMMENDATIONS:  # Hyponatremia  - appricated nephrology following. With 2% saline, has increased to 124.   - has not been on diuretics, fluid restriction at 1L. Protein supplements not included in restriction  - recommend stopping citalopram as this may also contribute to hyponatremia. Health psychology for mental health care.     # Hepatic hydrothorax  - increasing dyspnea, remains on room air. Last thora 12/8.  Repeat thora as needed, please send diagnostic sample with each thora.   - has needed x3 weekly thoras prior to admission  - 25% albumin, at least 25 gms with each para to prevent shifts.     # Alcohol associated cirrhosis  # Alcohol use disorder  - last use 6/28/23. Has been involved in CD program prior to worsening status. She reports wanting to complete her program and states she will do this when she is able.   - MELD 3.0 24. ABO A. Had been evaluated for liver transplant in the past- needed to complete CD program and prolonged sobriety prior to re consideration. Has been seen by [facility] due to declining status. Will discuss at liver transplant conference 12/12. Discussed with her potential of opening eval and she is in favor. Reiterated that opening eval does not equal transplant listing. Still would need cardiology risk assessment when determined she could have repeat evaluation.   - US abd w doppler  "10/22 with patent vasculature, no lesions     # Hepatic encephalopathy  - mentation more stable, continues to have response out of context when asked targeted questions.   - continue with lactulose and rifaximin. Goal stool output with 3-5 soft/loose BM's per day.    # Debility  # Moderate protein calorie malnutrition  - encouraged up to chair at least 3x daily and mobile.   - PT for strengthening.     Patient was discussed with attending physician, Dr. Annie Cary.  The above note reflects our joint plan.     Next follow up appointment: Dr. Stephanie Lim 1/29/24    Thank you for the opportunity to be involved with  Stefani Serrano [facility]. Please call with any questions or concerns.    ROD Urban, CNP  Inpatient Hepatology [facility]        SUBJECTIVE:  States is having an increase in dyspnea with exertion. Denies worsening at rest. No fevers, abdominal pain, nausea, vomiting. Having concerns about what fluids are included in her fluid restriction.    She reports that she is willing to finish her treatment program post transplant if this occurs. She is concerned about feeling disappointed if she is not a candidate.     ROS:  A 10-point review of systems was negative.    OBJECTIVE:  VS: /59 (BP Location: Right arm)   Pulse 70   Temp 98.4  F (36.9  C) (Oral)   Resp 20   Ht 1.651 m (5' 5\")   Wt 66.4 kg (146 lb 6.2 oz)   LMP  (LMP Unknown)   SpO2 100%   BMI 24.36 kg/m       Gross per 24 hour   Intake 1985 ml   Output 1125 ml   Net 860 ml     General: In no acute distress, mild facial muscle wasting  Neuro: AOx3, No asterixis  HEENT:  Noscleral icterus, Nooral lesions  CV:  Skin warm and dry  Lungs:  Respirations even and nonlabored on room air  Abd:   Nontender, mildly distended  Extrem:  trace  peripheral edema   Skin: Nojaundice  Psych: pleasant    MEDICATIONS:  Current Facility-Administered Medications   Medication    acetaminophen (TYLENOL) tablet 650 mg    Or    acetaminophen (TYLENOL) " Suppository 650 mg    albuterol (PROVENTIL HFA/VENTOLIN HFA) inhaler    albuterol (PROVENTIL) neb solution 2.5 mg    calcium carbonate (TUMS) chewable tablet 1,000 mg    citalopram (celeXA) tablet 40 mg    cyclobenzaprine (FLEXERIL) tablet 5-10 mg    ferrous sulfate (FEROSUL) tablet 325 mg    fluticasone-vilanterol (BREO ELLIPTA) 100-25 MCG/ACT inhaler 1 puff    lactulose (CEPHULAC) Packet 20 g    lactulose (CEPHULAC) Packet 20 g    levothyroxine (SYNTHROID/LEVOTHROID) tablet 112 mcg    lidocaine (LMX4) cream    lidocaine 1 % 0.1-1 mL    LORazepam (ATIVAN) tablet 0.5 mg    melatonin tablet 5 mg    midodrine (PROAMATINE) tablet 15 mg    montelukast (SINGULAIR) tablet 10 mg    ondansetron (ZOFRAN ODT) ODT tab 4 mg    Or    ondansetron (ZOFRAN) injection 4 mg    oxymetazoline (AFRIN) 0.05 % spray 2 spray    pantoprazole (PROTONIX) EC tablet 40 mg    prochlorperazine (COMPAZINE) injection 10 mg    Or    prochlorperazine (COMPAZINE) tablet 10 mg    Or    prochlorperazine (COMPAZINE) suppository 25 mg    rifaximin (XIFAXAN) tablet 550 mg    senna-docusate (SENOKOT-S/PERICOLACE) 8.6-50 MG per tablet 1 tablet    Or    senna-docusate (SENOKOT-S/PERICOLACE) 8.6-50 MG per tablet 2 tablet    sodium chloride (NEBUSAL) 3 % neb solution 3 mL    sodium chloride (PF) 0.9% PF flush 3 mL    sodium chloride (PF) 0.9% PF flush 3 mL    sodium chloride 2% infusion       REVIEW OF LABORATORY, PATHOLOGY AND IMAGING RESULTS:  BMP  Recent Labs   Lab 12/11/23  0812 12/11/23  0541 12/11/23  0226 12/10/23  2208 12/10/23  1019 12/10/23  0533 12/09/23  0542 12/09/23  0351   *  124* 123*  123* 126* 124*   < > 119*  119*   < > 114*   POTASSIUM 5.3 5.3  --   --   --  5.3  --  4.7   CHLORIDE 99 97*  --   --   --  91*  --  88*   UMA 9.3 9.3  --   --   --  9.6  --  9.6   CO2 18* 17*  --   --   --  20*  --  19*   BUN 36.1* 38.0*  --   --   --  48.2*  --  46.1*   CR 0.63 0.63  --   --   --  0.80  --  0.78   GLC 90 94  --   --   --  95  --  78     "< > = values in this interval not displayed.     CBC  Recent Labs   Lab 12/11/23  0812 12/11/23  0541 12/10/23  0533 12/09/23  0351   WBC 6.5 6.7 7.1 6.9   RBC 2.13* 2.10* 2.14* 2.09*   HGB 7.1* 6.7* 7.0* 6.9*   HCT 21.5* 21.2* 20.8* 20.2*   * 101* 97 97   MCH 33.3* 31.9 32.7 33.0   MCHC 33.0 31.6 33.7 34.2   RDW 16.9* 16.8* 16.4* 16.1*   PLT 47* 46* 51* 53*     INR  Recent Labs   Lab 12/08/23  1547 12/06/23  1608   INR 1.72* 1.74*     LFTs  Recent Labs   Lab 12/11/23  0812 12/07/23  0614 12/06/23  1843 12/06/23  1608   ALKPHOS 67 64 65 66   AST 47* 41 43 41   ALT 36 29 31 31   BILITOTAL 4.9* 3.9* 4.5* 4.2*   PROTTOTAL 5.0* 5.2* 6.0* 5.9*   ALBUMIN 3.4* 3.5 4.0 4.0        MELD 3.0: 24 at 12/9/2023 10:15 PM  MELD-Na: 27 at 12/9/2023 10:15 PM  Calculated from:  Serum Creatinine: 0.78 mg/dL (Using min of 1 mg/dL) at 12/9/2023  3:51 AM  Serum Sodium: 120 mmol/L (Using min of 125 mmol/L) at 12/9/2023 10:15 PM  Total Bilirubin: 3.9 mg/dL at 12/7/2023  6:14 AM  Serum Albumin: 3.5 g/dL at 12/7/2023  6:14 AM  INR(ratio): 1.72 at 12/8/2023  3:47 PM  Age at listing (hypothetical): 63 years  Sex: Female at 12/9/2023 10:15 PM    Previous work-up:   Lab Results   Component Value Date    HEPBANG Nonreactive 02/18/2021    HBCAB Nonreactive 02/18/2021    HCVAB  08/03/2017     Nonreactive   Assay performance characteristics have not been established for newborns,   infants, and children      TANYA 157 10/03/2023    IRONSAT 26 11/23/2023    TSH 3.98 12/06/2023    CHOL 176 08/15/2022    HDL 44 (L) 08/15/2022     (H) 08/15/2022    TRIG 32 12/06/2023    A1C 4.7 07/24/2023    POPETH <10 10/22/2023    PLPETH <10 10/22/2023      No results found for: \"SPECDES\", \"LDRESULTS\"      Imaging Results:  CXR 12/10/23  Impression:   1. No appreciable pneumothorax.  2. Slightly increased moderate to large loculated right pleural  effusion with associated atelectasis.    "

## 2023-12-11 NOTE — CONSULTS
"    Interventional Radiology  [facility] Consult Service Note  12/11/23   1:15 PM    Consult Requested: \"staged thora\"    Recommendations/Plan:    Patient is on IR schedule 12/11 for a right sided therapeutic staged thoracentesis.   Labs WNL for procedure.  Orders entered for procedure, No NPO required.  Consent will be done prior to procedure.     Please contact the IR charge RN at [phone] for estimated time of procedure.     Case and imaging discussed with IR attending, Dr. Kenney.  Recommendations were reviewed with Dr. Mayo.    History of Present Illness:  Stefani Crowell is a 63 year old female with a history of alcohol use disorder and alcohol associated cirrhosis, last use 6/28/23. Her liver disease has been complicated by ascites, diuretic refractory hepatic hydrothorax with 3x weekly thoracentesis as an outpatient, COPD, hyponatremia, HE, EV who was admitted 12/6 following a thoracentesis and noted to have a serum sodium of 114. She has received 3% saline without significant improvement in serum sodium level.  She has been on a 2% sodium infusion with fluid restriction. Last thoracentesis with CAPS was 12/8, 1.5L drained. IR is consulted for staged thoracentesis. We have successfully drained 4 L in this manner as recently as 11/15. Would recommend only twice weekly staged thoras.    Diagnostic labs entered by: ARTURO    Pertinent Imaging Reviewed:         Expected date of discharge:  TBD    Vitals:   /59 (BP Location: Right arm)   Pulse 70   Temp 98.4  F (36.9  C) (Oral)   Resp 20   Ht 1.651 m (5' 5\")   Wt 66.4 kg (146 lb 6.2 oz)   LMP  (LMP Unknown)   SpO2 100%   BMI 24.36 kg/m      Pertinent Labs:   Lab Results   Component Value Date    WBC 6.5 12/11/2023    WBC 6.7 12/11/2023    WBC 7.1 12/10/2023    WBC 5.9 03/25/2021    WBC 4.9 02/18/2021    WBC 5.2 01/19/2021     Lab Results   Component Value Date    HGB 7.1 12/11/2023    HGB 6.7 12/11/2023    HGB 7.0 12/10/2023    " HGB 14.0 03/25/2021    HGB 14.1 02/18/2021    HGB 14.7 01/19/2021     Lab Results   Component Value Date    PLT 47 12/11/2023    PLT 46 12/11/2023    PLT 51 12/10/2023    PLT 56 03/25/2021    PLT 53 02/18/2021    PLT 51 01/19/2021     Lab Results   Component Value Date    INR 1.72 (H) 12/08/2023    INR 1.36 (H) 03/25/2021     Lab Results   Component Value Date    POTASSIUM 5.3 12/11/2023    POTASSIUM 4.5 03/25/2021        COVID-19 Antibody Results, Testing for Immunity           No data to display              COVID-19 PCR Results          10/22/2023    01:56   COVID-19 PCR Results   SARS CoV2 PCR Negative        ROD Jovel CNP  Interventional Radiology  Pager: [phone]

## 2023-12-11 NOTE — PROGRESS NOTES
Neuro: A&Ox4, able to make needs known, and calls appropriately.   Cardiac: Not on tele, Afebrile, soft BP, VSS.              Respiratory: Sating well on RA  GI/: Voiding spontaneously, large soft BM x 1.  Diet/appetite: Tolerating regular diet with 1000mL free water restriction  Activity: Up with assist x1   Pain: Denies   Skin: No new deficits noted.  Lines: Piv x 2, R&L, SL'D   Labs: Na 124, 126, 123 Sodium infusion stopped  and restarted as per instruction. Hgb 6.7, PLT 46K, MD paged, waiting for response.    Plan: Monitor patient sodium, notify provider with a change in status as needed     Problem: Adult Inpatient Plan of Care  Goal: Optimal Comfort and Wellbeing  Intervention: Monitor Pain and Promote Comfort  Recent Flowsheet Documentation  Taken 12/9/2023 0000 by Kathrine Vaughan RN  Pain Management Interventions: emotional support  Taken 12/8/2023 2000 by Kathrine Vaughan RN  Pain Management Interventions: emotional support     Problem: Comorbidity Management  Goal: Maintenance of COPD Symptom Control  Intervention: Maintain COPD Symptom Control  Recent Flowsheet Documentation  Taken 12/9/2023 0405 by Kathrine Vaughan RN  Supportive Measures:   active listening utilized   decision-making supported   self-care encouraged  Medication Review/Management: medications reviewed  Taken 12/9/2023 0000 by Kathrine Vaughan RN  Supportive Measures:   active listening utilized   decision-making supported   self-care encouraged  Medication Review/Management: medications reviewed  Taken 12/8/2023 2000 by Kathrine Vaughan RN  Supportive Measures:   active listening utilized   decision-making supported   self-care encouraged  Medication Review/Management: medications reviewed     Problem: Liver Failure  Goal: Fluid and Electrolyte Balance  Intervention: Monitor and Manage Fluid and Electrolyte Balance  Recent Flowsheet Documentation  Taken 12/9/2023 0405 by Kathrine Vaughan  RN  Fluid/Electrolyte Management: fluids restricted  Taken 12/9/2023 0000 by Kathrine Vaughan RN  Fluid/Electrolyte Management: fluids restricted  Taken 12/8/2023 2000 by Kathrine Vaughan RN  Fluid/Electrolyte Management: fluids restricted   Goal Outcome Evaluation: Progressing: Na 124 and 126. 2% sodium infusion stopped as per order since Na came up to 126 @0200

## 2023-12-11 NOTE — CONSULTS
[facility]  WOC Nurse Inpatient Assessment     Consulted for: right shin abrasion    Patient History (according to provider note(s):      Stefani Crowell is a 63 year old female admitted on 12/6/2023. She has a PMH of alcoholic cirrhosis, hepatorenal syndrome, chronic hyponatremia, recurrent pleural effusion (requiring MWF thoracentesis), hypothyroidism, COPD, and anxiety. She is admitted for acute hyponatremia (114) noted on routine labs associated w/ the thoracentesis she received on day of admission, 12/6/23.     Assessment:      Areas visualized during today's visit: Lower extremities     Wound location: Right shin    Last photo: 12/11/23  Wound due to: Skin Tear and Trauma  Wound history/plan of care: Pt hit her leg against the side of her friends SUV while trying to get in.  Wound base: 50 % dermis, 50 %  skin flap  wound bed is bruised      Palpation of the wound bed: normal      Drainage: small     Description of drainage: serosanguinous     Measurements (length x width x depth, in cm): 3  x 2.5  x  0.1 cm      Tunneling: N/A     Undermining: N/A  Periwound skin: Ecchymosis and Hemosiderin staining      Color: dusky      Temperature: normal   Odor: none  Pain: mild, tender  Pain interventions prior to dressing change: no significant pain present  and slow and gentle cares   Treatment goal: Heal  and Protection  STATUS: initial assessment  Supplies ordered: supplies stored on unit      Treatment Plan:     Right shin wound(s): Every 3 days and PRN  Cleanse with wound cleanser  Apply no sting to periwound skin and allow to dry  Cut adaptic or Vaseline guaze to fit over wound bed  Cover with 4x4 mepilex.     Orders: Written    RECOMMEND PRIMARY TEAM ORDER: None, at this time  Education provided: plan of care  Discussed plan of care with: Patient, Family, and Nurse  WOC nurse follow-up plan: weekly  Notify WOC if wound(s) deteriorate.  Nursing to notify the  Provider(s) and re-consult the [facility] Nurse if new skin concern.    DATA:     Current support surface: Standard  Standard gel/foam mattress (IsoFlex, Atmos air, etc)  Containment of urine/stool: Incontinence Protocol  BMI: Body mass index is 24.36 kg/m .   Active diet order: Orders Placed This Encounter      Regular Diet Adult     Output: I/O last 3 completed shifts:  In: 1300 [P.O.:1300]  Out: 1425 [Urine:1425]     Labs:   Recent Labs   Lab 12/11/23  0812 12/09/23  0351 12/08/23  1547   ALBUMIN 3.4*  --   --    HGB 7.1*   < >  --    INR  --   --  1.72*   WBC 6.5   < >  --     < > = values in this interval not displayed.     Pressure injury risk assessment:   Sensory Perception: 4-->no impairment  Moisture: 4-->rarely moist  Activity: 3-->walks occasionally  Mobility: 4-->no limitation  Nutrition: 3-->adequate  Friction and Shear: 3-->no apparent problem  Castillo Score: 21    Aliyah Ly RN CWOCN  Please contact through [employer] [facility] group: [facility] Nurse  Dept. Office Number: [phone]

## 2023-12-11 NOTE — PROCEDURES
Stefani Crowell   [id number]     Completed ultrasound-guided placement of 7 Armenian right chest tube for planned staged thoracentesis.  A total of 2100 mL fluid drained in two stages. Chest tube removed.

## 2023-12-11 NOTE — PLAN OF CARE
"Goal Outcome Evaluation:    /57 (BP Location: Right arm)   Pulse 78   Temp 98.4  F (36.9  C) (Oral)   Resp 20   Ht 1.651 m (5' 5\")   Wt 66.4 kg (146 lb 6.2 oz)   LMP  (LMP Unknown)   SpO2 99%   BMI 24.36 kg/m      VSS. No tele. BPs soft but stable. RA. A&O x4. Denies pain. Tolerating diet, needs reminders with fluid restriction. Voiding adequately. Loose BMs this shift. 2% NS gtt infusing via L PIV. Up with SBA and walker. Down to IR for thoracentesis. Continue to monitor.   "

## 2023-12-11 NOTE — PROGRESS NOTES
Patient Name: Stefani Crowell  Medical Record Number: [id number]  Today's Date: 12/11/2023    Procedure: Image guided taged large volume thoracentesis   Proceduralist: Hannah PARR, Dr Pablito LEAL  Pathology present: N/A    Procedure Start: 1430  Procedure end: 1541  Sedation medications administered: Local only     Report given to: Vignesh CONTEH RN  : N/A    Other Notes: Pt arrived to IR room 7 from 2A. Consent reviewed. Pt denies any questions or concerns regarding procedure. Pt positioned sitting and monitored per protocol. 2100 mls total drained in a stages.  30 minute wait between stages.  Patient allowed to lay down HOB 45 degrees between stages.  50 mls of 25% albumin given.  Pt tolerated procedure without any noted complications. Pt transferred back to .

## 2023-12-11 NOTE — PROGRESS NOTES
[id number]. Patient had critical labs that needs attention; Hgb 6.7, Platelet 46K, please advise.  Thank you  REN Arroyo  [phone]  Paged: Dr. Reyna Worrell @ [phone]

## 2023-12-12 NOTE — PROGRESS NOTES
[facility]  HEPATOLOGY PROGRESS NOTE  Stefani Crowell [id number]       IMPRESSION:  Stefani Crowell is a 63 year old female with a history of alcohol use disorder and alcohol associated cirrhosis, last use 6/28/23. Her liver disease has been complicated by ascites, diuretic refractory hepatic hydrothorax with 3x weekly thoracentesis, COPD, hyponatremia, HE, EV who was admitted following a thoracentesis and noted to have a serum sodium of 114. She has received 3% saline without significant improvement in serum sodium level.  She has been on a 2% sodium infusion with fluid restriction.     RECOMMENDATIONS:  # Hyponatremia  - has had improvement in sodium to 124 with 2% saline and fluid restriction.   - has not been on diuretics, fluid restriction at 1L. Protein supplements not included in restriction. Discussed free water examples.   - recommend stopping citalopram as this may also contribute to hyponatremia. Health psychology for mental health care.     # Hepatic hydrothorax  - reports increased dyspnea similar to when she has ascites.  remains on room air. Last thora 12/11.  Repeat thora as needed, please send diagnostic sample with each thora.   - has needed x3 weekly thoras prior to admission.   - 25% albumin, at least 25 gms with each thora to prevent shifts.     # Alcohol associated cirrhosis  # Alcohol use disorder  - last use 6/28/23. Has been involved in CD program prior to worsening status. She reports wanting to complete her program and states she will do this when she is able.   - MELD 3.0 ~24. ABO A. Had been evaluated for liver transplant in the past- needed to complete CD program and prolonged sobriety prior to re consideration. Has been seen by [facility] due to declining status. Will discuss at liver transplant conference 12/12. Discussed with her we may discuss potential of opening eval and she is in favor of completing requirements. Reiterated that opening eval does not equal transplant  "listing. Still would need cardiology risk assessment when determined she could have repeat evaluation.   - US abd w doppler 10/22 with patent vasculature, no lesions     # Hepatic encephalopathy  - mentation more stable, continues to have response out of context when asked targeted questions.   - continue with lactulose and rifaximin. Goal stool output with 3-5 soft/loose BM's per day.    # Debility  # Moderate protein calorie malnutrition  - encouraged up to chair at least 3x daily and mobile.   - PT for strengthening.     Patient was discussed with attending physician, Dr. Annie Cary.  The above note reflects our joint plan.     Next follow up appointment: Dr. Stephanie Lim 1/29/24    Thank you for the opportunity to be involved with  Stefani Crowell [facility]. Please call with any questions or concerns.    ROD Urban, CNP  Inpatient Hepatology [facility]        SUBJECTIVE:  She is upset with fluid restriction as she has constant thirst. She has been taking in protein supplements too. She did have a increase in stool output overnight with current lactulose. She denies changes in mentation, fevers, melena or hematochezia.     ROS:  A 10-point review of systems was negative.    OBJECTIVE:  VS: /59   Pulse 76   Temp 98.2  F (36.8  C) (Oral)   Resp 22   Ht 1.651 m (5' 5\")   Wt 66.4 kg (146 lb 6.2 oz)   LMP  (LMP Unknown)   SpO2 100%   BMI 24.36 kg/m       Gross per 24 hour   Intake 3661 ml   Output 1000 ml   Net 2661 ml      General: In no acute distress, mild facial muscle wasting  Neuro: AOx3, No asterixis  HEENT:  Noscleral icterus, Nooral lesions  CV:  Skin warm and dry  Lungs:  Respirations even and nonlabored on room air  Abd:   Nontender, mildly distended  Extrem:  trace  peripheral edema   Skin: Nojaundice  Psych: pleasant    MEDICATIONS:  Current Facility-Administered Medications   Medication    acetaminophen (TYLENOL) tablet 650 mg    Or    acetaminophen (TYLENOL) Suppository 650 mg    " albuterol (PROVENTIL HFA/VENTOLIN HFA) inhaler    albuterol (PROVENTIL) neb solution 2.5 mg    calcium carbonate (TUMS) chewable tablet 1,000 mg    citalopram (celeXA) tablet 40 mg    cyclobenzaprine (FLEXERIL) tablet 5-10 mg    ferrous sulfate (FEROSUL) tablet 325 mg    fluticasone-vilanterol (BREO ELLIPTA) 100-25 MCG/ACT inhaler 1 puff    lactulose (CEPHULAC) Packet 20 g    lactulose (CEPHULAC) Packet 20 g    levothyroxine (SYNTHROID/LEVOTHROID) tablet 112 mcg    lidocaine (LMX4) cream    lidocaine 1 % 0.1-1 mL    LORazepam (ATIVAN) tablet 0.5 mg    melatonin tablet 5 mg    midodrine (PROAMATINE) tablet 15 mg    montelukast (SINGULAIR) tablet 10 mg    ondansetron (ZOFRAN ODT) ODT tab 4 mg    Or    ondansetron (ZOFRAN) injection 4 mg    oxymetazoline (AFRIN) 0.05 % spray 2 spray    pantoprazole (PROTONIX) EC tablet 40 mg    prochlorperazine (COMPAZINE) injection 10 mg    Or    prochlorperazine (COMPAZINE) tablet 10 mg    Or    prochlorperazine (COMPAZINE) suppository 25 mg    rifaximin (XIFAXAN) tablet 550 mg    senna-docusate (SENOKOT-S/PERICOLACE) 8.6-50 MG per tablet 1 tablet    Or    senna-docusate (SENOKOT-S/PERICOLACE) 8.6-50 MG per tablet 2 tablet    sodium chloride (NEBUSAL) 3 % neb solution 3 mL    sodium chloride (PF) 0.9% PF flush 3 mL    sodium chloride (PF) 0.9% PF flush 3 mL    [Held by provider] sodium chloride 2% infusion       REVIEW OF LABORATORY, PATHOLOGY AND IMAGING RESULTS:  BMP  Recent Labs   Lab 12/12/23  1319 12/12/23  0539 12/12/23  0212 12/11/23  2143 12/11/23  1355 12/11/23  0812 12/11/23  0541 12/10/23  1019 12/10/23  0533   * 126*  126* 124* 123*   < > 124*  124* 123*  123*   < > 119*  119*   POTASSIUM  --  5.6*  --   --   --  5.3 5.3  --  5.3   CHLORIDE  --  102  --   --   --  99 97*  --  91*   UMA  --  8.9  --   --   --  9.3 9.3  --  9.6   CO2  --  17*  --   --   --  18* 17*  --  20*   BUN  --  26.5*  --   --   --  36.1* 38.0*  --  48.2*   CR  --  0.64  --   --   --  0.63  "0.63  --  0.80   GLC  --  94  --   --   --  90 94  --  95    < > = values in this interval not displayed.     CBC  Recent Labs   Lab 12/12/23  0539 12/11/23  0812 12/11/23  0541 12/10/23  0533   WBC 6.9 6.5 6.7 7.1   RBC 2.00* 2.13* 2.10* 2.14*   HGB 6.7* 7.1* 6.7* 7.0*   HCT 20.4* 21.5* 21.2* 20.8*   * 101* 101* 97   MCH 33.5* 33.3* 31.9 32.7   MCHC 32.8 33.0 31.6 33.7   RDW 17.3* 16.9* 16.8* 16.4*   PLT 49* 47* 46* 51*     INR  Recent Labs   Lab 12/08/23  1547 12/06/23  1608   INR 1.72* 1.74*     LFTs  Recent Labs   Lab 12/11/23  0812 12/07/23  0614 12/06/23  1843 12/06/23  1608   ALKPHOS 67 64 65 66   AST 47* 41 43 41   ALT 36 29 31 31   BILITOTAL 4.9* 3.9* 4.5* 4.2*   PROTTOTAL 5.0* 5.2* 6.0* 5.9*   ALBUMIN 3.4* 3.5 4.0 4.0        MELD 3.0: 24 at 12/9/2023 10:15 PM  MELD-Na: 27 at 12/9/2023 10:15 PM  Calculated from:  Serum Creatinine: 0.78 mg/dL (Using min of 1 mg/dL) at 12/9/2023  3:51 AM  Serum Sodium: 120 mmol/L (Using min of 125 mmol/L) at 12/9/2023 10:15 PM  Total Bilirubin: 3.9 mg/dL at 12/7/2023  6:14 AM  Serum Albumin: 3.5 g/dL at 12/7/2023  6:14 AM  INR(ratio): 1.72 at 12/8/2023  3:47 PM  Age at listing (hypothetical): 63 years  Sex: Female at 12/9/2023 10:15 PM    Previous work-up:   Lab Results   Component Value Date    HEPBANG Nonreactive 02/18/2021    HBCAB Nonreactive 02/18/2021    HCVAB  08/03/2017     Nonreactive   Assay performance characteristics have not been established for newborns,   infants, and children      TANYA 157 10/03/2023    IRONSAT 26 11/23/2023    TSH 3.98 12/06/2023    CHOL 176 08/15/2022    HDL 44 (L) 08/15/2022     (H) 08/15/2022    TRIG 32 12/06/2023    A1C 4.7 07/24/2023    POPETH <10 10/22/2023    PLPETH <10 10/22/2023      No results found for: \"SPECDES\", \"LDRESULTS\"      Imaging Results:  CXR 12/12/23  IMPRESSION: Slight decrease in right pleural effusion with development  of a right apical pneumothorax component, small to moderate. Close  follow-up " suggested.

## 2023-12-12 NOTE — PROGRESS NOTES
"[facility]    Medicine Progress Note - Hospitalist Service, GOLD TEAM 10    Date of Admission:  12/6/2023    Assessment & Plan   Stefani Crowell is a 63 year old female admitted on 12/6/2023. She has a PMH of alcoholic cirrhosis, hepatorenal syndrome, chronic hyponatremia, recurrent pleural effusion (requiring MWF thoracentesis), hypothyroidism, COPD, and anxiety. She is admitted for acute hyponatremia (114) noted on routine labs associated w/ the thoracentesis she received on day of admission, 12/6/23.     Changes/interval events:  - 1L fluid restriction   - Thoracentesis yesterday; CXR with increased dyspnea today with small-mod pneumothorax   - Discussed with IR and because dyspnea improved after para, no hypoxemia throughout, will watch rather than pursue chest tube at the moment. The accumulated air is still probably less than the liquid removed yesterday, though trapped lung is not ruled out.   - If hypoxemic or dyspneic overnight, stat CXR and if ptx increased, page IR at *[phone]  - Decrease in sodium when 2% saline held; this was restarted; it may be that her goal will just have to be 120s where she's \"lived\" for some time        #Severe hyponatremia (114), improving  #Alcoholic cirrhosis  #Hepatic hydrothorax ISO cirrhosis  #Hx of hepatorenal syndrome  Recurrent episodes of hyponatremia, BL mid 120s. Usually attributed to low effective arterial blood volume iso cirrhosis and post thoracentesis (2L removed thrice weekly). Nephrology consulted while patient in ED, suspect same etiology. Plan as below.  Pt presenting w/o notable altered mental status or neurologic changes. Mild increase in confusion, some lightheadedness/unsteadiness, and less than goal BM over last several days. Pt w/ alcoholic cirrhosis, requires thrice weekly thoracenteses for hepatic hydrothorax, diagnosed during most recent hospitalization from 10/22/23- 11/21/23 (also dx'd w/ hepatorenal " syndrome during recent admission). Previously underwent regular paracenteses, but no longer. On 1L fluid restriction PTA, adheres well. On PTA lactulose, regularly having 3-4 BM daily. Not meeting goal last 2 days. No appreciable asterixis or tongue fasciculations on exam. Mild to moderate ascites, non tender. No focal neurologic deficits on exam, occasionally staring off into distance and missing questions, but no clearly altered mental status.  Current LFTs near baseline. BL Cr 0.7-0.8, on arrival, 0.98.   - CAPS for paracentesis 12/12  - IR completed staged thora 12/11  - Resume 2% saline; continue q4h sodium for now  - Nephrology signed off; will reach out 12/13 to help devise discharge and follow-up goals     MELD 3.0: 24 at 12/9/2023 10:15 PM  MELD-Na: 27 at 12/9/2023 10:15 PM  Calculated from:  Serum Creatinine: 0.78 mg/dL (Using min of 1 mg/dL) at 12/9/2023  3:51 AM  Serum Sodium: 120 mmol/L (Using min of 125 mmol/L) at 12/9/2023 10:15 PM  Total Bilirubin: 3.9 mg/dL at 12/7/2023  6:14 AM  Serum Albumin: 3.5 g/dL at 12/7/2023  6:14 AM  INR(ratio): 1.72 at 12/8/2023  3:47 PM  Age at listing (hypothetical): 63 years  Sex: Female at 12/9/2023 10:15 PM    #Esophageal varix  #Diverticulosis  #Acute on chronic macrocytic anemia  Melenic stools on 11/21 (during hospitalization). Previous EGD/colonoscopy w/ small esophageal varix and diverticulosis. EGD during most recent hospitalization w/o active bleed. Tx w/ octreotide and ceftriaxone at the time. Required several pRBCs, discharged w/ stable hgb.   BL hgb 8-9, on admit 7.8. No overt S/S bleeding at this time. Transfused 12/10, 12/12. No clinical evidence of bleeding. B12 is elevated, folate normal. Suspect cirrhosis is the underlying cause.  - PTA omeprazole (subbed her w/ pantoprazole 40mg BID)  - CTM for signs of bleeding, melena, hematemesis, hemodynamic changes  - Monitor hgb w/ daily CBC.  - Daily Hb, transfuse for Hb < 7.0 g/dL     #RLE  ecchymosis/hematoma  Incurred last week while transferring into car. Appearance not consistent w/ infection, appears to be hematoma w/ break in skin. See photo in objective portion of note.  - Dress w/ non stick bandages per patient preference  - Heat, ice, tylenol prn     #Hypermagnesemia  Mg 2.9 on arrival.   - Repeat AM     #Hypothyroidism  - PTA levothyroxine 112mcg daily     #Allergic rhinitis  #COPD  - PTA montelukast 10mg at bedtime   - albuterol inhaler and neb solution PRN     #Back pain, muscle spasms  - PTA cyclobenzaprine 5-10mg Q8H PRN     #Anxiety  #Depression  #Insomnia  - PTA citalopram 40mg daily  - PTA trazodone 50mg at bedtime   - PTA lorazepam 0.5mg Q8H PRN for panic attacks          Diet: Snacks/Supplements Adult: Other; See comments below; Between Meals  Regular Diet Adult  Fluid restriction 1000 ML FLUID    DVT Prophylaxis:   Demarco Catheter: Not present  Lines: None     Cardiac Monitoring: None  Code Status: Full Code      Clinically Significant Risk Factors        # Hyperkalemia: Highest K = 5.6 mmol/L in last 2 days, will monitor as appropriate  # Hyponatremia: Lowest Na = 123 mmol/L in last 2 days, will monitor as appropriate      # Hypoalbuminemia: Lowest albumin = 3.4 g/dL at 12/11/2023  8:12 AM, will monitor as appropriate     # Thrombocytopenia: Lowest platelets = 46 in last 2 days, will monitor for bleeding           # Severe Malnutrition: based on nutrition assessment    # Financial/Environmental Concerns: none  # Asthma: noted on problem list        Disposition Plan      Expected Discharge Date: 12/14/2023      Destination: home with family  Discharge Comments: pending sodium labs            Kaushal Newell DO  Hospitalist Service, GOLD TEAM [address] [place] [address]  Securely message with [employer] (more info)  Text page via [facility] Paging/Directory   See signed in provider for up to date coverage  information  ______________________________________________________________________    Interval History   Dyspneic this morning, worsened into the evening. She suspected ascites was the cause and it improved after para. Had considered chest tube for increased size pneumothorax but will monitor for now with improvement in symptoms. No chest pain throughout.     Otherwise denies fevers, chills, confusion. Had a very large BM this morning. Asked questions about fluid restriction.    Physical Exam   Vital Signs: Temp: 98.7  F (37.1  C) Temp src: Oral BP: 107/52 Pulse: 80   Resp: 24 SpO2: 100 % O2 Device: None (Room air) Oxygen Delivery: 2 LPM  Weight: 146 lbs 6.17 oz    Gen: Awake and alert, appears comfortable, appears stated age.  HEENT: NCAT, sclerae anicteric.  CV: Regular rhythm, normal rate, S1/S2, extremities warm and well perfused, no lower extremity edema.  Pulm: Mildly increased work of breathing, lungs clear to auscultation, no crackles or wheezing.  GI: Nontender, moderately distended but not tense, soft. +bowel sounds.  Skin: Warm, dry, no jaundice.  Neuro: AO, speech normal, moves all extremities symmetrically. No asterixis.  Psych: Mood is fair, affect is congruent.      Medical Decision Making             Data

## 2023-12-12 NOTE — PLAN OF CARE
Neuro: A&Ox4.   Cardiac: Not on tele. VSS, on scheduled Midodrine. Trace edema BLE  Respiratory: Sating 100% on RA.  GI/: Adequate urine output via the bathroom. Soft BM X1  Diet/appetite: Tolerating regular diet w/1L fluid restriction for free water. Requests for carbonated or apple juice as tolerated. Eating well.  Activity:  SBA with walker.  Pain: At acceptable level on current regimen.   Skin: No new deficits noted.  LDA's: R PIV SL. L PIV on Saline drip at 75 ml/hr, latest Na 123.    Returned from staged thoracentesis at 4pm. 2.1L removed.    Plan: Na check q4 hrs. Continue with POC. Notify primary team with changes.

## 2023-12-12 NOTE — PROGRESS NOTES
"CLINICAL NUTRITION SERVICES - BRIEF NOTE  For last full RD assessment, see note dated 12/8/23    NEW FINDINGS   - Informed by diet office that patient's current fluid restriction order is not interfacing correctly with the Health Touch Room-Service program. This is due to fluid restriction drop down box not being utilized (rather, comment section states \"Please restrict free water to 1000ml/24 hours\". Comments in diet orders do not interface correctly and therefore pt may receive more fluid than order intends.    - Attempted to contact primary team MD for fluid restriction order correction x3 but no response.     - Corrected patient's fluid restriction order in EPIC (with same volume 1000 mL fluid restriction order) using \"per staff recommendation\" ordering mode which requires an MD signature; used primary team MD to sign order (Kaushal Newell DO). Informed bedside RN of change.     INTERVENTIONS  Implementation  Feeding tube flush - corrected order to ensure proper EPIC interface.    Monitoring/Evaluation  Will continue to monitor and evaluate per protocol.    Nuno Esparza RDN, LD, CNSC  6B work-room RD phone: [phone]   6B/Obs RD pager: [phone]    Weekend/Holiday RD pager [phone]    "

## 2023-12-12 NOTE — CONSULTS
"    Interventional Radiology  [facility] Consult Service Note  12/12/23   3:08 PM    Consult Requested:  \"Pneumothorax\"    Recommendations/Plan:    Patient is on IR schedule 12/12 for a right chest tube placement for apical pneumothorax.   Labs WNL for procedure.  Orders entered for procedure, No NPO required.  Consent will be done prior to procedure.     Please contact the IR charge RN at [phone] for estimated time of procedure.     Case and imaging discussed with IR attending, Dr. Rudolph.  Recommendations were reviewed with Dr. Newell.    History of Present Illness:  Stefani Crowell is a 63 year old female with a history of alcohol use disorder and alcohol associated cirrhosis, last use 6/28/23. Her liver disease has been complicated by ascites, diuretic refractory hepatic hydrothorax with 3x weekly thoracentesis as an outpatient, COPD, hyponatremia, HE, EV who was admitted 12/6 following a thoracentesis and noted to have a serum sodium of 114. She has received 3% saline without significant improvement in serum sodium level.  She has been on a 2% sodium infusion with fluid restriction. IR completed at right thoracentesis 12/11 with 2.1 L drained. Today pt endorsed more SOB and chest xray revealed a moderate pneumothorax. Pt is requiring 2L of O2. IR is consulted for right apical chest tube symptomatic pneumothorax.    Pertinent Imaging Reviewed:         Expected date of discharge:  TBD    Vitals:   /59   Pulse 76   Temp 98.2  F (36.8  C) (Oral)   Resp 22   Ht 1.651 m (5' 5\")   Wt 66.4 kg (146 lb 6.2 oz)   LMP  (LMP Unknown)   SpO2 100%   BMI 24.36 kg/m      Pertinent Labs:   Lab Results   Component Value Date    WBC 6.9 12/12/2023    WBC 6.5 12/11/2023    WBC 6.7 12/11/2023    WBC 5.9 03/25/2021    WBC 4.9 02/18/2021    WBC 5.2 01/19/2021     Lab Results   Component Value Date    HGB 6.7 12/12/2023    HGB 7.1 12/11/2023    HGB 6.7 12/11/2023    HGB 14.0 03/25/2021    HGB 14.1 " 02/18/2021    HGB 14.7 01/19/2021     Lab Results   Component Value Date    PLT 49 12/12/2023    PLT 47 12/11/2023    PLT 46 12/11/2023    PLT 56 03/25/2021    PLT 53 02/18/2021    PLT 51 01/19/2021     Lab Results   Component Value Date    INR 1.72 (H) 12/08/2023    INR 1.36 (H) 03/25/2021     Lab Results   Component Value Date    POTASSIUM 5.6 (H) 12/12/2023    POTASSIUM 4.5 03/25/2021        COVID-19 Antibody Results, Testing for Immunity           No data to display              COVID-19 PCR Results          10/22/2023    01:56   COVID-19 PCR Results   SARS CoV2 PCR Negative        ROD Jovel CNP  Interventional Radiology  Pager: [phone]

## 2023-12-12 NOTE — PROVIDER NOTIFICATION
Time of notification: 4:44 PM  Provider notified: DESTINY BRICENO  Patient status: Stable  looks like IR cancelled CT placement, wanted to confirm?  Orders received:

## 2023-12-12 NOTE — PROGRESS NOTES
Assumed cares from [id number]    Neuro: A&Ox4.   Cardiac: No tele, soft BP noted (97/53) otherwise VSS.   Respiratory: Sating >99% on RA.  GI/: Adequate urine output and BM occurrence X1  Diet/appetite: Tolerating regular diet and compliant with 1L free water restriction.   Activity:  SBA in room and to bathroom.  Pain: At acceptable level on current regimen. Pain denied during shift.  Skin: No new deficits noted.  LDA's: L & R PIV SL.   Notes: Critical lab values of 6.7 hgb and platelet count of 49k, provided notified.   Plan: Q4 sodium checks. Continue with POC. Notify primary team with changes.

## 2023-12-12 NOTE — PROGRESS NOTES
IR follow-up note.    Update from Dr. Newell regarding this patient. After paracentesis patient is feeling less SOB and O2 sats have improved. Want to hold off on right chest tube for now. Will continue to monitor and update IR on call if patient condition changes. If worsening SOB recommend repeat chest xray to correlate with size of pneumothorax. Question possible trapped lung.     ROD Yanes  Interventional Radiol og  Pager [phone]

## 2023-12-12 NOTE — PROCEDURES
[facility]  CAPS PROCEDURE NOTE  Date of Admission:  12/6/2023  Consult Requested by: Medicine  Reason for Consult: diagnostic evaluation of ascites and management of symptomatic ascites    Indication/HPI: Patient with decompensated cirrhosis with ascites    Pre-Procedure Diagnosis: Ascites    Post-Procedure Diagnosis: Ascites    Risk Assessment: Average risk, Technically straightforward; patient's anticoagulation has been held according to guidelines based on the agent and platelets and coags are within guidelines    Procedure Outcome:  Diagnostic paracentesis performed and Therapeutic paracentesis performed with 2.8 liters of ascites removed.   See additional procedure details below.    The primary covering service should follow up and address any lab results as appropriate.    Jono Howard MD  [facility]  Securely message with Vocera (more info)  Text page via [employer] Paging/Directory   See signed in provider for up to date coverage information      [facility]    Procedure: Abdominal paracentesis    Date/Time: 12/12/2023 5:23 PM    Performed by: Jono Howard MD  Authorized by: Jono Howard MD      UNIVERSAL PROTOCOL   Site Marked: Yes  Prior Images Obtained and Reviewed:  Yes  Required items: Required blood products, implants, devices and special equipment available    Patient identity confirmed:  Verbally with patient, hospital-assigned identification number and arm band  NA - No sedation, light sedation, or local anesthesia  Confirmation Checklist:  Patient's identity using two indicators  Time out: Immediately prior to the procedure a time out was called    Universal Protocol: the Joint Commission Universal Protocol was followed    Preparation: Patient was prepped and draped in usual sterile fashion       ANESTHESIA    Anesthesia:  Local  infiltration  Local Anesthetic:  Lidocaine 1% without epinephrine  Anesthetic Total (mL):  10      SEDATION    Patient Sedated: No      PROCEDURE  Describe Procedure: The risks and benefits of the procedure were explained to patient who expressed understanding and opted to proceed. Consent was obtained and placed in the chart.  A time out was performed. An area of ascites was located with ultrasound and marked in the right lower quadrant; the area was prepped and draped in the usual sterile fashion. A pocklet of ascites was located using ultrasound and  10 ml of 1% lidocaine was instilled a 25 g needle and a 5 Fr Yeuh catheter guided to fluid pocket and  was inserted under real time guidance which yeilded 2800 ml serosanguinous fluid obtained on the 1st attempt.     The fluid was removed and sent for analysis per CAPS routine, and the area dressed.    Patient tolerated the procedure well without obvious complication    Please contact the Consult and Procedure Service if any complications or concerns arise.           Length of time physician/provider present for 1:1 monitoring during sedation: 0      POC US GUIDE FOR PARACENTESIS     Impression  US Indication: decompensated liver disease    Limited abdominal ultrasound was performed and demonstrated an adequate fluid collection on the right side of the abdomen.    Doppler of the skin demonstrated an area at this site without significant vasculature.  A paracentesis at this site was subsequently performed.    Jono Howard MD

## 2023-12-13 NOTE — PROVIDER NOTIFICATION
"0620: Notified Dr. Micheline Gifford, \"FYI patient had increased potassium of 5.4 this AM. Additionally, pt is up 3.3kg since 12/11, both weights were standing weight.\"    "

## 2023-12-13 NOTE — PLAN OF CARE
B/P: 115/50, T: 97.7, P: 72, R: 20    Neuro: A&Ox4. Flat affect  Cardiac: SR. VSS.   Respiratory: Sating 100% on RA but on 4L nc d/t pneumothorax per order. Prod, loose cough.   GI/: Adequate urine output. BM X1, formed. PRN Lactulose x1 given.   Diet/appetite: Tolerating 2g Na diet. Eating well.  Activity:  SBA, up to chair and bathroom.  Pain: Denies.  Skin: No new deficits noted.  LDA's: R PIV SL. (2% Na infusion discontinued),     Plan: Liver Transplant consulted and eval reopened. Encourage restriction in fluid intake to max 1L. Continue with POC. Notify primary team with changes.

## 2023-12-13 NOTE — PROGRESS NOTES
[facility]    Medicine Progress Note - Hospitalist Service, GOLD TEAM 10    Date of Admission:  12/6/2023    Assessment & Plan   Stefani Crowell is a 63 year old woman admitted on 12/6/2023. She has a PMH of alcoholic cirrhosis, hepatorenal syndrome, chronic hyponatremia, recurrent pleural effusion (requiring MWF thoracentesis), hypothyroidism, COPD, and anxiety. She is admitted for acute hyponatremia (114) noted on routine labs associated w/ the thoracentesis she received on day of admission, 12/6/23.     Changes/interval events:  - 1L fluid restriction   - Surveillance for pneumothorax; notify IR if resp sign/symptoms   - If hypoxemic or dyspneic overnight, stat CXR and if ptx increased, page IR at *[phone]  - Stopped 2% saline  - Transplant candidacy reopened  - PT and health psych consulted       Severe hyponatremia (114), improving  Recurrent episodes of hyponatremia, BL mid 120s. Usually attributed to low effective arterial blood volume iso cirrhosis and post thoracentesis (2L removed thrice weekly). Nephrology consulted while patient in ED, suspect same etiology.  - Stop 2% saline; continue q8h sodium for now  - Nephrology signed off; recommended goal Na mid-120s, fluid restriction will be key to maintaining this    #Alcohol cirrhosis  #Hepatic hydrothorax  #Hx of hepatorenal syndrome  Pt presenting w/o notable altered mental status or neurologic changes. Mild increase in confusion, some lightheadedness/unsteadiness, and less than goal BM over last several days. Pt w/ alcoholic cirrhosis, requires thrice weekly thoracenteses for hepatic hydrothorax, diagnosed during most recent hospitalization from 10/22/23- 11/21/23 (also dx'd w/ hepatorenal syndrome during recent admission). Previously underwent regular paracenteses, but no longer. On 1L fluid restriction PTA, adheres well. On PTA lactulose, regularly having 3-4 BM daily. Not meeting goal last 2 days. No  appreciable asterixis or tongue fasciculations on exam. Mild to moderate ascites, non tender. No focal neurologic deficits on exam, occasionally staring off into distance and missing questions, but no clearly altered mental status.  Current LFTs near baseline. BL Cr 0.7-0.8, on arrival, 0.98.   - CAPS for paracentesis 12/12  - IR completed staged thora 12/11, next planned 12/14 (anticipating twice weekly going forward)  - 25% albumin at least 25g with para or thora going forward  - Physical therapy consult    Transplant candidacy  Reopened candidacy this admission given improvement in renal function and near completion of chem dep treatment.  - Needs CT angiogram, outpatient mammogram  - Transplant surgery consulted  - Hepatology following  - Ordered PETH, 25 OH D, CMV, EBV, IGRA, treponemal ab, serum HCG (neg), and spot protein urine    Pneumothorax  Noted after 12/11 paracentesis, discussed with IR - plan for surveillance given minimal dyspnea (dyspnea resolved after 12/12 paracentesis), no hypoxemia. Diminished size on repeat XR 12/13.  - 4L oxygen continuous for treatment (not hypoxemia)  - Repeat XR daily     MELD 3.0: 24 at 12/8/2023  9:52 PM  MELD-Na: 27 at 12/8/2023  9:52 PM  Calculated from:  Serum Creatinine: 0.73 mg/dL (Using min of 1 mg/dL) at 12/8/2023  5:55 AM  Serum Sodium: 121 mmol/L (Using min of 125 mmol/L) at 12/8/2023  9:52 PM  Total Bilirubin: 3.9 mg/dL at 12/7/2023  6:14 AM  Serum Albumin: 3.5 g/dL at 12/7/2023  6:14 AM  INR(ratio): 1.74 at 12/6/2023  4:08 PM  Age at listing (hypothetical): 63 years  Sex: Female at 12/8/2023  9:52 PM    #Esophageal varix  #Diverticulosis  #Acute on chronic macrocytic anemia  Melenic stools on 11/21 (during hospitalization). Previous EGD/colonoscopy w/ small esophageal varix and diverticulosis. EGD during most recent hospitalization w/o active bleed. Tx w/ octreotide and ceftriaxone at the time. Required several pRBCs, discharged w/ stable hgb.   BL hgb 8-9, on  admit 7.8. No overt S/S bleeding at this time. Transfused 12/10, 12/12. No clinical evidence of bleeding. B12 is elevated, folate normal. Suspect cirrhosis is the underlying cause.  - PTA omeprazole (subbed her w/ pantoprazole 40mg BID)  - CTM for signs of bleeding, melena, hematemesis, hemodynamic changes  - Monitor hgb w/ daily CBC.  - Daily Hb, transfuse for Hb < 7.0 g/dL     #RLE ecchymosis/hematoma  Incurred last week while transferring into car. Appearance not consistent w/ infection, appears to be hematoma w/ break in skin. See photo in objective portion of note.  - Dress w/ non stick bandages per patient preference  - Heat, ice, tylenol prn     #Hypermagnesemia  Mg 2.9 on arrival.   - Repeat AM     #Hypothyroidism  - PTA levothyroxine 112mcg daily     #Allergic rhinitis  #COPD  - PTA montelukast 10mg at bedtime   - albuterol inhaler and neb solution PRN     #Back pain, muscle spasms  - PTA cyclobenzaprine 5-10mg Q8H PRN     #Anxiety  #Depression  #Insomnia  - PTA citalopram 40mg daily  - PTA trazodone 50mg at bedtime   - PTA lorazepam 0.5mg Q8H PRN for panic attacks          Diet: Fluid restriction 1000 ML FLUID  Snacks/Supplements Adult: Special K Bar; Between Meals  Snacks/Supplements Adult: Other; See comments below; Between Meals  Combination Diet High Kcal/High Protein Diet, ADULT; 3 gm K Diet (low potassium); 2 gm NA Diet    DVT Prophylaxis:   Demarco Catheter: Not present  Lines: None     Cardiac Monitoring: None  Code Status: Full Code      Clinically Significant Risk Factors        # Hyperkalemia: Highest K = 5.6 mmol/L in last 2 days, will monitor as appropriate  # Hyponatremia: Lowest Na = 122 mmol/L in last 2 days, will monitor as appropriate      # Hypoalbuminemia: Lowest albumin = 3.4 g/dL at 12/11/2023  8:12 AM, will monitor as appropriate     # Thrombocytopenia: Lowest platelets = 49 in last 2 days, will monitor for bleeding          # Overweight: Estimated body mass index is 25.57 kg/m  as  "calculated from the following:    Height as of this encounter: 1.651 m (5' 5\").    Weight as of this encounter: 69.7 kg (153 lb 10.6 oz).   # Severe Malnutrition: based on nutrition assessment    # Financial/Environmental Concerns: none  # Asthma: noted on problem list        Disposition Plan     Expected Discharge Date: 12/14/2023      Destination: home with family  Discharge Comments: pending sodium labs            Kaushal Newell DO  Hospitalist Service, GOLD TEAM 10  [facility]  Securely message with [employer] (more info)  Text page via [facility] Paging/Directory   See signed in provider for up to date coverage information  ______________________________________________________________________    Interval History   No acute overnight events. This morning with 10-15 minute episodes of sudden \"shortness of breath,\" when further elaborating, says she felt a dry throat and difficulty making a productive cough. Does not sound like air hunger/dyspnea per se.    Otherwise denies fevers, chills, confusion. Asked questions about fluid restriction. She is happy to hear of reopening transplant eval.    Sister Bev updated at bedside. Discussed with RN.    Physical Exam   Vital Signs: Temp: 98.9  F (37.2  C) Temp src: Oral BP: 104/51 Pulse: 66   Resp: 22 SpO2: 99 % O2 Device: Nasal cannula Oxygen Delivery: 4 LPM  Weight: 153 lbs 10.57 oz    Gen: Awake and alert, appears comfortable, appears stated age.  HEENT: NCAT, sclerae icteric.  CV: Regular rhythm, normal rate, S1/S2, extremities warm and well perfused.  Pulm: Relaxed resp effort, hollow sounds R chest with transmitted bowel sounds; left with good air movement. On room air.  GI: Nontender, mildly distended but not tense, soft. +bowel sounds.  Skin: Warm, dry, jaundice.  Neuro: AO, speech normal, moves all extremities symmetrically. No asterixis.  Psych: Mood is fair, affect is congruent.      Medical Decision Making             Data "

## 2023-12-13 NOTE — PLAN OF CARE
Neuro: A&Ox4. Calls appropriately.   Cardiac: HR 70s, VSS.   Respiratory: Sating >95% on RA. C/o wheezing, prn albuterol nebulizer given with relief. Otherwise denied dyspnea  GI/: Adequate urine output via commode, No BM this shift.  Diet/appetite: Tolerating regular diet. 1L FR, small amount of intake overnight, patient aware of limits.   Activity:  SBA  Pain: At acceptable level on current regimen. Denied pain throughout the shift  Skin: No new deficits noted. R shin wound WDL, dressing change due 12/14  LDA's: L and R PIV. 2% Sodium via R PIV @ 75mL/hr.    Plan: Latest Na 124, continuous 2% Na infusion for Na <125. Increased weight this AM, providers notified. Monitor respiratory status, notify team with changes

## 2023-12-13 NOTE — PROGRESS NOTES
Patient discussed in selection committee, and decision made to re-open transplant evaluation    Patient is near 6 months of sobriety and has completed over 90 hrs of treatment thus far, currently inpatient.     Current MELD 24    Needs CTA, cardiac clearance, needs to see transplant surgery    Needs outpatient mammogram    Last PAP done 2019, negative HPV    emaining labs needed at time eval was initially closed:   - udpated PEth   - Vit D   - CMV, EBV   - Quant Gold   - Trepemona/TPA   - Urine random protein   - Serum HCG

## 2023-12-13 NOTE — PROGRESS NOTES
CLINICAL NUTRITION SERVICES - BRIEF NOTE  For last full RD assessment, see note dated 12/8/23    NEW FINDINGS   - Pt requesting RD visit to modify ONS/snack order. Would like to discontinue the Greek Yogurt snack and would like to increase Ensure frequency to TID + start protein bar once daily. Not counting Ensure drinks towards fluid restriction. Sent supplements to room via room service. Pt with no further questions at this time.    INTERVENTIONS  Implementation  Medical food supplement therapy  Modify composition of meals/snacks    Monitoring/Evaluation  Will continue to monitor and evaluate per protocol.    Nuno Esparza, MS, RDN, LD, CNSC  Available via phone or [employer] chat, and pager  6B work-room RD phone: *[phone]   6B/Obs RD pager: [phone]         Weekend/Holiday RD pager [phone]

## 2023-12-13 NOTE — PROGRESS NOTES
[facility]  HEPATOLOGY PROGRESS NOTE  Stefani Crowell [id number]       IMPRESSION:  Stefani Crowell is a 63 year old female with a history of alcohol use disorder and alcohol associated cirrhosis, last use 6/28/23. Her liver disease has been complicated by ascites, diuretic refractory hepatic hydrothorax with 3x weekly thoracentesis, COPD, hyponatremia, HE, EV who was admitted following a thoracentesis and noted to have a serum sodium of 114. She has received 3% saline without significant improvement in serum sodium level.  She has been on a 2% sodium infusion with fluid restriction.     RECOMMENDATIONS:  # Hyponatremia  - has had improvement in sodium to 124, more stable.   - has not been on diuretics, fluid restriction at 1L. Protein supplements not included in restriction. Discussed free water examples.   - recommend stopping citalopram as this may also contribute to hyponatremia. Health psychology for mental health care.     # Hepatic hydrothorax  # Apical pneumothorax  # Ascites  - mild-moderate pneumothorax following thora 12/11. Continues to have mild dyspnea and occasional coughs.  Had evidenc eof ascites with para 12/12 negative for SBP, low protein.  Repeat thora as needed, please send diagnostic sample with each thora/para.   - has needed x3 weekly thoras prior to admission.   - 25% albumin, at least 25 gms with each thora/para to prevent shifts.   - with hyponatremia, low total protein and borderline creatinine, will need to discuss SBP ppx with renal dosed Ciprofloxacin 250 mg daily.     # Alcohol associated cirrhosis  # Alcohol use disorder  - last use 6/28/23. Has been involved in CD program prior to worsening status. She reports wanting to complete her program and states she will do this when she is able.   - MELD 3.0 ~24. ABO A. Discussed in liver transplant conference 12/12. Re opening liver transplant evaluation. She still needs CTA coronary- will evaluate appropriate timing with  "borderline renal function. As OP, will need pap and mammogram.   Reiterated that opening eval does not equal transplant listing. Ordered transplant surgery consult.    - US abd w doppler 10/22 with patent vasculature, no lesions     # Hepatic encephalopathy  - mentation more stable, continues to have response out of context when asked targeted questions.   - continue with lactulose and rifaximin. Goal stool output with 3-5 soft/loose BM's per day.    # Debility  # Moderate protein calorie malnutrition  - encouraged up to chair at least 3x daily for 30-60 minutes and mobile.   - PT for strengthening.     Patient was discussed with attending physician, Dr. Annie Cary.  The above note reflects our joint plan.     Next follow up appointment: Dr. Stephanie Lim 1/29/24    Thank you for the opportunity to be involved with  Stefani Maggie rosas. Please call with any questions or concerns.    ROD Urban, CNP  Inpatient Hepatology [facility]        SUBJECTIVE:   Continues to have intermittent dyspnea, no change in mentation. She reported wanting to proceed with transplant eval. Improvement in abdominal discomfort following para.      ROS:  A 10-point review of systems was negative.    OBJECTIVE:  VS: /50 (BP Location: Left arm)   Pulse 72   Temp 97.7  F (36.5  C) (Oral)   Resp 20   Ht 1.651 m (5' 5\")   Wt 69.7 kg (153 lb 10.6 oz)   LMP  (LMP Unknown)   SpO2 100%   BMI 25.57 kg/m       Gross per 24 hour   Intake 1438.75 ml   Output 1425 ml   Net 13.75 ml      General: In no acute distress, mild facial muscle wasting  Neuro: AOx3, No asterixis  HEENT:  Noscleral icterus, Nooral lesions  CV:  Skin warm and dry  Lungs:  Respirations even and nonlabored on room air  Abd:   Nontender, mildly distended  Extrem:  trace  peripheral edema   Skin: Nojaundice  Psych: pleasant    MEDICATIONS:  Current Facility-Administered Medications   Medication    acetaminophen (TYLENOL) tablet 650 mg    Or    acetaminophen " (TYLENOL) Suppository 650 mg    albuterol (PROVENTIL HFA/VENTOLIN HFA) inhaler    albuterol (PROVENTIL) neb solution 2.5 mg    calcium carbonate (TUMS) chewable tablet 1,000 mg    citalopram (celeXA) tablet 40 mg    cyclobenzaprine (FLEXERIL) tablet 5-10 mg    ferrous sulfate (FEROSUL) tablet 325 mg    fluticasone-vilanterol (BREO ELLIPTA) 100-25 MCG/ACT inhaler 1 puff    lactulose (CEPHULAC) Packet 20 g    lactulose (CEPHULAC) Packet 20 g    levothyroxine (SYNTHROID/LEVOTHROID) tablet 112 mcg    lidocaine (LMX4) cream    lidocaine 1 % 0.1-1 mL    LORazepam (ATIVAN) tablet 0.5 mg    melatonin tablet 5 mg    midodrine (PROAMATINE) tablet 15 mg    montelukast (SINGULAIR) tablet 10 mg    ondansetron (ZOFRAN ODT) ODT tab 4 mg    Or    ondansetron (ZOFRAN) injection 4 mg    oxymetazoline (AFRIN) 0.05 % spray 2 spray    pantoprazole (PROTONIX) EC tablet 40 mg    prochlorperazine (COMPAZINE) injection 10 mg    Or    prochlorperazine (COMPAZINE) tablet 10 mg    Or    prochlorperazine (COMPAZINE) suppository 25 mg    rifaximin (XIFAXAN) tablet 550 mg    senna-docusate (SENOKOT-S/PERICOLACE) 8.6-50 MG per tablet 1 tablet    Or    senna-docusate (SENOKOT-S/PERICOLACE) 8.6-50 MG per tablet 2 tablet    sodium chloride (NEBUSAL) 3 % neb solution 3 mL    sodium chloride (PF) 0.9% PF flush 3 mL    sodium chloride (PF) 0.9% PF flush 3 mL    [Held by provider] sodium chloride 2% infusion    sodium zirconium cyclosilicate (LOKELMA) packet 10 g       REVIEW OF LABORATORY, PATHOLOGY AND IMAGING RESULTS:  BMP  Recent Labs   Lab 12/13/23  1000 12/13/23  0418 12/13/23  0217 12/12/23  2231 12/12/23  1319 12/12/23  0539 12/11/23  1355 12/11/23  0812 12/11/23  0541   * 124*  124* 122* 123*   < > 126*  126*   < > 124*  124* 123*  123*   POTASSIUM  --  5.4*  --   --   --  5.6*  --  5.3 5.3   CHLORIDE  --  100  --   --   --  102  --  99 97*   UMA  --  8.8  --   --   --  8.9  --  9.3 9.3   CO2  --  17*  --   --   --  17*  --  18* 17*  "  BUN  --  23.4*  --   --   --  26.5*  --  36.1* 38.0*   CR  --  0.59  --   --   --  0.64  --  0.63 0.63   GLC  --  104*  --   --   --  94  --  90 94    < > = values in this interval not displayed.     CBC  Recent Labs   Lab 12/13/23  0418 12/12/23  0539 12/11/23  0812 12/11/23  0541   WBC 7.7 6.9 6.5 6.7   RBC 2.24* 2.00* 2.13* 2.10*   HGB 7.3* 6.7* 7.1* 6.7*   HCT 22.6* 20.4* 21.5* 21.2*   * 102* 101* 101*   MCH 32.6 33.5* 33.3* 31.9   MCHC 32.3 32.8 33.0 31.6   RDW 18.6* 17.3* 16.9* 16.8*   PLT 52* 49* 47* 46*     INR  Recent Labs   Lab 12/08/23  1547 12/06/23  1608   INR 1.72* 1.74*     LFTs  Recent Labs   Lab 12/11/23  0812 12/07/23  0614 12/06/23  1843 12/06/23  1608   ALKPHOS 67 64 65 66   AST 47* 41 43 41   ALT 36 29 31 31   BILITOTAL 4.9* 3.9* 4.5* 4.2*   PROTTOTAL 5.0* 5.2* 6.0* 5.9*   ALBUMIN 3.4* 3.5 4.0 4.0        MELD 3.0: 24 at 12/8/2023  9:52 PM  MELD-Na: 27 at 12/8/2023  9:52 PM  Calculated from:  Serum Creatinine: 0.73 mg/dL (Using min of 1 mg/dL) at 12/8/2023  5:55 AM  Serum Sodium: 121 mmol/L (Using min of 125 mmol/L) at 12/8/2023  9:52 PM  Total Bilirubin: 3.9 mg/dL at 12/7/2023  6:14 AM  Serum Albumin: 3.5 g/dL at 12/7/2023  6:14 AM  INR(ratio): 1.74 at 12/6/2023  4:08 PM  Age at listing (hypothetical): 63 years  Sex: Female at 12/8/2023  9:52 PM    Previous work-up:   Lab Results   Component Value Date    HEPBANG Nonreactive 02/18/2021    HBCAB Nonreactive 02/18/2021    HCVAB  08/03/2017     Nonreactive   Assay performance characteristics have not been established for newborns,   infants, and children      TANYA 157 10/03/2023    IRONSAT 26 11/23/2023    TSH 3.98 12/06/2023    CHOL 176 08/15/2022    HDL 44 (L) 08/15/2022     (H) 08/15/2022    TRIG 32 12/06/2023    A1C 4.7 07/24/2023    POPETH <10 10/22/2023    PLPETH <10 10/22/2023      No results found for: \"SPECDES\", \"LDRESULTS\"      Imaging Results:  CXR 12/13/23  Impression: Right hydropneumothorax redemonstrated with " slight  decrease of air component and similar volume of moderate to large  pleural effusion. Underlying compressive atelectasis.

## 2023-12-14 NOTE — PROGRESS NOTES
12/14/23 1400   Appointment Info   Signing Clinician's Name / Credentials (PT) Anita Herron DPT   Living Environment   People in Home spouse   Current Living Arrangements house   Home Accessibility stairs within home  (stairs in garage)   Number of Stairs, Within Home, Primary three   Stair Railings, Within Home, Primary railing on right side (ascending)   Transportation Anticipated family or friend will provide   Living Environment Comments Pt lives in home with 3 SHANTEL garage, has walk in shower and tub shower   Self-Care   Usual Activity Tolerance moderate   Current Activity Tolerance fair   Regular Exercise No   Equipment Currently Used at Home walker, rolling;cane, straight   Fall history within last six months yes   Number of times patient has fallen within last six months 2   Activity/Exercise/Self-Care Comment Pt reports Alba with 4WW for ambulation within him. Spouse and other family members assisting with ADLS PRN, supervising tub transfer and stairs PTA.   General Information   Onset of Illness/Injury or Date of Surgery 12/06/23   Referring Physician Kaushal Newell, DO   Patient/Family Therapy Goals Statement (PT) To go home   Pertinent History of Current Problem (include personal factors and/or comorbidities that impact the POC) Stefani Crowell is a 63 year old female with a history of alcohol use disorder and alcohol associated cirrhosis, last use 6/28/23. Her liver disease has been complicated by ascites, diuretic refractory hepatic hydrothorax with 3x weekly thoracentesis, COPD, hyponatremia, HE, EV who was admitted following a thoracentesis and noted to have a serum sodium of 114. She has received 3% saline without significant improvement in serum sodium level.  She has been on a 2% sodium infusion with fluid restriction   Existing Precautions/Restrictions fall   Cognition   Affect/Mental Status (Cognition) WFL;confused   Orientation Status (Cognition) oriented x 4   Pain Assessment   Patient  Currently in Pain Yes, see Vital Sign flowsheet  (thora site pain)   Integumentary/Edema   Integumentary/Edema no deficits were identifed   Posture    Posture Forward head position;Protracted shoulders   Range of Motion (ROM)   ROM Comment BLE WFL   Strength (Manual Muscle Testing)   Strength Comments generalized weakness in BLEs   Bed Mobility   Bed Mobility supine-sit   Comment, (Bed Mobility) sup>sit with IND   Transfers   Transfers sit-stand transfer   Comment, (Transfers) STS to 4WW with SBA   Gait/Stairs (Locomotion)   Comment, (Gait/Stairs) Amb x 10' with 4WW and CGA   Balance   Sitting Balance: Static good balance   Sitting Balance: Dynamic good balance   Standing Balance: Static good balance   Standing Balance: Dynamic fair balance   Balance Quick Add Sitting balance: Static;Sitting balance: dynamic;Standing balance: static;Standing balance: dynamic   Sensory Examination   Sensory Perception patient reports no sensory changes   Coordination   Coordination no deficits were identified   Muscle Tone   Muscle Tone no deficits were identified   Clinical Impression   Criteria for Skilled Therapeutic Intervention Yes, treatment indicated   PT Diagnosis (PT) Impaired functional mobility   Influenced by the following impairments Generalized weakness, impaired balance, decreased activity tolerance   Functional limitations due to impairments Decreased IND with transfers, gait, and stairs   Clinical Presentation (PT Evaluation Complexity) evolving   Clinical Presentation Rationale Clinical judgment   Clinical Decision Making (Complexity) moderate complexity   Planned Therapy Interventions (PT) balance training;bed mobility training;gait training;home exercise program;stair training;strengthening;transfer training;progressive activity/exercise   Risk & Benefits of therapy have been explained evaluation/treatment results reviewed;care plan/treatment goals reviewed;risks/benefits reviewed;current/potential barriers  reviewed;patient   PT Total Evaluation Time   PT Aylinal, Moderate Complexity Minutes (46222) 9   Physical Therapy Goals   PT Frequency 4x/week   PT Predicted Duration/Target Date for Goal Attainment 01/19/24   PT Goals Transfers;Gait;Stairs   PT: Transfers Modified independent;Assistive device;Sit to/from stand;Bed to/from chair   PT: Gait Modified independent;100 feet;Assistive device   PT: Stairs Modified independent;3 stairs;Rail on right   Total Session Time   Timed Code Treatment Minutes 17   Total Session Time (sum of timed and untimed services) 26

## 2023-12-14 NOTE — PROGRESS NOTES
[facility]    Medicine Progress Note - Hospitalist Service, GOLD TEAM 10    Date of Admission:  12/6/2023    Assessment & Plan   Stefani Crowell is a 63 year old woman admitted on 12/6/2023. She has a PMH of alcoholic cirrhosis, hepatorenal syndrome, chronic hyponatremia, recurrent pleural effusion (requiring MWF thoracentesis), hypothyroidism, COPD, and anxiety. She is admitted for acute hyponatremia (114) noted on routine labs associated w/ the thoracentesis she received on day of admission, 12/6/23.     Changes/interval events:  - 1L fluid restriction   - Thoracentesis today with 2.1L removed  - Transplant candidacy reopened  - PT and health psych consulted       Severe hyponatremia (114), improved  Recurrent episodes of hyponatremia, BL mid 120s. Usually attributed to low effective arterial blood volume iso cirrhosis and post thoracentesis (2L removed thrice weekly). Nephrology consulted while patient in ED, suspect same etiology.  - Stop 2% saline; continue daily sodium checks  - Nephrology signed off; recommended goal Na mid-120s, fluid restriction will be key to maintaining this    #Alcohol cirrhosis  #Hepatic hydrothorax  #Hx of hepatorenal syndrome  Pt presenting w/o notable altered mental status or neurologic changes. Mild increase in confusion, some lightheadedness/unsteadiness, and less than goal BM over last several days. Pt w/ alcoholic cirrhosis, requires thrice weekly thoracenteses for hepatic hydrothorax, diagnosed during most recent hospitalization from 10/22/23- 11/21/23 (also dx'd w/ hepatorenal syndrome during recent admission). Previously underwent regular paracenteses, but no longer. On 1L fluid restriction PTA, adheres well. On PTA lactulose, regularly having 3-4 BM daily. Not meeting goal last 2 days. No appreciable asterixis or tongue fasciculations on exam. Mild to moderate ascites, non tender. No focal neurologic deficits on exam,  occasionally staring off into distance and missing questions, but no clearly altered mental status.  Current LFTs near baseline. BL Cr 0.7-0.8, on arrival, 0.98.   - CAPS for paracentesis 12/12  - IR completed staged thora 12/11, next planned 12/14 (anticipating twice weekly going forward at [facility] in [place], as done PTA)  - 25% albumin at least 25g with para or thora going forward  - will not need staged thoras from here given loculation and low add'l volume with second stage  - Physical therapy consult    Transplant candidacy  Reopened candidacy this admission given improvement in renal function and near completion of chem dep treatment.  - CT angiogram before discharge, outpatient mammogram  - Cardiology consult for 12/15 for preop risk eval  - Transplant surgery consulted  - Hepatology following  - Ordered PETH (neg), 25 OH D, CMV (PCR quant neg), EBV, IGRA (neg), treponemal ab, serum HCG (neg), and spot protein urine (0.24 mg/mg)    Pneumothorax  Likely ex vacuo: noted after 12/11 paracentesis, discussed with IR - plan for surveillance given minimal dyspnea (dyspnea resolved after 12/12 paracentesis), no hypoxemia. Diminished size on repeat XR 12/13, stable 12/14. Asymptomatic.  - 4L oxygen continuous for treatment (not hypoxemia) while admitted     MELD 3.0: 24 at 12/9/2023 10:15 PM  MELD-Na: 27 at 12/9/2023 10:15 PM  Calculated from:  Serum Creatinine: 0.78 mg/dL (Using min of 1 mg/dL) at 12/9/2023  3:51 AM  Serum Sodium: 120 mmol/L (Using min of 125 mmol/L) at 12/9/2023 10:15 PM  Total Bilirubin: 3.9 mg/dL at 12/7/2023  6:14 AM  Serum Albumin: 3.5 g/dL at 12/7/2023  6:14 AM  INR(ratio): 1.72 at 12/8/2023  3:47 PM  Age at listing (hypothetical): 63 years  Sex: Female at 12/9/2023 10:15 PM    #Esophageal varix  #Diverticulosis  #Acute on chronic macrocytic anemia  Melenic stools on 11/21 (during hospitalization). Previous EGD/colonoscopy w/ small esophageal varix and diverticulosis. EGD during most  recent hospitalization w/o active bleed. Tx w/ octreotide and ceftriaxone at the time. Required several pRBCs, discharged w/ stable hgb.   BL hgb 8-9, on admit 7.8. No overt S/S bleeding at this time. Transfused 12/10, 12/12. No clinical evidence of bleeding. B12 is elevated, folate normal. Suspect cirrhosis is the underlying cause.  - PTA omeprazole (subbed her w/ pantoprazole 40mg BID)  - CTM for signs of bleeding, melena, hematemesis, hemodynamic changes  - Daily Hb, transfuse for Hb < 7.0 g/dL     #RLE ecchymosis/hematoma  Incurred last week while transferring into car. Appearance not consistent w/ infection, appears to be hematoma w/ break in skin. See photo in prior note.  - Dress w/ non stick bandages per patient preference  - Heat, ice, tylenol prn     #Hypermagnesemia  Mg 2.9 on arrival.   - Repeat AM     #Hypothyroidism  - PTA levothyroxine 112mcg daily     #Allergic rhinitis  #COPD  - PTA montelukast 10mg at bedtime   - albuterol inhaler and neb solution PRN     #Back pain, muscle spasms  - PTA cyclobenzaprine 5-10mg Q8H PRN     #Anxiety  #Depression  #Insomnia  - PTA citalopram 40mg daily  - PTA trazodone 50mg at bedtime   - PTA lorazepam 0.5mg Q8H PRN for panic attacks          Diet: Fluid restriction 1000 ML FLUID  Snacks/Supplements Adult: [employer]; Between Meals  Snacks/Supplements Adult: Other; See comments below; Between Meals  Combination Diet High Kcal/High Protein Diet, ADULT; 3 gm K Diet (low potassium); 2 gm NA Diet    DVT Prophylaxis:   Demarco Catheter: Not present  Lines: None     Cardiac Monitoring: None  Code Status: Full Code      Clinically Significant Risk Factors        # Hyperkalemia: Highest K = 5.4 mmol/L in last 2 days, will monitor as appropriate  # Hyponatremia: Lowest Na = 122 mmol/L in last 2 days, will monitor as appropriate      # Hypoalbuminemia: Lowest albumin = 3.4 g/dL at 12/11/2023  8:12 AM, will monitor as appropriate     # Thrombocytopenia: Lowest platelets = 47 in  "last 2 days, will monitor for bleeding          # Overweight: Estimated body mass index is 26.23 kg/m  as calculated from the following:    Height as of this encounter: 1.651 m (5' 5\").    Weight as of this encounter: 71.5 kg (157 lb 10.1 oz).   # Severe Malnutrition: based on nutrition assessment    # Financial/Environmental Concerns: none  # Asthma: noted on problem list        Disposition Plan      Expected Discharge Date: 12/15/2023      Destination: home with family  Discharge Comments: Thoracentesis 12/14; surveillance for pneumothorax            Kaushal Newell DO  Hospitalist Service, GOLD TEAM 10  [facility]  Securely message with Dar[employer] (more info)  Text page via [facility] Paging/Directory   See signed in provider for up to date coverage information  ______________________________________________________________________    Interval History   No acute overnight events. Fewer dry coughing episodes. Does complain of right flank pain which preceded today's thoracentesis and persists after. Not worse with inspiration, but worsens with movement and coughing. Otherwise no fevers, chillls, confusion, nausea/vomiting.    Sister Bev updated at bedside. Discussed with RN.    Physical Exam   Vital Signs: Temp: 97.8  F (36.6  C) Temp src: Oral BP: (!) 89/58 Pulse: 70   Resp: 18 SpO2: 100 % O2 Device: Nasal cannula with humidification Oxygen Delivery: 2 LPM  Weight: 157 lbs 10.06 oz    Gen: Awake and alert, appears comfortable.  HEENT: NCAT, sclerae icteric.  CV: Regular rhythm, normal rate, systolic murmur; extremities warm and well perfused.  Pulm: Relaxed resp effort, hollow sounds R chest with transmitted bowel sounds; left with good air movement. On room air.  GI: Nontender, mildly distended but not tense, soft. +bowel sounds.  Skin: Warm, dry, jaundice.  Neuro: AO, speech normal, moves all extremities symmetrically. No asterixis.  Psych: Mood is fair, affect is " congruent.      Medical Decision Making             Data

## 2023-12-14 NOTE — PROCEDURES
[facility]    Procedure: IR staged right thoracentesis    Date/Time: 12/14/2023 10:16 AM    Performed by: Akash Massey MD  Authorized by: Akash Massey MD  IR Fellow Physician:  Radiology Resident Physician: Lawrence Ruff        UNIVERSAL PROTOCOL   Site Marked: NA  Prior Images Obtained and Reviewed:  Yes  Required items: Required blood products, implants, devices and special equipment available    Patient identity confirmed:  Verbally with patient, arm band, provided demographic data and hospital-assigned identification number  NA - No sedation, light sedation, or local anesthesia  Confirmation Checklist:  Patient's identity using two indicators, relevant allergies, procedure was appropriate and matched the consent or emergent situation and correct equipment/implants were available  Time out: Immediately prior to the procedure a time out was called    Universal Protocol: the Joint Commission Universal Protocol was followed    Preparation: Patient was prepped and draped in usual sterile fashion    ESBL (mL):  0     ANESTHESIA    Anesthesia: Local infiltration  Local Anesthetic:  Lidocaine 1% without epinephrine  Anesthetic Total (mL):  10      SEDATION    Patient Sedated: No    Fluoroscopy Time: 0 minute(s)  See dictated procedure note for full details.  Findings: Moderate right pleural effusion seen on US.    Specimens: none    Complications: None    Condition: Stable    Plan: Patient may transfer      PROCEDURE  Describe Procedure: Uncomplicated ultrasound guided right staged thoracentesis. 2 L of bloody/serous fluid aspirated. No samples sent. Post-procedure US showed trace loculated right effusion.  Patient Tolerance:  Patient tolerated the procedure well with no immediate complications  Length of time physician/provider present for 1:1 monitoring during sedation: 0

## 2023-12-14 NOTE — PLAN OF CARE
"Outcome Evaluation: VSS on 2-4L O2 NC r/t \"flushing out pneumo\" orders, scheduled midodrine given; Afebrile. Denies nausea. Unchanged pain to right lower flank (probable cause pneumothorax) relieved with heat therapy + Tylenol + repositioning. Plan for the day included thoracentesis with 2.1L dark red fluid out, 2-view CXR showing sustained hydropneumothorax and physical therapy consult and assessment (recommending outpatient therapy). Family at bedside and attentive to patient, encouraging her to participate in cares and take charge of her own health. Pending plan includes: CTA r/t re-do liver workup and outpatient mammogram.    Neuro: A&Ox4. Denies neuropathy.  Cardiac: VSS, chronically low pressures on midodrine (scheduled). Cardiology consulted r/t continued liver tx workup. Edema to BLE and compression stockings ordered from [facility] at 1834.  Respiratory: Sating >90% on 2-4L O2 NC (orders for 4L O2 NC to \"flush out\" pneumothorax but patient has had intermittent epistaxis with higher flow so occasionally decreased for this reason)  GI/: Adequate urine output per patient statement. BM x 0. 3 total doses lactulose given.  Diet/appetite: Tolerating 3g potassium + 2g diet. Has had 2 protein drinks. Eating well.  Activity:  Standby assist in halls, otherwise independent with walker in the room. Family available to assist with ADLs. Worked with PT today and is feeling very proud of the fact she walked from room --> cardiac rehab room --> back to room  Pain: At acceptable level on current regimen. Tylenol PRN. Heating packs.  Skin: No new deficits noted. Skin tear site re-dressed and next due 12/17  LDA's: Bilateral PIV SL.  Psychosocial: Per patient statement, she is \"crabby\" and realizes it. Spiritual care following. Family available at bedside.     Problem: Adult Inpatient Plan of Care  Goal: Plan of Care Review  Description: The Plan of Care Review/Shift note should be completed every shift.  The Outcome Evaluation " "is a brief statement about your assessment that the patient is improving, declining, or no change.  This information will be displayed automatically on your shift  note.  Outcome: Progressing  Flowsheets (Taken 12/14/2023 1000)  Outcome Evaluation:  Plan of Care Reviewed With:   patient   sibling  Overall Patient Progress: no change  Goal: Patient-Specific Goal (Individualized)  Description: You can add care plan individualizations to a care plan. Examples of Individualization might be:  \"Parent requests to be called daily at 9am for status\", \"I have a hard time hearing out of my right ear\", or \"Do not touch me to wake me up as it startles  me\".  Outcome: Progressing  Flowsheets (Taken 12/14/2023 1000)  Individualized Care Needs: Needs encouragement to participate in cares  Anxieties, Fears or Concerns: None noted at this time  Patient/Family-Specific Goals (Include Timeframe): Goals: wash hair, get up to chair x1  Goal: Absence of Hospital-Acquired Illness or Injury  Outcome: Progressing  Intervention: Identify and Manage Fall Risk  Recent Flowsheet Documentation  Taken 12/14/2023 0742 by Danielle Birch RN  Safety Promotion/Fall Prevention:   activity supervised   assistive device/personal items within reach   clutter free environment maintained   increased rounding and observation   increase visualization of patient   lighting adjusted   nonskid shoes/slippers when out of bed   treat underlying cause  Intervention: Prevent Skin Injury  Recent Flowsheet Documentation  Taken 12/14/2023 0742 by Danielle Birch RN  Body Position: supine, head elevated  Intervention: Prevent and Manage VTE (Venous Thromboembolism) Risk  Recent Flowsheet Documentation  Taken 12/14/2023 0742 by Danielle Birch RN  VTE Prevention/Management: SCDs (sequential compression devices) off  Intervention: Prevent Infection  Recent Flowsheet Documentation  Taken 12/14/2023 0742 by Danielle Birch RN  Infection Prevention:   cohorting " utilized   environmental surveillance performed   hand hygiene promoted   personal protective equipment utilized   rest/sleep promoted   single patient room provided   visitors restricted/screened  Goal: Optimal Comfort and Wellbeing  Outcome: Progressing  Intervention: Monitor Pain and Promote Comfort  Recent Flowsheet Documentation  Taken 12/14/2023 0742 by Danielle Birch RN  Pain Management Interventions: medication (see MAR)  Goal: Readiness for Transition of Care  Outcome: Progressing     Problem: Fall Injury Risk  Goal: Absence of Fall and Fall-Related Injury  Outcome: Progressing  Intervention: Identify and Manage Contributors  Recent Flowsheet Documentation  Taken 12/14/2023 0742 by Danielle Birch RN  Medication Review/Management:   medications reviewed   high-risk medications identified  Intervention: Promote Injury-Free Environment  Recent Flowsheet Documentation  Taken 12/14/2023 0742 by Danielle Birch RN  Safety Promotion/Fall Prevention:   activity supervised   assistive device/personal items within reach   clutter free environment maintained   increased rounding and observation   increase visualization of patient   lighting adjusted   nonskid shoes/slippers when out of bed   treat underlying cause     Problem: Comorbidity Management  Goal: Maintenance of Asthma Control  Outcome: Progressing  Intervention: Maintain Asthma Symptom Control  Recent Flowsheet Documentation  Taken 12/14/2023 0742 by Danielle Birch RN  Medication Review/Management:   medications reviewed   high-risk medications identified  Goal: Maintenance of Behavioral Health Symptom Control  Outcome: Progressing  Intervention: Maintain Behavioral Health Symptom Control  Recent Flowsheet Documentation  Taken 12/14/2023 0742 by Danielle Birch RN  Medication Review/Management:   medications reviewed   high-risk medications identified  Goal: Maintenance of COPD Symptom Control  Outcome: Progressing  Intervention: Maintain COPD  Symptom Control  Recent Flowsheet Documentation  Taken 12/14/2023 0742 by Danielle Birch RN  Medication Review/Management:   medications reviewed   high-risk medications identified  Goal: Blood Glucose Levels Within Targeted Range  Outcome: Progressing  Intervention: Monitor and Manage Glycemia  Recent Flowsheet Documentation  Taken 12/14/2023 0742 by Danielle Birch RN  Medication Review/Management:   medications reviewed   high-risk medications identified  Goal: Maintenance of Heart Failure Symptom Control  Outcome: Progressing  Intervention: Maintain Heart Failure Management  Recent Flowsheet Documentation  Taken 12/14/2023 0742 by Danielle Birch RN  Medication Review/Management:   medications reviewed   high-risk medications identified  Goal: Blood Pressure in Desired Range  Outcome: Progressing  Intervention: Maintain Blood Pressure Management  Recent Flowsheet Documentation  Taken 12/14/2023 0742 by Danielle Birch RN  Medication Review/Management:   medications reviewed   high-risk medications identified  Goal: Maintenance of Osteoarthritis Symptom Control  Outcome: Progressing  Intervention: Maintain Osteoarthritis Symptom Control  Recent Flowsheet Documentation  Taken 12/14/2023 0742 by Danielle Birch RN  Activity Management: activity adjusted per tolerance  Medication Review/Management:   medications reviewed   high-risk medications identified  Goal: Bariatric Home Regimen Maintained  Outcome: Progressing  Intervention: Maintain and Manage Postbariatric Surgery Care  Recent Flowsheet Documentation  Taken 12/14/2023 0742 by Danielle Birch RN  Medication Review/Management:   medications reviewed   high-risk medications identified  Goal: Maintenance of Seizure Control  Outcome: Progressing  Intervention: Maintain Seizure Symptom Control  Recent Flowsheet Documentation  Taken 12/14/2023 0742 by Danielle Birch RN  Medication Review/Management:   medications reviewed   high-risk medications  identified     Problem: Liver Failure  Goal: Optimal Coping with Liver Failure  Outcome: Progressing  Goal: Fluid and Electrolyte Balance  Outcome: Progressing  Goal: Optimal Gastrointestinal Function  Outcome: Progressing  Intervention: Monitor and Support Gastrointestinal Function  Recent Flowsheet Documentation  Taken 12/14/2023 0742 by Danielle Birch RN  Body Position: supine, head elevated  Goal: Blood Glucose Level Within Target Range  Outcome: Progressing  Goal: Optimal Coagulation Function  Outcome: Progressing  Goal: Absence of Infection Signs and Symptoms  Outcome: Progressing  Goal: Optimal Neurologic Function  Outcome: Progressing  Goal: Improved Oral Intake  Outcome: Progressing  Goal: Optimal Pain Control, Comfort and Function  Outcome: Progressing  Intervention: Prevent or Manage Pain  Recent Flowsheet Documentation  Taken 12/14/2023 0742 by Danielle Birch RN  Pain Management Interventions: medication (see MAR)  Goal: Optimize Renal Function  Outcome: Progressing  Goal: Effective Oxygenation and Ventilation  Outcome: Progressing  Intervention: Promote Airway Secretion Clearance  Recent Flowsheet Documentation  Taken 12/14/2023 0742 by Danielle Birch RN  Cough And Deep Breathing: done with encouragement  Activity Management: activity adjusted per tolerance       Goal Outcome Evaluation:      Plan of Care Reviewed With: patient, sibling    Overall Patient Progress: no changeOverall Patient Progress: no change

## 2023-12-14 NOTE — IR NOTE
Patient Name: Stefani Crowell  Medical Record Number: [id number]  Today's Date: 12/14/2023    Procedure: Thoracentesis  Proceduralist: Elzbieta Massey MD, MD    Procedure Start: 0901  Procedure end: 1010  Sedation medications administered: lidocaine 1% SQ     Report given to: Danielle MCCLURE  : n/a    Other Notes: Pt arrived to IR room 7 from . Consent reviewed. Pt denies any questions or concerns regarding procedure. Pt positioned laterasl right side up and monitored per protocol. 1100ml of dark red fluid removed during stage 1. There was a 30 minute pause and we started stage 2. An additional 900ml of dark red fluid was removed. Pt tolerated procedure without any noted complications. Pt transferred back to .

## 2023-12-14 NOTE — PLAN OF CARE
Neuro: A&Ox4.  Calls appropriately.  Cardiac: No tele, HR 60s-70s.  Bps 90s-100s/40s-50s.  Afebrile.   Respiratory: Sating >92% on 4L NC per orders for pneumo.  No dyspnea reported.  GI/: Adequate urine output, multiple unmeasured voids d/t pt missing hat.  BM X4, pt refused scheduled 2000 lactose d/t increased Bms.  Meeting BM goal of 3-4 for 12/13 w/ 5 total.  Diet/appetite: Tolerating 2g Na/3g K diet & 1L FR.  Fair appetite for dinner.  Activity:  SBA, up to BSC.  Pain: At acceptable level on current regimen.   Skin: No new deficits noted.  LDA's: L & R PIVs SL.    Plan: 2 view Chest X-ray today. Continue with POC. Notify primary team with changes.

## 2023-12-15 NOTE — CONSULTS
Health Psychology          Elisha Edwards, Ph.D.,  [phone]  Renetta Alonzo, Ph.D.,  [phone]  Alexa Grubbs, Ph.D.,  [phone]  Awilda Wilson, Ph.D., , [facility] [phone]  Kavon Arriaga, Ph.D., , [facility] [phone]  Azra Matias, Ph.D.,  [phone]  Tasneem Andrews, Ph.D., , [facility] [phone]    [facility], 3rd Floor  [address] [place] [address]       Inpatient Health Psychology Consultation     Date of Service: 12/15/23     Health psychology consultation request received. Health psychology coverage will next be available on Tuesday, 12/19/23. Plan is for health psychology to attempt consultation at next available time.     Tasneem Andrews, Ph.D., , [facility]  Clinical Health Psychologist  Phone: [phone]   Pager: [phone]  12/15/2023 2:39 PM

## 2023-12-15 NOTE — PLAN OF CARE
Neuro: A&Ox4.  Calls appropriately.  Cardiac: No tele, HR 60s-70s.  Bps 90s/30s-50s.  Afebrile.      Respiratory: Sating >92% on 3-4L NC per orders for pneumo.  No dyspnea reported.  GI/: Adequate urine output.  BM X3 yesterday, required encouragement to take scheduled lactulose.  Diet/appetite: Tolerating 2g Na/3g K diet & 1L FR.  Pt requesting additional fluids despite meeting FR, education provided.  Activity:  SBA, up to BSC.  Pain: At acceptable level on current regimen.  Tylenol given x1 this AM for R-sided back/chest pain.  Skin: No new deficits noted.  LDA's: L & R PIVs SL.  Labs: Hgb 6.7, team notified & Type/Screen & 1U RBC ordered.     Plan: CTA possibly today. Continue with POC. Notify primary team with changes.

## 2023-12-15 NOTE — PROGRESS NOTES
CLINICAL NUTRITION SERVICES - REASSESSMENT NOTE     Nutrition Prescription    Malnutrition Status:    Severe malnutrition in setting of chronic illness     Recommendations already ordered by Registered Dietitian (RD):  Continue oral nutrition supplements/snacks as presently ordered; supplemental beverages not counted towards fluid restriction (severe malnutrition)    Small frequent meals, protein-containing meals/snacks, food from home if desired    Future/Additional Recommendations:  Monitor nutrition-related findings and follow pt per protocol     EVALUATION OF THE PROGRESS TOWARD GOALS   Diet: NPO for procedure but previously on high Kcal/high protein diet with 3 gm K+ restriction and 2 gm Na restriction, and 1L FR.   Supplement: Ensure Max Protein BID; Ensure Plus once daily; Special K bar daily.      PO Intake: High variation to intake trends; range from 31-04-% of foods. Small freq meals/high protein nutrition drinks to optimize intake. Supplements last adjusted 12/13. Pt reports doing well with food/PO intake. No further adjustment to supplements needed at this time. Family visiting and providing some foods from outside of hospital. Pt still trying to choose high protein foods on menu; enjoyed chicken stir-guillen last night. Continue to encourage intake.      NEW FINDINGS   GI:  2-9 unmeasured BMs per day over past week, per I/O.     Weights:  Weight trending upwards since admission, though is likely 2/2 pt fluid status with ESLD and ascites.    Labs:  Chronic hyponatremia, fluid restriction in place, Na currently 126 (slowly improving)    Medications:  Ferosul 325 mg daily   Lactulose TID   Synthroid    Skin:  Last WOCN assessment 12/11 - see note for detail   R shin/abrasion - Initial assessment     MALNUTRITION  % Intake: </=75% for >/= 1 month (severe)  % Weight Loss: > 10% in 6 months (severe) -PTA  Subcutaneous Fat Loss: Severe, global  Muscle Loss: Severe, global   Fluid Accumulation/Edema: None  noted  Malnutrition Diagnosis: Severe malnutrition in the context of chronic illness    Previous Goals   Patient to consume % of nutritionally adequate meal trays TID, or the equivalent with supplements/snacks.  Evaluation: Met with frequent ONS/snack    Previous Nutrition Diagnosis  Malnutrition related to reduced appetite, chronic illness (ESLD) as evidenced by reliance on small freq meals/ONS to optimize intake, wt loss >10% in less than 6 months, severe global muscle/fat loss per NFPE.   Evaluation: No change    CURRENT NUTRITION DIAGNOSIS  Malnutrition related to reduced appetite, chronic illness (ESLD) as evidenced by reliance on small freq meals/ONS to optimize intake, wt loss >10% in less than 6 months, severe global muscle/fat loss per NFPE.     INTERVENTIONS  Implementation  Continue current nutrition POC     Goals  Patient to consume % of nutritionally adequate meal trays TID, or the equivalent with supplements/snacks.    Monitoring/Evaluation  Progress toward goals will be monitored and evaluated per protocol.    Nuno Esparza, MS, RDN, LD, CNSC  Available via phone or [employer] chat, and pager  6B work-room RD phone: [phone]   6B/Obs RD pager: [phone]         Weekend/Holiday RD pager [phone]

## 2023-12-15 NOTE — DISCHARGE INSTRUCTIONS
A referral has been sent for outpatient PT and OT to [facility]. They should contact you to schedule, if you do not hear from them within a week, please call them directly to schedule appointments.     Outpatient Physical Therapy at [facility]  Scheduling Phone: [phone]  Fax: [phone]      You were hospitalized for hyponatremia (low sodium).  Your liver transplant evaluation was reopened.  You will need to see cardiology in clinic to evaluate heart risk for transplant surgery.  You will need labs tested early next week (Mon or Tues) for sodium and liver tests.    Resume thoracentesis and paracentesis at [place] outpatient.    See your primary care doctor this coming week.    Return to care sooner if:  - you have more shortness of breath  - you have increased pain in the chest or abdomen  - you have fevers or chills  - you have new confusion  - you have new bleeding  - you have other new or concerning symptoms

## 2023-12-15 NOTE — PROGRESS NOTES
[facility]    Medicine Progress Note - Hospitalist Service, GOLD TEAM 10    Date of Admission:  12/6/2023    Assessment & Plan   Stefani Crowell is a 63 year old woman admitted on 12/6/2023. She has a PMH of alcoholic cirrhosis, hepatorenal syndrome, chronic hyponatremia, recurrent pleural effusion (requiring MWF thoracentesis), hypothyroidism, COPD, and anxiety. She is admitted for acute hyponatremia (114) noted on routine labs associated w/ the thoracentesis she received on day of admission, 12/6/23.     Changes/interval events:  - 1L fluid restriction   - Transplant candidacy reopened; CTA 12/15 nondiagnostic  - Anticipating discharge home in the care of family tomorrow, with 2-3x weekly thoracentesis at [facility] as she was doing PTA       Alcohol cirrhosis  Hepatic hydrothorax  Hx of hepatorenal syndrome  Pt presenting w/o notable altered mental status or neurologic changes. Mild increase in confusion, some lightheadedness/unsteadiness, and less than goal BM over last several days. Pt w/ alcoholic cirrhosis, requires thrice weekly thoracenteses for hepatic hydrothorax, diagnosed during most recent hospitalization from 10/22/23- 11/21/23 (also dx'd w/ hepatorenal syndrome during recent admission). Previously underwent regular paracenteses, but no longer. On 1L fluid restriction PTA, adheres well. On PTA lactulose, regularly having 3-4 BM daily. Not meeting goal last 2 days. No appreciable asterixis or tongue fasciculations on exam. Mild to moderate ascites, non tender. No focal neurologic deficits on exam, occasionally staring off into distance and missing questions, but no clearly altered mental status.  Current LFTs near baseline. BL Cr 0.7-0.8, on arrival, 0.98.   - CAPS for paracentesis 12/12  - IR completed staged thora 12/11, next planned 12/14 (anticipating 2-3x weekly going forward at [facility] in [place], as done PTA)  - 25% albumin,  at least 25g with para or thora going forward  - will not need staged thoras from here given loculation and low add'l volume with second stage  - Physical therapy consult; home with outpt PT    Severe hyponatremia (114), improved  Recurrent episodes of hyponatremia, BL mid 120s. Usually attributed to low effective arterial blood volume iso cirrhosis and post thoracentesis (2L removed thrice weekly). Nephrology consulted while patient in ED, suspect same etiology.  - Stop 2% saline; continue daily sodium checks  - Nephrology signed off; recommended goal Na mid-120s, fluid restriction will be key to maintaining    Transplant candidacy  Reopened candidacy this admission given improvement in renal function and near completion of chem dep treatment.  - CT angiogram was nondiagnostic discharge, outpatient mammogram   - Follow up with cardiology for preop risk eval, determine need for repeat CTA vs alternate coronary risk stratification  - Transplant surgery consulted  - Hepatology following  - Ordered PETH (neg), 25 OH D  - CMV (PCR quant neg), EBV (low DNA quant, 537 copies; discussed with ID and no need to recheck),   - IGRA (neg), treponemal ab (nonreactive), serum HCG (neg), and spot protein urine (0.24 mg/mg)    MELD 3.0: 24 at 12/9/2023 10:15 PM  MELD-Na: 27 at 12/9/2023 10:15 PM  Calculated from:  Serum Creatinine: 0.78 mg/dL (Using min of 1 mg/dL) at 12/9/2023  3:51 AM  Serum Sodium: 120 mmol/L (Using min of 125 mmol/L) at 12/9/2023 10:15 PM  Total Bilirubin: 3.9 mg/dL at 12/7/2023  6:14 AM  Serum Albumin: 3.5 g/dL at 12/7/2023  6:14 AM  INR(ratio): 1.72 at 12/8/2023  3:47 PM  Age at listing (hypothetical): 63 years  Sex: Female at 12/9/2023 10:15 PM    Pneumothorax  Likely ex vacuo: noted after 12/11 paracentesis, discussed with IR - okay to forego chest tube, resume periodic thoracentesis, given minimal dyspnea, no hypoxemia. Diminished size on repeat XR 12/13, stable 12/14. Asymptomatic.  - 4L oxygen continuous  for treatment (not hypoxemia) while admitted  - Walking oximetry to determine any need for home O2     Esophageal varix  Diverticulosis  Acute on chronic macrocytic anemia  Melenic stools on 11/21 (during hospitalization). Previous EGD/colonoscopy w/ small esophageal varix and diverticulosis. EGD during most recent hospitalization w/o active bleed. Tx w/ octreotide and ceftriaxone at the time. Required several units pRBCs, discharged w/ stable hgb.   BL hgb 8-9, on admit 7.8. No overt S/S bleeding at this time. Transfused 12/10, 12/12. No clinical evidence of bleeding. B12 is elevated, folate normal. Suspect cirrhosis the underlying cause.  - PTA omeprazole (subbed her w/ pantoprazole 40mg BID)  - CTM for signs of bleeding, melena, hematemesis, hemodynamic changes  - Daily Hb, transfuse for Hb < 7.0 g/dL  (transfused 12/15 though no marked change in Hb)     RLE ecchymosis/hematoma  Incurred last week while transferring into car. Appearance not consistent w/ infection, appears to be hematoma w/ break in skin. See photo in prior note.  - Dress w/ non stick bandages per patient preference  - Heat, ice, tylenol prn     Hypermagnesemia, resolved  Mg 2.9 on arrival.        Hypothyroidism  - PTA levothyroxine 112mcg daily     Allergic rhinitis  COPD  - PTA montelukast 10mg at bedtime   - albuterol inhaler and neb solution PRN    Back pain, muscle spasms  - PTA cyclobenzaprine 5-10mg Q8H PRN     Anxiety  Depression  Insomnia  - PTA citalopram 40mg daily  - PTA trazodone 50mg at bedtime   - PTA lorazepam 0.5mg Q8H PRN for panic attacks          Diet: Fluid restriction 1000 ML FLUID  Snacks/Supplements Adult: Special K Bar; Between Meals  Snacks/Supplements Adult: Other; See comments below; Between Meals  Combination Diet High Kcal/High Protein Diet, ADULT; 2 gm NA Diet    DVT Prophylaxis: SCDs  Demarco Catheter: Not present  Lines: None     Cardiac Monitoring: None  Code Status: Full Code      Clinically Significant Risk  "Factors         # Hyponatremia: Lowest Na = 124 mmol/L in last 2 days, will monitor as appropriate      # Hypoalbuminemia: Lowest albumin = 3.3 g/dL at 12/15/2023  5:45 AM, will monitor as appropriate     # Thrombocytopenia: Lowest platelets = 47 in last 2 days, will monitor for bleeding          # Overweight: Estimated body mass index is 26.08 kg/m  as calculated from the following:    Height as of this encounter: 1.651 m (5' 5\").    Weight as of this encounter: 71.1 kg (156 lb 12 oz).   # Severe Malnutrition: based on nutrition assessment    # Financial/Environmental Concerns: none  # Asthma: noted on problem list        Disposition Plan      Expected Discharge Date: 12/16/2023      Destination: home with family  Discharge Comments: Adequate hemoglobin response after transfusion, off oxygen, stable Na            Kaushal Newell, DO  Hospitalist Service, GOLD TEAM 10  [facility]  Securely message with [employer] (more info)  Text page via [facility] Paging/Directory   See signed in provider for up to date coverage information  ______________________________________________________________________    Interval History   No acute overnight events. Still a few dry coughing episodes. Does complain of right flank pain which is slightly improved today; tylenol helped. Abdomen feels a little more distended today. No fevers, chillls, confusion, nausea/vomiting.    Sister Bev and  Hari updated at bedside. Discussed with RN.    Physical Exam   Vital Signs: Temp: 97.8  F (36.6  C) Temp src: Oral BP: 96/61 Pulse: 70   Resp: 20 SpO2: 100 % O2 Device: Nasal cannula with humidification Oxygen Delivery: 3 LPM  Weight: 156 lbs 11.95 oz    Gen: Awake and alert, appears comfortable.  HEENT: NCAT, sclerae icteric.  CV: Regular rhythm, normal rate, systolic murmur; extremities warm and well perfused.  Pulm: Relaxed resp effort; bilaterally with good air movement.  GI: Nontender, moderately " distended but not tense, soft. +bowel sounds.  Skin: Warm, dry, jaundice.  Neuro: AO, speech normal, moves all extremities symmetrically. No asterixis.  Psych: Mood is fair, affect is congruent.      Medical Decision Making             Data

## 2023-12-15 NOTE — PLAN OF CARE
Goal Outcome Evaluation:      Plan of Care Reviewed With: patient, family    Overall Patient Progress: no changeOverall Patient Progress: no change    Outcome Evaluation: See most recent RD note 12/15

## 2023-12-15 NOTE — PROGRESS NOTES
Pt arrived for Coronary CT angiogram. Test, meds and side effects reviewed with pt. Resting HR 70s bpm. Given  15 mg PO Ivabradine on the floor Administered 0.8 mg SL nitro and 5 mg IV metoprolol on CTA table per order. CTA completed. Patient tolerated procedure well and denies symptoms of allergic reaction. Transfer back to inpatient floor.

## 2023-12-15 NOTE — PLAN OF CARE
Neuro: A&Ox4.  No numbness or tingling noted   Cardiac: No tele, BP soft 90/50 MD aware, has schedule midodrine.  Edema noted 2+ BLE.   Respiratory: Sating >95% on 4L oxy Via NC to wash out pneumothorax per MD. Auditory wheeze prn Albuterol given.   GI/: Adequate urine output. BM X2 during shift.   Diet/appetite: Tolerating 3G K + 2  diet  1 L Fluid Restriction . Eating well.  Activity: SBA , up to chair   Pain: At acceptable level on current regimen.   Skin: No new deficits noted.  LDA's: Left and RT PIV SL.     Events   -1 unit of blood infused, Recheck HGB in Am per MD order. No signs of bleeding   -Critical lab EBV  PCR positive, MD aware.   Plan: Continue with POC. Notify primary team with changes.

## 2023-12-15 NOTE — CONSULTS
Transplant Surgery Eval    Assessment and Plan:  1. Patient is a good candidate for liver transplantation  2. End stage liver disease due to EtoH Cirrhosis     MELD 3.0: 24 at 12/9/2023 10:15 PM  MELD-Na: 27 at 12/9/2023 10:15 PM  Calculated from:  Serum Creatinine: 0.78 mg/dL (Using min of 1 mg/dL) at 12/9/2023  3:51 AM  Serum Sodium: 120 mmol/L (Using min of 125 mmol/L) at 12/9/2023 10:15 PM  Total Bilirubin: 3.9 mg/dL at 12/7/2023  6:14 AM  Serum Albumin: 3.5 g/dL at 12/7/2023  6:14 AM  INR(ratio): 1.72 at 12/8/2023  3:47 PM  Age at listing (hypothetical): 63 years  Sex: Female at 12/9/2023 10:15 PM    Surgical evaluation:  1. Portal Vein: open per last CT and US, low velocity   2. Hepatic Artery: open per last CT and US  3. TIPS: no  4. Previous Abdominal Surgery: None  5. Hepatocellular Carcinoma: no  6. Ascites: large. Also with persistent right hydrothorax requiring multiple thoracentesis  7. Costal Angle: moderate  8. Portopulmonary Hypertension: no  9. Hepatopulmonary Syndrome: no  10. Cardiac Evaluation: Nondiagnostic CTA given respiratory motion, although no evidence of calcium. Agatston score 0.   11. Nutritional Status: acceptable  12. Tobacco:  None  13. Diabetes: None  14. HTN: none    Recommendations:   No surgical contraindications to liver transplant. No prior abdominal surgeries. Vessels all appear patent.  Will need social work approval and chemical dependency.       Patients overall evaluation will be discussed at the Liver Transplant selection committee meeting with a final recommendation on the patients suitability for transplant to be made at that time.    Consult Full Details:  Lolly Crowell was seen in consultation for evaluation as a potential liver transplant recipient.    Reason for Visit:  Lolly is a 62 yo F , who presents for liver transplant evaluation.    HPI:  Presenting complaint: transplant eval    Stefani Crowell is a 63 year old woman admitted on 12/6/2023. She has a PMH of  alcoholic cirrhosis, hepatorenal syndrome, chronic hyponatremia, recurrent pleural effusion (requiring MWF thoracentesis), hypothyroidism, COPD, and anxiety. She is admitted for acute hyponatremia (114) noted on routine labs associated w/ the thoracentesis she received on day of admission, 12/6/23. Last EtOH use was July 2023 she states. Overall doing well.  and niece in the room.     Previous Transplant Hx: no    Cardiovascular Hx:  h/o Cardiac Issues: None  Exercise Tolerance: None    Potential Donor(s): no    ROS:   REVIEW OF SYSTEMS (check box if normal)  [X]   GENERAL [X]   PULMONARY [X]   GENITOURINARY  [X]    CNS [X]   CARDIAC [X]    ENDOCRINE  [X]   EARS,NOSE,THROAT [X]    GASTROINTESTINAL [X]   NEUROLOGIC   [X]   MUSCLOSKELTAL [X]    HEMATOLOGY    Examination:     Vitals:  There were no vitals taken for this visit.    GENERAL APPEARANCE: alert and no distress  EYES: PERRL  HENT: mouth without ulcers or lesions  NECK: supple, no adenopathy  RESP: lungs clear to auscultation - no rales, rhonchi or wheezes  CV: regular rhythm, normal rate, no rub   ABDOMEN: moderately distended due to ascites, no scars  MS: extremities normal- no gross deformities noted, no evidence of inflammation in joints, no muscle tenderness  SKIN: no rash  NEURO: Normal strength and tone, sensory exam grossly normal, mentation intact and speech normal  PSYCH: mentation appears normal. and affect normal/bright      Results:             I had a long discussion with the patient regarding liver transplantation which included but was not limited to  the following points:  1. Liver transplant selection committee process.  2. The federal rules for cadaveric waiting list, the size and blood type matching of the organ. The availability of living-related donor transplantation.  3. The types of donors: brain death donors, non-heart beating donors, partial liver grafts: splits and living donor grafts  4. Extended criteria Donors (older age,  steasosis) and the increased risk of primary non-function using the extended criteria donors  5. The CDC high risk donors, Risk of donor transmitted infections and donor transmitted malignancy  6. The liver transplant operation and the associated risks and technical complications which can include intraoperative death, post operative death, Primary non-function, bleeding requiring re-operations, arterial and biliary complications, bowel perforations, and intra abdominal abscess. Some of these complicaitons may require a second operation.  7. The postoperative course, the ICU stay and risk of postoperative complications which can include sepsis, MI, stroke, brain injury, pneumonia, pleural effusions, and renal dysfunction.  8. The current 1 year and 5 year graft and patient survivals.  9. The need for life long immunosuppressive therapy and the side effects of these medications, including the possibility of toxicity, opportunistic infections, risk of cancer including lymphoma, and the possibility of rejection even if the patient is taking the medication exactly as prescribed.  10. The need for compliance with medications and follow-up visits in the clinic and thereafter.  11. The patient and family understand these risks and wish to proceed to transplantation    I spent 60 minutes with the patient and more than 50% of the time was spend in direct face to face counseling.    Aristides Parisi MD  Transplant Surgery Fellow

## 2023-12-15 NOTE — PROGRESS NOTES
Care Management Follow Up    Length of Stay (days): 9    Expected Discharge Date: TBD     Concerns to be Addressed: all concerns addressed in this encounter     Patient plan of care discussed at interdisciplinary rounds: Yes    Anticipated Discharge Disposition: Home  Anticipated Discharge Services: [facility], Home Care  Anticipated Discharge DME: No new DME noted.     Patient/family educated on Medicare website which has current facility and service quality ratings: Yes  Education Provided on the Discharge Plan: Yes  Patient/Family in Agreement with the Plan:  Yes    Referrals Placed by CM/SW: Home care  Private pay costs discussed: Not applicable    Additional Information:  PT is recommending patient discharge to home w/home health thearapy when medically stable, referral sent to [facility] for PT and OT. Patient's sister and SO at the bedside, all questions answered at this time. Patient is currently on supplemental O2, does not have O2 at baseline, will need to attempt to wean or assess for home O2 set up.     7577 Addendum:  [facility] was unable to secure home care due to patient's payor source ([place]). Patient updated at this time, agreeable to OP PT/OT, would like to do it at [facility] if possible. Writer contacted [facility], confirmed they provide OP PT at the [facility] location. Writer placed OP PT/OT orders, will fax to [facility] once signed. Patient's discharge AVS updated w/OP PT contact number for scheduling.     Outpatient Physical Therapy at [facility]  Phone: [phone]  Fax: [phone]    Care coordination will continue to follow for discharge planning.     María Aguilar, RNCC, BSN    [facility]    [address]  [address] [place] [id number]    [email or web address]    Office: [phone] Pager: [phone]

## 2023-12-16 PROBLEM — Z01.818 ENCOUNTER FOR PRE-TRANSPLANT EVALUATION FOR CHRONIC LIVER DISEASE: Status: ACTIVE | Noted: 2023-01-01

## 2023-12-16 NOTE — PLAN OF CARE
DISCHARGE                         No discharge date for patient encounter.  ----------------------------------------------------------------------------  Discharged to: Home  Via: private transportation  Accompanied by: Family  Discharge Instructions:  2Gm sodium diet, as tolerated activity, medications, follow up appointments, when to call the MD, aftercare instructions.  Prescriptions:  medication list reviewed & sent with pt  Follow Up Appointments: arranged; information given  Belongings: All sent with pt  IV: d/c'd  Telemetry: d/c'd  Pt exhibits understanding of above discharge instructions; all questions answered.    Discharge Paperwork: Signed, copied, and sent home with patient.

## 2023-12-16 NOTE — DISCHARGE SUMMARY
"[facility]  Hospitalist Discharge Summary      Date of Admission:  12/6/2023  Date of Discharge:  12/16/2023  Discharging Provider: Kaushal Newell DO  Discharge Service: Hospitalist Service, GOLD TEAM 10    Discharge Diagnoses   Alcohol cirrhosis  Hepatic hydrothorax  Hx of hepatorenal syndrome  Severe acute on chronic hyponatremia (114), improved  Transplant candidacy  Pneumothorax, improving  Esophageal varix  Diverticulosis  Acute on chronic macrocytic anemia  RLE ecchymosis/hematoma  Hypermagnesemia, resolved  Hypothyroidism  Allergic rhinitis  COPD  Back pain, muscle spasms  Anxiety  Depression  Insomnia    Clinically Significant Risk Factors     # Overweight: Estimated body mass index is 26.08 kg/m  as calculated from the following:    Height as of this encounter: 1.651 m (5' 5\").    Weight as of this encounter: 71.1 kg (156 lb 12 oz).    # Severe Malnutrition: based on nutrition assessment      Follow-ups Needed After Discharge   Follow-up Appointments     Adult [facility] Follow-up and recommended labs and tests      Follow up with primary care provider, Aliyah Marcus, within 7 days   for hospital follow- up.  The following labs/tests are recommended: BMP,   CBC.      Appointments on [place] and/or [facility] (with [facility] or [facility]   provider or service). Call [phone] if you haven't heard regarding   these appointments within 7 days of discharge.          CBC, CMP next week, special attention to sodium, hemoglobin.  Cardiology follow-up; may need repeat or alternate coronary risk stratification (nondiagnostic CTA this admission).  Hepatology follow-up as scheduled.  Resume outpatient thoracentesis.    Unresulted Labs Ordered in the Past 30 Days of this Admission       Date and Time Order Name Status Description    12/13/2023  1:21 PM 25 Hydroxyvitamin D2 and D3 In process     12/13/2023  1:21 PM Phosphatidylethanol (PEth), Whole Blood In process     " 12/12/2023  9:43 AM Ascites Fluid Aerobic Bacterial Culture Routine With Gram Stain Preliminary     12/11/2023  7:52 AM Prepare red blood cells (unit) Preliminary     12/11/2023  7:05 AM Prepare red blood cells (unit) Preliminary     11/22/2023  5:13 AM Prepare red blood cells (unit) Preliminary     11/6/2023  9:59 PM Prepare red blood cells (unit) Preliminary         These results will be followed up by hospitalist pool.    Discharge Disposition   Discharged to home  Condition at discharge: Stable    Hospital Course   Stefani Crowell is a 63 year old woman admitted on 12/6/2023. She has a PMH of alcoholic cirrhosis, hepatorenal syndrome, chronic hyponatremia, recurrent pleural effusion (requiring MWF thoracentesis), hypothyroidism, COPD, and anxiety. She is admitted for acute hyponatremia (114) noted on routine labs associated w/ the thoracentesis she received on day of admission, 12/6/23.     Changes this hospitalization:  - 1L fluid restriction   - Transplant candidacy reopened; CTA 12/15 nondiagnostic, follow up with cardiology  - Discharge home in the care of family, with 2-3x weekly thoracentesis at [facility] as she was doing PTA       Alcohol cirrhosis  Hepatic hydrothorax  Hx of hepatorenal syndrome  Pt presenting w/o notable altered mental status or neurologic changes. Mild increase in confusion, some lightheadedness/unsteadiness, and less than goal BM over last several days. Pt w/ alcoholic cirrhosis, requires thrice weekly thoracenteses for hepatic hydrothorax, diagnosed during most recent hospitalization from 10/22/23- 11/21/23 (also dx'd w/ hepatorenal syndrome during recent admission). Previously underwent regular paracenteses, but no longer. On 1L fluid restriction PTA, adheres well. On PTA lactulose, regularly having 3-4 BM daily. Not meeting goal last 2 days. No appreciable asterixis or tongue fasciculations on exam. Mild to moderate ascites, non tender. No focal neurologic deficits  on exam, occasionally staring off into distance and missing questions, but no clearly altered mental status.  Current LFTs near baseline. BL Cr 0.7-0.8, on arrival, 0.98.   - CAPS completed paracentesis 12/12, no evidence of SBP, 2.1L removed  - IR completed staged thora 12/11 and 12/14 (anticipating 2-3x weekly going forward at [facility] in [place], as done PTA)  - 25% albumin 25g given with thoracentesis and paracentesis  - will not need staged thoras from here given loculation and low add'l volume with second stage  - Physical therapy consult; home with referral to outpt PT    Severe hyponatremia (114), improved  Recurrent episodes of hyponatremia, BL mid 120s. Usually attributed to low effective arterial blood volume iso cirrhosis and post thoracentesis (2L removed thrice weekly). Nephrology consulted while patient in ED, suspect same etiology.  - Did use low rate 2% saline  - Nephrology consulted: recommended goal Na mid-120s, fluid restriction will be key to maintaining  - CMP next week    Transplant candidacy  Reopened candidacy this admission given improvement in renal function and near completion of chem dep treatment.  - CT angiogram was nondiagnostic, outpatient mammogram   - Follow up with cardiology for preop risk eval, determine need for repeat CTA vs alternate coronary risk stratification  - Transplant surgery consulted - will need outpatient follow up  - Hepatology follow up scheduled Jan 2024  - Ordered PETH (neg), 25 OH D  - CMV (PCR quant neg), EBV (low DNA quant, 537 copies; discussed with ID and no need to recheck),   - IGRA (neg), treponemal ab (nonreactive), serum HCG (neg), and spot protein urine (0.24 mg/mg)    MELD 3.0: 25 at 12/16/2023  4:51 AM  MELD-Na: 26 at 12/16/2023  4:51 AM  Calculated from:  Serum Creatinine: 0.59 mg/dL (Using min of 1 mg/dL) at 12/16/2023  4:51 AM  Serum Sodium: 126 mmol/L at 12/16/2023  4:51 AM  Total Bilirubin: 4.5 mg/dL at 12/15/2023  5:45 AM  Serum  Albumin: 3.3 g/dL at 12/15/2023  5:45 AM  INR(ratio): 1.73 at 12/16/2023  4:51 AM  Age at listing (hypothetical): 63 years  Sex: Female at 12/16/2023  4:51 AM      Pneumothorax, improving  Likely ex vacuo: noted after 12/11 paracentesis, discussed with IR - okay to forego chest tube, resume periodic thoracentesis, given minimal dyspnea, no hypoxemia. Diminished size on repeat XR 12/13, stable 12/14. Asymptomatic.  - 4L oxygen continuous for treatment (not hypoxemia) while admitted  - Continue thoracentesis outpatient     Esophageal varix  Diverticulosis  Acute on chronic macrocytic anemia  Melenic stools on 11/21 (during hospitalization). Previous EGD/colonoscopy w/ small esophageal varix and diverticulosis. EGD during most recent hospitalization w/o active bleed. Tx w/ octreotide and ceftriaxone at the time. Required several units pRBCs, discharged w/ stable hgb.   BL hgb 8-9, on admit 7.8. No overt S/S bleeding at this time. Transfused 12/10, 12/12. No clinical evidence of bleeding. B12 is elevated, folate normal. Suspect cirrhosis the underlying cause.  - PTA omeprazole (subbed her w/ pantoprazole 40mg BID)  - CTM for signs of bleeding, melena, hematemesis, hemodynamic changes  - CBC next week     RLE ecchymosis/hematoma  Incurred last week while transferring into car. Appearance not consistent w/ infection, appears to be hematoma w/ break in skin. See photo in prior note.  - Dress w/ non stick bandages per patient preference  - Heat, ice, tylenol prn     Hypermagnesemia, resolved  Mg 2.9 on arrival.        Hypothyroidism  - PTA levothyroxine 112mcg daily     Allergic rhinitis  COPD  - PTA montelukast 10mg at bedtime   - albuterol inhaler and neb solution PRN    Back pain, muscle spasms  - PTA cyclobenzaprine 5-10mg Q8H PRN     Anxiety  Depression  Insomnia  - PTA citalopram 40mg daily  - PTA trazodone 50mg at bedtime   - PTA lorazepam 0.5mg Q8H PRN for panic attacks    Consultations This Hospital Stay    NEPHROLOGY GENERAL ADULT IP CONSULT  GI HEPATOLOGY ADULT IP CONSULT  CARE MANAGEMENT / SOCIAL WORK IP CONSULT  INTERNAL MEDICINE PROCEDURE TEAM ADULT IP CONSULT EAST [place]ANK - THORACENTESIS  NURSING TO CONSULT FOR VASCULAR ACCESS CARE IP CONSULT  WOUND OSTOMY CONTINENCE NURSE  IP CONSULT  INTERVENTIONAL RADIOLOGY ADULT/PEDS IP CONSULT  INTERNAL MEDICINE PROCEDURE TEAM ADULT IP CONSULT EAST BANK - PARACENTESIS  INTERVENTIONAL RADIOLOGY ADULT/PEDS IP CONSULT  TRANSPLANT SURGERY LIVER ADULT IP CONSULT  PHYSICAL THERAPY ADULT IP CONSULT  PSYCHOLOGY ADULT IP CONSULT  CARDIOLOGY GENERAL ADULT IP CONSULT    Code Status   Full Code    Time Spent on this Encounter   I, Kaushal Newell DO, personally saw the patient today and spent greater than 30 minutes discharging this patient.       Kaushal Newell DO  [facility] [address] [place]  [address] [address]  Phone: [phone]  ______________________________________________________________________    Physical Exam   Vital Signs: Temp: 97.8  F (36.6  C) Temp src: Oral BP: 92/51 Pulse: 64   Resp: 18 SpO2: 100 % O2 Device: Nasal cannula Oxygen Delivery: 3 LPM  Weight: 156 lbs 11.95 oz  Gen: Awake and alert, appears comfortable.  HEENT: NCAT, sclerae icteric.  CV: Regular rhythm, normal rate, systolic murmur; extremities warm and well perfused.  Pulm: Relaxed resp effort; bilaterally with good air movement.  GI: Nontender, moderately distended but not tense, soft. +bowel sounds.  Skin: Warm, dry, jaundice.  Neuro: AO, speech normal, moves all extremities symmetrically. No asterixis.  Psych: Mood is fair, affect is congruent.       Primary Care Physician   Aliyah Marcus    Discharge Orders      Physical Therapy Referral      Occupational Therapy Referral      Adult Cardiology Jeanna Garcia Referral      Reason for your hospital stay    You were hospitalized for hyponatremia and pleural effusion.     Activity    Your activity upon discharge: activity as  tolerated     Adult [facility] Follow-up and recommended labs and tests    Follow up with primary care provider, Aliyah Marcus, within 7 days for hospital follow- up.  The following labs/tests are recommended: BMP, CBC.      Appointments on [place] and/or [facility] (with [facility] or [facility] provider or service). Call [phone] if you haven't heard regarding these appointments within 7 days of discharge.     Diet    Follow this diet upon discharge: Orders Placed This Encounter      Fluid restriction 1000 ML FLUID      Snacks/Supplements Adult: Special [employer] Bar; Between Meals      Snacks/Supplements Adult: Other; See comments below; Between Meals      Combination Diet High Kcal/High Protein Diet, ADULT; 2 gm NA Diet       Significant Results and Procedures   Most Recent 3 CBC's:  Recent Labs   Lab Test 12/16/23  0451 12/15/23  0545 12/14/23  0509   WBC 6.9 6.0 6.3   HGB 8.2* 6.7* 7.1*   * 101* 103*   PLT 52* 50* 47*     Most Recent 3 BMP's:  Recent Labs   Lab Test 12/16/23  0451 12/15/23  0545 12/14/23  1134 12/14/23  0509   * 126* 124* 125*  125*   POTASSIUM 5.2 4.7  --  4.3   CHLORIDE 100 105  --  99   CO2 17* 15*  --  18*   BUN 34.4* 35.4*  --  27.6*   CR 0.59 0.64  --  0.58   ANIONGAP 9 6*  --  8   UMA 9.6 9.3  --  9.0   GLC 92 94  --  78     Most Recent 2 LFT's:  Recent Labs   Lab Test 12/15/23  0545 12/11/23  0812   AST 40 47*   ALT 34 36   ALKPHOS 68 67   BILITOTAL 4.5* 4.9*     Most Recent 3 INR's:  Recent Labs   Lab Test 12/16/23  0451 12/08/23  1547 12/06/23  1608   INR 1.73* 1.72* 1.74*       Discharge Medications   Discharge Medication List as of 12/16/2023 11:07 AM        CONTINUE these medications which have NOT CHANGED    Details   albuterol (PROAIR HFA/PROVENTIL HFA/VENTOLIN HFA) 108 (90 Base) MCG/ACT inhaler Inhale 1-2 puffs into the lungs every 6 hours as needed for shortness of breath or wheezing, Disp-18 g, R-1, E-PrescribePharmacy may dispense brand covered by insurance  (Proair, or proventil or ventolin or generic albuterol inhaler)      albuterol (PROVENTIL) (2.5 MG/3ML) 0.083% neb solution Take 1 vial (2.5 mg) by nebulization every 4 hours as needed for shortness of breath or wheezing Use in neb machine, Disp-30 mL, R-0, E-Prescribe      artificial saliva (BIOTENE DRY MOUTHWASH) LIQD liquid Swish and spit 5 mLs in mouth 4 times daily as needed for dry mouth, Disp-473 mL, R-3, E-Prescribe      citalopram (CELEXA) 40 MG tablet Take 1 tablet (40 mg) by mouth daily, Disp-90 tablet, R-0, E-Prescribe      cyclobenzaprine (FLEXERIL) 10 MG tablet TAKE 1/2 TO 1 TABLET(5 TO 10 MG) BY MOUTH THREE TIMES DAILY AS NEEDED FOR MUSCLE SPASMS, Disp-30 tablet, R-5, E-Prescribe      ferrous sulfate (FEROSUL) 325 (65 Fe) MG tablet Take 1 tablet (325 mg) by mouth daily (with breakfast) Take 1 tablet (325 mg) by mouth, Disp-90 tablet, R-0, E-Prescribe      fluticasone-salmeterol (ADVAIR) 250-50 MCG/ACT inhaler Inhale 1 puff into the lungs every 12 hours for 30 days, Disp-60 each, R-0, E-Prescribe      Hypertonic Nasal Wash (SINUS RINSE BOTTLE KIT NA) Spray 1 Bottle in nostril daily as needed., 1 Bottle, Nasal, DAILY PRN, Until Discontinued, Historical      lactulose (CEPHULAC) 10 GM packet Take 2 packets (20 g) by mouth 3 times daily, Disp-180 packet, R-2, E-Prescribe      levothyroxine (SYNTHROID/LEVOTHROID) 112 MCG tablet Take 1 tablet (112 mcg) by mouth daily, Disp-90 tablet, R-0, E-Prescribe      !! LORazepam (ATIVAN) 0.5 MG tablet Take 1 tablet (0.5 mg) by mouth daily as needed for anxiety, Disp-20 tablet, R-0, E-Prescribe      !! LORazepam (ATIVAN) 0.5 MG tablet Take 1 tablet by mouth daily as needed for panic attacks, Disp-20 tablet, R-0, E-Prescribe      midodrine (PROAMATINE) 10 MG tablet Take 15 mg by mouth 3 times daily, Historical      montelukast (SINGULAIR) 10 MG tablet Take 1 tablet (10 mg) by mouth At Bedtime, Disp-90 tablet, R-0, E-Prescribe      Nutritional Supplements (ENSURE  COMPLETE PO) Take 1 Bottle by mouth 2 times daily, Historical      omeprazole (PRILOSEC) 20 MG DR capsule Take 1 capsule (20 mg) by mouth 2 times daily, Disp-120 capsule, R-0, E-Prescribe      OVER-THE-COUNTER Place 1 drop into both eyes 3 times daily. Blink Tears, Systane Ultra, Systane Balance or Refresh Optive Artificial Tear, 1 drop, Both Eyes, 3 TIMES DAILY Starting 7/12/2011, Until Discontinued, Disp-1 Bottle, OTC      rifaximin (XIFAXAN) 550 MG TABS tablet Take 1 tablet (550 mg) by mouth 2 times daily, Disp-180 tablet, R-0, E-Prescribe       !! - Potential duplicate medications found. Please discuss with provider.        STOP taking these medications       traZODone (DESYREL) 50 MG tablet Comments:   Reason for Stopping:             Allergies   Allergies   Allergen Reactions    Azithromycin Nausea    Dust Mite Extract     Dust Mites      Other Reaction(s): Not available    Pollen Extract      Other Reaction(s): Not available    Ragweeds     Tetracycline Other (See Comments)     Unknown if allergic- not listed on H&P from 3/25/2021

## 2023-12-16 NOTE — PLAN OF CARE
Physical Therapy Discharge Summary    Reason for therapy discharge:    Discharged to home.    Progress towards therapy goal(s). See goals on Care Plan in [facility] electronic health record for goal details.  Goals partially met.  Barriers to achieving goals:   discharge from facility.    Therapy recommendation(s):    Continue home exercise program.

## 2023-12-16 NOTE — PROGRESS NOTES
"Goal outcome evaluation:  Time: 1900 -   Plan of care reviewed with: Patient  Overall patient progress: No change  VS BP 90/72 (BP Location: Right arm)   Pulse 66   Temp 98  F (36.7  C) (Oral)   Resp 18   Ht 1.651 m (5' 5\")   Wt 71.1 kg (156 lb 12 oz)   LMP  (LMP Unknown)   SpO2 96%   BMI 26.08 kg/m        Stefani Crowell is a 63 year old woman admitted on 12/6/2023. She has a PMH of alcoholic cirrhosis, hepatorenal syndrome, chronic hyponatremia, recurrent pleural effusion (requiring MWF thoracentesis), hypothyroidism, COPD, and anxiety. She is admitted for acute hyponatremia (114) noted on routine labs associated w/ the thoracentesis she received on day of admission, 12/6/23.     Activity: UAL  Neuros: Alert and oriented to person, place, situation and time. Calls appropriately. Able to make needs known. Clear and appropriate speech. Follows instructions.  Psychosocial Assessment: Flat, withdrawn  Cardiac: Soft BP. Heart rate is within normal limits and regular. Peripheral pulses are palpable and equal bilaterally.  Respiratory: On 3L O2 via NC. Sating at 96%. Denies SOB.  GI/: Reports no nausea, vomiting, or abdominal pain. Bowel sounds are present in all quadrants, no BM this shift. Reports no difficulty or pain with urination.   Diet: Combination Diet High Kcal/High Protein Diet, ADULT; 2 gm NA Diet. no complaints of nausea, vomiting or abdominal discomfort.  Lines/Drains: R&L PIV SL  Labs: Na 126, hemoglobin recheck in the AM.  Pain/Nausea: No c/o of pain or nausea.  New changes this shift:None  Plan: Continue POC, notify provider of changes    "

## 2023-12-17 NOTE — TELEPHONE ENCOUNTER
Reason for Call:  Appointment Request    Patient requesting this type of appt:  PAP SMEAR AND MAMMOGRAM NEEDED    Requested provider: OPEN TO ANYONE    Reason patient unable to be scheduled: Not within requested timeframe    When does patient want to be seen/preferred time: ASAP    Comments: PATIENT IS NEEDING TO SCHEDULE A PAP SMEAR AND A MAMOGRAM/REFERRAL.     Could we send this information to you in [facility] or would you prefer to receive a phone call?:   Patient would prefer a phone call   Okay to leave a detailed message?: Yes at Cell number on file:    Telephone Information:   Mobile [phone]       Call taken on 12/17/2023 at 8:01 AM by Rena Angela

## 2023-12-18 NOTE — TELEPHONE ENCOUNTER
Lvm for Lolly to call me, discuss transplant evaluation re-opened    Needs mammo and PAP- she replied to [facility] message but it was not fully coherent    - need to call her to assess for any sx of HE    - lvm that she needs to see PCP asap and schedule mammogram ASAP.     - would like weekly labs, orders to [facility] for when she gets thoras.      - needs cards consult,  Message sent to cards team to expedite- order was placed on discharge from hospital team.

## 2023-12-18 NOTE — TELEPHONE ENCOUNTER
Ok to schedule physical/pap next available  Mammo order was placed on 12/17/23, she can go ahead and schedule this

## 2023-12-18 NOTE — LETTER
PHYSICIAN ORDERS      DATE & TIME ISSUED: 2023 9:52 AM  PATIENT NAME: Stefani Crowell   : 1960     [facility] MR# : [id number]     DIAGNOSIS:  Cirrhosis  ICD-10 CODE: K70.31  Orders  1 year after issue.    We are hoping that labs can please be drawn weekly during one of her thoracentesis appointments. These labs must be drawn all together on the same day when done:   INR   Comprehensive Metabolic Panel    Additionally, the following labs to be done monthly:         Phosphatidyethanol (PEth)    CBC with platelets     PLEASE FAX RESULTS AS SOON AS POSSIBLE TO [phone], ATTN:LabDE    FOR CLINICAL QUESTIONS, PLEASE CALL Jerica Schumacher RN, at [phone]    .  Hepatology/Liver Transplant  Medical Director, Liver Transplantation  [facility]

## 2023-12-18 NOTE — PROGRESS NOTES
Hepatology post hospital discharge RNCC assessment    Post hospital discharge follow up:      Admission diagnosis: severe hyponatremia   Discharge diagnosis:  severe hyponatremia, pneumothorax, acute on chronic macrocytic anemia   Admitted on: 12/6/23  Discharged on: 12/16/23    Medication Review    Medication review completed.    Discharge medication changes reviewed.   Patient did not have new medications prescribed in hospital.    Follow Up Post Discharge    Reviewed discharge instructions with patient. Follow up appointments reviewed and transportation to appointments confirmed.   Patient able to verbalized understanding of discharge instructions including medications and follow up.      Care coordinator role and contact information reviewed, and pt encouraged to call with questions or concerns.     Symptom Review    1.  Ascites: Pt had 2.8 liters of fluid removed at paracentesis on 12/12. Continue paracentesis as needed.   2.  Pleural effusion: Continue thoracentesis 3 times weekly as needed with albumin infusion.   3.  Encephalopathy:  Continue rifaximin and lactulose as prescribed. Goal of 3-5 soft/loose BMs/day.   4.  Hyponatremia: Goal sodium of mid 120's. Pt has 1 liter fluid restriction (adherence to this is key).  5. Transplant candidacy: Transplant evaluation re opened. Pt's transplant coordinator is Jerica Schumacher. Jerica will follow up with pt on needs for completion of transplant evaluation.     Collaboration    Above discussed with Dr. Jerica Schumacher RN.     Plan    Continue thoracentesis and paracentesis as needed with albumin infusion.   Complete set of labs in 1 week.  Complete transplant evaluation work up.   Pt has hepatology follow up scheduled on 1/29/24 with Dr. Lim.

## 2023-12-19 NOTE — TELEPHONE ENCOUNTER
Received call from patient's sister, Bev.    She is calling to request orders for a Pap smear to be placed and to call back to schedule once completed. She explains patient has upcoming liver transplant procedure coming up, and the transplant team had informed her that she needs to have pap smear completed prior to then.     Noted patient is due for Pap smear on 3/1/2024.    CHACORTA MortonN RN  [facility]

## 2023-12-19 NOTE — TELEPHONE ENCOUNTER
Marycarmen from [facility] is calling regarding an established patient with [facility].       Requesting orders from: Aliyah Marcus  Provider is following patient: Yes  Is this a 60-day recertification request?  No    Orders Requested    Physical Therapy  Request for continuation of care with no increase or decrease in frequency  Frequency:  2x/wk for 2 wks  then 1x/wk for 4 wks  For strength, balance, and gait training.          Verbal orders given for PT.  Information was gathered for medication interactions.  Provider review needed.      She is also calling to report severe medication interactions:  Citalopram and omeprazole  Zofran and Citalopram  Please follow-up with patient directly with any medication advice.     Monika Fields RN

## 2023-12-20 NOTE — TELEPHONE ENCOUNTER
Please call patient with the following info:    Start high dose Vit D once per week x3 months then recheck Vit D level

## 2023-12-20 NOTE — TELEPHONE ENCOUNTER
Spoke with Lolly and sisters Bev and Riri    Cardiology- told booked out to end of Feb, needs sooner.  WIll reach out to cardiology.  Non dx CTA- once scheduled, will reach out to Dr GLADYS Lim and cardiology on next best testing.        Has mammo and pap scheduled in next few days    Low Vit D, ordered per protocol    Labs weekly at [place] with shara goode   Re discuss once above complete

## 2023-12-20 NOTE — TELEPHONE ENCOUNTER
Forms received from: [employer]   Phone number listed: [phone]   Fax listed: [phone]  Date received: 12/19/23  Form description: Order ID # [id number]  Once forms are completed, please return to [employer] via fax.  Is patient requesting to be contacted when forms are completed: na  Phone: na  Form placed:  Aliyah Cohen

## 2023-12-21 NOTE — TELEPHONE ENCOUNTER
Forms received from: [employer]   Phone number listed: [phone]   Fax listed: [phone]  Date received: 12/19/23  Form description: Order ID # [id number]  Once forms are completed, please return to [employer] via fax.  Is patient requesting to be contacted when forms are completed: na  Phone: na  Form placed:  Aliyah Cohen

## 2023-12-21 NOTE — TELEPHONE ENCOUNTER
Called [phone] (home).   Did they answer the phone: No, left a message on voicemail to return call to the [facility] at [phone], and to ask for any available triage nurse.    Danielle MCCLURE BSN  Triage Nurse  [facility]

## 2023-12-26 NOTE — NURSING NOTE
"Chief Complaint   Patient presents with    New Patient     New General Cardiology for Pre-Liver Transplant - Cardiac Clearance, Hx of Alcoholic Cirrhosis       Initial /64 (BP Location: Left arm, Patient Position: Chair, Cuff Size: Adult Small)   Pulse 72   Ht 1.664 m (5' 5.5\")   Wt 68.3 kg (150 lb 9.6 oz)   LMP  (LMP Unknown)   SpO2 99%   BMI 24.68 kg/m   Estimated body mass index is 24.68 kg/m  as calculated from the following:    Height as of this encounter: 1.664 m (5' 5.5\").    Weight as of this encounter: 68.3 kg (150 lb 9.6 oz)..  BP completed using cuff size: small regular    Ni P., VF    "

## 2023-12-26 NOTE — NURSING NOTE
Left Heart Catheterization: Patient given Coronary Angiogram and Angioplasty: A Patient's Guide booklet. Patient was instructed regarding left heart catheterization, including discussion of the indication, procedure, preparation, intra-procedural steps, and recovery at home. Patient demonstrated understanding of this information and agreed to call with further questions or concerns. Reviewed below instructions with patient. Reviewed with provider. Patient to not take aspirin prior to angiogram. See telephone encounter.    Return Appointment: Patient given instructions regarding scheduling next clinic visit. Patient demonstrated understanding of this information and agreed to call with further questions or concerns. Patient to follow up as needed.    Patient stated she understood all health information given and agreed to call with further questions or concerns.    Pre-procedure instructions - Coronary Angiogram  Patient Education    Your arrival time is 1/11/24.  Location is [address] Waiting Room  Please plan on being at the hospital all day.  At any time, emergencies and/or urgent cases may come up which could delay the start of your procedure.    Pre-procedure instructions - Coronary Angiogram  Shower in the evening before or the morning of the procedure  No solid food for 8 hours prior and nothing to drink 2 hours prior to arrival time  You can take your morning medications (except for diabetic and blood thinners) with sips of water.  You will need to arrange a ride to drop you off and pick you up, as you will be unable to drive home.  Prior to discharge you may be required to lay flat for approximately 2-4 hours in the recovery unit to ensure proper clotting of the artery. You will need a responsible adult to stay with you for 24 hours post-procedure.              Diabetic Medication Instructions  Hold oral diabetic  medication in morning of your procedure and for 48 hours after IV contrast is given  Typical instructions for insulin diabetic medication holding are below. However, please reach out to your Primary Care Provider or Endocrinologist for specific instructions  DO NOT take any oral diabetic medication, short-acting diabetes medications/insulin, humalog or regular insulin the morning of your test  Take   dose of long-acting insulin (Lantus, Levemir) the day of your test  Remember to bring your glucometer and insulin with you to take after your test if needed  DO NOT take injectable GLP-1 agonists semaglutide (Ozempic, Wegovy), dulaglutide (Trulicity), exenatide ER (Bydureon), tirzepatide (Mounjaro), or oral semaglutide (Rybelsus) for 7 days prior your procedure  Hold once daily injectable GLP-1 agonists exenatide (Byetta), liraglutide (Saxenda, Victoza), lixisenatide (Soligua) the day before and day of your procedure                  Anticoagulation Medication Instructions   NA    You will need to follow up with one of our cardiology APPs 1-2 weeks after your procedure. If you need help scheduling or rescheduling your appointment, please call [phone]     Nayana Suresh, RN, BSN  Cardiology RN Care Coordinator   [place]   Phone: [phone]  Fax: [phone] ([place]) [phone] ([place])

## 2023-12-26 NOTE — PROGRESS NOTES
Connected with pt and pt's sister Bev for check in. Pt had paracentesis and labs completed today at [place]. Pt is scheduled for thoracentesis on Friday 12/29. Pt denied shortness of breath or chest discomfort. Bev stated that pt has been strictly adhering to 1,000 ml fluid restriction and sodium 126 today. Pt reported compliance with lactulose and rifaximin and is having at least 3 BMs/day. Bev noted that pt is having increased edema in bilateral feet and ankles. Discussed the importance of following low Na diet, compression stockings, and elevating legs throughout the day.     Will check in with pt in in 1-2 weeks. Encouraged pt to call with any questions or concerns related to liver disease. Pt verbalized understanding and is agreeable to plan of care.

## 2023-12-26 NOTE — PATIENT INSTRUCTIONS
Thank you for coming to the [facility] [place]; please note the following instructions:    1. Coronary Angiogram    Pre-procedure instructions - Coronary Angiogram  Patient Education    Your arrival time is 11:30 am on Thursday Jan 11, 2024.  Location is [address] [address] [facility] Waiting Room  Please plan on being at the hospital all day.  At any time, emergencies and/or urgent cases may come up which could delay the start of your procedure.    Pre-procedure instructions - Coronary Angiogram  Shower in the evening before or the morning of the procedure  No solid food for 8 hours prior and nothing to drink 2 hours prior to arrival time  You can take your morning medications (except for diabetic and blood thinners) with sips of water.  Per Dr. Casiano, do not take aspirin for angiogram  You will need to arrange a ride to drop you off and pick you up, as you will be unable to drive home.  Prior to discharge you may be required to lay flat for approximately 2-4 hours in the recovery unit to ensure proper clotting of the artery. You will need a responsible adult to stay with you for 24 hours post-procedure.              Diabetic Medication Instructions  Hold oral diabetic medication in morning of your procedure and for 48 hours after IV contrast is given  Typical instructions for insulin diabetic medication holding are below. However, please reach out to your Primary Care Provider or Endocrinologist for specific instructions  DO NOT take any oral diabetic medication, short-acting diabetes medications/insulin, humalog or regular insulin the morning of your test  Take   dose of long-acting insulin (Lantus, Levemir) the day of your test  Remember to bring your glucometer and insulin with you to take after your test if needed  DO NOT take injectable GLP-1 agonists semaglutide (Ozempic, Wegovy), dulaglutide (Trulicity), exenatide ER  (Bydureon), tirzepatide (Mounjaro), or oral semaglutide (Rybelsus) for 7 days prior your procedure  Hold once daily injectable GLP-1 agonists exenatide (Byetta), liraglutide (Saxenda, Victoza), lixisenatide (Soligua) the day before and day of your procedure                  Anticoagulation Medication Instructions   NA    You will need to follow up with one of our cardiology APPs 1-2 weeks after your procedure. If you need help scheduling or rescheduling your appointment, please call [phone]     2. Follow up after Angiogram on 1/23/24 with Dr. Casiano at 3:30 pm      If you have any questions regarding your visit, please contact your care team:     CARDIOLOGY  TELEPHONE NUMBER   Ninoska CARRERAVaughn, Registered Nurse  Nayana RUELAS, Registered Nurse  Jayna QUINN, Registered Nurse  Halina STORY, Registered Medical Assistant  Mary VANEGAS, Certified Medical Assistant  Katy DASILVA, Visit Facilitator [phone] (select option 1)    *After hours: [phone]   For Scheduling Appts:     [phone] (select option 1)    *After hours: [phone]   For the Device Clinic (Pacemakers and ICD's)  Stephanie TRUJILLO, Registered Nurse   During business hours: [phone]    *After business hours:  [phone] (select option 4)      Normal test result notifications will be released via [employer] or mailed within 7 business days.  All other test results, will be communicated via telephone once reviewed by your cardiologist.    If you need a medication refill, please contact your pharmacy.  Please allow 3 business days for your refill to be completed.    As always, thank you for trusting us with your health care needs!

## 2023-12-26 NOTE — TELEPHONE ENCOUNTER
Cath Lab Case Request/Order    Location: [address] [address] [facility] Waiting Room    Procedure: Coronary Angiogram    Procedure Date: 1/11/24    Patient Arrival Time: 11:30 am    Procedure Time: 4th case to follow    Ordering Provider: Dr. Mathew Casiano    Performing Cardiologist: Dr. Kanu Robles    Inpatient Bed Needed: No    Post-  Procedure MALOU appointment scheduled (1 - 2 weeks): YES     Date: 1/23/2024     Provider: Dr. Casiano    Communicated Patient Instructions:     NPO, nothing to eat 8 hours and drink 2 hours before arrival time: Yes     , need to arrange a ride home - unable to drive post- procedure: Yes     Adult at home, need a responsible adult to stay with patient 24 hours post- procedure: YES    Appointment was scheduled: Face to Face    Patient expressed understanding of above instructions and denied further questions at this time.    Mary yL, CMA

## 2023-12-26 NOTE — TELEPHONE ENCOUNTER
Patient Contacted for the patient to call back and schedule the following:    Appointment type: new cardiology   Provider: can  Return date: 12/26/23  Specialty phone number: [phone]  Additional appointment(s) needed: N/A  Additonal Notes: scheduled ok per gloria chan
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
## 2023-12-26 NOTE — TELEPHONE ENCOUNTER
Patient saw Dr. Casiano today 12/26/23. Patient to have coronary angiogram. Confirmed with patient regarding aspirin instructions. Per Dr. Casiano, patient to not take aspirin.    Called and patient's sister answered phone. Patient's sister states she does take messages for patient. Consent to communicate on file. Relayed information regarding patient to not take aspirin per Dr. Casiano for upcoming procedure.     Nayana Suresh, RN, BSN  Cardiology RN Care Coordinator   [place]   Phone: [phone]  Fax: [phone] ([place]) [phone] ([place])
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
## 2023-12-26 NOTE — TELEPHONE ENCOUNTER
Below instructions reviewed with patient at office visit. Patient to not take aspiring per Dr. Casiano.    Pre-procedure instructions - Coronary Angiogram  Patient Education    Your arrival time is 1/11/24.  Location is [address] Waiting Room  Please plan on being at the hospital all day.  At any time, emergencies and/or urgent cases may come up which could delay the start of your procedure.    Pre-procedure instructions - Coronary Angiogram  Shower in the evening before or the morning of the procedure  No solid food for 8 hours prior and nothing to drink 2 hours prior to arrival time  You can take your morning medications (except for diabetic and blood thinners) with sips of water.  You will need to arrange a ride to drop you off and pick you up, as you will be unable to drive home.  Prior to discharge you may be required to lay flat for approximately 2-4 hours in the recovery unit to ensure proper clotting of the artery. You will need a responsible adult to stay with you for 24 hours post-procedure.              Diabetic Medication Instructions  Hold oral diabetic medication in morning of your procedure and for 48 hours after IV contrast is given  Typical instructions for insulin diabetic medication holding are below. However, please reach out to your Primary Care Provider or Endocrinologist for specific instructions  DO NOT take any oral diabetic medication, short-acting diabetes medications/insulin, humalog or regular insulin the morning of your test  Take   dose of long-acting insulin (Lantus, Levemir) the day of your test  Remember to bring your glucometer and insulin with you to take after your test if needed  DO NOT take injectable GLP-1 agonists semaglutide (Ozempic, Wegovy), dulaglutide (Trulicity), exenatide ER (Bydureon), tirzepatide (Mounjaro), or oral semaglutide (Rybelsus) for 7 days prior your procedure  Hold once  daily injectable GLP-1 agonists exenatide (Byetta), liraglutide (Saxenda, Victoza), lixisenatide (Soligua) the day before and day of your procedure                  Anticoagulation Medication Instructions   NA    You will need to follow up with one of our cardiology APPs 1-2 weeks after your procedure. If you need help scheduling or rescheduling your appointment, please call [phone]     Nayana Suresh RN, BSN  Cardiology RN Care Coordinator   [place]   Phone: [phone]  Fax: [phone] ([place]) [phone] ([place])

## 2023-12-26 NOTE — TELEPHONE ENCOUNTER
Notified patient of the results below per phone, and estefaníahart.    Danielle WHATLEYN RN  Triage Nurse  [facility]

## 2023-12-27 NOTE — TELEPHONE ENCOUNTER
Second attempt to call patient to confirm that she received message. Left message for patient.    Nayana Suresh, RN, BSN  Cardiology RN Care Coordinator   [place]   Phone: [phone]  Fax: [phone] ([place]) [phone] ([place])

## 2023-12-27 NOTE — TELEPHONE ENCOUNTER
Forms received from: [facility]   Phone number listed: [phone]   Fax listed: [phone]  Date received: 12/27/23  Form description: order ID [id number]  Once forms are completed, please return to [phone] via fax.  Is patient requesting to be contacted when forms are completed: na  Phone: na  Form placed: In Aliyah's in box

## 2023-12-27 NOTE — TELEPHONE ENCOUNTER
Reason for Call:  Appointment Request    Patient requesting this type of appt: Chronic Diease Management/Medication/Follow-Up - face to face needed for home care     Requested provider: Aliyah Marcus or other provider at [place]    Reason patient unable to be scheduled: Not within requested timeframe    When does patient want to be seen/preferred time: On or before January 15 2024    Comments: Please call    Could we send this information to you in [place] or would you prefer to receive a phone call?:   Patient would prefer a phone call   Okay to leave a detailed message?: Yes at Cell number on file:    Telephone Information:   Mobile [phone]       Call taken on 12/27/2023 at 10:40 AM by Royal Forbes

## 2023-12-28 NOTE — TELEPHONE ENCOUNTER
See telephone encounter. Patient to have upcoming coronary angiogram. Patient was to not take aspirin for procedure per Dr. Casiano due to patient's platelet levels. Multiple phone calls made to contact patient. [employer] message sent to patient.    Nayana Suresh RN, BSN  Cardiology RN Care Coordinator   [place]   Phone: [phone]  Fax: [phone] ([place]) [phone] ([place])

## 2023-12-28 NOTE — TELEPHONE ENCOUNTER
Sister Paulette called-  Need new order for PT>  She will message back with location and fax number to send order    Sister noted her K+ improved and Lolly following a low/moderate K+ diet    They are helping her sign in for support group later today, and will try and reach out about re-enrolling in CD programming as able based on her doing better/feeling better at moment.  Encouraged them to reach out to Mary for any info needed on logistics or expectations on chemical dep requirements/expectations.

## 2023-12-28 NOTE — TELEPHONE ENCOUNTER
Third attempt to call patient. [employer] message sent to patient. See [employer] encounter.    Nayana Suresh RN, BSN  Cardiology RN Care Coordinator   [place]   Phone: [phone]  Fax: [phone] ([place]) [phone] ([place])
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
## 2024-01-01 ENCOUNTER — APPOINTMENT (OUTPATIENT)
Dept: OCCUPATIONAL THERAPY | Facility: CLINIC | Age: 64
End: 2024-01-01
Payer: COMMERCIAL

## 2024-01-01 ENCOUNTER — APPOINTMENT (OUTPATIENT)
Dept: INTERVENTIONAL RADIOLOGY/VASCULAR | Facility: CLINIC | Age: 64
End: 2024-01-01
Attending: PHYSICIAN ASSISTANT
Payer: COMMERCIAL

## 2024-01-01 ENCOUNTER — APPOINTMENT (OUTPATIENT)
Dept: CT IMAGING | Facility: CLINIC | Age: 64
End: 2024-01-01
Payer: COMMERCIAL

## 2024-01-01 ENCOUNTER — APPOINTMENT (OUTPATIENT)
Dept: GENERAL RADIOLOGY | Facility: CLINIC | Age: 64
End: 2024-01-01
Attending: INTERNAL MEDICINE
Payer: COMMERCIAL

## 2024-01-01 ENCOUNTER — PATIENT OUTREACH (OUTPATIENT)
Dept: GASTROENTEROLOGY | Facility: CLINIC | Age: 64
End: 2024-01-01

## 2024-01-01 ENCOUNTER — ANCILLARY PROCEDURE (OUTPATIENT)
Dept: ULTRASOUND IMAGING | Facility: CLINIC | Age: 64
End: 2024-01-01
Attending: INTERNAL MEDICINE
Payer: COMMERCIAL

## 2024-01-01 ENCOUNTER — HOME INFUSION (PRE-WILLOW HOME INFUSION) (OUTPATIENT)
Dept: PHARMACY | Facility: CLINIC | Age: 64
End: 2024-01-01
Payer: COMMERCIAL

## 2024-01-01 ENCOUNTER — APPOINTMENT (OUTPATIENT)
Dept: GENERAL RADIOLOGY | Facility: CLINIC | Age: 64
End: 2024-01-01
Payer: COMMERCIAL

## 2024-01-01 ENCOUNTER — TELEPHONE (OUTPATIENT)
Dept: TRANSPLANT | Facility: CLINIC | Age: 64
End: 2024-01-01
Payer: COMMERCIAL

## 2024-01-01 ENCOUNTER — APPOINTMENT (OUTPATIENT)
Dept: LAB | Facility: CLINIC | Age: 64
End: 2024-01-01
Attending: INTERNAL MEDICINE
Payer: COMMERCIAL

## 2024-01-01 ENCOUNTER — APPOINTMENT (OUTPATIENT)
Dept: PHYSICAL THERAPY | Facility: CLINIC | Age: 64
End: 2024-01-01
Payer: COMMERCIAL

## 2024-01-01 ENCOUNTER — TELEPHONE (OUTPATIENT)
Dept: CARDIOLOGY | Facility: CLINIC | Age: 64
End: 2024-01-01

## 2024-01-01 ENCOUNTER — HOSPITAL ENCOUNTER (OUTPATIENT)
Facility: CLINIC | Age: 64
Discharge: HOME | End: 2024-01-11
Attending: INTERNAL MEDICINE | Admitting: INTERNAL MEDICINE
Payer: COMMERCIAL

## 2024-01-01 ENCOUNTER — HOSPITAL ENCOUNTER (OUTPATIENT)
Facility: CLINIC | Age: 64
End: 2024-01-01
Attending: INTERNAL MEDICINE | Admitting: INTERNAL MEDICINE
Payer: COMMERCIAL

## 2024-01-01 ENCOUNTER — TELEPHONE (OUTPATIENT)
Dept: TRANSPLANT | Facility: CLINIC | Age: 64
End: 2024-01-01

## 2024-01-01 ENCOUNTER — TELEPHONE (OUTPATIENT)
Dept: CARDIOLOGY | Facility: CLINIC | Age: 64
End: 2024-01-01
Payer: COMMERCIAL

## 2024-01-01 ENCOUNTER — APPOINTMENT (OUTPATIENT)
Dept: INTERVENTIONAL RADIOLOGY/VASCULAR | Facility: CLINIC | Age: 64
End: 2024-01-01
Attending: NURSE PRACTITIONER
Payer: COMMERCIAL

## 2024-01-01 ENCOUNTER — HOSPITAL ENCOUNTER (INPATIENT)
Facility: CLINIC | Age: 64
LOS: 21 days | Discharge: HOSPICE | End: 2024-02-12
Attending: EMERGENCY MEDICINE | Admitting: INTERNAL MEDICINE
Payer: COMMERCIAL

## 2024-01-01 ENCOUNTER — APPOINTMENT (OUTPATIENT)
Dept: GENERAL RADIOLOGY | Facility: CLINIC | Age: 64
End: 2024-01-01
Attending: EMERGENCY MEDICINE
Payer: COMMERCIAL

## 2024-01-01 ENCOUNTER — LAB (OUTPATIENT)
Dept: INFUSION THERAPY | Facility: CLINIC | Age: 64
End: 2024-01-01
Attending: INTERNAL MEDICINE
Payer: COMMERCIAL

## 2024-01-01 ENCOUNTER — MYC MEDICAL ADVICE (OUTPATIENT)
Dept: TRANSPLANT | Facility: CLINIC | Age: 64
End: 2024-01-01
Payer: COMMERCIAL

## 2024-01-01 ENCOUNTER — MEDICAL CORRESPONDENCE (OUTPATIENT)
Dept: HEALTH INFORMATION MANAGEMENT | Facility: CLINIC | Age: 64
End: 2024-01-01

## 2024-01-01 ENCOUNTER — TELEPHONE (OUTPATIENT)
Dept: FAMILY MEDICINE | Facility: CLINIC | Age: 64
End: 2024-01-01

## 2024-01-01 ENCOUNTER — COMMITTEE REVIEW (OUTPATIENT)
Dept: TRANSPLANT | Facility: CLINIC | Age: 64
End: 2024-01-01
Payer: COMMERCIAL

## 2024-01-01 ENCOUNTER — TELEPHONE (OUTPATIENT)
Dept: FAMILY MEDICINE | Facility: CLINIC | Age: 64
End: 2024-01-01
Payer: COMMERCIAL

## 2024-01-01 ENCOUNTER — PATIENT OUTREACH (OUTPATIENT)
Dept: GASTROENTEROLOGY | Facility: CLINIC | Age: 64
End: 2024-01-01
Payer: COMMERCIAL

## 2024-01-01 ENCOUNTER — OFFICE VISIT (OUTPATIENT)
Dept: INTERNAL MEDICINE | Facility: CLINIC | Age: 64
End: 2024-01-01
Payer: COMMERCIAL

## 2024-01-01 ENCOUNTER — ANCILLARY PROCEDURE (OUTPATIENT)
Dept: BONE DENSITY | Facility: CLINIC | Age: 64
End: 2024-01-01
Attending: INTERNAL MEDICINE
Payer: COMMERCIAL

## 2024-01-01 ENCOUNTER — APPOINTMENT (OUTPATIENT)
Dept: MEDSURG UNIT | Facility: CLINIC | Age: 64
End: 2024-01-01
Attending: INTERNAL MEDICINE
Payer: COMMERCIAL

## 2024-01-01 VITALS
WEIGHT: 135.36 LBS | TEMPERATURE: 95.3 F | DIASTOLIC BLOOD PRESSURE: 22 MMHG | RESPIRATION RATE: 16 BRPM | BODY MASS INDEX: 22.53 KG/M2 | SYSTOLIC BLOOD PRESSURE: 71 MMHG | HEART RATE: 53 BPM | OXYGEN SATURATION: 100 %

## 2024-01-01 VITALS
DIASTOLIC BLOOD PRESSURE: 55 MMHG | SYSTOLIC BLOOD PRESSURE: 101 MMHG | TEMPERATURE: 98.1 F | BODY MASS INDEX: 23.8 KG/M2 | HEART RATE: 66 BPM | OXYGEN SATURATION: 99 % | WEIGHT: 143 LBS | RESPIRATION RATE: 18 BRPM

## 2024-01-01 VITALS
OXYGEN SATURATION: 100 % | TEMPERATURE: 98.2 F | DIASTOLIC BLOOD PRESSURE: 66 MMHG | RESPIRATION RATE: 16 BRPM | HEART RATE: 71 BPM | SYSTOLIC BLOOD PRESSURE: 106 MMHG

## 2024-01-01 VITALS
OXYGEN SATURATION: 97 % | DIASTOLIC BLOOD PRESSURE: 61 MMHG | SYSTOLIC BLOOD PRESSURE: 91 MMHG | HEART RATE: 75 BPM | TEMPERATURE: 97.7 F

## 2024-01-01 DIAGNOSIS — R05.1 ACUTE COUGH: Primary | ICD-10-CM

## 2024-01-01 DIAGNOSIS — E03.9 HYPOTHYROIDISM, UNSPECIFIED TYPE: ICD-10-CM

## 2024-01-01 DIAGNOSIS — J86.9 EMPYEMA (H): ICD-10-CM

## 2024-01-01 DIAGNOSIS — K70.30 ALCOHOLIC CIRRHOSIS (H): Primary | ICD-10-CM

## 2024-01-01 DIAGNOSIS — K70.31 ALCOHOLIC CIRRHOSIS OF LIVER WITH ASCITES (H): Primary | ICD-10-CM

## 2024-01-01 DIAGNOSIS — M81.0 OSTEOPOROSIS: ICD-10-CM

## 2024-01-01 DIAGNOSIS — J45.50 SEVERE PERSISTENT ASTHMA WITHOUT COMPLICATION (H): ICD-10-CM

## 2024-01-01 DIAGNOSIS — Z51.5 END OF LIFE CARE: ICD-10-CM

## 2024-01-01 DIAGNOSIS — Z01.818 ENCOUNTER FOR PRE-TRANSPLANT EVALUATION FOR CHRONIC LIVER DISEASE: ICD-10-CM

## 2024-01-01 DIAGNOSIS — K70.31 ALCOHOLIC CIRRHOSIS OF LIVER WITH ASCITES (H): ICD-10-CM

## 2024-01-01 DIAGNOSIS — E87.1 HYPONATREMIA: ICD-10-CM

## 2024-01-01 DIAGNOSIS — D64.9 ANEMIA: Primary | ICD-10-CM

## 2024-01-01 DIAGNOSIS — K70.11 ALCOHOLIC HEPATITIS WITH ASCITES (H): ICD-10-CM

## 2024-01-01 DIAGNOSIS — R79.1 ELEVATED INR: ICD-10-CM

## 2024-01-01 DIAGNOSIS — J45.50 UNCOMPLICATED SEVERE PERSISTENT ASTHMA (H): ICD-10-CM

## 2024-01-01 DIAGNOSIS — J34.89 NASAL SORE: ICD-10-CM

## 2024-01-01 DIAGNOSIS — D69.6 THROMBOCYTOPENIA (H): ICD-10-CM

## 2024-01-01 DIAGNOSIS — N17.9 ACUTE RENAL FAILURE, UNSPECIFIED ACUTE RENAL FAILURE TYPE (H): ICD-10-CM

## 2024-01-01 DIAGNOSIS — S81.801D WOUND OF RIGHT LOWER EXTREMITY, SUBSEQUENT ENCOUNTER: ICD-10-CM

## 2024-01-01 DIAGNOSIS — E55.9 VITAMIN D DEFICIENCY: ICD-10-CM

## 2024-01-01 DIAGNOSIS — M81.0 OSTEOPOROSIS, UNSPECIFIED OSTEOPOROSIS TYPE, UNSPECIFIED PATHOLOGICAL FRACTURE PRESENCE: Primary | ICD-10-CM

## 2024-01-01 DIAGNOSIS — K76.82 HEPATIC ENCEPHALOPATHY (H): ICD-10-CM

## 2024-01-01 DIAGNOSIS — I71.21 ANEURYSM OF ASCENDING AORTA WITHOUT RUPTURE (H): ICD-10-CM

## 2024-01-01 DIAGNOSIS — R06.00 DYSPNEA, UNSPECIFIED TYPE: ICD-10-CM

## 2024-01-01 DIAGNOSIS — Z76.82 LIVER TRANSPLANT CANDIDATE: ICD-10-CM

## 2024-01-01 DIAGNOSIS — T14.8XXA MULTIPLE SKIN TEARS: Primary | ICD-10-CM

## 2024-01-01 DIAGNOSIS — M81.6 LOCALIZED OSTEOPOROSIS WITHOUT CURRENT PATHOLOGICAL FRACTURE: ICD-10-CM

## 2024-01-01 LAB
ABO/RH(D): ABNORMAL
ABO/RH(D): NORMAL
ABSOLUTE NEUTROPHILS, BODY FLUID: 131 /UL
ABSOLUTE NEUTROPHILS, BODY FLUID: 49.8 /UL
ABSOLUTE NEUTROPHILS, BODY FLUID: 58.9 /UL
ABSOLUTE NEUTROPHILS, BODY FLUID: 66.8 /UL
ABSOLUTE NEUTROPHILS, BODY FLUID: 74.9 /UL
ABSOLUTE NEUTROPHILS, BODY FLUID: 76 /UL
ACANTHOCYTES BLD QL SMEAR: ABNORMAL
ALBUMIN BODY FLUID SOURCE: NORMAL
ALBUMIN FLD-MCNC: 0.4 G/DL
ALBUMIN FLD-MCNC: 0.6 G/DL
ALBUMIN FLD-MCNC: 0.8 G/DL
ALBUMIN SERPL BCG-MCNC: 2.9 G/DL (ref 3.5–5.2)
ALBUMIN SERPL BCG-MCNC: 2.9 G/DL (ref 3.5–5.2)
ALBUMIN SERPL BCG-MCNC: 3.1 G/DL (ref 3.5–5.2)
ALBUMIN SERPL BCG-MCNC: 3.1 G/DL (ref 3.5–5.2)
ALBUMIN SERPL BCG-MCNC: 3.2 G/DL (ref 3.5–5.2)
ALBUMIN SERPL BCG-MCNC: 3.3 G/DL (ref 3.5–5.2)
ALBUMIN SERPL BCG-MCNC: 3.4 G/DL (ref 3.5–5.2)
ALBUMIN SERPL BCG-MCNC: 3.5 G/DL (ref 3.5–5.2)
ALBUMIN SERPL BCG-MCNC: 3.6 G/DL (ref 3.5–5.2)
ALBUMIN SERPL BCG-MCNC: 4 G/DL (ref 3.5–5.2)
ALBUMIN UR-MCNC: NEGATIVE MG/DL
ALBUMIN UR-MCNC: NEGATIVE MG/DL
ALP SERPL-CCNC: 100 U/L (ref 40–150)
ALP SERPL-CCNC: 104 U/L (ref 40–150)
ALP SERPL-CCNC: 106 U/L (ref 40–150)
ALP SERPL-CCNC: 110 U/L (ref 40–150)
ALP SERPL-CCNC: 127 U/L (ref 40–150)
ALP SERPL-CCNC: 71 U/L (ref 40–150)
ALP SERPL-CCNC: 77 U/L (ref 40–150)
ALP SERPL-CCNC: 78 U/L (ref 40–150)
ALP SERPL-CCNC: 78 U/L (ref 40–150)
ALP SERPL-CCNC: 79 U/L (ref 40–150)
ALP SERPL-CCNC: 81 U/L (ref 40–150)
ALP SERPL-CCNC: 85 U/L (ref 40–150)
ALP SERPL-CCNC: 86 U/L (ref 40–150)
ALP SERPL-CCNC: 87 U/L (ref 40–150)
ALP SERPL-CCNC: 89 U/L (ref 40–150)
ALP SERPL-CCNC: 97 U/L (ref 40–150)
ALP SERPL-CCNC: 98 U/L (ref 40–150)
ALP SERPL-CCNC: 98 U/L (ref 40–150)
ALT SERPL W P-5'-P-CCNC: 18 U/L (ref 0–50)
ALT SERPL W P-5'-P-CCNC: 19 U/L (ref 0–50)
ALT SERPL W P-5'-P-CCNC: 19 U/L (ref 0–50)
ALT SERPL W P-5'-P-CCNC: 20 U/L (ref 0–50)
ALT SERPL W P-5'-P-CCNC: 20 U/L (ref 0–50)
ALT SERPL W P-5'-P-CCNC: 21 U/L (ref 0–50)
ALT SERPL W P-5'-P-CCNC: 22 U/L (ref 0–50)
ALT SERPL W P-5'-P-CCNC: 22 U/L (ref 0–50)
ALT SERPL W P-5'-P-CCNC: 23 U/L (ref 0–50)
ALT SERPL W P-5'-P-CCNC: 24 U/L (ref 0–50)
ALT SERPL W P-5'-P-CCNC: 25 U/L (ref 0–50)
ALT SERPL W P-5'-P-CCNC: 25 U/L (ref 0–50)
ALT SERPL W P-5'-P-CCNC: 27 U/L (ref 0–50)
ALT SERPL W P-5'-P-CCNC: 29 U/L (ref 0–50)
ALT SERPL W P-5'-P-CCNC: 30 U/L (ref 0–50)
ALT SERPL W P-5'-P-CCNC: 36 U/L (ref 0–50)
ALT SERPL W P-5'-P-CCNC: 37 U/L (ref 0–50)
ALT SERPL W P-5'-P-CCNC: 37 U/L (ref 0–50)
AMMONIA PLAS-SCNC: 60 UMOL/L (ref 11–51)
ANION GAP SERPL CALCULATED.3IONS-SCNC: 10 MMOL/L (ref 7–15)
ANION GAP SERPL CALCULATED.3IONS-SCNC: 11 MMOL/L (ref 7–15)
ANION GAP SERPL CALCULATED.3IONS-SCNC: 12 MMOL/L (ref 7–15)
ANION GAP SERPL CALCULATED.3IONS-SCNC: 12 MMOL/L (ref 7–15)
ANION GAP SERPL CALCULATED.3IONS-SCNC: 7 MMOL/L (ref 7–15)
ANION GAP SERPL CALCULATED.3IONS-SCNC: 8 MMOL/L (ref 7–15)
ANION GAP SERPL CALCULATED.3IONS-SCNC: 9 MMOL/L (ref 7–15)
ANTIBODY ID: NORMAL
ANTIBODY ID: NORMAL
ANTIBODY SCREEN: NEGATIVE
ANTIBODY SCREEN: POSITIVE
ANTIBODY UNIDENTIFIED: NORMAL
APPEARANCE FLD: ABNORMAL
APPEARANCE UR: ABNORMAL
APPEARANCE UR: CLEAR
APTT PPP: 76 SECONDS (ref 22–38)
AST SERPL W P-5'-P-CCNC: 30 U/L (ref 0–45)
AST SERPL W P-5'-P-CCNC: 30 U/L (ref 0–45)
AST SERPL W P-5'-P-CCNC: 32 U/L (ref 0–45)
AST SERPL W P-5'-P-CCNC: 33 U/L (ref 0–45)
AST SERPL W P-5'-P-CCNC: 33 U/L (ref 0–45)
AST SERPL W P-5'-P-CCNC: 34 U/L (ref 0–45)
AST SERPL W P-5'-P-CCNC: 35 U/L (ref 0–45)
AST SERPL W P-5'-P-CCNC: 35 U/L (ref 0–45)
AST SERPL W P-5'-P-CCNC: 36 U/L (ref 0–45)
AST SERPL W P-5'-P-CCNC: 39 U/L (ref 0–45)
AST SERPL W P-5'-P-CCNC: 40 U/L (ref 0–45)
AST SERPL W P-5'-P-CCNC: 40 U/L (ref 0–45)
AST SERPL W P-5'-P-CCNC: 43 U/L (ref 0–45)
AST SERPL W P-5'-P-CCNC: 45 U/L (ref 0–45)
AST SERPL W P-5'-P-CCNC: 48 U/L (ref 0–45)
AST SERPL W P-5'-P-CCNC: 51 U/L (ref 0–45)
AST SERPL W P-5'-P-CCNC: 57 U/L (ref 0–45)
AST SERPL W P-5'-P-CCNC: 67 U/L (ref 0–45)
AST SERPL W P-5'-P-CCNC: 74 U/L (ref 0–45)
AST SERPL W P-5'-P-CCNC: 80 U/L (ref 0–45)
ATRIAL RATE - MUSE: 62 BPM
ATRIAL RATE - MUSE: 68 BPM
AUER BODIES BLD QL SMEAR: ABNORMAL
BACTERIA #/AREA URNS HPF: ABNORMAL /HPF
BACTERIA ASPIRATE CULT: NO GROWTH
BACTERIA ASPIRATE CULT: NORMAL
BACTERIA BLD CULT: ABNORMAL
BACTERIA BLD CULT: ABNORMAL
BACTERIA BLD CULT: NO GROWTH
BACTERIA FLD CULT: NO GROWTH
BACTERIA FLD CULT: NORMAL
BACTERIA PLR CULT: ABNORMAL
BACTERIA UR CULT: ABNORMAL
BASO STIPL BLD QL SMEAR: ABNORMAL
BASOPHILS # BLD AUTO: 0.1 10E3/UL (ref 0–0.2)
BASOPHILS # BLD AUTO: 0.1 10E3/UL (ref 0–0.2)
BASOPHILS NFR BLD AUTO: 0 %
BASOPHILS NFR BLD AUTO: 0 %
BILIRUB DIRECT SERPL-MCNC: 3.07 MG/DL (ref 0–0.3)
BILIRUB SERPL-MCNC: 2.5 MG/DL
BILIRUB SERPL-MCNC: 2.6 MG/DL
BILIRUB SERPL-MCNC: 2.7 MG/DL
BILIRUB SERPL-MCNC: 2.8 MG/DL
BILIRUB SERPL-MCNC: 2.8 MG/DL
BILIRUB SERPL-MCNC: 3 MG/DL
BILIRUB SERPL-MCNC: 3 MG/DL
BILIRUB SERPL-MCNC: 3.1 MG/DL
BILIRUB SERPL-MCNC: 3.1 MG/DL
BILIRUB SERPL-MCNC: 3.3 MG/DL
BILIRUB SERPL-MCNC: 3.4 MG/DL
BILIRUB SERPL-MCNC: 3.5 MG/DL
BILIRUB SERPL-MCNC: 3.6 MG/DL
BILIRUB SERPL-MCNC: 3.8 MG/DL
BILIRUB SERPL-MCNC: 4.8 MG/DL
BILIRUB SERPL-MCNC: 4.8 MG/DL
BILIRUB SERPL-MCNC: 6 MG/DL
BILIRUB SERPL-MCNC: 6 MG/DL
BILIRUB SERPL-MCNC: 7 MG/DL
BILIRUB SERPL-MCNC: 7.3 MG/DL
BILIRUB UR QL STRIP: NEGATIVE
BILIRUB UR QL STRIP: NEGATIVE
BITE CELLS BLD QL SMEAR: ABNORMAL
BKR LAB AP GYN ADEQUACY: NORMAL
BKR LAB AP GYN INTERPRETATION: NORMAL
BKR LAB AP HPV REFLEX: NORMAL
BKR LAB AP PREVIOUS ABNORMAL: NORMAL
BLD PROD TYP BPU: NORMAL
BLISTER CELLS BLD QL SMEAR: ABNORMAL
BLOOD BANK CHART COMMENT: NORMAL
BLOOD COMPONENT TYPE: NORMAL
BUN SERPL-MCNC: 108 MG/DL (ref 8–23)
BUN SERPL-MCNC: 109 MG/DL (ref 8–23)
BUN SERPL-MCNC: 118 MG/DL (ref 8–23)
BUN SERPL-MCNC: 128 MG/DL (ref 8–23)
BUN SERPL-MCNC: 132 MG/DL (ref 8–23)
BUN SERPL-MCNC: 36.8 MG/DL (ref 8–23)
BUN SERPL-MCNC: 66.4 MG/DL (ref 8–23)
BUN SERPL-MCNC: 70.1 MG/DL (ref 8–23)
BUN SERPL-MCNC: 71 MG/DL (ref 8–23)
BUN SERPL-MCNC: 75.8 MG/DL (ref 8–23)
BUN SERPL-MCNC: 77.5 MG/DL (ref 8–23)
BUN SERPL-MCNC: 81.4 MG/DL (ref 8–23)
BUN SERPL-MCNC: 81.8 MG/DL (ref 8–23)
BUN SERPL-MCNC: 83.4 MG/DL (ref 8–23)
BUN SERPL-MCNC: 85.9 MG/DL (ref 8–23)
BUN SERPL-MCNC: 87.2 MG/DL (ref 8–23)
BUN SERPL-MCNC: 87.7 MG/DL (ref 8–23)
BUN SERPL-MCNC: 88.3 MG/DL (ref 8–23)
BUN SERPL-MCNC: 93.1 MG/DL (ref 8–23)
BUN SERPL-MCNC: 95.3 MG/DL (ref 8–23)
BURR CELLS BLD QL SMEAR: ABNORMAL
CALCIUM SERPL-MCNC: 10.1 MG/DL (ref 8.8–10.2)
CALCIUM SERPL-MCNC: 10.2 MG/DL (ref 8.8–10.2)
CALCIUM SERPL-MCNC: 10.2 MG/DL (ref 8.8–10.2)
CALCIUM SERPL-MCNC: 10.3 MG/DL (ref 8.8–10.2)
CALCIUM SERPL-MCNC: 10.4 MG/DL (ref 8.8–10.2)
CALCIUM SERPL-MCNC: 10.5 MG/DL (ref 8.8–10.2)
CALCIUM SERPL-MCNC: 9.3 MG/DL (ref 8.8–10.2)
CALCIUM SERPL-MCNC: 9.4 MG/DL (ref 8.8–10.2)
CALCIUM SERPL-MCNC: 9.5 MG/DL (ref 8.8–10.2)
CALCIUM SERPL-MCNC: 9.5 MG/DL (ref 8.8–10.2)
CALCIUM SERPL-MCNC: 9.6 MG/DL (ref 8.8–10.2)
CALCIUM SERPL-MCNC: 9.7 MG/DL (ref 8.8–10.2)
CALCIUM SERPL-MCNC: 9.7 MG/DL (ref 8.8–10.2)
CALCIUM SERPL-MCNC: 9.8 MG/DL (ref 8.8–10.2)
CALCIUM SERPL-MCNC: 9.9 MG/DL (ref 8.8–10.2)
CELL COUNT BODY FLUID SOURCE: ABNORMAL
CF REDUC 60M P MA LENFR BLD TEG: 0 % (ref 0–15)
CFT BLD TEG: 1.5 MINUTE (ref 1–3)
CHLORIDE SERPL-SCNC: 100 MMOL/L (ref 98–107)
CHLORIDE SERPL-SCNC: 100 MMOL/L (ref 98–107)
CHLORIDE SERPL-SCNC: 102 MMOL/L (ref 98–107)
CHLORIDE SERPL-SCNC: 81 MMOL/L (ref 98–107)
CHLORIDE SERPL-SCNC: 85 MMOL/L (ref 98–107)
CHLORIDE SERPL-SCNC: 89 MMOL/L (ref 98–107)
CHLORIDE SERPL-SCNC: 91 MMOL/L (ref 98–107)
CHLORIDE SERPL-SCNC: 93 MMOL/L (ref 98–107)
CHLORIDE SERPL-SCNC: 93 MMOL/L (ref 98–107)
CHLORIDE SERPL-SCNC: 94 MMOL/L (ref 98–107)
CHLORIDE SERPL-SCNC: 94 MMOL/L (ref 98–107)
CHLORIDE SERPL-SCNC: 95 MMOL/L (ref 98–107)
CHLORIDE SERPL-SCNC: 96 MMOL/L (ref 98–107)
CHLORIDE SERPL-SCNC: 97 MMOL/L (ref 98–107)
CI (COAGULATION INDEX)(Z) NON NATIVE: -1.4 (ref -3–3)
CLOT ANGLE BLD TEG: 69.8 DEGREES (ref 53–72)
CLOT INIT BLD TEG: 7.2 MINUTE (ref 5–10)
CLOT LYSIS 30M P MA LENFR BLD TEG: 0 % (ref 0–8)
CLOT STRENGTH BLD TEG: 5.4 KD/SC (ref 4.5–11)
CODING SYSTEM: NORMAL
COLOR FLD: ABNORMAL
COLOR UR AUTO: YELLOW
COLOR UR AUTO: YELLOW
CREAT SERPL-MCNC: 0.66 MG/DL (ref 0.51–0.95)
CREAT SERPL-MCNC: 0.72 MG/DL (ref 0.51–0.95)
CREAT SERPL-MCNC: 0.74 MG/DL (ref 0.51–0.95)
CREAT SERPL-MCNC: 0.75 MG/DL (ref 0.51–0.95)
CREAT SERPL-MCNC: 0.75 MG/DL (ref 0.51–0.95)
CREAT SERPL-MCNC: 0.76 MG/DL (ref 0.51–0.95)
CREAT SERPL-MCNC: 0.78 MG/DL (ref 0.51–0.95)
CREAT SERPL-MCNC: 0.82 MG/DL (ref 0.51–0.95)
CREAT SERPL-MCNC: 0.84 MG/DL (ref 0.51–0.95)
CREAT SERPL-MCNC: 0.87 MG/DL (ref 0.51–0.95)
CREAT SERPL-MCNC: 0.9 MG/DL (ref 0.51–0.95)
CREAT SERPL-MCNC: 0.91 MG/DL (ref 0.51–0.95)
CREAT SERPL-MCNC: 0.94 MG/DL (ref 0.51–0.95)
CREAT SERPL-MCNC: 1.07 MG/DL (ref 0.51–0.95)
CREAT SERPL-MCNC: 1.12 MG/DL (ref 0.51–0.95)
CREAT SERPL-MCNC: 1.13 MG/DL (ref 0.51–0.95)
CREAT SERPL-MCNC: 1.16 MG/DL (ref 0.51–0.95)
CREAT SERPL-MCNC: 1.17 MG/DL (ref 0.51–0.95)
CROSSMATCH: NORMAL
CYSTATIN C (ROCHE): 2.9 MG/L (ref 0.6–1)
CYSTATIN C (ROCHE): 3 MG/L (ref 0.6–1)
DACRYOCYTES BLD QL SMEAR: ABNORMAL
DEPRECATED HCO3 PLAS-SCNC: 14 MMOL/L (ref 22–29)
DEPRECATED HCO3 PLAS-SCNC: 15 MMOL/L (ref 22–29)
DEPRECATED HCO3 PLAS-SCNC: 16 MMOL/L (ref 22–29)
DEPRECATED HCO3 PLAS-SCNC: 17 MMOL/L (ref 22–29)
DEPRECATED HCO3 PLAS-SCNC: 18 MMOL/L (ref 22–29)
DEPRECATED HCO3 PLAS-SCNC: 18 MMOL/L (ref 22–29)
DIASTOLIC BLOOD PRESSURE - MUSE: NORMAL MMHG
DIASTOLIC BLOOD PRESSURE - MUSE: NORMAL MMHG
EGFRCR SERPLBLD CKD-EPI 2021: 53 ML/MIN/1.73M2
EGFRCR SERPLBLD CKD-EPI 2021: 53 ML/MIN/1.73M2
EGFRCR SERPLBLD CKD-EPI 2021: 55 ML/MIN/1.73M2
EGFRCR SERPLBLD CKD-EPI 2021: 56 ML/MIN/1.73M2
EGFRCR SERPLBLD CKD-EPI 2021: 59 ML/MIN/1.73M2
EGFRCR SERPLBLD CKD-EPI 2021: 69 ML/MIN/1.73M2
EGFRCR SERPLBLD CKD-EPI 2021: 71 ML/MIN/1.73M2
EGFRCR SERPLBLD CKD-EPI 2021: 72 ML/MIN/1.73M2
EGFRCR SERPLBLD CKD-EPI 2021: 75 ML/MIN/1.73M2
EGFRCR SERPLBLD CKD-EPI 2021: 79 ML/MIN/1.73M2
EGFRCR SERPLBLD CKD-EPI 2021: 81 ML/MIN/1.73M2
EGFRCR SERPLBLD CKD-EPI 2021: 86 ML/MIN/1.73M2
EGFRCR SERPLBLD CKD-EPI 2021: 89 ML/MIN/1.73M2
EGFRCR SERPLBLD CKD-EPI 2021: 90 ML/MIN/1.73M2
EGFRCR SERPLBLD CKD-EPI 2021: 90 ML/MIN/1.73M2
EGFRCR SERPLBLD CKD-EPI 2021: >90 ML/MIN/1.73M2
ELLIPTOCYTES BLD QL SMEAR: ABNORMAL
ENTEROCOCCUS FAECALIS: NOT DETECTED
ENTEROCOCCUS FAECIUM: NOT DETECTED
EOSINOPHIL # BLD AUTO: 0 10E3/UL (ref 0–0.7)
EOSINOPHIL # BLD AUTO: 0.1 10E3/UL (ref 0–0.7)
EOSINOPHIL NFR BLD AUTO: 0 %
EOSINOPHIL NFR BLD AUTO: 0 %
EOSINOPHIL NFR FLD MANUAL: 1 %
EOSINOPHIL NFR FLD MANUAL: 1 %
ERYTHROCYTE [DISTWIDTH] IN BLOOD BY AUTOMATED COUNT: 16.3 % (ref 10–15)
ERYTHROCYTE [DISTWIDTH] IN BLOOD BY AUTOMATED COUNT: 16.4 % (ref 10–15)
ERYTHROCYTE [DISTWIDTH] IN BLOOD BY AUTOMATED COUNT: 16.5 % (ref 10–15)
ERYTHROCYTE [DISTWIDTH] IN BLOOD BY AUTOMATED COUNT: 16.7 % (ref 10–15)
ERYTHROCYTE [DISTWIDTH] IN BLOOD BY AUTOMATED COUNT: 17.3 % (ref 10–15)
ERYTHROCYTE [DISTWIDTH] IN BLOOD BY AUTOMATED COUNT: 17.4 % (ref 10–15)
ERYTHROCYTE [DISTWIDTH] IN BLOOD BY AUTOMATED COUNT: 17.4 % (ref 10–15)
ERYTHROCYTE [DISTWIDTH] IN BLOOD BY AUTOMATED COUNT: 17.5 % (ref 10–15)
ERYTHROCYTE [DISTWIDTH] IN BLOOD BY AUTOMATED COUNT: 17.6 % (ref 10–15)
ERYTHROCYTE [DISTWIDTH] IN BLOOD BY AUTOMATED COUNT: 17.6 % (ref 10–15)
ERYTHROCYTE [DISTWIDTH] IN BLOOD BY AUTOMATED COUNT: 17.8 % (ref 10–15)
ERYTHROCYTE [DISTWIDTH] IN BLOOD BY AUTOMATED COUNT: 18 % (ref 10–15)
ERYTHROCYTE [DISTWIDTH] IN BLOOD BY AUTOMATED COUNT: 18 % (ref 10–15)
ERYTHROCYTE [DISTWIDTH] IN BLOOD BY AUTOMATED COUNT: 18.2 % (ref 10–15)
ERYTHROCYTE [DISTWIDTH] IN BLOOD BY AUTOMATED COUNT: 18.6 % (ref 10–15)
ERYTHROCYTE [DISTWIDTH] IN BLOOD BY AUTOMATED COUNT: 18.6 % (ref 10–15)
ERYTHROCYTE [DISTWIDTH] IN BLOOD BY AUTOMATED COUNT: 19.3 % (ref 10–15)
FIBRINOGEN PPP-MCNC: 150 MG/DL (ref 170–490)
FIBRINOGEN PPP-MCNC: 154 MG/DL (ref 170–490)
FIBRINOGEN PPP-MCNC: 160 MG/DL (ref 170–490)
FIBRINOGEN PPP-MCNC: 175 MG/DL (ref 170–490)
FIBRINOGEN PPP-MCNC: 300 MG/DL (ref 170–490)
FLUAV RNA SPEC QL NAA+PROBE: NEGATIVE
FLUBV RNA RESP QL NAA+PROBE: NEGATIVE
FRAGMENTS BLD QL SMEAR: ABNORMAL
GFR SERPL CREATININE-BSD FRML MDRD: 17 ML/MIN/1.73M2
GFR SERPL CREATININE-BSD FRML MDRD: 18 ML/MIN/1.73M2
GLUCOSE BODY FLUID SOURCE: NORMAL
GLUCOSE BODY FLUID SOURCE: NORMAL
GLUCOSE FLD-MCNC: 109 MG/DL
GLUCOSE FLD-MCNC: <2 MG/DL
GLUCOSE SERPL-MCNC: 101 MG/DL (ref 70–99)
GLUCOSE SERPL-MCNC: 106 MG/DL (ref 70–99)
GLUCOSE SERPL-MCNC: 107 MG/DL (ref 70–99)
GLUCOSE SERPL-MCNC: 108 MG/DL (ref 70–99)
GLUCOSE SERPL-MCNC: 119 MG/DL (ref 70–99)
GLUCOSE SERPL-MCNC: 119 MG/DL (ref 70–99)
GLUCOSE SERPL-MCNC: 121 MG/DL (ref 70–99)
GLUCOSE SERPL-MCNC: 71 MG/DL (ref 70–99)
GLUCOSE SERPL-MCNC: 72 MG/DL (ref 70–99)
GLUCOSE SERPL-MCNC: 72 MG/DL (ref 70–99)
GLUCOSE SERPL-MCNC: 73 MG/DL (ref 70–99)
GLUCOSE SERPL-MCNC: 74 MG/DL (ref 70–99)
GLUCOSE SERPL-MCNC: 75 MG/DL (ref 70–99)
GLUCOSE SERPL-MCNC: 79 MG/DL (ref 70–99)
GLUCOSE SERPL-MCNC: 79 MG/DL (ref 70–99)
GLUCOSE SERPL-MCNC: 90 MG/DL (ref 70–99)
GLUCOSE SERPL-MCNC: 92 MG/DL (ref 70–99)
GLUCOSE SERPL-MCNC: 94 MG/DL (ref 70–99)
GLUCOSE SERPL-MCNC: 95 MG/DL (ref 70–99)
GLUCOSE SERPL-MCNC: 98 MG/DL (ref 70–99)
GLUCOSE UR STRIP-MCNC: NEGATIVE MG/DL
GLUCOSE UR STRIP-MCNC: NEGATIVE MG/DL
GRAM STAIN RESULT: ABNORMAL
GRAM STAIN RESULT: ABNORMAL
GRAM STAIN RESULT: NORMAL
HCT VFR BLD AUTO: 19.4 % (ref 35–47)
HCT VFR BLD AUTO: 20.6 % (ref 35–47)
HCT VFR BLD AUTO: 20.8 % (ref 35–47)
HCT VFR BLD AUTO: 21.7 % (ref 35–47)
HCT VFR BLD AUTO: 22.2 % (ref 35–47)
HCT VFR BLD AUTO: 22.2 % (ref 35–47)
HCT VFR BLD AUTO: 22.3 % (ref 35–47)
HCT VFR BLD AUTO: 22.4 % (ref 35–47)
HCT VFR BLD AUTO: 22.4 % (ref 35–47)
HCT VFR BLD AUTO: 22.6 % (ref 35–47)
HCT VFR BLD AUTO: 22.9 % (ref 35–47)
HCT VFR BLD AUTO: 23.4 % (ref 35–47)
HCT VFR BLD AUTO: 23.7 % (ref 35–47)
HCT VFR BLD AUTO: 23.8 % (ref 35–47)
HCT VFR BLD AUTO: 23.9 % (ref 35–47)
HCT VFR BLD AUTO: 23.9 % (ref 35–47)
HCT VFR BLD AUTO: 24.1 % (ref 35–47)
HCT VFR BLD AUTO: 24.2 % (ref 35–47)
HCT VFR BLD AUTO: 25 % (ref 35–47)
HCT VFR BLD AUTO: 25.3 % (ref 35–47)
HCT VFR BLD AUTO: 26 % (ref 35–47)
HGB BLD-MCNC: 6.4 G/DL (ref 11.7–15.7)
HGB BLD-MCNC: 6.6 G/DL (ref 11.7–15.7)
HGB BLD-MCNC: 6.8 G/DL (ref 11.7–15.7)
HGB BLD-MCNC: 7.4 G/DL (ref 11.7–15.7)
HGB BLD-MCNC: 7.5 G/DL (ref 11.7–15.7)
HGB BLD-MCNC: 7.6 G/DL (ref 11.7–15.7)
HGB BLD-MCNC: 7.6 G/DL (ref 11.7–15.7)
HGB BLD-MCNC: 7.7 G/DL (ref 11.7–15.7)
HGB BLD-MCNC: 7.8 G/DL (ref 11.7–15.7)
HGB BLD-MCNC: 7.8 G/DL (ref 11.7–15.7)
HGB BLD-MCNC: 8 G/DL (ref 11.7–15.7)
HGB BLD-MCNC: 8.1 G/DL (ref 11.7–15.7)
HGB BLD-MCNC: 8.2 G/DL (ref 11.7–15.7)
HGB BLD-MCNC: 8.3 G/DL (ref 11.7–15.7)
HGB BLD-MCNC: 8.4 G/DL (ref 11.7–15.7)
HGB BLD-MCNC: 8.4 G/DL (ref 11.7–15.7)
HGB BLD-MCNC: 8.6 G/DL (ref 11.7–15.7)
HGB C CRYSTALS: ABNORMAL
HGB UR QL STRIP: ABNORMAL
HGB UR QL STRIP: ABNORMAL
HOLD SPECIMEN: NORMAL
HOWELL-JOLLY BOD BLD QL SMEAR: ABNORMAL
HUMAN PAPILLOMA VIRUS 16 DNA: NEGATIVE
HUMAN PAPILLOMA VIRUS 18 DNA: NEGATIVE
HUMAN PAPILLOMA VIRUS FINAL DIAGNOSIS: NORMAL
HUMAN PAPILLOMA VIRUS OTHER HR: NEGATIVE
HYALINE CASTS: 10 /LPF
HYALINE CASTS: 14 /LPF
IMM GRANULOCYTES # BLD: 0.4 10E3/UL
IMM GRANULOCYTES # BLD: 0.5 10E3/UL
IMM GRANULOCYTES NFR BLD: 1 %
IMM GRANULOCYTES NFR BLD: 2 %
INR PPP: 2.15 (ref 0.85–1.15)
INR PPP: 2.31 (ref 0.85–1.15)
INR PPP: 2.35 (ref 0.85–1.15)
INR PPP: 2.45 (ref 0.85–1.15)
INR PPP: 2.68 (ref 0.85–1.15)
INR PPP: 2.7 (ref 0.85–1.15)
INR PPP: 2.87 (ref 0.85–1.15)
INR PPP: 2.93 (ref 0.85–1.15)
INR PPP: 2.94 (ref 0.85–1.15)
INR PPP: 3 (ref 0.85–1.15)
INR PPP: 3.04 (ref 0.85–1.15)
INR PPP: 3.07 (ref 0.85–1.15)
INR PPP: 3.09 (ref 0.85–1.15)
INR PPP: 3.16 (ref 0.85–1.15)
INR PPP: 3.18 (ref 0.85–1.15)
INR PPP: 3.23 (ref 0.85–1.15)
INR PPP: 3.24 (ref 0.85–1.15)
INR PPP: 3.24 (ref 0.85–1.15)
INR PPP: 3.28 (ref 0.85–1.15)
INR PPP: 3.3 (ref 0.85–1.15)
INR PPP: 3.42 (ref 0.85–1.15)
INTERPRETATION ECG - MUSE: NORMAL
INTERPRETATION ECG - MUSE: NORMAL
ISSUE DATE AND TIME: NORMAL
K AG RBC QL: NEGATIVE
KETONES UR STRIP-MCNC: NEGATIVE MG/DL
KETONES UR STRIP-MCNC: NEGATIVE MG/DL
LABORATORY REPORT: NORMAL
LACTATE SERPL-SCNC: 1.5 MMOL/L (ref 0.7–2)
LACTATE SERPL-SCNC: 1.9 MMOL/L (ref 0.7–2)
LACTATE SERPL-SCNC: 3 MMOL/L (ref 0.7–2)
LD BODY BODY FLUID SOURCE: NORMAL
LDH FLD L TO P-CCNC: 27 U/L
LDH SERPL L TO P-CCNC: 195 U/L (ref 0–250)
LEUKOCYTE ESTERASE UR QL STRIP: ABNORMAL
LEUKOCYTE ESTERASE UR QL STRIP: ABNORMAL
LISTERIA SPECIES (DETECTED/NOT DETECTED): NOT DETECTED
LYMPHOCYTES # BLD AUTO: 1.2 10E3/UL (ref 0.8–5.3)
LYMPHOCYTES # BLD AUTO: 1.3 10E3/UL (ref 0.8–5.3)
LYMPHOCYTES NFR BLD AUTO: 4 %
LYMPHOCYTES NFR BLD AUTO: 4 %
LYMPHOCYTES NFR FLD MANUAL: 0 %
LYMPHOCYTES NFR FLD MANUAL: 1 %
LYMPHOCYTES NFR FLD MANUAL: 1 %
LYMPHOCYTES NFR FLD MANUAL: 12 %
LYMPHOCYTES NFR FLD MANUAL: 16 %
LYMPHOCYTES NFR FLD MANUAL: 21 %
LYMPHOCYTES NFR FLD MANUAL: 23 %
LYMPHOCYTES NFR FLD MANUAL: 3 %
LYMPHOCYTES NFR FLD MANUAL: 9 %
MAGNESIUM SERPL-MCNC: 2.8 MG/DL (ref 1.7–2.3)
MAGNESIUM SERPL-MCNC: 3.4 MG/DL (ref 1.7–2.3)
MCF BLD TEG: 51.8 MM (ref 50–70)
MCH RBC QN AUTO: 31.5 PG (ref 26.5–33)
MCH RBC QN AUTO: 31.8 PG (ref 26.5–33)
MCH RBC QN AUTO: 31.8 PG (ref 26.5–33)
MCH RBC QN AUTO: 31.9 PG (ref 26.5–33)
MCH RBC QN AUTO: 32.2 PG (ref 26.5–33)
MCH RBC QN AUTO: 32.2 PG (ref 26.5–33)
MCH RBC QN AUTO: 32.3 PG (ref 26.5–33)
MCH RBC QN AUTO: 32.4 PG (ref 26.5–33)
MCH RBC QN AUTO: 32.5 PG (ref 26.5–33)
MCH RBC QN AUTO: 32.6 PG (ref 26.5–33)
MCH RBC QN AUTO: 32.7 PG (ref 26.5–33)
MCH RBC QN AUTO: 32.8 PG (ref 26.5–33)
MCH RBC QN AUTO: 32.9 PG (ref 26.5–33)
MCH RBC QN AUTO: 33 PG (ref 26.5–33)
MCH RBC QN AUTO: 33 PG (ref 26.5–33)
MCH RBC QN AUTO: 33.2 PG (ref 26.5–33)
MCH RBC QN AUTO: 33.5 PG (ref 26.5–33)
MCH RBC QN AUTO: 34 PG (ref 26.5–33)
MCHC RBC AUTO-ENTMCNC: 32.3 G/DL (ref 31.5–36.5)
MCHC RBC AUTO-ENTMCNC: 32.6 G/DL (ref 31.5–36.5)
MCHC RBC AUTO-ENTMCNC: 32.7 G/DL (ref 31.5–36.5)
MCHC RBC AUTO-ENTMCNC: 32.9 G/DL (ref 31.5–36.5)
MCHC RBC AUTO-ENTMCNC: 33 G/DL (ref 31.5–36.5)
MCHC RBC AUTO-ENTMCNC: 33 G/DL (ref 31.5–36.5)
MCHC RBC AUTO-ENTMCNC: 33.1 G/DL (ref 31.5–36.5)
MCHC RBC AUTO-ENTMCNC: 33.2 G/DL (ref 31.5–36.5)
MCHC RBC AUTO-ENTMCNC: 33.3 G/DL (ref 31.5–36.5)
MCHC RBC AUTO-ENTMCNC: 33.5 G/DL (ref 31.5–36.5)
MCHC RBC AUTO-ENTMCNC: 33.5 G/DL (ref 31.5–36.5)
MCHC RBC AUTO-ENTMCNC: 33.6 G/DL (ref 31.5–36.5)
MCHC RBC AUTO-ENTMCNC: 33.8 G/DL (ref 31.5–36.5)
MCHC RBC AUTO-ENTMCNC: 33.9 G/DL (ref 31.5–36.5)
MCHC RBC AUTO-ENTMCNC: 34.1 G/DL (ref 31.5–36.5)
MCHC RBC AUTO-ENTMCNC: 34.3 G/DL (ref 31.5–36.5)
MCHC RBC AUTO-ENTMCNC: 34.6 G/DL (ref 31.5–36.5)
MCV RBC AUTO: 100 FL (ref 78–100)
MCV RBC AUTO: 102 FL (ref 78–100)
MCV RBC AUTO: 95 FL (ref 78–100)
MCV RBC AUTO: 96 FL (ref 78–100)
MCV RBC AUTO: 97 FL (ref 78–100)
MCV RBC AUTO: 98 FL (ref 78–100)
MCV RBC AUTO: 99 FL (ref 78–100)
MONOCYTES # BLD AUTO: 4.5 10E3/UL (ref 0–1.3)
MONOCYTES # BLD AUTO: 4.8 10E3/UL (ref 0–1.3)
MONOCYTES NFR BLD AUTO: 15 %
MONOCYTES NFR BLD AUTO: 15 %
MONOS+MACROS NFR FLD MANUAL: 0 %
MONOS+MACROS NFR FLD MANUAL: 1 %
MONOS+MACROS NFR FLD MANUAL: 15 %
MONOS+MACROS NFR FLD MANUAL: 47 %
MONOS+MACROS NFR FLD MANUAL: 5 %
MONOS+MACROS NFR FLD MANUAL: 6 %
MONOS+MACROS NFR FLD MANUAL: 85 %
MUCOUS THREADS #/AREA URNS LPF: PRESENT /LPF
MUCOUS THREADS #/AREA URNS LPF: PRESENT /LPF
NEUTROPHILS # BLD AUTO: 24.8 10E3/UL (ref 1.6–8.3)
NEUTROPHILS # BLD AUTO: 26.4 10E3/UL (ref 1.6–8.3)
NEUTROPHILS NFR BLD AUTO: 79 %
NEUTROPHILS NFR BLD AUTO: 80 %
NEUTS BAND NFR FLD MANUAL: 14 %
NEUTS BAND NFR FLD MANUAL: 21 %
NEUTS BAND NFR FLD MANUAL: 29 %
NEUTS BAND NFR FLD MANUAL: 30 %
NEUTS BAND NFR FLD MANUAL: 45 %
NEUTS BAND NFR FLD MANUAL: 64 %
NEUTS BAND NFR FLD MANUAL: 92 %
NEUTS BAND NFR FLD MANUAL: 94 %
NEUTS BAND NFR FLD MANUAL: 98 %
NEUTS HYPERSEG BLD QL SMEAR: ABNORMAL
NITRATE UR QL: NEGATIVE
NITRATE UR QL: NEGATIVE
NRBC # BLD AUTO: 0 10E3/UL
NRBC # BLD AUTO: 0 10E3/UL
NRBC BLD AUTO-RTO: 0 /100
NRBC BLD AUTO-RTO: 0 /100
OSMOLALITY SERPL: 270 MMOL/KG (ref 280–301)
OSMOLALITY UR: 325 MMOL/KG (ref 100–1200)
OSMOLALITY UR: 349 MMOL/KG (ref 100–1200)
OTHER CELLS FLD MANUAL: 43 %
OTHER CELLS FLD MANUAL: 61 %
OTHER CELLS FLD MANUAL: 64 %
P AXIS - MUSE: 49 DEGREES
P AXIS - MUSE: 52 DEGREES
PATH REPORT.COMMENTS IMP SPEC: NORMAL
PATH REPORT.COMMENTS IMP SPEC: NORMAL
PATH REPORT.RELEVANT HX SPEC: NORMAL
PETH INTERPRETATION: NORMAL
PH UR STRIP: 5 [PH] (ref 5–7)
PH UR STRIP: 5.5 [PH] (ref 5–7)
PHOSPHATE SERPL-MCNC: 3.8 MG/DL (ref 2.5–4.5)
PLAT MORPH BLD: ABNORMAL
PLATELET # BLD AUTO: 47 10E3/UL (ref 150–450)
PLATELET # BLD AUTO: 49 10E3/UL (ref 150–450)
PLATELET # BLD AUTO: 51 10E3/UL (ref 150–450)
PLATELET # BLD AUTO: 54 10E3/UL (ref 150–450)
PLATELET # BLD AUTO: 55 10E3/UL (ref 150–450)
PLATELET # BLD AUTO: 57 10E3/UL (ref 150–450)
PLATELET # BLD AUTO: 58 10E3/UL (ref 150–450)
PLATELET # BLD AUTO: 59 10E3/UL (ref 150–450)
PLATELET # BLD AUTO: 60 10E3/UL (ref 150–450)
PLATELET # BLD AUTO: 61 10E3/UL (ref 150–450)
PLATELET # BLD AUTO: 62 10E3/UL (ref 150–450)
PLATELET # BLD AUTO: 62 10E3/UL (ref 150–450)
PLATELET # BLD AUTO: 63 10E3/UL (ref 150–450)
PLATELET # BLD AUTO: 72 10E3/UL (ref 150–450)
PLATELET # BLD AUTO: 84 10E3/UL (ref 150–450)
PLATELET # BLD AUTO: 89 10E3/UL (ref 150–450)
PLATELET # BLD AUTO: 91 10E3/UL (ref 150–450)
PLPETH BLD-MCNC: <10 NG/ML
POLYCHROMASIA BLD QL SMEAR: ABNORMAL
POPETH BLD-MCNC: <10 NG/ML
POTASSIUM SERPL-SCNC: 3.6 MMOL/L (ref 3.4–5.3)
POTASSIUM SERPL-SCNC: 3.7 MMOL/L (ref 3.4–5.3)
POTASSIUM SERPL-SCNC: 3.8 MMOL/L (ref 3.4–5.3)
POTASSIUM SERPL-SCNC: 3.8 MMOL/L (ref 3.4–5.3)
POTASSIUM SERPL-SCNC: 3.9 MMOL/L (ref 3.4–5.3)
POTASSIUM SERPL-SCNC: 4 MMOL/L (ref 3.4–5.3)
POTASSIUM SERPL-SCNC: 4 MMOL/L (ref 3.4–5.3)
POTASSIUM SERPL-SCNC: 4.1 MMOL/L (ref 3.4–5.3)
POTASSIUM SERPL-SCNC: 4.1 MMOL/L (ref 3.4–5.3)
POTASSIUM SERPL-SCNC: 4.4 MMOL/L (ref 3.4–5.3)
POTASSIUM SERPL-SCNC: 4.6 MMOL/L (ref 3.4–5.3)
POTASSIUM SERPL-SCNC: 4.6 MMOL/L (ref 3.4–5.3)
POTASSIUM SERPL-SCNC: 4.7 MMOL/L (ref 3.4–5.3)
POTASSIUM SERPL-SCNC: 4.7 MMOL/L (ref 3.4–5.3)
POTASSIUM SERPL-SCNC: 4.8 MMOL/L (ref 3.4–5.3)
POTASSIUM SERPL-SCNC: 4.8 MMOL/L (ref 3.4–5.3)
POTASSIUM SERPL-SCNC: 5.1 MMOL/L (ref 3.4–5.3)
POTASSIUM SERPL-SCNC: 5.2 MMOL/L (ref 3.4–5.3)
POTASSIUM UR-SCNC: 25.4 MMOL/L
PR INTERVAL - MUSE: 162 MS
PR INTERVAL - MUSE: 180 MS
PROCALCITONIN SERPL IA-MCNC: 1.34 NG/ML
PROT FLD-MCNC: 0.6 G/DL
PROT FLD-MCNC: 0.6 G/DL
PROT FLD-MCNC: 0.8 G/DL
PROT FLD-MCNC: 1.2 G/DL
PROT FLD-MCNC: 2.9 G/DL
PROT SERPL-MCNC: 4.8 G/DL (ref 6.4–8.3)
PROT SERPL-MCNC: 4.9 G/DL (ref 6.4–8.3)
PROT SERPL-MCNC: 5 G/DL (ref 6.4–8.3)
PROT SERPL-MCNC: 5 G/DL (ref 6.4–8.3)
PROT SERPL-MCNC: 5.1 G/DL (ref 6.4–8.3)
PROT SERPL-MCNC: 5.1 G/DL (ref 6.4–8.3)
PROT SERPL-MCNC: 5.2 G/DL (ref 6.4–8.3)
PROT SERPL-MCNC: 5.3 G/DL (ref 6.4–8.3)
PROT SERPL-MCNC: 5.4 G/DL (ref 6.4–8.3)
PROT SERPL-MCNC: 5.5 G/DL (ref 6.4–8.3)
PROT SERPL-MCNC: 5.5 G/DL (ref 6.4–8.3)
PROT SERPL-MCNC: 5.6 G/DL (ref 6.4–8.3)
PROT SERPL-MCNC: 6.1 G/DL (ref 6.4–8.3)
PROTEIN BODY FLUID SOURCE: NORMAL
PTH-INTACT SERPL-MCNC: 24 PG/ML (ref 15–65)
QRS DURATION - MUSE: 110 MS
QRS DURATION - MUSE: 96 MS
QT - MUSE: 488 MS
QT - MUSE: 494 MS
QTC - MUSE: 495 MS
QTC - MUSE: 525 MS
R AXIS - MUSE: 14 DEGREES
R AXIS - MUSE: 17 DEGREES
RBC # BLD AUTO: 1.96 10E6/UL (ref 3.8–5.2)
RBC # BLD AUTO: 2.13 10E6/UL (ref 3.8–5.2)
RBC # BLD AUTO: 2.16 10E6/UL (ref 3.8–5.2)
RBC # BLD AUTO: 2.27 10E6/UL (ref 3.8–5.2)
RBC # BLD AUTO: 2.27 10E6/UL (ref 3.8–5.2)
RBC # BLD AUTO: 2.28 10E6/UL (ref 3.8–5.2)
RBC # BLD AUTO: 2.29 10E6/UL (ref 3.8–5.2)
RBC # BLD AUTO: 2.29 10E6/UL (ref 3.8–5.2)
RBC # BLD AUTO: 2.3 10E6/UL (ref 3.8–5.2)
RBC # BLD AUTO: 2.3 10E6/UL (ref 3.8–5.2)
RBC # BLD AUTO: 2.34 10E6/UL (ref 3.8–5.2)
RBC # BLD AUTO: 2.42 10E6/UL (ref 3.8–5.2)
RBC # BLD AUTO: 2.44 10E6/UL (ref 3.8–5.2)
RBC # BLD AUTO: 2.45 10E6/UL (ref 3.8–5.2)
RBC # BLD AUTO: 2.47 10E6/UL (ref 3.8–5.2)
RBC # BLD AUTO: 2.47 10E6/UL (ref 3.8–5.2)
RBC # BLD AUTO: 2.48 10E6/UL (ref 3.8–5.2)
RBC # BLD AUTO: 2.5 10E6/UL (ref 3.8–5.2)
RBC # BLD AUTO: 2.5 10E6/UL (ref 3.8–5.2)
RBC # BLD AUTO: 2.53 10E6/UL (ref 3.8–5.2)
RBC # BLD AUTO: 2.64 10E6/UL (ref 3.8–5.2)
RBC AGGLUT BLD QL: ABNORMAL
RBC MORPH BLD: ABNORMAL
RBC URINE: 3 /HPF
RBC URINE: 4 /HPF
ROULEAUX BLD QL SMEAR: ABNORMAL
RSV RNA SPEC NAA+PROBE: NEGATIVE
SARS-COV-2 RNA RESP QL NAA+PROBE: NEGATIVE
SICKLE CELLS BLD QL SMEAR: ABNORMAL
SMUDGE CELLS BLD QL SMEAR: ABNORMAL
SODIUM SERPL-SCNC: 110 MMOL/L (ref 135–145)
SODIUM SERPL-SCNC: 111 MMOL/L (ref 135–145)
SODIUM SERPL-SCNC: 112 MMOL/L (ref 135–145)
SODIUM SERPL-SCNC: 113 MMOL/L (ref 135–145)
SODIUM SERPL-SCNC: 114 MMOL/L (ref 135–145)
SODIUM SERPL-SCNC: 115 MMOL/L (ref 135–145)
SODIUM SERPL-SCNC: 116 MMOL/L (ref 135–145)
SODIUM SERPL-SCNC: 117 MMOL/L (ref 135–145)
SODIUM SERPL-SCNC: 118 MMOL/L (ref 135–145)
SODIUM SERPL-SCNC: 119 MMOL/L (ref 135–145)
SODIUM SERPL-SCNC: 120 MMOL/L (ref 135–145)
SODIUM SERPL-SCNC: 121 MMOL/L (ref 135–145)
SODIUM SERPL-SCNC: 122 MMOL/L (ref 135–145)
SODIUM SERPL-SCNC: 123 MMOL/L (ref 135–145)
SODIUM SERPL-SCNC: 124 MMOL/L (ref 135–145)
SODIUM SERPL-SCNC: 125 MMOL/L (ref 135–145)
SODIUM SERPL-SCNC: 126 MMOL/L (ref 135–145)
SODIUM SERPL-SCNC: 127 MMOL/L (ref 135–145)
SODIUM UR-SCNC: <20 MMOL/L
SP GR UR STRIP: 1.01 (ref 1–1.03)
SP GR UR STRIP: 1.01 (ref 1–1.03)
SPECIMEN EXPIRATION DATE: ABNORMAL
SPECIMEN EXPIRATION DATE: NORMAL
SPHEROCYTES BLD QL SMEAR: ABNORMAL
SQUAMOUS EPITHELIAL: 3 /HPF
SQUAMOUS EPITHELIAL: 4 /HPF
STAPHYLOCOCCUS AUREUS: NOT DETECTED
STAPHYLOCOCCUS EPIDERMIDIS: DETECTED
STAPHYLOCOCCUS LUGDUNENSIS: NOT DETECTED
STOMATOCYTES BLD QL SMEAR: ABNORMAL
STREPTOCOCCUS AGALACTIAE: NOT DETECTED
STREPTOCOCCUS ANGINOSUS GROUP: NOT DETECTED
STREPTOCOCCUS PNEUMONIAE: NOT DETECTED
STREPTOCOCCUS PYOGENES: NOT DETECTED
STREPTOCOCCUS SPECIES: NOT DETECTED
SYSTOLIC BLOOD PRESSURE - MUSE: NORMAL MMHG
SYSTOLIC BLOOD PRESSURE - MUSE: NORMAL MMHG
T AXIS - MUSE: 29 DEGREES
T AXIS - MUSE: 41 DEGREES
TARGETS BLD QL SMEAR: ABNORMAL
TOXIC GRANULES BLD QL SMEAR: ABNORMAL
TRANSITIONAL EPI: <1 /HPF
UNIT ABO/RH: NORMAL
UNIT NUMBER: NORMAL
UNIT STATUS: NORMAL
UNIT TYPE ISBT: 6200
UROBILINOGEN UR STRIP-MCNC: NORMAL MG/DL
UROBILINOGEN UR STRIP-MCNC: NORMAL MG/DL
VARIANT LYMPHS BLD QL SMEAR: ABNORMAL
VENTRICULAR RATE- MUSE: 62 BPM
VENTRICULAR RATE- MUSE: 68 BPM
VIT D+METAB SERPL-MCNC: 24 NG/ML (ref 20–50)
WBC # BLD AUTO: 11.5 10E3/UL (ref 4–11)
WBC # BLD AUTO: 11.9 10E3/UL (ref 4–11)
WBC # BLD AUTO: 12.4 10E3/UL (ref 4–11)
WBC # BLD AUTO: 14 10E3/UL (ref 4–11)
WBC # BLD AUTO: 14 10E3/UL (ref 4–11)
WBC # BLD AUTO: 14.9 10E3/UL (ref 4–11)
WBC # BLD AUTO: 15.1 10E3/UL (ref 4–11)
WBC # BLD AUTO: 15.2 10E3/UL (ref 4–11)
WBC # BLD AUTO: 15.4 10E3/UL (ref 4–11)
WBC # BLD AUTO: 15.6 10E3/UL (ref 4–11)
WBC # BLD AUTO: 17.3 10E3/UL (ref 4–11)
WBC # BLD AUTO: 17.9 10E3/UL (ref 4–11)
WBC # BLD AUTO: 18.6 10E3/UL (ref 4–11)
WBC # BLD AUTO: 19.1 10E3/UL (ref 4–11)
WBC # BLD AUTO: 19.8 10E3/UL (ref 4–11)
WBC # BLD AUTO: 20.8 10E3/UL (ref 4–11)
WBC # BLD AUTO: 28.3 10E3/UL (ref 4–11)
WBC # BLD AUTO: 31.2 10E3/UL (ref 4–11)
WBC # BLD AUTO: 33 10E3/UL (ref 4–11)
WBC # BLD AUTO: 8.3 10E3/UL (ref 4–11)
WBC # BLD AUTO: 8.4 10E3/UL (ref 4–11)
WBC # FLD AUTO: 118 /UL
WBC # FLD AUTO: 166 /UL
WBC # FLD AUTO: 202 /UL
WBC # FLD AUTO: 291 /UL
WBC # FLD AUTO: 362 /UL
WBC # FLD AUTO: 477 /UL
WBC # FLD AUTO: ABNORMAL /UL
WBC CLUMPS #/AREA URNS HPF: PRESENT /HPF
WBC URINE: 12 /HPF
WBC URINE: 83 /HPF

## 2024-01-01 PROCEDURE — 250N000013 HC RX MED GY IP 250 OP 250 PS 637

## 2024-01-01 PROCEDURE — 84295 ASSAY OF SERUM SODIUM: CPT

## 2024-01-01 PROCEDURE — 82374 ASSAY BLOOD CARBON DIOXIDE: CPT

## 2024-01-01 PROCEDURE — 97530 THERAPEUTIC ACTIVITIES: CPT | Mod: GO

## 2024-01-01 PROCEDURE — 99255 IP/OBS CONSLTJ NEW/EST HI 80: CPT | Mod: FS | Performed by: STUDENT IN AN ORGANIZED HEALTH CARE EDUCATION/TRAINING PROGRAM

## 2024-01-01 PROCEDURE — 89050 BODY FLUID CELL COUNT: CPT | Performed by: INTERNAL MEDICINE

## 2024-01-01 PROCEDURE — 83615 LACTATE (LD) (LDH) ENZYME: CPT

## 2024-01-01 PROCEDURE — 250N000011 HC RX IP 250 OP 636: Performed by: INTERNAL MEDICINE

## 2024-01-01 PROCEDURE — 120N000002 HC R&B MED SURG/OB UMMC

## 2024-01-01 PROCEDURE — 99233 SBSQ HOSP IP/OBS HIGH 50: CPT | Performed by: INTERNAL MEDICINE

## 2024-01-01 PROCEDURE — 250N000011 HC RX IP 250 OP 636

## 2024-01-01 PROCEDURE — 99233 SBSQ HOSP IP/OBS HIGH 50: CPT | Mod: 25 | Performed by: STUDENT IN AN ORGANIZED HEALTH CARE EDUCATION/TRAINING PROGRAM

## 2024-01-01 PROCEDURE — 36415 COLL VENOUS BLD VENIPUNCTURE: CPT

## 2024-01-01 PROCEDURE — 120N000003 HC R&B IMCU UMMC

## 2024-01-01 PROCEDURE — 85610 PROTHROMBIN TIME: CPT | Performed by: INTERNAL MEDICINE

## 2024-01-01 PROCEDURE — 87205 SMEAR GRAM STAIN: CPT

## 2024-01-01 PROCEDURE — 85610 PROTHROMBIN TIME: CPT

## 2024-01-01 PROCEDURE — 99215 OFFICE O/P EST HI 40 MIN: CPT | Performed by: INTERNAL MEDICINE

## 2024-01-01 PROCEDURE — 85027 COMPLETE CBC AUTOMATED: CPT | Performed by: INTERNAL MEDICINE

## 2024-01-01 PROCEDURE — 86870 RBC ANTIBODY IDENTIFICATION: CPT | Performed by: STUDENT IN AN ORGANIZED HEALTH CARE EDUCATION/TRAINING PROGRAM

## 2024-01-01 PROCEDURE — 97110 THERAPEUTIC EXERCISES: CPT | Mod: GP

## 2024-01-01 PROCEDURE — 258N000003 HC RX IP 258 OP 636: Performed by: INTERNAL MEDICINE

## 2024-01-01 PROCEDURE — 99233 SBSQ HOSP IP/OBS HIGH 50: CPT | Mod: GC | Performed by: STUDENT IN AN ORGANIZED HEALTH CARE EDUCATION/TRAINING PROGRAM

## 2024-01-01 PROCEDURE — 99233 SBSQ HOSP IP/OBS HIGH 50: CPT | Mod: GC | Performed by: INTERNAL MEDICINE

## 2024-01-01 PROCEDURE — 85014 HEMATOCRIT: CPT

## 2024-01-01 PROCEDURE — 32551 INSERTION OF CHEST TUBE: CPT | Mod: GC | Performed by: INTERNAL MEDICINE

## 2024-01-01 PROCEDURE — 97530 THERAPEUTIC ACTIVITIES: CPT | Mod: GP

## 2024-01-01 PROCEDURE — 36592 COLLECT BLOOD FROM PICC: CPT

## 2024-01-01 PROCEDURE — 84155 ASSAY OF PROTEIN SERUM: CPT

## 2024-01-01 PROCEDURE — 71045 X-RAY EXAM CHEST 1 VIEW: CPT

## 2024-01-01 PROCEDURE — 89050 BODY FLUID CELL COUNT: CPT

## 2024-01-01 PROCEDURE — 71045 X-RAY EXAM CHEST 1 VIEW: CPT | Mod: 77

## 2024-01-01 PROCEDURE — 99233 SBSQ HOSP IP/OBS HIGH 50: CPT | Mod: 25 | Performed by: NURSE PRACTITIONER

## 2024-01-01 PROCEDURE — P9047 ALBUMIN (HUMAN), 25%, 50ML: HCPCS

## 2024-01-01 PROCEDURE — 85004 AUTOMATED DIFF WBC COUNT: CPT

## 2024-01-01 PROCEDURE — 272N000192 HC ACCESSORY CR2

## 2024-01-01 PROCEDURE — 85027 COMPLETE CBC AUTOMATED: CPT

## 2024-01-01 PROCEDURE — 99418 PROLNG IP/OBS E/M EA 15 MIN: CPT | Performed by: INTERNAL MEDICINE

## 2024-01-01 PROCEDURE — 250N000013 HC RX MED GY IP 250 OP 250 PS 637: Performed by: INTERNAL MEDICINE

## 2024-01-01 PROCEDURE — 97165 OT EVAL LOW COMPLEX 30 MIN: CPT | Mod: GO

## 2024-01-01 PROCEDURE — 86900 BLOOD TYPING SEROLOGIC ABO: CPT | Performed by: INTERNAL MEDICINE

## 2024-01-01 PROCEDURE — 71046 X-RAY EXAM CHEST 2 VIEWS: CPT

## 2024-01-01 PROCEDURE — 84145 PROCALCITONIN (PCT): CPT

## 2024-01-01 PROCEDURE — 80053 COMPREHEN METABOLIC PANEL: CPT

## 2024-01-01 PROCEDURE — 86922 COMPATIBILITY TEST ANTIGLOB: CPT

## 2024-01-01 PROCEDURE — 250N000009 HC RX 250: Performed by: RADIOLOGY

## 2024-01-01 PROCEDURE — 97161 PT EVAL LOW COMPLEX 20 MIN: CPT | Mod: GP

## 2024-01-01 PROCEDURE — 999N000127 HC STATISTIC PERIPHERAL IV START W US GUIDANCE

## 2024-01-01 PROCEDURE — C1769 GUIDE WIRE: HCPCS

## 2024-01-01 PROCEDURE — 82042 OTHER SOURCE ALBUMIN QUAN EA: CPT

## 2024-01-01 PROCEDURE — 0W9G30Z DRAINAGE OF PERITONEAL CAVITY WITH DRAINAGE DEVICE, PERCUTANEOUS APPROACH: ICD-10-PCS | Performed by: RADIOLOGY

## 2024-01-01 PROCEDURE — 85018 HEMOGLOBIN: CPT

## 2024-01-01 PROCEDURE — 49418 INSERT TUN IP CATH PERC: CPT

## 2024-01-01 PROCEDURE — 97116 GAIT TRAINING THERAPY: CPT | Mod: GP

## 2024-01-01 PROCEDURE — 80048 BASIC METABOLIC PNL TOTAL CA: CPT | Performed by: INTERNAL MEDICINE

## 2024-01-01 PROCEDURE — 87040 BLOOD CULTURE FOR BACTERIA: CPT | Performed by: STUDENT IN AN ORGANIZED HEALTH CARE EDUCATION/TRAINING PROGRAM

## 2024-01-01 PROCEDURE — 36592 COLLECT BLOOD FROM PICC: CPT | Performed by: INTERNAL MEDICINE

## 2024-01-01 PROCEDURE — 49083 ABD PARACENTESIS W/IMAGING: CPT | Performed by: INTERNAL MEDICINE

## 2024-01-01 PROCEDURE — 82140 ASSAY OF AMMONIA: CPT

## 2024-01-01 PROCEDURE — 99222 1ST HOSP IP/OBS MODERATE 55: CPT | Mod: 25 | Performed by: INTERNAL MEDICINE

## 2024-01-01 PROCEDURE — 36415 COLL VENOUS BLD VENIPUNCTURE: CPT | Performed by: EMERGENCY MEDICINE

## 2024-01-01 PROCEDURE — 99207 PR NO CHARGE LOS: CPT

## 2024-01-01 PROCEDURE — 99232 SBSQ HOSP IP/OBS MODERATE 35: CPT | Performed by: STUDENT IN AN ORGANIZED HEALTH CARE EDUCATION/TRAINING PROGRAM

## 2024-01-01 PROCEDURE — 250N000009 HC RX 250

## 2024-01-01 PROCEDURE — 85396 CLOTTING ASSAY WHOLE BLOOD: CPT | Performed by: NURSE PRACTITIONER

## 2024-01-01 PROCEDURE — 87075 CULTR BACTERIA EXCEPT BLOOD: CPT | Performed by: STUDENT IN AN ORGANIZED HEALTH CARE EDUCATION/TRAINING PROGRAM

## 2024-01-01 PROCEDURE — 250N000011 HC RX IP 250 OP 636: Mod: JZ | Performed by: INTERNAL MEDICINE

## 2024-01-01 PROCEDURE — P9016 RBC LEUKOCYTES REDUCED: HCPCS | Performed by: INTERNAL MEDICINE

## 2024-01-01 PROCEDURE — 82040 ASSAY OF SERUM ALBUMIN: CPT

## 2024-01-01 PROCEDURE — 85384 FIBRINOGEN ACTIVITY: CPT

## 2024-01-01 PROCEDURE — 99223 1ST HOSP IP/OBS HIGH 75: CPT | Mod: 25 | Performed by: CLINICAL NURSE SPECIALIST

## 2024-01-01 PROCEDURE — 250N000011 HC RX IP 250 OP 636: Performed by: STUDENT IN AN ORGANIZED HEALTH CARE EDUCATION/TRAINING PROGRAM

## 2024-01-01 PROCEDURE — 85384 FIBRINOGEN ACTIVITY: CPT | Performed by: INTERNAL MEDICINE

## 2024-01-01 PROCEDURE — 87186 SC STD MICRODIL/AGAR DIL: CPT

## 2024-01-01 PROCEDURE — 93005 ELECTROCARDIOGRAM TRACING: CPT

## 2024-01-01 PROCEDURE — P9047 ALBUMIN (HUMAN), 25%, 50ML: HCPCS | Performed by: CLINICAL NURSE SPECIALIST

## 2024-01-01 PROCEDURE — G0463 HOSPITAL OUTPT CLINIC VISIT: HCPCS

## 2024-01-01 PROCEDURE — 250N000009 HC RX 250: Performed by: STUDENT IN AN ORGANIZED HEALTH CARE EDUCATION/TRAINING PROGRAM

## 2024-01-01 PROCEDURE — 0W9G3ZZ DRAINAGE OF PERITONEAL CAVITY, PERCUTANEOUS APPROACH: ICD-10-PCS | Performed by: ORTHOPAEDIC SURGERY

## 2024-01-01 PROCEDURE — 0W993ZX DRAINAGE OF RIGHT PLEURAL CAVITY, PERCUTANEOUS APPROACH, DIAGNOSTIC: ICD-10-PCS | Performed by: STUDENT IN AN ORGANIZED HEALTH CARE EDUCATION/TRAINING PROGRAM

## 2024-01-01 PROCEDURE — C1729 CATH, DRAINAGE: HCPCS

## 2024-01-01 PROCEDURE — 71045 X-RAY EXAM CHEST 1 VIEW: CPT | Mod: 26 | Performed by: RADIOLOGY

## 2024-01-01 PROCEDURE — P9016 RBC LEUKOCYTES REDUCED: HCPCS

## 2024-01-01 PROCEDURE — 84295 ASSAY OF SERUM SODIUM: CPT | Performed by: STUDENT IN AN ORGANIZED HEALTH CARE EDUCATION/TRAINING PROGRAM

## 2024-01-01 PROCEDURE — 99000 SPECIMEN HANDLING OFFICE-LAB: CPT | Performed by: PATHOLOGY

## 2024-01-01 PROCEDURE — 250N000009 HC RX 250: Performed by: INTERNAL MEDICINE

## 2024-01-01 PROCEDURE — 93010 ELECTROCARDIOGRAM REPORT: CPT | Performed by: INTERNAL MEDICINE

## 2024-01-01 PROCEDURE — 999N000134 HC STATISTIC PP CARE STAGE 3

## 2024-01-01 PROCEDURE — 87637 SARSCOV2&INF A&B&RSV AMP PRB: CPT | Performed by: INTERNAL MEDICINE

## 2024-01-01 PROCEDURE — 71045 X-RAY EXAM CHEST 1 VIEW: CPT | Mod: 26 | Performed by: STUDENT IN AN ORGANIZED HEALTH CARE EDUCATION/TRAINING PROGRAM

## 2024-01-01 PROCEDURE — 87206 SMEAR FLUORESCENT/ACID STAI: CPT

## 2024-01-01 PROCEDURE — 0W9G3ZZ DRAINAGE OF PERITONEAL CAVITY, PERCUTANEOUS APPROACH: ICD-10-PCS | Performed by: INTERNAL MEDICINE

## 2024-01-01 PROCEDURE — 36569 INSJ PICC 5 YR+ W/O IMAGING: CPT

## 2024-01-01 PROCEDURE — 85025 COMPLETE CBC W/AUTO DIFF WBC: CPT | Performed by: EMERGENCY MEDICINE

## 2024-01-01 PROCEDURE — 82945 GLUCOSE OTHER FLUID: CPT

## 2024-01-01 PROCEDURE — 99207 IR PARACENTESIS: CPT | Mod: GC | Performed by: STUDENT IN AN ORGANIZED HEALTH CARE EDUCATION/TRAINING PROGRAM

## 2024-01-01 PROCEDURE — 49083 ABD PARACENTESIS W/IMAGING: CPT

## 2024-01-01 PROCEDURE — 49083 ABD PARACENTESIS W/IMAGING: CPT | Mod: GC | Performed by: STUDENT IN AN ORGANIZED HEALTH CARE EDUCATION/TRAINING PROGRAM

## 2024-01-01 PROCEDURE — 71250 CT THORAX DX C-: CPT

## 2024-01-01 PROCEDURE — 83735 ASSAY OF MAGNESIUM: CPT

## 2024-01-01 PROCEDURE — 81001 URINALYSIS AUTO W/SCOPE: CPT

## 2024-01-01 PROCEDURE — 99233 SBSQ HOSP IP/OBS HIGH 50: CPT | Performed by: NURSE PRACTITIONER

## 2024-01-01 PROCEDURE — 97110 THERAPEUTIC EXERCISES: CPT | Mod: GO

## 2024-01-01 PROCEDURE — 32551 INSERTION OF CHEST TUBE: CPT | Performed by: INTERNAL MEDICINE

## 2024-01-01 PROCEDURE — 99223 1ST HOSP IP/OBS HIGH 75: CPT | Mod: GC | Performed by: INTERNAL MEDICINE

## 2024-01-01 PROCEDURE — 99233 SBSQ HOSP IP/OBS HIGH 50: CPT | Performed by: STUDENT IN AN ORGANIZED HEALTH CARE EDUCATION/TRAINING PROGRAM

## 2024-01-01 PROCEDURE — 84157 ASSAY OF PROTEIN OTHER: CPT

## 2024-01-01 PROCEDURE — 82247 BILIRUBIN TOTAL: CPT

## 2024-01-01 PROCEDURE — 250N000011 HC RX IP 250 OP 636: Performed by: CLINICAL NURSE SPECIALIST

## 2024-01-01 PROCEDURE — 87086 URINE CULTURE/COLONY COUNT: CPT

## 2024-01-01 PROCEDURE — 86923 COMPATIBILITY TEST ELECTRIC: CPT | Performed by: INTERNAL MEDICINE

## 2024-01-01 PROCEDURE — 272N000473 HC KIT, VPS RHYTHM STYLET

## 2024-01-01 PROCEDURE — 36415 COLL VENOUS BLD VENIPUNCTURE: CPT | Performed by: STUDENT IN AN ORGANIZED HEALTH CARE EDUCATION/TRAINING PROGRAM

## 2024-01-01 PROCEDURE — 99232 SBSQ HOSP IP/OBS MODERATE 35: CPT | Mod: GC | Performed by: INTERNAL MEDICINE

## 2024-01-01 PROCEDURE — 86900 BLOOD TYPING SEROLOGIC ABO: CPT | Performed by: STUDENT IN AN ORGANIZED HEALTH CARE EDUCATION/TRAINING PROGRAM

## 2024-01-01 PROCEDURE — 83970 ASSAY OF PARATHORMONE: CPT | Performed by: CLINICAL NURSE SPECIALIST

## 2024-01-01 PROCEDURE — 0W9930Z DRAINAGE OF RIGHT PLEURAL CAVITY WITH DRAINAGE DEVICE, PERCUTANEOUS APPROACH: ICD-10-PCS | Performed by: INTERNAL MEDICINE

## 2024-01-01 PROCEDURE — 71260 CT THORAX DX C+: CPT

## 2024-01-01 PROCEDURE — 83935 ASSAY OF URINE OSMOLALITY: CPT

## 2024-01-01 PROCEDURE — 85041 AUTOMATED RBC COUNT: CPT

## 2024-01-01 PROCEDURE — 80321 ALCOHOLS BIOMARKERS 1OR 2: CPT | Performed by: INTERNAL MEDICINE

## 2024-01-01 PROCEDURE — 32555 ASPIRATE PLEURA W/ IMAGING: CPT

## 2024-01-01 PROCEDURE — 99223 1ST HOSP IP/OBS HIGH 75: CPT | Performed by: NURSE PRACTITIONER

## 2024-01-01 PROCEDURE — 272N000001 HC OR GENERAL SUPPLY STERILE: Performed by: INTERNAL MEDICINE

## 2024-01-01 PROCEDURE — 82610 CYSTATIN C: CPT

## 2024-01-01 PROCEDURE — 99285 EMERGENCY DEPT VISIT HI MDM: CPT | Performed by: EMERGENCY MEDICINE

## 2024-01-01 PROCEDURE — 71260 CT THORAX DX C+: CPT | Mod: 26 | Performed by: RADIOLOGY

## 2024-01-01 PROCEDURE — 0W9G3ZZ DRAINAGE OF PERITONEAL CAVITY, PERCUTANEOUS APPROACH: ICD-10-PCS | Performed by: PEDIATRICS

## 2024-01-01 PROCEDURE — 83930 ASSAY OF BLOOD OSMOLALITY: CPT

## 2024-01-01 PROCEDURE — 84300 ASSAY OF URINE SODIUM: CPT | Performed by: CLINICAL NURSE SPECIALIST

## 2024-01-01 PROCEDURE — 49418 INSERT TUN IP CATH PERC: CPT | Performed by: RADIOLOGY

## 2024-01-01 PROCEDURE — 99207 PR NO CHARGE LOS: CPT | Performed by: RADIOLOGY

## 2024-01-01 PROCEDURE — 97535 SELF CARE MNGMENT TRAINING: CPT | Mod: GO

## 2024-01-01 PROCEDURE — 84300 ASSAY OF URINE SODIUM: CPT

## 2024-01-01 PROCEDURE — 82610 CYSTATIN C: CPT | Performed by: INTERNAL MEDICINE

## 2024-01-01 PROCEDURE — 80053 COMPREHEN METABOLIC PANEL: CPT | Performed by: EMERGENCY MEDICINE

## 2024-01-01 PROCEDURE — 82306 VITAMIN D 25 HYDROXY: CPT

## 2024-01-01 PROCEDURE — 32555 ASPIRATE PLEURA W/ IMAGING: CPT | Mod: GC | Performed by: STUDENT IN AN ORGANIZED HEALTH CARE EDUCATION/TRAINING PROGRAM

## 2024-01-01 PROCEDURE — 49083 ABD PARACENTESIS W/IMAGING: CPT | Mod: GC | Performed by: INTERNAL MEDICINE

## 2024-01-01 PROCEDURE — 87102 FUNGUS ISOLATION CULTURE: CPT

## 2024-01-01 PROCEDURE — 99417 PROLNG OP E/M EACH 15 MIN: CPT | Performed by: INTERNAL MEDICINE

## 2024-01-01 PROCEDURE — 272N000500 HC NEEDLE CR2

## 2024-01-01 PROCEDURE — 999N000065 XR CHEST PORT 1 VIEW

## 2024-01-01 PROCEDURE — 0W9G3ZX DRAINAGE OF PERITONEAL CAVITY, PERCUTANEOUS APPROACH, DIAGNOSTIC: ICD-10-PCS | Performed by: STUDENT IN AN ORGANIZED HEALTH CARE EDUCATION/TRAINING PROGRAM

## 2024-01-01 PROCEDURE — 49083 ABD PARACENTESIS W/IMAGING: CPT | Mod: GC | Performed by: PEDIATRICS

## 2024-01-01 PROCEDURE — 87149 DNA/RNA DIRECT PROBE: CPT

## 2024-01-01 PROCEDURE — 999N000142 HC STATISTIC PROCEDURE PREP ONLY

## 2024-01-01 PROCEDURE — 36415 COLL VENOUS BLD VENIPUNCTURE: CPT | Performed by: INTERNAL MEDICINE

## 2024-01-01 PROCEDURE — 81001 URINALYSIS AUTO W/SCOPE: CPT | Performed by: CLINICAL NURSE SPECIALIST

## 2024-01-01 PROCEDURE — 3E0L3GC INTRODUCTION OF OTHER THERAPEUTIC SUBSTANCE INTO PLEURAL CAVITY, PERCUTANEOUS APPROACH: ICD-10-PCS | Performed by: STUDENT IN AN ORGANIZED HEALTH CARE EDUCATION/TRAINING PROGRAM

## 2024-01-01 PROCEDURE — 99239 HOSP IP/OBS DSCHRG MGMT >30: CPT | Mod: GC | Performed by: INTERNAL MEDICINE

## 2024-01-01 PROCEDURE — 272N000103 HC INTRODUCER MICRO SET

## 2024-01-01 PROCEDURE — 86870 RBC ANTIBODY IDENTIFICATION: CPT | Performed by: INTERNAL MEDICINE

## 2024-01-01 PROCEDURE — 71250 CT THORAX DX C-: CPT | Mod: 26 | Performed by: STUDENT IN AN ORGANIZED HEALTH CARE EDUCATION/TRAINING PROGRAM

## 2024-01-01 PROCEDURE — 82248 BILIRUBIN DIRECT: CPT | Performed by: INTERNAL MEDICINE

## 2024-01-01 PROCEDURE — 36430 TRANSFUSION BLD/BLD COMPNT: CPT

## 2024-01-01 PROCEDURE — 71046 X-RAY EXAM CHEST 2 VIEWS: CPT | Mod: 26 | Performed by: RADIOLOGY

## 2024-01-01 PROCEDURE — 86905 BLOOD TYPING RBC ANTIGENS: CPT | Performed by: STUDENT IN AN ORGANIZED HEALTH CARE EDUCATION/TRAINING PROGRAM

## 2024-01-01 PROCEDURE — 36592 COLLECT BLOOD FROM PICC: CPT | Performed by: NURSE PRACTITIONER

## 2024-01-01 PROCEDURE — G0463 HOSPITAL OUTPT CLINIC VISIT: HCPCS | Mod: 25

## 2024-01-01 PROCEDURE — P9059 PLASMA, FRZ BETWEEN 8-24HOUR: HCPCS

## 2024-01-01 PROCEDURE — 99207 PR SERVICE NOT STAFFED W/SUPERV PROV: CPT | Performed by: STUDENT IN AN ORGANIZED HEALTH CARE EDUCATION/TRAINING PROGRAM

## 2024-01-01 PROCEDURE — 83605 ASSAY OF LACTIC ACID: CPT | Performed by: STUDENT IN AN ORGANIZED HEALTH CARE EDUCATION/TRAINING PROGRAM

## 2024-01-01 PROCEDURE — 85730 THROMBOPLASTIN TIME PARTIAL: CPT | Performed by: INTERNAL MEDICINE

## 2024-01-01 PROCEDURE — 77080 DXA BONE DENSITY AXIAL: CPT | Mod: TC | Performed by: PHYSICIAN ASSISTANT

## 2024-01-01 PROCEDURE — 36592 COLLECT BLOOD FROM PICC: CPT | Performed by: CLINICAL NURSE SPECIALIST

## 2024-01-01 PROCEDURE — C1894 INTRO/SHEATH, NON-LASER: HCPCS | Performed by: INTERNAL MEDICINE

## 2024-01-01 PROCEDURE — 999N000044 HC STATISTIC CVC DRESSING CHANGE

## 2024-01-01 PROCEDURE — 84100 ASSAY OF PHOSPHORUS: CPT

## 2024-01-01 PROCEDURE — 49083 ABD PARACENTESIS W/IMAGING: CPT | Mod: GC | Performed by: ORTHOPAEDIC SURGERY

## 2024-01-01 PROCEDURE — 272N000450 HC KIT 4FR POWER PICC SINGLE LUMEN

## 2024-01-01 PROCEDURE — 250N000013 HC RX MED GY IP 250 OP 250 PS 637: Performed by: STUDENT IN AN ORGANIZED HEALTH CARE EDUCATION/TRAINING PROGRAM

## 2024-01-01 PROCEDURE — 87205 SMEAR GRAM STAIN: CPT | Performed by: INTERNAL MEDICINE

## 2024-01-01 PROCEDURE — 99285 EMERGENCY DEPT VISIT HI MDM: CPT | Mod: 25 | Performed by: EMERGENCY MEDICINE

## 2024-01-01 PROCEDURE — 84133 ASSAY OF URINE POTASSIUM: CPT | Performed by: INTERNAL MEDICINE

## 2024-01-01 PROCEDURE — 99255 IP/OBS CONSLTJ NEW/EST HI 80: CPT | Performed by: INTERNAL MEDICINE

## 2024-01-01 PROCEDURE — 71250 CT THORAX DX C-: CPT | Mod: 26 | Performed by: RADIOLOGY

## 2024-01-01 PROCEDURE — 84295 ASSAY OF SERUM SODIUM: CPT | Performed by: INTERNAL MEDICINE

## 2024-01-01 RX ORDER — METRONIDAZOLE 500 MG/1
500 TABLET ORAL 3 TIMES DAILY
Qty: 21 TABLET | Refills: 0 | Status: SHIPPED | OUTPATIENT
Start: 2024-01-01 | End: 2024-02-19

## 2024-01-01 RX ORDER — CEFTRIAXONE 1 G/1
1 INJECTION, POWDER, FOR SOLUTION INTRAMUSCULAR; INTRAVENOUS EVERY 24 HOURS
Status: DISCONTINUED | OUTPATIENT
Start: 2024-01-01 | End: 2024-01-01

## 2024-01-01 RX ORDER — CEFPODOXIME PROXETIL 200 MG/1
200 TABLET, FILM COATED ORAL 2 TIMES DAILY
Qty: 14 TABLET | Refills: 0 | Status: SHIPPED | OUTPATIENT
Start: 2024-01-01 | End: 2024-02-19

## 2024-01-01 RX ORDER — MIDODRINE HYDROCHLORIDE 5 MG/1
15 TABLET ORAL 3 TIMES DAILY
Status: DISCONTINUED | OUTPATIENT
Start: 2024-01-01 | End: 2024-01-01

## 2024-01-01 RX ORDER — MONTELUKAST SODIUM 10 MG/1
10 TABLET ORAL AT BEDTIME
Status: DISCONTINUED | OUTPATIENT
Start: 2024-01-01 | End: 2024-01-01

## 2024-01-01 RX ORDER — POLYETHYLENE GLYCOL 3350 17 G/17G
17 POWDER, FOR SOLUTION ORAL DAILY
Qty: 119 G | Refills: 0 | Status: SHIPPED | OUTPATIENT
Start: 2024-01-01 | End: 2024-02-19

## 2024-01-01 RX ORDER — FLUORIDE TOOTHPASTE
5 TOOTHPASTE DENTAL 4 TIMES DAILY PRN
Status: DISCONTINUED | OUTPATIENT
Start: 2024-01-01 | End: 2024-01-01 | Stop reason: HOSPADM

## 2024-01-01 RX ORDER — ALBUMIN (HUMAN) 12.5 G/50ML
75 SOLUTION INTRAVENOUS ONCE
Status: COMPLETED | OUTPATIENT
Start: 2024-01-01 | End: 2024-01-01

## 2024-01-01 RX ORDER — AMOXICILLIN 250 MG
2 CAPSULE ORAL 2 TIMES DAILY PRN
Status: DISCONTINUED | OUTPATIENT
Start: 2024-01-01 | End: 2024-01-01 | Stop reason: HOSPADM

## 2024-01-01 RX ORDER — ALBUTEROL SULFATE 0.83 MG/ML
2.5 SOLUTION RESPIRATORY (INHALATION) EVERY 4 HOURS PRN
Qty: 90 ML | Refills: 0 | Status: SHIPPED | OUTPATIENT
Start: 2024-01-01 | End: 2024-02-19

## 2024-01-01 RX ORDER — MIDODRINE HYDROCHLORIDE 5 MG/1
15 TABLET ORAL
Status: DISCONTINUED | OUTPATIENT
Start: 2024-01-01 | End: 2024-01-01 | Stop reason: HOSPADM

## 2024-01-01 RX ORDER — CALCIUM CARBONATE 500 MG/1
1000 TABLET, CHEWABLE ORAL 4 TIMES DAILY PRN
Status: DISCONTINUED | OUTPATIENT
Start: 2024-01-01 | End: 2024-01-01 | Stop reason: HOSPADM

## 2024-01-01 RX ORDER — PANTOPRAZOLE SODIUM 40 MG/1
40 TABLET, DELAYED RELEASE ORAL 2 TIMES DAILY
Qty: 14 TABLET | Refills: 0 | Status: SHIPPED | OUTPATIENT
Start: 2024-01-01 | End: 2024-02-19

## 2024-01-01 RX ORDER — ALBUTEROL SULFATE 0.83 MG/ML
2.5 SOLUTION RESPIRATORY (INHALATION) EVERY 4 HOURS PRN
Qty: 300 ML | Refills: 11 | Status: SHIPPED | OUTPATIENT
Start: 2024-01-01 | End: 2024-01-01

## 2024-01-01 RX ORDER — ASPIRIN 325 MG
325 TABLET ORAL ONCE
Status: CANCELLED | OUTPATIENT
Start: 2024-01-01 | End: 2024-01-01

## 2024-01-01 RX ORDER — MUPIROCIN 20 MG/G
OINTMENT TOPICAL 3 TIMES DAILY
Status: DISCONTINUED | OUTPATIENT
Start: 2024-01-01 | End: 2024-01-01 | Stop reason: HOSPADM

## 2024-01-01 RX ORDER — ONDANSETRON 2 MG/ML
4 INJECTION INTRAMUSCULAR; INTRAVENOUS
Status: DISCONTINUED | OUTPATIENT
Start: 2024-01-01 | End: 2024-01-01 | Stop reason: HOSPADM

## 2024-01-01 RX ORDER — EPINEPHRINE 1 MG/ML
0.3 INJECTION, SOLUTION, CONCENTRATE INTRAVENOUS EVERY 5 MIN PRN
Status: CANCELLED | OUTPATIENT
Start: 2024-01-01

## 2024-01-01 RX ORDER — LIDOCAINE 40 MG/G
CREAM TOPICAL
Status: DISCONTINUED | OUTPATIENT
Start: 2024-01-01 | End: 2024-01-01 | Stop reason: HOSPADM

## 2024-01-01 RX ORDER — IOPAMIDOL 755 MG/ML
100 INJECTION, SOLUTION INTRAVASCULAR ONCE
Status: COMPLETED | OUTPATIENT
Start: 2024-01-01 | End: 2024-01-01

## 2024-01-01 RX ORDER — CEFTRIAXONE 1 G/1
1 INJECTION, POWDER, FOR SOLUTION INTRAMUSCULAR; INTRAVENOUS ONCE
Qty: 10 ML | Refills: 0 | Status: COMPLETED | OUTPATIENT
Start: 2024-01-01 | End: 2024-01-01

## 2024-01-01 RX ORDER — AMOXICILLIN 250 MG
1 CAPSULE ORAL 2 TIMES DAILY PRN
Status: DISCONTINUED | OUTPATIENT
Start: 2024-01-01 | End: 2024-01-01 | Stop reason: HOSPADM

## 2024-01-01 RX ORDER — METRONIDAZOLE 500 MG/1
500 TABLET ORAL 3 TIMES DAILY
Status: DISCONTINUED | OUTPATIENT
Start: 2024-01-01 | End: 2024-01-01 | Stop reason: HOSPADM

## 2024-01-01 RX ORDER — IOPAMIDOL 755 MG/ML
70 INJECTION, SOLUTION INTRAVASCULAR ONCE
Status: COMPLETED | OUTPATIENT
Start: 2024-01-01 | End: 2024-01-01

## 2024-01-01 RX ORDER — ACETAMINOPHEN 325 MG/1
650 TABLET ORAL ONCE
Status: DISCONTINUED | OUTPATIENT
Start: 2024-01-01 | End: 2024-01-01

## 2024-01-01 RX ORDER — ALBUTEROL SULFATE 0.83 MG/ML
2.5 SOLUTION RESPIRATORY (INHALATION) EVERY 4 HOURS PRN
Status: DISCONTINUED | OUTPATIENT
Start: 2024-01-01 | End: 2024-01-01 | Stop reason: HOSPADM

## 2024-01-01 RX ORDER — MIDODRINE HYDROCHLORIDE 5 MG/1
15 TABLET ORAL
Qty: 63 TABLET | Refills: 0 | Status: SHIPPED | OUTPATIENT
Start: 2024-01-01 | End: 2024-02-19

## 2024-01-01 RX ORDER — SODIUM CHLORIDE 9 MG/ML
INJECTION, SOLUTION INTRAVENOUS CONTINUOUS
Status: CANCELLED | OUTPATIENT
Start: 2024-01-01

## 2024-01-01 RX ORDER — DIPHENHYDRAMINE HYDROCHLORIDE 50 MG/ML
50 INJECTION INTRAMUSCULAR; INTRAVENOUS
Status: CANCELLED
Start: 2024-01-01

## 2024-01-01 RX ORDER — LACTULOSE 10 G/10G
20 SOLUTION ORAL 3 TIMES DAILY
Status: DISCONTINUED | OUTPATIENT
Start: 2024-01-01 | End: 2024-01-01

## 2024-01-01 RX ORDER — TRAZODONE HYDROCHLORIDE 50 MG/1
50 TABLET, FILM COATED ORAL AT BEDTIME
Status: ON HOLD | COMMUNITY
End: 2024-01-01

## 2024-01-01 RX ORDER — OXYCODONE HYDROCHLORIDE 5 MG/1
5 TABLET ORAL EVERY 4 HOURS PRN
Qty: 28 TABLET | Refills: 0 | Status: SHIPPED | OUTPATIENT
Start: 2024-01-01 | End: 2024-02-19

## 2024-01-01 RX ORDER — MAGNESIUM HYDROXIDE/ALUMINUM HYDROXICE/SIMETHICONE 120; 1200; 1200 MG/30ML; MG/30ML; MG/30ML
30 SUSPENSION ORAL EVERY 4 HOURS PRN
Status: DISCONTINUED | OUTPATIENT
Start: 2024-01-01 | End: 2024-01-01 | Stop reason: HOSPADM

## 2024-01-01 RX ORDER — ASPIRIN 81 MG/1
243 TABLET, CHEWABLE ORAL ONCE
Status: CANCELLED | OUTPATIENT
Start: 2024-01-01

## 2024-01-01 RX ORDER — NALOXONE HYDROCHLORIDE 0.4 MG/ML
0.2 INJECTION, SOLUTION INTRAMUSCULAR; INTRAVENOUS; SUBCUTANEOUS
Status: DISCONTINUED | OUTPATIENT
Start: 2024-01-01 | End: 2024-01-01 | Stop reason: HOSPADM

## 2024-01-01 RX ORDER — LACTULOSE 10 G/15ML
20 SOLUTION ORAL ONCE
Status: COMPLETED | OUTPATIENT
Start: 2024-01-01 | End: 2024-01-01

## 2024-01-01 RX ORDER — ALBUTEROL SULFATE 90 UG/1
1-2 AEROSOL, METERED RESPIRATORY (INHALATION) EVERY 6 HOURS PRN
Status: DISCONTINUED | OUTPATIENT
Start: 2024-01-01 | End: 2024-01-01 | Stop reason: HOSPADM

## 2024-01-01 RX ORDER — ALBUTEROL SULFATE 90 UG/1
1-2 AEROSOL, METERED RESPIRATORY (INHALATION) EVERY 6 HOURS PRN
Qty: 18 G | Refills: 11 | Status: SHIPPED | OUTPATIENT
Start: 2024-01-01

## 2024-01-01 RX ORDER — CEFTRIAXONE 2 G/1
2 INJECTION, POWDER, FOR SOLUTION INTRAMUSCULAR; INTRAVENOUS EVERY 24 HOURS
Status: DISCONTINUED | OUTPATIENT
Start: 2024-01-01 | End: 2024-01-01 | Stop reason: HOSPADM

## 2024-01-01 RX ORDER — MUPIROCIN 20 MG/G
OINTMENT TOPICAL 3 TIMES DAILY
Qty: 1 G | Refills: 0 | Status: SHIPPED | OUTPATIENT
Start: 2024-01-01

## 2024-01-01 RX ORDER — ACETAMINOPHEN 325 MG/1
650 TABLET ORAL EVERY 8 HOURS PRN
Status: DISCONTINUED | OUTPATIENT
Start: 2024-01-01 | End: 2024-01-01 | Stop reason: HOSPADM

## 2024-01-01 RX ORDER — POTASSIUM CHLORIDE 1500 MG/1
20 TABLET, EXTENDED RELEASE ORAL
Status: CANCELLED | OUTPATIENT
Start: 2024-01-01

## 2024-01-01 RX ORDER — ALBUMIN (HUMAN) 12.5 G/50ML
25-50 SOLUTION INTRAVENOUS ONCE
Status: DISCONTINUED | OUTPATIENT
Start: 2024-01-01 | End: 2024-01-01

## 2024-01-01 RX ORDER — MUPIROCIN 20 MG/G
OINTMENT TOPICAL 3 TIMES DAILY
Qty: 15 G | Refills: 3 | Status: SHIPPED | OUTPATIENT
Start: 2024-01-01 | End: 2024-01-01

## 2024-01-01 RX ORDER — ALBUMIN (HUMAN) 12.5 G/50ML
50 SOLUTION INTRAVENOUS ONCE
Status: COMPLETED | OUTPATIENT
Start: 2024-01-01 | End: 2024-01-01

## 2024-01-01 RX ORDER — HYDROMORPHONE HYDROCHLORIDE 1 MG/ML
0.3 INJECTION, SOLUTION INTRAMUSCULAR; INTRAVENOUS; SUBCUTANEOUS
Status: COMPLETED | OUTPATIENT
Start: 2024-01-01 | End: 2024-01-01

## 2024-01-01 RX ORDER — PIPERACILLIN SODIUM, TAZOBACTAM SODIUM 3; .375 G/15ML; G/15ML
3.38 INJECTION, POWDER, LYOPHILIZED, FOR SOLUTION INTRAVENOUS EVERY 6 HOURS
Status: DISCONTINUED | OUTPATIENT
Start: 2024-01-01 | End: 2024-01-01

## 2024-01-01 RX ORDER — ZINC SULFATE 50(220)MG
220 CAPSULE ORAL DAILY
Status: DISCONTINUED | OUTPATIENT
Start: 2024-01-01 | End: 2024-01-01

## 2024-01-01 RX ORDER — LACTULOSE 10 G/15ML
20 SOLUTION ORAL 3 TIMES DAILY
Status: DISCONTINUED | OUTPATIENT
Start: 2024-01-01 | End: 2024-01-01

## 2024-01-01 RX ORDER — LEVOTHYROXINE SODIUM 112 UG/1
112 TABLET ORAL DAILY
Status: DISCONTINUED | OUTPATIENT
Start: 2024-01-01 | End: 2024-01-01 | Stop reason: HOSPADM

## 2024-01-01 RX ORDER — NALOXONE HYDROCHLORIDE 0.4 MG/ML
0.1 INJECTION, SOLUTION INTRAMUSCULAR; INTRAVENOUS; SUBCUTANEOUS
Status: DISCONTINUED | OUTPATIENT
Start: 2024-01-01 | End: 2024-01-01 | Stop reason: HOSPADM

## 2024-01-01 RX ORDER — PANTOPRAZOLE SODIUM 40 MG/1
1 TABLET, DELAYED RELEASE ORAL
Status: ON HOLD | COMMUNITY
Start: 2024-01-01 | End: 2024-01-01

## 2024-01-01 RX ORDER — OXYMETAZOLINE HYDROCHLORIDE 0.05 G/100ML
2 SPRAY NASAL 2 TIMES DAILY
Status: DISCONTINUED | OUTPATIENT
Start: 2024-01-01 | End: 2024-01-01

## 2024-01-01 RX ORDER — OXYCODONE HYDROCHLORIDE 5 MG/1
5 TABLET ORAL EVERY 4 HOURS PRN
Status: DISCONTINUED | OUTPATIENT
Start: 2024-01-01 | End: 2024-01-01 | Stop reason: HOSPADM

## 2024-01-01 RX ORDER — ACETAMINOPHEN 325 MG/1
650 TABLET ORAL ONCE
Status: COMPLETED | OUTPATIENT
Start: 2024-01-01 | End: 2024-01-01

## 2024-01-01 RX ORDER — LIDOCAINE 40 MG/G
CREAM TOPICAL
Status: ACTIVE | OUTPATIENT
Start: 2024-01-01 | End: 2024-01-01

## 2024-01-01 RX ORDER — LIDOCAINE HYDROCHLORIDE 10 MG/ML
1-30 INJECTION, SOLUTION EPIDURAL; INFILTRATION; INTRACAUDAL; PERINEURAL
Status: COMPLETED | OUTPATIENT
Start: 2024-01-01 | End: 2024-01-01

## 2024-01-01 RX ORDER — LEVOTHYROXINE SODIUM 112 UG/1
112 TABLET ORAL DAILY
Qty: 7 TABLET | Refills: 0 | Status: SHIPPED | OUTPATIENT
Start: 2024-01-01

## 2024-01-01 RX ORDER — CHOLECALCIFEROL (VITAMIN D3) 1250 MCG
50000 CAPSULE ORAL
Status: DISCONTINUED | OUTPATIENT
Start: 2024-01-01 | End: 2024-01-01

## 2024-01-01 RX ORDER — LACTULOSE 10 G/15ML
SOLUTION ORAL
Status: ON HOLD | COMMUNITY
Start: 2023-01-01 | End: 2024-01-01

## 2024-01-01 RX ORDER — FLUTICASONE FUROATE AND VILANTEROL 200; 25 UG/1; UG/1
1 POWDER RESPIRATORY (INHALATION) DAILY
Status: DISCONTINUED | OUTPATIENT
Start: 2024-01-01 | End: 2024-01-01

## 2024-01-01 RX ORDER — VANCOMYCIN HYDROCHLORIDE 1 G/200ML
1000 INJECTION, SOLUTION INTRAVENOUS
Status: DISCONTINUED | OUTPATIENT
Start: 2024-01-01 | End: 2024-01-01

## 2024-01-01 RX ORDER — ONDANSETRON 2 MG/ML
4 INJECTION INTRAMUSCULAR; INTRAVENOUS
Status: COMPLETED | OUTPATIENT
Start: 2024-01-01 | End: 2024-01-01

## 2024-01-01 RX ORDER — HEPARIN SODIUM,PORCINE 10 UNIT/ML
5-20 VIAL (ML) INTRAVENOUS EVERY 24 HOURS
Status: DISCONTINUED | OUTPATIENT
Start: 2024-01-01 | End: 2024-01-01 | Stop reason: HOSPADM

## 2024-01-01 RX ORDER — LACTULOSE 10 G/10G
10 SOLUTION ORAL 3 TIMES DAILY PRN
Qty: 15 EACH | Refills: 0 | Status: SHIPPED | OUTPATIENT
Start: 2024-01-01 | End: 2024-02-19

## 2024-01-01 RX ORDER — HEPARIN SODIUM,PORCINE 10 UNIT/ML
5-20 VIAL (ML) INTRAVENOUS
Status: DISCONTINUED | OUTPATIENT
Start: 2024-01-01 | End: 2024-01-01 | Stop reason: HOSPADM

## 2024-01-01 RX ORDER — PANTOPRAZOLE SODIUM 40 MG/1
40 TABLET, DELAYED RELEASE ORAL 2 TIMES DAILY
Status: DISCONTINUED | OUTPATIENT
Start: 2024-01-01 | End: 2024-01-01 | Stop reason: HOSPADM

## 2024-01-01 RX ORDER — ALBUTEROL SULFATE 0.83 MG/ML
SOLUTION RESPIRATORY (INHALATION)
Qty: 90 ML | Refills: 0 | Status: ON HOLD | OUTPATIENT
Start: 2024-01-01 | End: 2024-01-01

## 2024-01-01 RX ORDER — POLYETHYLENE GLYCOL 3350 17 G/17G
17 POWDER, FOR SOLUTION ORAL 3 TIMES DAILY
Status: DISCONTINUED | OUTPATIENT
Start: 2024-01-01 | End: 2024-01-01 | Stop reason: HOSPADM

## 2024-01-01 RX ORDER — CITALOPRAM HYDROBROMIDE 10 MG/1
40 TABLET ORAL DAILY
Status: DISCONTINUED | OUTPATIENT
Start: 2024-01-01 | End: 2024-01-01 | Stop reason: HOSPADM

## 2024-01-01 RX ORDER — LIDOCAINE 4 G/G
1 PATCH TOPICAL
Status: DISCONTINUED | OUTPATIENT
Start: 2024-01-01 | End: 2024-01-01

## 2024-01-01 RX ORDER — EPINEPHRINE 1 MG/ML
0.3 INJECTION, SOLUTION INTRAMUSCULAR; SUBCUTANEOUS EVERY 5 MIN PRN
Status: CANCELLED | OUTPATIENT
Start: 2024-01-01

## 2024-01-01 RX ORDER — MIDODRINE HYDROCHLORIDE 5 MG/1
15 TABLET ORAL 3 TIMES DAILY PRN
Status: DISCONTINUED | OUTPATIENT
Start: 2024-01-01 | End: 2024-01-01

## 2024-01-01 RX ORDER — FLUTICASONE PROPIONATE AND SALMETEROL 500; 50 UG/1; UG/1
1 POWDER RESPIRATORY (INHALATION) EVERY 12 HOURS
Qty: 60 EACH | Refills: 11 | Status: ON HOLD | OUTPATIENT
Start: 2024-01-01 | End: 2024-01-01

## 2024-01-01 RX ORDER — ALENDRONATE SODIUM 70 MG/1
70 TABLET ORAL
Qty: 12 TABLET | Refills: 4 | Status: ON HOLD | OUTPATIENT
Start: 2024-01-01 | End: 2024-01-01

## 2024-01-01 RX ORDER — SODIUM CHLORIDE 9 MG/ML
INJECTION, SOLUTION INTRAVENOUS CONTINUOUS
Status: DISCONTINUED | OUTPATIENT
Start: 2024-01-01 | End: 2024-01-01 | Stop reason: HOSPADM

## 2024-01-01 RX ORDER — LIDOCAINE 40 MG/G
CREAM TOPICAL
Status: CANCELLED | OUTPATIENT
Start: 2024-01-01

## 2024-01-01 RX ORDER — LIDOCAINE 4 G/G
1 PATCH TOPICAL
Status: DISCONTINUED | OUTPATIENT
Start: 2024-01-01 | End: 2024-01-01 | Stop reason: HOSPADM

## 2024-01-01 RX ORDER — ONDANSETRON 2 MG/ML
4 INJECTION INTRAMUSCULAR; INTRAVENOUS EVERY 8 HOURS PRN
Status: DISCONTINUED | OUTPATIENT
Start: 2024-01-01 | End: 2024-01-01 | Stop reason: HOSPADM

## 2024-01-01 RX ORDER — POTASSIUM CHLORIDE 1500 MG/1
40 TABLET, EXTENDED RELEASE ORAL
Status: CANCELLED | OUTPATIENT
Start: 2024-01-01

## 2024-01-01 RX ORDER — LEVOTHYROXINE SODIUM 112 UG/1
112 TABLET ORAL DAILY
Qty: 90 TABLET | Refills: 0 | Status: ON HOLD | OUTPATIENT
Start: 2024-01-01 | End: 2024-01-01

## 2024-01-01 RX ADMIN — LEVOTHYROXINE SODIUM 112 MCG: 0.11 TABLET ORAL at 08:54

## 2024-01-01 RX ADMIN — MUPIROCIN: 20 OINTMENT TOPICAL at 20:19

## 2024-01-01 RX ADMIN — LACTULOSE 20 G: 20 SOLUTION ORAL at 14:30

## 2024-01-01 RX ADMIN — RIFAXIMIN 550 MG: 550 TABLET ORAL at 07:40

## 2024-01-01 RX ADMIN — DICLOFENAC SODIUM 2 G: 10 GEL TOPICAL at 15:21

## 2024-01-01 RX ADMIN — RIFAXIMIN 550 MG: 550 TABLET ORAL at 19:36

## 2024-01-01 RX ADMIN — MIDODRINE HYDROCHLORIDE 15 MG: 5 TABLET ORAL at 08:22

## 2024-01-01 RX ADMIN — OXYMETHAZOLINE HCL 2 SPRAY: 0.05 SPRAY NASAL at 17:49

## 2024-01-01 RX ADMIN — RIFAXIMIN 550 MG: 550 TABLET ORAL at 07:36

## 2024-01-01 RX ADMIN — ZINC SULFATE 220 MG (50 MG) CAPSULE 220 MG: CAPSULE at 08:23

## 2024-01-01 RX ADMIN — MUPIROCIN: 20 OINTMENT TOPICAL at 19:35

## 2024-01-01 RX ADMIN — LEVOTHYROXINE SODIUM 112 MCG: 0.11 TABLET ORAL at 10:29

## 2024-01-01 RX ADMIN — Medication 5 ML: at 17:51

## 2024-01-01 RX ADMIN — MIDODRINE HYDROCHLORIDE 15 MG: 5 TABLET ORAL at 03:46

## 2024-01-01 RX ADMIN — ACETAMINOPHEN 650 MG: 325 TABLET, FILM COATED ORAL at 18:30

## 2024-01-01 RX ADMIN — METRONIDAZOLE 500 MG: 500 TABLET ORAL at 19:41

## 2024-01-01 RX ADMIN — RIFAXIMIN 550 MG: 550 TABLET ORAL at 20:51

## 2024-01-01 RX ADMIN — CITALOPRAM 40 MG: 40 TABLET ORAL at 07:46

## 2024-01-01 RX ADMIN — MONTELUKAST 10 MG: 10 TABLET, FILM COATED ORAL at 22:34

## 2024-01-01 RX ADMIN — OXYMETHAZOLINE HCL 2 SPRAY: 0.05 SPRAY NASAL at 10:28

## 2024-01-01 RX ADMIN — RIFAXIMIN 550 MG: 550 TABLET ORAL at 08:22

## 2024-01-01 RX ADMIN — MUPIROCIN: 20 OINTMENT TOPICAL at 09:26

## 2024-01-01 RX ADMIN — LACTULOSE 20 G: 20 SOLUTION ORAL at 07:37

## 2024-01-01 RX ADMIN — OXYMETHAZOLINE HCL 2 SPRAY: 0.05 SPRAY NASAL at 08:22

## 2024-01-01 RX ADMIN — RIFAXIMIN 550 MG: 550 TABLET ORAL at 07:46

## 2024-01-01 RX ADMIN — MONTELUKAST 10 MG: 10 TABLET, FILM COATED ORAL at 22:28

## 2024-01-01 RX ADMIN — DORNASE ALFA 50 ML: 1 SOLUTION RESPIRATORY (INHALATION) at 07:58

## 2024-01-01 RX ADMIN — MONTELUKAST 10 MG: 10 TABLET, FILM COATED ORAL at 20:17

## 2024-01-01 RX ADMIN — DICLOFENAC SODIUM 2 G: 10 GEL TOPICAL at 20:16

## 2024-01-01 RX ADMIN — PANTOPRAZOLE SODIUM 40 MG: 40 TABLET, DELAYED RELEASE ORAL at 07:36

## 2024-01-01 RX ADMIN — OXYCODONE HYDROCHLORIDE 5 MG: 5 TABLET ORAL at 18:30

## 2024-01-01 RX ADMIN — METRONIDAZOLE 500 MG: 500 TABLET ORAL at 08:22

## 2024-01-01 RX ADMIN — ACETAMINOPHEN 650 MG: 325 TABLET, FILM COATED ORAL at 13:39

## 2024-01-01 RX ADMIN — MIDODRINE HYDROCHLORIDE 15 MG: 5 TABLET ORAL at 13:47

## 2024-01-01 RX ADMIN — OXYMETHAZOLINE HCL 2 SPRAY: 0.05 SPRAY NASAL at 09:26

## 2024-01-01 RX ADMIN — FLUTICASONE FUROATE AND VILANTEROL TRIFENATATE 1 PUFF: 200; 25 POWDER RESPIRATORY (INHALATION) at 08:59

## 2024-01-01 RX ADMIN — MUPIROCIN: 20 OINTMENT TOPICAL at 08:01

## 2024-01-01 RX ADMIN — RIFAXIMIN 550 MG: 550 TABLET ORAL at 08:54

## 2024-01-01 RX ADMIN — LEVOTHYROXINE SODIUM 112 MCG: 0.11 TABLET ORAL at 08:21

## 2024-01-01 RX ADMIN — OXYMETHAZOLINE HCL 2 SPRAY: 0.05 SPRAY NASAL at 08:01

## 2024-01-01 RX ADMIN — Medication 1 LOZENGE: at 22:50

## 2024-01-01 RX ADMIN — LIDOCAINE 1 PATCH: 560 PATCH PERCUTANEOUS; TOPICAL; TRANSDERMAL at 09:21

## 2024-01-01 RX ADMIN — FLUTICASONE FUROATE AND VILANTEROL TRIFENATATE 1 PUFF: 200; 25 POWDER RESPIRATORY (INHALATION) at 09:20

## 2024-01-01 RX ADMIN — PANTOPRAZOLE SODIUM 40 MG: 40 TABLET, DELAYED RELEASE ORAL at 20:16

## 2024-01-01 RX ADMIN — MIDODRINE HYDROCHLORIDE 15 MG: 5 TABLET ORAL at 07:41

## 2024-01-01 RX ADMIN — SODIUM CHLORIDE: 4 INJECTION, SOLUTION, CONCENTRATE INTRAVENOUS at 11:36

## 2024-01-01 RX ADMIN — ACETAMINOPHEN 650 MG: 325 TABLET, FILM COATED ORAL at 10:11

## 2024-01-01 RX ADMIN — MUPIROCIN: 20 OINTMENT TOPICAL at 19:36

## 2024-01-01 RX ADMIN — LACTULOSE 20 G: 20 SOLUTION ORAL at 08:59

## 2024-01-01 RX ADMIN — ACETAMINOPHEN 650 MG: 325 TABLET, FILM COATED ORAL at 22:03

## 2024-01-01 RX ADMIN — MUPIROCIN: 20 OINTMENT TOPICAL at 08:27

## 2024-01-01 RX ADMIN — MUPIROCIN: 20 OINTMENT TOPICAL at 13:47

## 2024-01-01 RX ADMIN — METRONIDAZOLE 500 MG: 500 TABLET ORAL at 07:36

## 2024-01-01 RX ADMIN — OXYCODONE HYDROCHLORIDE 5 MG: 5 TABLET ORAL at 20:51

## 2024-01-01 RX ADMIN — PANTOPRAZOLE SODIUM 40 MG: 40 TABLET, DELAYED RELEASE ORAL at 09:42

## 2024-01-01 RX ADMIN — CITALOPRAM 40 MG: 40 TABLET ORAL at 08:20

## 2024-01-01 RX ADMIN — MUPIROCIN: 20 OINTMENT TOPICAL at 08:32

## 2024-01-01 RX ADMIN — MIDODRINE HYDROCHLORIDE 15 MG: 5 TABLET ORAL at 04:37

## 2024-01-01 RX ADMIN — MUPIROCIN: 20 OINTMENT TOPICAL at 08:16

## 2024-01-01 RX ADMIN — METRONIDAZOLE 500 MG: 500 TABLET ORAL at 08:54

## 2024-01-01 RX ADMIN — METRONIDAZOLE 500 MG: 500 TABLET ORAL at 14:21

## 2024-01-01 RX ADMIN — MONTELUKAST 10 MG: 10 TABLET, FILM COATED ORAL at 21:45

## 2024-01-01 RX ADMIN — MIDODRINE HYDROCHLORIDE 15 MG: 5 TABLET ORAL at 13:20

## 2024-01-01 RX ADMIN — OXYMETHAZOLINE HCL 2 SPRAY: 0.05 SPRAY NASAL at 17:43

## 2024-01-01 RX ADMIN — PANTOPRAZOLE SODIUM 40 MG: 40 TABLET, DELAYED RELEASE ORAL at 20:00

## 2024-01-01 RX ADMIN — MIDODRINE HYDROCHLORIDE 15 MG: 5 TABLET ORAL at 13:10

## 2024-01-01 RX ADMIN — MIDODRINE HYDROCHLORIDE 15 MG: 5 TABLET ORAL at 14:43

## 2024-01-01 RX ADMIN — LACTULOSE 20 G: 20 SOLUTION ORAL at 13:17

## 2024-01-01 RX ADMIN — METRONIDAZOLE 500 MG: 500 TABLET ORAL at 13:28

## 2024-01-01 RX ADMIN — MONTELUKAST 10 MG: 10 TABLET, FILM COATED ORAL at 22:25

## 2024-01-01 RX ADMIN — CITALOPRAM 40 MG: 40 TABLET ORAL at 07:40

## 2024-01-01 RX ADMIN — MONTELUKAST 10 MG: 10 TABLET, FILM COATED ORAL at 21:17

## 2024-01-01 RX ADMIN — MUPIROCIN: 20 OINTMENT TOPICAL at 09:08

## 2024-01-01 RX ADMIN — MUPIROCIN: 20 OINTMENT TOPICAL at 14:01

## 2024-01-01 RX ADMIN — METRONIDAZOLE 500 MG: 500 TABLET ORAL at 14:29

## 2024-01-01 RX ADMIN — MONTELUKAST 10 MG: 10 TABLET, FILM COATED ORAL at 22:48

## 2024-01-01 RX ADMIN — LACTULOSE 20 G: 20 SOLUTION ORAL at 13:18

## 2024-01-01 RX ADMIN — LIDOCAINE 1 PATCH: 560 PATCH PERCUTANEOUS; TOPICAL; TRANSDERMAL at 10:00

## 2024-01-01 RX ADMIN — MUPIROCIN: 20 OINTMENT TOPICAL at 19:39

## 2024-01-01 RX ADMIN — MUPIROCIN: 20 OINTMENT TOPICAL at 14:32

## 2024-01-01 RX ADMIN — FLUTICASONE FUROATE AND VILANTEROL TRIFENATATE 1 PUFF: 200; 25 POWDER RESPIRATORY (INHALATION) at 08:53

## 2024-01-01 RX ADMIN — SODIUM CHLORIDE: 9 INJECTION, SOLUTION INTRAVENOUS at 10:31

## 2024-01-01 RX ADMIN — LIDOCAINE HYDROCHLORIDE 5 ML: 10 INJECTION, SOLUTION EPIDURAL; INFILTRATION; INTRACAUDAL; PERINEURAL at 16:58

## 2024-01-01 RX ADMIN — MIDODRINE HYDROCHLORIDE 15 MG: 5 TABLET ORAL at 01:18

## 2024-01-01 RX ADMIN — MUPIROCIN: 20 OINTMENT TOPICAL at 14:35

## 2024-01-01 RX ADMIN — PANTOPRAZOLE SODIUM 40 MG: 40 TABLET, DELAYED RELEASE ORAL at 19:36

## 2024-01-01 RX ADMIN — DORNASE ALFA 50 ML: 1 SOLUTION RESPIRATORY (INHALATION) at 10:52

## 2024-01-01 RX ADMIN — MIDODRINE HYDROCHLORIDE 15 MG: 5 TABLET ORAL at 19:41

## 2024-01-01 RX ADMIN — MIDODRINE HYDROCHLORIDE 15 MG: 5 TABLET ORAL at 13:28

## 2024-01-01 RX ADMIN — LACTULOSE 20 G: 20 SOLUTION ORAL at 14:11

## 2024-01-01 RX ADMIN — Medication 5 ML: at 21:39

## 2024-01-01 RX ADMIN — METRONIDAZOLE 500 MG: 500 TABLET ORAL at 14:04

## 2024-01-01 RX ADMIN — OXYMETHAZOLINE HCL 2 SPRAY: 0.05 SPRAY NASAL at 09:31

## 2024-01-01 RX ADMIN — MIDODRINE HYDROCHLORIDE 15 MG: 5 TABLET ORAL at 18:24

## 2024-01-01 RX ADMIN — METRONIDAZOLE 500 MG: 500 TABLET ORAL at 09:53

## 2024-01-01 RX ADMIN — METRONIDAZOLE 500 MG: 500 TABLET ORAL at 08:11

## 2024-01-01 RX ADMIN — MUPIROCIN: 20 OINTMENT TOPICAL at 07:54

## 2024-01-01 RX ADMIN — METRONIDAZOLE 500 MG: 500 TABLET ORAL at 13:47

## 2024-01-01 RX ADMIN — MUPIROCIN: 20 OINTMENT TOPICAL at 07:41

## 2024-01-01 RX ADMIN — LACTULOSE 20 G: 20 SOLUTION ORAL at 20:18

## 2024-01-01 RX ADMIN — MUPIROCIN: 20 OINTMENT TOPICAL at 13:26

## 2024-01-01 RX ADMIN — OXYMETHAZOLINE HCL 2 SPRAY: 0.05 SPRAY NASAL at 08:21

## 2024-01-01 RX ADMIN — PANTOPRAZOLE SODIUM 40 MG: 40 TABLET, DELAYED RELEASE ORAL at 20:34

## 2024-01-01 RX ADMIN — OXYCODONE HYDROCHLORIDE 5 MG: 5 TABLET ORAL at 15:56

## 2024-01-01 RX ADMIN — MONTELUKAST 10 MG: 10 TABLET, FILM COATED ORAL at 21:03

## 2024-01-01 RX ADMIN — DICLOFENAC SODIUM 2 G: 10 GEL TOPICAL at 12:01

## 2024-01-01 RX ADMIN — RIFAXIMIN 550 MG: 550 TABLET ORAL at 08:56

## 2024-01-01 RX ADMIN — LACTULOSE 20 G: 20 SOLUTION ORAL at 19:41

## 2024-01-01 RX ADMIN — CEFTRIAXONE SODIUM 1 G: 1 INJECTION, POWDER, FOR SOLUTION INTRAMUSCULAR; INTRAVENOUS at 04:35

## 2024-01-01 RX ADMIN — LACTULOSE 20 G: 20 SOLUTION ORAL at 13:46

## 2024-01-01 RX ADMIN — CITALOPRAM 40 MG: 40 TABLET ORAL at 08:57

## 2024-01-01 RX ADMIN — PANTOPRAZOLE SODIUM 40 MG: 40 TABLET, DELAYED RELEASE ORAL at 08:30

## 2024-01-01 RX ADMIN — VANCOMYCIN HYDROCHLORIDE 1000 MG: 1 INJECTION, SOLUTION INTRAVENOUS at 02:26

## 2024-01-01 RX ADMIN — DORNASE ALFA 50 ML: 1 SOLUTION RESPIRATORY (INHALATION) at 11:31

## 2024-01-01 RX ADMIN — CITALOPRAM 40 MG: 40 TABLET ORAL at 08:11

## 2024-01-01 RX ADMIN — PANTOPRAZOLE SODIUM 40 MG: 40 TABLET, DELAYED RELEASE ORAL at 08:21

## 2024-01-01 RX ADMIN — RIFAXIMIN 550 MG: 550 TABLET ORAL at 19:35

## 2024-01-01 RX ADMIN — MONTELUKAST 10 MG: 10 TABLET, FILM COATED ORAL at 20:41

## 2024-01-01 RX ADMIN — MUPIROCIN: 20 OINTMENT TOPICAL at 07:36

## 2024-01-01 RX ADMIN — PANTOPRAZOLE SODIUM 40 MG: 40 TABLET, DELAYED RELEASE ORAL at 21:03

## 2024-01-01 RX ADMIN — LACTULOSE 20 G: 20 SOLUTION ORAL at 23:20

## 2024-01-01 RX ADMIN — LEVOTHYROXINE SODIUM 112 MCG: 0.11 TABLET ORAL at 09:03

## 2024-01-01 RX ADMIN — DICLOFENAC SODIUM 2 G: 10 GEL TOPICAL at 07:42

## 2024-01-01 RX ADMIN — SODIUM CHLORIDE: 4 INJECTION, SOLUTION, CONCENTRATE INTRAVENOUS at 05:49

## 2024-01-01 RX ADMIN — ACETAMINOPHEN 650 MG: 325 TABLET, FILM COATED ORAL at 04:36

## 2024-01-01 RX ADMIN — RIFAXIMIN 550 MG: 550 TABLET ORAL at 20:19

## 2024-01-01 RX ADMIN — LACTULOSE 20 G: 20 SOLUTION ORAL at 07:46

## 2024-01-01 RX ADMIN — Medication 5 ML: at 15:49

## 2024-01-01 RX ADMIN — LEVOTHYROXINE SODIUM 112 MCG: 0.11 TABLET ORAL at 09:20

## 2024-01-01 RX ADMIN — METRONIDAZOLE 500 MG: 500 TABLET ORAL at 13:10

## 2024-01-01 RX ADMIN — LIDOCAINE 1 PATCH: 560 PATCH PERCUTANEOUS; TOPICAL; TRANSDERMAL at 09:01

## 2024-01-01 RX ADMIN — RIFAXIMIN 550 MG: 550 TABLET ORAL at 20:16

## 2024-01-01 RX ADMIN — CEFTRIAXONE SODIUM 2 G: 2 INJECTION, POWDER, FOR SOLUTION INTRAMUSCULAR; INTRAVENOUS at 14:04

## 2024-01-01 RX ADMIN — MIDODRINE HYDROCHLORIDE 15 MG: 5 TABLET ORAL at 09:20

## 2024-01-01 RX ADMIN — METRONIDAZOLE 500 MG: 500 TABLET ORAL at 19:35

## 2024-01-01 RX ADMIN — LACTULOSE 20 G: 20 SOLUTION ORAL at 07:41

## 2024-01-01 RX ADMIN — METRONIDAZOLE 500 MG: 500 TABLET ORAL at 19:54

## 2024-01-01 RX ADMIN — RIFAXIMIN 550 MG: 550 TABLET ORAL at 19:54

## 2024-01-01 RX ADMIN — OXYMETHAZOLINE HCL 2 SPRAY: 0.05 SPRAY NASAL at 17:29

## 2024-01-01 RX ADMIN — Medication 5 ML: at 17:24

## 2024-01-01 RX ADMIN — OXYMETHAZOLINE HCL 2 SPRAY: 0.05 SPRAY NASAL at 19:21

## 2024-01-01 RX ADMIN — MUPIROCIN: 20 OINTMENT TOPICAL at 19:29

## 2024-01-01 RX ADMIN — MIDODRINE HYDROCHLORIDE 15 MG: 5 TABLET ORAL at 16:14

## 2024-01-01 RX ADMIN — PANTOPRAZOLE SODIUM 40 MG: 40 TABLET, DELAYED RELEASE ORAL at 07:41

## 2024-01-01 RX ADMIN — ALBUMIN HUMAN 75 G: 0.25 SOLUTION INTRAVENOUS at 14:44

## 2024-01-01 RX ADMIN — PANTOPRAZOLE SODIUM 40 MG: 40 TABLET, DELAYED RELEASE ORAL at 19:52

## 2024-01-01 RX ADMIN — ALBUMIN HUMAN 75 G: 0.25 SOLUTION INTRAVENOUS at 12:15

## 2024-01-01 RX ADMIN — METRONIDAZOLE 500 MG: 500 TABLET ORAL at 08:56

## 2024-01-01 RX ADMIN — MUPIROCIN: 20 OINTMENT TOPICAL at 14:12

## 2024-01-01 RX ADMIN — PANTOPRAZOLE SODIUM 40 MG: 40 TABLET, DELAYED RELEASE ORAL at 19:42

## 2024-01-01 RX ADMIN — MIDODRINE HYDROCHLORIDE 15 MG: 5 TABLET ORAL at 18:31

## 2024-01-01 RX ADMIN — PANTOPRAZOLE SODIUM 40 MG: 40 TABLET, DELAYED RELEASE ORAL at 20:41

## 2024-01-01 RX ADMIN — CEFTRIAXONE SODIUM 2 G: 2 INJECTION, POWDER, FOR SOLUTION INTRAMUSCULAR; INTRAVENOUS at 13:50

## 2024-01-01 RX ADMIN — METRONIDAZOLE 500 MG: 500 TABLET ORAL at 07:40

## 2024-01-01 RX ADMIN — OXYMETHAZOLINE HCL 2 SPRAY: 0.05 SPRAY NASAL at 09:43

## 2024-01-01 RX ADMIN — SODIUM CHLORIDE: 4 INJECTION, SOLUTION, CONCENTRATE INTRAVENOUS at 07:01

## 2024-01-01 RX ADMIN — CITALOPRAM 40 MG: 40 TABLET ORAL at 09:42

## 2024-01-01 RX ADMIN — MIDODRINE HYDROCHLORIDE 15 MG: 5 TABLET ORAL at 08:57

## 2024-01-01 RX ADMIN — MIDODRINE HYDROCHLORIDE 15 MG: 5 TABLET ORAL at 14:54

## 2024-01-01 RX ADMIN — MUPIROCIN: 20 OINTMENT TOPICAL at 13:49

## 2024-01-01 RX ADMIN — MUPIROCIN: 20 OINTMENT TOPICAL at 08:29

## 2024-01-01 RX ADMIN — CEFTRIAXONE SODIUM 2 G: 2 INJECTION, POWDER, FOR SOLUTION INTRAMUSCULAR; INTRAVENOUS at 13:47

## 2024-01-01 RX ADMIN — METRONIDAZOLE 500 MG: 500 TABLET ORAL at 13:51

## 2024-01-01 RX ADMIN — MUPIROCIN: 20 OINTMENT TOPICAL at 14:54

## 2024-01-01 RX ADMIN — FLUTICASONE FUROATE AND VILANTEROL TRIFENATATE 1 PUFF: 200; 25 POWDER RESPIRATORY (INHALATION) at 08:01

## 2024-01-01 RX ADMIN — LEVOTHYROXINE SODIUM 112 MCG: 0.11 TABLET ORAL at 09:53

## 2024-01-01 RX ADMIN — MIDODRINE HYDROCHLORIDE 15 MG: 5 TABLET ORAL at 22:48

## 2024-01-01 RX ADMIN — PIPERACILLIN AND TAZOBACTAM 3.38 G: 3; .375 INJECTION, POWDER, LYOPHILIZED, FOR SOLUTION INTRAVENOUS at 08:17

## 2024-01-01 RX ADMIN — OXYMETHAZOLINE HCL 2 SPRAY: 0.05 SPRAY NASAL at 07:53

## 2024-01-01 RX ADMIN — LEVOTHYROXINE SODIUM 112 MCG: 0.11 TABLET ORAL at 09:32

## 2024-01-01 RX ADMIN — METRONIDAZOLE 500 MG: 500 TABLET ORAL at 20:40

## 2024-01-01 RX ADMIN — CEFTRIAXONE SODIUM 2 G: 2 INJECTION, POWDER, FOR SOLUTION INTRAMUSCULAR; INTRAVENOUS at 13:19

## 2024-01-01 RX ADMIN — ONDANSETRON 4 MG: 2 INJECTION INTRAMUSCULAR; INTRAVENOUS at 02:09

## 2024-01-01 RX ADMIN — FLUTICASONE FUROATE AND VILANTEROL TRIFENATATE 1 PUFF: 200; 25 POWDER RESPIRATORY (INHALATION) at 07:53

## 2024-01-01 RX ADMIN — MUPIROCIN: 20 OINTMENT TOPICAL at 13:51

## 2024-01-01 RX ADMIN — PANTOPRAZOLE SODIUM 40 MG: 40 TABLET, DELAYED RELEASE ORAL at 08:55

## 2024-01-01 RX ADMIN — OXYMETHAZOLINE HCL 2 SPRAY: 0.05 SPRAY NASAL at 17:24

## 2024-01-01 RX ADMIN — OXYMETHAZOLINE HCL 2 SPRAY: 0.05 SPRAY NASAL at 18:21

## 2024-01-01 RX ADMIN — METRONIDAZOLE 500 MG: 500 TABLET ORAL at 09:42

## 2024-01-01 RX ADMIN — PANTOPRAZOLE SODIUM 40 MG: 40 TABLET, DELAYED RELEASE ORAL at 20:27

## 2024-01-01 RX ADMIN — LACTULOSE 20 G: 20 SOLUTION ORAL at 19:52

## 2024-01-01 RX ADMIN — METRONIDAZOLE 500 MG: 500 TABLET ORAL at 20:51

## 2024-01-01 RX ADMIN — POLYETHYLENE GLYCOL 3350 17 G: 17 POWDER, FOR SOLUTION ORAL at 10:11

## 2024-01-01 RX ADMIN — MONTELUKAST 10 MG: 10 TABLET, FILM COATED ORAL at 21:28

## 2024-01-01 RX ADMIN — MIDODRINE HYDROCHLORIDE 15 MG: 5 TABLET ORAL at 08:15

## 2024-01-01 RX ADMIN — MUPIROCIN: 20 OINTMENT TOPICAL at 21:10

## 2024-01-01 RX ADMIN — FLUTICASONE FUROATE AND VILANTEROL TRIFENATATE 1 PUFF: 200; 25 POWDER RESPIRATORY (INHALATION) at 09:37

## 2024-01-01 RX ADMIN — SODIUM CHLORIDE: 4 INJECTION, SOLUTION, CONCENTRATE INTRAVENOUS at 10:36

## 2024-01-01 RX ADMIN — LACTULOSE 20 G: 20 SOLUTION ORAL at 13:50

## 2024-01-01 RX ADMIN — MUPIROCIN: 20 OINTMENT TOPICAL at 13:10

## 2024-01-01 RX ADMIN — MUPIROCIN: 20 OINTMENT TOPICAL at 16:10

## 2024-01-01 RX ADMIN — LEVOTHYROXINE SODIUM 112 MCG: 0.11 TABLET ORAL at 08:15

## 2024-01-01 RX ADMIN — LACTULOSE 20 G: 20 SOLUTION ORAL at 09:21

## 2024-01-01 RX ADMIN — RIFAXIMIN 550 MG: 550 TABLET ORAL at 20:34

## 2024-01-01 RX ADMIN — LACTULOSE 20 G: 20 SOLUTION ORAL at 19:42

## 2024-01-01 RX ADMIN — MUPIROCIN: 20 OINTMENT TOPICAL at 13:18

## 2024-01-01 RX ADMIN — CEFTRIAXONE SODIUM 2 G: 2 INJECTION, POWDER, FOR SOLUTION INTRAMUSCULAR; INTRAVENOUS at 13:34

## 2024-01-01 RX ADMIN — SODIUM CHLORIDE: 4 INJECTION, SOLUTION, CONCENTRATE INTRAVENOUS at 00:44

## 2024-01-01 RX ADMIN — MIDODRINE HYDROCHLORIDE 15 MG: 5 TABLET ORAL at 17:31

## 2024-01-01 RX ADMIN — CITALOPRAM 40 MG: 40 TABLET ORAL at 08:15

## 2024-01-01 RX ADMIN — CEFTRIAXONE SODIUM 1 G: 1 INJECTION, POWDER, FOR SOLUTION INTRAMUSCULAR; INTRAVENOUS at 14:09

## 2024-01-01 RX ADMIN — MIDODRINE HYDROCHLORIDE 15 MG: 5 TABLET ORAL at 08:21

## 2024-01-01 RX ADMIN — RIFAXIMIN 550 MG: 550 TABLET ORAL at 19:39

## 2024-01-01 RX ADMIN — MIDODRINE HYDROCHLORIDE 15 MG: 5 TABLET ORAL at 14:37

## 2024-01-01 RX ADMIN — ACETAMINOPHEN 650 MG: 325 TABLET, FILM COATED ORAL at 19:36

## 2024-01-01 RX ADMIN — FLUTICASONE FUROATE AND VILANTEROL TRIFENATATE 1 PUFF: 200; 25 POWDER RESPIRATORY (INHALATION) at 07:36

## 2024-01-01 RX ADMIN — RIFAXIMIN 550 MG: 550 TABLET ORAL at 08:21

## 2024-01-01 RX ADMIN — OXYMETHAZOLINE HCL 2 SPRAY: 0.05 SPRAY NASAL at 17:37

## 2024-01-01 RX ADMIN — ACETAMINOPHEN 650 MG: 325 TABLET, FILM COATED ORAL at 05:33

## 2024-01-01 RX ADMIN — CEFTRIAXONE SODIUM 1 G: 1 INJECTION, POWDER, FOR SOLUTION INTRAMUSCULAR; INTRAVENOUS at 02:54

## 2024-01-01 RX ADMIN — LACTULOSE 20 G: 20 SOLUTION ORAL at 21:38

## 2024-01-01 RX ADMIN — ACETAMINOPHEN 650 MG: 325 TABLET, FILM COATED ORAL at 15:54

## 2024-01-01 RX ADMIN — ALBUMIN HUMAN 50 G: 0.25 SOLUTION INTRAVENOUS at 17:00

## 2024-01-01 RX ADMIN — ACETAMINOPHEN 650 MG: 325 TABLET, FILM COATED ORAL at 23:34

## 2024-01-01 RX ADMIN — RIFAXIMIN 550 MG: 550 TABLET ORAL at 20:27

## 2024-01-01 RX ADMIN — MUPIROCIN: 20 OINTMENT TOPICAL at 20:17

## 2024-01-01 RX ADMIN — ACETAMINOPHEN 650 MG: 325 TABLET, FILM COATED ORAL at 19:49

## 2024-01-01 RX ADMIN — METRONIDAZOLE 500 MG: 500 TABLET ORAL at 20:19

## 2024-01-01 RX ADMIN — FLUTICASONE FUROATE AND VILANTEROL TRIFENATATE 1 PUFF: 200; 25 POWDER RESPIRATORY (INHALATION) at 08:16

## 2024-01-01 RX ADMIN — PANTOPRAZOLE SODIUM 40 MG: 40 TABLET, DELAYED RELEASE ORAL at 08:15

## 2024-01-01 RX ADMIN — METRONIDAZOLE 500 MG: 500 TABLET ORAL at 09:20

## 2024-01-01 RX ADMIN — LACTULOSE 20 G: 20 SOLUTION ORAL at 09:52

## 2024-01-01 RX ADMIN — ZINC SULFATE 220 MG (50 MG) CAPSULE 220 MG: CAPSULE at 10:39

## 2024-01-01 RX ADMIN — MUPIROCIN: 20 OINTMENT TOPICAL at 15:02

## 2024-01-01 RX ADMIN — PANTOPRAZOLE SODIUM 40 MG: 40 TABLET, DELAYED RELEASE ORAL at 19:35

## 2024-01-01 RX ADMIN — LIDOCAINE HYDROCHLORIDE 28 ML: 10 INJECTION, SOLUTION EPIDURAL; INFILTRATION; INTRACAUDAL; PERINEURAL at 16:15

## 2024-01-01 RX ADMIN — MIDODRINE HYDROCHLORIDE 15 MG: 5 TABLET ORAL at 09:42

## 2024-01-01 RX ADMIN — RIFAXIMIN 550 MG: 550 TABLET ORAL at 08:15

## 2024-01-01 RX ADMIN — LEVOTHYROXINE SODIUM 112 MCG: 0.11 TABLET ORAL at 07:46

## 2024-01-01 RX ADMIN — MIDODRINE HYDROCHLORIDE 15 MG: 5 TABLET ORAL at 13:18

## 2024-01-01 RX ADMIN — LACTULOSE 20 G: 20 SOLUTION ORAL at 19:54

## 2024-01-01 RX ADMIN — FLUTICASONE FUROATE AND VILANTEROL TRIFENATATE 1 PUFF: 200; 25 POWDER RESPIRATORY (INHALATION) at 08:23

## 2024-01-01 RX ADMIN — METRONIDAZOLE 500 MG: 500 TABLET ORAL at 10:29

## 2024-01-01 RX ADMIN — PANTOPRAZOLE SODIUM 40 MG: 40 TABLET, DELAYED RELEASE ORAL at 19:41

## 2024-01-01 RX ADMIN — MIDODRINE HYDROCHLORIDE 15 MG: 5 TABLET ORAL at 08:31

## 2024-01-01 RX ADMIN — METRONIDAZOLE 500 MG: 500 TABLET ORAL at 07:41

## 2024-01-01 RX ADMIN — LEVOTHYROXINE SODIUM 112 MCG: 0.11 TABLET ORAL at 08:23

## 2024-01-01 RX ADMIN — RIFAXIMIN 550 MG: 550 TABLET ORAL at 08:31

## 2024-01-01 RX ADMIN — RIFAXIMIN 550 MG: 550 TABLET ORAL at 21:05

## 2024-01-01 RX ADMIN — LACTULOSE 20 G: 20 SOLUTION ORAL at 09:41

## 2024-01-01 RX ADMIN — ACETAMINOPHEN 650 MG: 325 TABLET, FILM COATED ORAL at 06:35

## 2024-01-01 RX ADMIN — LACTULOSE 20 G: 20 SOLUTION ORAL at 08:21

## 2024-01-01 RX ADMIN — RIFAXIMIN 550 MG: 550 TABLET ORAL at 21:03

## 2024-01-01 RX ADMIN — MIDODRINE HYDROCHLORIDE 15 MG: 5 TABLET ORAL at 11:44

## 2024-01-01 RX ADMIN — PANTOPRAZOLE SODIUM 40 MG: 40 TABLET, DELAYED RELEASE ORAL at 20:51

## 2024-01-01 RX ADMIN — PIPERACILLIN AND TAZOBACTAM 3.38 G: 3; .375 INJECTION, POWDER, LYOPHILIZED, FOR SOLUTION INTRAVENOUS at 14:07

## 2024-01-01 RX ADMIN — MUPIROCIN: 20 OINTMENT TOPICAL at 20:00

## 2024-01-01 RX ADMIN — PANTOPRAZOLE SODIUM 40 MG: 40 TABLET, DELAYED RELEASE ORAL at 08:23

## 2024-01-01 RX ADMIN — Medication 5 ML: at 15:58

## 2024-01-01 RX ADMIN — MIDODRINE HYDROCHLORIDE 15 MG: 5 TABLET ORAL at 11:19

## 2024-01-01 RX ADMIN — MIDODRINE HYDROCHLORIDE 15 MG: 5 TABLET ORAL at 09:32

## 2024-01-01 RX ADMIN — MONTELUKAST 10 MG: 10 TABLET, FILM COATED ORAL at 21:29

## 2024-01-01 RX ADMIN — POLYETHYLENE GLYCOL 3350 17 G: 17 POWDER, FOR SOLUTION ORAL at 10:28

## 2024-01-01 RX ADMIN — LEVOTHYROXINE SODIUM 112 MCG: 0.11 TABLET ORAL at 08:57

## 2024-01-01 RX ADMIN — METRONIDAZOLE 500 MG: 500 TABLET ORAL at 19:39

## 2024-01-01 RX ADMIN — MUPIROCIN: 20 OINTMENT TOPICAL at 13:46

## 2024-01-01 RX ADMIN — CEFTRIAXONE SODIUM 2 G: 2 INJECTION, POWDER, FOR SOLUTION INTRAMUSCULAR; INTRAVENOUS at 14:43

## 2024-01-01 RX ADMIN — Medication 10 ML: at 18:24

## 2024-01-01 RX ADMIN — LIDOCAINE 1 PATCH: 560 PATCH PERCUTANEOUS; TOPICAL; TRANSDERMAL at 10:29

## 2024-01-01 RX ADMIN — MUPIROCIN: 20 OINTMENT TOPICAL at 07:39

## 2024-01-01 RX ADMIN — OXYMETHAZOLINE HCL 2 SPRAY: 0.05 SPRAY NASAL at 08:59

## 2024-01-01 RX ADMIN — FLUTICASONE FUROATE AND VILANTEROL TRIFENATATE 1 PUFF: 200; 25 POWDER RESPIRATORY (INHALATION) at 08:10

## 2024-01-01 RX ADMIN — METRONIDAZOLE 500 MG: 500 TABLET ORAL at 21:05

## 2024-01-01 RX ADMIN — OXYMETHAZOLINE HCL 2 SPRAY: 0.05 SPRAY NASAL at 09:07

## 2024-01-01 RX ADMIN — LACTULOSE 20 G: 20 SOLUTION ORAL at 13:38

## 2024-01-01 RX ADMIN — MUPIROCIN: 20 OINTMENT TOPICAL at 20:49

## 2024-01-01 RX ADMIN — LACTULOSE 20 G: 20 SOLUTION ORAL at 08:11

## 2024-01-01 RX ADMIN — FLUTICASONE FUROATE AND VILANTEROL TRIFENATATE 1 PUFF: 200; 25 POWDER RESPIRATORY (INHALATION) at 07:41

## 2024-01-01 RX ADMIN — LIDOCAINE 1 PATCH: 4 PATCH TOPICAL at 08:22

## 2024-01-01 RX ADMIN — MONTELUKAST 10 MG: 10 TABLET, FILM COATED ORAL at 21:05

## 2024-01-01 RX ADMIN — MIDODRINE HYDROCHLORIDE 15 MG: 5 TABLET ORAL at 11:49

## 2024-01-01 RX ADMIN — OXYMETHAZOLINE HCL 2 SPRAY: 0.05 SPRAY NASAL at 18:24

## 2024-01-01 RX ADMIN — METRONIDAZOLE 500 MG: 500 TABLET ORAL at 20:16

## 2024-01-01 RX ADMIN — PANTOPRAZOLE SODIUM 40 MG: 40 TABLET, DELAYED RELEASE ORAL at 09:03

## 2024-01-01 RX ADMIN — MIDODRINE HYDROCHLORIDE 15 MG: 5 TABLET ORAL at 13:50

## 2024-01-01 RX ADMIN — RIFAXIMIN 550 MG: 550 TABLET ORAL at 20:00

## 2024-01-01 RX ADMIN — DORNASE ALFA 50 ML: 1 SOLUTION RESPIRATORY (INHALATION) at 19:57

## 2024-01-01 RX ADMIN — LACTULOSE 20 G: 20 SOLUTION ORAL at 08:57

## 2024-01-01 RX ADMIN — RIFAXIMIN 550 MG: 550 TABLET ORAL at 08:01

## 2024-01-01 RX ADMIN — CHOLECALCIFEROL CAP 1.25 MG (50000 UNIT) 50000 UNITS: 1.25 CAP at 07:36

## 2024-01-01 RX ADMIN — MIDODRINE HYDROCHLORIDE 15 MG: 5 TABLET ORAL at 08:53

## 2024-01-01 RX ADMIN — RIFAXIMIN 550 MG: 550 TABLET ORAL at 09:53

## 2024-01-01 RX ADMIN — MUPIROCIN: 20 OINTMENT TOPICAL at 13:38

## 2024-01-01 RX ADMIN — METRONIDAZOLE 500 MG: 500 TABLET ORAL at 09:32

## 2024-01-01 RX ADMIN — FLUTICASONE FUROATE AND VILANTEROL TRIFENATATE 1 PUFF: 200; 25 POWDER RESPIRATORY (INHALATION) at 07:39

## 2024-01-01 RX ADMIN — MIDODRINE HYDROCHLORIDE 15 MG: 5 TABLET ORAL at 17:49

## 2024-01-01 RX ADMIN — MONTELUKAST 10 MG: 10 TABLET, FILM COATED ORAL at 21:51

## 2024-01-01 RX ADMIN — METRONIDAZOLE 500 MG: 500 TABLET ORAL at 20:01

## 2024-01-01 RX ADMIN — MUPIROCIN: 20 OINTMENT TOPICAL at 09:57

## 2024-01-01 RX ADMIN — LACTULOSE 20 G: 20 SOLUTION ORAL at 15:02

## 2024-01-01 RX ADMIN — LEVOTHYROXINE SODIUM 112 MCG: 0.11 TABLET ORAL at 08:01

## 2024-01-01 RX ADMIN — RIFAXIMIN 550 MG: 550 TABLET ORAL at 20:41

## 2024-01-01 RX ADMIN — MIDODRINE HYDROCHLORIDE 15 MG: 5 TABLET ORAL at 17:24

## 2024-01-01 RX ADMIN — MUPIROCIN: 20 OINTMENT TOPICAL at 09:04

## 2024-01-01 RX ADMIN — LEVOTHYROXINE SODIUM 112 MCG: 0.11 TABLET ORAL at 09:42

## 2024-01-01 RX ADMIN — METRONIDAZOLE 500 MG: 500 TABLET ORAL at 19:59

## 2024-01-01 RX ADMIN — METRONIDAZOLE 500 MG: 500 TABLET ORAL at 14:00

## 2024-01-01 RX ADMIN — SODIUM CHLORIDE: 4 INJECTION, SOLUTION, CONCENTRATE INTRAVENOUS at 20:20

## 2024-01-01 RX ADMIN — MIDODRINE HYDROCHLORIDE 15 MG: 5 TABLET ORAL at 17:51

## 2024-01-01 RX ADMIN — CEFTRIAXONE SODIUM 2 G: 2 INJECTION, POWDER, FOR SOLUTION INTRAMUSCULAR; INTRAVENOUS at 14:54

## 2024-01-01 RX ADMIN — PANTOPRAZOLE SODIUM 40 MG: 40 TABLET, DELAYED RELEASE ORAL at 08:57

## 2024-01-01 RX ADMIN — OXYMETHAZOLINE HCL 2 SPRAY: 0.05 SPRAY NASAL at 17:50

## 2024-01-01 RX ADMIN — OXYMETHAZOLINE HCL 2 SPRAY: 0.05 SPRAY NASAL at 07:38

## 2024-01-01 RX ADMIN — RIFAXIMIN 550 MG: 550 TABLET ORAL at 20:01

## 2024-01-01 RX ADMIN — LIDOCAINE 1 PATCH: 4 PATCH TOPICAL at 08:01

## 2024-01-01 RX ADMIN — CITALOPRAM 40 MG: 40 TABLET ORAL at 09:20

## 2024-01-01 RX ADMIN — MONTELUKAST 10 MG: 10 TABLET, FILM COATED ORAL at 23:12

## 2024-01-01 RX ADMIN — METRONIDAZOLE 500 MG: 500 TABLET ORAL at 14:43

## 2024-01-01 RX ADMIN — CEFTRIAXONE SODIUM 1 G: 1 INJECTION, POWDER, FOR SOLUTION INTRAMUSCULAR; INTRAVENOUS at 15:02

## 2024-01-01 RX ADMIN — RIFAXIMIN 550 MG: 550 TABLET ORAL at 19:52

## 2024-01-01 RX ADMIN — MUPIROCIN: 20 OINTMENT TOPICAL at 13:19

## 2024-01-01 RX ADMIN — MIDODRINE HYDROCHLORIDE 15 MG: 5 TABLET ORAL at 18:16

## 2024-01-01 RX ADMIN — POLYETHYLENE GLYCOL 3350 17 G: 17 POWDER, FOR SOLUTION ORAL at 20:01

## 2024-01-01 RX ADMIN — CITALOPRAM 40 MG: 40 TABLET ORAL at 10:29

## 2024-01-01 RX ADMIN — METRONIDAZOLE 500 MG: 500 TABLET ORAL at 20:27

## 2024-01-01 RX ADMIN — ONDANSETRON 4 MG: 2 INJECTION INTRAMUSCULAR; INTRAVENOUS at 15:39

## 2024-01-01 RX ADMIN — LIDOCAINE HYDROCHLORIDE 30 ML: 10 INJECTION, SOLUTION EPIDURAL; INFILTRATION; INTRACAUDAL; PERINEURAL at 13:11

## 2024-01-01 RX ADMIN — CITALOPRAM 40 MG: 40 TABLET ORAL at 09:32

## 2024-01-01 RX ADMIN — CEFTRIAXONE SODIUM 2 G: 2 INJECTION, POWDER, FOR SOLUTION INTRAMUSCULAR; INTRAVENOUS at 14:24

## 2024-01-01 RX ADMIN — LACTULOSE 20 G: 20 SOLUTION ORAL at 19:39

## 2024-01-01 RX ADMIN — MIDODRINE HYDROCHLORIDE 15 MG: 5 TABLET ORAL at 14:00

## 2024-01-01 RX ADMIN — RIFAXIMIN 550 MG: 550 TABLET ORAL at 09:20

## 2024-01-01 RX ADMIN — METRONIDAZOLE 500 MG: 500 TABLET ORAL at 20:34

## 2024-01-01 RX ADMIN — CEFTRIAXONE SODIUM 1 G: 1 INJECTION, POWDER, FOR SOLUTION INTRAMUSCULAR; INTRAVENOUS at 13:46

## 2024-01-01 RX ADMIN — LACTULOSE 20 G: 20 SOLUTION ORAL at 14:21

## 2024-01-01 RX ADMIN — Medication 5 ML: at 17:31

## 2024-01-01 RX ADMIN — MIDODRINE HYDROCHLORIDE 15 MG: 5 TABLET ORAL at 10:29

## 2024-01-01 RX ADMIN — CEFTRIAXONE SODIUM 1 G: 1 INJECTION, POWDER, FOR SOLUTION INTRAMUSCULAR; INTRAVENOUS at 13:51

## 2024-01-01 RX ADMIN — RIFAXIMIN 550 MG: 550 TABLET ORAL at 19:41

## 2024-01-01 RX ADMIN — LEVOTHYROXINE SODIUM 112 MCG: 0.11 TABLET ORAL at 08:11

## 2024-01-01 RX ADMIN — ACETAMINOPHEN 650 MG: 325 TABLET, FILM COATED ORAL at 12:09

## 2024-01-01 RX ADMIN — MUPIROCIN: 20 OINTMENT TOPICAL at 08:22

## 2024-01-01 RX ADMIN — PIPERACILLIN AND TAZOBACTAM 3.38 G: 3; .375 INJECTION, POWDER, LYOPHILIZED, FOR SOLUTION INTRAVENOUS at 08:19

## 2024-01-01 RX ADMIN — METRONIDAZOLE 500 MG: 500 TABLET ORAL at 08:21

## 2024-01-01 RX ADMIN — LIDOCAINE 1 PATCH: 560 PATCH PERCUTANEOUS; TOPICAL; TRANSDERMAL at 09:31

## 2024-01-01 RX ADMIN — MUPIROCIN: 20 OINTMENT TOPICAL at 09:43

## 2024-01-01 RX ADMIN — FLUTICASONE FUROATE AND VILANTEROL TRIFENATATE 1 PUFF: 200; 25 POWDER RESPIRATORY (INHALATION) at 08:29

## 2024-01-01 RX ADMIN — CITALOPRAM 40 MG: 40 TABLET ORAL at 08:53

## 2024-01-01 RX ADMIN — CEFTRIAXONE SODIUM 2 G: 2 INJECTION, POWDER, FOR SOLUTION INTRAMUSCULAR; INTRAVENOUS at 14:23

## 2024-01-01 RX ADMIN — MUPIROCIN: 20 OINTMENT TOPICAL at 21:46

## 2024-01-01 RX ADMIN — LACTULOSE 20 G: 20 SOLUTION ORAL at 14:00

## 2024-01-01 RX ADMIN — MUPIROCIN: 20 OINTMENT TOPICAL at 08:55

## 2024-01-01 RX ADMIN — LACTULOSE 20 G: 20 SOLUTION ORAL at 08:55

## 2024-01-01 RX ADMIN — CEFTRIAXONE SODIUM 2 G: 2 INJECTION, POWDER, FOR SOLUTION INTRAMUSCULAR; INTRAVENOUS at 14:38

## 2024-01-01 RX ADMIN — CITALOPRAM 40 MG: 40 TABLET ORAL at 09:53

## 2024-01-01 RX ADMIN — RIFAXIMIN 550 MG: 550 TABLET ORAL at 09:32

## 2024-01-01 RX ADMIN — MUPIROCIN: 20 OINTMENT TOPICAL at 14:48

## 2024-01-01 RX ADMIN — METRONIDAZOLE 500 MG: 500 TABLET ORAL at 13:46

## 2024-01-01 RX ADMIN — SODIUM CHLORIDE: 4 INJECTION, SOLUTION, CONCENTRATE INTRAVENOUS at 18:11

## 2024-01-01 RX ADMIN — MONTELUKAST 10 MG: 10 TABLET, FILM COATED ORAL at 21:50

## 2024-01-01 RX ADMIN — RIFAXIMIN 550 MG: 550 TABLET ORAL at 09:03

## 2024-01-01 RX ADMIN — METRONIDAZOLE 500 MG: 500 TABLET ORAL at 13:38

## 2024-01-01 RX ADMIN — PANTOPRAZOLE SODIUM 40 MG: 40 TABLET, DELAYED RELEASE ORAL at 19:54

## 2024-01-01 RX ADMIN — PANTOPRAZOLE SODIUM 40 MG: 40 TABLET, DELAYED RELEASE ORAL at 19:39

## 2024-01-01 RX ADMIN — MIDODRINE HYDROCHLORIDE 15 MG: 5 TABLET ORAL at 19:21

## 2024-01-01 RX ADMIN — LACTULOSE 20 G: 20 SOLUTION ORAL at 13:19

## 2024-01-01 RX ADMIN — MUPIROCIN: 20 OINTMENT TOPICAL at 09:31

## 2024-01-01 RX ADMIN — MIDODRINE HYDROCHLORIDE 15 MG: 5 TABLET ORAL at 20:15

## 2024-01-01 RX ADMIN — CITALOPRAM 40 MG: 40 TABLET ORAL at 08:00

## 2024-01-01 RX ADMIN — CITALOPRAM 40 MG: 40 TABLET ORAL at 07:36

## 2024-01-01 RX ADMIN — MIDODRINE HYDROCHLORIDE 15 MG: 5 TABLET ORAL at 08:23

## 2024-01-01 RX ADMIN — MUPIROCIN: 20 OINTMENT TOPICAL at 21:11

## 2024-01-01 RX ADMIN — ALBUMIN HUMAN 75 G: 0.25 SOLUTION INTRAVENOUS at 14:47

## 2024-01-01 RX ADMIN — METRONIDAZOLE 500 MG: 500 TABLET ORAL at 09:03

## 2024-01-01 RX ADMIN — Medication 5 ML: at 17:49

## 2024-01-01 RX ADMIN — IOPAMIDOL 100 ML: 755 INJECTION, SOLUTION INTRAVENOUS at 06:15

## 2024-01-01 RX ADMIN — METRONIDAZOLE 500 MG: 500 TABLET ORAL at 15:02

## 2024-01-01 RX ADMIN — LACTULOSE 20 G: 20 SOLUTION ORAL at 08:30

## 2024-01-01 RX ADMIN — CEFTRIAXONE SODIUM 2 G: 2 INJECTION, POWDER, FOR SOLUTION INTRAMUSCULAR; INTRAVENOUS at 13:30

## 2024-01-01 RX ADMIN — OXYMETHAZOLINE HCL 2 SPRAY: 0.05 SPRAY NASAL at 08:10

## 2024-01-01 RX ADMIN — ACETAMINOPHEN 650 MG: 325 TABLET, FILM COATED ORAL at 19:35

## 2024-01-01 RX ADMIN — PANTOPRAZOLE SODIUM 40 MG: 40 TABLET, DELAYED RELEASE ORAL at 08:11

## 2024-01-01 RX ADMIN — CITALOPRAM 40 MG: 40 TABLET ORAL at 08:22

## 2024-01-01 RX ADMIN — Medication 5 ML: at 18:04

## 2024-01-01 RX ADMIN — MIDODRINE HYDROCHLORIDE 15 MG: 5 TABLET ORAL at 05:29

## 2024-01-01 RX ADMIN — Medication 5 ML: at 06:36

## 2024-01-01 RX ADMIN — PANTOPRAZOLE SODIUM 40 MG: 40 TABLET, DELAYED RELEASE ORAL at 07:46

## 2024-01-01 RX ADMIN — LEVOTHYROXINE SODIUM 112 MCG: 0.11 TABLET ORAL at 07:37

## 2024-01-01 RX ADMIN — DORNASE ALFA 50 ML: 1 SOLUTION RESPIRATORY (INHALATION) at 20:34

## 2024-01-01 RX ADMIN — PANTOPRAZOLE SODIUM 40 MG: 40 TABLET, DELAYED RELEASE ORAL at 20:01

## 2024-01-01 RX ADMIN — OXYMETHAZOLINE HCL 2 SPRAY: 0.05 SPRAY NASAL at 19:42

## 2024-01-01 RX ADMIN — METRONIDAZOLE 500 MG: 500 TABLET ORAL at 13:50

## 2024-01-01 RX ADMIN — OXYMETHAZOLINE HCL 2 SPRAY: 0.05 SPRAY NASAL at 09:02

## 2024-01-01 RX ADMIN — PANTOPRAZOLE SODIUM 40 MG: 40 TABLET, DELAYED RELEASE ORAL at 08:01

## 2024-01-01 RX ADMIN — MIDODRINE HYDROCHLORIDE 15 MG: 5 TABLET ORAL at 11:22

## 2024-01-01 RX ADMIN — METRONIDAZOLE 500 MG: 500 TABLET ORAL at 14:54

## 2024-01-01 RX ADMIN — Medication 5 ML: at 15:05

## 2024-01-01 RX ADMIN — CITALOPRAM 40 MG: 40 TABLET ORAL at 08:31

## 2024-01-01 RX ADMIN — METRONIDAZOLE 500 MG: 500 TABLET ORAL at 19:29

## 2024-01-01 RX ADMIN — PANTOPRAZOLE SODIUM 40 MG: 40 TABLET, DELAYED RELEASE ORAL at 10:29

## 2024-01-01 RX ADMIN — RIFAXIMIN 550 MG: 550 TABLET ORAL at 22:48

## 2024-01-01 RX ADMIN — OXYMETHAZOLINE HCL 2 SPRAY: 0.05 SPRAY NASAL at 09:57

## 2024-01-01 RX ADMIN — PANTOPRAZOLE SODIUM 40 MG: 40 TABLET, DELAYED RELEASE ORAL at 19:26

## 2024-01-01 RX ADMIN — PANTOPRAZOLE SODIUM 40 MG: 40 TABLET, DELAYED RELEASE ORAL at 20:18

## 2024-01-01 RX ADMIN — LACTULOSE 20 G: 20 SOLUTION ORAL at 19:35

## 2024-01-01 RX ADMIN — MIDODRINE HYDROCHLORIDE 15 MG: 5 TABLET ORAL at 19:36

## 2024-01-01 RX ADMIN — CITALOPRAM 40 MG: 40 TABLET ORAL at 09:03

## 2024-01-01 RX ADMIN — OXYMETHAZOLINE HCL 2 SPRAY: 0.05 SPRAY NASAL at 08:20

## 2024-01-01 RX ADMIN — METRONIDAZOLE 500 MG: 500 TABLET ORAL at 13:17

## 2024-01-01 RX ADMIN — PIPERACILLIN AND TAZOBACTAM 3.38 G: 3; .375 INJECTION, POWDER, LYOPHILIZED, FOR SOLUTION INTRAVENOUS at 02:27

## 2024-01-01 RX ADMIN — MUPIROCIN: 20 OINTMENT TOPICAL at 10:28

## 2024-01-01 RX ADMIN — MUPIROCIN: 20 OINTMENT TOPICAL at 13:28

## 2024-01-01 RX ADMIN — CHOLECALCIFEROL CAP 1.25 MG (50000 UNIT) 50000 UNITS: 1.25 CAP at 08:11

## 2024-01-01 RX ADMIN — FLUTICASONE FUROATE AND VILANTEROL TRIFENATATE 1 PUFF: 200; 25 POWDER RESPIRATORY (INHALATION) at 11:46

## 2024-01-01 RX ADMIN — FLUTICASONE FUROATE AND VILANTEROL TRIFENATATE 1 PUFF: 200; 25 POWDER RESPIRATORY (INHALATION) at 10:00

## 2024-01-01 RX ADMIN — HYDROMORPHONE HYDROCHLORIDE 0.3 MG: 1 INJECTION, SOLUTION INTRAMUSCULAR; INTRAVENOUS; SUBCUTANEOUS at 02:43

## 2024-01-01 RX ADMIN — RIFAXIMIN 550 MG: 550 TABLET ORAL at 19:27

## 2024-01-01 RX ADMIN — CITALOPRAM 40 MG: 40 TABLET ORAL at 08:21

## 2024-01-01 RX ADMIN — LEVOTHYROXINE SODIUM 112 MCG: 0.11 TABLET ORAL at 07:41

## 2024-01-01 RX ADMIN — PANTOPRAZOLE SODIUM 40 MG: 40 TABLET, DELAYED RELEASE ORAL at 09:20

## 2024-01-01 RX ADMIN — PANTOPRAZOLE SODIUM 40 MG: 40 TABLET, DELAYED RELEASE ORAL at 08:22

## 2024-01-01 RX ADMIN — LEVOTHYROXINE SODIUM 112 MCG: 0.11 TABLET ORAL at 08:31

## 2024-01-01 RX ADMIN — METRONIDAZOLE 500 MG: 500 TABLET ORAL at 08:01

## 2024-01-01 RX ADMIN — MUPIROCIN: 20 OINTMENT TOPICAL at 20:35

## 2024-01-01 RX ADMIN — LACTULOSE 20 G: 20 SOLUTION ORAL at 08:00

## 2024-01-01 RX ADMIN — OXYMETHAZOLINE HCL 2 SPRAY: 0.05 SPRAY NASAL at 07:41

## 2024-01-01 RX ADMIN — PANTOPRAZOLE SODIUM 40 MG: 40 TABLET, DELAYED RELEASE ORAL at 19:29

## 2024-01-01 RX ADMIN — METRONIDAZOLE 500 MG: 500 TABLET ORAL at 14:38

## 2024-01-01 RX ADMIN — DOCUSATE SODIUM 50 MG AND SENNOSIDES 8.6 MG 1 TABLET: 8.6; 5 TABLET, FILM COATED ORAL at 09:32

## 2024-01-01 RX ADMIN — METRONIDAZOLE 500 MG: 500 TABLET ORAL at 19:42

## 2024-01-01 RX ADMIN — FLUTICASONE FUROATE AND VILANTEROL TRIFENATATE 1 PUFF: 200; 25 POWDER RESPIRATORY (INHALATION) at 08:32

## 2024-01-01 RX ADMIN — METRONIDAZOLE 500 MG: 500 TABLET ORAL at 21:03

## 2024-01-01 RX ADMIN — FLUTICASONE FUROATE AND VILANTEROL TRIFENATATE 1 PUFF: 200; 25 POWDER RESPIRATORY (INHALATION) at 08:21

## 2024-01-01 RX ADMIN — RIFAXIMIN 550 MG: 550 TABLET ORAL at 19:29

## 2024-01-01 RX ADMIN — LACTULOSE 20 G: 20 SOLUTION ORAL at 08:15

## 2024-01-01 RX ADMIN — PANTOPRAZOLE SODIUM 40 MG: 40 TABLET, DELAYED RELEASE ORAL at 09:53

## 2024-01-01 RX ADMIN — LIDOCAINE 1 PATCH: 4 PATCH TOPICAL at 13:15

## 2024-01-01 RX ADMIN — OXYMETHAZOLINE HCL 2 SPRAY: 0.05 SPRAY NASAL at 07:36

## 2024-01-01 RX ADMIN — LACTULOSE 20 G: 20 SOLUTION ORAL at 20:16

## 2024-01-01 RX ADMIN — MIDODRINE HYDROCHLORIDE 15 MG: 5 TABLET ORAL at 14:08

## 2024-01-01 RX ADMIN — MUPIROCIN: 20 OINTMENT TOPICAL at 19:53

## 2024-01-01 RX ADMIN — METRONIDAZOLE 500 MG: 500 TABLET ORAL at 13:19

## 2024-01-01 RX ADMIN — LACTULOSE 20 G: 20 SOLUTION ORAL at 14:54

## 2024-01-01 RX ADMIN — Medication 5 ML: at 16:55

## 2024-01-01 RX ADMIN — MUPIROCIN: 20 OINTMENT TOPICAL at 20:07

## 2024-01-01 RX ADMIN — PANTOPRAZOLE SODIUM 40 MG: 40 TABLET, DELAYED RELEASE ORAL at 21:05

## 2024-01-01 RX ADMIN — LACTULOSE 20 G: 20 SOLUTION ORAL at 12:10

## 2024-01-01 RX ADMIN — METRONIDAZOLE 500 MG: 500 TABLET ORAL at 19:26

## 2024-01-01 RX ADMIN — PANTOPRAZOLE SODIUM 40 MG: 40 TABLET, DELAYED RELEASE ORAL at 09:32

## 2024-01-01 RX ADMIN — CITALOPRAM 40 MG: 40 TABLET ORAL at 07:39

## 2024-01-01 RX ADMIN — ACETAMINOPHEN 650 MG: 325 TABLET, FILM COATED ORAL at 20:02

## 2024-01-01 RX ADMIN — RIFAXIMIN 550 MG: 550 TABLET ORAL at 10:29

## 2024-01-01 RX ADMIN — MUPIROCIN: 20 OINTMENT TOPICAL at 19:26

## 2024-01-01 RX ADMIN — RIFAXIMIN 550 MG: 550 TABLET ORAL at 09:42

## 2024-01-01 RX ADMIN — MIDODRINE HYDROCHLORIDE 15 MG: 5 TABLET ORAL at 14:04

## 2024-01-01 RX ADMIN — LACTULOSE 20 G: 20 SOLUTION ORAL at 19:36

## 2024-01-01 RX ADMIN — LACTULOSE 20 G: 20 SOLUTION ORAL at 13:49

## 2024-01-01 RX ADMIN — MIDODRINE HYDROCHLORIDE 15 MG: 5 TABLET ORAL at 19:52

## 2024-01-01 RX ADMIN — DORNASE ALFA 50 ML: 1 SOLUTION RESPIRATORY (INHALATION) at 19:48

## 2024-01-01 RX ADMIN — LACTULOSE 20 G: 20 SOLUTION ORAL at 08:25

## 2024-01-01 RX ADMIN — OXYMETHAZOLINE HCL 2 SPRAY: 0.05 SPRAY NASAL at 18:31

## 2024-01-01 RX ADMIN — MUPIROCIN: 20 OINTMENT TOPICAL at 19:41

## 2024-01-01 RX ADMIN — MIDODRINE HYDROCHLORIDE 15 MG: 5 TABLET ORAL at 18:03

## 2024-01-01 RX ADMIN — LACTULOSE 20 G: 20 SOLUTION ORAL at 13:28

## 2024-01-01 RX ADMIN — CEFTRIAXONE SODIUM 1 G: 1 INJECTION, POWDER, FOR SOLUTION INTRAMUSCULAR; INTRAVENOUS at 13:19

## 2024-01-01 RX ADMIN — MONTELUKAST 10 MG: 10 TABLET, FILM COATED ORAL at 21:49

## 2024-01-01 RX ADMIN — CEFTRIAXONE SODIUM 2 G: 2 INJECTION, POWDER, FOR SOLUTION INTRAMUSCULAR; INTRAVENOUS at 13:10

## 2024-01-01 RX ADMIN — OXYMETHAZOLINE HCL 2 SPRAY: 0.05 SPRAY NASAL at 18:04

## 2024-01-01 RX ADMIN — METRONIDAZOLE 500 MG: 500 TABLET ORAL at 07:46

## 2024-01-01 RX ADMIN — RIFAXIMIN 550 MG: 550 TABLET ORAL at 08:11

## 2024-01-01 RX ADMIN — PIPERACILLIN AND TAZOBACTAM 3.38 G: 3; .375 INJECTION, POWDER, LYOPHILIZED, FOR SOLUTION INTRAVENOUS at 19:53

## 2024-01-01 ASSESSMENT — ACTIVITIES OF DAILY LIVING (ADL)
ADLS_ACUITY_SCORE: 54
ADLS_ACUITY_SCORE: 35
ADLS_ACUITY_SCORE: 50
ADLS_ACUITY_SCORE: 50
ADLS_ACUITY_SCORE: 48
ADLS_ACUITY_SCORE: 64
ADLS_ACUITY_SCORE: 50
ADLS_ACUITY_SCORE: 50
ADLS_ACUITY_SCORE: 53
ADLS_ACUITY_SCORE: 48
ADLS_ACUITY_SCORE: 50
ADLS_ACUITY_SCORE: 54
ADLS_ACUITY_SCORE: 58
ADLS_ACUITY_SCORE: 45
ADLS_ACUITY_SCORE: 48
ADLS_ACUITY_SCORE: 35
ADLS_ACUITY_SCORE: 58
ADLS_ACUITY_SCORE: 48
ADLS_ACUITY_SCORE: 35
ADLS_ACUITY_SCORE: 60
ADLS_ACUITY_SCORE: 58
ADLS_ACUITY_SCORE: 50
ADLS_ACUITY_SCORE: 50
ADLS_ACUITY_SCORE: 54
ADLS_ACUITY_SCORE: 54
ADLS_ACUITY_SCORE: 47
ADLS_ACUITY_SCORE: 54
TOILETING_ISSUES: YES
ADLS_ACUITY_SCORE: 58
ADLS_ACUITY_SCORE: 58
ADLS_ACUITY_SCORE: 57
ADLS_ACUITY_SCORE: 54
ADLS_ACUITY_SCORE: 53
ADLS_ACUITY_SCORE: 48
ADLS_ACUITY_SCORE: 54
ADLS_ACUITY_SCORE: 64
ADLS_ACUITY_SCORE: 35
ADLS_ACUITY_SCORE: 48
TOILETING_ASSISTANCE: TOILETING DIFFICULTY, ASSISTANCE 1 PERSON
ADLS_ACUITY_SCORE: 51
ADLS_ACUITY_SCORE: 51
WEAR_GLASSES_OR_BLIND: YES
ADLS_ACUITY_SCORE: 35
WALKING_OR_CLIMBING_STAIRS: AMBULATION DIFFICULTY, REQUIRES EQUIPMENT
DRESSING/BATHING_DIFFICULTY: YES
ADLS_ACUITY_SCORE: 54
ADLS_ACUITY_SCORE: 58
ADLS_ACUITY_SCORE: 48
ADLS_ACUITY_SCORE: 51
ADLS_ACUITY_SCORE: 54
ADLS_ACUITY_SCORE: 45
ADLS_ACUITY_SCORE: 50
EQUIPMENT_CURRENTLY_USED_AT_HOME: WALKER, ROLLING;CANE, STRAIGHT
ADLS_ACUITY_SCORE: 51
ADLS_ACUITY_SCORE: 50
ADLS_ACUITY_SCORE: 50
FALL_HISTORY_WITHIN_LAST_SIX_MONTHS: YES
ADLS_ACUITY_SCORE: 48
ADLS_ACUITY_SCORE: 48
ADLS_ACUITY_SCORE: 57
ADLS_ACUITY_SCORE: 57
ADLS_ACUITY_SCORE: 53
ADLS_ACUITY_SCORE: 58
ADLS_ACUITY_SCORE: 54
ADLS_ACUITY_SCORE: 50
ADLS_ACUITY_SCORE: 50
ADLS_ACUITY_SCORE: 51
ADLS_ACUITY_SCORE: 50
ADLS_ACUITY_SCORE: 48
ADLS_ACUITY_SCORE: 49
ADLS_ACUITY_SCORE: 64
ADLS_ACUITY_SCORE: 48
HEARING_DIFFICULTY_OR_DEAF: NO
ADLS_ACUITY_SCORE: 51
ADLS_ACUITY_SCORE: 58
ADLS_ACUITY_SCORE: 47
ADLS_ACUITY_SCORE: 50
ADLS_ACUITY_SCORE: 50
ADLS_ACUITY_SCORE: 53
ADLS_ACUITY_SCORE: 48
ADLS_ACUITY_SCORE: 47
ADLS_ACUITY_SCORE: 48
ADLS_ACUITY_SCORE: 48
ADLS_ACUITY_SCORE: 60
ADLS_ACUITY_SCORE: 50
ADLS_ACUITY_SCORE: 54
ADLS_ACUITY_SCORE: 48
ADLS_ACUITY_SCORE: 50
ADLS_ACUITY_SCORE: 58
ADLS_ACUITY_SCORE: 54
ADLS_ACUITY_SCORE: 50
ADLS_ACUITY_SCORE: 58
ADLS_ACUITY_SCORE: 50
ADLS_ACUITY_SCORE: 48
ADLS_ACUITY_SCORE: 50
ADLS_ACUITY_SCORE: 58
ADLS_ACUITY_SCORE: 49
ADLS_ACUITY_SCORE: 48
ADLS_ACUITY_SCORE: 57
ADLS_ACUITY_SCORE: 54
ADLS_ACUITY_SCORE: 35
ADLS_ACUITY_SCORE: 48
ADLS_ACUITY_SCORE: 47
DIFFICULTY_EATING/SWALLOWING: YES
ADLS_ACUITY_SCORE: 48
ADLS_ACUITY_SCORE: 50
ADLS_ACUITY_SCORE: 48
ADLS_ACUITY_SCORE: 50
ADLS_ACUITY_SCORE: 35
PREVIOUS_RESPONSIBILITIES: MEAL PREP;HOUSEKEEPING;MEDICATION MANAGEMENT;FINANCES
ADLS_ACUITY_SCORE: 50
ADLS_ACUITY_SCORE: 47
CHANGE_IN_FUNCTIONAL_STATUS_SINCE_ONSET_OF_CURRENT_ILLNESS/INJURY: YES
ADLS_ACUITY_SCORE: 35
ADLS_ACUITY_SCORE: 58
ADLS_ACUITY_SCORE: 64
ADLS_ACUITY_SCORE: 51
ADLS_ACUITY_SCORE: 54
ADLS_ACUITY_SCORE: 54
ADLS_ACUITY_SCORE: 58
ADLS_ACUITY_SCORE: 60
ADLS_ACUITY_SCORE: 50
ADLS_ACUITY_SCORE: 49
ADLS_ACUITY_SCORE: 50
ADLS_ACUITY_SCORE: 50
ADLS_ACUITY_SCORE: 51
ADLS_ACUITY_SCORE: 47
ADLS_ACUITY_SCORE: 51
ADLS_ACUITY_SCORE: 50
ADLS_ACUITY_SCORE: 50
TOILETING: 1-->ASSISTANCE (EQUIPMENT/PERSON) NEEDED (NOT DEVELOPMENTALLY APPROPRIATE)
ADLS_ACUITY_SCORE: 50
ADLS_ACUITY_SCORE: 58
ADLS_ACUITY_SCORE: 58
ADLS_ACUITY_SCORE: 50
ADLS_ACUITY_SCORE: 53
ADLS_ACUITY_SCORE: 48
NUMBER_OF_TIMES_PATIENT_HAS_FALLEN_WITHIN_LAST_SIX_MONTHS: 1
ADLS_ACUITY_SCORE: 48
ADLS_ACUITY_SCORE: 54
ADLS_ACUITY_SCORE: 51
ADLS_ACUITY_SCORE: 48
ADLS_ACUITY_SCORE: 51
ADLS_ACUITY_SCORE: 50
ADLS_ACUITY_SCORE: 57
ADLS_ACUITY_SCORE: 50
ADLS_ACUITY_SCORE: 54
ADLS_ACUITY_SCORE: 35
ADLS_ACUITY_SCORE: 50
TOILETING: 1-->ASSISTANCE (EQUIPMENT/PERSON) NEEDED
ADLS_ACUITY_SCORE: 48
ADLS_ACUITY_SCORE: 48
ADLS_ACUITY_SCORE: 47
ADLS_ACUITY_SCORE: 50
ADLS_ACUITY_SCORE: 54
ADLS_ACUITY_SCORE: 57
ADLS_ACUITY_SCORE: 50
ADLS_ACUITY_SCORE: 51
ADLS_ACUITY_SCORE: 50
ADLS_ACUITY_SCORE: 58
DIFFICULTY_COMMUNICATING: NO
ADLS_ACUITY_SCORE: 58
ADLS_ACUITY_SCORE: 48
ADLS_ACUITY_SCORE: 64
ADLS_ACUITY_SCORE: 48
ADLS_ACUITY_SCORE: 54
ADLS_ACUITY_SCORE: 48
ADLS_ACUITY_SCORE: 64
ADLS_ACUITY_SCORE: 50
ADLS_ACUITY_SCORE: 57
ADLS_ACUITY_SCORE: 48
ADLS_ACUITY_SCORE: 35
ADLS_ACUITY_SCORE: 54
ADLS_ACUITY_SCORE: 48
ADLS_ACUITY_SCORE: 54
ADLS_ACUITY_SCORE: 53
ADLS_ACUITY_SCORE: 58
ADLS_ACUITY_SCORE: 57
ADLS_ACUITY_SCORE: 50
VISION_MANAGEMENT: GLASSES
ADLS_ACUITY_SCORE: 53
ADLS_ACUITY_SCORE: 48
ADLS_ACUITY_SCORE: 45
ADLS_ACUITY_SCORE: 54
ADLS_ACUITY_SCORE: 56
ADLS_ACUITY_SCORE: 50
ADLS_ACUITY_SCORE: 54
ADLS_ACUITY_SCORE: 48
ADLS_ACUITY_SCORE: 57
ADLS_ACUITY_SCORE: 58
DOING_ERRANDS_INDEPENDENTLY_DIFFICULTY: YES
DEPENDENT_IADLS:: CLEANING;LAUNDRY;COOKING;SHOPPING;MEAL PREPARATION;MEDICATION MANAGEMENT;TRANSPORTATION
ADLS_ACUITY_SCORE: 35
ADLS_ACUITY_SCORE: 54
ADLS_ACUITY_SCORE: 43
ADLS_ACUITY_SCORE: 49
ADLS_ACUITY_SCORE: 50
ADLS_ACUITY_SCORE: 54
ADLS_ACUITY_SCORE: 54
ADLS_ACUITY_SCORE: 45
ADLS_ACUITY_SCORE: 48
EATING/SWALLOWING_MANAGEMENT: COUGH MEDICINE
ADLS_ACUITY_SCORE: 58
ADLS_ACUITY_SCORE: 53
ADLS_ACUITY_SCORE: 51
CONCENTRATING,_REMEMBERING_OR_MAKING_DECISIONS_DIFFICULTY: YES
ADLS_ACUITY_SCORE: 54
ADLS_ACUITY_SCORE: 35
ADLS_ACUITY_SCORE: 51
ADLS_ACUITY_SCORE: 54
ADLS_ACUITY_SCORE: 50
ADLS_ACUITY_SCORE: 48
ADLS_ACUITY_SCORE: 51
ADLS_ACUITY_SCORE: 50
ADLS_ACUITY_SCORE: 50
ADLS_ACUITY_SCORE: 51
ADLS_ACUITY_SCORE: 35
ADLS_ACUITY_SCORE: 50
ADLS_ACUITY_SCORE: 48
EATING/SWALLOWING: EATING;SWALLOWING LIQUIDS;SWALLOWING SOLID FOOD
ADLS_ACUITY_SCORE: 60
ADLS_ACUITY_SCORE: 53
ADLS_ACUITY_SCORE: 58
ADLS_ACUITY_SCORE: 54
ADLS_ACUITY_SCORE: 60
ADLS_ACUITY_SCORE: 49
ADLS_ACUITY_SCORE: 50
ADLS_ACUITY_SCORE: 60
ADLS_ACUITY_SCORE: 35
ADLS_ACUITY_SCORE: 35
ADLS_ACUITY_SCORE: 54
ADLS_ACUITY_SCORE: 54
ADLS_ACUITY_SCORE: 51
ADLS_ACUITY_SCORE: 58
ADLS_ACUITY_SCORE: 49
ADLS_ACUITY_SCORE: 58
ADLS_ACUITY_SCORE: 48
ADLS_ACUITY_SCORE: 50
ADLS_ACUITY_SCORE: 48
WALKING_OR_CLIMBING_STAIRS_DIFFICULTY: YES
ADLS_ACUITY_SCORE: 50
ADLS_ACUITY_SCORE: 48
ADLS_ACUITY_SCORE: 50
ADLS_ACUITY_SCORE: 54
ADLS_ACUITY_SCORE: 58
ADLS_ACUITY_SCORE: 47
ADLS_ACUITY_SCORE: 51
ADLS_ACUITY_SCORE: 60
ADLS_ACUITY_SCORE: 50
ADLS_ACUITY_SCORE: 50
ADLS_ACUITY_SCORE: 54
ADLS_ACUITY_SCORE: 54
ADLS_ACUITY_SCORE: 50
DRESSING/BATHING: BATHING DIFFICULTY, REQUIRES EQUIPMENT;BATHING DIFFICULTY, ASSISTANCE 1 PERSON;DRESSING DIFFICULTY, REQUIRES EQUIPMENT;DRESSING DIFFICULTY, ASSISTANCE 1 PERSON
ADLS_ACUITY_SCORE: 54
ADLS_ACUITY_SCORE: 58
ADLS_ACUITY_SCORE: 50
ADLS_ACUITY_SCORE: 54
ADLS_ACUITY_SCORE: 50
ADLS_ACUITY_SCORE: 50
ADLS_ACUITY_SCORE: 51
ADLS_ACUITY_SCORE: 58
ADLS_ACUITY_SCORE: 51
ADLS_ACUITY_SCORE: 48
ADLS_ACUITY_SCORE: 58
ADLS_ACUITY_SCORE: 53
ADLS_ACUITY_SCORE: 50
ADLS_ACUITY_SCORE: 51

## 2024-01-01 ASSESSMENT — PAIN SCALES - GENERAL: PAINLEVEL: NO PAIN (0)

## 2024-01-02 NOTE — TELEPHONE ENCOUNTER
Spoke with [place]    Hgb 6.9    Other MELD labs:    Na+ 124  Creat 0.46  TBili 8.2  Alb 3.1  INR 2.4     MELD 29    No availability at [facility] next 1-2 days, awaiting return call from [facility] for availability for outpatient transfusion

## 2024-01-02 NOTE — LETTER
PHYSICIAN ORDERS      DATE & TIME ISSUED: 2024 2:26 PM  PATIENT NAME: Stefani Crowell   : 1960     [facility] MR# : [id number]     DIAGNOSIS:  Cirrhosis, anemia   ICD-10 CODE: K70.31; D63.8  Orders  1 year after issue.    Please give one unit Packed Red Blood Cells per your institution protocol for Hgb < 7.      Please order type and screen as needed.       PLEASE FAX RESULTS AS SOON AS POSSIBLE TO [phone], ATTN:Kathleen    FOR CLINICAL QUESTIONS, PLEASE CALL Jerica Schumacher RN at [phone].  Please call me to discuss availability for transfusion appointment within next 1-2 days.      .  Hepatology/Liver Transplant  Medical Director, Liver Transplantation  [facility]

## 2024-01-03 PROBLEM — D64.9 ANEMIA: Status: ACTIVE | Noted: 2024-01-01

## 2024-01-03 NOTE — TELEPHONE ENCOUNTER
Patient Call: General  Route to LPN    Reason for call: Sharyn from [facility] called for infusion orders for patient.      Call back needed? Yes    Return Call Needed  Same as documented in contacts section  When to return call?: Same day: Route High Priority

## 2024-01-03 NOTE — PROGRESS NOTES
Post Infusion Assessment:  Patient tolerated infusion without incident.  Site patent and intact, free from redness, edema or discomfort.  No evidence of extravasations.  Access discontinued per protocol.       Discharge Plan:   Patient discharged in stable condition accompanied by: family.  Departure Mode: Ambulatory.      Daniela Hough RN

## 2024-01-03 NOTE — PROGRESS NOTES
Infusion Nursing Note:  Stefani Crowell presents today for 1 unit(s) PRBC.    Patient seen by provider today: No   present during visit today: Not Applicable.    Note: Oriented to  infusion center, including call light and bathrooms.  Labs were drawn outside of the system and are available in Care Everywhere.  Hgb 6.9.      Intravenous Access:  Peripheral IV placed.    Treatment Conditions:  Results reviewed, labs MET treatment parameters, ok to proceed with treatment.  Blood transfusion consent signed 1/3/2024.    End of shift report given to RN for completion of care.     Olivia Pond, RN-BSN, PHN, OCN  [facility]

## 2024-01-05 NOTE — TELEPHONE ENCOUNTER
Forms received from: [employer]   Phone number listed: [phone]   Fax listed: [phone]  Date received: 1/5/24  Form description: Home Health Plan of care [id number]  Once forms are completed, please return to [phone] via fax.  Is patient requesting to be contacted when forms are completed: na  Phone: na  Form placed: in Aliyah's in box

## 2024-01-05 NOTE — TELEPHONE ENCOUNTER
Spoke with patient- had lvm asking if anything she could take for cough    Called pt back    No fever.  Cough with minimal sputum output- if something does come up it's white/clear    Reports it's persistent and hard cough, states does NOT feel SOB.  Of note, pt did have blood transfusion yesterday.  Has appt with IR at [facility] tomorrow and possible thora, may have some fluid overload    Advised pt if fever, worsening sx, feeling SOB should get in and be tested for viral illness such as covid, influenza, etc - ER if sx warrant,

## 2024-01-05 NOTE — TELEPHONE ENCOUNTER
Spoke with Lolly.  Still intermittent cough.  900 ml removed via thora today    Reports it's OK if sucking on lozenge/candy etc.  No fevers, non prod cough.  Occasional bouts of harder coughing but no SOB.    Advised id worse, fever develops or not better by Monday needs to see PCP or urgent care right away, ER if SOB< fevers, confusion, low BP, etc.

## 2024-01-08 NOTE — TELEPHONE ENCOUNTER
[facility] Call Center    Phone Message    May a detailed message be left on voicemail: yes     Reason for Call: Other: Patient is returning a phone call about her angiogram procedure for 1/11/24. She has some further questions about the procedure. Please call the patient back.      Action Taken: Other: cardiology    Travel Screening: Not Applicable  Thank you!  Specialty Access Center

## 2024-01-09 NOTE — TELEPHONE ENCOUNTER
Attempted to call patient. Left message for patient.    Nayana Suresh, RN, BSN  Cardiology RN Care Coordinator   [place]   Phone: [phone]  Fax: [phone] ([place]) [phone] ([place])

## 2024-01-09 NOTE — PROGRESS NOTES
Per chart review, pt is averaging one thoracentesis and paracentesis per week. Pt's last paracentesis was on 1/8 and 3.1 liters of fluid removed. Pt has 900 ml of fluid removed at thoracentesis on 1/5. Pt is currently in evaluation for liver transplant and has heart cath scheduled on 1/11. Pt continues to get weekly labs and last Na was 124 (1/8/24).     Attempted to reach patient for check in, no answer, message left requesting call back, number provided.

## 2024-01-11 NOTE — PROGRESS NOTES
Cardiac angiogram cancelled per Dr. Chapa due to lab results from this am. Pt returned to 2a. PIV dc'd. Pt will discharge home accompanied by family.

## 2024-01-12 NOTE — TELEPHONE ENCOUNTER
----- Message from Shahana Hernandez RN sent at 1/12/2024  2:33 PM CST -----  Hello,     The new angio orders are in. Could someone please contact this pt and get them scheduled?    Michael Hernandez RN   Cardiology Nurse Coordinator     ----- Message -----  From: Mathew Mitchell MD  Sent: 1/11/2024  12:07 PM CST  To: Amrik Palafox LPN; Poonam Roth RN; #    Paulo,    They have cancelled this patient`s coronary angiogram today because her INR was high. I talked to Dr Fede Chapa and he suggested ordering a new cath with FFP prior to cath. At least 2 FFP (blood product). I have not done this before may be Kaylie mitchell help you.  Can we reschedule cath (coronary angiogram) for this patient please ?    Thank you for your help.    DR MITCHELL

## 2024-01-12 NOTE — TELEPHONE ENCOUNTER
"Patient Contacted for the patient to call back and schedule the following:    Appointment type: Coronary Angiogram  Provider: ARTURO  Return date: Next available  Specialty phone number: [phone] option 1  Additional appointment(s) needed: NA  Additonal Notes: 1/12 Called pt to schedule CORS. Stephanie found the order, but not the case request. Pt also wants to know the \"plan\" for the procedure. Sent staff message to Michael JOLLEY    "

## 2024-01-17 NOTE — TELEPHONE ENCOUNTER
Health Call Center    Phone Message    May a detailed message be left on voicemail: yes     Reason for Call: Other: Bev called because Lolly's angiogram that is scheduled for 2/2 is not a great day for Lolly. Tuesdays and Thursdays work the best for her. Please reach out to Bev to discuss moving Lolly's angiogram. Thank you!     Action Taken: Other: Cardiology    Travel Screening: Not Applicable    Thank you!  Specialty Access Center

## 2024-01-17 NOTE — TELEPHONE ENCOUNTER
Cath Lab Case Request/Order    Location: [address] [address] [facility] Waiting Room    Procedure: Coronary Angiogram    Procedure Date: 2/1/2024    Patient Arrival Time: 11:30AM    Procedure Time: 4th case to follow    Ordering Provider: Dr. Mathew Casiano    Performing Cardiologist: Dr. Macario Dumont    Inpatient Bed Needed: No    Post-  Procedure MALOU appointment scheduled (1 - 2 weeks): YES     Date: 2/14/2024     Provider: Esperanza Rosales    Communicated Patient Instructions:     NPO, nothing to eat 8 hours and drink 2 hours before arrival time: Yes     , need to arrange a ride home - unable to drive post- procedure: Yes     Adult at home, need a responsible adult to stay with patient 24 hours post- procedure: YES    Appointment was scheduled: Over the phone    Patient expressed understanding of above instructions and denied further questions at this time.    Kecia Massey

## 2024-01-18 NOTE — TELEPHONE ENCOUNTER
----- Message from Nayana Suresh RN sent at 1/16/2024  2:51 PM CST -----    ----- Message -----  From: Amrik Palafox LPN  Sent: 1/15/2024   2:25 PM CST  To:  Cardiology    I was out on FTO the last 10 days and Michael must have not been aware that this is a [place] patient.    Amrik   ----- Message -----  From: Shahana Hernandez RN  Sent: 1/15/2024   1:57 PM CST  To: Amrik Palafox LPN; MD Mariya March,    Just wanted to send an FYI that pt's angio is scheduled with [facility] on 2/2.     Thank you,    Michael Hernandez RN   Cardiology Nurse Coordinator     ----- Message -----  From: Poonam Roth RN  Sent: 1/15/2024  12:43 PM CST  To: REN Anderson,   She is already scheduled for February 2, check in at 9 am, will transfuse 2 units of FFP if INR >1.8.  Kaylie  ----- Message -----  From: Shahana Hernandez RN  Sent: 1/15/2024  12:20 PM CST  To: REN Gonzalez!     I know you had put in the new orders. Looks like they need a case request. Can you help me with taht as well? :/    Michael Hernandez RN   Cardiology Nurse Coordinator     ----- Message -----  From: Kecia Noriega  Sent: 1/12/2024   2:55 PM CST  To: REN Anderson!    Not sure who to send this to, so I'll start with you.  Called Stephanie to get the Angio scheduled- she said the order was there, but the case request was not, so she could not schedule it. I let the pt know we would call her back to get the angio scheduled and the pt specifically asked me if I knew what the plan was and she's hoping I can relay a plan to her the next time I call her back. She said that she wants to work with the Liver Team at the [facility] on this and she mentioned a Jerica/Xiomara that she's been talking to. She also mentioned spending the night so that they can get some blood? She said that the reason they cancelled her last angio was that they were concerned blood was going to clot and they cancelled the procedure at  "the last second. She's worried they'll cancel again so that's why she wants a specific plan for that day. If you can help me forward this message along to the appropriate channels I would greatly appreciate it. Let me know when the case request is ready and I will give Stephanie a call back to check. And if you have any \"plan\" for Lolly I'd love to have something I could tell her for that day.    Thank you so much,    Kecia    ----- Message -----  From: Shahana Hernandez RN  Sent: 1/12/2024   2:34 PM CST  To: [facility]    Hello,     The new angio orders are in. Could someone please contact this pt and get them scheduled?    Michael Hernandez RN   Cardiology Nurse Coordinator     ----- Message -----  From: Mathew Mitchell MD  Sent: 1/11/2024  12:07 PM CST  To: Amrik Palafox LPN; Poonam Roth RN; #    Paulo,    They have cancelled this patient`s coronary angiogram today because her INR was high. I talked to Dr Fede Chapa and he suggested ordering a new cath with FFP prior to cath. At least 2 FFP (blood product). I have not done this before may be Kaylie mitchell help you.  Can we reschedule cath (coronary angiogram) for this patient please ?    Thank you for your help.    DR MITCHELL                  "

## 2024-01-18 NOTE — PROGRESS NOTES
Connected with pt to check in and discuss upcoming appointments. Pt stated that she is feeling much better after starting Mucinex nightly. Pt reported that she had a productive cough and the phlegm was causing pt to vomit. Per pt, the vomiting has subsided since starting the mucinex. Denied shortness of breath and chest pain. Pt is going to [facility] for paracentesis and thoracentesis 3 times weekly. Pt stated that she requests for IR staff to only drain the area (peritoneal or pleural) which has the most fluid. Pt is receiving albumin infusion with every drainage.     Pt has labs checked every Monday and most recent sodium was 125. Pt reported adherence to 1,000 ml fluid restriction. Pt is taking lactulose and is averaging 3 BMs/day. Pt is also taking xifaxan as prescribed and denied experiencing confusion.     Pt has upcoming hepatology follow up on 1/29 with Dr. Lim. Pt is scheduled for admission for heart cath on 2/1. Will check in with pt in 2-3 weeks. Pt verbalized understanding and is agreeable to plan of care.

## 2024-01-18 NOTE — TELEPHONE ENCOUNTER
Called and spoke with patient. Reviewed instructions. Patient with no questions at this time. Postponing encounter to closer to procedure.     Pre-procedure instructions - Coronary Angiogram  Patient Education    Your arrival time is 2/1/24.  Location is [id number] [place] [address] Waiting Room  Please plan on being at the hospital all day.  At any time, emergencies and/or urgent cases may come up which could delay the start of your procedure.    Pre-procedure instructions - Coronary Angiogram  Shower in the evening before or the morning of the procedure  No solid food for 8 hours prior and nothing to drink 2 hours prior to arrival time  You can take your morning medications (except for diabetic and blood thinners) with sips of water.  You will need to arrange a ride to drop you off and pick you up, as you will be unable to drive home.  Prior to discharge you may be required to lay flat for approximately 2-4 hours in the recovery unit to ensure proper clotting of the artery. You will need a responsible adult to stay with you for 24 hours post-procedure.              Diabetic Medication Instructions  Hold oral diabetic medication in morning of your procedure and for 48 hours after IV contrast is given  Typical instructions for insulin diabetic medication holding are below. However, please reach out to your Primary Care Provider or Endocrinologist for specific instructions  DO NOT take any oral diabetic medication, short-acting diabetes medications/insulin, humalog or regular insulin the morning of your test  Take   dose of long-acting insulin (Lantus, Levemir) the day of your test  Remember to bring your glucometer and insulin with you to take after your test if needed  DO NOT take injectable GLP-1 agonists semaglutide (Ozempic, Wegovy), dulaglutide (Trulicity), exenatide ER (Bydureon), tirzepatide (Mounjaro), or oral semaglutide (Rybelsus) for  7 days prior your procedure  Hold once daily injectable GLP-1 agonists exenatide (Byetta), liraglutide (Saxenda, Victoza), lixisenatide (Soligua) the day before and day of your procedure                  Anticoagulation Medication Instructions   NA    You will need to follow up with one of our cardiology APPs 1-2 weeks after your procedure. If you need help scheduling or rescheduling your appointment, please call [phone]     Nayana Suresh RN, BSN  Cardiology RN Care Coordinator   [place]   Phone: [phone]  Fax: [phone] ([place]) [phone] ([place])

## 2024-01-19 NOTE — PROGRESS NOTES
"       HPI       Stefani Crowell is a 63 year old {woman/man etc:126463} who presents {for follow up/new concern:922956}:.  No chief complaint on file.      {Superlists :137357:x}    {:246099}   +++++++  {Additional Concerns  (FM):447808}    Problem, Medication and Allergy Lists were {Reviewed Lists:136934}.    Patient is {New/Established:654850::\"an established patient of this clinic.\"}.           Review of Systems:     ROS            Physical Exam:   There were no vitals filed for this visit.  There is no height or weight on file to calculate BMI.  {[facility] VITALS OPTIONS:361876}     Physical Exam   { Ortho/Psych  exam:936528}    Results:     {Result Choices:373743}    Assessment and Plan     {Diagnosis Pick List UMP :282764}  {Assessment and Plan:697938}    There are no discontinued medications.    Options for treatment and follow-up care were reviewed with the patient. Stefani Crowell  engaged in the decision making process and verbalized understanding of the options discussed and agreed with the final plan.    Christian Davis MD              "

## 2024-01-22 PROBLEM — E87.1 HYPONATREMIA: Status: ACTIVE | Noted: 2023-01-01

## 2024-01-22 NOTE — PROGRESS NOTES
Assessment & Plan     Acute cough  and  Severe persistent asthma without complication (H28)    Lolly presents with 2 weeks of URI symptoms with worsening of chronic cough and wheezing.  Breathing is fine on exam today, SpO2 of 97%.  Does have some blunting in the RLL, has chronic effusion, but otherwise lung sounds are normal.  She'd like to pursue viral testing for information purposes, though could be negative at this point.  She is using albuterol multiple times per day, so we'll try increasing the Advair from 250mg to 500mg.  We discussed a course of prednisone, but Lolly reports that GI told her not to use prednisone.  She and her sister request that I contact  to clarify this to see if this may be an option if symptoms continue to not improving.  Staff message sent.     ADDENDUM:  Tensas back from Dr. Lim regarding prednisone, who said it is fine to take if needed.  They prefer to avoid it around the time of transplant if possible.  Lolly's labs came back today significant for worsened hyponatremia, and the plan is for admission, so I will defer management to the hospital team for now.     - Symptomatic Influenza A/B, RSV, & SARS-CoV2 PCR (COVID-19) Nose  - fluticasone-salmeterol (ADVAIR) 500-50 MCG/ACT inhaler; Inhale 1 puff into the lungs every 12 hours  - albuterol (PROAIR HFA/PROVENTIL HFA/VENTOLIN HFA) 108 (90 Base) MCG/ACT inhaler; Inhale 1-2 puffs into the lungs every 6 hours as needed for shortness of breath or wheezing  - albuterol (PROVENTIL) (2.5 MG/3ML) 0.083% neb solution; Take 1 vial (2.5 mg) by nebulization every 4 hours as needed for shortness of breath or wheezing Use in neb machine    Nasal sore    Previously improved with mupirocin, prescription sent.      - mupirocin (BACTROBAN) 2 % external ointment; Apply topically 3 times daily    Localized osteoporosis without current pathological fracture    T score in the spine was -3 on recent imaging.  She is already being treated for low  "Vitamin D.  Recommended medication as well, and we'll start Fosamax as below.  Follow-up DEXA in 2 years.     - alendronate (FOSAMAX) 70 MG tablet; Take 1 tablet (70 mg) by mouth every 7 days    Wound of right lower extremity, subsequent encounter    R leg wound is much improved from prior photo in her chart from December.  No sign of infection, continue to monitori.     *    Discussed vaccination for Hep A, Hep B, RSV, and Shingrix.  Her sister thinks she got the RSV vaccine and will check their records.  Since she is not feeling well today, deferred vaccines until feeling improved.     55 minutes spent by me on the date of the encounter doing chart review, history and exam, documentation and further activities per the note      BMI  Estimated body mass index is 23.8 kg/m  as calculated from the following:    Height as of 12/29/23: 1.651 m (5' 5\").    Weight as of 1/11/24: 64.9 kg (143 lb).         Subjective   Lolly is a 63 year old, presenting for the following health issues:  Physical (Pt here for physical/establish care), URI (Pt would like to discuss ongoing cold symptoms for the last two weeks), Fatigue, Pharyngitis, and Recheck Medication      1/22/2024    10:44 AM   Additional Questions   Roomed by MJL, EMT     HPI     Lolly presents to establish care and discuss URI symptoms for the past 2 weeks with fatigue and pharyngitis.      Lolly is currently undergoing work-up for liver transplant for cirrhosis.  Her sister wonders about treatment with Epo for anemia; advised I typically see this used for anemia related to CKD rather than liver disease, so recommended they talk to hepatology about this.     She has had sores in the nose for months, would like a salve.  Tried something while in the hospital that was helpful     She has a wound on the right leg after a scrape back in December- photo in media from 12/6.  Very long wound initially.      They wonder when her thyroid and B12 were last checked.  TSH was " normal in December 2023.  B12 was high in November.  She is on high dose Vit D, and level is improving, checked 1/11 and was 24 (was 9 in on 12/11).     She does frequent courtney and thoras- evaluated three times per week and has RUQ pain from the procedures.    She needs a refill of albuterol inhaler for asthma, she uses this three times per day.  She's uses albuterol nebs 2-3 times per day.  She is using Advair 250mg BID.  The symptoms have been worse since her illness before but breathing symptoms were not well controlled before then as well.       She'd like to follow-up on osteoporosis, has an appointment with endo in June.  Recent DEXA showed a new diagnosis of osteoporosis on 1/9.      [place]  reviewed: Lorazepam filled for 20 tablets 11/12.  Gabapentin last filled June 2023.      Acute Illness   Acute illness concerns: cough, fatigue  Onset: 2 weeks  Fever: no  Chills/Sweats: no  Headache (location?): YES- mild  Sinus Pressure:YES- all over, improving  Conjunctivitis:  no  Ear Pain: no  Rhinorrhea: YES  Congestion: YES  Sore Throat: no    Cough: YES - not able to cough anything up  Wheeze: YES  Decreased Appetite: YES  Nausea: YES  Vomiting: YES- of mucus  Fatigue/Achiness: YES- can barely get out of bed, back aches from thoracentesis  Sick/Strep Exposure: sister has had cough and malaise  Has not had a COVID test since this started     Therapies Tried and outcome: Mucinex- helps some        Constitutional, HEENT, pulmonary, GI, and derm systems are negative, except as otherwise noted.       Objective    BP 91/61 (BP Location: Right arm, Patient Position: Sitting, Cuff Size: Adult Regular)   Pulse 75   Temp 97.7  F (36.5  C)   LMP  (LMP Unknown)   SpO2 97%   There is no height or weight on file to calculate BMI.  Physical Exam   GENERAL: alert, Jaundiced, scleral icterus   HEENT:  blood present in both nasal passages  RESP: decreased breath sounds R lower posterior  CV: regular rate and rhythm, normal S1  S2, no S3 or S4, no murmur, click or rub, no peripheral edema   SKIN: R leg as below:              Signed Electronically by: hCristian Davis MD

## 2024-01-22 NOTE — ED TRIAGE NOTES
"Ambulatory to triage w/ abnormal labs. States she was referred here by her \"caregiver\" r/t hyponatremia.         "

## 2024-01-22 NOTE — TELEPHONE ENCOUNTER
Call from [facility] today, there for thora and labs    Na+ level down to 112.    She will finish getting Albumin infusion post thora then come to [facility]    Patient has completed most of transplant work up, but LHC was postponed due to high INR.  Plan was for repeat on 2/1/24 with [facility] prior to procedure.  May need as iinpatient to compelte transplant work up in addition to treatment of hyponatremia.      MELD 31 today with decrease in Na and increase in INR

## 2024-01-22 NOTE — LETTER
Recipient:  [facility]  PH: [phone]  F: [phone]         Sender:  KAR Scott, [facility]  [address]   [email or web address]  Office: [phone]   Pager: [phone]         Date: February 12, 2024  Patient Name:  Stefani Crowell  Patient YOB: 1960  Routing Message:    I am referring patient for Home Hospice who is medically ready for discharge and would like to discharge as soon as possible.  Please contact me with any questions.    Thank you,    KAR Scott, [facility]  [address]   [email or web address]  Office: [phone]   Pager: [phone]       The documents accompanying this notice contain confidential information belonging to the sender.  This information is intended only for the use of the individual or entity named above.  The authorized recipient of this information is prohibited from disclosing this information to any other party and is required to destroy the information after its stated need has been fulfilled, unless otherwise required by state law.    If you are not the intended recipient, you are hereby notified that any disclosure, copy, distribution or action taken in reliance on the contents of these documents is strictly prohibited.  If you have received this document in error, please return it by fax to [phone] with a note on the cover sheet explaining why you are returning it (e.g. not your patient, not your provider, etc.).  If you need further assistance, please call .  Documents may also be returned by mail to [employer], , [facility] [address], [address] [place], [place] [address].

## 2024-01-22 NOTE — PROGRESS NOTES
Lolly is a 63 year old that presents in clinic today for the following:     Chief Complaint   Patient presents with    Physical     Pt here for physical/establish care    URI     Pt would like to discuss ongoing cold symptoms for the last two weeks    Fatigue    Pharyngitis    Recheck Medication           1/22/2024    10:44 AM   Additional Questions   Roomed by TANNAL, EMT     Screenings from encounters over the past 10 days    No data recorded       Macario Gonzales at 10:51 AM on 1/22/2024

## 2024-01-22 NOTE — LETTER
Recipient:  [facility]  Ph: [phone]; Intake: [phone]  Fax: [phone]        Sender:  KAR Scott, [facility]  [address]   [email or web address]  Office: [phone]   Pager: [phone]         Date: February 12, 2024  Patient Name:  Stefani Crowell  Patient YOB: 1960  Routing Message:    I am looking for Home Hospice care for patient who is medically ready and would like to discharge home ASAP.  Please contact me with any questions.    Thank you,    KAR Scott, [facility]  [address]   [email or web address]  Office: [phone]   Pager: [phone]       The documents accompanying this notice contain confidential information belonging to the sender.  This information is intended only for the use of the individual or entity named above.  The authorized recipient of this information is prohibited from disclosing this information to any other party and is required to destroy the information after its stated need has been fulfilled, unless otherwise required by state law.    If you are not the intended recipient, you are hereby notified that any disclosure, copy, distribution or action taken in reliance on the contents of these documents is strictly prohibited.  If you have received this document in error, please return it by fax to [phone] with a note on the cover sheet explaining why you are returning it (e.g. not your patient, not your provider, etc.).  If you need further assistance, please call .  Documents may also be returned by mail to [employer], , [address], [address] [place] [phone].

## 2024-01-22 NOTE — PATIENT INSTRUCTIONS
We'll try increasing the Advair to see if a higher dose helps- increase your current inhaler to 2 puffs twice per day, and I'll put a prescription on file for a new inhaler for the higher dose when you need it.     Some insurances only cover some vaccines if you get them at the pharmacy and not in clinic.  You can either check your insurance coverage or just plan to get the following vaccines at the pharmacy: Shingrix (shingles vaccine) and RSV    You can get the following vaccines either in clinic or the pharmacy: Hepatitis B and Hepatitis A     *    Preventive Health Recommendations  Female Ages 50 - 64    Yearly exam: See your health care provider every year in order to  Review health changes.   Discuss preventive care.    Review your medicines if your doctor has prescribed any.    Get a Pap test every three years (unless you have an abnormal result and your provider advises testing more often).  If you get Pap tests with HPV test, you only need to test every 5 years, unless you have an abnormal result.   You do not need a Pap test if your uterus was removed (hysterectomy) and you have not had cancer.  You should be tested each year for STDs (sexually transmitted diseases) if you're at risk.   Have a mammogram every 1 to 2 years.  Have a colonoscopy at age 45, or have a yearly FIT test (stool test). These exams screen for colon cancer.    Have a cholesterol test every 5 years, or more often if advised.  Have a diabetes test (fasting glucose) every three years. If you are at risk for diabetes, you should have this test more often.   If you are at risk for osteoporosis (brittle bone disease), think about having a bone density scan (DEXA).    Shots: Get a flu shot each year. Get a tetanus shot every 10 years.    Nutrition:   Eat at least 5 servings of fruits and vegetables each day.  Eat whole-grain bread, whole-wheat pasta and brown rice instead of white grains and rice.  Get adequate Calcium and Vitamin D.      Lifestyle  Exercise at least 150 minutes a week (30 minutes a day, 5 days a week). This will help you control your weight and prevent disease.  Limit alcohol to one drink per day.  No smoking.   Wear sunscreen to prevent skin cancer.   See your dentist every six months for an exam and cleaning.  See your eye doctor every 1 to 2 years.
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
## 2024-01-23 NOTE — PROCEDURES
[facility]    Procedure: Therapeutic thoracentesis and paracentesis    Date/Time: 1/23/2024 4:22 PM    Performed by: Jeanette Odell MD  Authorized by: Aksah Massey MD  IR Fellow Physician:  Radiology Resident Physician: Jeanette Odell MD        UNIVERSAL PROTOCOL   Site Marked: Yes  Prior Images Obtained and Reviewed:  Yes  Required items: Required blood products, implants, devices and special equipment available    Patient identity confirmed:  Verbally with patient, arm band, provided demographic data and hospital-assigned identification number  Patient was reevaluated immediately before administering moderate or deep sedation or anesthesia  Confirmation Checklist:  Patient's identity using two indicators, relevant allergies, procedure was appropriate and matched the consent or emergent situation and correct equipment/implants were available  Time out: Immediately prior to the procedure a time out was called    Universal Protocol: the Joint Commission Universal Protocol was followed    Preparation: Patient was prepped and draped in usual sterile fashion    ESBL (mL):  2     ANESTHESIA    Anesthesia: Local infiltration  Local Anesthetic:  Lidocaine 1% without epinephrine  Anesthetic Total (mL):  28      SEDATION    Patient Sedated: No    See dictated procedure note for full details.  Findings: Thoracentesis: 100ml earth-colored fluid aspirated from the right chest   Paracentesis: 100ml sanguineous fluid aspirated     Specimens: fluid and/or tissue for laboratory analysis and culture    Complications: None    Condition: Stable    Plan: Bedrest for 1 hour.      PROCEDURE    Patient Tolerance:  Patient tolerated the procedure well with no immediate complications and patient tolerated the procedure well with no immediate complications  Length of time physician/provider present for 1:1 monitoring during sedation: 0
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
## 2024-01-23 NOTE — PROGRESS NOTES
Transplant Social Work Services Progress Note      Date of Initial Social Work Evaluation: 10/3/2023  Collaborated with: Liver transplant team; Jerica Schumacher, pre-liver transplant coordinator     Data: Lolly is currently being evaluated for liver transplant and is in the emergency department at [facility].   Intervention: Writer called Riri, Lolly's sister, via phone, [phone] . Riri reports she was with Lolly at the hospital this morning. Riri provides update that Lolly did return to substance use treatment  (phase 4 IOP) after her last hospitalization. Reports this treatment program is 1/week and Lolly has been able to attend 2 sessions, missed last week and this week due to medical concerns. Riri reports Lolly will have about 13 weeks/sessions remaining in this next phase of treatment.   Regarding care giver support, Riri confirms that Hari, significant other, provides much of the day to day support; and Riri and other family members also support Lolly as needed. Riri inquires about Lolly's ability to access support group virtually while in the hospital if she is still in the hospital on Thursday 1/25. Writer will explore this option once patient transfers to a unit. Writer provided Riri with writer's phone number.   Writer met Lolly in room in the emergency department, with her significant other, Hari, present. Lolly reports not feeling well and expresses it has been hard to get much rest while being in the emergency department. Writer shares that writer was able to speak with Riri earlier in the day. Writer introduces self as liver transplant , encourages Lolly to reach out to writer with any questions or concerns. Lolly denies any questions or concerns currently for writer.    Assessment: Chart review indicates Lolly started programming at [facility] on 7/24/2023) and she completed 90.5 hours of outpatient treatment prior to being discharged due to hospitalization.  Jason Burns, [facility] states patient is ready to move to phase 4 of IOP when she is medically stable and able to return to treatment. Her relapse potential (dimension 5) on discharge is a 1, minimal relapse risk.   Chart history also indicates that Lolly has expressed willingness to continue treatment, even if post-transplant. Chart indicates Lolly has been sober from alcohol since 6/29/2023 and Peth tests have been negative.   Lolly has adequate care giving support for liver transplant. Lolly has also been engaging in substance use treatment as recommended. Per records from [facility], Lolly was close to program completion, having completed 90.5 hours of treatment, but needed to discharge due to hospitalization for medical needs. Discharge summary from [facility] indicated Lolly was ready to move to phase 4 of IOP when medically stable and able to return to treatment. Riri, patient's sister, reports Lolly returned to treatment post hospitalization and was able to attend 2 sessions of phase 4 prior to being unable to continue due to medical concerns for the past 2 weeks.   Lolly has been engaging in substance use treatment per liver transplant team's recommendation and has plans to continue treatment, even if it be after transplant. Lolly is an acceptable liver transplant candidate from a psychosocial perspective.   Education provided by [facility]: introduced self and role of liver transplant .   Plan:    Follow up Plan: If Lolly remains in hospital 1/25/2024, Lolly may need support in accessing virtual support group if she chooses. Liver transplant  will remain available for psychosocial support.     KAR James, [facility]  Liver Transplant   [email or web address]  Phone: [phone]  Fax: [phone]

## 2024-01-23 NOTE — PROGRESS NOTES
[facility]    Progress Note - Medicine Service, MARMAGO TEAM 4       Date of Admission:  1/22/2024    Assessment & Plan   Stefani Crowell is a 63 year old female admitted on 1/22/2024. She has a history of cirrhosis secondary to alcohol use currently being worked up for liver transplant complicated by need for frequent paracenteses and thoracenteses for ascites/hepatic hydrothorax, in addition to asthma and COPD 2/2 tobacco use, MDD/STEPHANIE, hypothyroidism, low back pain, and chronic hyponatremia. She presented to the ED for evaluation of acute on chronic hyponatremia in the setting of recent viral-like URI illness. Admitted for careful monitoring, and for evaluation of loculated pleural effusion with significant leukocytosis. Currently being treated for intrathoracic/intraabdominal fluid collections + asymptomatic bacteruria vs. UTI     Changes Today:  - IR consult for paracentesis + thoracentesis   > Attempted to call [facility] to have labs added to recent paracentesis (1/22); unable as sample not retained   > Para + Thora labs: Aerobic/anaerobic culture, cell counts, gram stain, LDH, glucose, protein   > Serum protein  - Cont. Q2h Na checks  - Cont. Ceftriaxone  - Strict I/O  - Daily MELD labs  - WOC consult    ==========================================================    # Leukocytosis  # Loculated R pleural effusion  # Hepatic Hydrothorax   Mostly neutrophils with some monocytosis as well. Significant increase from baseline, worry that this is related to the loculated pleural effusion. Low concern for hematologic malignancy at this time, will CTM.  Given significant history of hepatic hydrothorax requiring multiple thoracenteses, it is possible that a infection could seed the space and cause an infection with a white count at this time. On bedside ultrasound, notable loculations and so would not be amenable for CAP intervention.  - IR consulted for Diagnostic  thoracentesis   > Prefer to avoid therapeutic to avoid massive fluid shifts   > Para + Thora labs: Aerobic/anaerobic culture, cell counts, gram stain, LDH, glucose, protein  - CBC w/ diff daily   - Blood cultures  - Consider IR consult for complicated loculated thoracentesis drainage/sampling          # Decompensated alcoholic cirrhosis (Ascites, HE)  # Current liver transplant evaluation   # Hepatic encephalopathy   # Recurrent ascites  Decompensations include jaundice/icterus, G1 esophageal & rectal varices on EGD without bleeding, significant ascites requiring 3 times weekly paracenteses, hepatic hydrothorax requiring frequent thoracenteses, and hepatic encephalopathy.  Gets thoras and courtney through the [facility] system. During prior hospitalization, her transplant eligibility was reopened due to completion of chemotherapy treatment and improved renal function.  Do suspect that she currently has some hepatic encephalopathy due to decreased bowel movements recently.  MELD NA 26 on discharge 12/16, 29 on most recent labs. Last admit: CMV (PCR quant neg), EBV (low DNA quant, 537 copies; discussed with ID and no need to recheck). Last admit: IGRA (neg), treponemal ab (nonreactive), serum HCG (neg), and spot protein urine (0.24 mg/mg)  MELD 3.0: 31 at 1/23/2024  2:05 PM  MELD-Na: 31 at 1/23/2024  2:05 PM  Calculated from:  Serum Creatinine: 1.16 mg/dL at 1/23/2024  6:48 AM  Serum Sodium: 114 mmol/L (Using min of 125 mmol/L) at 1/23/2024  2:05 PM  Total Bilirubin: 7.0 mg/dL at 1/23/2024  6:48 AM  Serum Albumin: 2.9 g/dL at 1/23/2024  6:48 AM  INR(ratio): 2.31 at 1/23/2024  6:48 AM  Age at listing (hypothetical): 63 years  Sex: Female at 1/23/2024  2:05 PM  - Hepatology consult in AM  - Planning L heart cath prior to transplant, not yet performed (coronary CTA non-diagnostic)  - IR consulted for diagnostic Paracentesis   > Called [facility] to attempt to add-on labs to samples from 1/22; samples were not retained   > Prefer to  avoid therapeutic to avoid massive fluid shifts   > Para + Thora labs: Aerobic/anaerobic culture, cell counts, gram stain, LDH, glucose, protein  - Continue lactulose, Rifaximin   - Continue PPI  - Target 3-4 BM daily   - Ammonia, INR, CMP AM  - Abx:   > Ceftriaxone: 1/23 - *     # Acute on Chronic Hyponatremia - improving  Chronic hyponatremia common in the setting of cirrhosis, and she has been admitted for hyponatremia several times in the past. Baseline mid 120s.  Given her history of low p.o. intake recently, do worry about a hypovolemic hyponatremia. S/p Albumin with paracentesis 1/22  - Nephrology consulted   > 2% Normal saline @ 35ml/hr (I changed to 25ml/hr as patient's Na was 114 and increasing by 1 every 2 hours. By AM, would have exceeded goal range if current rate of correction at 35ml/hr)   > Fluid restriction 500cc   > Goal Na tomorrow -118  - Urine studies   - Q2hr Na checks, goal 118 by 7AM on 1/24  - strict ins/outs   - daily weights   - Regular diet      # MATTIE  # H/o hepatorenal syndrome   Chart review suggests a prior history of hepatorenal syndrome.  However, her history at the moment is more compatible with a prerenal MATTIE secondary to poor p.o. intake.  Baseline Cr around 0.6, nearly double this on admission. Receiving fluids as above for hyponatremia, will trend creatinine. S/p albumin w/ para.   - AM CMP  - Avoid nephrotoxic agents  - Fluids as above  - Continue Midodrine 15mg PO TID     # Asymptomatic Bacteruria  No clear symptoms, UA and urine culture corroborate possible infection. Notably, Ucx on 11/13/23 was w/ klebsiella species and now notable for E coli on 1/22/24. Patient not reporting any urinary symptoms, though in her altered state it is unclear. Regardless, she will be covered for intraabdominal infection (possible) w/ ceftriaxone as above, and so will get cross coverage. Will need to clarify symptoms w/ patient to verify additional indication for abx  - Abx as above    #  "Skin excoriations  Diffuse wounds across skin. Notes that she has \"fragile skin\"  - WOC consult    # Persistent Asthma  # COPD 2/2 TUD in remission   PCP notes suggest current or recent exacerbation, though doing well from a respiratory standpoint on admission. Holding off on prednisone for now iso possible infection and respiratory stability  - Continue PRN albuterol inhaler or nebs  - Continue ICS-LABA  - Continue Singulair       # Hypothyroidism  - Continue PTA levothyroxine      # Hypermagnesemia  - CTM     # H/o hypophosphatemia - not present here  Will recheck in setting of poor oral intake. Fairly low risk for refeeding as low PO intake only recently.      # Normocytic anemia - stable  Has required outpatient white blood cell transfusions in the past.  Her hemoglobin tends to be around 7.5-9.5.  At normal range on admission. No clear bleeding history though is at risk for this. Chronic disease component and nutritional components possible as well.   - CBC w/ diff in AM  - Would transfuse for Hgb <7     # Thrombocytopenia - stable  ISO cirrhosis. Increased bleeding risk, avoiding heparin/lovenox for now.      # MDD/STEPHANIE  - Continue PTA Celexa  - Would try to avoid sedating agents d/t current sedative presentation          # Low back pain  Stable, trial hot/cold packs first     Diet: Combination Diet Regular Diet Adult  Fluid restriction 1000 ML FLUID  Fluid restriction 500 ML FLUID    DVT Prophylaxis: Low Risk/Ambulatory with no VTE prophylaxis indicated  Demarco Catheter: Not present  Fluids: None  Lines: None     Cardiac Monitoring: None  Code Status: Full Code      Clinically Significant Risk Factors Present on Admission         # Hyponatremia: Lowest Na = 110 mmol/L in last 2 days, will monitor as appropriate   # Hypercalcemia: corrected calcium is >10.1, will monitor as appropriate    # Hypoalbuminemia: Lowest albumin = 2.9 g/dL at 1/23/2024  6:48 AM, will monitor as appropriate  # Coagulation Defect: INR = " 2.31 (Ref range: 0.85 - 1.15) and/or PTT = N/A, will monitor for bleeding  # Thrombocytopenia: Lowest platelets = 89 in last 2 days, will monitor for bleeding            # Financial/Environmental Concerns:    # Asthma: noted on problem list        Disposition Plan     Expected Discharge Date: 01/24/2024                The patient's care was discussed with the Attending Physician, Dr. Fu .    Tyrone Singh MD  Medicine Service, [address]  Securely message with Vocera (more info)  Text page via [facility] Paging/Directory   See signed in provider for up to date coverage information  ______________________________________________________________________    Interval History   Patient admitted overnight.  This morning she is complained only of some abdominal pain.  She is able to recall her most recent procedures and is fully oriented.  We discussed with her how we would like to collect some fluid samples from both her chest cavity as well as her abdomen to verify if either is implicated in her current presentation.  She does not note any urinary symptoms, though would like to clarify this at a later time given that she is currently quite somnolent.  She denies chest pain or shortness of breath.  She feels like she may have accumulated more fluid since her last  thoracentesis    Physical Exam   Vital Signs: Temp: 97.6  F (36.4  C) Temp src: Oral BP: 98/53 Pulse: 76   Resp: 18 SpO2: 97 % O2 Device: None (Room air)    Weight: 0 lbs 0 oz    General Appearance: Chronically ill appearing, somnolent/slow but conversational and oriented fully  Respiratory: Reduced on bilateral bases R > L w/ some crackles bilateral  Cardiovascular: RRR, warm and well perfusing extremities  GI: distended, diffusely mildly tender  Skin: jaundice  Other: Moves extremities spontaneously, answers all questions appropriately    Medical Decision Making       Please see A&P for additional  details of medical decision making.      Data     I have personally reviewed the following data over the past 24 hrs:    33.0 (H)  \   8.1 (L)   / 89 (L)     114 (LL) 85 (L) 95.3 (H) /  74   5.2 17 (L) 1.16 (H) \     ALT: 37 AST: 67 (H) AP: 97 TBILI: 7.0 (H)   ALB: 2.9 (L) TOT PROTEIN: 5.1 (L) LIPASE: N/A     Procal: 1.34 (H) CRP: N/A Lactic Acid: N/A       INR:  2.31 (H) PTT:  N/A   D-dimer:  N/A Fibrinogen:  300     Ferritin:  N/A % Retic:  N/A LDH:  195       Imaging results reviewed over the past 24 hrs:   Recent Results (from the past 24 hour(s))   XR Chest 2 Views    Narrative    EXAM: XR CHEST 2 VIEWS 1/22/2024 6:30 PM     HISTORY: SOB, r/o CAP       COMPARISON: Radiographs 12/14/2023, CT 11/13/2023     FINDINGS: Large loculated right pleural effusion with associated  atelectasis of the right upper, middle, and lower lobes. The left lung  is clear. Normal size of the cardiac silhouette. No pneumothorax.       Impression    IMPRESSION:  Large loculated right pleural effusion with associated atelectasis of  the right upper, middle, and lower lobes. Increased in fluid volume  compared to 12/14/2023.     I have personally reviewed the examination and initial interpretation  and I agree with the findings.    DEYANIRA HAMPTON MD         SYSTEM ID:  [id number]

## 2024-01-23 NOTE — ED NOTES
Pt arrived with multiple skin tears covered with band aids and some covered with coban. Skin tear to R chest on skin tear on R lower shin received while in ED. Requested for team to place WOC consult.

## 2024-01-23 NOTE — PRE-PROCEDURE
GENERAL PRE-PROCEDURE:   Procedure:  Right diagnostic thoracentesis. Therapeutic paracentesis.  Date/Time:  1/23/2024 3:35 PM    Written consent obtained?: Yes    Risks and benefits: Risks, benefits and alternatives were discussed    Consent given by:  Patient  Patient states understanding of procedure being performed: Yes    Patient's understanding of procedure matches consent: Yes    Procedure consent matches procedure scheduled: Yes    ASA Class:  2  History & Physical reviewed:  History and physical reviewed and no updates needed  Statement of review:  I have reviewed the lab findings, diagnostic data, medications, and the plan for sedation

## 2024-01-23 NOTE — ED NOTES
Pt was sitting on chair and when she stood to get back into bed she stood on the cords of her ECG and ripped one of the leads off. When the lead came off it left large skin tear to R chest. Covered in antibiotic gauze and ABD pad.

## 2024-01-23 NOTE — CONSULTS
[facility] Consult Service Note  Interventional Radiology  01/23/24   2:38 PM    Consult Requested: diagnostic right thoracentesis / diagnostic paracentesis    Recommendations/Plan:    Patient is approved for image guided right diagnostic thoracentesis and image guided diagnostic paracentesis. Small therapeutic paracentesis approved limit to 2 liters.    Timing of procedure is TBD based on IR staffing/schedule and triage.  Please contact the IR charge RN at [phone] for estimated time of procedure.     Labs WNL for procedure.    Orders entered for procedure. No NPO required.  Medications to be held include: None  Informed consent will be completed prior to procedure.     Case and imaging discussed with IR attending Dr. Tank Massey MD   Recommendations were reviewed with the requesting team.    History of Present Illness:  Stefani Crowell is a 63 year old English speaking female with past medical history of known alcoholic liver cirrhosis with ascites (currently undergoing liver transplant workup), severe asthma who presents emergency department with abnormal lab values.  She was seen in [facility] interventional radiology for paracentesis and lab workup.  She was found to be hyponatremic with a sodium of 112.  She was given an albumin infusion after the paracentesis and sent to the ED for further evaluation.  Per [facility] records, she had 2740 mL of fluid removed during paracentesis.  In clinic today she noted 2 weeks of upper respiratory infection symptoms including chronic cough, wheezing.  She was satting at normal at 97%.       Diagnostic labs to be entered by the requesting team.     Expected date of discharge:  01/24/2024    Vitals:   BP 98/53   Pulse 76   Temp 97.6  F (36.4  C) (Oral)   Resp 18   LMP  (LMP Unknown)   SpO2 97%     Pertinent Labs Reviewed:  CBC:  Lab Results   Component Value Date    WBC 33.0 (H) 01/23/2024    WBC 31.2 (H) 01/22/2024    WBC 8.4 01/11/2024    WBC  5.9 03/25/2021    WBC 4.9 02/18/2021    WBC 5.2 01/19/2021     Lab Results   Component Value Date    HGB 8.1 01/23/2024    HGB 8.3 01/22/2024    HGB 8.4 01/11/2024    HGB 14.0 03/25/2021    HGB 14.1 02/18/2021    HGB 14.7 01/19/2021     Lab Results   Component Value Date    PLT 89 01/23/2024    PLT 91 01/22/2024    PLT 84 01/11/2024    PLT 56 03/25/2021    PLT 53 02/18/2021    PLT 51 01/19/2021    INR:  Lab Results   Component Value Date    INR 2.31 (H) 01/23/2024    INR 2.6 (H) 01/08/2024    INR 1.36 (H) 03/25/2021          COVID Results:  COVID-19 Antibody Results, Testing for Immunity           No data to display              COVID-19 PCR Results          10/22/2023    01:56 1/22/2024    11:37   COVID-19 PCR Results   SARS CoV2 PCR Negative  Negative     Potassium   Date Value Ref Range Status   01/23/2024 5.2 3.4 - 5.3 mmol/L Final   03/25/2021 4.5 3.4 - 5.3 mmol/L Final          Kanu Epperson PA-C  Interventional Radiology  Pager: [phone]

## 2024-01-23 NOTE — CONSULTS
[facility] Nurse Inpatient Assessment     Consulted for: Diffuse skin tears    Summery: Pt has extremely fragile skin. There are multiple area where she had had a skin tear but it has healed or is no longer open. Pt would benefit from protective garments such as leyda sleeves. For additional skin tears please use skin care protocol to treat.    Patient History (according to provider note(s):      Stefani Crowell is a 63 year old female admitted on 1/22/2024. She has a history of cirrhosis secondary to alcohol use currently being worked up for liver transplant complicated by need for frequent paracenteses and thoracenteses for ascites/hepatic hydrothorax, in addition to asthma and COPD 2/2 tobacco use, MDD/STEPHANIE, hypothyroidism, low back pain, and chronic hyponatremia. She presented to the ED for evaluation of acute on chronic hyponatremia in the setting of recent viral-like URI illness. Admitted for careful monitoring, and for evaluation of loculated pleural effusion with significant leukocytosis     Assessment:      Areas visualized during today's visit: Lower extremities , Upper extremities , and Chest    Wound location: Right Chest    Last photo: 1/25/24  Wound due to: Skin Tear and Trauma  Wound history/plan of care: Skin tear occurred when ECG line was pulled off on accident.  Wound base: 90 % dermis, 20 %  Skin flap     Palpation of the wound bed: normal      Drainage: small     Description of drainage: bloody     Measurements (length x width x depth, in cm): 5  x 6  x  0.1 cm      Tunneling: N/A     Undermining: N/A  Periwound skin: Intact and Ecchymosis      Color: purple      Temperature: normal   Odor: none  Pain: moderate, sharpwell being cleaned  Pain interventions prior to dressing change: soaking and slow and gentle cares   Treatment goal: Heal  and Protection  STATUS: initial assessment  Supplies ordered: supplies stored on unit     Wound location:  Right ankle    Last photo: 1/23/24  Wound due to: Skin Tear and Trauma  Wound history/plan of care: Unclear. Pt has very fragile skin that is prone to skin tears.   Wound base: 90 % dermis, 10 %  skin flap     Palpation of the wound bed: normal      Drainage: small     Description of drainage: bloody     Measurements (length x width x depth, in cm): 2.5  x 2.5  x  0.1 cm      Tunneling: N/A     Undermining: N/A  Periwound skin: Intact      Color: normal and consistent with surrounding tissue      Temperature: normal   Odor: none  Pain: moderate, sharp  Pain interventions prior to dressing change: slow and gentle cares   Treatment goal: Heal  and Protection  STATUS: initial assessment  Supplies ordered: supplies stored on unit      Treatment Plan:     Skin tears wound(s) right upper chest and right ankle: Every 3 days and PRN  Gently cleanse with normal saline.  Apply No sting barrier film to any area that will be covered the adhesive.  Cut adaptic to fit wound wound bed.  Cover with mepilex.     Orders: Written    RECOMMEND PRIMARY TEAM ORDER: None, at this time  Education provided: Not appropriate today   Discussed plan of care with: Patient and Nurse  WO nurse follow-up plan: weekly  Notify WOC if wound(s) deteriorate.  Nursing to notify the Provider(s) and re-consult the WOC Nurse if new skin concern.    DATA:     Current support surface: Standard  Standard gel/foam mattress (IsoFlex, Atmos air, etc)  Containment of urine/stool: Incontinence Protocol  BMI: There is no height or weight on file to calculate BMI.   Active diet order: Orders Placed This Encounter      Combination Diet Regular Diet Adult     Output: I/O last 3 completed shifts:  In: 480 [P.O.:480]  Out: -      Labs:   Recent Labs   Lab 01/23/24  0648   ALBUMIN 2.9*   HGB 8.1*   INR 2.31*   WBC 33.0*     Pressure injury risk assessment:                          Aliyah Ly RN CWOCN  Please contact through [facility] group: [facility] Nurse  Dept.  Office Number: [phone]

## 2024-01-23 NOTE — PROGRESS NOTES
Brief Nephrology Note:      Stefani Crowell is a 63 year old female with history of known alcoholic liver cirrhosis with ascites (currently undergoing liver transplant workup), severe asthma who presents with hyponatremia noted during paracentesis today. Na down to 112 (and K 5.3). Presented to the ED for further management. She has had previous admissions with hyponatremia with most recent urine studies showing urine Osm 409 and Urine Na <20.These labs are consistent with her known liver disease. During last admission (12/6 - 12/16) she had a catrina down to 113 that did not have accompanying neurologic changes, but was started on 2% for management until improvement to >120. It seems that her recent baseline is mid 120s, and there may be some association with the timing of her paracentesis (2-3x weekly, appears last 1/19 and today). She has also recently had URI symptoms of worsening cough.    -Repeat urine osm and urine Na  -Would recommend fluid restriction of 1L  -Continue Midodrine 15mg TID  -Appears that she received albumin after paracentesis today, can hold on additional  -Would repeat Na now, if it remains ~110 (after recent para/albumin), will plan to start 2%NS at 35 mL/hr  -Monitor Na q2-3 to verify rate of correction  -Goal would be to improve to ~116 by tomorrow evening (1/23)      Denny Alex MD  Division of Renal Disease and Hypertension  [facility]  [employer] Web Console

## 2024-01-23 NOTE — MEDICATION SCRIBE - ADMISSION MEDICATION HISTORY
Medication Scribe Admission Medication History    Admission medication history is complete. The information provided in this note is only as accurate as the sources available at the time of the update.    Information Source(s): Family member and CareEverywhere/SureScripts via in-person    Pertinent Information: Patient's spouse Hari, handed me his phone with patient's med grid, worked the PTA from there. He also reports pharmacy would only dispense a limited amount of the Vitamin D3 doses and patient missed her latest dose as they ran out and awaiting refill. (He reports the last dose of Vitamin D3 was 1-2 weeks ago).     Changes made to PTA medication list:  Added: Trazodone 50 mg, pantoprazole 40 mg, lactulose solution  Deleted: Albuterol neb solution (duplicate), cyclobenzaprine 10 mg (not taking), hypertonic nasal solution (not taking), lactulose 10 gm (not taking), Lorazepam 0.5 mg x2 (not taking), mupirocin ointment (not on the med grid), Nutritional supplements (not taking), Vitamin D2 5000 units.  Changed: None    Medication Affordability:       Allergies reviewed with patient and updates made in EHR: yes    Medication History Completed By: Felipa Martins 1/23/2024 2:45 PM    Prior to Admission medications    Medication Sig Last Dose Taking? Auth Provider Long Term End Date   albuterol (PROAIR HFA/PROVENTIL HFA/VENTOLIN HFA) 108 (90 Base) MCG/ACT inhaler Inhale 1-2 puffs into the lungs every 6 hours as needed for shortness of breath or wheezing More than a month at Unknown Yes Christian Davis MD Yes    albuterol (PROVENTIL) (2.5 MG/3ML) 0.083% neb solution USE 1 VIAL VIA NEBULIZER EVERY 4 HOURS AS NEEDED FOR SHORTNESS OF BREATH OR WHEEZING More than a month at Unknown Yes Aliyah Marcus PA-C Yes    artificial saliva (BIOTENE DRY MOUTHWASH) LIQD liquid Swish and spit 5 mLs in mouth 4 times daily as needed for dry mouth Unknown at Unknown Yes Robin Dotson MD     citalopram (CELEXA) 40 MG tablet  Take 1 tablet (40 mg) by mouth daily 1/22/2024 at 0830 Yes Ninfa Mark PA-C Yes    ferrous sulfate (FEROSUL) 325 (65 Fe) MG tablet Take 1 tablet (325 mg) by mouth daily (with breakfast) Take 1 tablet (325 mg) by mouth 1/22/2024 at 0830 Yes Aliyah Marcus PA-C     fluticasone-salmeterol (ADVAIR) 500-50 MCG/ACT inhaler Inhale 1 puff into the lungs every 12 hours 1/22/2024 at 2030 Yes Christian Davis MD Yes    lactulose (CHRONULAC) 10 GM/15ML solution TAKE 30 ML BY MOUTH TWICE DAILY 1/22/2024 at 1630 Yes Reported, Patient No    levothyroxine (SYNTHROID/LEVOTHROID) 112 MCG tablet TAKE 1 TABLET(112 MCG) BY MOUTH DAILY 1/22/2024 at 0830 Yes Aliyah Marcus PA-C Yes    midodrine (PROAMATINE) 10 MG tablet Take 15 mg by mouth 3 times daily 1/22/2024 at 1630 Yes Poonam Hays MD     montelukast (SINGULAIR) 10 MG tablet Take 1 tablet (10 mg) by mouth At Bedtime 1/22/2024 at 2030 Yes Ninfa Mark PA-C Yes    omeprazole (PRILOSEC) 20 MG DR capsule Take 1 capsule (20 mg) by mouth 2 times daily 1/22/2024 at 1630 Yes Carlito Arciniega MD     OVER-THE-COUNTER Place 1 drop into both eyes 3 times daily. Blink Tears, Systane Ultra, Systane Balance or Refresh Optive Artificial Tear Unknown at Unknown Yes Xiomara Asencio, OD     pantoprazole (PROTONIX) 40 MG EC tablet Take 1 tablet by mouth daily at 2 pm 1/22/2024 at 1630 Yes Reported, Patient No    rifaximin (XIFAXAN) 550 MG TABS tablet Take 1 tablet (550 mg) by mouth 2 times daily 1/22/2024 at 1630 Yes Robin Dotson MD     traZODone (DESYREL) 50 MG tablet Take 50 mg by mouth at bedtime 1/22/2024 at 2030 Yes Reported, Patient No    vitamin D3 (CHOLECALCIFEROL) 1.25 MG (98140 UT) capsule Take 1 capsule (50,000 Units) by mouth every 7 days for 12 doses Past Week Yes Aliyah Marcus PA-C  3/7/24   alendronate (FOSAMAX) 70 MG tablet Take 1 tablet (70 mg) by mouth every 7 days   Christian Davis MD Yes    fluticasone-salmeterol (ADVAIR) 250-50  MCG/ACT inhaler Inhale 1 puff into the lungs every 12 hours for 30 days   Carlito Arciniega MD Yes 1/11/24

## 2024-01-23 NOTE — ED NOTES
Messaged team asking for WOC consult as pt has multiple skin tears and has gotten 2 new ones overnight.
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
## 2024-01-23 NOTE — IR NOTE
Patient Name: Stefani Crowell  Medical Record Number: [id number]  Today's Date: 1/23/2024    Procedure: Right thoracentesis, paracentesis  Proceduralist: Dr. Jose R Hernandez   Pathology present: N/A    Procedure Start: 1557  Procedure end: 1620  Sedation medications administered: local only     Report given to: REN [place] ED  : N/A    Other Notes: Pt arrived to IR room 2 from ED. Consent reviewed. Pt denies any questions or concerns regarding procedure. Pt positioned supine and monitored per protocol.     100 mL fluid removed for thoracentesis and sent to lab as ordered. 100 mL fluid removed for paracentesis and sent to lab as ordered. Per Dr. Massey, no therapeutic paracentesis (diagnostic only) due to fluid balance concerns.     MAP <65 at end of procedure. BP 90s/40s before and after procedure. Pt already receiving 75g albumin infusion from ED upon arrival to IR. Pt denies SOB, chest pain. HR consistently 70s. Per Dr. Massey, okay to transfer.     Pt tolerated procedure without any noted complications. Pt transferred back to ED.

## 2024-01-23 NOTE — CONSULTS
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 re-assessed for adequacy to receive                          sedatives. The heart rate, respiratory rate, oxygen                          saturations, blood pressure, adequacy of pulmonary                          ventilation, and response to care were  monitored                          throughout the procedure. The physical status of the                          patient was re-assessed after the procedure.                         After obtaining informed consent, the colonoscope was                          passed under direct vision. Throughout the procedure,                          the patient's blood pressure, pulse, and oxygen                          saturations were monitored continuously. The                          Colonoscope was introduced through the anus and                          advanced to the cecum, identified by appendiceal                          orifice and ileocecal valve. The colonoscopy was                           performed without difficulty. The patient tolerated                          the procedure well. The quality of the bowel                          preparation was fair.                                                                                   Findings:       A scattered area of mildly f riable mucosa with contact bleeding was        found in the entire colon.       A few small-mouthed diverticula were found in the sigmoid colon.       Small, non-bleeding rectal varices were found.                                                                                   Impression:            - Preparation of the colon was fair.                         - Friability with contact bleeding in the entire                          examined colon.                         - Diverticulosis in the sigmoid colon.                         - No specimens collected.                         - There were possible polyps. This exam would not                          suffice for screening.  Recommendation:        - Return patient to [facility] for ongoing care.                                                                                     electronically signed by Dmitri [facility]  ______________________________  DMITRI [facility]ICK MD  11/3/2023 2:11:55 PM  I was physically present  for the entire viewing portion of the exam.  __________________________  Signature of teaching physician  [id number] FITZPATRICK MD  Number of Addenda: 0    Note Initiated On: 11/3/2023 12:04 PM  Scope In: 1:40:29 PM  Scope Out: 1:54:22 PM       No results found for this or any previous visit.      IMAGING:  No results found for this or any previous visit.    Results for orders placed during the hospital encounter of 12/06/23    XR Chest Port 1 View    Narrative  Exam: XR CHEST PORT 1 VIEW, 12/10/2023 10:01 AM    Indication: interval eval of pneumothorax    Comparison: 12/9/2023    Findings:  The cardiomediastinal silhouette and pulmonary  vasculature are within  normal limits. Slightly increased moderate to large loculated right  pleural effusion. Continued streaky right perihilar and basilar  opacities. No appreciable pneumothorax or left pleural effusion. No  new focal airspace opacity.    Impression  Impression:  1. No appreciable pneumothorax.  2. Slightly increased moderate to large loculated right pleural  effusion with associated atelectasis.    JENN BOWMAN DO      SYSTEM ID:  [id number]    Results for orders placed during the hospital encounter of 10/22/23    US Abdomen Complete w Doppler Complete    Narrative  EXAMINATION: US ABDOMEN COMPLETE WITH DOPPLER COMPLETE 10/22/2023  11:40 AM    COMPARISON: CT abdomen/pelvis 3/27/2023, 9/10/2020 ultrasound    HISTORY: Evaluate ascites + r/o clot    TECHNIQUE: The abdomen was scanned in standard fashion with  specialized ultrasound transducer(s) using both gray-scale, color  Doppler, and spectral flow techniques.    Findings:  Doppler Evaluation:  Splenic Vein: Patent with retrograde flow, 40 cm/sec.    Portal Venous Flow:  Main Portal Vein: Patent with antegrade flow, 18 cm/sec.  Right Portal Vein: Patent with antegrade flow, 27 cm/sec.  Left Portal Vein: Patent with antegrade flow, 28 cm/sec.    Hepatic Veins:  The right, middle, and left hepatic veins are patent with flow towards  the IVC. IVC is patent.    Hepatic Arterial Flow:  Hepatic Artery: Resistive Index of 0.70, peak systolic velocity  125  cm/sec.  Left and right hepatic arteries not identified.    Gray-scale Analysis:  Liver: Hepatic parenchyma demonstrates coarsened echotexture with a  nodular capsular contour. No focal hepatic lesion. The right hepatic  lobe measures 12.7 cm craniocaudally. Main portal vein measures 1.3 cm  in diameter.    Gallbladder: The gallbladder is well distended and of normal  morphology. There is no pericholecystic fluid or sonographic Dempsey's  sign. Shadowing stones in the lumen.    Bile Ducts: No intra-  or extra-hepatic biliary dilation. Common bile  duct measures 5 mm..    Pancreas: Not visualized    Kidneys: Both kidneys are of normal echotexture.  No evidence of mass  or hydronephrosis. Right kidney measures 9.2 cm, left measures 11.5  cm.    Spleen: The spleen is of normal echogenicity. Measures 15.7 cm,  enlarged.    Aorta and IVC: The visualized portions of the aorta and IVC are  unremarkable. Proximal aorta measures 1.9 cm.    Free fluid in the right upper and lower quadrants. Right pleural  effusion.    Impression  Impression:  1. Cirrhotic hepatic morphology. No focal hepatic lesion.  2. Patent hepatic vasculature. Retrograde blood flow in the splenic  vein. Low velocity flow in the main portal vein at 18 cm/s.  3. Ascites and splenomegaly, compatible portal hypertension.  4. Cholelithiasis.  5. Right pleural effusion.    I have personally reviewed the examination and initial interpretation  and I agree with the findings.    JENN BOWMAN DO      SYSTEM ID:  [id number]    No results found for this or any previous visit.    XR Chest 2 Views    Result Date: 1/22/2024  EXAM: XR CHEST 2 VIEWS 1/22/2024 6:30 PM HISTORY: SOB, r/o CAP   COMPARISON: Radiographs 12/14/2023, CT 11/13/2023 FINDINGS: Large loculated right pleural effusion with associated atelectasis of the right upper, middle, and lower lobes. The left lung is clear. Normal size of the cardiac silhouette. No pneumothorax.     IMPRESSION: Large loculated right pleural effusion with associated atelectasis of the right upper, middle, and lower lobes. Increased in fluid volume compared to 12/14/2023. I have personally reviewed the examination and initial interpretation and I agree with the findings. DEYANIRA HAMPTON MD   SYSTEM ID:  [id number]    IR Paracentesis    Result Date: 1/22/2024  For Patients: As a result of the 21st Century Cures Act, medical imaging exams and procedure reports are released immediately into your electronic medical record. You may  view this report before your referring provider. If you have questions, please contact your health care provider. EXAM: 1. PARACENTESIS 2. ULTRASOUND GUIDANCE LOCATION: [facility] DATE: 1/22/2024 INDICATION: Ascites. PROCEDURE: Informed consent obtained. Time out performed. The abdomen was prepped and draped in a sterile fashion. 10 mL of 1% lidocaine was infused into local soft tissues. A 5 Kyrgyz catheter system was introduced into the abdominal ascites under ultrasound guidance. 2.74 liters of clear fluid were removed and sent to lab if requested. Patient tolerated procedure well. Ultrasound imaging was obtained and placed in the patient's permanent medical record.    1.  Status post ultrasound-guided paracentesis. Reference CPT Code: 20442    IR Paracentesis    Result Date: 1/19/2024  For Patients: As a result of the 21st Century Cures Act, medical imaging exams and procedure reports are released immediately into your electronic medical record. You may view this report before your referring provider. If you have questions, please contact your health care provider. EXAM: 1. PARACENTESIS 2. ULTRASOUND GUIDANCE LOCATION: [facility] DATE: 1/19/2024 INDICATION: Ascites. PROCEDURE: Informed consent obtained. Time out performed. The abdomen was prepped and draped in a sterile fashion. 10 mL of 1% lidocaine was infused into local soft tissues. A 5 Kyrgyz catheter system was introduced into the abdominal ascites under ultrasound guidance. 2.9 liters of clear fluid were removed and sent to lab if requested. Patient tolerated procedure well. Ultrasound imaging was obtained and placed in the patient's permanent medical record.    1.  Status post ultrasound-guided paracentesis. Reference CPT Code: 96661    US THORACENTESIS RIGHT    Result Date: 1/17/2024  For Patients: As a result of the 21st Century Cures Act, medical imaging exams and procedure reports are released immediately into your electronic medical record.  You may view this report before your referring provider. If you have questions, please contact your health care provider. EXAM: 1. RIGHT-SIDED THORACENTESIS 2. ULTRASOUND GUIDANCE LOCATION: [facility] DATE: 1/17/2024 INDICATION: Pleural effusion. PROCEDURE: Informed consent obtained. Time out performed. The chest was prepped and draped in sterile fashion. 10 mL of 1% lidocaine was infused into the local soft tissues. Under direct ultrasound guidance, a 5 Nepali catheter system was placed into the pleural effusion. 1.375 liters of clear yellow fluid were removed and sent to lab, if requested. Patient tolerated procedure well. Ultrasound imaging was obtained and placed in the patient's permanent medical record.    1.  Status post right-sided ultrasound-guided thoracentesis.    XR CHEST POST PROCEDURE    Result Date: 1/17/2024  For Patients: As a result of the 21st Century Cures Act, medical imaging exams and procedure reports are released immediately into your electronic medical record. You may view this report before your referring provider. If you have questions, please contact your health care provider. EXAM: XR CHEST POST PROCEDURE LOCATION: [facility] DATE: 1/17/2024 INDICATION: Post procedure. COMPARISON: Chest x-rays, most recently 12/22/2023. The cardiomediastinal silhouette and pulmonary vasculature appear within normal limits, although are partially obscured by right lung opacities. Moderate right pleural effusion with small basilar and moderate loculated apical components. Associated right basilar and perihilar airspace opacities. The left lung is clear without effusion. No pneumothorax.    1.  No pneumothorax status post right thoracentesis. 2.  Moderate right pleural effusion with loculated apical component. Associated right lung atelectasis versus consolidation.

## 2024-01-24 NOTE — PROCEDURES
INTERVENTIONAL PULMONOLOGY       Procedure(s):    Therapeutic suctioning (1 site)        Indication:  Right empyema    Attending of Record:  Katja Brady MD     Interventional Pulmonary Fellow   Yousuf Harrison    Trainees Present:   None     Medications:    6 ml 1% lidocaine    Sedation Time:   None    Time Out:  Performed    The patient's medical record has been reviewed.  The indication for the procedure was reviewed.  The necessary history and physical examination was performed and reviewed.  The risks, benefits and alternatives of the procedure were discussed with the the patient in detail and she had the opportunity to ask questions.  I discussed in particular the potential complications including risks of minor or life-threatening bleeding and/or infection, respiratory failure, vocal cord trauma / paralysis, pneumothorax, and discomfort. Sedation risks were also discussed including abnormal heart rhythms, low blood pressure, and respiratory failure. All questions were answered to the best of my ability.  Verbal and written informed consent was obtained.  The proposed procedure and the patient's identification were verified prior to the procedure by the physician and the nurse.    The patient was assessed for the adequacy for the procedure and to receive medications.   Mental Status:  Alert and oriented x 3  Airway examination:  Class I (Complete visualization of soft palate)  Pulmonary:  Non labored respirations  CV:  Regular rate  ASA Grade:  (III)  Severe systemic disease    After clinical evaluation and reviewing the indication, risks, alternatives and benefits of the procedure the patient was deemed to be in satisfactory condition to undergo the procedure.      Immediately before administration of medications the patient was re-assessed for adequacy to receive sedatives including the heart rate, respiratory rate, mental status, oxygen saturation, blood pressure and adequacy of pulmonary ventilation.  These same parameters were continuously monitored throughout the procedure.    A Tuberculosis risk assessment was performed:  The patient has no known RISK of Tuberculosis    The procedure was performed in a negative airflow room: Yes    Maneuvers / Procedure:      Chest tube placement: Once the site was marked using US, A 14F Con pneumothorax chest tube was used for the chest tube. The patient was prepped in sterile fashion. A total of 6 ml 1%lidocaine used to anesthetize the area. A finder needle was used to aspirate purulent, foul smelling fluid. The tube was then advanced into the pleural space using seldinger technique. The tube was connected to the pleura-VAC system and placed on suction.     Any disposable equipment was visually inspected and deemed to be intact immediately post procedure.      Relevant Pictures  none    Assessment and Recommendations:     Successful completion of right 14 Fr chest tube placement, drainage of 1300 ml purulent fluid  Keep right chest tube on wall suction fi pt tolerates, if not then can transition to water seal  CT chest without contrast tomorrow morning  We will reevaluate about possible fibrinolytic via right chest tube tomorrow after CT chest  Pt is high risk for hemorrhagic complication after lytic therapy because of coagulopathy          Yousuf Harrison  Interventional pulmonary fellow  Pager: [phone]

## 2024-01-24 NOTE — PROGRESS NOTES
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      RECOMMENDATIONS:    Updates for 01/24/2024 :  - Chest tube for source control of empyema    # Empyema  # Hepatic hydrothorax  # Ascites  - thora 1/23 with very high cell count. With how high her PMN's are and negative ascites fluid, more characteristic of empyema rather than SBE. She remains on zosyn. Initial cultures with gm neg bacilli.   - Recommend chest tube for source control. She is high risk for further deterioration with infection and risk of further loculations and nutritional deficiencies.    - ID following.   - Other infectious work up in progress- 1/2 bottles blood culture with +gpc, likely contaminant.   - para without evidence of SBP    # Hyponatremia  - nephrology following  - on 2% saline. Sodium level increased to 117 from admit of 110.  - current limited oral intake due to drowsiness. Fluid restriction to 1.2 L    # Alcohol associated cirrhosis  # Transplant candidacy  - MELD 30, ABO A  - with current infection, not a candidate for liver transplant at this time. Still needs [facility] for cardiac risk assessment for transplant. Borderline frailty.   - US abd 10/22/23 negative for HCC    # Hepatic encephalopathy  - continue with lactulose and rifaximin.     # Debility  # Moderate protein calorie malnutrition  - PT/OT when able to participate.   - BID protein supplements.     Patient was discussed with attending physician, Dr. Leventhal.  The above note reflects our joint plan.     Next follow up appointment: Dr. Lim  1/29/24    Thank you for the opportunity to be involved with  Stefani rosas. Please call with any questions or concerns.    ROD Urban, CNP  Inpatient Hepatology [facility]        Subjective    Denies dyspnea. Continues to feel ill and denies appetite. Feels thirsty but has not had increased oral intake. No fevers.         Objective    VS: /50 (BP Location: Left arm)   Pulse 78   Temp 97.9  F (36.6  C) (Oral)   Resp 16   Wt 63.2 kg (139 lb 5.3 oz)   LMP  (LMP Unknown)   SpO2 95%   BMI 23.19 kg/m     Date 01/24/24 0700 - 01/25/24 0659   Shift 8565-6708 3633-0340 6182-1169 24 Hour Total   INTAKE   P.O. 40   40   I.V. 200   200   Shift Total(mL/kg) 240(3.8)   240(3.8)   OUTPUT   Shift Total(mL/kg)       Weight (kg) 63.2 63.2 63.2 63.2      General: In no acute distress, moderate facial muscle wasting  Neuro: drowsy Ox3, No asterixis. Mild tremulous  HEENT:  Noscleral icterus, Nooral lesions  CV:  Skin warm and dry  Lungs:  Respirations even and nonlabored on room air  Abd:  Mild abdominal distention. nontender   Extrem:  trace lower peripheral edema   Skin:  mild jaundice  Psych: flat    MEDICATIONS:  Current Facility-Administered Medications   Medication    albuterol (PROVENTIL HFA/VENTOLIN HFA) inhaler    albuterol (PROVENTIL) neb solution 2.5 mg    alum & mag hydroxide-simethicone (MAALOX) suspension 30 mL    artificial saliva (BIOTENE DRY MOUTHWASH) liquid 5 mL    benzocaine-menthol (CHLORASEPTIC MAX) 15-10 MG lozenge 1 lozenge    calcium carbonate (TUMS) chewable tablet 1,000 mg    citalopram (celeXA) tablet 40 mg    fluticasone-vilanterol (BREO ELLIPTA) 200-25 MCG/ACT inhaler 1 puff    lactulose (CHRONULAC) solution 20 g    levothyroxine (SYNTHROID/LEVOTHROID) tablet 112 mcg    lidocaine (LMX4) cream    lidocaine 1 % 0.1-1 mL    midodrine (PROAMATINE) tablet 15 mg    montelukast (SINGULAIR) tablet 10 mg    mupirocin (BACTROBAN) 2 % ointment    ondansetron (ZOFRAN) injection 4 mg     pantoprazole (PROTONIX) EC tablet 40 mg    piperacillin-tazobactam (ZOSYN) 3.375 g vial to attach to  mL bag    rifaximin (XIFAXAN) tablet 550 mg    senna-docusate (SENOKOT-S/PERICOLACE) 8.6-50 MG per tablet 1 tablet    Or    senna-docusate (SENOKOT-S/PERICOLACE) 8.6-50 MG per tablet 2 tablet    sodium chloride (PF) 0.9% PF flush 3 mL    sodium chloride (PF) 0.9% PF flush 3 mL    sodium chloride 2% infusion    vancomycin (VANCOCIN) 1,000 mg in 200 mL dextrose intermittent infusion       REVIEW OF LABORATORY, PATHOLOGY AND IMAGING RESULTS:  BMP  Recent Labs   Lab 01/24/24  0928 01/24/24  0805 01/24/24  0532 01/24/24  0217 01/23/24  0730 01/23/24  0648 01/22/24  2232 01/22/24  1734 01/22/24  1313   * 119* 117*  117* 117*   < > 111*   < > 110*  --    POTASSIUM  --   --  4.7  --   --  5.2  --  5.1  --    CHLORIDE  --   --  89*  --   --  85*  --  81* 82*   UMA  --   --  9.8  --   --  9.8  --  9.6  --    CO2  --   --  17*  --   --  17*  --  18*  --    BUN  --   --  88.3*  --   --  95.3*  --  85.9*  --    CR  --   --  1.07*  --   --  1.16*  --  1.12*  --    GLC  --   --  92  --   --  74  --  121*  --     < > = values in this interval not displayed.     CBC  Recent Labs   Lab 01/24/24  0532 01/23/24  0648 01/22/24  1734   WBC 28.3* 33.0* 31.2*   RBC 2.27* 2.47* 2.48*   HGB 7.5* 8.1* 8.3*   HCT 22.4* 23.9* 24.2*   MCV 99 97 98   MCH 33.0 32.8 33.5*   MCHC 33.5 33.9 34.3   RDW 16.4* 16.3* 16.5*   PLT 72* 89* 91*     INR  Recent Labs   Lab 01/24/24  0532 01/23/24  0648 01/22/24  1313   INR 2.45* 2.31* 2.5*     LFTs  Recent Labs   Lab 01/24/24  0532 01/23/24  1405 01/23/24  0648 01/22/24  1734   ALKPHOS 98  --  97 127   AST 74*  --  67* 80*   ALT 36  --  37 37   BILITOTAL 6.0*  --  7.0* 7.3*   PROTTOTAL 5.4* 5.1* 5.1* 5.6*   ALBUMIN 3.3*  --  2.9* 3.1*        MELD 3.0: 29 at 1/24/2024  9:28 AM  MELD-Na: 30 at 1/24/2024  9:28 AM  Calculated from:  Serum Creatinine: 1.07 mg/dL at 1/24/2024  5:32 AM  Serum Sodium: 117  "mmol/L (Using min of 125 mmol/L) at 1/24/2024  9:28 AM  Total Bilirubin: 6.0 mg/dL at 1/24/2024  5:32 AM  Serum Albumin: 3.3 g/dL at 1/24/2024  5:32 AM  INR(ratio): 2.45 at 1/24/2024  5:32 AM  Age at listing (hypothetical): 63 years  Sex: Female at 1/24/2024  9:28 AM    Previous work-up:   Lab Results   Component Value Date    HEPBANG Nonreactive 02/18/2021    HBCAB Nonreactive 02/18/2021    HCVAB  08/03/2017     Nonreactive   Assay performance characteristics have not been established for newborns,   infants, and children      TANYA 157 10/03/2023    IRONSAT 26 11/23/2023    TSH 3.98 12/06/2023    CHOL 176 08/15/2022    HDL 44 (L) 08/15/2022     (H) 08/15/2022    TRIG 32 12/06/2023    A1C 4.7 07/24/2023    POPETH <10 01/11/2024    PLPETH <10 01/11/2024      No results found for: \"SPECDES\", \"LDRESULTS\"      Imaging Results:  CXR 1/22/24  IMPRESSION:  Large loculated right pleural effusion with associated atelectasis of  the right upper, middle, and lower lobes. Increased in fluid volume  compared to 12/14/2023.         "

## 2024-01-24 NOTE — OR NURSING
Pt underwent chest tube placement with local analgesia. Specimens: none. Report called to floor RN and pt transported back to floor.       Criselda Flynn RN

## 2024-01-24 NOTE — PHARMACY-VANCOMYCIN DOSING SERVICE
Pharmacy Vancomycin Initial Note  Date of Service 2024  Patient's  1960  63 year old, female    Indication: Bacteremia    Current estimated CrCl = Estimated Creatinine Clearance: 50.8 mL/min (A) (based on SCr of 1.16 mg/dL (H)).    Creatinine for last 3 days  2024:  5:34 PM Creatinine 1.12 mg/dL  2024:  6:48 AM Creatinine 1.16 mg/dL    Recent Vancomycin Level(s) for last 3 days  No results found for requested labs within last 3 days.      Vancomycin IV Administrations (past 72 hours)        No vancomycin orders with administrations in past 72 hours.                    Nephrotoxins and other renal medications (From now, onward)      Start     Dose/Rate Route Frequency Ordered Stop    24 0200  vancomycin (VANCOCIN) 1,000 mg in 200 mL dextrose intermittent infusion         1,000 mg  200 mL/hr over 1 Hours Intravenous EVERY 18 HOURS 24 0151              Contrast Orders - past 72 hours (72h ago, onward)      None            [employer] Prediction of Planned Initial Vancomycin Regimen  Loading dose: N/A  Regimen: 1000 mg IV every 18 hours.  Start time: 01:47 on 2024  Exposure target: AUC24 (range)400-600 mg/L.hr   AUC24,ss: 524 mg/L.hr  Probability of AUC24 > 400: 80 %  Ctrough,ss: 16.5 mg/L  Probability of Ctrough,ss > 20: 29 %  Probability of nephrotoxicity (Lodise MANJINDER ): 12 %    Plan:  Start vancomycin  1000 mg IV q18h.   Vancomycin monitoring method: AUC  Vancomycin therapeutic monitoring goal: 400-600 mg*h/L  Pharmacy will check vancomycin levels as appropriate in 1-3 Days.    Serum creatinine levels will be ordered daily for the first week of therapy and at least twice weekly for subsequent weeks.      IMANI MELISSA [facility]

## 2024-01-24 NOTE — PROGRESS NOTES
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  2.7L para was performed then she was sent to the ED.  Na normal up until July 2023 when it has been on the decline but generally 120-125 as recently as 1/11.  Cr also up from baseline of 0.7 on 1/11 to 1.2.  Nephrology consulted for management of hyponatremia and MATTIE.       Interval History :   Mrs Crowell continues on 2% saline, decreased rate to 25cc/h with Na up to goal this am.  Giving 75g albumin again to treat likely HRS as Cr did stabilize/improve slightly.  Continuing to follow closely, we can likely stop the 2% gtt tomorrow as our Na goals are essentially >120 given her cirrhosis.      Assessment & Recommendations:   Hyponatremia-Hypervolemic with liver failure, has chronic hyponatremia in 120-125 range but now much worse at ~111-112.  Endorses feeling constantly thirsty and taking in excess H2O.  Started on 2% overnight and Na is stable.  Will give albumin today for MATTIE (suspected HRS) which will provide some more Na and hopefully improved renal perfusion.  Jaswinder <20 due to liver disease.                  -Continuing 2% saline at 25cc/h.                -Fluid restriction 500cc (ordered)                -Giving albumin as well for MATTIE which should help Na                 -Na at goal of 118 this am, decreased rate of 2% to 25cc/h, giving albumin 75g again.  Likely can stop tomorrow.      MATTIE-Baseline Cr 0.7, has cirrhosis and low muscle mass which may be masking some CKD despite normal Cr. Responded to terlipressin in Nov and Cr did not rise afterwards, will consider again but starting with albumin challenge for HRS and evaluating for SBP given increased WBC's.  UA showed hyaline casts (chronic with liver failure) and also will be  sent for culture.  Would be difficult dialysis patient given her hemodynamics.                   -MATTIE, has underlying HRS physiology, unclear if she has acute infectious issue.                 -Giving 75g albumin x1 again today and decide daily.  Already on midodrine.       Volume-+1-2 edema in LE, + abd distension but soft, breathing is comfortable.       K/pH-K mildly up at 4.7, bicarb 17.  Giving albumin to try to get improved renal perfusion.       BMD-Mg mildly up consistent with MATTIE, phos 3.8 yesterday.      Anemia-Hgb 7.5, down slightly from yesterday but may be dilutional with 2% and albumin.       Nutrition-Taking PO, placed on 500cc fluid restriction.      Time spent: 40 minutes on this date of encounter for chart review, physical exam, medical decision making and co-ordination of care.      Discussed with Dr Escobar     Recommendations were communicated to primary team via verbal communication.         ROD Pena CNS  Clinical Nurse Specialist  [phone]      Review of Systems:   I reviewed the following systems:  Gen: No fevers or chills, fatigued.    CV: No CP at rest  Resp: No SOB at rest  GI: No N/V    Physical Exam:   I/O last 3 completed shifts:  In: 570 [P.O.:570]  Out: 375 [Urine:375]   /50 (BP Location: Left arm)   Pulse 78   Temp 97.9  F (36.6  C) (Oral)   Resp 16   Wt 63.2 kg (139 lb 5.3 oz)   LMP  (LMP Unknown)   SpO2 95%   BMI 23.19 kg/m       GENERAL APPEARANCE: alert and no distress, lethargic this am, frail.    Pulmonary: Breathing comfortably on RA.   CV: Regular rhythm, normal rate   - Edema +1 generalized.   GI: soft, nontender, normal bowel sounds  MS: no evidence of inflammation in joints, no muscle tenderness  : No Demarco  SKIN: no rash, warm, dry  NEURO: mild confusion but no focal deficits.      Labs:   All labs reviewed by me  Electrolytes/Renal -   Recent Labs   Lab Test 01/24/24  1151 01/24/24  0928 01/24/24  0805 01/24/24  0532 01/23/24  0730  01/23/24  0648 01/23/24  0210 01/22/24 2232 01/22/24  1734 12/13/23  1000 12/13/23  0418 10/19/23  1523 10/03/23  0853   * 117* 119* 117*  117*   < > 111*   < > 111* 110*   < > 124*  124*   < > 126*   POTASSIUM  --   --   --  4.7  --  5.2  --   --  5.1   < > 5.4*   < > 3.9   CHLORIDE  --   --   --  89*  --  85*  --   --  81*   < > 100   < > 91*   CO2  --   --   --  17*  --  17*  --   --  18*   < > 17*   < > 25   BUN  --   --   --  88.3*  --  95.3*  --   --  85.9*   < > 23.4*   < > 33.9*   CR  --   --   --  1.07*  --  1.16*  --   --  1.12*   < > 0.59   < > 1.24*   GLC  --   --   --  92  --  74  --   --  121*   < > 104*   < > 98   UMA  --   --   --  9.8  --  9.8  --   --  9.6   < > 8.8   < > 10.2   MAG  --   --   --   --   --  3.4*  --   --  2.8*  --  2.0   < >  --    PHOS  --   --   --   --   --   --   --  3.8  --   --   --   --  2.6    < > = values in this interval not displayed.       CBC -   Recent Labs   Lab Test 01/24/24  0532 01/23/24  0648 01/22/24  1734   WBC 28.3* 33.0* 31.2*   HGB 7.5* 8.1* 8.3*   PLT 72* 89* 91*       LFTs -   Recent Labs   Lab Test 01/24/24  0532 01/23/24  1405 01/23/24  0648 01/22/24  1734   ALKPHOS 98  --  97 127   BILITOTAL 6.0*  --  7.0* 7.3*   ALT 36  --  37 37   AST 74*  --  67* 80*   PROTTOTAL 5.4* 5.1* 5.1* 5.6*   ALBUMIN 3.3*  --  2.9* 3.1*       Iron Panel -   Recent Labs   Lab Test 11/23/23  0638 10/26/23  1311 10/03/23  0853   IRON 52 62 43   IRONSAT 26 28 28   TANYA  --   --  157           Current Medications:   albumin human  75 g Intravenous Once    citalopram  40 mg Oral Daily    fluticasone-vilanterol  1 puff Inhalation Daily    lactulose  20 g Oral TID    levothyroxine  112 mcg Oral Daily    midodrine  15 mg Oral TID    montelukast  10 mg Oral At Bedtime    mupirocin   Topical TID    pantoprazole  40 mg Oral BID    piperacillin-tazobactam  3.375 g Intravenous Q6H    rifaximin  550 mg Oral BID    sodium chloride (PF)  3 mL Intracatheter Q8H      sodium chloride 2%  infusion 25 mL/hr at 01/24/24 1200
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
## 2024-01-24 NOTE — PLAN OF CARE
Goal Outcome Evaluation:      Plan of Care Reviewed With: patient    Overall Patient Progress: decliningOverall Patient Progress: declining    Outcome Evaluation: Severe malnutrition. Initiate felecia cts tomorrow 1/25-1/27. Begin RS assistance after discussion with RN. See RD note 1/24 for full assessment/recs.

## 2024-01-24 NOTE — CONSULTS
Care Management Initial Consult    General Information  Assessment completed with: Lolly Velasco  Type of CM/SW Visit: Initial Assessment    Primary Care Provider verified and updated as needed: Yes   Readmission within the last 30 days: no previous admission in last 30 days (Recent DC of 12/15)      Reason for Consult: discharge planning  Advance Care Planning: Advance Care Planning Reviewed: present on chart, no concerns identified          Communication Assessment  Patient's communication style: spoken language (English or Bilingual)    Hearing Difficulty or Deaf: no   Wear Glasses or Blind: yes    Cognitive  Cognitive/Neuro/Behavioral: .WDL except  Level of Consciousness: alert  Arousal Level: opens eyes spontaneously  Orientation: oriented x 4  Mood/Behavior: withdrawn, cooperative, flat affect  Best Language: 0 - No aphasia  Speech: logical    Living Environment:   People in home: significant other  Hari  Current living Arrangements: house      Able to return to prior arrangements: yes       Family/Social Support:  Care provided by: spouse/significant other, self  Provides care for: no one  Marital Status: Lives with Significant Other  Significant Other          Description of Support System: Supportive         Current Resources:   Patient receiving home care services: No     Community Resources: [employer] [facility]  Equipment currently used at home: walker, rolling, cane, straight  Supplies currently used at home: None    Employment/Financial:  Employment Status: disabled (Reports SSI Disability is pending, denies needing anything)        Financial Concerns: none   Referral to Financial Worker: No       Does the patient's insurance plan have a 3 day qualifying hospital stay waiver?  Yes     Which insurance plan 3 day waiver is available? Alternative insurance waiver    Will the waiver be used for post-acute placement? Undetermined at this time    Lifestyle & Psychosocial Needs:  Social Determinants  of Health     Food Insecurity: Low Risk  (1/22/2024)    Food Insecurity     Within the past 12 months, did you worry that your food would run out before you got money to buy more?: No     Within the past 12 months, did the food you bought just not last and you didn t have money to get more?: No   Depression: At risk (8/18/2023)    PHQ-2     PHQ-2 Score: 4   Housing Stability: Low Risk  (1/22/2024)    Housing Stability     Do you have housing? : Yes     Are you worried about losing your housing?: No   Tobacco Use: Medium Risk (1/22/2024)    Patient History     Smoking Tobacco Use: Former     Smokeless Tobacco Use: Never     Passive Exposure: Past   Financial Resource Strain: Low Risk  (1/22/2024)    Financial Resource Strain     Within the past 12 months, have you or your family members you live with been unable to get utilities (heat, electricity) when it was really needed?: No   Alcohol Use: Not on file   Transportation Needs: Low Risk  (1/22/2024)    Transportation Needs     Within the past 12 months, has lack of transportation kept you from medical appointments, getting your medicines, non-medical meetings or appointments, work, or from getting things that you need?: No   Physical Activity: Not on file   Interpersonal Safety: Not on file   Stress: Not on file   Social Connections: Not on file       Functional Status:  Prior to admission patient needed assistance:   Dependent ADLs:: Ambulation-walker  Dependent IADLs:: Cleaning, Laundry, Cooking, Shopping, Meal Preparation, Medication Management, Transportation       Mental Health Status:  Mental Health Status: No Current Concerns       Chemical Dependency Status:  Chemical Dependency Status: Current Concern  Chemical Dependency Management:  (OP CD with Amy)          Values/Beliefs:  Spiritual, Cultural Beliefs, Faith Practices, Values that affect care: no               Additional Information:  63 year old female admitted on 1/22/2024. She has a history of  "cirrhosis secondary to alcohol use currently being worked up for liver transplant complicated by need for frequent paracenteses and thoracenteses for ascites/hepatic hydrothorax, in addition to asthma and COPD 2/2 tobacco use, MDD/STEPHANIE, hypothyroidism, low back pain, and chronic hyponatremia. She presented to the ED for evaluation of acute on chronic hyponatremia in the setting of recent viral-like URI illness. Admitted for careful monitoring, and for evaluation of loculated pleural effusion with significant leukocytosis. Currently being treated for intrathoracic/intraabdominal fluid collections + asymptomatic bacteruria vs. UTI    RNCC met with Pt on 1/23 to  complete assessment. Pt confirmed PCP, GUMARO. She reports she lives with her SO, Hari. She denies any financial concerns despite being unemployed and disability benefits not yet active. RNCC asked if she needed help with follow up and Pt stated she has \"done all I can, now I just have to wait\"    Pt currently receiving OP CD Tx with [facility]. Sober since June 29, 2023, per chart  review. \"it confirmed she receives therapy but stated she has not been \"in awhile\" but would like to continue to go. Pt states she remains sober.     Pt receives regularly scheduled thoracentesis at [facility] typically on Mondays, Wednesdays and Fridays. Pt stated she has skin tears and wound from these procedures that are painful. WOC following. MD aware. Primary RNCC to follow up with OP apts needed at discharge.     [facility], [place] location for OP PT, Pt stated she has not been able to go for every appointment d/t feeling unwell. Primary RNCC to follow up with clinic and possible new referral needs pending how many apts the Pt has missed. Current PT/OT recs pending.     Care management will continue to follow and support safe discharge planning as needed.     Nely Jain RN  RNCC Float   [phone]    "

## 2024-01-24 NOTE — PROGRESS NOTES
"CLINICAL NUTRITION SERVICES - ASSESSMENT NOTE     Nutrition Prescription    RECOMMENDATIONS FOR MDs/PROVIDERS TO ORDER:  Recommend liberalize fluid restriction as medically able throughout hospital admission, pending Na trends and fluid status.   --> Will not  be counting oral nutrition supplements towards fluid restriction at this time (severe malnutrition hx, oral nutrition supplements provide minimal \"free water\", fluid restriction shared 50/50 with nursing so pt only allowed ~250 mL fluid via room-service).     Encourage PO intake. Monitor paul cts with minimum goal for pt to consume at least 1130 Kcals and 55 gm protein daily (65% est needs) vs need to consider nutrition support.     Malnutrition Status:    Severe malnutrition in the context of chronic illness     Recommendations already ordered by Registered Dietitian (RD):  Paul cts 1/25-1/27  Allow oral nutrition supplements/snacks PRN if requested. Please do not count towards fluid restriction at this time. Allow nutrition drinks from home if preferred  Encourage small frequent meals, at least 1 high protein food per meal/snack. Please encourage pt to order meals at least TID and alert RD if pt interested in setting up snacks between meals.      Future/Additional Recommendations:  Monitor nutrition-related findings and follow pt per protocol  Monitor for need to initiate RS assistance services  Monitor pt willingness to schedule automatic oral nutrition supplements and/or snacks as received during prior admit  Monitor PO adequacy per paul cts vs need to consider nutrition support/FT if aligns with pt GOC.    If patient requires FT placement for enteral nutrition support , see recs below:  Formula: Two Paul HN (or equivalent)   Use dosing weight 63.2 kg  EN access: None at present (Post-pyloric FT likely to be best tolerated w/ ascites)   Goal Regimen: TwoCal HN (or equivalent) at goal rate of 35 mL/hr (840 mL/day) + 1 pkt Prosource TF20 daily to provide 1760 " "kcals (28 kcals/kg/day), 91 g PRO (1.5 g/kg/day), 588 mL H2O, 184 g CHO and 4 g Fiber daily.   - Initiate at 10 ml/hr and advance by 10 ml q8hr pending pt's tolerance.  - Do not advance TF rate unless Mg++ >1.5, K+ is >3, and phos >1.9  - Recommend 30-60 ml q4hr fluid flushes for tube patency. Additional fluids and/or adjustments per MD.    - Order multivitamin/minerals to help ensure micronutrient needs being met with suspected hypermetabolic demands and potential interruptions to TF infusions.   - Consider additional 100 mg Thiamine x 5-7 days to prevent lyte depletion if at risk for refeeding syndrome    - If gastric enteral access: HOB > 30 degrees or Reverse Trendelenburg >10-25 degrees.              -Send a nutrition consult for \"Registered Dietitian to Order TF per Medical Nutrition Therapy Guidelines\" if desire RD to order TFs.      REASON FOR ASSESSMENT  Stefani Maggie Crowell is a/an 63 year old female assessed by the dietitian for Nutrition Risk Monitoring (MST score 3 for unsure weight loss and decreased appetite)    CLINICAL HISTORY  Hx of cirrhosis 2/2 [facility] use (currently undergoing liver txp eval), ascites w/ need for frequent paracentesis, COPD, chronic hyponatremia, and MDD/STEPHANIE, admitted to [facility] for evaluation of pleural effusion and leukocytosis.     NUTRITION HISTORY  - Writer is familiar with pt from recent hospitalization. Historically is reliant on small frequent meals, high protein ONS/snacks between meals to optimize Kcal/protein intake. At most recent admission was reliant on ~2 Ensure drinks daily + Greek yogurt between meals + protein drink from home once daily.       - Pt very sleepy during visit today. Not interested in speaking with writer. Does endorse worsened PO intake since recent discharge and reports that nutrition drinks/Ensure are no longer going well for her. Pt has 1 Ensure Max protein drink at bedside, brought in by family but it is unopened. Has not ordered any meals yet since " "admit.     CURRENT NUTRITION ORDERS  Diet: Regular, 500 mL FR [as of 1/23]   Supplements: None currently    Intake/Tolerance: No intake documented for present admit yet. Monitor trends/tolerances as available. Has not ordered any meals since admit. Please encourage PO intake. Ordered felecia cts to begin tomorrow.      GI  BM x3 so far today per I/O   Emesis x1 yesterday and x1 so far today.     LABS:  Na 119 (L); up from 110 upon admit.  K+ 4.7 (WNL)  BUN 88 (H); Cr 1.07 (H); GFR 59 (L), cont' to monitor trends  PO4 3.8 (WNL)  25 OHD 24 (low end of acceptable range) 1/11/24, up from prior deficient lab 12/11/23    MEDICATIONS  Lactulose TID   Synthroid daily   Zosyn Q6hrs  Vancomycin  IVF - 2% hypertonic saline at 25 mL/hr     SKIN  WOCN note 1/23/24 (see for detail if needed)  R chest - skin tear/trauma, initial assessment   R ankle - Skin tear/trauma, initial assessment      ANTHROPOMETRICS  Height:5'5\"   Most Recent Weight: 63.2 kg (139 lb 5.3 oz)  IBW: 56.8 kg  111%IBW   BMI: Normal BMI    Weight History:   - Down 7.5% over past month (severe); however, pt fluid status may be confounding measures. During RD visit, ascites was present; so suspect current actual weight is lower than appears.  Wt Readings from Last 15 Encounters:   01/24/24 63.2 kg (139 lb 5.3 oz)   01/11/24 64.9 kg (143 lb)   12/29/23 69.4 kg (153 lb)   12/26/23 68.3 kg (150 lb 9.6 oz)   12/15/23 71.1 kg (156 lb 12 oz)   11/23/23 61.2 kg (135 lb)   10/03/23 62.3 kg (137 lb 4.8 oz)   09/28/23 65.8 kg (145 lb)   08/30/23 65.8 kg (145 lb)   08/18/23 74.3 kg (163 lb 12.8 oz)   07/24/23 70.3 kg (155 lb)   07/06/23 70.1 kg (154 lb 9.6 oz)   04/19/23 78.2 kg (172 lb 8 oz)   03/10/23 80.8 kg (178 lb 3.2 oz)   08/15/22 78.5 kg (173 lb)       Dosing Weight: 63.2 kg (admit wt)    ASSESSED NUTRITION NEEDS  Estimated Energy Needs: 2208-7199+ kcals/day (25 - 30+ kcals/kg)  Justification: Maintenance vs higher end of range for repletion  Estimated Protein Needs: " 75-95 grams protein/day (1.2 - 1.5 grams of pro/kg)  Justification: Repletion and/or lean body mass preservation  Estimated Fluid Needs: Per provider   Justification: On a fluid restriction    PHYSICAL FINDINGS  See malnutrition section below.  - Ascites     MALNUTRITION  % Intake: </=75% for >/= 1 month (severe)  % Weight Loss: Unable to assess, confounded by fluid status   Subcutaneous Fat Loss: Severe, global  Muscle Loss: Severe, global  Fluid Accumulation/Edema: None noted w/ exception of ascites  Malnutrition Diagnosis: Severe malnutrition in the context of chronic illness     NUTRITION DIAGNOSIS  Malnutrition related to reduced appetite/early satiety w/ ESLD/ascites as evidenced by severe global muscle/fat depletion, pt self-report of poor PO/reduced tolerance of oral nutrition supplements      INTERVENTIONS  Implementation  Nutrition Education: Provided education on RD role, reason for assessment/visit    Medical food supplement therapy - Placed PRN supplement order for now; will check back for pt preference and willingness to schedule ONS/snacks as appropriate (lethargy today)  Paul cts x3 days, start tomorrow 1/25-1/27    Goals  Minimum goal for pt to consume at least 1130 Kcals and 55 gm protein daily (65% est needs) vs need to consider nutrition support if aligns with GOC      Monitoring/Evaluation  Progress toward goals will be monitored and evaluated per protocol.  Nuno Esparza, MS, RDN, LD, CNSC  Available via phone, [employer] chat, and pager  Phone: [phone]  [address]/Obs RD pager: [phone]         Weekend/Holiday RD pager [phone]

## 2024-01-24 NOTE — ED NOTES
Nurse walked into the room and the patient was disrobed and had a bowel movement all over the bed. She stated she needed to use the commode. Nurse noted there was a new laceration/skin tear on her arm from unknown causes. The call light cord was near her arm and had blood on it but it was not wrapped on her arm. Nurses cleaned her from the bowel movement. Nurse cleaned arm and dressed wound with antibiotic ointment, a non adhesive dressing, and a meplex. Called the unit and updating provider.

## 2024-01-24 NOTE — PLAN OF CARE
Neuro: Lethargic/drowsy t/o shift. Oriented x4, neuro status intact, ordered q4hr.   Cardiac: No tele orders (clarified with provider, see note for details). VSS   Respiratory: Sating >92% on RA  GI/: Adequate urine output overnight, que in color; Per pt report x2-3 BM prior to admission to 6B. Smear x2 and large BM x1 overnight. Emesis this am, 1x dose zofran ordered  Diet/appetite: Pt oral mucosa very dry and pt continues to ask for water, but remains on 500mL FR.  Activity:  Assist of 1 to ambulate, ambulated to the bathroom x2 overnight  Pain: No c/o pain overnight  Skin: extremely fragile skin. X2 skin tears since admission, multiple healing abrasions and bruises  LDA's: R) and L) PIV with 2% NS currently running through RPIV    Na 116-118 overnight. 2% saline gtt off for most of night. Attempted to re-start per orders @0700, but both pt's IV's leaking/infiltrated. Order placed for STAT IV placement per vascular access.    Plan: Continue with POC, continue to monitor Na q2hr. Notify primary team with changes.

## 2024-01-24 NOTE — PROGRESS NOTES
[facility]    Progress Note - Medicine Service, [facility] TEAM 4       Date of Admission:  1/22/2024    Assessment & Plan   Stefani Crowell is a 63 year old female admitted on 1/22/2024. She has a history of cirrhosis secondary to alcohol use currently being worked up for liver transplant complicated by need for frequent paracenteses and thoracenteses for ascites/hepatic hydrothorax, in addition to asthma and COPD 2/2 tobacco use, MDD/STEPHANIE, hypothyroidism, low back pain, and chronic hyponatremia. She presented to the ED for evaluation of acute on chronic hyponatremia in the setting of recent viral-like URI illness. Admitted for careful monitoring, and for evaluation of loculated pleural effusion with significant leukocytosis. Currently being treated for Empyema     Changes Today:  - PT/OT consult  - Held 2% NS overnight d/t overcorrecting Na  - Repeat blood cultures  - Added on fungal and AFB to both samples  - Vanc + Zosyn initiated overnight   > Ceftriaxone discontinued   >Vanc discontinued in afternoon per ID recs (contaminant likely)  - ID consulted  - Interventional Pulm consulted (chest tube w/ or w/o lytics?)   > FFP required for procedure  - Urine Na  - 2% NS @ 25ml/hr  - 75g albumin  - CT scan for 1/25    ==========================================================    # Loculated R pleural effusion; GNB empyema  # Asymptomatic Bacteruria  # At risk for skin/soft tissue infection  # Leukocytosis  # Hx Hepatic Hydrothorax   No clear symptoms, UA and urine culture corroborate possible infection. Notably, Ucx on 11/13/23 was w/ klebsiella species and now E coli on 1/22/24. Patient not reporting any urinary symptoms, though in her altered state it is unclear. On 1/23, had thora + paracentesis per IR and pleural fluid cultures yielded GNB. Blood cultures yielded GPC, though this is most likely a contaminant. Has very fragile skin and numerous tears.  - IR consulted  for Diagnostic thoracentesis (1/23)  - [facility] consult  - Blood cultures:   > 1/23: GPC (suspect contaminant)   > 1/24: Collected, pending  - Pleural fluid culture:   > 1/23: GNB, speciation pending  - Peritoneal fluid culture:   > 1/23: NGTD  - Urine culture:   > 1/22: Ampicillin R E. Coli   - Infectious Disease consulted (1/24)   > Rec source control w/ chest tube  - Interventional Pulm consulted (1/24) for chest tube (source control)   > FFP x 2   > INR to follow  - CT Scan on 1/25  - Para Labs:   > Aerobic/anaerobic culture (NGTD), cell counts (completed), gram stain (NGTD), LDH, glucose, protein, AFB (pending), fungal culture (pending)  - Thora labs:  > Aerobic (GNB)/anaerobic culture (pending), cell counts, gram stain, LDH, glucose, protein, AFB (pending), fungal culture (pending)  > Cell counts demonstrate 125,760 total nucleated cells w/ significant % (98) neutrophils consistent w/ empyema as verified on gram stain  - CBC w/ diff daily   - Abx:   > Zosyn: 1/24 - *   > Rifaximin: 1/22 - *  > Vancomycin: 1/24  > Ceftriaxone: 1/23 - 1/24   > Prefer to avoid therapeutic to avoid massive fluid shifts           # Decompensated alcoholic cirrhosis (Ascites, HE)  # Current liver transplant evaluation   # Hepatic encephalopathy   # Recurrent ascites  Decompensations include jaundice/icterus, G1 esophageal & rectal varices on EGD without bleeding, significant ascites requiring 3 times weekly paracenteses, hepatic hydrothorax requiring frequent thoracenteses, and hepatic encephalopathy.  Gets thoras and courtney through the [facility] system. During prior hospitalization, her transplant eligibility was reopened due to completion of chemotherapy treatment and improved renal function.  Do suspect that she currently has some hepatic encephalopathy due to decreased bowel movements recently.  MELD NA 26 on discharge 12/16, 29 on most recent labs. Last admit: CMV (PCR quant neg), EBV (low DNA quant, 537 copies; discussed with ID and no  need to recheck). Last admit: IGRA (neg), treponemal ab (nonreactive), serum HCG (neg), and spot protein urine (0.24 mg/mg)  MELD 3.0: 29 at 1/24/2024  5:32 AM  MELD-Na: 30 at 1/24/2024  5:32 AM  Calculated from:  Serum Creatinine: 1.07 mg/dL at 1/24/2024  5:32 AM  Serum Sodium: 117 mmol/L (Using min of 125 mmol/L) at 1/24/2024  5:32 AM  Total Bilirubin: 6.0 mg/dL at 1/24/2024  5:32 AM  Serum Albumin: 3.3 g/dL at 1/24/2024  5:32 AM  INR(ratio): 2.45 at 1/24/2024  5:32 AM  Age at listing (hypothetical): 63 years  Sex: Female at 1/24/2024  5:32 AM  - Hepatology consult  - Planning L heart cath prior to transplant, not yet performed (coronary CTA non-diagnostic)  - IR consulted for diagnostic Paracentesis   > Called [facility] to attempt to add-on labs to samples from 1/22; samples were not retained   > Prefer to avoid therapeutic to avoid massive fluid shifts   > Para + Thora labs: Aerobic/anaerobic culture, cell counts, gram stain, LDH, glucose, protein  - Continue lactulose, Rifaximin   - Continue PPI  - Target 3-4 BM daily   - Abx as above.     # Acute mild on Chronic Hyponatremia - improving  Chronic hyponatremia common in the setting of cirrhosis, and she has been admitted for hyponatremia several times in the past. Baseline mid 120s.  Given her history of low p.o. intake recently, do worry about a hypovolemic hyponatremia. S/p Albumin with paracentesis 1/22  - Nephrology consulted   > 2% Normal saline @ 25ml/hr    > Albumin 75g   - Urine studies   - Q2hr Na checks  - strict ins/outs   - daily weights   - Regular diet      # MATTIE  # H/o hepatorenal syndrome   Chart review suggests a prior history of hepatorenal syndrome.  However, her history at the moment is more compatible with a prerenal MATTIE secondary to poor p.o. intake.  Baseline Cr around 0.6, nearly double this on admission. Receiving fluids as above for hyponatremia, will trend creatinine. S/p albumin w/ para and additional 75g on 1/24.   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 Holding off on prednisone for now iso possible infection and respiratory stability  - Continue PRN albuterol inhaler or nebs  - Continue ICS-LABA  - Continue Singulair       # Hypothyroidism  - Continue PTA levothyroxine      # Hypermagnesemia  - CTM     # H/o hypophosphatemia - not present here  Will recheck in setting of poor oral intake. Fairly low risk for refeeding as low PO intake only recently.      # Normocytic anemia - stable  Has required outpatient white blood cell transfusions in the past.  Her hemoglobin tends to be around 7.5-9.5.  At normal range on admission. No clear bleeding history though is at risk for this. Chronic disease component and nutritional components possible as well. Hgb on 1/24 was 7.5, which is likely dilutional w/ fluids. No sources of bleeding identified.  - CBC w/ diff in AM  - Would transfuse for Hgb <7     # Thrombocytopenia - stable  ISO cirrhosis. Increased bleeding risk, avoiding heparin/lovenox for now.      # MDD/STEPHANIE  - Continue PTA Celexa  - Would try to avoid sedating agents d/t current sedative presentation          # Low back pain  Stable, trial hot/cold packs first     Diet: Combination Diet Regular Diet Adult  Fluid restriction 500 ML FLUID    DVT Prophylaxis: Low Risk/Ambulatory with no VTE prophylaxis indicated  Demarco Catheter: Not present  Fluids: None  Lines: None     Cardiac Monitoring: None  Code Status: Full Code      Clinically Significant Risk Factors         # Hyponatremia: Lowest Na = 110 mmol/L in last 2 days, will monitor as appropriate   # Hypercalcemia: corrected calcium is >10.1, will monitor as appropriate    # Hypoalbuminemia: Lowest albumin = 2.9 g/dL at 1/23/2024  6:48  AM, will monitor as appropriate    # Coagulation Defect: INR = 2.45 (Ref range: 0.85 - 1.15) and/or PTT = N/A, will monitor for bleeding  # Thrombocytopenia: Lowest platelets = 72 in last 2 days, will monitor for bleeding              # Financial/Environmental Concerns:    # Asthma: noted on problem list        Disposition Plan     Expected Discharge Date: 01/24/2024                The patient's care was discussed with the Attending Physician, Dr. Fu .    Tyrone Singh MD  Medicine Service, [facility] TEAM [address] [place] [address]  Securely message with HomeTouch (more info)  Text page via [facility] Paging/Directory   See signed in provider for up to date coverage information  ______________________________________________________________________    Interval History   This morning patient was awake, though visibly very fatigued. She was engaging appropriately, and fully alert. We explained to her that she has an infection in her lung space. She did not have any shortness of breath or dificulty laying flat. Was just hoping to get some rest.     Later today we discussed w/ pulm the need for a chest tube w/ lytics for source control (per ID recs). Had to get FFP prior to procedure. My staff helped by consenting patient, and reported that she was also more awake and conversational.    Physical Exam   Vital Signs: Temp: 98.1  F (36.7  C) Temp src: Oral BP: 105/53 Pulse: 83   Resp: 16 SpO2: 97 % O2 Device: None (Room air)    Weight: 139 lbs 5.29 oz    General Appearance: Chronically ill appearing, somnolent/slow but conversational and oriented fully  Respiratory: Reduced on bilateral bases R > L w/ some crackles bilateral  Cardiovascular: RRR, warm and well perfusing extremities  GI: distended, diffusely mildly tender  Skin: jaundice  Other: Moves extremities spontaneously, answers all questions appropriately    Medical Decision Making       Please see A&P for additional details of medical  decision making.      Data     I have personally reviewed the following data over the past 24 hrs:    28.3 (H)  \   7.5 (L)   / 72 (L)     117 (LL); 117 (LL) 89 (L) 88.3 (H) /  92   4.7 17 (L) 1.07 (H) \     ALT: 36 AST: 74 (H) AP: 98 TBILI: 6.0 (H)   ALB: 3.3 (L) TOT PROTEIN: 5.4 (L) LIPASE: N/A     Procal: 1.34 (H) CRP: N/A Lactic Acid: 1.5       INR:  2.45 (H) PTT:  N/A   D-dimer:  N/A Fibrinogen:  300     Ferritin:  N/A % Retic:  N/A LDH:  195       Imaging results reviewed over the past 24 hrs:   Recent Results (from the past 24 hour(s))   IR Thoracentesis    Narrative    Procedure date: 1/23/2024.    History: right diagnostic thoracentesis and paracentesis    Procedure: Diagnostic right thoracentesis and paracentesis.    Preop diagnosis: Right pleural effusion. Moderate ascites.    Postop diagnosis: Right  pleural effusion, moderate ascites.    Staff: Akash Massey MD    Resident: Jeanette Odell MD    Medications: 28 ml 1% lidocaine used for local anesthesia    Nursing: Throughout the procedure the patient's vital signs and oxygen  saturations were continuously monitored by nursing staff under my  supervision, and remained stable.    Consent: Informed written and verbal consent was obtained.    Procedure/Findings:  The patient was placed in the left decubitus  position on the gurney, and limited ultrasound evaluation of the right  hemithorax revealed a loculated pleural effusion. The area overlying  right lateral chest and abdomen were prepped and draped in usual  sterile fashion. Under ultrasound guidance, the area overlying the  effusion was anesthetized to the pleura with 1% lidocaine. Under  ultrasound guidance a 5 Latvian, 10 cm straight Wabeebwaeh needle/catheter  was advanced into the right pleural fluid collection, with initial  return of greenish fluid. The catheter was advanced off the needle  into the pleural space and the needle was removed. 100 cc' s of fluid  was aspirated.  Repeat ultrasound  revealed moderate to large fluid  remaining.  The catheter was removed. The site was cleansed and  dressed.  Images were saved throughout the procedure. The procedure  was well tolerated, with no immediate complications. .    Patient was then placed in supine position. Limited ultrasound of the  abdomen demonstrates moderate ascites. 1% lidocaine was used for local  anesthesia. Under ultrasound guidance, a 5 Spanish Yueh catheter was  advanced into the peritoneal space. The catheter with advanced, and  the needle was removed. 100 cc sanguinous fluid aspirated. Catheter  removed. Sterile dressing applied. The patient tolerated the procedure  well. No immediate complication.    Estimate blood loss: Less than 2 cc.      Impression    Impression:   1. Uneventful ultrasound guided diagnostic thoracentesis. Total of 100  cc of right pleural fluid aspirated and sent for lab analysis.    2. Uneventful ultrasound-guided diagnostic paracentesis. Total of 100  cc of ascites aspirated and sent for lab analysis.    Plan: Patient discharged to patient care unit in stable condition.   Bedrest of one hour.

## 2024-01-24 NOTE — PROGRESS NOTES
Admission          1/22/2024  5:26 PM  -----------------------------------------------------------  Reason for admission: hyponatremia  Primary team notified of pt arrival.  Admitted from: ED  Via: stretcher  Accompanied by: Ed staff  Belongings: Placed in closet; valuables at bedside with patient  Admission Profile: complete  Teaching: orientation to unit and call light- call light within reach, call don't fall, use of console, meal times, when to call for the RN, and enforced importance of safety   Access: PIV x2  Telemetry:Placed on pt for now, plan to clarify orders with provider  Ht./Wt.: complete  Code Status verified on armband: yes  2 RN Skin Assessment Completed By: This writer and REN Mckay  Med Rec completed: no. Pt was tired and noted that she did this in the ED with a provider, declined to assist writer with med rec  Suction/Ambu bag/Flowmeter at bedside: yes  Is patient having diarrhea upon admission- if YES fill out testing algorithm : yes    C. Diff Testing Algorithm (MUST be marked YES)   3 or more loose stools in 24 hrs. [x] Yes [] No       Additional symptoms:(At least ONE must be marked yes)   Abdominal pain/discomfort [x] Yes [] No   Fever at least 38C (100.4 F) [] Yes [x] No   Elevated WBC(>11,000) [x] Yes [] No       Exclusion Criteria:  (MUST be marked YES)   Off laxatives for at least 48 hrs. [] Yes [x] No       Pt status: Stable, hyponatremic

## 2024-01-24 NOTE — PROGRESS NOTES
Writer attempted to meet with patient for supportive check in, patient was asleep. Liver transplant  will remain available for psychosocial support.     KAR James, [facility]  Liver Transplant   [email or web address]  Phone: [phone]  Fax: [phone]

## 2024-01-24 NOTE — PROVIDER NOTIFICATION
"Time of notification: 11:07 PM  Provider notified: Dr. Neil Escalera  Patient status: Due to left sided lung pleural effusion, lung sounds on L significanly diminished. R) lung sounds clear. Still awaiting 10pm Na results. Provider paged and notified of the above as an FYI.  Orders received:      Time of notification: 1:13 AM  Provider notified: Dr. Kalyani Rasmussen  Patient status: Critical Oq=252 and +BC for gram+ cocci in clusters. Provider notified via Mailcloud text message. Writer also attemted to get urine osmolality, but pt missed hat in toilet, provider was asked if this lab needed to be collected ASAP.  Orders received:  Per provider, no new orders for Na, and not critical to get urine osmolality tonight, but should attempt when able. Orders placed for vancomycin and repeat BC.    Time of notification: 3:18 AM  Provider notified: Dr. Kalyani Rasmussen  Patient status: Critical Gy=847.  Orders received:  Per provider, no new orders. Continue with the same plan and watch the next Na level    Time of notification: 3:37 AM  Provider notified: Dr. Kalyani Rasmussen  Patient status: BC from 1/23 Rapid ID=staphylococcus epidermidis  Orders received:  Provider's response via Mailcloud text: \"Probably contaminant! I'll let day team know, thanks!\"    Time of notification: 4:44 AM  Provider notified: Dr. Kalyani Rasmussen  Patient status: Writer texted provider via Mailcloud questioning if tele should be ordered for pt. Pt placed on tele upon admission.  Orders received:  Provider's response via Mailcloud text: \"I don't think she needs tele.\" No orders for tele placed.    Time of notification: 6:30 AM  Provider notified: Dr. Kalyani Rasmussen  Patient status: Critical Ps=052.  Orders received:  Orders to restart 2% saline gtt (See eMAR) for details    Time of notification: 6:47 AM  Provider notified: Dr. Kalyani Rasmussen  Patient status: Pt had emesis, writer paged provider to ask for order of zofran  Orders " received:  Orders placed for 1x dose zofran
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
## 2024-01-25 NOTE — PROGRESS NOTES
01/25/24 1436   Appointment Info   Signing Clinician's Name / Credentials (PT) GINA Bravo   Student Supervision Direct Patient Contact Provided   Rehab Comments (PT) chest tube, ok to ambulate on water seal   Living Environment   People in Home spouse   Current Living Arrangements house   Home Accessibility stairs to enter home   Number of Stairs, Main Entrance 3   Stair Railings, Main Entrance railing on left side (ascending)   Transportation Anticipated family or friend will provide   Living Environment Comments No stairs inside home; spouse available to provide assistance as needed   Self-Care   Usual Activity Tolerance moderate   Current Activity Tolerance fair   Regular Exercise No   Equipment Currently Used at Home cane, straight;walker, standard;shower chair;raised toilet seat   Fall history within last six months yes   Number of times patient has fallen within last six months 2   Activity/Exercise/Self-Care Comment Pt states she does not use any device at baseline   General Information   Onset of Illness/Injury or Date of Surgery 01/22/24   Referring Physician Tyrone Singh MD   Patient/Family Therapy Goals Statement (PT) Wants to be more mobile   Pertinent History of Current Problem (include personal factors and/or comorbidities that impact the POC) Stefani Crowell is a 63 year old female with past medical history of liver cirrhosis related to alcohol use disorder, ascites and hepatic hydrothorax (status post multiple right-sided thoracentesis last one in January 17, 2024), COPD/asthma, esophageal varices, coagulopathy and thrombocytopenia related to liver cirrhosis, history of hyponatremia who presented to the hospital on January 22, 2024 with encephalopathy.  Patient was found to have hyponatremia, encephalopathy, hepatorenal syndrome.  Patient underwent diagnostic and therapeutic right thoracentesis and paracentesis in January 23, 2024.  Right thoracentesis pleural fluid labs  suggestive for empyema.   Existing Precautions/Restrictions fall   Heart Disease Risk Factors Smoking;Medical history;Age;Lack of physical activity   Cognition   Affect/Mental Status (Cognition) flat/blunted affect;confused   Orientation Status (Cognition) oriented x 4   Follows Commands (Cognition) delayed response/completion;75-90% accuracy;verbal cues/prompting required   Pain Assessment   Patient Currently in Pain No   Integumentary/Edema   Integumentary/Edema Comments bruises on UEs and LEs, fragile skin, LE edema   Posture    Posture Forward head position;Protracted shoulders   Range of Motion (ROM)   ROM Comment B hip flexion limited, otherwise grossly WFL B   Strength (Manual Muscle Testing)   Strength (Manual Muscle Testing) Deficits observed during functional mobility   Bed Mobility   Comment, (Bed Mobility) supine>sit ModA with HOB raised, Kd for rolling in bed   Transfers   Comment, (Transfers) sit>stand, Kd, no device   Gait/Stairs (Locomotion)   Comment, (Gait/Stairs) Amb 10' CGA, no device   Balance   Balance Comments good sitting balance, impaired standing and dynamic balance   Clinical Impression   Criteria for Skilled Therapeutic Intervention Yes, treatment indicated   PT Diagnosis (PT) Impaired functional mobility   Influenced by the following impairments strength, activity tolerance, balance, cognition, pain, edema   Functional limitations due to impairments bed mobility, transfers, gait, stairs, functional endurance, ROM   Clinical Presentation (PT Evaluation Complexity) stable   Clinical Presentation Rationale clinical reasoning   Clinical Decision Making (Complexity) low complexity   Planned Therapy Interventions (PT) balance training;bed mobility training;gait training;home exercise program;neuromuscular re-education;patient/family education;stair training;strengthening;transfer training;progressive activity/exercise;risk factor education;home program guidelines;ROM (range of motion)    Risk & Benefits of therapy have been explained evaluation/treatment results reviewed;care plan/treatment goals reviewed;risks/benefits reviewed;patient   PT Total Evaluation Time   PT Aylinal, Low Complexity Minutes (01513) 10   Physical Therapy Goals   PT Frequency 5x/week   PT Predicted Duration/Target Date for Goal Attainment 02/08/24   PT: Bed Mobility Within precautions;Supine to/from sit;Modified independent   PT: Transfers Supervision/stand-by assist;Sit to/from stand;Within precautions   PT: Gait 150 feet;Within precautions;Supervision/stand-by assist   PT: Stairs 3 stairs;Rail on left;Supervision/stand-by assist   PT Discharge Planning   PT Plan gait training, step up to progress to stairs   PT Discharge Recommendation (DC Rec) home with assist;home with home care physical therapy   PT Rationale for DC Rec Pt has assist at home, owns equipment if needed, and all needs are on one level. Pt  would benefit from continued skilled therapies to address deficits   PT Brief overview of current status Ax1 w/ GB   Total Session Time   Timed Code Treatment Minutes 23   Total Session Time (sum of timed and untimed services) 33

## 2024-01-25 NOTE — PROGRESS NOTES
Transplant Social Work Services Progress Note      Date of Initial Social Work Evaluation: 10/3/2023    Data: Lolly is currently being evaluated for liver transplant and is admitted at [facility] on unit 6B.    Intervention: Writer briefly met with Lolly in her room on 6B. Hari, significant other, was present. Writer offered support for patient to join virtual support group per her sister's previous request.   Assessment: Hari and Lolly report that she does not have her tablet with her today, and Lolly reports feeling it may be difficult to engage in support group today. Writer provided Lolly with writer's contact information; Lolly denies any further questions or concerns for writer currently.   Education provided by CJ: introduced self and role.   Plan:    Follow up Plan: Liver transplant  will remain available for psychosocial support.     KAR James, [facility]  Liver Transplant   [email or web address]  Phone: [phone]  Fax: [phone]
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
## 2024-01-25 NOTE — PLAN OF CARE
Goal Outcome Evaluation:    Neuro: A&Ox4.   Cardiac:  soft BPs. Scheduled midodrine given.    Respiratory: Sating >95% on RA.  GI/: Adequate urine output. Loose BM X1. Incontinent at times.   Diet/appetite: Tolerating regular diet. Eating well. 1L FR. Paul counts.   Activity:  Assist of 1 w/ gaitbelt, up to chair and bathroom.  Pain: At acceptable level on current regimen. C/o dull right abd pain, heat applied.   Skin: No new deficits noted. Pt has scattered bruising and skin tears protected with mepilex.   LDA's: right chest tube to -20 suction. Right and left piv.     Plan: hgb 6.4 this morning, 1 unit of blood given. Hgb re check back at 7.8. Trending sodium BID now. Chest Ct/cxr completed. Plan for therapeutic para tomorrow. Continue with POC. Notify primary team with changes.

## 2024-01-25 NOTE — PROGRESS NOTES
[facility] PROGRESS NOTE     Patient:  Stefani Crowell   Date of birth 1960, Medical record number [id number]  Date of Visit:  01/25/2024  Date of Admission: 1/22/2024  Consult Requester:Venancio Fu MD          Assessment and Plan:   ID Problem List  Right-sided loculated empyema  Thoracentesis (1/23): ~126K TNC, 98% neutrophils; total protein 2.9, glucose <2, +Light's criteria.   Culture = E. coli  Positive blood cultures - Staph epi in 1/4 bottles on 1/23 is a contaminant  Leukocytosis  Decompensated alcohol-related cirrhosis   Anemia, thrombocytopenia, coagulopathy  Hepatic encephalopathy  MATTIE, HRS  Ascites and hepatic hydrothorax requiring 3x/weekly drainage  Grade 1 EV  Hyponatremia  Dirty UA (4 squamous cells), urine culture +E. Coli (Amp-R) - no therapy required  History of C difficile (+PCR 9/4/20)     RECOMMENDATION:  Stop IV Zosyn  Resume IV ceftriaxone 1g once daily  Add PO metronidazole 500 mg q8hrs  Appreciate chest tube placement for source control. Will need surveillance imaging to ensure resolution. May need additional apical drainage.  Duration of antibiotics TBD - minimum 4-6 weeks for empyema  Follow up pending cultures (blood, pleural, ascites) and sensitivities     ASSESSMENT:  Stefani Crowell is a 63 year old female with PMHx significant for severe asthma, decompensated alcohol related cirrhosis c/b thrombocytopenia, anemia, esophageal varices (grade 1), hepatic encephalopathy, hepatorenal syndrome, ascites and hepatic hydrothorax requiring 3x/week drainage, hypothyroidism, depression, and anxiety who was admitted for abnormal labs (Na 112, K 5.3). Hospital course notable for CXR with loculated right pleural effusion s/p thoracentesis with concern for empyema. ID consulted for GNB pleural effusion and GPC on blood cultures.     Thoracentesis 1/23 with exudative effusion with ~126K total nucleated cells and culture with E. coli. Unclear etiology of this  empyema. No recent or current concerns for pneumonia - did have recent URI, likely viral, but no productive cough or reported hypoxia, though does have chest CT with peribronchial consolidative opacities. No concurrent SBP (paracentesis same day with 131 ANC, Cx NGTD), though does get 3x/week paracentesis +/- thoracentesis which may be source via direct inoculation. Last outpatient thoracentesis on 1/17 - no labs done; CXR at that time noted loculated apical component. Appreciate IP involvement for chest tube placement (no labs sent, recent thora culture already). Chest tube with 1900 ml seropurulent output, now sanguinous today. Chest CT with large apical collection - unclear if being drained by present lower chest tube. Apical collection will need additional drainage for source control if no improvement with existing drain. Agree with serial CXR, possible repeat CT in coming days to follow loculated effusions. Would stop IV Zosyn as E. Coli is ampicillin resistant only. Narrow to IV ceftriaxone + PO flagyl. Anticipate 4-6 week course for empyema.      She has been afebrile here. Blood cultures on 1/23 (collected for leukocytosis in 30s) with Staph epi in 1/2 sets, in 1/4 bottles. Vancomycin stopped as Staph epi is a contaminant. Repeat blood cultures from 1/24 at [facility]. In regards to E. Coli on urine culture, patient is asymptomatic and this is a dirty specimen with 4 squamous cells, would not treat. However, this will be incidentally covered with therapy for her E. Coli empyema.        ID will continue to follow with you.   Patient and plan staffed with Dr. Munson. Recommendations discussed with primary team.    Josseline Pace PA-C  Infectious Diseases  Pager #[phone] or Vocera    40 MINUTES SPENT BY ME on the date of service doing chart review, history, exam, documentation & further activities per the note.           Interim History and Events:     Lolly remains afebrile, vitals stable, on room air. WBC 18.6.  Hemoglobin down to 6.4 today, getting 1 unit pRBCs. Had chest tube placed 1/24 - 1900 seropurulent output, no labs sent. Previous 1/23 thoracentesis culture = E. Coli (ampicillin resistant, Unasyn intermediate). Lactic 3.0 overnight. Na 121.    CT chest with small right hydropneumothorax, though large apical component, peribronchial consolidative opacities in RUL.          ROS:   -Focused 5 point ROS completed, pertinent positives and negatives listed above.      Physical Examination:  Temp: 97.6  F (36.4  C) Temp src: Oral BP: 92/57 Pulse: 74   Resp: 16 SpO2: 98 % O2 Device: None (Room air)      Vitals:    01/24/24 0030   Weight: 63.2 kg (139 lb 5.3 oz)       Constitutional: Pleasant adult female seen laying in bed, in NAD. Alert and interactive.   HEENT: NCAT, trace icteric sclerae, conjunctiva clear. Moist mucous membranes.  Respiratory: Non-labored breathing, good air exchange. Lungs are clear to auscultation bilaterally, without wheezing, crackles or rhonchi. No cough noted. Chest tube with serosanguinous output  Cardiovascular: Regular rate and rhythm with no murmur, rub or gallop.  GI: Normoactive BS. Abdomen is soft, nontender to palpation, mild-moderately distended. No rigidity or guarding. No rebound tenderness.  Skin: Warm and dry. Multiple scabs, skin tears. No purulence or cellulitis.  Musculoskeletal: Extremities grossly normal. No tenderness or edema present.   Neurologic: A &O x3, speech normal, answering questions appropriately. Moves all extremities spontaneously.  VAD: PIV is c/d/i with no erythema, drainage, or tenderness.      Medications:   citalopram  40 mg Oral Daily    fluticasone-vilanterol  1 puff Inhalation Daily    lactulose  20 g Oral TID    levothyroxine  112 mcg Oral Daily    midodrine  15 mg Oral TID    montelukast  10 mg Oral At Bedtime    mupirocin   Topical TID    oxymetazoline  2 spray Both Nostrils BID    pantoprazole  40 mg Oral BID    piperacillin-tazobactam  3.375 g  "Intravenous Q6H    rifaximin  550 mg Oral BID    sodium chloride (PF)  3 mL Intracatheter Q8H       Infusions/Drips:      Laboratory Data:   No results found for: \"ACD4\"    Inflammatory Markers    Recent Labs   Lab Test 12/06/23  1701 06/29/18  1309   SED 12 7   CRP  --  5.9       Metabolic Studies       Recent Labs   Lab Test 01/25/24  0550 01/25/24  0226 01/25/24  0027 01/24/24  2134 01/24/24  1905 01/24/24  1659 01/24/24  0805 01/24/24  0532 01/23/24  0730 01/23/24  0648 01/23/24  0210 01/22/24  2232 01/22/24  1734 01/22/24  1313 01/11/24  0935 12/18/23  1258 12/16/23  0451 10/19/23  1523 10/03/23  0853 08/18/23  1402 07/24/23  1457   *  122* 124* 124* 124* 122* 120*   < > 117*  117*   < > 111*   < > 111* 110*  --  123*  --  126*   < > 126*   < > 133*   POTASSIUM 4.1  --   --   --   --   --   --  4.7  --  5.2  --   --  5.1  --  4.4  --  5.2   < > 3.9   < > 3.4   CHLORIDE 93*  --   --   --   --   --   --  89*  --  85*  --   --  81* 82* 95*   < > 100   < > 91*   < > 95*   CO2 17*  --   --   --   --   --   --  17*  --  17*  --   --  18*  --  18*  --  17*   < > 25   < > 24   ANIONGAP 12  --   --   --   --   --   --  11  --  9  --   --  11  --  10  --  9   < > 10   < > 14   BUN 83.4*  --   --   --   --   --   --  88.3*  --  95.3*  --   --  85.9*  --  36.8*  --  34.4*   < > 33.9*   < >  --    CR 1.13*  --   --   --   --   --   --  1.07*  --  1.16*  --   --  1.12*  --  0.66  --  0.59   < > 1.24*   < >  --    GFRESTIMATED 55*  --   --   --   --   --   --  59*  --  53*  --   --  56*  --  >90  --  >90   < > 49*   < >  --    GLC 75  --   --   --   --   --   --  92  --  74  --   --  121*  --  107*  --  92   < > 98   < >  --    A1C  --   --   --   --   --   --   --   --   --   --   --   --   --   --   --   --   --   --   --   --  4.7   UMA 9.9  --   --   --   --   --   --  9.8  --  9.8  --   --  9.6  --  9.5  --  9.6   < > 10.2   < >  --    PHOS  --   --   --   --   --   --   --   --   --   --   --  3.8  --   --   --  "  --   --   --  2.6  --   --    MAG  --   --   --   --   --   --   --   --   --  3.4*  --   --  2.8*  --   --   --   --    < >  --    < >  --    LACT  --   --   --  1.9  --  3.0*  --   --    < >  --   --   --   --   --   --   --   --    < >  --   --   --     < > = values in this interval not displayed.       Hepatic Studies    Recent Labs   Lab Test 01/25/24  0550 01/24/24  0532 01/23/24  1405 01/23/24  0648 01/22/24  1734 01/11/24  0935 12/15/23  0545 12/11/23  0812   BILITOTAL 4.8* 6.0*  --  7.0* 7.3* 6.0* 4.5* 4.9*   ALKPHOS 85 98  --  97 127 100 68 67   ALBUMIN 3.6 3.3*  --  2.9* 3.1* 3.5 3.3* 3.4*   AST 51* 74*  --  67* 80* 48* 40 47*   ALT 29 36  --  37 37 24 34 36   LDH  --   --  195  --   --   --   --  185       Pancreatitis testing    Recent Labs   Lab Test 12/06/23  1843 12/06/23  1608 10/22/23  0150 08/15/22  1426 09/03/20  1216 03/01/19  0954 04/08/16  1040   LIPASE  --  125* 116*  --  189  --   --    TRIG 32  --   --  111  --  125 97       Hematology Studies      Recent Labs   Lab Test 01/25/24  0550 01/24/24  0532 01/23/24  0648 01/22/24  1734 01/11/24  0935 12/16/23  0451 01/19/21  1502 09/16/20  1147 09/03/20  1216 06/29/18  1309   WBC 18.6* 28.3* 33.0* 31.2* 8.4 6.9   < > 7.1   < > 5.6   ANEU  --   --   --   --   --   --   --  4.8  --  3.4   ALYM  --   --   --   --   --   --   --  1.5  --  1.5   VIJI  --   --   --   --   --   --   --  0.5  --  0.6   AEOS  --   --   --   --   --   --   --  0.2  --  0.1   HGB 6.4* 7.5* 8.1* 8.3* 8.4* 8.2*   < > 13.9   < > 13.7   HCT 19.4* 22.4* 23.9* 24.2* 25.3* 25.1*   < > 43.0   < > 41.8   PLT 54* 72* 89* 91* 84* 52*   < > 118*   < > 125*    < > = values in this interval not displayed.       Arterial Blood Gas Testing  No lab results found.     Urine Studies     Recent Labs   Lab Test 01/22/24  2323 12/07/23  1237 12/06/23  1730 11/13/23  1540 11/02/23  1236   URINEPH 5.0 5.0 5.0 5.0 5.5   NITRITE Negative Negative Negative Negative Negative   LEUKEST Large* Small*  Trace* Small* Large*   WBCU 83* 4 7* 17* 23*       Microbiology:  Culture   Date Value Ref Range Status   01/24/2024 No growth after 1 day  Preliminary   01/24/2024 No growth after 1 day  Preliminary   01/23/2024 4+ Escherichia coli (A)  Final   01/23/2024 No growth after 1 day  Preliminary   01/23/2024 No anaerobic organisms isolated after 1 day  Preliminary   01/23/2024 No growth after 1 day  Preliminary   01/23/2024 No growth after 1 day  Preliminary   01/23/2024 Positive on the 1st day of incubation (A)  Preliminary   01/23/2024 Staphylococcus epidermidis (AA)  Preliminary     Comment:     1 of 2 bottles   01/23/2024 No growth after 2 days  Preliminary   01/22/2024 >100,000 CFU/mL Escherichia coli (A)  Final   12/12/2023 No Growth  Final   12/06/2023 <10,000 CFU/mL Mixture of Urogenital Jenni  Final   11/13/2023 >100,000 CFU/mL Klebsiella pneumoniae (A)  Final   11/13/2023 50,000-100,000 CFU/mL Klebsiella pneumoniae (A)  Final   11/12/2023 No Growth  Final   11/09/2023 No Growth  Final   11/06/2023 No Growth  Final   11/06/2023 No anaerobic organisms isolated  Final   10/25/2023 No Growth  Final   10/22/2023 No Growth  Final   10/22/2023 >100,000 CFU/mL Mixture of urogenital jenni  Final       Last check of C difficile  C Diff Toxin B PCR   Date Value Ref Range Status   09/04/2020 Positive (A) NEG^Negative Final     Comment:     Positive: Toxin producing C. difficile target DNA sequences detected, presumed   positive for C. difficile toxin B. C. difficile (Requires Enteric Isolation).      C. difficile (Requires Enteric Isolation)  FDA approved assay performed using [employer] GeneXpert real-time PCR.         Imaging:  CT Chest w/o contrast 1/25/24  IMPRESSION:  1. Small right-sided hydropneumothorax, previously moderate, with  slightly increased air component compared to CT 12/15/2023 which may  be in part related to placement of right basilar chest tube. There is  a fairly large apical component which may be  communicating with the  basilar component containing the chest tube along the right lateral  aspect. Adjacent linear atelectasis throughout the right lung.  Aeration of the lung has improved compared to CT 11/13/2023.  2. Peribronchial consolidative opacities in the right upper lobe  representing infectious or inflammatory process.  3. Trace left effusion with adjacent atelectasis. Improved groundglass  opacities from CT 11/13/2023. Mild pulmonary edema.  4. Borderline ascending aortic aneurysm measuring 4.3 cm.    XR Chest 2 Views  Result Date: 1/22/2024  IMPRESSION: Large loculated right pleural effusion with associated atelectasis of the right upper, middle, and lower lobes. Increased in fluid volume compared to 12/14/2023.

## 2024-01-25 NOTE — CODE/RAPID RESPONSE
Rapid Response Team Note    Assessment   In assessment a rapid response was called on Stefani Crowell due to  hyperlactatemia w/ LA, 3.0 . This presentation is likely due to multifactorial in setting of ESLD and empyema .    Plan   -  Patient on zosyn, s/p CT per IR pulmonology, receiving albumin 75g of 25%  -  Repeat LA at 2130  -  The Internal Medicine primary team was able to be reached and they are in agreement with the above plan.  -  Disposition: The patient will remain on the current unit. We will continue to monitor this patient closely.  -  Reassessment and plan follow-up will be performed by the primary team      Kelly Fletcher PA-C  [facility] RRT [facility] Job Code Contact #[phone]  [facility] Paging/Directory    Hospital Course   Brief Summary of events leading to rapid response:   RRT called for LA 3.0. Patient reports that she is having mild dyspnea, though not different since she was admitted. Mild right-sided abdominal pain that she reports is 'not new' and related to where she usually gets fluid taken out.  No fever, chills, HA, CP, cough, sob, dysuria noted. We discussed POC as outlined above and patient agreeable.     Admission Diagnosis:   Hyponatremia [E87.1]  Multiple skin tears [T14.8XXA]    Physical Exam   Temp: 97.6  F (36.4  C) Temp  Min: 96.6  F (35.9  C)  Max: 98.1  F (36.7  C)  Resp: 18 Resp  Min: 14  Max: 29  SpO2: 97 % SpO2  Min: 92 %  Max: 98 %  Pulse: 82 Pulse  Min: 78  Max: 92    No data recorded  BP: 105/50 Systolic (24hrs), Av , Min:94 , Max:115   Diastolic (24hrs), Av, Min:42, Max:56     I/Os: I/O last 3 completed shifts:  In: 1549.92 [P.O.:650; I.V.:582.92]  Out: 575 [Urine:575]     Exam:   General: chronically ill appearing  Mental Status: baseline mental status.  Resp: Breathing non-labored on RA, Mildly diminished right LL  CV: RRR. BETTY heard best at LUSB.   Skin: Spider angiomata scattered across upper body.     Significant Results and Procedures   Lactic Acid:  "  Recent Labs   Lab Test 01/24/24  1659 01/23/24  2138 12/06/23  1701   LACT 3.0* 1.5 1.1     CBC:   Recent Labs   Lab Test 01/24/24  0532 01/23/24  0648 01/22/24  1734   WBC 28.3* 33.0* 31.2*   HGB 7.5* 8.1* 8.3*   HCT 22.4* 23.9* 24.2*   PLT 72* 89* 91*        Sepsis Evaluation   The patient is known to have an infection.  Stefani Crowell meets SIRS criteria AND has a lactate >2 or other evidence of acute organ damage.  These vital signs, lab and physical exam findings constitute a diagnosis of SEVERE SEPSIS, based on: Lactate resulted, and the level was > 2.0          Anti-infectives (From now, onward)      Start     Dose/Rate Route Frequency Ordered Stop    01/24/24 0800  piperacillin-tazobactam (ZOSYN) 3.375 g vial to attach to  mL bag        Note to Pharmacy: For [facility], [facility] and [facility]: For Zosyn-naive patients, use the \"Zosyn initial dose + extended infusion\" order panel.    3.375 g  over 30 Minutes Intravenous EVERY 6 HOURS 01/24/24 0729      01/22/24 2145  rifaximin (XIFAXAN) tablet 550 mg        Note to Pharmacy: PTA Sig:Take 1 tablet (550 mg) by mouth 2 times daily      550 mg Oral 2 TIMES DAILY 01/22/24 2143            Current antibiotic coverage is appropriate for source of infection.    3 Hour Severe Sepsis Bundle Completion:  1. Initial Lactic Acid result shown above. Repeat lactic acid ordered by reflex for 3 hours from initial collection.  2. Blood Cultures before Antibiotics: Yes  3. Broad Spectrum Antibiotics Administered: yes  4. Is hypotension present? No (IV fluid bolus NOT required). IV Fluid volume administered: Receiving 75g of 25% albumin    "

## 2024-01-25 NOTE — PROGRESS NOTES
[facility]  HEPATOLOGY PROGRESS NOTE  Stefani Crowell [id number]       IMPRESSION:  Stefani Crowell is a 63 year old female with a history of alcohol associated cirrhosis, last use 6/28/23 complicated by diuretic refractory ascites and hepatic hydrothorax with 3x weekly courtney and thoras, hyponatremia, hepatic encephalopathy, MATTIE/CKD (prior terlipressin 11/2023), small EV, asthma, COPD who was admitted following labs indicating serum sodium level at para of 110. She underwent thoracentesis with evidence of empyema.     RECOMMENDATIONS:    Updates for 01/25/2024 :  - Treatment for empyema, pulmonary considering chest tube lytics.     # Empyema  # Hepatic hydrothorax  # Ascites  - thora 1/23 with 1 lights criteria, high PMNs, low glucose. With how high her PMN's are and negative ascites fluid, more characteristic of empyema rather than SBE. She remains on zosyn. Cultures + for e. Coli.   - Per pulm, chest tube initially with purulent liquid, now with some serous fluid in addition. Area in RUL concerning for other loculated fluid. Hepatology not opposed to consideration of lytic therapy through chest tube. With her current liver disease, surgical intervention for loculation would not high risk and she has not completed her transplant evaluation.     - ID following.   - Other infectious work up in progress- 1/2 bottles blood culture with +gpc, likely contaminant.   - para without evidence of SBP    # Hyponatremia  - nephrology following  - on 2% saline. Sodium level increased to 120 from admit of 110. Baseline ~124  - Fluid restriction to 1.2 L    # Alcohol associated cirrhosis  # Transplant candidacy  - MELD 30, ABO A  - with current infection, not a candidate for liver transplant at this time. Still needs [facility] for cardiac risk assessment for transplant. Borderline frailty.   - US abd 10/22/23 negative for HCC    # Hepatic encephalopathy  - continue with lactulose and rifaximin.     # Debility  # Moderate  protein calorie malnutrition  - PT/OT when able to participate.   - BID protein supplements.     Patient was discussed with attending physician, Dr. Powers.  The above note reflects our joint plan.     Next follow up appointment: Dr. Lim 1/29/24    Thank you for the opportunity to be involved with  Stefani Maggie rosas. Please call with any questions or concerns.    Yanelis Miller APRN, CNP  Inpatient Hepatology [facility]        Subjective    States is feeling improved following chest tube placement. Denies change in mentation or fevers. No increase in abdominal distention.         Objective    VS: /48 (BP Location: Left arm, Cuff Size: Adult Small)   Pulse 73   Temp 97.4  F (36.3  C) (Oral)   Resp 16   Wt 63.2 kg (139 lb 5.3 oz)   LMP  (LMP Unknown)   SpO2 100%   BMI 23.19 kg/m       Gross per 24 hour   Intake 2320.33 ml   Output 2163 ml   Net 157.33 ml      General: In no acute distress, moderate facial muscle wasting  Neuro: alert Ox3, No asterixis.   HEENT:  Noscleral icterus, Nooral lesions  CV:  Skin warm and dry  Lungs:  Respirations even and nonlabored on room air  Abd:  Mild abdominal distention. nontender   Extrem:  trace lower peripheral edema   Skin:  mild jaundice  Psych: flat    MEDICATIONS:  Current Facility-Administered Medications   Medication    albuterol (PROVENTIL HFA/VENTOLIN HFA) inhaler    albuterol (PROVENTIL) neb solution 2.5 mg    alum & mag hydroxide-simethicone (MAALOX) suspension 30 mL    artificial saliva (BIOTENE DRY MOUTHWASH) liquid 5 mL    benzocaine-menthol (CHLORASEPTIC MAX) 15-10 MG lozenge 1 lozenge    calcium carbonate (TUMS) chewable tablet 1,000 mg    cefTRIAXone (ROCEPHIN) 1 g vial to attach to  mL bag for ADULTS or NS 50 mL bag for PEDS    citalopram (celeXA) tablet 40 mg    fluticasone-vilanterol (BREO ELLIPTA) 200-25 MCG/ACT inhaler 1 puff    lactulose (CHRONULAC) solution 20 g    levothyroxine (SYNTHROID/LEVOTHROID) tablet 112 mcg    lidocaine (LMX4)  cream    lidocaine 1 % 0.1-1 mL    metroNIDAZOLE (FLAGYL) tablet 500 mg    midodrine (PROAMATINE) tablet 15 mg    montelukast (SINGULAIR) tablet 10 mg    mupirocin (BACTROBAN) 2 % ointment    ondansetron (ZOFRAN) injection 4 mg    oxymetazoline (AFRIN) 0.05 % spray 2 spray    pantoprazole (PROTONIX) EC tablet 40 mg    rifaximin (XIFAXAN) tablet 550 mg    senna-docusate (SENOKOT-S/PERICOLACE) 8.6-50 MG per tablet 1 tablet    Or    senna-docusate (SENOKOT-S/PERICOLACE) 8.6-50 MG per tablet 2 tablet    sodium chloride (PF) 0.9% PF flush 3 mL    sodium chloride (PF) 0.9% PF flush 3 mL       REVIEW OF LABORATORY, PATHOLOGY AND IMAGING RESULTS:  BMP  Recent Labs   Lab 01/25/24  1439 01/25/24  0958 01/25/24  0550 01/25/24  0226 01/24/24  0805 01/24/24  0532 01/23/24  0730 01/23/24  0648 01/22/24  2232 01/22/24  1734   * 121* 122*  122* 124*   < > 117*  117*   < > 111*   < > 110*   POTASSIUM  --   --  4.1  --   --  4.7  --  5.2  --  5.1   CHLORIDE  --   --  93*  --   --  89*  --  85*  --  81*   UMA  --   --  9.9  --   --  9.8  --  9.8  --  9.6   CO2  --   --  17*  --   --  17*  --  17*  --  18*   BUN  --   --  83.4*  --   --  88.3*  --  95.3*  --  85.9*   CR  --   --  1.13*  --   --  1.07*  --  1.16*  --  1.12*   GLC  --   --  75  --   --  92  --  74  --  121*    < > = values in this interval not displayed.     CBC  Recent Labs   Lab 01/25/24  0550 01/24/24  0532 01/23/24  0648 01/22/24  1734   WBC 18.6* 28.3* 33.0* 31.2*   RBC 1.96* 2.27* 2.47* 2.48*   HGB 6.4* 7.5* 8.1* 8.3*   HCT 19.4* 22.4* 23.9* 24.2*   MCV 99 99 97 98   MCH 32.7 33.0 32.8 33.5*   MCHC 33.0 33.5 33.9 34.3   RDW 16.7* 16.4* 16.3* 16.5*   PLT 54* 72* 89* 91*     INR  Recent Labs   Lab 01/25/24  0550 01/24/24  1905 01/24/24  0532 01/23/24  0648   INR 2.68* 2.35* 2.45* 2.31*     LFTs  Recent Labs   Lab 01/25/24  0550 01/24/24  0532 01/23/24  1405 01/23/24  0648 01/22/24  1734   ALKPHOS 85 98  --  97 127   AST 51* 74*  --  67* 80*   ALT 29 36  --   "37 37   BILITOTAL 4.8* 6.0*  --  7.0* 7.3*   PROTTOTAL 5.3* 5.4* 5.1* 5.1* 5.6*   ALBUMIN 3.6 3.3*  --  2.9* 3.1*        MELD 3.0: 30 at 1/25/2024  2:39 PM  MELD-Na: 30 at 1/25/2024  2:39 PM  Calculated from:  Serum Creatinine: 1.13 mg/dL at 1/25/2024  5:50 AM  Serum Sodium: 120 mmol/L (Using min of 125 mmol/L) at 1/25/2024  2:39 PM  Total Bilirubin: 4.8 mg/dL at 1/25/2024  5:50 AM  Serum Albumin: 3.6 g/dL (Using max of 3.5 g/dL) at 1/25/2024  5:50 AM  INR(ratio): 2.68 at 1/25/2024  5:50 AM  Age at listing (hypothetical): 63 years  Sex: Female at 1/25/2024  2:39 PM    Previous work-up:   Lab Results   Component Value Date    HEPBANG Nonreactive 02/18/2021    HBCAB Nonreactive 02/18/2021    HCVAB  08/03/2017     Nonreactive   Assay performance characteristics have not been established for newborns,   infants, and children      TANYA 157 10/03/2023    IRONSAT 26 11/23/2023    TSH 3.98 12/06/2023    CHOL 176 08/15/2022    HDL 44 (L) 08/15/2022     (H) 08/15/2022    TRIG 32 12/06/2023    A1C 4.7 07/24/2023    POPETH <10 01/11/2024    PLPETH <10 01/11/2024      No results found for: \"SPECDES\", \"LDRESULTS\"      Imaging Results:  CT chest 1/25/24  IMPRESSION:  1. Small right-sided hydropneumothorax, previously moderate, with  slightly increased air component compared to CT 12/15/2023 which may  be in part related to placement of right basilar chest tube. There is  a fairly large apical component which may be communicating with the  basilar component containing the chest tube along the right lateral  aspect. Adjacent linear atelectasis throughout the right lung.  Aeration of the lung has improved compared to CT 11/13/2023.  2. Peribronchial consolidative opacities in the right upper lobe  representing infectious or inflammatory process.  3. Trace left effusion with adjacent atelectasis. Improved groundglass  opacities from CT 11/13/2023. Mild pulmonary edema.  4. Borderline ascending aortic aneurysm measuring 4.3 cm.   "

## 2024-01-25 NOTE — PROGRESS NOTES
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  2.7L para was performed then she was sent to the ED.  Na normal up until July 2023 when it has been on the decline but generally 120-125 as recently as 1/11.  Cr also up from baseline of 0.7 on 1/11 to 1.2.  Nephrology consulted for management of hyponatremia and MATTIE.       Interval History :   Mrs Crowell's Na is again improved and now >120, 2% saline stopped.  I ordered another 75g of albumin for treatment of MATTIE/HRS, loosed her fluid restriction to 1L but still did encourage protein rich fluids rather than straight H2O.      Assessment & Recommendations:   Hyponatremia-Hypervolemic with liver failure, has chronic hyponatremia in 120-125 range but now much worse at ~111-112.  Endorses feeling constantly thirsty and taking in excess H2O.  Started on 2% overnight and Na is stable.  Will give another albumin today for MATTIE (suspected HRS) which will provide some more Na and hopefully improved renal perfusion.  Jaswinder <20 due to liver disease.                  -Stopped 2%, can reduce frequency of Na checks to BID                -Fluid restriction loosened to 1L.                 -Giving albumin as well for MATTIE which should help Na                 -Na at goal of 118 this am, decreased rate of 2% to 25cc/h, giving albumin 75g again.  Likely can stop tomorrow.      MATTIE-Baseline Cr 0.7, has cirrhosis and low muscle mass which may be masking some CKD despite normal Cr. Responded to terlipressin in Nov and Cr did not rise afterwards, will consider again but starting with albumin challenge for HRS and evaluating for SBP given increased WBC's.  UA showed hyaline casts (chronic with liver failure) and also will be sent for culture.  Would be difficult  dialysis patient given her hemodynamics.                   -MATTIE, has underlying HRS physiology, unclear if she has acute infectious issue.                 -Giving 75g albumin x1 again today which will be x3 days.  Already on midodrine.       Volume-+1-2 edema in LE, + abd distension but soft, breathing is comfortable.       K/pH-K mildly up at 4.1, bicarb 17.  Giving albumin to try to get improved renal perfusion.       BMD-Mg mildly up consistent with MATTIE, phos 3.8 yesterday.      Anemia-Hgb 6.4, down from 7.5 yesterday and 8.1 the day prior.  No active signs of bleeding, likely getting PRBC's today.       Nutrition-Taking PO, 1L fluid restriction.      Time spent: 40 minutes on this date of encounter for chart review, physical exam, medical decision making and co-ordination of care.      Discussed with Dr Escobar     Recommendations were communicated to primary team via verbal communication.         ROD Pena CNS  Clinical Nurse Specialist  [phone]      Review of Systems:   I reviewed the following systems:  Gen: No fevers or chills, fatigued.    CV: No CP at rest  Resp: No SOB at rest  GI: No N/V    Physical Exam:   I/O last 3 completed shifts:  In: 2681.92 [P.O.:730; I.V.:982.92]  Out: 2393 [Urine:350; Other:3; Chest Tube:2040]   /47   Pulse 73   Temp 97.5  F (36.4  C) (Oral)   Resp 16   Wt 63.2 kg (139 lb 5.3 oz)   LMP  (LMP Unknown)   SpO2 99%   BMI 23.19 kg/m       GENERAL APPEARANCE: alert and no distress, more awake this am, frail.    Pulmonary: Breathing comfortably on RA.   CV: Regular rhythm, normal rate   - Edema +1 generalized.   GI: soft, nontender, normal bowel sounds  MS: no evidence of inflammation in joints, no muscle tenderness  : No Demarco  SKIN: no rash, warm, dry  NEURO: mild confusion but no focal deficits.      Labs:   All labs reviewed by me  Electrolytes/Renal -   Recent Labs   Lab Test 01/25/24  0958 01/25/24  0550 01/25/24  0226 01/24/24  0805 01/24/24  0532  01/23/24  0730 01/23/24  0648 01/23/24  0210 01/22/24  2232 01/22/24  1734 12/13/23  1000 12/13/23  0418 10/19/23  1523 10/03/23  0853   * 122*  122* 124*   < > 117*  117*   < > 111*   < > 111* 110*   < > 124*  124*   < > 126*   POTASSIUM  --  4.1  --   --  4.7  --  5.2  --   --  5.1   < > 5.4*   < > 3.9   CHLORIDE  --  93*  --   --  89*  --  85*  --   --  81*   < > 100   < > 91*   CO2  --  17*  --   --  17*  --  17*  --   --  18*   < > 17*   < > 25   BUN  --  83.4*  --   --  88.3*  --  95.3*  --   --  85.9*   < > 23.4*   < > 33.9*   CR  --  1.13*  --   --  1.07*  --  1.16*  --   --  1.12*   < > 0.59   < > 1.24*   GLC  --  75  --   --  92  --  74  --   --  121*   < > 104*   < > 98   UMA  --  9.9  --   --  9.8  --  9.8  --   --  9.6   < > 8.8   < > 10.2   MAG  --   --   --   --   --   --  3.4*  --   --  2.8*  --  2.0   < >  --    PHOS  --   --   --   --   --   --   --   --  3.8  --   --   --   --  2.6    < > = values in this interval not displayed.       CBC -   Recent Labs   Lab Test 01/25/24  0550 01/24/24  0532 01/23/24  0648   WBC 18.6* 28.3* 33.0*   HGB 6.4* 7.5* 8.1*   PLT 54* 72* 89*       LFTs -   Recent Labs   Lab Test 01/25/24  0550 01/24/24  0532 01/23/24  1405 01/23/24  0648   ALKPHOS 85 98  --  97   BILITOTAL 4.8* 6.0*  --  7.0*   ALT 29 36  --  37   AST 51* 74*  --  67*   PROTTOTAL 5.3* 5.4* 5.1* 5.1*   ALBUMIN 3.6 3.3*  --  2.9*       Iron Panel -   Recent Labs   Lab Test 11/23/23  0638 10/26/23  1311 10/03/23  0853   IRON 52 62 43   IRONSAT 26 28 28   TANYA  --   --  157           Current Medications:   cefTRIAXone  1 g Intravenous Q24H    citalopram  40 mg Oral Daily    fluticasone-vilanterol  1 puff Inhalation Daily    lactulose  20 g Oral TID    levothyroxine  112 mcg Oral Daily    metroNIDAZOLE  500 mg Oral TID    midodrine  15 mg Oral TID    montelukast  10 mg Oral At Bedtime    mupirocin   Topical TID    oxymetazoline  2 spray Both Nostrils BID    pantoprazole  40 mg Oral BID    rifaximin   550 mg Oral BID    sodium chloride (PF)  3 mL Intracatheter Q8H

## 2024-01-25 NOTE — PROGRESS NOTES
[facility]    Progress Note - Medicine Service, [facility] TEAM 4       Date of Admission:  1/22/2024    Assessment & Plan   Stefani Crowell is a 63 year old female admitted on 1/22/2024. She has a history of cirrhosis secondary to alcohol use currently being worked up for liver transplant complicated by need for frequent paracenteses and thoracenteses for ascites/hepatic hydrothorax, in addition to asthma and COPD 2/2 tobacco use, MDD/STEPHANIE, hypothyroidism, low back pain, and chronic hyponatremia. She presented to the ED for evaluation of acute on chronic hyponatremia in the setting of recent viral-like URI illness. Admitted for careful monitoring, and for evaluation of loculated pleural effusion with significant leukocytosis. Currently being treated for Empyema     Changes Today:  - Stopped 2% NS   > Na checks BID  - Fluid restrictions loosened to 1L  - 75g albumin per nephro  - Requested ultrasound team to assess patient's abdomen   > Anticipate paracentesis on 1/26 AM as part of patient's frequent paracentesis needs  - Stop Zosyn  - Cont IV Ceftriaxone 1g every day  - Start PO Metronidazole 500mg Q8H   > Duration of Abx anticipated 4-6 weeks  - Pulm will not administer intrapleural fibrinolytics at this time (discussing w/ thoracic surg + hepatology)  - CT Chest this AM  - 1u pRBC this AM  - Paracentesis ordered for tomorrow AM    ==========================================================    # Hydropneumothorax  # Loculated R pleural effusion; GNB empyema  # Asymptomatic Bacteruria  # At risk for skin/soft tissue infection  # Leukocytosis  # Hx Hepatic Hydrothorax   No clear symptoms, UA and urine culture corroborate possible infection. Notably, Ucx on 11/13/23 was w/ klebsiella species and now E coli on 1/22/24. Patient not reporting any urinary symptoms, though in her altered state it is unclear. On 1/23, had thora + paracentesis per IR and pleural fluid cultures  yielded GNB. Blood cultures yielded GPC, though this is most likely a contaminant. Has very fragile skin and numerous tears. Interventional pulm placed chest tube on 1/24. CT Chest on 1/25 demonstrated improved R-sided hydropneumothorax   - IR consulted for Diagnostic thoracentesis (1/23)  - WOC consult  - Blood cultures:   > 1/23: Staph epi. (suspect contaminant)   > 1/24: NGTD  - Pleural fluid culture:   > 1/23: E coli (Amp res.)  - Peritoneal fluid culture:   > 1/23: NGTD  - Urine culture:   > 1/22: Ampicillin Res. E. Coli   - Infectious Disease consulted (1/24)   > Stop IV Zosyn   > Resume IV Ceftriaxone 1g every day, Add metronidazole 500mg Q8H  - Interventional Pulm consulted (1/24) for chest tube (source control). Required  2u FFP and INR to follow   - CT Scan on 1/25  - Para Labs:   > Aerobic/anaerobic culture (NGTD), cell counts (completed), gram stain (NGTD), LDH, glucose, protein, AFB (pending), fungal culture (pending)  - Thora labs:  > Aerobic (GNB)/anaerobic culture (pending), cell counts, gram stain, LDH, glucose, protein, AFB (pending), fungal culture (pending)  > Cell counts demonstrate 125,760 total nucleated cells w/ significant % (98) neutrophils consistent w/ empyema as verified on gram stain  - CBC w/ diff daily   - Abx:  > Ceftriaxone: 1/23 - 1/24, 1/25 - *   > Metronidazole 500mg Q8H: 1/25 - *   > Rifaximin: 1/22 - *  > Zosyn: 1/24 - 1/25  > Vancomycin: 1/24  [  ] Dispo: Duration of abx TBD, minimum 4-6 weeks for empyema         # Decompensated alcoholic cirrhosis (Ascites, HE)  # Current liver transplant evaluation   # Hepatic encephalopathy   # Recurrent ascites  Decompensations include jaundice/icterus, G1 esophageal & rectal varices on EGD without bleeding, significant ascites requiring 3 times weekly paracenteses, hepatic hydrothorax requiring frequent thoracenteses, and hepatic encephalopathy.  Gets thoras and courtney through the [facility] system. During prior hospitalization, her transplant  eligibility was reopened due to completion of chemotherapy treatment and improved renal function.  Do suspect that she currently has some hepatic encephalopathy due to decreased bowel movements recently.  MELD NA 26 on discharge 12/16, 29 on most recent labs. Last admit: CMV (PCR quant neg), EBV (low DNA quant, 537 copies; discussed with ID and no need to recheck). Last admit: IGRA (neg), treponemal ab (nonreactive), serum HCG (neg), and spot protein urine (0.24 mg/mg)  MELD 3.0: 30 at 1/25/2024  9:58 AM  MELD-Na: 30 at 1/25/2024  9:58 AM  Calculated from:  Serum Creatinine: 1.13 mg/dL at 1/25/2024  5:50 AM  Serum Sodium: 121 mmol/L (Using min of 125 mmol/L) at 1/25/2024  9:58 AM  Total Bilirubin: 4.8 mg/dL at 1/25/2024  5:50 AM  Serum Albumin: 3.6 g/dL (Using max of 3.5 g/dL) at 1/25/2024  5:50 AM  INR(ratio): 2.68 at 1/25/2024  5:50 AM  Age at listing (hypothetical): 63 years  Sex: Female at 1/25/2024  9:58 AM  - Hepatology consult  - Planning L heart cath prior to transplant, not yet performed (coronary CTA non-diagnostic)  - IR consulted for diagnostic Paracentesis   > Called [facility] to attempt to add-on labs to samples from 1/22; samples were not retained   > Prefer to avoid therapeutic to avoid massive fluid shifts   > Para + Thora labs: Aerobic/anaerobic culture, cell counts, gram stain, LDH, glucose, protein (results above)  - Continue lactulose, Rifaximin   - Continue PPI  - Target 3-4 BM daily   - Abx as above.  - Ultrasound team performed bedside US on 1/25 and noted moderate sized ascites; anticipate patient will need paracentesis on 1/26     # Acute mild on Chronic Hyponatremia - improving  Chronic hyponatremia common in the setting of cirrhosis, and she has been admitted for hyponatremia several times in the past. Baseline mid 120s.  Given her history of low p.o. intake recently, do worry about a hypovolemic hyponatremia. S/p Albumin with paracentesis 1/22.  Given additional albumin on 1/24, 1/25. Her  sodium corrected w/ 2% NS at a slow rate.  - Nephrology consulted   > Albumin 75g   - Urine studies   - Q2hr Na checks  - strict ins/outs   - daily weights   - Regular diet      # MATTIE  # hepatorenal syndrome   Chart review suggests a prior history of hepatorenal syndrome.  However, her history at the moment is more compatible with a prerenal MATTIE secondary to poor p.o. intake.  Baseline Cr around 0.6, nearly double this on admission. Receiving fluids as above for hyponatremia, will trend creatinine. S/p albumin w/ para and additional 75g on 1/24.   - AM CMP  - Avoid nephrotoxic agents  - Fluids as above  - Continue Midodrine 15mg PO TID  - s/p 75g albumin per nephrology on 1/24, 1/25     # Borderline Ascending Aortic Aneurysm  Noted on CT Chest on 1/25. Measured to be 4.3 cm.  - BP Goal near normotensive < 130/<85  - HR goal < 80    # Persistent Asthma  # COPD 2/2 TUD in remission   PCP notes suggest current or recent exacerbation, though doing well from a respiratory standpoint on admission. Holding off on prednisone for now iso possible infection and respiratory stability  - Continue PRN albuterol inhaler or nebs  - Continue ICS-LABA  - Continue Singulair       # Hypothyroidism  - Continue PTA levothyroxine      # Hypermagnesemia  - CTM     # H/o hypophosphatemia - not present here  Will recheck in setting of poor oral intake. Fairly low risk for refeeding as low PO intake only recently.      # Normocytic anemia - stable  Has required outpatient white blood cell transfusions in the past.  Her hemoglobin tends to be around 7.5-9.5.  At normal range on admission. No clear bleeding history though is at risk for this. Chronic disease component and nutritional components possible as well. Hgb on 1/24 was 7.5, which is likely dilutional w/ fluids. No sources of bleeding identified.  - CBC w/ diff in AM  - Would transfuse for Hgb <7  - Transfusion log:   > 1u pRBC 1/25     # Thrombocytopenia - stable  ISO cirrhosis.  Increased bleeding risk, avoiding heparin/lovenox for now.      # MDD/STEPHANIE  - Continue PTA Celexa  - Would try to avoid sedating agents d/t current sedative presentation          # Low back pain  Stable, trial hot/cold packs first     Diet: Snacks/Supplements Adult: Ensure Max Protein (bariatric); Between Meals  Calorie Counts  Room Service  Regular Diet Adult  Fluid restriction 1000 ML FLUID    DVT Prophylaxis: Low Risk/Ambulatory with no VTE prophylaxis indicated  Demarco Catheter: Not present  Fluids: None  Lines: None     Cardiac Monitoring: None  Code Status: Full Code      Clinically Significant Risk Factors         # Hyponatremia: Lowest Na = 114 mmol/L in last 2 days, will monitor as appropriate   # Hypercalcemia: corrected calcium is >10.1, will monitor as appropriate    # Hypoalbuminemia: Lowest albumin = 2.9 g/dL at 1/23/2024  6:48 AM, will monitor as appropriate    # Coagulation Defect: INR = 2.68 (Ref range: 0.85 - 1.15) and/or PTT = N/A, will monitor for bleeding  # Thrombocytopenia: Lowest platelets = 54 in last 2 days, will monitor for bleeding           # Severe Malnutrition: based on nutrition assessment, PRESENT ON ADMISSION   # Financial/Environmental Concerns: none  # Asthma: noted on problem list        Disposition Plan         The patient's care was discussed with the Attending Physician, Dr. Fu .    Tyrone Singh MD  Medicine Service, [address]  Securely message with [employer] (more info)  Text page via [facility] Paging/Directory   See signed in provider for up to date coverage information  ______________________________________________________________________    Interval History   This morning patient was awake and more energetic than usual. She had no complaints, specifically denying chest and abdominal pain. She was also not experiencing any discomfort from her chest tube.     Physical Exam   Vital Signs: Temp: 97.5  F (36.4  C)  Temp src: Oral BP: 101/47 Pulse: 73   Resp: 16 SpO2: 99 % O2 Device: None (Room air)    Weight: 139 lbs 5.29 oz    General Appearance: Chronically ill appearing, somnolent/slow but conversational and oriented fully  Respiratory: Reduced on bilateral bases R > L w/ some crackles bilateral  Cardiovascular: RRR, warm and well perfusing extremities  GI: distended, diffusely mildly tender  Skin: jaundice  Other: Moves extremities spontaneously, answers all questions appropriately. Chest tube draining red fluid    Medical Decision Making       Please see A&P for additional details of medical decision making.      Data     I have personally reviewed the following data over the past 24 hrs:    18.6 (H)  \   6.4 (LL)   / 54 (L)     121 (L) 93 (L) 83.4 (H) /  75   4.1 17 (L) 1.13 (H) \     ALT: 29 AST: 51 (H) AP: 85 TBILI: 4.8 (H)   ALB: 3.6 TOT PROTEIN: 5.3 (L) LIPASE: N/A     Procal: N/A CRP: N/A Lactic Acid: 1.9       INR:  2.68 (H) PTT:  N/A   D-dimer:  N/A Fibrinogen:  N/A       Imaging results reviewed over the past 24 hrs:   Recent Results (from the past 24 hour(s))   XR Chest Port 1 View    Narrative    Portable chest    INDICATION right chest tube placement    COMPARISON: 1/22/2024    Findings: Placement of right basilar chest catheter with slightly  improved aeration of the lower right hemithorax especially. Probable  fluid another dense opacification of the right hemithorax is otherwise  unchanged. Heart size normal. Left lung unremarkable. Cannot exclude  tiny right basilar pneumothorax given mildly prominent lucencies in  this area.      Impression    IMPRESSION: Slightly improved aeration especially in the right lower  hemithorax post chest catheter placement.    JUSTINO SHEEHAN MD         SYSTEM ID:  [id number]   CT Chest w/o Contrast    Narrative    EXAM: CT chest without intravenous contrast. 1/25/2024 6:30 AM    HISTORY: s/p chest tube. Pls assess for volume reduction of empyema.    TECHNIQUE: Helical  acquisition of image data was performed for the  chest without intravenous contrast.    COMPARISON: Chest x-ray 1/24/2024, CT coronary angiogram 12/15/2023.  CT chest 11/13/2023.    FINDINGS:    Lines and tubes: Right basilar chest tube.    Airways: Central airways are patent.     Lungs: Small right-sided hydropneumothorax with increased air  component compared to CT 12/15/2023, following chest tube insertion.  Component at the right apex demonstrated, which may be communicating  with the basilar component along the right lateral chest wall. There  is linear scattered atelectasis/collapse throughout the right lung  however aeration of the lung has improved compared to CT 11/13/2023.  Peribronchial consolidative opacities in the right upper lobe.  Trace left pleural effusion with adjacent atelectasis. Groundglass  opacities present in the left thorax on 11/13/2023 have resolved.  There may be some mild edema with reticulation and subpleural lines.    Thyroid: Subcentimeter hypoattenuating nodules in the bilateral  thyroid lobes, which do not meet size criteria requiring follow-up.    Lymph nodes: Multiple prominent and borderline enlarged mediastinal  lymph nodes, presumably reactive.    Cardiac: The heart is not enlarged. No pericardial effusion.  Mediastinal shift to the right.    Vessels: Borderline ascending aortic aneurysm measuring 4.3 cm in  diameter.    Esophagus: The esophagus appears unremarkable.    Upper abdomen: Cirrhotic morphology of the liver. Ascites.  Splenomegaly. Cholelithiasis.    Bones/soft tissue: Degenerative changes of the spine. No acute or  suspicious osseous abnormality.        Impression    IMPRESSION:  1. Small right-sided hydropneumothorax, previously moderate, with  slightly increased air component compared to CT 12/15/2023 which may  be in part related to placement of right basilar chest tube. There is  a fairly large apical component which may be communicating with the  basilar  component containing the chest tube along the right lateral  aspect. Adjacent linear atelectasis throughout the right lung.  Aeration of the lung has improved compared to CT 11/13/2023.  2. Peribronchial consolidative opacities in the right upper lobe  representing infectious or inflammatory process.  3. Trace left effusion with adjacent atelectasis. Improved groundglass  opacities from CT 11/13/2023. Mild pulmonary edema.  4. Borderline ascending aortic aneurysm measuring 4.3 cm.    I have personally reviewed the examination and initial interpretation  and I agree with the findings.    CLAUDIA MENEZES MD         SYSTEM ID:  [id number]

## 2024-01-25 NOTE — PLAN OF CARE
[id number]  Vitals:  Blood pressure 98/43, pulse 83, temperature 97.9  F (36.6  C), temperature source Oral, resp. rate 18, weight 63.2 kg (139 lb 5.3 oz), SpO2 97%, not currently breastfeeding.    Neuro: A/Ox4 Denies Headache, dizziness,  Lightheadedness, numbness and tingling. Lethargic.   Cardiac: VSS  Respiratory: sating >92 on RA. denies SOB  Diet/appetite: regular Diet, 500 mL fluid restriction  GI/:  Incontinent BM x3. Good urine output.  Activity:  Assist x2 w/ walker  Pain: Denies  Skin: scattered bruising/hematoma. Bilat forearm scabbing. WNL. CT side wnl.  Lines: L PIV SL, R PIV infusing.    Plan: Pt undergoing liver trx workup. CT in am to check chest tube. Q2hr Na labs. Continue to monitor pt progression, notify care team of changes per care plan.    Problem: Adult Inpatient Plan of Care  Goal: Absence of Hospital-Acquired Illness or Injury  Outcome: Progressing  Intervention: Identify and Manage Fall Risk  Recent Flowsheet Documentation  Taken 1/24/2024 2331 by Selena Vences RN  Safety Promotion/Fall Prevention:   activity supervised   assistive device/personal items within reach   nonskid shoes/slippers when out of bed   room near nurse's station   safety round/check completed  Intervention: Prevent Skin Injury  Recent Flowsheet Documentation  Taken 1/24/2024 2331 by Selena Vences, RN  Body Position: position changed independently  Skin Protection: adhesive use limited  Device Skin Pressure Protection: adhesive use limited  Intervention: Prevent and Manage VTE (Venous Thromboembolism) Risk  Recent Flowsheet Documentation  Taken 1/24/2024 2331 by Selena Vences RN  VTE Prevention/Management:   SCDs (sequential compression devices) off   compression stockings off  Intervention: Prevent Infection  Recent Flowsheet Documentation  Taken 1/24/2024 2331 by Selena Vences RN  Infection Prevention:   environmental surveillance performed   equipment surfaces disinfected   hand hygiene promoted   personal  protective equipment utilized   rest/sleep promoted   single patient room provided     Problem: Liver Failure  Goal: Optimal Coping with Liver Failure  Outcome: Progressing  Intervention: Support Response to Liver Disease  Recent Flowsheet Documentation  Taken 1/24/2024 2331 by Selnea Vences RN  Supportive Measures:   active listening utilized   decision-making supported   relaxation techniques promoted   verbalization of feelings encouraged     Problem: Liver Failure  Goal: Optimal Gastrointestinal Function  Outcome: Progressing  Intervention: Monitor and Support Gastrointestinal Function  Recent Flowsheet Documentation  Taken 1/24/2024 2331 by Selena Vences RN  Body Position: position changed independently     Problem: Liver Failure  Goal: Absence of Infection Signs and Symptoms  Outcome: Progressing  Intervention: Prevent or Manage Infection  Recent Flowsheet Documentation  Taken 1/24/2024 2331 by Selena Vences RN  Infection Management: aseptic technique maintained     Problem: Liver Failure  Goal: Effective Oxygenation and Ventilation  Outcome: Progressing  Intervention: Promote Airway Secretion Clearance  Recent Flowsheet Documentation  Taken 1/24/2024 2331 by Selena Vences RN  Cough And Deep Breathing: done with encouragement  Activity Management: activity adjusted per tolerance  Intervention: Optimize Oxygenation and Ventilation  Recent Flowsheet Documentation  Taken 1/24/2024 2331 by Selena Vences RN  Airway/Ventilation Management: airway patency maintained  Head of Bed (HOB) Positioning: HOB at 20-30 degrees

## 2024-01-25 NOTE — PLAN OF CARE
Neuro: A&Ox4. Withdrawn/flat.   Cardiac: No tele orders. Soft BP, SBP 90-110s. Afebrile.    Respiratory: Sating >92% on RA.  GI/: Adequate urine output. BM X1, receiving scheduled lactulose.   Diet/appetite: Regular diet, poor appetite. Calorie counts starting tomorrow. 500 mL fluid restriction.   Activity:  Assist of 1, up to bathroom.   Pain: Right upper abdominal pain - heating pad applied with good relief.  Skin: No new deficits noted.  LDA's: PIV x2 - infusing NaCl 2% at 25 mL/hr. R CT to -20 suction.     Shift updates: Right chest tube placed this afternoon with interventional pulmonology. RRT this evening for lactic of 3.0, no new intervention - recheck lactic scheduled for 2130.     Plan: Sodium checks Q2hr. Chest CT in the AM. Receiving antibiotics. Continue with POC. Notify primary team with changes.     Goal Outcome Evaluation:      Plan of Care Reviewed With: patient    Overall Patient Progress: no changeOverall Patient Progress: no change    Outcome Evaluation: CT placed today.      Problem: Adult Inpatient Plan of Care  Goal: Absence of Hospital-Acquired Illness or Injury  Intervention: Prevent Infection  Recent Flowsheet Documentation  Taken 1/24/2024 1515 by Lolly Hernandez, RN  Infection Prevention:   environmental surveillance performed   equipment surfaces disinfected   hand hygiene promoted   personal protective equipment utilized   rest/sleep promoted   single patient room provided  Taken 1/24/2024 1200 by Lolly Hernandez, RN  Infection Prevention:   environmental surveillance performed   equipment surfaces disinfected   hand hygiene promoted   personal protective equipment utilized   rest/sleep promoted   single patient room provided  Taken 1/24/2024 0800 by Lolly Hernandez, RN  Infection Prevention:   environmental surveillance performed   equipment surfaces disinfected   hand hygiene promoted   personal protective equipment utilized   rest/sleep promoted   single patient room provided      Problem: Electrolyte Imbalance  Goal: Electrolyte Balance  Intervention: Monitor and Manage Electrolyte Imbalance  Recent Flowsheet Documentation  Taken 1/24/2024 1515 by Lolly Hernandez RN  Fluid/Electrolyte Management: fluids restricted  Taken 1/24/2024 1200 by Lolly Hernandez RN  Fluid/Electrolyte Management: fluids restricted  Taken 1/24/2024 0800 by Lolly Hernandez RN  Fluid/Electrolyte Management: fluids restricted

## 2024-01-25 NOTE — PROVIDER NOTIFICATION
"   01/24/24 1800   Call Information   Date of Call 01/24/24   Time of Call 1748   Name of person requesting the team Ninfa DASILVA   Title of person requesting team RN   RRT Arrival time 1752   Time RRT ended 1810   Reason for call   Type of RRT Adult   Primary reason for call Sepsis suspected   Sepsis Suspected Elevated Lactate level   Was patient transferred from the ED, ICU, or PACU within last 24 hours prior to RRT call? No   SBAR   Situation LA 3.0   Background Per provider note, \"Stefani Crowell is a 63 year old female with a history of alcohol associated cirrhosis, last use 6/28/23 complicated by diuretic refractory ascites and hepatic hydrothorax with 3x weekly courtney and thoras, hyponatremia, hepatic encephalopathy, MATTIE/CKD (prior terlipressin 11/2023), small EV, asthma, COPD who was admitted following labs indicating serum sodium level at para of 110.\"   Notable History/Conditions End-Stage disease   Assessment Pt lying in bed, drowsy. VSS on RA, AO+4. No complaints of pain.   Interventions Labs  (Repeat LA)   Patient Outcome   Patient Outcome Stabilized on unit   RRT Team   Attending/Primary/Covering Physician Keo 4   Date Attending Physician notified 01/24/24   Time Attending Physician notified 1748   Physician(s) Kelly Fletcher PA-C   Lead RN Jennifer VANEGAS, RN   REN DASILVA, RN   RT N/A   Post RRT Intervention Assessment   Post RRT Assessment Stable/Improved   Date Follow Up Done 01/24/24   Time Follow Up Done 2032   Comments No concerns at this time per bedside RN; VSS; await f/u LA draw 2130.       "

## 2024-01-26 NOTE — PLAN OF CARE
Neuro: A&Ox4. Forgetful  Cardiac: SR. VSS. Sys 100s. Midodrine PRN if sys <100, MAP <65, none given   Respiratory: Sating 90 on RA.  GI/: Adequate urine output. BM+ on shift (goal =TID with lactulose)  Diet/appetite: Tolerating Reg diet. 1L FR, Paul counts through 1/27 Eating fair  Activity:  Assist of X1, up to chair/bath   Pain: At acceptable level on current regimen. PRN tylenol ordered   Skin: No new deficits noted. Gen bruising, skin tears (R chest, arm, leg)  LDA's: PIV X2, CT -20 suction (lytics given X1, second dose 2000)    Plan: Continue with POC. Notify primary team with changes.   --Changed midodrine from scheduled to PRN  --Added lytics, first dose: plan BID for 3 days and repeat CT 1/29  --Added PRN tylenol Q8 hours   --Para today, took off 1.5L

## 2024-01-26 NOTE — PROGRESS NOTES
Calorie Count  Intake recorded for: 1/25  Total Kcals: 1327 Total Protein: 42g  Kcals from Hospital Food: 1327   Protein: 42g  Kcals from Outside Food (average):0 Protein: 0g  # Meals Ordered from Kitchen: 3 meals  # Meals Recorded: 2 meals   (First - 100% Froot Loops, blueberry muffin, fruit cup, sugar cookie, pudding, yogurt)  (Second - 100% pudding, sherbet)  # Supplements Recorded: 100% 1 Ensure Enlive

## 2024-01-26 NOTE — TELEPHONE ENCOUNTER
Attempted to call patient. Left message for patient.    Nayana Suresh, RN, BSN  Cardiology RN Care Coordinator   [place]   Phone: [phone]  Fax: [phone] ([place]) [phone] ([place])

## 2024-01-26 NOTE — PROCEDURES
[facility]  CAPS PROCEDURE NOTE  Date of Admission:  1/22/2024  Consult Requested by: Medicine  Reason for Consult: management of symptomatic ascites    Indication/HPI: ascites    Pre-Procedure Diagnosis: Ascites    Post-Procedure Diagnosis: Ascites    Risk Assessment: Average risk, Technically straightforward; patient's anticoagulation has been held according to guidelines based on the agent and platelets and coags are within guidelines    Procedure Outcome:  Therapeutic paracentesis performed with 1500 liters of ascites removed.   See additional procedure details below.    The primary covering service should follow up and address any lab results as appropriate.    Jennifer Simmons MD  [facility]  Securely message with [employer] (more info)  Text page via [employer] Paging/Directory   See signed in provider for up to date coverage information      [facility]    Paracentesis    Date/Time: 1/26/2024 4:38 PM    Performed by: Jennifer Simmons MD  Authorized by: Jennifer Simmons MD    PRE-PROCEDURE DETAILS  Initial or subsequent exam: initial  Procedure purpose: diagnostic and therapeutic  Indications: abdominal discomfort secondary to ascites        UNIVERSAL PROTOCOL   Site Marked: Yes  Prior Images Obtained and Reviewed:  Yes  Required items: Required blood products, implants, devices and special equipment available    Patient identity confirmed:  Verbally with patient  NA - No sedation, light sedation, or local anesthesia  Confirmation Checklist:  Patient's identity using two indicators  Time out: Immediately prior to the procedure a time out was called    Universal Protocol: the Joint Commission Universal Protocol was followed    Preparation: Patient was prepped and draped in usual sterile fashion       ANESTHESIA    Anesthesia:  Local infiltration  Local Anesthetic:  Lidocaine  1% without epinephrine  Anesthetic Total (mL):  4      SEDATION    Patient Sedated: No    POST PROCEDURE DETAILS  Needle gauge: 22  Ultrasound guidance: yes  Puncture site: left lower quadrant  Fluid removed: 1500(ml)  Fluid characteristics: orange clear.  Dressing: glue.        PROCEDURE  Describe Procedure: Sample was sent to the lab  Patient Tolerance:  Patient tolerated the procedure well with no immediate complications  Length of time physician/provider present for 1:1 monitoring during sedation: 0        POC US GUIDE FOR PARACENTESIS     Impression  US Indication: abdominal distension    Limited abdominal ultrasound was performed and demonstrated an adequate fluid collection on the left side of the abdomen.    Doppler of the skin demonstrated an area at this site without significant vasculature.  A paracentesis at this site was subsequently performed.    Jennifer Simmons MD

## 2024-01-26 NOTE — PROGRESS NOTES
01/26/24 0942   Appointment Info   Signing Clinician's Name / Credentials (OT) Dee Baer OTR/L   Rehab Comments (OT) CT to WS when ambulating   Living Environment   People in Home spouse   Current Living Arrangements house   Home Accessibility stairs to enter home   Number of Stairs, Main Entrance 3   Stair Railings, Main Entrance railing on left side (ascending)   Living Environment Comments Pt has tub shower with shower chair, GB by shower and elevated toilet. Standard bed. Has a basement, does not need to access -- laundry downstairs, spouse does.   Self-Care   Usual Activity Tolerance moderate   Current Activity Tolerance fair   Regular Exercise No   Equipment Currently Used at Home cane, straight;walker, standard;shower chair;raised toilet seat   Fall history within last six months yes   Number of times patient has fallen within last six months   (2)   Activity/Exercise/Self-Care Comment Ambulates without AD at baseline. Pt reports increased weakness in last 2 weeks, spouse assisting with donning socks. Was using shower chair at baseline. Does not work, spouse works full time out of the home. Pt reports being by herself during the day.   Instrumental Activities of Daily Living (IADL)   Previous Responsibilities meal prep;housekeeping;medication management;finances   IADL Comments Spouse does laundry, driving. They have help for yardwork.   General Information   Onset of Illness/Injury or Date of Surgery 01/22/24   Referring Physician Tyrone Singh MD   Patient/Family Therapy Goal Statement (OT) Return home   Additional Occupational Profile Info/Pertinent History of Current Problem Stefani Crowell is a 63 year old female with a history of alcohol associated cirrhosis, last use 6/28/23 complicated by diuretic refractory ascites and hepatic hydrothorax with 3x weekly courtney and thoras, hyponatremia, hepatic encephalopathy, MATTIE/CKD (prior terlipressin 11/2023), small EV, asthma, COPD  "  Limitations/Impairments safety/cognitive   Cognitive Status Examination   Orientation Status orientation to person, place and time   Cognitive Status Comments Pt is grossly oriented, however, reports feeling confused during hospitalization and somewhat \"foggy\". Chair alarm in use. Is on lactulose, will monitor.   Visual Perception   Visual Impairment/Limitations corrective lenses for reading   Impact of Vision Impairment on Function (Vision) no acute changes   Sensory   Sensory Comments denies n/t   Range of Motion Comprehensive   Comment, General Range of Motion B UE WNL   Strength Comprehensive (MMT)   Comment, General Manual Muscle Testing (MMT) Assessment UE strength is functional   Coordination   Upper Extremity Coordination No deficits were identified   Bed Mobility   Comment (Bed Mobility) SBA   Transfers   Transfer Comments SBA/CGA with use of IV pole for stability   Bathing Assessment/Intervention   Comment, (Bathing) A x 1 per clinical judgment   Lower Body Dressing Assessment/Training   Comment, (Lower Body Dressing) Pt required min A for doffing/donning brief while seated on toilet   Toileting   Comment, (Toileting) SBA/CGA for transfer   Clinical Impression   Criteria for Skilled Therapeutic Interventions Met (OT) Yes, treatment indicated   OT Diagnosis Decreased ADL I   OT Problem List-Impairments impacting ADL problems related to;activity tolerance impaired;cognition;mobility;strength   ADL comments/analysis generalized deconditioning   Assessment of Occupational Performance 5 or more Performance Deficits   Identified Performance Deficits dressing, bathing, toileting, home management, mobility   Planned Therapy Interventions (OT) ADL retraining;cognition;strengthening;transfer training;home program guidelines;progressive activity/exercise   Clinical Decision Making Complexity (OT) problem focused assessment/low complexity   Risk & Benefits of therapy have been explained evaluation/treatment results " reviewed;care plan/treatment goals reviewed   Clinical Impression Comments Pt to benefit from skilled OT to address above deficits. See daily flowsheet for details regarding tx provided.   OT Total Evaluation Time   OT Eval, Low Complexity Minutes (77304) 8   OT Goals   Therapy Frequency (OT) 6 times/week   OT Predicted Duration/Target Date for Goal Attainment 02/15/24   OT Goals Lower Body Dressing;Lower Body Bathing;Toilet Transfer/Toileting;Cognition   OT: Lower Body Dressing Modified independent   OT: Lower Body Bathing Supervision/stand-by assist   OT: Toilet Transfer/Toileting Modified independent;toilet transfer;cleaning and garment management   OT: Cognitive Patient/caregiver will verbalize understanding of cognitive assessment results/recommendations as needed for safe discharge planning   OT Discharge Planning   OT Plan OT: cog, standing ADLs, increase activity   OT Discharge Recommendation (DC Rec) home with assist;home with home care occupational therapy   OT Rationale for DC Rec Pt appears near functional baseline with self cares, primary limitaiton seems to be cognition/generalized deconditioning.  able to assist when home but does work during the day. Would benefit from  OT.   OT Brief overview of current status Up with IV pole SBA/CGA   Total Session Time   Total Session Time (sum of timed and untimed services) 8

## 2024-01-26 NOTE — PROGRESS NOTES
Nephrology Progress Note  01/26/2024       Stefani Crowell is a 63 yof with hx of ETOH cirrhosis being worked up for transplant complicated by hydrothorax requiring at least twice weekly thoracenteses, COPD, known ETOH Cirrhosis who presented for paracentesis and noted to be 112.  2.7L para was performed then she was sent to the ED.  Na normal up until July 2023 when it has been on the decline but generally 120-125 as recently as 1/11.  Cr also up from baseline of 0.7 on 1/11 to 1.2.  Nephrology consulted for management of hyponatremia and MATTIE.       Interval History :   Mrs Crowell's Na is again improved and now >120, with 2% saline stopped which is goal for her with advanced liver disease.  Cr stable but MATTIE unresolved, likely due to new infection (E.Coli) found in pleural fluid on 1/23, on Ceftriaxone and is planned for 6 weeks of abx.   Will sign off from daily visits as acute issues have improved but MATTIE has not entirely resolved likely due to infection which is now being treated.  Please let us know if issues arise acutely, will schedule in CKD clinic.      Assessment & Recommendations:   Hyponatremia-Hypervolemic with liver failure, has chronic hyponatremia in 120-125 range but now much worse at ~111-112.  Endorses feeling constantly thirsty and taking in excess H2O.  Started on 2% overnight and Na is stable.  Will give another albumin today for MATTIE (suspected HRS) which will provide some more Na and hopefully improved renal perfusion.  Jaswinder consistently <20 due to liver disease.                  -Stopped 2%, can reduce frequency of Na checks to BID                -Fluid restriction= 1L.                 -Done with albumin x3 days, Cr unchanged from yesterday                -Na at goal of >120 this am, 2% stopped 1/25.  Counseled on fluid restriction, encouraged protein drinks.       MATTIE-Baseline Cr 0.7, has cirrhosis and low muscle mass which may be masking some CKD despite normal Cr. Responded to  terlipressin in Nov and Cr did not rise afterwards, will consider again but starting with albumin challenge for HRS and evaluating for SBP given increased WBC's.  UA showed hyaline casts (chronic with liver failure) and also will be sent for culture.  Would be difficult dialysis patient given her hemodynamics.                   -MATTIE, has underlying HRS physiology, unclear if she has acute infectious issue.                 -Giving 75g albumin x1 again today which will be x3 days.  Already on midodrine.       Volume-+1-2 edema in LE, + abd distension but soft, breathing is comfortable.       K/pH-Na 122 with goal of >120 w/chronic hyponatremia and cirrhosis.  Bicarb low but avoiding PO bicarb due to Na load which will worsen ascites/hydrothorax.      BMD-Mg and Phos not checked today.       Anemia-Hgb 8 after 1u PRBC's yesterday.  No active signs of bleeding.     Other comorbidites  Nutrition-Taking PO, 1L fluid restriction.     Infection-Hydrothorax + for E.Coli from 1/23, on Ceftriaxone.       Time spent: 40 minutes on this date of encounter for chart review, physical exam, medical decision making and co-ordination of care.      Discussed with Dr Escobar     Recommendations were communicated to primary team via verbal communication.         ROD Pena CNS  Clinical Nurse Specialist  [phone]      Review of Systems:   I reviewed the following systems:  Gen: No fevers or chills, fatigued.    CV: No CP at rest  Resp: No SOB at rest  GI: No N/V    Physical Exam:   I/O last 3 completed shifts:  In: 1798.33 [P.O.:1180]  Out: 480 [Chest Tube:480]   /50   Pulse 63   Temp 97.7  F (36.5  C) (Oral)   Resp 18   Wt 63.2 kg (139 lb 5.3 oz)   LMP  (LMP Unknown)   SpO2 100%   BMI 23.19 kg/m       GENERAL APPEARANCE: alert and no distress, more awake this am, frail.    Pulmonary: Breathing comfortably on RA.   CV: Regular rhythm, normal rate   - Edema +1 generalized.   GI: soft, nontender, normal bowel  sounds  MS: no evidence of inflammation in joints, no muscle tenderness  : No Demarco  SKIN: no rash, warm, dry  NEURO: mild confusion but no focal deficits.      Labs:   All labs reviewed by me  Electrolytes/Renal -   Recent Labs   Lab Test 01/26/24  0435 01/25/24  1617 01/25/24  1439 01/25/24  0958 01/25/24  0550 01/24/24  0805 01/24/24  0532 01/23/24  0730 01/23/24  0648 01/23/24  0210 01/22/24  2232 01/22/24  1734 12/13/23  1000 12/13/23  0418 10/19/23  1523 10/03/23  0853   *  122* 122* 120*   < > 122*  122*   < > 117*  117*   < > 111*   < > 111* 110*   < > 124*  124*   < > 126*   POTASSIUM 3.9  --   --   --  4.1  --  4.7  --  5.2  --   --  5.1   < > 5.4*   < > 3.9   CHLORIDE 93*  --   --   --  93*  --  89*  --  85*  --   --  81*   < > 100   < > 91*   CO2 17*  --   --   --  17*  --  17*  --  17*  --   --  18*   < > 17*   < > 25   BUN 77.5*  --   --   --  83.4*  --  88.3*  --  95.3*  --   --  85.9*   < > 23.4*   < > 33.9*   CR 1.17*  --   --   --  1.13*  --  1.07*  --  1.16*  --   --  1.12*   < > 0.59   < > 1.24*   *  --   --   --  75  --  92  --  74  --   --  121*   < > 104*   < > 98   UMA 9.9  --   --   --  9.9  --  9.8  --  9.8  --   --  9.6   < > 8.8   < > 10.2   MAG  --   --   --   --   --   --   --   --  3.4*  --   --  2.8*  --  2.0   < >  --    PHOS  --   --   --   --   --   --   --   --   --   --  3.8  --   --   --   --  2.6    < > = values in this interval not displayed.       CBC -   Recent Labs   Lab Test 01/26/24  0435 01/25/24  1617 01/25/24  0550 01/24/24  0532   WBC 17.9*  --  18.6* 28.3*   HGB 8.0* 7.8* 6.4* 7.5*   PLT 60*  --  54* 72*       LFTs -   Recent Labs   Lab Test 01/26/24  0435 01/25/24  0550 01/24/24  0532   ALKPHOS 98 85 98   BILITOTAL 4.8* 4.8* 6.0*   ALT 30 29 36   AST 57* 51* 74*   PROTTOTAL 5.5* 5.3* 5.4*   ALBUMIN 4.0 3.6 3.3*       Iron Panel -   Recent Labs   Lab Test 11/23/23  0638 10/26/23  1311 10/03/23  0853   IRON 52 62 43   IRONSAT 26 28 28   TANYA  --   --   157           Current Medications:   alteplase (ACTIVASE) 10 mg, dornase hermila (PULMOZYME) 5 mg in sodium chloride 0.9 % 50 mL for chest tube instillation in syringe  50 mL Chest Tube BID    cefTRIAXone  1 g Intravenous Q24H    [START ON 1/27/2024] cholecalciferol  50,000 Units Oral Q7 Days    citalopram  40 mg Oral Daily    fluticasone-vilanterol  1 puff Inhalation Daily    lactulose  20 g Oral TID    levothyroxine  112 mcg Oral Daily    metroNIDAZOLE  500 mg Oral TID    montelukast  10 mg Oral At Bedtime    mupirocin   Topical TID    oxymetazoline  2 spray Both Nostrils BID    pantoprazole  40 mg Oral BID    rifaximin  550 mg Oral BID    sodium chloride (PF)  3 mL Intracatheter Q8H

## 2024-01-26 NOTE — TELEPHONE ENCOUNTER
Forms received from: [facility]  Phone number listed: [phone]   Fax listed: [phone]  Date received: 1/24/24  Form description: Discharge summary ID # [id number]  Once forms are completed, please return to [facility] via fax.  Is patient requesting to be contacted when forms are completed: na  Phone: na   Form placed:  Aliyah Cohen

## 2024-01-26 NOTE — PROGRESS NOTES
[facility] PROGRESS NOTE     Patient:  Stefani Crowell   Date of birth 1960, Medical record number [id number]  Date of Visit:  01/26/2024  Date of Admission: 1/22/2024  Consult Requester:Venancio Fu MD          Assessment and Plan:   ID Problem List  Right-sided loculated empyema  Thoracentesis (1/23): ~126K TNC, 98% neutrophils; total protein 2.9, glucose <2, +Light's criteria.   Culture = E. coli  Positive blood cultures - Staph epi in 1/4 bottles on 1/23 is a contaminant  Leukocytosis  Decompensated alcohol-related cirrhosis   Anemia, thrombocytopenia, coagulopathy  Hepatic encephalopathy  MATTIE, HRS  Ascites and hepatic hydrothorax requiring 3x/weekly drainage  Grade 1 EV  Hyponatremia  Dirty UA (4 squamous cells), urine culture +E. Coli (Amp-R) - no therapy required  History of C difficile (+PCR 9/4/20)     RECOMMENDATION:  Continue IV ceftriaxone 1g once daily and PO metronidazole 500 mg q8hrs for empyema  May need additional drainage of apical pleural collection. Appreciate IP management of chest tube.  Duration of antibiotics TBD - minimum 4-6 weeks for empyema pending source control  Follow up pending cultures (blood, ascites)      ASSESSMENT:  Stefani Crowell is a 63 year old female with PMHx significant for severe asthma, decompensated alcohol related cirrhosis c/b thrombocytopenia, anemia, esophageal varices (grade 1), hepatic encephalopathy, hepatorenal syndrome, ascites and hepatic hydrothorax requiring 3x/week drainage, hypothyroidism, depression, and anxiety who was admitted for abnormal labs (Na 112, K 5.3). Hospital course notable for CXR with loculated right pleural effusion s/p thoracentesis with concern for empyema. ID consulted for GNB pleural effusion and GPC on blood cultures.     Thoracentesis 1/23 with exudative effusion with ~126K total nucleated cells and culture with E. coli. Unclear etiology of this empyema. No recent or current concerns for  pneumonia - did have recent URI, likely viral, but no productive cough or reported hypoxia, though does have chest CT with peribronchial consolidative opacities. No concurrent SBP (paracentesis same day with 131 ANC, Cx NGTD), though does get 3x/week paracentesis +/- thoracentesis which may be source via direct inoculation. Last outpatient thoracentesis on 1/17 - no labs done; CXR at that time noted loculated apical component. Appreciate IP involvement for chest tube placement (no labs sent, recent thora culture already). Chest tube with 1900 ml seropurulent output, now sanguinous today. Chest CT with large apical collection - unclear if being drained by present lower chest tube. Apical collection will need additional drainage for source control if no improvement with existing drain. Appreciate lytics started per IP on 1/26. Agree with serial CXR, possible repeat CT in coming days to follow loculated effusions. May be difficult to determine source control given her baseline hepatic hydrothorax. Continue IV ceftriaxone + PO flagyl. Anticipate 4-6 week course for empyema, pending source control.     She has been afebrile here. Blood cultures on 1/23 (collected for leukocytosis in 30s) with Staph epi in 1/2 sets, in 1/4 bottles. Vancomycin stopped as Staph epi is a contaminant. Repeat blood cultures from 1/24 at [facility]. In regards to E. Coli on urine culture, patient is asymptomatic and this is a dirty specimen with 4 squamous cells, would not treat. However, this will be incidentally covered with therapy for her E. Coli empyema.     Infectious diseases will follow peripherally over the weekend. Dr. Munson will be covering the General ID services over the weekend. Dr. Mohr will take over the Green ID service on Monday.     Patient and plan staffed with Dr. Munson. Recommendations discussed with primary team.    Josseline Pace PA-C  Infectious Diseases  Pager #[phone] or Vocera    40 MINUTES SPENT BY ME on the date of  service doing chart review, history, exam, documentation & further activities per the note.           Interim History and Events:     Lolly remains afebrile, vitals stable, on room air. WBC down to 17.9. No acute events overnight. Sitting up in chair - feeling slightly better today. Some pain at chest tube site.    Chest tube with 540 ml out yesterday. CXR pending. Plan for paracentesis today. Lytics per IP.         ROS:   -Focused 5 point ROS completed, pertinent positives and negatives listed above.      Physical Examination:  Temp: 97.7  F (36.5  C) Temp src: Oral BP: 133/51 Pulse: 74   Resp: 22 SpO2: 96 % O2 Device: None (Room air)      Vitals:    01/24/24 0030   Weight: 63.2 kg (139 lb 5.3 oz)       Constitutional: Pleasant adult female, in NAD. Alert and interactive.   HEENT: NCAT, trace icteric sclerae, conjunctiva clear. Moist mucous membranes.  Respiratory: Non-labored breathing, good air exchange. Lungs are clear to auscultation bilaterally, without wheezing, crackles or rhonchi. No cough noted. Chest tube with serosanguinous output - some settled purulence noted.  Cardiovascular: Regular rate and rhythm with no murmur, rub or gallop.  GI: Normoactive BS. Abdomen is soft, nontender to palpation, mild-moderately distended. No rigidity or guarding. No rebound tenderness.  Skin: Warm and dry. Multiple scabs, skin tears. No purulence or cellulitis.  Musculoskeletal: Extremities grossly normal. No tenderness or edema present.   Neurologic: A &O x3, speech normal, answering questions appropriately. Moves all extremities spontaneously.  VAD: PIV is c/d/i with no erythema, drainage, or tenderness.      Medications:   cefTRIAXone  1 g Intravenous Q24H    citalopram  40 mg Oral Daily    fluticasone-vilanterol  1 puff Inhalation Daily    lactulose  20 g Oral TID    levothyroxine  112 mcg Oral Daily    metroNIDAZOLE  500 mg Oral TID    montelukast  10 mg Oral At Bedtime    mupirocin   Topical TID    oxymetazoline  2  "spray Both Nostrils BID    pantoprazole  40 mg Oral BID    rifaximin  550 mg Oral BID    sodium chloride (PF)  3 mL Intracatheter Q8H       Infusions/Drips:      Laboratory Data:   No results found for: \"ACD4\"    Inflammatory Markers    Recent Labs   Lab Test 12/06/23  1701 06/29/18  1309   SED 12 7   CRP  --  5.9       Metabolic Studies       Recent Labs   Lab Test 01/26/24  0435 01/25/24  1617 01/25/24  1439 01/25/24  0958 01/25/24  0550 01/25/24  0226 01/25/24  0027 01/24/24  2134 01/24/24  1905 01/24/24  1659 01/24/24  0805 01/24/24  0532 01/23/24  0730 01/23/24  0648 01/23/24  0210 01/22/24  2232 01/22/24  1734 01/22/24  1313 01/11/24  0935 10/19/23  1523 10/03/23  0853 08/18/23  1402 07/24/23  1457   *  122* 122* 120* 121* 122*  122* 124*   < > 124*   < > 120*   < > 117*  117*   < > 111*   < > 111* 110*  --  123*   < > 126*   < > 133*   POTASSIUM 3.9  --   --   --  4.1  --   --   --   --   --   --  4.7  --  5.2  --   --  5.1  --  4.4   < > 3.9   < > 3.4   CHLORIDE 93*  --   --   --  93*  --   --   --   --   --   --  89*  --  85*  --   --  81* 82* 95*   < > 91*   < > 95*   CO2 17*  --   --   --  17*  --   --   --   --   --   --  17*  --  17*  --   --  18*  --  18*   < > 25   < > 24   ANIONGAP 12  --   --   --  12  --   --   --   --   --   --  11  --  9  --   --  11  --  10   < > 10   < > 14   BUN 77.5*  --   --   --  83.4*  --   --   --   --   --   --  88.3*  --  95.3*  --   --  85.9*  --  36.8*   < > 33.9*   < >  --    CR 1.17*  --   --   --  1.13*  --   --   --   --   --   --  1.07*  --  1.16*  --   --  1.12*  --  0.66   < > 1.24*   < >  --    GFRESTIMATED 53*  --   --   --  55*  --   --   --   --   --   --  59*  --  53*  --   --  56*  --  >90   < > 49*   < >  --    *  --   --   --  75  --   --   --   --   --   --  92  --  74  --   --  121*  --  107*   < > 98   < >  --    A1C  --   --   --   --   --   --   --   --   --   --   --   --   --   --   --   --   --   --   --   --   --   --  4.7   UMA " 9.9  --   --   --  9.9  --   --   --   --   --   --  9.8  --  9.8  --   --  9.6  --  9.5   < > 10.2   < >  --    PHOS  --   --   --   --   --   --   --   --   --   --   --   --   --   --   --  3.8  --   --   --   --  2.6  --   --    MAG  --   --   --   --   --   --   --   --   --   --   --   --   --  3.4*  --   --  2.8*  --   --    < >  --    < >  --    LACT  --   --   --   --   --   --   --  1.9  --  3.0*  --   --    < >  --   --   --   --   --   --    < >  --   --   --     < > = values in this interval not displayed.       Hepatic Studies    Recent Labs   Lab Test 01/26/24  0435 01/25/24  0550 01/24/24  0532 01/23/24  1405 01/23/24  0648 01/22/24  1734 01/11/24  0935 12/15/23  0545 12/11/23  0812   BILITOTAL 4.8* 4.8* 6.0*  --  7.0* 7.3* 6.0*   < > 4.9*   ALKPHOS 98 85 98  --  97 127 100   < > 67   ALBUMIN 4.0 3.6 3.3*  --  2.9* 3.1* 3.5   < > 3.4*   AST 57* 51* 74*  --  67* 80* 48*   < > 47*   ALT 30 29 36  --  37 37 24   < > 36   LDH  --   --   --  195  --   --   --   --  185    < > = values in this interval not displayed.       Pancreatitis testing    Recent Labs   Lab Test 12/06/23  1843 12/06/23  1608 10/22/23  0150 08/15/22  1426 09/03/20  1216 03/01/19  0954 04/08/16  1040   LIPASE  --  125* 116*  --  189  --   --    TRIG 32  --   --  111  --  125 97       Hematology Studies      Recent Labs   Lab Test 01/26/24  0435 01/25/24  1617 01/25/24  0550 01/24/24  0532 01/23/24  0648 01/22/24  1734 01/11/24  0935 01/19/21  1502 09/16/20  1147 09/03/20  1216 06/29/18  1309   WBC 17.9*  --  18.6* 28.3* 33.0* 31.2* 8.4   < > 7.1   < > 5.6   ANEU  --   --   --   --   --   --   --   --  4.8  --  3.4   ALYM  --   --   --   --   --   --   --   --  1.5  --  1.5   VIJI  --   --   --   --   --   --   --   --  0.5  --  0.6   AEOS  --   --   --   --   --   --   --   --  0.2  --  0.1   HGB 8.0* 7.8* 6.4* 7.5* 8.1* 8.3* 8.4*   < > 13.9   < > 13.7   HCT 23.8*  --  19.4* 22.4* 23.9* 24.2* 25.3*   < > 43.0   < > 41.8   PLT 60*  --   54* 72* 89* 91* 84*   < > 118*   < > 125*    < > = values in this interval not displayed.       Arterial Blood Gas Testing  No lab results found.     Urine Studies     Recent Labs   Lab Test 01/22/24  2323 12/07/23  1237 12/06/23  1730 11/13/23  1540 11/02/23  1236   URINEPH 5.0 5.0 5.0 5.0 5.5   NITRITE Negative Negative Negative Negative Negative   LEUKEST Large* Small* Trace* Small* Large*   WBCU 83* 4 7* 17* 23*       Microbiology:  Culture   Date Value Ref Range Status   01/24/2024 No growth after 2 days  Preliminary   01/24/2024 No growth after 2 days  Preliminary   01/23/2024 4+ Escherichia coli (A)  Final   01/23/2024 No growth after 2 days  Preliminary   01/23/2024 No anaerobic organisms isolated after 2 days  Preliminary   01/23/2024 No growth after 1 day  Preliminary   01/23/2024 No growth after 1 day  Preliminary   01/23/2024 Positive on the 1st day of incubation (A)  Final   01/23/2024 Staphylococcus epidermidis (AA)  Final     Comment:     1 of 2 bottles   01/23/2024 No growth after 3 days  Preliminary   01/22/2024 >100,000 CFU/mL Escherichia coli (A)  Final   12/12/2023 No Growth  Final   12/06/2023 <10,000 CFU/mL Mixture of Urogenital Jenni  Final   11/13/2023 >100,000 CFU/mL Klebsiella pneumoniae (A)  Final   11/13/2023 50,000-100,000 CFU/mL Klebsiella pneumoniae (A)  Final   11/12/2023 No Growth  Final   11/09/2023 No Growth  Final   11/06/2023 No Growth  Final   11/06/2023 No anaerobic organisms isolated  Final   10/25/2023 No Growth  Final   10/22/2023 No Growth  Final   10/22/2023 >100,000 CFU/mL Mixture of urogenital jenni  Final       Last check of C difficile  C Diff Toxin B PCR   Date Value Ref Range Status   09/04/2020 Positive (A) NEG^Negative Final     Comment:     Positive: Toxin producing C. difficile target DNA sequences detected, presumed   positive for C. difficile toxin B. C. difficile (Requires Enteric Isolation).      C. difficile (Requires Enteric Isolation)  FDA approved assay  performed using [employer] GeneXpert real-time PCR.         Imaging:  CT Chest w/o contrast 1/25/24  IMPRESSION:  1. Small right-sided hydropneumothorax, previously moderate, with  slightly increased air component compared to CT 12/15/2023 which may  be in part related to placement of right basilar chest tube. There is  a fairly large apical component which may be communicating with the  basilar component containing the chest tube along the right lateral  aspect. Adjacent linear atelectasis throughout the right lung.  Aeration of the lung has improved compared to CT 11/13/2023.  2. Peribronchial consolidative opacities in the right upper lobe  representing infectious or inflammatory process.  3. Trace left effusion with adjacent atelectasis. Improved groundglass  opacities from CT 11/13/2023. Mild pulmonary edema.  4. Borderline ascending aortic aneurysm measuring 4.3 cm.    XR Chest 2 Views  Result Date: 1/22/2024  IMPRESSION: Large loculated right pleural effusion with associated atelectasis of the right upper, middle, and lower lobes. Increased in fluid volume compared to 12/14/2023.

## 2024-01-26 NOTE — PROGRESS NOTES
"CLINICAL NUTRITION SERVICES - BRIEF NOTE    See full nutrition assessment 1/24     Nutrition Prescription     RECOMMENDATIONS FOR MDs/PROVIDERS TO ORDER:  Recommend liberalize fluid restriction as medically able throughout hospital admission, pending Na trends and fluid status.   --> Will not  be counting oral nutrition supplements towards fluid restriction at this time (severe malnutrition hx, oral nutrition supplements provide minimal \"free water\", fluid restriction shared 50/50 with nursing so pt only allowed ~250 mL fluid via room-service).      Encourage PO intake. Monitor paul cts with minimum goal for pt to consume at least 1130 Kcals and 55 gm protein daily (65% est needs) vs need to consider nutrition support.      Malnutrition Status:    Severe malnutrition in the context of chronic illness      Recommendations already ordered by Registered Dietitian (RD):  - Ensure BID  -Trial of magic cup orange or vanilla  -Trial of gelatein cherry   -Additional supplements PRN    Future/Additional Recommendations:  Monitor PO adequacy per paul cts vs need to consider nutrition support/FT if aligns with pt GOC.     If patient requires FT placement for enteral nutrition support , see recs below:  Formula: Two Paul HN (or equivalent)   Use dosing weight 63.2 kg  EN access: None at present (Post-pyloric FT likely to be best tolerated w/ ascites)   Goal Regimen: TwoCal HN (or equivalent) at goal rate of 35 mL/hr (840 mL/day) + 1 pkt Prosource TF20 daily to provide 1760 kcals (28 kcals/kg/day), 91 g PRO (1.5 g/kg/day), 588 mL H2O, 184 g CHO and 4 g Fiber daily.   - Initiate at 10 ml/hr and advance by 10 ml q8hr pending pt's tolerance.  - Do not advance TF rate unless Mg++ >1.5, K+ is >3, and phos >1.9  - Recommend 30-60 ml q4hr fluid flushes for tube patency. Additional fluids and/or adjustments per MD.    - Order multivitamin/minerals to help ensure micronutrient needs being met with suspected hypermetabolic demands and " "potential interruptions to TF infusions.   - Consider additional 100 mg Thiamine x 5-7 days to prevent lyte depletion if at risk for refeeding syndrome    - If gastric enteral access: HOB > 30 degrees or Reverse Trendelenburg >10-25 degrees.              -Send a nutrition consult for \"Registered Dietitian to Order TF per Medical Nutrition Therapy Guidelines\" if desire RD to order TFs.       NEW FINDINGS    Received consult that patient is getting tired of boost supplements, and would like more options.     Spoke with patient in room. She would like her ensure drink twice a day now. She is also willing to try magic cup and gelatein. RD explained and provided full supplement list, and encouraged patient to order additional supplements as needed. Patient says she likes greek yogurt and PB&J, note added in health touch for NSA to use with room service with assist.     Paul counts: 1/25       Total Kcals: 1327     Total Protein: 42g     INTERVENTIONS  Implementation  -Collaboration with other providers - received consult  -Medical food supplement therapy - increased ensure to BID, trial of magic cup and gelatein  -Modify composition of meals/snacks - encouraged protein intake     Leah Khan, MPH, RDN, LD   6B/Obs RD pager [phone]  Weekend/Holiday RD pager [phone]   "

## 2024-01-26 NOTE — PLAN OF CARE
Goal Outcome Evaluation:  Neuro: A&Ox4. More awake and interactive.   Cardiac: VSS. BP normotenisve.   Respiratory: Sating above 95% on RA, chest tune at right -20mmhg wall suction, seroussanguinuous output.  GI/: Adequate urine output. BM X1, loose large/   Diet/appetite: Tolerating regular diet. Eating well. With 1liter FR  Activity:  Assist of 1 up bathroom.   Pain: At acceptable level on current regimen. Tylenol given once.   Skin: No new deficits noted.  LDA's: 2 PIVS, TKO and saline lock. Chest tube at right.    Plan: Plan for paracenthesis today, sodium monitoring BD. Possible another chest tube insertion. Continue with POC. Notify primary team with changes.
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
## 2024-01-26 NOTE — TELEPHONE ENCOUNTER
Pre-Liver Transplant Evaluation/Teaching    TEACHING TOPICS: Evaluation Process, Evaluation Items, Diagnostic Studies, Consultation, Chemical Dependency Policy, CD Eval, Donor Source, Liver Allocation, MELD System, UNOS, Waiting List, Follow up while on transplant list, Follow up after transplantation, Infection and Rejection, Immunosuppression , Medication Teaching, Lab Recording after transplant, Laboratory Frequency after transplant , Consent for evaluation and One year survival rates    INSTRUCTIONAL MATERIAL USED/GIVEN:   Liver Transplant Handbook, MELD Booklet, Donor Booklet, Web Sites Options, Verbal instructions, Multiple Listing Brochure , Consent for evaluation signed, One year survival rates and SRTR (Scientific Registry) Data    Person(s) involved in teaching: patient, sisters Bev and Paulette, daughter Angelia  Asks Questions: YES  Eager to Learn: YES  Cooperative: YES  Receptive (willing/able to accept information): YES  Reason for the appointment, diagnosis and treatment plan: YES  Knowledge of proper use of medications and conditions for which they are ordered (with special attention to potential side effects or drug interactions): YES  Which situations necessitate calling provider and whom to contact: YES    Teaching Concerns Addressed   Comments: Vaccinations, need for Hep A/B still  Nutritional needs and diet plan: YES  How and/when to access community resources: YES  Patient is aware of and agrees to required commitment to post-op care and long term follow-up: YES    Patient open to all Extended Criteria organ offers (underutilized donors): Yes or no: Yes  Details: open to all    Patient has name and phone number of transplant coordinator.   Time Spent face-to-face teachin minutes.

## 2024-01-26 NOTE — PROGRESS NOTES
Care Management Follow Up    Length of Stay (days): 4    Expected Discharge Date: 01/31/2024?     Concerns to be Addressed: Discharge planning, medical readiness.   Patient plan of care discussed at interdisciplinary rounds: Yes    Anticipated Discharge Disposition: Home  Anticipated Discharge Services: Home infusion, OP PT/OT, OP Chemical dependency treatment  Anticipated Discharge DME: TBD    Patient/family educated on Medicare website which has current facility and service quality ratings: NA  Education Provided on the Discharge Plan: Not at this time.   Patient/Family in Agreement with the Plan: NA    Referrals Placed by CM/SW: Home infusion   Private pay costs discussed:  TBD pending insurance coverage for home infusion    Additional Information:  Per chart review, patient is on IV abx. Per ID note, patient is likely to need 4-6 weeks of IV abx for treatment of empyema, final abx plan pending. Referral sent to [facility] at this time to check coverage for home IV abx. Per PT/OT evals, patient is cleared for home w/HH services. Historically, it has been difficult to arrange HH therapy for patient d/t payor source ([place]), will request [facility] attempt to arrange home w/home infusion referral. Patient will need a PICC line and teaching once the plan is confirmed.     Discharge resources:    [facility] (Likely to need 4-6 weeks of IV abx)  Phone: [phone]  Fax: [phone]    OP CD treatment w/[facility]    OP thoras: MWF at [facility]     OP PT/OT previously arranged at [facility] in [place].     Care coordination will continue to follow for discharge planning.     María Aguilar, RNCC, BSN    [facility]    [address] [place] [id number]    [email or web address]    Office: [phone] Pager: [phone]

## 2024-01-26 NOTE — PROGRESS NOTES
[facility]  HEPATOLOGY PROGRESS NOTE  Stefani Crowell [id number]       IMPRESSION:  Stefani Crowell is a 63 year old female with a history of alcohol associated cirrhosis, last use 6/28/23 complicated by diuretic refractory ascites and hepatic hydrothorax with 3x weekly courtney and thoras, hyponatremia, hepatic encephalopathy, MATTIE/CKD (prior terlipressin 11/2023), small EV, asthma, COPD who was admitted following labs indicating serum sodium level at para of 110. She underwent thoracentesis with evidence of empyema.     RECOMMENDATIONS:    Updates for 01/26/2024 :  -     # Empyema  # Hepatic hydrothorax  # Ascites  - thora 1/23 with 1 lights criteria, high PMNs, low glucose. With how high her PMN's are and negative ascites fluid, more characteristic of empyema rather than SBE. Remains on ceftriaxone. Pleural Cultures + for e. Coli.   - Appreciate interventional pulm following. She has concern for loculated pleural fluid, ok from hepatology to attempt lytics.      - ID following.   - Other infectious work up in progress- 1/2 bottles blood culture with +gpc, likely contaminant.   - para without evidence of SBP. Repeat para as needed for comfort, limit to <4L and send diagnostic testing with each sample.     # Hyponatremia  - nephrology following  - on 2% saline. Sodium level increased to 122 from admit of 110. Baseline ~124  - Fluid restriction to 1.2 L    # Alcohol associated cirrhosis  # Transplant candidacy  - MELD 31, ABO A  - with current infection, not a candidate for liver transplant at this time. Still needs [facility] for cardiac risk assessment for transplant.   - US abd 10/22/23 negative for HCC    # Hepatic encephalopathy  - continue with lactulose and rifaximin.     # Debility  # Moderate protein calorie malnutrition  - PT/OT- was up walking with OT and steady. Can stand per self without assistance.    - BID protein supplements- working with RD for other options for protein supplements.     Patient  was discussed with attending physician, Dr. Powers.  The above note reflects our joint plan.     Next follow up appointment: Dr. Lim 1/29/24    Thank you for the opportunity to be involved with  Stefani Serrano Mervat rosas. Please call with any questions or concerns.    ROD Urban, CNP  Inpatient Hepatology [facility]        Subjective    States is having an increase in abdominal distention limiting her appetite. No fevers or worsening of dyspnea. Having a slight increase in stool output.         Objective    VS: /51 (BP Location: Left arm, Cuff Size: Adult Small)   Pulse 63   Temp 97.7  F (36.5  C) (Oral)   Resp 18   Wt 63.2 kg (139 lb 5.3 oz)   LMP  (LMP Unknown)   SpO2 98%   BMI 23.19 kg/m       Gross per 24 hour   Intake 2143.33 ml   Output 470 ml   Net 1673.33 ml      General: In no acute distress, moderate facial muscle wasting  Neuro: alert Ox3, No asterixis.   HEENT:  Noscleral icterus, Nooral lesions  CV:  Skin warm and dry  Lungs:  Respirations even and nonlabored on room air  Abd:  Mild abdominal distention. nontender   Extrem: no lower peripheral edema   Skin:  mild jaundice  Psych: flat    MEDICATIONS:  Current Facility-Administered Medications   Medication    albuterol (PROVENTIL HFA/VENTOLIN HFA) inhaler    albuterol (PROVENTIL) neb solution 2.5 mg    alteplase (ACTIVASE) 10 mg, dornase hermila (PULMOZYME) 5 mg in sodium chloride 0.9 % 50 mL for chest tube instillation in syringe    alum & mag hydroxide-simethicone (MAALOX) suspension 30 mL    artificial saliva (BIOTENE DRY MOUTHWASH) liquid 5 mL    benzocaine-menthol (CHLORASEPTIC MAX) 15-10 MG lozenge 1 lozenge    calcium carbonate (TUMS) chewable tablet 1,000 mg    cefTRIAXone (ROCEPHIN) 1 g vial to attach to  mL bag for ADULTS or NS 50 mL bag for PEDS    citalopram (celeXA) tablet 40 mg    fluticasone-vilanterol (BREO ELLIPTA) 200-25 MCG/ACT inhaler 1 puff    lactulose (CHRONULAC) solution 20 g    levothyroxine  (SYNTHROID/LEVOTHROID) tablet 112 mcg    lidocaine (LMX4) cream    lidocaine 1 % 0.1-1 mL    metroNIDAZOLE (FLAGYL) tablet 500 mg    midodrine (PROAMATINE) tablet 15 mg    montelukast (SINGULAIR) tablet 10 mg    mupirocin (BACTROBAN) 2 % ointment    ondansetron (ZOFRAN) injection 4 mg    oxymetazoline (AFRIN) 0.05 % spray 2 spray    pantoprazole (PROTONIX) EC tablet 40 mg    rifaximin (XIFAXAN) tablet 550 mg    senna-docusate (SENOKOT-S/PERICOLACE) 8.6-50 MG per tablet 1 tablet    Or    senna-docusate (SENOKOT-S/PERICOLACE) 8.6-50 MG per tablet 2 tablet    sodium chloride (PF) 0.9% PF flush 3 mL    sodium chloride (PF) 0.9% PF flush 3 mL       REVIEW OF LABORATORY, PATHOLOGY AND IMAGING RESULTS:  BMP  Recent Labs   Lab 01/26/24  0435 01/25/24  1617 01/25/24  1439 01/25/24  0958 01/25/24  0550 01/24/24  0805 01/24/24  0532 01/23/24  0730 01/23/24  0648   *  122* 122* 120* 121* 122*  122*   < > 117*  117*   < > 111*   POTASSIUM 3.9  --   --   --  4.1  --  4.7  --  5.2   CHLORIDE 93*  --   --   --  93*  --  89*  --  85*   UMA 9.9  --   --   --  9.9  --  9.8  --  9.8   CO2 17*  --   --   --  17*  --  17*  --  17*   BUN 77.5*  --   --   --  83.4*  --  88.3*  --  95.3*   CR 1.17*  --   --   --  1.13*  --  1.07*  --  1.16*   *  --   --   --  75  --  92  --  74    < > = values in this interval not displayed.     CBC  Recent Labs   Lab 01/26/24  0435 01/25/24  1617 01/25/24  0550 01/24/24  0532 01/23/24  0648   WBC 17.9*  --  18.6* 28.3* 33.0*   RBC 2.44*  --  1.96* 2.27* 2.47*   HGB 8.0*   < > 6.4* 7.5* 8.1*   HCT 23.8*  --  19.4* 22.4* 23.9*   MCV 98  --  99 99 97   MCH 32.8  --  32.7 33.0 32.8   MCHC 33.6  --  33.0 33.5 33.9   RDW 17.6*  --  16.7* 16.4* 16.3*   PLT 60*  --  54* 72* 89*    < > = values in this interval not displayed.     INR  Recent Labs   Lab 01/26/24  0435 01/25/24  0550 01/24/24  1905 01/24/24  0532   INR 2.70* 2.68* 2.35* 2.45*     LFTs  Recent Labs   Lab 01/26/24  0435 01/25/24  0550  "01/24/24  0532 01/23/24  1405 01/23/24  0648   ALKPHOS 98 85 98  --  97   AST 57* 51* 74*  --  67*   ALT 30 29 36  --  37   BILITOTAL 4.8* 4.8* 6.0*  --  7.0*   PROTTOTAL 5.5* 5.3* 5.4* 5.1* 5.1*   ALBUMIN 4.0 3.6 3.3*  --  2.9*        MELD 3.0: 31 at 1/26/2024  4:35 AM  MELD-Na: 31 at 1/26/2024  4:35 AM  Calculated from:  Serum Creatinine: 1.17 mg/dL at 1/26/2024  4:35 AM  Serum Sodium: 122 mmol/L (Using min of 125 mmol/L) at 1/26/2024  4:35 AM  Total Bilirubin: 4.8 mg/dL at 1/26/2024  4:35 AM  Serum Albumin: 4.0 g/dL (Using max of 3.5 g/dL) at 1/26/2024  4:35 AM  INR(ratio): 2.70 at 1/26/2024  4:35 AM  Age at listing (hypothetical): 63 years  Sex: Female at 1/26/2024  4:35 AM    Previous work-up:   Lab Results   Component Value Date    HEPBANG Nonreactive 02/18/2021    HBCAB Nonreactive 02/18/2021    HCVAB  08/03/2017     Nonreactive   Assay performance characteristics have not been established for newborns,   infants, and children      TANYA 157 10/03/2023    IRONSAT 26 11/23/2023    TSH 3.98 12/06/2023    CHOL 176 08/15/2022    HDL 44 (L) 08/15/2022     (H) 08/15/2022    TRIG 32 12/06/2023    A1C 4.7 07/24/2023    POPETH <10 01/11/2024    PLPETH <10 01/11/2024      No results found for: \"SPECDES\", \"LDRESULTS\"      Imaging Results:  None current        "

## 2024-01-26 NOTE — PROVIDER NOTIFICATION
Samantha Hansen ([facility])       [id number]   RN: do you still want Q4 neuro? Also can you clarify midodrine order- got in report that sys goal is <130 and MAP >65 but the midodrine is scheduled w/o any parameters and vital parameters don't match what I received in report  Provider: In my note I had 130 for the aneurysm, but I would tolerate <140 for new parameters. Will check with nephro as well. Will make midodrine PRN   --changed scheduled midodrine to PRN <100  --changed neuro Q4 to Qshift       1101  RN: Are you concerned about the CT OP? Just wondering about the alteplase BID order given the fact that their hemoglobin dropped to 6.4 yesterday after the 1900ml OP 1/24.  Provider: pulm is ordering provider on alteplase-feels confident based on hemoglobin bouncing back after 1u PRBC and no concerns of bleeding

## 2024-01-26 NOTE — PROGRESS NOTES
[facility]    Progress Note - Medicine Service, MAROON TEAM 4       Date of Admission:  1/22/2024    Assessment & Plan   Stefani Crowell is a 63 year old female admitted on 1/22/2024. She has a history of cirrhosis secondary to alcohol use currently being worked up for liver transplant complicated by need for frequent paracenteses and thoracenteses for ascites/hepatic hydrothorax, in addition to asthma and COPD 2/2 tobacco use, MDD/STEPHANIE, hypothyroidism, low back pain, and chronic hyponatremia. She presented to the ED for evaluation of acute on chronic hyponatremia in the setting of recent viral-like URI illness. Admitted for careful monitoring, and for evaluation of loculated pleural effusion with significant leukocytosis. Currently being treated for Empyema     Changes Today:  - Paracentesis today (CAPS)   > 1.5 L remove (would favor less d/t elevated Crt, though may be able to tolerate more)   > Add on fibrinogen  - Cont. IV Ceftriaxone 1g every day  - Cont. PO Metronidazole 500mg Q8H   > Duration of Abx anticipated 4-6 weeks  - Alteplase ordered per Pulm  - Midodrine changed from 15mg TID -> 15mg TID PRN (SBP < 100)  - CT Chest w/o contrast ordered for 1/29 @ 0700  - Tylenol 650mg x 1 overnight, additional 650mg x1 ordered during day  - Resume PTA Vit D 50,000u Qweekly    ==========================================================    # Hydropneumothorax  # Loculated R pleural effusion; GNB empyema  # Asymptomatic Bacteruria  # At risk for skin/soft tissue infection  # Leukocytosis  # Hx Hepatic Hydrothorax   No clear symptoms, UA and urine culture corroborate possible infection. Notably, Ucx on 11/13/23 was w/ klebsiella species and now E coli on 1/22/24. Patient not reporting any urinary symptoms, though in her altered state it is unclear. On 1/23, had thora + paracentesis per IR and pleural fluid cultures yielded GNB. Blood cultures yielded GPC, though this is  most likely a contaminant. Has very fragile skin and numerous tears. Interventional pulm placed chest tube on 1/24. CT Chest on 1/25 demonstrated improved R-sided hydropneumothorax   - IR consulted for Diagnostic thoracentesis (1/23)  - WOC consult  - Blood cultures:   > 1/23: Staph epi. (suspect contaminant)   > 1/24: NGTD  - Pleural fluid culture:   > 1/23: E coli (Amp res.)  - Peritoneal fluid culture:   > 1/23: NGTD  - Urine culture:   > 1/22: Ampicillin Res. E. Coli   - Infectious Disease consulted (1/24)   > Stop IV Zosyn   > Resume IV Ceftriaxone 1g every day, Add metronidazole 500mg Q8H  - Interventional Pulm consulted (1/24) for chest tube (source control). Required  2u FFP and INR to follow    > Daily CXR  - CT Scan on 1/25, repeat on 1/29 AM to monitor for improvement  - Para Labs:   > Aerobic/anaerobic culture (NGTD), cell counts (completed), gram stain (NGTD), LDH, glucose, protein, AFB (pending), fungal culture (pending)  - Thora labs:  > Aerobic (GNB)/anaerobic culture (pending), cell counts, gram stain, LDH, glucose, protein, AFB (pending), fungal culture (pending)  > Cell counts demonstrate 125,760 total nucleated cells w/ significant % (98) neutrophils consistent w/ empyema as verified on gram stain  - CBC w/ diff daily   - Abx:  > Ceftriaxone: 1/23 - 1/24, 1/25 - *   > Metronidazole 500mg Q8H: 1/25 - *   > Rifaximin: 1/22 - *  > Zosyn: 1/24 - 1/25  > Vancomycin: 1/24  [  ] Dispo: Duration of abx TBD, minimum 4-6 weeks for empyema         # Decompensated alcoholic cirrhosis (Ascites, HE)  # Current liver transplant evaluation   # Hepatic encephalopathy   # Recurrent ascites  # Abdominal Pain  Decompensations include jaundice/icterus, G1 esophageal & rectal varices on EGD without bleeding, significant ascites requiring 3 times weekly paracenteses, hepatic hydrothorax requiring frequent thoracenteses, and hepatic encephalopathy.  Gets thoras and courtney through the [facility] system. During prior  hospitalization, her transplant eligibility was reopened due to completion of chemotherapy treatment and improved renal function.  Do suspect that she currently has some hepatic encephalopathy due to decreased bowel movements recently.  MELD NA 26 on discharge 12/16, 29 on most recent labs. Last admit: CMV (PCR quant neg), EBV (low DNA quant, 537 copies; discussed with ID and no need to recheck). Last admit: IGRA (neg), treponemal ab (nonreactive), serum HCG (neg), and spot protein urine (0.24 mg/mg)  MELD 3.0: 31 at 1/26/2024  4:35 AM  MELD-Na: 31 at 1/26/2024  4:35 AM  Calculated from:  Serum Creatinine: 1.17 mg/dL at 1/26/2024  4:35 AM  Serum Sodium: 122 mmol/L (Using min of 125 mmol/L) at 1/26/2024  4:35 AM  Total Bilirubin: 4.8 mg/dL at 1/26/2024  4:35 AM  Serum Albumin: 4.0 g/dL (Using max of 3.5 g/dL) at 1/26/2024  4:35 AM  INR(ratio): 2.70 at 1/26/2024  4:35 AM  Age at listing (hypothetical): 63 years  Sex: Female at 1/26/2024  4:35 AM  - Hepatology consult  - Planning L heart cath prior to transplant, not yet performed (coronary CTA non-diagnostic)  - IR consulted for diagnostic Paracentesis on 1/23   > Called [facility] to attempt to add-on labs to samples from 1/22; samples were not retained   > Prefer to avoid therapeutic to avoid massive fluid shifts   > Para + Thora labs: Aerobic/anaerobic culture, cell counts, gram stain, LDH, glucose, protein (results above)  - CAPS to assist w/ para on 1/26   > 1.5L max removal  - Tylenol 650mg Q8H PRN (total dose < 2g)  - Continue lactulose, Rifaximin   - Continue PPI  - Target 3-4 BM daily   - Abx as above.  - Ultrasound team performed bedside US on 1/25 and noted moderate sized ascites; anticipate patient will need paracentesis on 1/26     # Acute mild on Chronic Hyponatremia - improving  Chronic hyponatremia common in the setting of cirrhosis, and she has been admitted for hyponatremia several times in the past. Baseline mid 120s.  Given her history of low p.o.  intake recently, do worry about a hypovolemic hyponatremia. S/p Albumin with paracentesis 1/22.  Given additional albumin on 1/24, 1/25. Her sodium corrected w/ 2% NS at a slow rate.  - Nephrology consulted   > Albumin 75g   - Urine studies   - Q2hr Na checks  - strict ins/outs   - daily weights   - Regular diet      # MATTIE  # hepatorenal syndrome   Chart review suggests a prior history of hepatorenal syndrome.  However, her history at the moment is more compatible with a prerenal MATTIE secondary to poor p.o. intake.  Baseline Cr around 0.6, nearly double this on admission. Receiving fluids as above for hyponatremia, will trend creatinine. S/p albumin w/ para and additional 75g on 1/23, 1/24, and 1/25. Her Crt remained stable after these doses..   - Nephrology consulted - signed off 1/26  - Avoid nephrotoxic agents  - Fluids as above  - Continue Midodrine 15mg PO TID PRN   > Will make PRN as her BP is labile while here, and do not want to have scheduled as a means of increasing her BP when we would also like to keep her BP within normal range for her aortic aneurysm.  [  ] Dispo: Renal scheduling in CKD clinic     # Borderline Ascending Aortic Aneurysm  Noted on CT Chest on 1/25. Measured to be 4.3 cm. I personally reviewed the CT chest and found largest diameter to be about 43.5mm. I compared this to the widest portion on of the ascending aorta on her CTA coronary on 12/15/23 which was 42.0mm. This is fairly stable.  - Will speak w/ Thoracic Surg re. Follow up guidelines  - BP Goal near normotensive < 130/<85  - HR goal < 80  [  ] Dispo: Follow up guidelines pending       # Severe malnutrition in context of chronic illness  Patient's phos was appropriate on 1/22. 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 Holding off on prednisone for now iso possible infection and respiratory stability  - Continue PRN albuterol inhaler or nebs  - Continue ICS-LABA  - Continue Singulair       # Hypothyroidism  - Continue PTA levothyroxine      # Hypermagnesemia  - CTM     # H/o hypophosphatemia - not present here  Will recheck in setting of poor oral intake. Fairly low risk for refeeding as low PO intake only recently.      # Normocytic anemia - stable  Has required outpatient white blood cell transfusions in the past.  Her hemoglobin tends to be around 7.5-9.5.  At normal range on admission. No clear bleeding history though is at risk for this. Chronic disease component and nutritional components possible as well. Hgb on 1/24 was 7.5, which is likely dilutional w/ fluids. No sources of bleeding identified.  - CBC w/ diff in AM  - Would transfuse for Hgb <7  - Transfusion log:   > 1u pRBC 1/25     # Thrombocytopenia - stable  ISO cirrhosis. Increased bleeding risk, avoiding heparin/lovenox for now.      # MDD/STEPHANIE  - Continue PTA Celexa  - Would try to avoid sedating agents d/t current sedative presentation          # Low back pain  Stable, trial hot/cold packs first     Diet: Snacks/Supplements Adult: Ensure Max Protein (bariatric); Between Meals  Calorie Counts  Room Service  Regular Diet Adult  Fluid restriction 1000 ML FLUID    DVT Prophylaxis: Low Risk/Ambulatory with no VTE prophylaxis indicated  Demarco Catheter: Not present  Fluids: None  Lines: None     Cardiac Monitoring: None  Code Status: Full Code      Clinically Significant Risk Factors         # Hyponatremia: Lowest Na = 117 mmol/L in last 2 days, will monitor as appropriate      # Hypoalbuminemia: Lowest albumin = 2.9 g/dL at 1/23/2024  6:48 AM, will monitor as appropriate    # Coagulation Defect: INR = 2.70 (Ref range: 0.85 - 1.15) and/or PTT = N/A, will monitor for bleeding  # Thrombocytopenia: Lowest platelets = 54 in last 2 days, will monitor  "for bleeding           # Severe Malnutrition: based on nutrition assessment, PRESENT ON ADMISSION   # Financial/Environmental Concerns: none  # Asthma: noted on problem list        Disposition Plan         The patient's care was discussed with the Attending Physician, Dr. Fu .    Tyrone Singh MD  Medicine Service, [facility] TEAM [address] [place] [address]  Securely message with "[employer] (more info)  Text page via [employer] Paging/Directory   See signed in provider for up to date coverage information  ______________________________________________________________________    Interval History   This morning patient was awake and sitting at bedside in her chair, enjoying breakfast. She felt overall better, though does feel like she might be \"holding fluid\" again. She believes she is due for another paracentesis soon; I asked if she would be ok to have one today and she said yes.    Physical Exam   Vital Signs: Temp: 97.8  F (36.6  C) Temp src: Oral BP: 131/63 Pulse: 71   Resp: 18 SpO2: 96 % O2 Device: None (Room air)    Weight: 139 lbs 5.29 oz    General Appearance: Chronically ill appearing, somnolent/slow but conversational and oriented fully  Respiratory: Reduced on bilateral bases R > L w/ some crackles bilateral  Cardiovascular: RRR, warm and well perfusing extremities, loud systolic murmur  GI: distended, diffusely mildly tender  Skin: jaundice  Other: Moves extremities spontaneously, answers all questions appropriately. Chest tube draining red fluid    Medical Decision Making       Please see A&P for additional details of medical decision making.      Data     I have personally reviewed the following data over the past 24 hrs:    17.9 (H)  \   8.0 (L)   / 60 (L)     122 (L); 122 (L) 93 (L) 77.5 (H) /  119 (H)   3.9 17 (L) 1.17 (H) \     ALT: 30 AST: 57 (H) AP: 98 TBILI: 4.8 (H)   ALB: 4.0 TOT PROTEIN: 5.5 (L) LIPASE: N/A     INR:  2.70 (H) PTT:  N/A   D-dimer:  N/A Fibrinogen:  N/A "       Imaging results reviewed over the past 24 hrs:   No results found for this or any previous visit (from the past 24 hour(s)).

## 2024-01-27 NOTE — PLAN OF CARE
Neuro: A&Ox4. Forgetful  Cardiac: SR. VSS. Sys 100s. MAP >65  Respiratory: Sating 90 on RA.  GI/: Adequate urine output. BM+ on shift (goal =TID with lactulose)  Diet/appetite: Tolerating Reg diet. 1L FR, Paul counts through 1/27 Eating fair  Activity:  Assist of X1, up to chair/bath   Pain: At acceptable level on current regimen. PRN tylenol. Using heat pack before tylenol due to liver, pt agreeable to pain management plan  Skin: No new deficits noted. Gen bruising, skin tears (R chest, arm, leg)  LDA's: PIV X2, CT -20 suction (lytics day 2)     Plan: Continue with POC. Notify primary team with changes.   --Lytics day 2, repeat CT 1/29 (no CT air leak after changing atrium)

## 2024-01-27 NOTE — PLAN OF CARE
Goal Outcome Evaluation:         [id number]  Neuro: A&Ox4. Forgetful.  Cardiac: Sys 100s. HR 70s-80s. PRN Midodrine given x1 for sys below 100.   Respiratory: Sating above 94% on RA.  GI/: Adequate urine output. BM x 3, loose.   Diet/appetite: Tolerating regular diet. Eating well.  Activity:  Assist of 1 up to bathroom  Pain: At acceptable level on current regimen.   Skin: No new deficits noted.  LDA's: 2 PIV SL, CT -20 suction (lytics givenx1)    Plan: Continue with POC. Notify primary team with changes.        Provider notifications  @2122  New air leak in CT. Pt sating fine. Do you want a new Xray?  --xray ordered by provider

## 2024-01-27 NOTE — PROVIDER NOTIFICATION
Johan paging: Tyrone Singh     [phone]  Pt continues to have CT air leak and coarse lung sounds on the R side. Pt denies SOB but states worsening pain at CT site. Want me to continue with AM alteplase?    [phone]  Just following up on earlier page: do you still want the alteplase given with the new changes?    Response: ok to give alteplase     1209 Update: changed CT atrium and no leak is present

## 2024-01-27 NOTE — PROGRESS NOTES
Calorie Count  Intake recorded for: 1/26  Total Kcals: 1529 Total Protein: 65g  Kcals from Hospital Food: 1529  Protein: 65g  Kcals from Outside Food (average):0 Protein: 0g  # Meals Ordered from Kitchen: 3 meals   # Meals Recorded: 3 meals (First - 75% Malay toast w/ butter & syrup, sausage cherie, Greek yogurt, 50% hot black tea w/ honey, lemon & sugar)       (Second - 50% strawberry kiwi water, 33% chicken quesadilla w/ onions, sour cream & picante sauce)       (Third - 50% penne pasta w/ marinara sauce, lemon lime soda, pineapple)  # Supplements Recorded: 100% 1 Ensure Enlive, 1 Magic Cup

## 2024-01-27 NOTE — PROGRESS NOTES
[facility] Pulmonology Follow up    Stefani Crowell  MRN: [id number]  : 1960    Date of Admission: 2024  Date of Service: 2024    Assessment:  R empyema, E coli, s/p chest tube placement 24  Coagulopathy 2/2 decompensated cirrhosis  Cirrhosis w/ coagulopathy, ascites, hepatic hydrothorax, hypo    Chest tube without hemorrhagic output, will continue lytics for empyema.    Recommendations:  -- continue chest tube to suction -20 except while clamped for lytic therapy  -- continue lytic therapy, if any evidence of bleeding hold lytics and page general pulmonary team over the weekend  -- ongoing antibiotic course per ID    Subjective:  feeling ok. Pain associated with chest tube, but also from abdomen. No changes in breathing, feels ok.    Review of systems: focused ROS performed and noted as above.    Objective:  /54 (BP Location: Left arm)   Pulse 70   Temp 97.5  F (36.4  C) (Oral)   Resp 17   Wt 63.2 kg (139 lb 5.3 oz)   LMP  (LMP Unknown)   SpO2 100%   BMI 23.19 kg/m      Gen: in no acute distress, lying in bed  Pulm: no increased work of breathing, chest tube with serous drainage, no blood, tidaling  Neuro: speech clear, alert and oriented  Psych: calm, appropriate    Data:  I have personally reviewed new CXR 24- chest tube remains in place, continued hydropneumothorax.    Criselda Mead DO  Pulmonary fellow  Patient discussed with Dr. Fernandez

## 2024-01-27 NOTE — PROGRESS NOTES
[facility]    Progress Note - Medicine Service, [facility] TEAM 4       Date of Admission:  1/22/2024    Assessment & Plan   Stefani Crowell is a 63 year old female admitted on 1/22/2024. She has a history of cirrhosis secondary to alcohol use currently being worked up for liver transplant complicated by need for frequent paracenteses and thoracenteses for ascites/hepatic hydrothorax, in addition to asthma and COPD 2/2 tobacco use, MDD/STEPHANIE, hypothyroidism, low back pain, and chronic hyponatremia. She presented to the ED for evaluation of acute on chronic hyponatremia in the setting of recent viral-like URI illness. Admitted for careful monitoring, and for evaluation of loculated pleural effusion with significant leukocytosis. Currently being treated for Empyema     Changes Today:  - Cont Alteplase per pulm  - Anticipate repeat paracentesis on 1/28 (suspect 2-3L can be further removed)  - Cont. PRN tylenol (effective, but reluctant to schedule d/t acute liver pathology)    ==========================================================    # Hydropneumothorax  # Loculated R pleural effusion; GNB empyema  # Asymptomatic Bacteruria  # At risk for skin/soft tissue infection  # Leukocytosis  # Hx Hepatic Hydrothorax   No clear symptoms, UA and urine culture corroborate possible infection. Notably, Ucx on 11/13/23 was w/ klebsiella species and now E coli on 1/22/24. Patient not reporting any urinary symptoms, though in her altered state it is unclear. On 1/23, had thora + paracentesis per IR and pleural fluid cultures yielded GNB. Blood cultures yielded GPC, though this is most likely a contaminant. Has very fragile skin and numerous tears. Interventional pulm placed chest tube on 1/24. CT Chest on 1/25 demonstrated improved R-sided hydropneumothorax after  - IR consulted for Diagnostic thoracentesis (1/23)  - WOC consult  - Blood cultures:   > 1/23: Staph epi. (suspect  contaminant)   > 1/24: NGTD  - Pleural fluid culture:   > 1/23: E coli (Amp res.)  - Peritoneal fluid culture:   > 1/23: NGTD  - Urine culture:   > 1/22: Ampicillin Res. E. Coli   - Infectious Disease consulted (1/24)   > Stop IV Zosyn   > Resume IV Ceftriaxone 1g every day, Add metronidazole 500mg Q8H  - Interventional Pulm consulted (1/24) for chest tube (source control). Required  2u FFP and INR to follow    > Daily CXR   > Initiated alteplase to break up fibrin; started on 1/26 and anticipated 6 doses  - CT Scan on 1/25, repeat on 1/29 AM to monitor for improvement  - Para Labs:   > Aerobic/anaerobic culture (NGTD), cell counts (completed), gram stain (NGTD), LDH, glucose, protein, AFB (pending), fungal culture (pending)  - Thora labs:  > Aerobic (GNB)/anaerobic culture (pending), cell counts, gram stain, LDH, glucose, protein, AFB (pending), fungal culture (pending)  > Cell counts demonstrate 125,760 total nucleated cells w/ significant % (98) neutrophils consistent w/ empyema as verified on gram stain  - CBC w/ diff daily   - Abx:  > Ceftriaxone: 1/23 - 1/24, 1/25 - *   > Metronidazole 500mg Q8H: 1/25 - *   > Rifaximin: 1/22 - *  > Zosyn: 1/24 - 1/25  > Vancomycin: 1/24  [  ] Dispo: Duration of abx TBD, minimum 4-6 weeks for empyema         # Decompensated alcoholic cirrhosis (Ascites, HE)  # Current liver transplant evaluation   # Hepatic encephalopathy   # Recurrent ascites  # Abdominal Pain  Decompensations include jaundice/icterus, G1 esophageal & rectal varices on EGD without bleeding, significant ascites requiring 3 times weekly paracenteses, hepatic hydrothorax requiring frequent thoracenteses, and hepatic encephalopathy.  Gets thoras and courtney through the [facility] system. During prior hospitalization, her transplant eligibility was reopened due to completion of chemotherapy treatment and improved renal function.  Do suspect that she currently has some hepatic encephalopathy due to decreased bowel  movements recently.  MELD NA 26 on discharge 12/16, 29 on most recent labs. Last admit: CMV (PCR quant neg), EBV (low DNA quant, 537 copies; discussed with ID and no need to recheck). Last admit: IGRA (neg), treponemal ab (nonreactive), serum HCG (neg), and spot protein urine (0.24 mg/mg)  MELD 3.0: 29 at 1/27/2024  6:03 AM  MELD-Na: 30 at 1/27/2024  6:03 AM  Calculated from:  Serum Creatinine: 0.94 mg/dL (Using min of 1 mg/dL) at 1/27/2024  6:03 AM  Serum Sodium: 122 mmol/L (Using min of 125 mmol/L) at 1/27/2024  6:03 AM  Total Bilirubin: 3.8 mg/dL at 1/27/2024  6:03 AM  Serum Albumin: 3.6 g/dL (Using max of 3.5 g/dL) at 1/27/2024  6:03 AM  INR(ratio): 2.94 at 1/27/2024  6:03 AM  Age at listing (hypothetical): 63 years  Sex: Female at 1/27/2024  6:03 AM  - Hepatology consult  - Planning L heart cath prior to transplant, not yet performed (coronary CTA non-diagnostic)  - IR consulted for diagnostic Paracentesis on 1/23   > Called [facility] to attempt to add-on labs to samples from 1/22; samples were not retained   > Prefer to avoid therapeutic to avoid massive fluid shifts   > Para + Thora labs: Aerobic/anaerobic culture, cell counts, gram stain, LDH, glucose, protein (results above)  - CAPS to assist w/ para on 1/26   > 1.5L max removal  - Tylenol 650mg Q8H PRN (total dose < 2g)  - Continue lactulose, Rifaximin   - Continue PPI  - Target 3-4 BM daily   - Abx as above.  - Paracentesis log:   > 1/23: IR w/ 100ml removed for diagnostics only   > 1/26: CAPS w/ 1.5L removed (still a lot remaining, opted for less d/t fluid shifts and initially labile BP)   > 1/28: Anticipated paracentesis w/ maybe 2-3L remove     # Acute mild on Chronic Hyponatremia - improving  Chronic hyponatremia common in the setting of cirrhosis, and she has been admitted for hyponatremia several times in the past. Baseline mid 120s.  Given her history of low p.o. intake recently, do worry about a hypovolemic hyponatremia. S/p Albumin with paracentesis  1/22.  Given additional albumin on 1/24, 1/25. Her sodium corrected w/ 2% NS at a slow rate.  - Nephrology consulted   > Albumin 75g   - Urine studies   - Q2hr Na checks  - strict ins/outs   - daily weights   - Regular diet      # MATTIE  # hepatorenal syndrome   Chart review suggests a prior history of hepatorenal syndrome.  However, her history at the moment is more compatible with a prerenal MATTIE secondary to poor p.o. intake.  Baseline Cr around 0.6, nearly double this on admission. Receiving fluids as above for hyponatremia, will trend creatinine. S/p albumin w/ para and additional 75g on 1/23, 1/24, and 1/25. Her Crt remained stable after these doses..   - Nephrology consulted - signed off 1/26  - Avoid nephrotoxic agents  - Fluids as above  - Continue Midodrine 15mg PO TID PRN   > Will make PRN as her BP is labile while here, and do not want to have scheduled as a means of increasing her BP when we would also like to keep her BP within normal range for her aortic aneurysm.  [  ] Dispo: Renal scheduling in CKD clinic     # Borderline Ascending Aortic Aneurysm  Noted on CT Chest on 1/25. Measured to be 4.3 cm. I personally reviewed the CT chest and found largest diameter to be about 43.5mm. I compared this to the widest portion on of the ascending aorta on her CTA coronary on 12/15/23 which was 42.0mm. This is fairly stable.  - Will speak w/ Thoracic Surg re. Follow up guidelines  - BP Goal near normotensive < 130/<85  - HR goal < 80  [  ] Dispo: Follow up guidelines pending       # Severe malnutrition in context of chronic illness  Patient's phos was appropriate on 1/22. 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 Holding off on prednisone for now iso possible infection and respiratory  stability  - Continue PRN albuterol inhaler or nebs  - Continue ICS-LABA  - Continue Singulair       # Hypothyroidism  - Continue PTA levothyroxine      # Hypermagnesemia  - CTM     # H/o hypophosphatemia - not present here  Will recheck in setting of poor oral intake. Fairly low risk for refeeding as low PO intake only recently.      # Normocytic anemia - stable  Has required outpatient white blood cell transfusions in the past.  Her hemoglobin tends to be around 7.5-9.5.  At normal range on admission. No clear bleeding history though is at risk for this. Chronic disease component and nutritional components possible as well. Hgb on 1/24 was 7.5, which is likely dilutional w/ fluids. No sources of bleeding identified.  - CBC w/ diff in AM  - Would transfuse for Hgb <7  - Transfusion log:   > 1u pRBC 1/25     # Thrombocytopenia - stable  ISO cirrhosis. Increased bleeding risk, avoiding heparin/lovenox for now.      # MDD/STEPHANIE  - Continue PTA Celexa  - Would try to avoid sedating agents d/t current sedative presentation          # Low back pain  Stable, trial hot/cold packs first     Diet: Calorie Counts  Room Service  Regular Diet Adult  Fluid restriction 1000 ML FLUID  Snacks/Supplements Adult: Ensure Max Protein (bariatric); Between Meals  Snacks/Supplements Adult: Ensure Enlive; Between Meals    DVT Prophylaxis: Low Risk/Ambulatory with no VTE prophylaxis indicated  Demarco Catheter: Not present  Fluids: None  Lines: None     Cardiac Monitoring: None  Code Status: Full Code      Clinically Significant Risk Factors         # Hyponatremia: Lowest Na = 120 mmol/L in last 2 days, will monitor as appropriate      # Hypoalbuminemia: Lowest albumin = 2.9 g/dL at 1/23/2024  6:48 AM, will monitor as appropriate    # Coagulation Defect: INR = 2.94 (Ref range: 0.85 - 1.15) and/or PTT = N/A, will monitor for bleeding  # Thrombocytopenia: Lowest platelets = 57 in last 2 days, will monitor for bleeding           # Severe  Malnutrition: based on nutrition assessment    # Financial/Environmental Concerns: none  # Asthma: noted on problem list        Disposition Plan     Expected Discharge Date: 01/31/2024      Destination: home with family        The patient's care was discussed with the Attending Physician, Dr. Fu .    Tyrone Singh MD  Medicine Service, [facility] TEAM [address] [place] [address]  Securely message with [employer] (more info)  Text page via [employer] Paging/Directory   See signed in provider for up to date coverage information  ______________________________________________________________________    Interval History   This morning patient was resting in her bed. She endorsed some abdominal pain in her RUQ and pain around the sites of her parecentesis but she reported that tylenol was VERY effective. She was ok w/ allowing the tylenol to remain PRN. She felt that her SOB had improved, and was onboard to having a repeat paracentesis on 1/28. Otherwise she has no additional complaints.    Nursing staff had noted that there was an airleak, though when I visited I only saw regular tidaling. Might have been clamped at that time, though. Will need to continue monitoring this for now.    Physical Exam   Vital Signs: Temp: 97.8  F (36.6  C) Temp src: Oral BP: 103/49 Pulse: 74   Resp: 20 SpO2: 98 % O2 Device: None (Room air)    Weight: 139 lbs 5.29 oz    General Appearance: Chronically ill appearing, somnolent/slow but conversational and oriented fully  Respiratory: Reduced on bilateral bases R > L w/ some crackles bilateral  Cardiovascular: RRR, warm and well perfusing extremities, loud systolic murmur  GI: distended, diffusely mildly tender  Skin: jaundice  Other: Moves extremities spontaneously, answers all questions appropriately. Chest tube draining red fluid    Medical Decision Making       Please see A&P for additional details of medical decision making.      Data     I have personally  reviewed the following data over the past 24 hrs:    19.1 (H)  \   7.7 (L)   / 57 (L)     122 (L); 122 (L) 94 (L) 70.1 (H) /  119 (H)   3.7 17 (L) 0.94 \     ALT: 27 AST: 43 AP: 104 TBILI: 3.8 (H)   ALB: 3.6 TOT PROTEIN: 5.3 (L) LIPASE: N/A     INR:  2.94 (H) PTT:  N/A   D-dimer:  N/A Fibrinogen:  175       Imaging results reviewed over the past 24 hrs:   Recent Results (from the past 24 hour(s))   POC US GUIDE FOR PARACENTESIS    Impression    US Indication: abdominal distension    Limited abdominal ultrasound was performed and demonstrated an adequate fluid collection on the left side of the abdomen.     Doppler of the skin demonstrated an area at this site without significant vasculature.  A paracentesis at this site was subsequently performed.    Jennifer Simmons MD    XR Chest Port 1 View    Impression    RESIDENT PRELIMINARY INTERPRETATION  IMPRESSION:    1. Increased air component of the right basilar hydropneumothorax.  Stable right basilar pleural drain.   2. Stable apical hydropneumothorax.  3. Stable hazy opacity in the mid to upper right lung field, likely  pneumonia.   4. Improved aeration of the right lung.

## 2024-01-27 NOTE — PROGRESS NOTES
Therapy: IV Abx  Insurance: [facility] Medicaid     Coverage at 100%. Deductible and Max out of pocket expense do not apply.     Please contact Intake with any questions, [phone] or In Basket pool, [facility] ([phone]).

## 2024-01-28 NOTE — PROGRESS NOTES
[facility]    Medicine Progress Note - Medicine Service, [facility] TEAM 4    Date of Admission:  1/22/2024    Assessment & Plan   Stefani Crowell is a 63 year old female admitted on 1/22/2024. She has a history of cirrhosis secondary to alcohol use currently being worked up for liver transplant complicated by need for frequent paracenteses and thoracenteses for ascites/hepatic hydrothorax, in addition to asthma and COPD 2/2 tobacco use, MDD/STEPHANIE, hypothyroidism, low back pain, and chronic hyponatremia. She presented to the ED for evaluation of acute on chronic hyponatremia in the setting of recent viral-like URI illness. Admitted for careful monitoring, and for evaluation of loculated pleural effusion with significant leukocytosis. Currently being treated for Empyema    ==========================================================    # Hydropneumothorax  # Loculated R pleural effusion; GNB empyema  # Asymptomatic Bacteruria  # At risk for skin/soft tissue infection  # Leukocytosis  # Hx Hepatic Hydrothorax   No clear symptoms, UA and urine culture corroborate possible infection. Notably, Ucx on 11/13/23 was w/ klebsiella species and now E coli on 1/22/24. Patient not reporting any urinary symptoms, though in her altered state it is unclear. On 1/23, had thora + paracentesis per IR and pleural fluid cultures yielded GNB. Blood cultures yielded GPC, though this is most likely a contaminant. Has very fragile skin and numerous tears. Interventional pulm placed chest tube on 1/24. CT Chest on 1/25 demonstrated improved R-sided hydropneumothorax after  - WOC consult  - Blood cultures:   > 1/23: Staph epi. (suspect contaminant)   > 1/24: NGTD  - Pleural fluid culture:   > 1/23: E coli (Amp res.)  - Peritoneal fluid culture:   > 1/23: NGTD  - Urine culture:   > 1/22: Ampicillin Res. E. Coli   - Infectious Disease consulted (1/24)   > Stop IV Zosyn   > Resumed IV Ceftriaxone 1g  every day, Added metronidazole 500mg Q8H  - Interventional Pulm consulted (1/24) for chest tube (source control). Required  2u FFP and INR to follow    > Daily CXR   > Initiated alteplase to break up fibrin; started on 1/26 and anticipated 6 doses  - CT Scan on 1/25, repeat on 1/29 AM to monitor for improvement  - Para Labs:   > Aerobic/anaerobic culture (NGTD), cell counts (completed), gram stain (NGTD), LDH, glucose, protein, AFB (pending), fungal culture (pending)  - Thora labs:  > Aerobic (GNB)/anaerobic culture (pending), cell counts, gram stain, LDH, glucose, protein, AFB (pending), fungal culture (pending)  > Cell counts demonstrate 125,760 total nucleated cells w/ significant % (98) neutrophils consistent w/ empyema as verified on gram stain  - CBC w/ diff daily   - Abx:  > Ceftriaxone: 1/23 - 1/24, 1/25 - *   > Metronidazole 500mg Q8H: 1/25 - *   > Rifaximin: 1/22 - *  > Zosyn: 1/24 - 1/25  > Vancomycin: 1/24  [  ] Dispo: Duration of abx TBD, minimum 4-6 weeks for empyema     # Decompensated alcoholic cirrhosis (Ascites, HE)  # Current liver transplant evaluation   # Hepatic encephalopathy   # Recurrent ascites  # Abdominal Pain  Decompensations include jaundice/icterus, G1 esophageal & rectal varices on EGD without bleeding, significant ascites requiring 3 times weekly paracenteses, hepatic hydrothorax requiring frequent thoracenteses, and hepatic encephalopathy.  Gets thoras and courtney through the [facility] system. During prior hospitalization, her transplant eligibility was reopened due to completion of chemotherapy treatment and improved renal function.  Do suspect that she currently has some hepatic encephalopathy due to decreased bowel movements recently.  MELD NA 26 on discharge 12/16, 29 on most recent labs. Last admit: CMV (PCR quant neg), EBV (low DNA quant, 537 copies; discussed with ID and no need to recheck). Last admit: IGRA (neg), treponemal ab (nonreactive), serum HCG (neg), and spot protein  urine (0.24 mg/mg)  MELD 3.0: 29 at 1/28/2024  4:26 AM  MELD-Na: 30 at 1/28/2024  4:26 AM  Calculated from:  Serum Creatinine: 0.82 mg/dL (Using min of 1 mg/dL) at 1/28/2024  4:26 AM  Serum Sodium: 118 mmol/L (Using min of 125 mmol/L) at 1/28/2024  4:26 AM  Total Bilirubin: 3.6 mg/dL at 1/28/2024  4:26 AM  Serum Albumin: 3.6 g/dL (Using max of 3.5 g/dL) at 1/28/2024  4:26 AM  INR(ratio): 2.87 at 1/28/2024  4:26 AM  Age at listing (hypothetical): 63 years  Sex: Female at 1/28/2024  4:26 AM    - Hepatology consult  - Planning L heart cath prior to transplant, not yet performed (coronary CTA non-diagnostic)  - IR consulted for diagnostic Paracentesis on 1/23   > Called [facility] to attempt to add-on labs to samples from 1/22; samples were not retained   > Prefer to avoid therapeutic to avoid massive fluid shifts   > Para + Thora labs: Aerobic/anaerobic culture, cell counts, gram stain, LDH, glucose, protein (results above)  - CAPS to assist w/ para on 1/28 (some hypotension 1/28/2024, would like to avoid shifts)  - Tylenol 650mg Q8H PRN (total dose < 2g)  - Continue lactulose, Rifaximin   - Continue PPI  - Target 3-4 BM daily   - Abx as above.  - Paracentesis log:   > 1/23: IR w/ 100ml removed for diagnostics only   > 1/26: CAPS w/ 1.5L removed (still a lot remaining, opted for less d/t fluid shifts and initially labile BP)   > 1/29: Anticipated paracentesis w/ maybe 2-3L remove     # Acute mild on Chronic Hyponatremia - worsening  Chronic hyponatremia common in the setting of cirrhosis, and she has been admitted for hyponatremia several times in the past. Baseline mid 120s.  Given her history of low p.o. intake recently, do worry about a hypovolemic hyponatremia. S/p Albumin with paracentesis 1/22.  Given additional albumin on 1/24, 1/25. Her sodium corrected w/ 2% NS at a slow rate.  - Nephrology consulted   > Albumin 75g   - Restarted 2% at 25cc/hr 1/28/2024  - Q4hr Na checks  - strict ins/outs   - daily weights   -  Regular diet    # MATTIE  # hepatorenal syndrome   Chart review suggests a prior history of hepatorenal syndrome.  However, her history at the moment is more compatible with a prerenal MATTIE secondary to poor p.o. intake.  Baseline Cr around 0.6, nearly double this on admission. Receiving fluids as above for hyponatremia, will trend creatinine. S/p albumin w/ para and additional 75g on 1/23, 1/24, and 1/25. Her Crt remained stable after these doses..   - Nephrology consulted - signed off 1/26  - Avoid nephrotoxic agents  - Fluids as above  - Continue Midodrine 15mg PO TID PRN   > Will make PRN as her BP is labile while here, and do not want to have scheduled as a means of increasing her BP when we would also like to keep her BP within normal range for her aortic aneurysm.  [  ] Dispo: Renal scheduling in CKD clinic     # Borderline Ascending Aortic Aneurysm  Noted on CT Chest on 1/25. Measured to be 4.3 cm. I personally reviewed the CT chest and found largest diameter to be about 43.5mm. I compared this to the widest portion on of the ascending aorta on her CTA coronary on 12/15/23 which was 42.0mm. This is fairly stable.  - Will speak w/ Thoracic Surg re. Follow up guidelines  - BP Goal near normotensive < 130/<85  - HR goal < 80  [  ] Dispo: Follow up guidelines pending     # Severe malnutrition in context of chronic illness  Patient's phos was appropriate on 1/22. 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 Holding off on prednisone for now iso possible infection and respiratory stability  - Continue PRN albuterol inhaler or nebs  - Continue ICS-LABA  - Continue Singulair       # Hypothyroidism  - Continue PTA levothyroxine      # Hypermagnesemia  - CTM     # H/o hypophosphatemia - not  present here  Will recheck in setting of poor oral intake. Fairly low risk for refeeding as low PO intake only recently.      # Normocytic anemia - stable  Has required outpatient white blood cell transfusions in the past.  Her hemoglobin tends to be around 7.5-9.5.  At normal range on admission. No clear bleeding history though is at risk for this. Chronic disease component and nutritional components possible as well. Hgb on 1/24 was 7.5, which is likely dilutional w/ fluids. No sources of bleeding identified.  - CBC w/ diff in AM  - Would transfuse for Hgb <7  - Transfusion log:   > 1u pRBC 1/25     # Thrombocytopenia - stable  ISO cirrhosis. Increased bleeding risk, avoiding heparin/lovenox for now.      # MDD/STEPHANIE  - Continue PTA Celexa  - Would try to avoid sedating agents d/t current sedative presentation     # Low back pain  Stable, trial hot/cold packs first          Diet: Room Service  Regular Diet Adult  Fluid restriction 1000 ML FLUID  Snacks/Supplements Adult: Ensure Max Protein (bariatric); Between Meals  Snacks/Supplements Adult: Ensure Enlive; Between Meals    DVT Prophylaxis: Pneumatic Compression Devices  Demarco Catheter: Not present  Lines: None     Cardiac Monitoring: None  Code Status: Full Code      Clinically Significant Risk Factors         # Hyponatremia: Lowest Na = 118 mmol/L in last 2 days, will monitor as appropriate      # Hypoalbuminemia: Lowest albumin = 2.9 g/dL at 1/23/2024  6:48 AM, will monitor as appropriate  # Coagulation Defect: INR = 2.87 (Ref range: 0.85 - 1.15) and/or PTT = N/A, will monitor for bleeding  # Thrombocytopenia: Lowest platelets = 55 in last 2 days, will monitor for bleeding           # Severe Malnutrition: based on nutrition assessment    # Financial/Environmental Concerns: none  # Asthma: noted on problem list        Disposition Plan     Expected Discharge Date: 01/31/2024      Destination: home with family            Venancio Fu MD  Medicine Service, [facility]  TEAM 4  [facility]  Securely message with [employer] (more info)  Text page via [employer] Paging/Directory   See signed in provider for up to date coverage information  ______________________________________________________________________    Interval History   Pain at CT site last night but improved this AM. Stable abdominal pain. Discussed possible para today vs tomorrow. Ok to continue with PRN tylenol. SOB continues to be improved. No chest pain. No fever or chills. No nausea or vomiting.    Physical Exam   Vital Signs: Temp: 97.7  F (36.5  C) Temp src: Oral BP: 99/55 Pulse: 67   Resp: 16 SpO2: 100 % O2 Device: None (Room air)    Weight: 139 lbs 5.29 oz    General Appearance:  Chronically ill appearing, conversational and oriented fully  Respiratory: Reduced on bilateral bases R > L w/ some crackles bilateral (stable)  Cardiovascular: RRR, warm and well perfusing extremities, loud systolic murmur  GI: distended, mildly tender throughout  Skin: jaundice  Other:  Moves extremities spontaneously, answers all questions appropriately. Chest tube draining red-tinged fluid    Medical Decision Making       MANAGEMENT DISCUSSED with the following over the past 24 hours: nursing       Data     I have personally reviewed the following data over the past 24 hrs:    20.8 (H)  \   8.1 (L)   / 55 (L)     118 (LL); 118 (LL) 91 (L) 66.4 (H) /  101 (H)   3.9 17 (L) 0.82 \     ALT: 25 AST: 40 AP: 77 TBILI: 3.6 (H)   ALB: 3.6 TOT PROTEIN: 5.2 (L) LIPASE: N/A     INR:  2.87 (H) PTT:  N/A   D-dimer:  N/A Fibrinogen:  N/A

## 2024-01-28 NOTE — PLAN OF CARE
Neuro: A&Ox4. Forgetful  Cardiac: SR. VSS. Sys 90s. MAP <65: Midodrine given X2. Post midodrine 100s  Respiratory: Sating 90 on RA.  GI/: Adequate urine output. BM+ (per pt) on shift (goal =TID with lactulose)  Diet/appetite: Tolerating Reg diet. 1L FR, Eating fair   Activity:  Assist of X1, up to chair/bath   Pain: At acceptable level on current regimen. PRN tylenol. Using heat pack before tylenol due to liver, pt agreeable to pain management plan  Skin: No new deficits noted. Gen bruising, skin tears (R chest, arm, leg)  LDA's: PIV X2, CT -20 suction (lytics day 3/3)     Plan: Continue with POC. Notify primary team with changes.   --Lytics day 3/3, repeat CT 1/29 (no CT air leak after changing atrium)  --Para likely 1/29  -Low BP sys low 90s MAP <65 gave midodrine X2  --Low temp: unable to obtain oral or axillary after multiple attempts/changing thermometer. Rectal Temp 95, 2hr post recheck 97.3 orally (after multiple warm blankets and increasing room temp)

## 2024-01-28 NOTE — PROGRESS NOTES
Calorie Count  Intake recorded for: 1/27  Total Kcals: 1018 Total Protein: 61g  Kcals from Hospital Food: 984   Protein: 61g  Kcals from Outside Food (average):34 Protein: 0g  # Meals Ordered from Kitchen: 3 Meals  # Meals Recorded: 2 Meals (First - 100% cheerios, 4 oz whole milk)  (Second - 100% cottage cheese, peaches)  Outside food- 100% of 7 strawberries, 15 grapes  # Supplements Recorded: 100% of 2 ensure enlives

## 2024-01-28 NOTE — PROVIDER NOTIFICATION
Venancio Fu     0513  Patient status:  BP sys low 90s and MAP <65. Already gave PRN midodrine at 0800. Pt asymptomatic. 2% not yet here from pharm so no IV fluids running yet-do you want an extra dose of midodrine?  Orders received: Continue to cycle BP-page again if BP does not improve/gets worse    1143  Pt status:temp is 95 rectal, unable to get temperature otherwise, after repeated attempts/new equipment. Yesterday/NOC shift was 97.7. vitally stable otherwise. Placed warm blankets on pt, any other intervention at time?  Orders received: No interventions at time, continue to monitor closely and repage if temp is still low after attempt to increase with blankets. Plan to give midodrine again at 1400 if sys is still <100

## 2024-01-28 NOTE — PROVIDER NOTIFICATION
9703  RN: Pt nauseous and has thrown up one. Can the PRN zofran order get updated so its available if needed? Right now in as only if doctor orders for procedure.    Orders received: PRN zofran q8hr, and EKG ordered.   EKG show normal SR.    0230  RN: Pt c/o worsened pain at CT site and side + nausea d/t 9/10 pain. PRN tylenol and zofran given. Tylenol minimally effective. Can we get different pain meds? Would you like the CXR to be done early?    Orders received: Provider called. One time dose dilaudid ordered and given.     0510  RN: KARI critical lab result sodium at 118.     Orders received: no orders at this time

## 2024-01-28 NOTE — PLAN OF CARE
Goal Outcome Evaluation:         [id number]  Neuro: A&Ox4. Forgetful.  Cardiac: Sys 100s. HR 60s-70s. PRN Midodrine available for sys below 100, none given.  Respiratory: Sating above 94% on RA.  GI/: Adequate urine output. BM x 1, loose.   Diet/appetite: Tolerating regular diet. Eating well. Emesis x1 overnight, PRN zofran given.   Activity:  Assist of 1 up to bathroom  Pain: Pain worsened at CT site during night, one time dose PRN tylenol and dilaudid given.  Skin: No new deficits noted.  LDA's: 2 PIV SL, CT -20 suction (lytics givenx1)    Plan: Continue with POC. Notify primary team with changes.

## 2024-01-29 NOTE — PROGRESS NOTES
[facility]  HEPATOLOGY PROGRESS NOTE  Stefani Crowell [id number]       IMPRESSION:  Stefani Crowell is a 63 year old female with a history of alcohol associated cirrhosis, last use 6/28/23 complicated by diuretic refractory ascites and hepatic hydrothorax with 3x weekly courtney and thoras, hyponatremia, hepatic encephalopathy, MATTIE/CKD (prior terlipressin 11/2023), small EV, asthma, COPD who was admitted following labs indicating serum sodium level at para of 110. She underwent thoracentesis with evidence of empyema.     RECOMMENDATIONS:    Updates for 01/29/2024 :  - pulmonary to determine timing of chest tube removal    # Empyema  # Hepatic hydrothorax  # Ascites  - thora 1/23 with 1 lights criteria, high PMNs, low glucose. Remains on ceftriaxone. Pleural Cultures + for e. Coli.   - Appreciate interventional pulm following. Received lytics in chest tube x3 days. Pending cell count for decision regarding removal of chest tube.   - Lidocaine patches for pain.       - Other infectious work up in progress- 1/2 bottles blood culture with +gpc, likely contaminant. No other + cultures  - para without evidence of SBP. Repeat para as needed for comfort, limit to <4L and send diagnostic testing with each sample.     # Hyponatremia  - nephrology following  - on 2% saline. Sodium level increased to 120-122 from admit of 110. Baseline ~124  - Fluid restriction to 1.2 L    # Alcohol associated cirrhosis  # Transplant candidacy  - MELD 30, ABO A  - with current infection, not a candidate for liver transplant at this time. Still needs [facility] for cardiac risk assessment for transplant. Scheduled for 2/1 with OP cath, depending on clinical course, will discuss with cards to see if can be done inpatient.   - US abd 10/22/23 negative for HCC    # Hepatic encephalopathy  - continue with lactulose and rifaximin.     # Debility  # Moderate protein calorie malnutrition  - PT/OT- was up walking with OT and steady.   - BID protein  "supplements- working with RD for other options for protein supplements.     Patient was discussed with attending physician, Dr. Powers.  The above note reflects our joint plan.     Next follow up appointment: Dr. Lim 1/29/24    Thank you for the opportunity to be involved with  Stefani Maggie rosas. Please call with any questions or concerns.    ROD Urban, CNP  Inpatient Hepatology [facility]        Subjective    Complains of chest pain where chest tube inserted. Able to get up and walk with stand  by assist. Denies change in mentation.         Objective    Vital signs:  Temp: 96.8  F (36  C) Temp src: Oral BP: 103/49 Pulse: 73   Resp: 17 SpO2: 99 % O2 Device: None (Room air)     Weight: 64.1 kg (141 lb 5 oz)  Estimated body mass index is 23.52 kg/m  as calculated from the following:    Height as of 12/29/23: 1.651 m (5' 5\").    Weight as of this encounter: 64.1 kg (141 lb 5 oz).    Gross per 24 hour   Intake 1718 ml   Output 340 ml   Net 1378 ml      General: In no acute distress, moderate facial muscle wasting  Neuro: alert Ox3, No asterixis.   HEENT:  Noscleral icterus, Nooral lesions  CV:  Skin warm and dry  Lungs:  Respirations even and nonlabored on room air Right chest tube with serous  Abd:  Mild abdominal distention. nontender   Extrem: no lower peripheral edema   Skin:  mild jaundice  Psych: flat    MEDICATIONS:  Current Facility-Administered Medications   Medication    acetaminophen (TYLENOL) tablet 650 mg    albuterol (PROVENTIL HFA/VENTOLIN HFA) inhaler    albuterol (PROVENTIL) neb solution 2.5 mg    alum & mag hydroxide-simethicone (MAALOX) suspension 30 mL    artificial saliva (BIOTENE DRY MOUTHWASH) liquid 5 mL    benzocaine-menthol (CHLORASEPTIC MAX) 15-10 MG lozenge 1 lozenge    calcium carbonate (TUMS) chewable tablet 1,000 mg    cefTRIAXone (ROCEPHIN) 1 g vial to attach to  mL bag for ADULTS or NS 50 mL bag for PEDS    cholecalciferol (VITAMIN D3) capsule 50,000 Units    " citalopram (celeXA) tablet 40 mg    diclofenac (VOLTAREN) 1 % topical gel 2 g    fluticasone-vilanterol (BREO ELLIPTA) 200-25 MCG/ACT inhaler 1 puff    lactulose (CHRONULAC) solution 20 g    levothyroxine (SYNTHROID/LEVOTHROID) tablet 112 mcg    lidocaine (LMX4) cream    lidocaine 1 % 0.1-1 mL    metroNIDAZOLE (FLAGYL) tablet 500 mg    midodrine (PROAMATINE) tablet 15 mg    montelukast (SINGULAIR) tablet 10 mg    mupirocin (BACTROBAN) 2 % ointment    [Held by provider] ondansetron (ZOFRAN) injection 4 mg    ondansetron (ZOFRAN) injection 4 mg    oxymetazoline (AFRIN) 0.05 % spray 2 spray    pantoprazole (PROTONIX) EC tablet 40 mg    rifaximin (XIFAXAN) tablet 550 mg    senna-docusate (SENOKOT-S/PERICOLACE) 8.6-50 MG per tablet 1 tablet    Or    senna-docusate (SENOKOT-S/PERICOLACE) 8.6-50 MG per tablet 2 tablet    sodium chloride (PF) 0.9% PF flush 3 mL    sodium chloride (PF) 0.9% PF flush 3 mL    sodium chloride 2% infusion       REVIEW OF LABORATORY, PATHOLOGY AND IMAGING RESULTS:  [facility]  Recent Labs   Lab 01/29/24  0542 01/29/24  0218 01/28/24  2217 01/28/24  1801 01/28/24  1441 01/28/24  0426 01/27/24  1557 01/27/24  0603 01/26/24  1624 01/26/24  0435   * 121* 122* 121*   < > 118*  118*   < > 122*  122*   < > 122*  122*   POTASSIUM 3.8  --   --   --   --  3.9  --  3.7  --  3.9   CHLORIDE 94*  --   --   --   --  91*  --  94*  --  93*   UMA 9.3  --   --   --   --  9.7  --  9.7  --  9.9   CO2 16*  --   --   --   --  17*  --  17*  --  17*   BUN 71.0*  --   --   --   --  66.4*  --  70.1*  --  77.5*   CR 0.84  --   --   --   --  0.82  --  0.94  --  1.17*   GLC 95  --   --   --   --  101*  --  119*  --  119*    < > = values in this interval not displayed.     CBC  Recent Labs   Lab 01/29/24  0542 01/28/24  0426 01/27/24  0603 01/26/24  0435   WBC 19.8* 20.8* 19.1* 17.9*   RBC 2.64* 2.50* 2.42* 2.44*   HGB 8.6* 8.1* 7.7* 8.0*   HCT 26.0* 24.1* 23.4* 23.8*   MCV 99 96 97 98   MCH 32.6 32.4 31.8 32.8   MCHC 33.1  "33.6 32.9 33.6   RDW 17.3* 17.4* 17.5* 17.6*   PLT 62* 55* 57* 60*     INR  Recent Labs   Lab 01/29/24  0542 01/28/24  0426 01/27/24  0603 01/26/24  0435   INR 3.28* 2.87* 2.94* 2.70*     LFTs  Recent Labs   Lab 01/29/24  0542 01/28/24  0426 01/27/24  0603 01/26/24  0435   ALKPHOS 86 77 104 98   AST 45 40 43 57*   ALT 25 25 27 30   BILITOTAL 3.3* 3.6* 3.8* 4.8*   PROTTOTAL 5.4* 5.2* 5.3* 5.5*   ALBUMIN 3.5 3.6 3.6 4.0        MELD 3.0: 30 at 1/29/2024  5:42 AM  MELD-Na: 30 at 1/29/2024  5:42 AM  Calculated from:  Serum Creatinine: 0.84 mg/dL (Using min of 1 mg/dL) at 1/29/2024  5:42 AM  Serum Sodium: 120 mmol/L (Using min of 125 mmol/L) at 1/29/2024  5:42 AM  Total Bilirubin: 3.3 mg/dL at 1/29/2024  5:42 AM  Serum Albumin: 3.5 g/dL at 1/29/2024  5:42 AM  INR(ratio): 3.28 at 1/29/2024  5:42 AM  Age at listing (hypothetical): 63 years  Sex: Female at 1/29/2024  5:42 AM    Previous work-up:   Lab Results   Component Value Date    HEPBANG Nonreactive 02/18/2021    HBCAB Nonreactive 02/18/2021    HCVAB  08/03/2017     Nonreactive   Assay performance characteristics have not been established for newborns,   infants, and children      TANYA 157 10/03/2023    IRONSAT 26 11/23/2023    TSH 3.98 12/06/2023    CHOL 176 08/15/2022    HDL 44 (L) 08/15/2022     (H) 08/15/2022    TRIG 32 12/06/2023    A1C 4.7 07/24/2023    POPETH <10 01/11/2024    PLPETH <10 01/11/2024      No results found for: \"SPECDES\", \"LDRESULTS\"      Imaging Results:  CT chest wo 1/29/24  IMPRESSION:   1. Compared to CT 1/25/2024 there is increased gas component of the  moderate right hydropneumothorax without substantial change in overall  size.  2. Interval resolution of several consolidative opacities in the right  lung while other peripheral consolidative opacities remain, likely  areas of atelectasis and rounded atelectasis. No new consolidation.  3. Stable 4.3 cm ascending thoracic aortic aneurysm.  4. Cirrhotic morphology of liver with moderate " volume ascites.

## 2024-01-29 NOTE — PROGRESS NOTES
Care Management Follow Up    Length of Stay (days): 7    Expected Discharge Date: 02/02/2024     Concerns to be Addressed: Discharge planning, medical readiness.   Patient plan of care discussed at interdisciplinary rounds: Yes    Anticipated Discharge Disposition: Home  Anticipated Discharge Services: Home infusion, home care, OP coutrney/thoras, OP CD treatment.  Anticipated Discharge DME: TBD    Patient/family educated on Medicare website which has current facility and service quality ratings: Yes  Education Provided on the Discharge Plan: Yes  Patient/Family in Agreement with the Plan: Yes    Referrals Placed by CM/SW: Home infusion  Private pay costs discussed: Not applicable    Additional Information:  Per [facility], the patient has 100% coverage for home IV abx. [facility] has confirmed home care services through [employer]. Per chart review, patient is still receiving IV lytics via chest tube for treatment of empyema, patient is not medically ready for discharge.     Discharge resources:     [facility] (Likely to need 4-6 weeks of IV abx)  Phone: [phone]  Fax: [phone]    Home Care (RN, PT and OT)  [facility]  Phone: [phone]  Fax: [phone]    OP CD treatment w/[facility]     OP thoras: MWF at [facility]      OP PT/OT previously arranged at [facility] in [place] (will have HH PT/OT upon discharge)     Care coordination will continue to follow for discharge planning.     María Aguilar, RNCC, BSN    [facility]    [address]  [address] [place] [address]    [email or web address]    Office: [phone] Pager: [phone]

## 2024-01-29 NOTE — PLAN OF CARE
Neuro: A&Ox4.   Cardiac: Denies chest pain.  Soft Bps, PRN Midodrine given after paracentesis for SBP of 98.  Currently /57, cycled.   Respiratory: Sating >92% on RA.  GI/: Adequate urine output. BM X3 this shift.  Diet/appetite: Tolerating regular diet. Encourage during meal times, On fluid restriction of 1L/day, monitored.  Activity:  Assist of 1, up to chair and in halls.  Pain: At acceptable level on current regimen. Started on scheduled pain cream for pain on chest tube site.   Skin: No new deficits noted.    LDA:  Left PIV, infusing.  Right PIV, saline lock.  Right chest tube to water seal.      On Na 2% infusion at 30ml/hr.  Na timed q4hr.  Paracentesis today with 3L out.    Plan: Continue with POC. Notify primary team with changes.

## 2024-01-29 NOTE — PLAN OF CARE
Goal Outcome Evaluation:         [id number]  Neuro: A&Ox4. Forgetful.  Cardiac: Sys 100s. HR 60s-70s. PRN Midodrine available for SBP below 100/ MAP below 65, none given.  Respiratory: Sating above 94% on RA.  GI/: Adequate urine output. BM x 1, loose.   Diet/appetite: Tolerating regular diet. 1L fluid restriction  Activity:  Assist of 1 up to bathroom  Pain: Pain at manageable level, heat applied and PRN tylenol given x1.   Skin: No new deficits noted.  LDA's: 2 PIV SL, CT -20 suction (lytics givenx1)     Plan: Continue with POC. Notify primary team with changes.  -Patient moved from 6219 to 6225  -CT scan this morning

## 2024-01-29 NOTE — PROCEDURES
[facility]  CAPS PROCEDURE NOTE  Date of Admission:  1/22/2024  Consult Requested by: Medicine  Reason for Consult: ----------Paracentesis------------ and management of symptomatic ascites    Indication/HPI: Stefani Crowell is a 63 year old female admitted on 1/22/2024. She has a history of cirrhosis secondary to alcohol use currently being worked up for liver transplant complicated by need for frequent paracenteses and thoracenteses for ascites/hepatic hydrothorax, in addition to asthma and COPD 2/2 tobacco use, MDD/STEPHANIE, hypothyroidism, low back pain, and chronic hyponatremia. She presented to the ED for evaluation of acute on chronic hyponatremia in the setting of recent viral-like URI illness; currently being treated for empyema. CAPS consulted for therapeutic paracentesis.    Pre-Procedure Diagnosis: Ascites    Post-Procedure Diagnosis: Ascites    Risk Assessment: Average risk, Technically straightforward; patient's anticoagulation has been held according to guidelines based on the agent and platelets and coags are within guidelines    Procedure Outcome:  Therapeutic paracentesis performed with 3 liters of ascites removed.     See additional procedure details below.    The primary covering service should follow up and address any lab results as appropriate.    Nora Bates MD  Internal Medicine Resident, PGY-2    Procedure supervised by attending physician, Dr. Umang Soto.    ---    [facility]    Paracentesis    Date/Time: 1/29/2024 3:24 PM    Performed by: Nora Bates MD  Authorized by: Nora Bates MD    PRE-PROCEDURE DETAILS  Procedure purpose: therapeutic  Indications: abdominal discomfort secondary to ascites        UNIVERSAL PROTOCOL   Site Marked: Yes  Prior Images Obtained and Reviewed:  Yes  Required items: Required blood products, implants, devices and special equipment available     Patient identity confirmed:  Verbally with patient, arm band and hospital-assigned identification number  Patient was reevaluated immediately before administering moderate or deep sedation or anesthesia  Confirmation Checklist:  Patient's identity using two indicators and procedure was appropriate and matched the consent or emergent situation  Time out: Immediately prior to the procedure a time out was called    Universal Protocol: the Joint Commission Universal Protocol was followed    Preparation: Patient was prepped and draped in usual sterile fashion       ANESTHESIA    Anesthesia:  Local infiltration  Local Anesthetic:  Lidocaine 1% without epinephrine  Anesthetic Total (mL):  5    POST PROCEDURE DETAILS  Puncture site: left lower quadrant  Fluid removed: 3000(ml)  Fluid appearance: serosanguinous        PROCEDURE    Length of time physician/provider present for 1:1 monitoring during sedation: 30        POC US GUIDE FOR PARACENTESIS  (Preliminary)  This result has not been signed. Information might be incomplete.    Impression  US Indication: decompensated liver disease and abdominal distension    Limited abdominal ultrasound was performed and demonstrated an adequate fluid collection on the left side of the abdomen.    Doppler of the skin demonstrated an area at this site without significant vasculature.    A paracentesis at this site was subsequently performed.    Nora Bates MD

## 2024-01-29 NOTE — PROGRESS NOTES
[facility] PROGRESS NOTE     Patient:  Stefani Crowell   Date of birth 1960, Medical record number [id number]  Date of Visit:  01/29/2024  Date of Admission: 1/22/2024  Consult Requester:Venancio Fu MD          Assessment and Plan:   ID Problem List  Right-sided loculated empyema  Thoracentesis (1/23): ~126K TNC, 98% neutrophils; total protein 2.9, glucose <2, +Light's criteria.   Culture = E. coli  Positive blood cultures - Staph epi in 1/4 bottles on 1/23 is a contaminant  Leukocytosis  Decompensated alcohol-related cirrhosis   Anemia, thrombocytopenia, coagulopathy  Hepatic encephalopathy  MATTIE, HRS  Ascites and hepatic hydrothorax requiring 3x/weekly drainage  Grade 1 EV  Hyponatremia  Dirty UA (4 squamous cells), urine culture +E. Coli (Amp-R) - no therapy required  History of C difficile (+PCR 9/4/20)     RECOMMENDATION:  Continue IV ceftriaxone 1g once daily and PO metronidazole 500 mg q8hrs for empyema  Duration of antibiotics TBD - minimum 4-6 weeks for empyema pending source control  May need additional source control of empyema given ongoing elevated nucleated cell counts and persistent leukocytosis  Appreciate IP management of chest tube     ASSESSMENT:  Stefani Crowell is a 63 year old female with PMHx significant for severe asthma, decompensated alcohol related cirrhosis c/b thrombocytopenia, anemia, esophageal varices (grade 1), hepatic encephalopathy, hepatorenal syndrome, ascites and hepatic hydrothorax requiring 3x/week drainage, hypothyroidism, depression, and anxiety who was admitted for abnormal labs (Na 112, K 5.3). Hospital course notable for CXR with loculated right pleural effusion s/p thoracentesis consistent with empyema. ID consulted for GNB pleural effusion and GPC on blood cultures. Staph epi in 1/2 sets, in 1/4 bottles felt to be contaminant. Repeat blood cultures with no growth. She also had +E. Coli on urine culture, patient is asymptomatic  and this is a dirty specimen with 4 squamous cells, would not treat. However, this will be incidentally covered with therapy for her E. Coli empyema.      Thoracentesis 1/23 with exudative effusion with ~126K total nucleated cells and culture with E. coli. Unclear etiology of this empyema. No recent or current concerns for pneumonia - did have recent URI, likely viral, but no productive cough or reported hypoxia, though does have chest CT with peribronchial consolidative opacities. No concurrent SBP (paracentesis same day with 131 ANC, Cx NG), though does get 3x/week paracentesis +/- thoracentesis which may be source via direct inoculation. Last outpatient thoracentesis on 1/17 - no labs done; CXR at that time noted loculated apical component. Appreciate IP involvement for chest tube placement - initially with 1900 ml seropurulent output day 1, now sanguinous today. Chest CT with large apical collection - unclear if being drained by present lower chest tube. Apical collection will need additional drainage for source control if no improvement with existing drain. Appreciate lytics started per IP on 1/26, tolerated well. She has persistent leukocytosis to ~20s, concerning for lack of source control and consider if 2nd chest tube is necessary to drain apical collection. It will be difficult to determine ultimate source control given her baseline hepatic hydrothorax. Repeat pleural fluid analysis 1/29 with still 104K total nucleated cells and repeat chest CT 1/29 with unchanged hydropneumothorax, increased gas concerning for lack of source control. Continue IV ceftriaxone + PO flagyl. Anticipate 4-6 week course for empyema, pending source control.    Infectious diseases will continue to follow.  Patient and plan staffed with Dr. Mohr. Recommendations discussed with primary team.    Josseline Pace PA-C  Infectious Diseases  Pager #[phone] or Vocera    40 MINUTES SPENT BY ME on the date of service doing chart review,  history, exam, documentation & further activities per the note.           Interim History and Events:     Lolly remains afebrile, vitals stable, on room air. Persistent leukocytosis at 19.8. No acute events over weekend - tolerated lytics x3 days. Some pain at chest tube site, otherwise ROS unremarkable. Denies fever, chills, cough, chest pain, dyspnea.     Chest tube with 445 ml out yesterday. CXR and Chest CT today with no substantial change in hydropneumothorax, increased gas. Repeat R pleural fluid analysis = 104,300 TNC.   Plan for paracentesis today.         ROS:   -Focused 5 point ROS completed, pertinent positives and negatives listed above.      Physical Examination:  Temp: 96.8  F (36  C) Temp src: Oral BP: 103/49 Pulse: 73   Resp: 17 SpO2: 99 % O2 Device: None (Room air)      Vitals:    01/24/24 0030 01/28/24 0800 01/28/24 1404 01/29/24 0237   Weight: 63.2 kg (139 lb 5.3 oz) 63.2 kg (139 lb 5.3 oz) 47.1 kg (103 lb 14.4 oz) 64.1 kg (141 lb 5 oz)       Constitutional: Pleasant adult female, in NAD. Alert and interactive.   HEENT: NCAT, trace icteric sclerae, conjunctiva clear. Moist mucous membranes.  Respiratory: Non-labored breathing, good air exchange. Lungs are clear to auscultation on left, diminished on right. No wheezing, crackles or rhonchi. No cough noted. Chest tube with serosanguinous output  Cardiovascular: Regular rate and rhythm with no murmur, rub or gallop.  GI: Normoactive BS. Abdomen is soft, nontender to palpation, moderately distended. No rigidity or guarding. No rebound tenderness.  Skin: Warm and dry. Multiple scabs, skin tears. No purulence or cellulitis.  Musculoskeletal: Extremities grossly normal. No tenderness or edema present.   Neurologic: A &O x3, speech normal, answering questions appropriately. Moves all extremities spontaneously.  VAD: PIV is c/d/i with no erythema, drainage, or tenderness.      Medications:   cefTRIAXone  1 g Intravenous Q24H    cholecalciferol  50,000  "Units Oral Q7 Days    citalopram  40 mg Oral Daily    diclofenac  2 g Topical 4x Daily    fluticasone-vilanterol  1 puff Inhalation Daily    lactulose  20 g Oral TID    levothyroxine  112 mcg Oral Daily    metroNIDAZOLE  500 mg Oral TID    montelukast  10 mg Oral At Bedtime    mupirocin   Topical TID    oxymetazoline  2 spray Both Nostrils BID    pantoprazole  40 mg Oral BID    rifaximin  550 mg Oral BID    sodium chloride (PF)  3 mL Intracatheter Q8H       Infusions/Drips:   sodium chloride 2% infusion 30 mL/hr at 01/29/24 1200       Laboratory Data:   No results found for: \"ACD4\"    Inflammatory Markers    Recent Labs   Lab Test 12/06/23  1701 06/29/18  1309   SED 12 7   CRP  --  5.9       Metabolic Studies       Recent Labs   Lab Test 01/29/24  1158 01/29/24  0542 01/29/24  0218 01/28/24  2217 01/28/24  1801 01/28/24  1441 01/28/24  0426 01/27/24  1557 01/27/24  0603 01/26/24  1624 01/26/24  0435 01/25/24  0958 01/25/24  0550 01/25/24  0027 01/24/24  2134 01/24/24  1905 01/24/24  1659 01/24/24  0805 01/24/24  0532 01/23/24  0730 01/23/24  0648 01/23/24  0210 01/22/24  2232 01/22/24  1734 10/19/23  1523 10/03/23  0853 08/18/23  1402 07/24/23  1457   * 120* 121* 122* 121* 119* 118*  118*   < > 122*  122*   < > 122*  122*   < > 122*  122*   < > 124*   < > 120*   < > 117*  117*   < > 111*   < > 111* 110*   < > 126*   < > 133*   POTASSIUM  --  3.8  --   --   --   --  3.9  --  3.7  --  3.9  --  4.1  --   --   --   --   --  4.7  --  5.2  --   --  5.1   < > 3.9   < > 3.4   CHLORIDE  --  94*  --   --   --   --  91*  --  94*  --  93*  --  93*  --   --   --   --   --  89*  --  85*  --   --  81*   < > 91*   < > 95*   CO2  --  16*  --   --   --   --  17*  --  17*  --  17*  --  17*  --   --   --   --   --  17*  --  17*  --   --  18*   < > 25   < > 24   ANIONGAP  --  10  --   --   --   --  10  --  11  --  12  --  12  --   --   --   --   --  11  --  9  --   --  11   < > 10   < > 14   BUN  --  71.0*  --   --   --   --  " 66.4*  --  70.1*  --  77.5*  --  83.4*  --   --   --   --   --  88.3*  --  95.3*  --   --  85.9*   < > 33.9*   < >  --    CR  --  0.84  --   --   --   --  0.82  --  0.94  --  1.17*  --  1.13*  --   --   --   --   --  1.07*  --  1.16*  --   --  1.12*   < > 1.24*   < >  --    GFRESTIMATED  --  79  --   --   --   --  81  --  69  --  53*  --  55*  --   --   --   --   --  59*  --  53*  --   --  56*   < > 49*   < >  --    GLC  --  95  --   --   --   --  101*  --  119*  --  119*  --  75  --   --   --   --   --  92  --  74  --   --  121*   < > 98   < >  --    A1C  --   --   --   --   --   --   --   --   --   --   --   --   --   --   --   --   --   --   --   --   --   --   --   --   --   --   --  4.7   UMA  --  9.3  --   --   --   --  9.7  --  9.7  --  9.9  --  9.9  --   --   --   --   --  9.8  --  9.8  --   --  9.6   < > 10.2   < >  --    PHOS  --   --   --   --   --   --   --   --   --   --   --   --   --   --   --   --   --   --   --   --   --   --  3.8  --   --  2.6  --   --    MAG  --   --   --   --   --   --   --   --   --   --   --   --   --   --   --   --   --   --   --   --  3.4*  --   --  2.8*   < >  --    < >  --    LACT  --   --   --   --   --   --   --   --   --   --   --   --   --   --  1.9  --  3.0*  --   --    < >  --   --   --   --    < >  --   --   --     < > = values in this interval not displayed.       Hepatic Studies    Recent Labs   Lab Test 01/29/24  0542 01/28/24  0426 01/27/24  0603 01/26/24  0435 01/25/24  0550 01/24/24  0532 01/23/24  1405 12/15/23  0545 12/11/23  0812   BILITOTAL 3.3* 3.6* 3.8* 4.8* 4.8* 6.0*  --    < > 4.9*   ALKPHOS 86 77 104 98 85 98  --    < > 67   ALBUMIN 3.5 3.6 3.6 4.0 3.6 3.3*  --    < > 3.4*   AST 45 40 43 57* 51* 74*  --    < > 47*   ALT 25 25 27 30 29 36  --    < > 36   LDH  --   --   --   --   --   --  195  --  185    < > = values in this interval not displayed.       Pancreatitis testing    Recent Labs   Lab Test 12/06/23  1843 12/06/23  1608 10/22/23  0150  08/15/22  1426 09/03/20  1216 03/01/19  0954 04/08/16  1040   LIPASE  --  125* 116*  --  189  --   --    TRIG 32  --   --  111  --  125 97       Hematology Studies      Recent Labs   Lab Test 01/29/24  0542 01/28/24  0426 01/27/24  0603 01/26/24  0435 01/25/24  1617 01/25/24  0550 01/24/24  0532 01/19/21  1502 09/16/20  1147 09/03/20  1216 06/29/18  1309   WBC 19.8* 20.8* 19.1* 17.9*  --  18.6* 28.3*   < > 7.1   < > 5.6   ANEU  --   --   --   --   --   --   --   --  4.8  --  3.4   ALYM  --   --   --   --   --   --   --   --  1.5  --  1.5   VIJI  --   --   --   --   --   --   --   --  0.5  --  0.6   AEOS  --   --   --   --   --   --   --   --  0.2  --  0.1   HGB 8.6* 8.1* 7.7* 8.0* 7.8* 6.4* 7.5*   < > 13.9   < > 13.7   HCT 26.0* 24.1* 23.4* 23.8*  --  19.4* 22.4*   < > 43.0   < > 41.8   PLT 62* 55* 57* 60*  --  54* 72*   < > 118*   < > 125*    < > = values in this interval not displayed.       Arterial Blood Gas Testing  No lab results found.     Urine Studies     Recent Labs   Lab Test 01/22/24  2323 12/07/23  1237 12/06/23  1730 11/13/23  1540 11/02/23  1236   URINEPH 5.0 5.0 5.0 5.0 5.5   NITRITE Negative Negative Negative Negative Negative   LEUKEST Large* Small* Trace* Small* Large*   WBCU 83* 4 7* 17* 23*       Microbiology:  Culture   Date Value Ref Range Status   01/24/2024 No Growth  Final   01/24/2024 No Growth  Final   01/23/2024 4+ Escherichia coli (A)  Final   01/23/2024 No Growth  Final   01/23/2024 No anaerobic organisms isolated after 5 days  Preliminary   01/23/2024 No growth after 5 days  Preliminary   01/23/2024 No growth after 5 days  Preliminary   01/23/2024 Positive on the 1st day of incubation (A)  Final   01/23/2024 Staphylococcus epidermidis (AA)  Final     Comment:     1 of 2 bottles   01/23/2024 No Growth  Final   01/22/2024 >100,000 CFU/mL Escherichia coli (A)  Final   12/12/2023 No Growth  Final   12/06/2023 <10,000 CFU/mL Mixture of Urogenital Jessica  Final   11/13/2023 >100,000 CFU/mL  Klebsiella pneumoniae (A)  Final   11/13/2023 50,000-100,000 CFU/mL Klebsiella pneumoniae (A)  Final   11/12/2023 No Growth  Final   11/09/2023 No Growth  Final   11/06/2023 No Growth  Final   11/06/2023 No anaerobic organisms isolated  Final   10/25/2023 No Growth  Final   10/22/2023 No Growth  Final   10/22/2023 >100,000 CFU/mL Mixture of urogenital jenni  Final       Last check of C difficile  C Diff Toxin B PCR   Date Value Ref Range Status   09/04/2020 Positive (A) NEG^Negative Final     Comment:     Positive: Toxin producing C. difficile target DNA sequences detected, presumed   positive for C. difficile toxin B. C. difficile (Requires Enteric Isolation).      C. difficile (Requires Enteric Isolation)  FDA approved assay performed using [employer] Birdpert real-time PCR.         Imaging:  CT Chest w/o contrast 1/29/24  IMPRESSION:   1. Compared to CT 1/25/2024 there is increased gas component of the  moderate right hydropneumothorax without substantial change in overall  size.  2. Interval resolution of several consolidative opacities in the right  lung while other peripheral consolidative opacities remain, likely  areas of atelectasis and rounded atelectasis. No new consolidation.  3. Stable 4.3 cm ascending thoracic aortic aneurysm.  4. Cirrhotic morphology of liver with moderate volume ascites.    CXR 1/29/24  IMPRESSION:   1. No substantial change in right moderate hydropneumothorax with  chest tube in place.  2. No substantial change in airspace opacities projecting over the  right mid and lower lung fields.    CXR 1/27/24  IMPRESSION:   1. Stable appearance of right hydropneumothorax with chest tube in  place.  2. Stable appearance of hazy opacity within the right mid/upper lung,  may represent pneumonia.    CT Chest w/o contrast 1/25/24  IMPRESSION:  1. Small right-sided hydropneumothorax, previously moderate, with  slightly increased air component compared to CT 12/15/2023 which may  be in part related  to placement of right basilar chest tube. There is  a fairly large apical component which may be communicating with the  basilar component containing the chest tube along the right lateral  aspect. Adjacent linear atelectasis throughout the right lung.  Aeration of the lung has improved compared to CT 11/13/2023.  2. Peribronchial consolidative opacities in the right upper lobe  representing infectious or inflammatory process.  3. Trace left effusion with adjacent atelectasis. Improved groundglass  opacities from CT 11/13/2023. Mild pulmonary edema.  4. Borderline ascending aortic aneurysm measuring 4.3 cm.    XR Chest 2 Views  Result Date: 1/22/2024  IMPRESSION: Large loculated right pleural effusion with associated atelectasis of the right upper, middle, and lower lobes. Increased in fluid volume compared to 12/14/2023.
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
## 2024-01-29 NOTE — PROGRESS NOTES
Home Infusion  Received referral from [facility] for IV Rocephin.  Benefits verified.  Patient has Blue [employer] Blue Shield and is covered 100%.  Called and spoke with Lolly to review home infusion services, review benefits and offer choice of providers.  Patient would like to remain in the [facility] system and will use [facility] for home infusion.  Confirmed discharge address, phone, and emergency contact information. Patient denies recent illness (not related to pre-existing conditions) or travel in the house hold. Confirmed allergies. No home health agency has been seeing the patient.     [facility] is willing to learn and manage home IV therapy.  Questions answered.  Plan for [facility] to provide home care services after discharge.    [facility] will continue to follow until discharge and update pt once final orders are determined.    Thank you for the referral    Edmond Wayne LPN, Coordinator   [facility]   [email or web address]  Office: [phone]

## 2024-01-29 NOTE — TELEPHONE ENCOUNTER
Spoke to patient.  Patient has been hospitalized since 1/22 for empyema.  Has a chest drainage tube.  Pt expressed frustration that she is currently still in the hospital.  States she is hoping to still get her coronary angiogram.  Writer reviewed notes from hospitalization-looks like there is a possibility the coronary angiogram can be completed via inpt.    Writer will inform Dr. Casiano.      Ninoska Chavez, RN  Cardiology Care Coordinator  [facility]  [phone] option 1

## 2024-01-29 NOTE — PROGRESS NOTES
[facility]    Medicine Progress Note - Medicine Service, [facility] TEAM 4    Date of Admission:  1/22/2024    Assessment & Plan   Stefani Crowell is a 63 year old female admitted on 1/22/2024. She has a history of cirrhosis secondary to alcohol use currently being worked up for liver transplant complicated by need for frequent paracenteses and thoracenteses for ascites/hepatic hydrothorax, in addition to asthma and COPD 2/2 tobacco use, MDD/STEPHANIE, hypothyroidism, low back pain, and chronic hyponatremia. She presented to the ED for evaluation of acute on chronic hyponatremia in the setting of recent viral-like URI illness. Admitted for careful monitoring, and for evaluation of loculated pleural effusion with significant leukocytosis. Currently being treated for Empyema      Changes Today:  - CAPS consult for paracentesis   > Given better BP today, could possible tolerate 3L out   > Fluid studies ordered  - Pulm has sent out fluid studies on chest tube output   > Will be used to monitor for infection improvement  - CT chest this AM  - 2% NS rate increased to 30ml/Hr   > Q4h Na checks   > 1/30 AM goal Na 125  - Tentative cath on 2/1; made NPO @ 0001 on 2/1 for this    ==========================================================    # Hydropneumothorax  # Loculated R pleural effusion; E coli empyema  # Asymptomatic Bacteruria  # At risk for skin/soft tissue infection  # Trapped lung  # Leukocytosis  # Hx Hepatic Hydrothorax   No clear symptoms, UA and urine culture corroborate possible infection. Notably, Ucx on 11/13/23 was w/ klebsiella species and now E coli on 1/22/24. Patient not reporting any urinary symptoms, though in her altered state it is unclear. On 1/23, had thora + paracentesis per IR and pleural fluid cultures yielded GNB. Blood cultures yielded GPC, though this is most likely a contaminant. Has very fragile skin and numerous tears. Interventional pulm placed  chest tube on 1/24. CT Chest on 1/25 demonstrated improved R-sided hydropneumothorax after. Repeat CT Chest on 1/29 demonstrated increased gas component of the R hydropneumothorax w/o changes in overall size. Patient tolerated 6 doses of alteplase total.  - WOC consult  - Blood cultures:   > 1/23: Staph epi. (suspect contaminant)   > 1/24: NGTD  - Pleural fluid culture:   > 1/23: E coli (Amp res.)  - Peritoneal fluid culture:   > 1/23: NGTD  - Urine culture:   > 1/22: Ampicillin Res. E. Coli   - Infectious Disease consulted (1/24)   > Stop IV Zosyn   > Resumed IV Ceftriaxone 1g every day, Added metronidazole 500mg Q8H  - Interventional Pulm consulted (1/24) for chest tube (source control). Required  2u FFP and INR to follow    > Daily CXR   > Initiated alteplase to break up fibrin; started on 1/26 and received 6 doses   > 1/29; sent additional fluid labs to monitor for improvement of infection  - CT Scan on 1/25, repeat on 1/29 AM w/ stable hydropneumothorax w/ increased gas component  - Para Labs:   > Aerobic/anaerobic culture (NGTD), cell counts (completed), gram stain (NGTD), LDH, glucose, protein, AFB (pending), fungal culture (pending)  - Thora labs:  > Aerobic (GNB)/anaerobic culture (pending), cell counts, gram stain, LDH, glucose, protein, AFB (pending), fungal culture (pending)  > Cell counts demonstrate 125,760 total nucleated cells w/ significant % (98) neutrophils consistent w/ empyema as verified on gram stain  - CBC w/ diff daily   - Abx:  > Ceftriaxone: 1/23 - 1/24, 1/25 - *   > Metronidazole 500mg Q8H: 1/25 - *   > Rifaximin: 1/22 - *  > Zosyn: 1/24 - 1/25  > Vancomycin: 1/24  [  ] Dispo: Duration of abx TBD, minimum 4-6 weeks for empyema     # Decompensated alcoholic cirrhosis (Ascites, HE)  # Current liver transplant evaluation   # Hepatic encephalopathy   # Recurrent ascites  # Abdominal Pain  Decompensations include jaundice/icterus, G1 esophageal & rectal varices on EGD without bleeding,  significant ascites requiring 3 times weekly paracenteses, hepatic hydrothorax requiring frequent thoracenteses, and hepatic encephalopathy.  Gets thoras and courtney through the [facility] system. During prior hospitalization, her transplant eligibility was reopened due to completion of chemotherapy treatment and improved renal function.  Do suspect that she currently has some hepatic encephalopathy due to decreased bowel movements recently.  MELD NA 26 on discharge 12/16, 29 on most recent labs. Last admit: CMV (PCR quant neg), EBV (low DNA quant, 537 copies; discussed with ID and no need to recheck). Last admit: IGRA (neg), treponemal ab (nonreactive), serum HCG (neg), and spot protein urine (0.24 mg/mg)  MELD 3.0: 30 at 1/29/2024  5:42 AM  MELD-Na: 30 at 1/29/2024  5:42 AM  Calculated from:  Serum Creatinine: 0.84 mg/dL (Using min of 1 mg/dL) at 1/29/2024  5:42 AM  Serum Sodium: 120 mmol/L (Using min of 125 mmol/L) at 1/29/2024  5:42 AM  Total Bilirubin: 3.3 mg/dL at 1/29/2024  5:42 AM  Serum Albumin: 3.5 g/dL at 1/29/2024  5:42 AM  INR(ratio): 3.28 at 1/29/2024  5:42 AM  Age at listing (hypothetical): 63 years  Sex: Female at 1/29/2024  5:42 AM    - Hepatology consult   > with current infection, not a candidate for liver transplant at this time. Still needs [facility] for cardiac risk assessment for transplant.   - Tentative cath on 2/1; made NPO @ 0001 on 2/1 for this  - IR consulted for diagnostic Paracentesis on 1/23   > Called [facility] to attempt to add-on labs to samples from 1/22; samples were not retained   > Prefer to avoid therapeutic to avoid massive fluid shifts   > Para + Thora labs: Aerobic/anaerobic culture, cell counts, gram stain, LDH, glucose, protein (results above)  - CAPS to assist w/ para on 1/28 (some hypotension 1/28/2024, would like to avoid shifts)  - Tylenol 650mg Q8H PRN (total dose < 2g)  - Continue lactulose, Rifaximin   - Continue PPI  - Target 3-4 BM daily   - Abx as above.  - Paracentesis  log:   > 1/23: IR w/ 100ml removed for diagnostics only   > 1/26: CAPS w/ 1.5L removed (still a lot remaining, opted for less d/t fluid shifts and initially labile BP)   > 1/29: Anticipated paracentesis w/ maybe 2-3L remove (CAPS consult placed)     # Acute mild on Chronic Hyponatremia - worsening  Chronic hyponatremia common in the setting of cirrhosis, and she has been admitted for hyponatremia several times in the past. Baseline mid 120s.  Given her history of low p.o. intake recently, do worry about a hypovolemic hyponatremia. S/p Albumin with paracentesis 1/22.  Given additional albumin on 1/24, 1/25. Her sodium corrected w/ 2% NS at a slow rate.  - Nephrology consulted   > Albumin 75g   - Restarted 2% at 30ml/hr (1/29)   > Goal Na 125 by AM on 1/30  - Q4hr Na checks  - strict ins/outs   - daily weights   - Regular diet    # MATTIE  # hepatorenal syndrome   Chart review suggests a prior history of hepatorenal syndrome.  However, her history at the moment is more compatible with a prerenal MATTIE secondary to poor p.o. intake.  Baseline Cr around 0.6, nearly double this on admission. Receiving fluids as above for hyponatremia, will trend creatinine. S/p albumin w/ para and additional 75g on 1/23, 1/24, and 1/25. Her Crt remained stable after these doses..   - Nephrology consulted - signed off 1/26  - Avoid nephrotoxic agents  - Fluids as above  - Continue Midodrine 15mg PO TID PRN   > Will make PRN as her BP is labile while here, and do not want to have scheduled as a means of increasing her BP when we would also like to keep her BP within normal range for her aortic aneurysm.  [  ] Dispo: Renal scheduling in CKD clinic     # Borderline Ascending Aortic Aneurysm  Noted on CT Chest on 1/25. Measured to be 4.3 cm. I personally reviewed the CT chest and found largest diameter to be about 43.5mm. I compared this to the widest portion on of the ascending aorta on her CTA coronary on 12/15/23 which was 42.0mm. This is  fairly stable.  - Will speak w/ Thoracic Surg re. Follow up guidelines  - BP Goal near normotensive < 130/<85  - HR goal < 80  [  ] Dispo: Follow up guidelines pending     # Severe malnutrition in context of chronic illness  Patient's phos was appropriate on 1/22. 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 Holding off on prednisone for now iso possible infection and respiratory stability  - Continue PRN albuterol inhaler or nebs  - Continue ICS-LABA  - Continue Singulair       # Hypothyroidism  - Continue PTA levothyroxine      # Hypermagnesemia  - CTM     # H/o hypophosphatemia - not present here  Will recheck in setting of poor oral intake. Fairly low risk for refeeding as low PO intake only recently.      # Normocytic anemia - stable  Has required outpatient white blood cell transfusions in the past.  Her hemoglobin tends to be around 7.5-9.5.  At normal range on admission. No clear bleeding history though is at risk for this. Chronic disease component and nutritional components possible as well. Hgb on 1/24 was 7.5, which is likely dilutional w/ fluids. No sources of bleeding identified.  - CBC w/ diff in AM  - Would transfuse for Hgb <7  - Transfusion log:   > 1u pRBC 1/25     # Thrombocytopenia - stable  ISO cirrhosis. Increased bleeding risk, avoiding heparin/lovenox for now.      # MDD/STEPHANIE  - Continue PTA Celexa  - Would try to avoid sedating agents d/t current sedative presentation     # Low back pain  Stable, trial hot/cold packs first          Diet: Room Service  Regular Diet Adult  Fluid restriction 1000 ML FLUID  Snacks/Supplements Adult: Ensure Max Protein (bariatric); Between Meals  Snacks/Supplements Adult: Ensure Enlive; Between Meals    DVT Prophylaxis: Pneumatic  Compression Devices  Demarco Catheter: Not present  Lines: None     Cardiac Monitoring: None  Code Status: Full Code      Clinically Significant Risk Factors         # Hyponatremia: Lowest Na = 118 mmol/L in last 2 days, will monitor as appropriate      # Hypoalbuminemia: Lowest albumin = 2.9 g/dL at 1/23/2024  6:48 AM, will monitor as appropriate    # Coagulation Defect: INR = 3.28 (Ref range: 0.85 - 1.15) and/or PTT = N/A, will monitor for bleeding  # Thrombocytopenia: Lowest platelets = 55 in last 2 days, will monitor for bleeding           # Severe Malnutrition: based on nutrition assessment    # Financial/Environmental Concerns: none  # Asthma: noted on problem list        Disposition Plan     Expected Discharge Date: 01/31/2024      Destination: home with family            Tyrone Singh MD  Medicine Service, [facility] TEAM [address] [place] [address]  Securely message with [employer] (more info)  Text page via [employer] Paging/Directory   See signed in provider for up to date coverage information  ______________________________________________________________________    Interval History   Went to CT this morning. Patient's only complaint today is the persistent pain she experiences from her chest tube site. We discussed that we could try using diclofenac gel for now; she was amenable to this plan. Otherwise, she felt like she was a little more distended (abdomen) than yesterday. She was willing to have a repeat paracentesis done today granted her blood pressures were tolerant (I spoke w/ bedside nurse around 11 AM and they were in the 110's).    Physical Exam   Vital Signs: Temp: 97.2  F (36.2  C) Temp src: Oral BP: 107/52 Pulse: 70   Resp: 11 SpO2: 97 % O2 Device: None (Room air)    Weight: 141 lbs 5.04 oz    General Appearance:  Chronically ill appearing, conversational and oriented fully  Respiratory: Reduced on bilateral bases R > L w/ some crackles bilateral (stable)  Cardiovascular:  RRR, warm and well perfusing extremities, loud systolic murmur  GI: distended, mildly tender throughout, fluid wave, tympanic while supine  Skin: jaundice  Other:  Moves extremities spontaneously, answers all questions appropriately. Chest tube draining red-tinged fluid, normal tidaling noted w/o air leak    Medical Decision Making       MANAGEMENT DISCUSSED with the following over the past 24 hours: nursing       Data     I have personally reviewed the following data over the past 24 hrs:    19.8 (H)  \   8.6 (L)   / 62 (L)     120 (L) 94 (L) 71.0 (H) /  95   3.8 16 (L) 0.84 \     ALT: 25 AST: 45 AP: 86 TBILI: 3.3 (H)   ALB: 3.5 TOT PROTEIN: 5.4 (L) LIPASE: N/A     INR:  3.28 (H) PTT:  N/A   D-dimer:  N/A Fibrinogen:  N/A

## 2024-01-30 NOTE — PLAN OF CARE
Neuro: A&Ox4.   Cardiac: No tele orders. Soft Bps at times, MAP >65. Afebrile.    Respiratory: Sating >92% on RA.  GI/: Adequate urine output. BM X2, loose/brown in appearance. Receiving scheduled lactulose.   Diet/appetite: Tolerating regular diet, fair appetite. 1L fluid restriction.   Activity:  Assist of 1, up to bathroom and in halls.  Pain: At acceptable level on current regimen. Lidocaine patch applied around CT.   Skin: No new deficits noted.   LDA's: PIV x2 - 2% NaCl infusing at 25 mL/hr. R CT to waterseal.     Plan: Continue with POC. Notify primary team with changes.     Goal Outcome Evaluation:      Plan of Care Reviewed With: patient    Overall Patient Progress: no changeOverall Patient Progress: no change      Problem: Electrolyte Imbalance  Goal: Electrolyte Balance  Intervention: Monitor and Manage Electrolyte Imbalance  Recent Flowsheet Documentation  Taken 1/30/2024 1600 by Lolly Hernandez RN  Fluid/Electrolyte Management: fluids restricted  Taken 1/30/2024 1200 by Lolly Hernandez RN  Fluid/Electrolyte Management: fluids restricted  Taken 1/30/2024 0800 by Lolly Hernandez RN  Fluid/Electrolyte Management: fluids restricted

## 2024-01-30 NOTE — PROGRESS NOTES
[facility] Nurse Inpatient Assessment     Consulted for: Diffuse skin tears    Summery: Pt has extremely fragile skin. There are multiple area where she had had a skin tear but it has healed or is no longer open. Pt would benefit from protective garments such as leyda sleeves. For additional skin tears please use skin care protocol to treat.    Patient History (according to provider note(s):      Stefani Crowell is a 63 year old female admitted on 1/22/2024. She has a history of cirrhosis secondary to alcohol use currently being worked up for liver transplant complicated by need for frequent paracenteses and thoracenteses for ascites/hepatic hydrothorax, in addition to asthma and COPD 2/2 tobacco use, MDD/STEPHANIE, hypothyroidism, low back pain, and chronic hyponatremia. She presented to the ED for evaluation of acute on chronic hyponatremia in the setting of recent viral-like URI illness. Admitted for careful monitoring, and for evaluation of loculated pleural effusion with significant leukocytosis     Assessment:      Areas visualized during today's visit: Lower extremities , Upper extremities , and Chest    Wound location: Right Chest    Last photo: 1/25/24  Wound due to: Skin Tear and Trauma  Wound history/plan of care: Skin tear occurred when ECG line was pulled off on accident.  Wound base: 90 % dermis, 20 %  Skin flap     Palpation of the wound bed: normal      Drainage: small     Description of drainage: bloody     Measurements (length x width x depth, in cm): 5  x 2  x  0.1 cm      Tunneling: N/A     Undermining: N/A  Periwound skin: Intact and Ecchymosis      Color: purple      Temperature: normal   Odor: none  Pain: moderate, sharpwell being cleaned  Pain interventions prior to dressing change: soaking and slow and gentle cares   Treatment goal: Heal  and Protection  STATUS: improving  Supplies ordered: supplies stored on unit     Wound location: Right  ankle    Last photo: 1/23/24  Wound due to: Skin Tear and Trauma  Wound history/plan of care: Unclear. Pt has very fragile skin that is prone to skin tears.   Wound base: 90 % dermis, 10 %  skin flap     Palpation of the wound bed: normal      Drainage: small     Description of drainage: bloody     Measurements (length x width x depth, in cm): 2.5  x 2.5  x  0.1 cm      Tunneling: N/A     Undermining: N/A  Periwound skin: Intact      Color: normal and consistent with surrounding tissue      Temperature: normal   Odor: none  Pain: moderate, sharp  Pain interventions prior to dressing change: slow and gentle cares   Treatment goal: Heal  and Protection  STATUS: improving  Supplies ordered: supplies stored on unit      Treatment Plan:     Skin tears wound(s) right upper chest and right ankle: Every 3 days and PRN  Gently cleanse with normal saline.  Apply No sting barrier film to any area that will be covered the adhesive.  Cut adaptic to fit wound wound bed.  Cover with mepilex.     Orders: Written    RECOMMEND PRIMARY TEAM ORDER: None, at this time  Education provided: Not appropriate today   Discussed plan of care with: Patient and Nurse  WOC nurse follow-up plan: weekly  Notify WOC if wound(s) deteriorate.  Nursing to notify the Provider(s) and re-consult the WOC Nurse if new skin concern.    DATA:     Current support surface: Standard  Standard gel/foam mattress (IsoFlex, Atmos air, etc)  Containment of urine/stool: Incontinence Protocol  BMI: Body mass index is 22.97 kg/m .   Active diet order: Orders Placed This Encounter      Regular Diet Adult      NPO per Anesthesia Guidelines for Procedure/Surgery Except for: Meds     Output: I/O last 3 completed shifts:  In: 1705 [P.O.:940; I.V.:765]  Out: 930 [Urine:700; Chest Tube:230]     Labs:   Recent Labs   Lab 01/30/24  0524   ALBUMIN 3.4*   HGB 8.4*   INR 3.16*   WBC 17.3*     Pressure injury risk assessment:   Sensory Perception: 3-->slightly limited  Moisture:  3-->occasionally moist  Activity: 3-->walks occasionally  Mobility: 3-->slightly limited  Nutrition: 2-->probably inadequate  Friction and Shear: 2-->potential problem  Castillo Score: 16      Please contact through [facility] group: [facility] Nurse  Dept. Office Number: [phone]

## 2024-01-30 NOTE — PROGRESS NOTES
[facility] PROGRESS NOTE     Patient:  Stefani Crowell   Date of birth 1960, Medical record number [id number]  Date of Visit:  01/30/2024  Date of Admission: 1/22/2024  Consult Requester:Venancio Fu MD          Assessment and Plan:   ID Problem List  Right-sided loculated empyema  Thoracentesis (1/23): ~126K TNC, 98% neutrophils; total protein 2.9, glucose <2, +Light's criteria. Culture = E. Coli  Chest tube in place (1/24/24)  Leukocytosis  Positive blood cultures - Staph epi in 1/4 bottles on 1/23 is a contaminant  Decompensated alcohol-related cirrhosis   Anemia, thrombocytopenia, coagulopathy  Hepatic encephalopathy  MATTIE, HRS  Ascites and hepatic hydrothorax requiring 3x/weekly drainage  Grade 1 EV  Hyponatremia  Dirty UA (4 squamous cells), urine culture +E. Coli (Amp-R) - no therapy required  History of C difficile (+PCR 9/4/20)     RECOMMENDATION:  Continue IV ceftriaxone 1g once daily and PO metronidazole 500 mg q8hrs for empyema  Duration of antibiotics TBD - minimum 4-6 weeks for empyema pending source control  May need additional source control of empyema given if continued elevated nucleated cell counts and persistent leukocytosis.   Appreciate IP management of chest tube - plan for repeat fluid analysis 2/1     ASSESSMENT:  Stefani Crowell is a 63 year old female with PMHx significant for severe asthma, decompensated alcohol related cirrhosis c/b thrombocytopenia, anemia, esophageal varices (grade 1), hepatic encephalopathy, hepatorenal syndrome, ascites and hepatic hydrothorax requiring 3x/week drainage, hypothyroidism, depression, and anxiety who was admitted for abnormal labs (Na 112, K 5.3). Hospital course notable for CXR with loculated right pleural effusion s/p thoracentesis consistent with empyema. ID consulted for GNB pleural effusion and GPC on blood cultures. Staph epi in 1/2 sets, in 1/4 bottles felt to be contaminant. Repeat blood cultures with no  growth. She also had +E. Coli on urine culture, patient is asymptomatic and this is a dirty specimen with 4 squamous cells, would not treat. However, this will be incidentally covered with therapy for her E. Coli empyema.      Thoracentesis 1/23 with exudative effusion with ~126K total nucleated cells and culture with E. coli. Unclear etiology of this empyema. No recent or current concerns for pneumonia - did have recent URI, likely viral, but no productive cough or reported hypoxia, though does have chest CT with peribronchial consolidative opacities. No concurrent SBP (paracentesis same day with 131 ANC, Cx NG), though does get 3x/week paracentesis +/- thoracentesis which may be source via direct inoculation. Last outpatient thoracentesis on 1/17 - no labs done; CXR at that time noted loculated apical component. Appreciate IP involvement for chest tube placement - initially with 1900 ml seropurulent output day 1, now sanguinous. Chest CT with large apical collection - unclear if being drained by present lower chest tube initially. Appreciate lytics started per IP on 1/26, tolerated well. She has persistent leukocytosis to ~20s, concerning for lack of source control and consider if 2nd chest tube is necessary to drain apical collection. IP feels apical collection is draining from existing chest tube which is reassuring. Repeat pleural fluid analysis 1/29 with still 104K total nucleated cells and repeat chest CT 1/29 with unchanged hydropneumothorax, increased gas concerning for lack of source control. It will be difficult to determine ultimate source control given her baseline hepatic hydrothorax and increased risks associated with any additional chest tube/healing. Agree with plan for repeat cell counts this week. Leukocytosis improving today. Continue IV ceftriaxone + PO flagyl. Anticipate 4-6 week course for empyema, pending source control.    Infectious diseases will continue to follow.  Patient and plan  staffed with Dr. Mohr. Recommendations discussed with primary team.    Josseline Pace PA-C  Infectious Diseases  Pager #[phone] or Vocera    40 MINUTES SPENT BY ME on the date of service doing chart review, history, exam, documentation & further activities per the note.           Interim History and Events:     Lolly remains afebrile, vitals stable, on room air. WBC downtrending - 17.3 today. Some pain at chest tube site, now epigastric pain today. No radiation of pain. Denies fever, chills, cough, chest pain, dyspnea. Stools are at baseline.    Chest tube with 280 ml out yesterday. CXR stable. Repeat R pleural fluid analysis = 104,300 TNC. Had 3L paracentesis yesterday - no SBP.          ROS:   -Focused 5 point ROS completed, pertinent positives and negatives listed above.      Physical Examination:  Temp: 97.5  F (36.4  C) Temp src: Oral BP: 102/53 Pulse: 73   Resp: 18 SpO2: 98 % O2 Device: None (Room air)      Vitals:    01/24/24 0030 01/28/24 0800 01/28/24 1404 01/29/24 0237   Weight: 63.2 kg (139 lb 5.3 oz) 63.2 kg (139 lb 5.3 oz) 47.1 kg (103 lb 14.4 oz) 64.1 kg (141 lb 5 oz)    01/30/24 0444   Weight: 62.6 kg (138 lb 0.1 oz)       Constitutional: Pleasant adult female, in NAD. Alert and interactive.   HEENT: NCAT, trace icteric sclerae, conjunctiva clear. Moist mucous membranes.  Respiratory: Non-labored breathing, good air exchange. Lungs are clear to auscultation on left, diminished on right but improving. No wheezing, crackles or rhonchi. No cough noted. Chest tube with serosanguinous output  Cardiovascular: Regular rate and rhythm with no murmur, rub or gallop.  GI: Normoactive BS. Abdomen is soft, nontender to percussion or palpation, mild-moderately distended. No rigidity or guarding. No rebound tenderness.  Skin: Warm and dry. Multiple scabs, skin tears. No purulence or cellulitis.  Musculoskeletal: Extremities grossly normal. No tenderness or edema present.   Neurologic: A &O x3, speech normal,  "answering questions appropriately. Moves all extremities spontaneously.  VAD: PIV is c/d/i with no erythema, drainage, or tenderness.      Medications:   cefTRIAXone  2 g Intravenous Q24H    cholecalciferol  50,000 Units Oral Q7 Days    citalopram  40 mg Oral Daily    fluticasone-vilanterol  1 puff Inhalation Daily    lactulose  20 g Oral TID    levothyroxine  112 mcg Oral Daily    lidocaine  1 patch Transdermal Q24H    metroNIDAZOLE  500 mg Oral TID    montelukast  10 mg Oral At Bedtime    mupirocin   Topical TID    oxymetazoline  2 spray Both Nostrils BID    pantoprazole  40 mg Oral BID    rifaximin  550 mg Oral BID    sodium chloride (PF)  3 mL Intracatheter Q8H       Infusions/Drips:   sodium chloride 2% infusion 25 mL/hr at 01/30/24 1200       Laboratory Data:   No results found for: \"ACD4\"    Inflammatory Markers    Recent Labs   Lab Test 12/06/23  1701 06/29/18  1309   SED 12 7   CRP  --  5.9       Metabolic Studies       Recent Labs   Lab Test 01/30/24  1134 01/30/24  0800 01/30/24  0524 01/30/24  0026 01/29/24  2045 01/29/24  1612 01/29/24  1158 01/29/24  0542 01/28/24  1441 01/28/24  0426 01/27/24  1557 01/27/24  0603 01/26/24  1624 01/26/24  0435 01/25/24  0958 01/25/24  0550 01/25/24  0027 01/24/24  2134 01/24/24  1905 01/24/24  1659 01/23/24  0730 01/23/24  0648 01/23/24  0210 01/22/24  2232 01/22/24  1734 10/19/23  1523 10/03/23  0853 08/18/23  1402 07/24/23  1457   * 123* 123*  123* 122* 125* 121*   < > 120*   < > 118*  118*   < > 122*  122*   < > 122*  122*   < > 122*  122*   < > 124*   < > 120*   < > 111*   < > 111* 110*   < > 126*   < > 133*   POTASSIUM  --   --  3.8  --   --   --   --  3.8  --  3.9  --  3.7  --  3.9  --  4.1  --   --   --   --    < > 5.2  --   --  5.1   < > 3.9   < > 3.4   CHLORIDE  --   --  97*  --   --   --   --  94*  --  91*  --  94*  --  93*  --  93*  --   --   --   --    < > 85*  --   --  81*   < > 91*   < > 95*   CO2  --   --  15*  --   --   --   --  16*  --  17*  " --  17*  --  17*  --  17*  --   --   --   --    < > 17*  --   --  18*   < > 25   < > 24   ANIONGAP  --   --  11  --   --   --   --  10  --  10  --  11  --  12  --  12  --   --   --   --    < > 9  --   --  11   < > 10   < > 14   BUN  --   --  75.8*  --   --   --   --  71.0*  --  66.4*  --  70.1*  --  77.5*  --  83.4*  --   --   --   --    < > 95.3*  --   --  85.9*   < > 33.9*   < >  --    CR  --   --  0.76  --   --   --   --  0.84  --  0.82  --  0.94  --  1.17*  --  1.13*  --   --   --   --    < > 1.16*  --   --  1.12*   < > 1.24*   < >  --    GFRESTIMATED  --   --  89  --   --   --   --  79  --  81  --  69  --  53*  --  55*  --   --   --   --    < > 53*  --   --  56*   < > 49*   < >  --    GLC  --   --  98  --   --   --   --  95  --  101*  --  119*  --  119*  --  75  --   --   --   --    < > 74  --   --  121*   < > 98   < >  --    A1C  --   --   --   --   --   --   --   --   --   --   --   --   --   --   --   --   --   --   --   --   --   --   --   --   --   --   --   --  4.7   UMA  --   --  9.4  --   --   --   --  9.3  --  9.7  --  9.7  --  9.9  --  9.9  --   --   --   --    < > 9.8  --   --  9.6   < > 10.2   < >  --    PHOS  --   --   --   --   --   --   --   --   --   --   --   --   --   --   --   --   --   --   --   --   --   --   --  3.8  --   --  2.6  --   --    MAG  --   --   --   --   --   --   --   --   --   --   --   --   --   --   --   --   --   --   --   --   --  3.4*  --   --  2.8*   < >  --    < >  --    LACT  --   --   --   --   --   --   --   --   --   --   --   --   --   --   --   --   --  1.9  --  3.0*   < >  --   --   --   --    < >  --   --   --     < > = values in this interval not displayed.       Hepatic Studies    Recent Labs   Lab Test 01/30/24  0524 01/29/24  0542 01/28/24  0426 01/27/24  0603 01/26/24  0435 01/25/24  0550 01/24/24  0532 01/23/24  1405 12/15/23  0545 12/11/23  0812   BILITOTAL 3.0* 3.3* 3.6* 3.8* 4.8* 4.8*   < >  --    < > 4.9*   ALKPHOS 106 86 77 104 98 85   < >  --    < >  67   ALBUMIN 3.4* 3.5 3.6 3.6 4.0 3.6   < >  --    < > 3.4*   AST 40 45 40 43 57* 51*   < >  --    < > 47*   ALT 22 25 25 27 30 29   < >  --    < > 36   LDH  --   --   --   --   --   --   --  195  --  185    < > = values in this interval not displayed.       Pancreatitis testing    Recent Labs   Lab Test 12/06/23  1843 12/06/23  1608 10/22/23  0150 08/15/22  1426 09/03/20  1216 03/01/19  0954 04/08/16  1040   LIPASE  --  125* 116*  --  189  --   --    TRIG 32  --   --  111  --  125 97       Hematology Studies      Recent Labs   Lab Test 01/30/24  0524 01/29/24  0542 01/28/24  0426 01/27/24  0603 01/26/24  0435 01/25/24  1617 01/25/24  0550 01/19/21  1502 09/16/20  1147 09/03/20  1216 06/29/18  1309   WBC 17.3* 19.8* 20.8* 19.1* 17.9*  --  18.6*   < > 7.1   < > 5.6   ANEU  --   --   --   --   --   --   --   --  4.8  --  3.4   ALYM  --   --   --   --   --   --   --   --  1.5  --  1.5   VIJI  --   --   --   --   --   --   --   --  0.5  --  0.6   AEOS  --   --   --   --   --   --   --   --  0.2  --  0.1   HGB 8.4* 8.6* 8.1* 7.7* 8.0* 7.8* 6.4*   < > 13.9   < > 13.7   HCT 25.0* 26.0* 24.1* 23.4* 23.8*  --  19.4*   < > 43.0   < > 41.8   PLT 57* 62* 55* 57* 60*  --  54*   < > 118*   < > 125*    < > = values in this interval not displayed.       Arterial Blood Gas Testing  No lab results found.     Urine Studies     Recent Labs   Lab Test 01/22/24  2323 12/07/23  1237 12/06/23  1730 11/13/23  1540 11/02/23  1236   URINEPH 5.0 5.0 5.0 5.0 5.5   NITRITE Negative Negative Negative Negative Negative   LEUKEST Large* Small* Trace* Small* Large*   WBCU 83* 4 7* 17* 23*       Microbiology:  Culture   Date Value Ref Range Status   01/29/2024 No growth, less than 1 day  Preliminary   01/24/2024 No Growth  Final   01/24/2024 No Growth  Final   01/23/2024 4+ Escherichia coli (A)  Final   01/23/2024 No Growth  Final   01/23/2024 No anaerobic organisms isolated  Final   01/23/2024 No growth after 6 days  Preliminary   01/23/2024 No  growth after 6 days  Preliminary   01/23/2024 Positive on the 1st day of incubation (A)  Final   01/23/2024 Staphylococcus epidermidis (AA)  Final     Comment:     1 of 2 bottles   01/23/2024 No Growth  Final   01/22/2024 >100,000 CFU/mL Escherichia coli (A)  Final   12/12/2023 No Growth  Final   12/06/2023 <10,000 CFU/mL Mixture of Urogenital Jenni  Final   11/13/2023 >100,000 CFU/mL Klebsiella pneumoniae (A)  Final   11/13/2023 50,000-100,000 CFU/mL Klebsiella pneumoniae (A)  Final   11/12/2023 No Growth  Final   11/09/2023 No Growth  Final   11/06/2023 No Growth  Final   11/06/2023 No anaerobic organisms isolated  Final   10/25/2023 No Growth  Final   10/22/2023 No Growth  Final   10/22/2023 >100,000 CFU/mL Mixture of urogenital jenni  Final       Last check of C difficile  C Diff Toxin B PCR   Date Value Ref Range Status   09/04/2020 Positive (A) NEG^Negative Final     Comment:     Positive: Toxin producing C. difficile target DNA sequences detected, presumed   positive for C. difficile toxin B. C. difficile (Requires Enteric Isolation).      C. difficile (Requires Enteric Isolation)  FDA approved assay performed using [employer] GeneXpert real-time PCR.         Imaging:  CT Chest w/o contrast 1/29/24  IMPRESSION:   1. Compared to CT 1/25/2024 there is increased gas component of the  moderate right hydropneumothorax without substantial change in overall  size.  2. Interval resolution of several consolidative opacities in the right  lung while other peripheral consolidative opacities remain, likely  areas of atelectasis and rounded atelectasis. No new consolidation.  3. Stable 4.3 cm ascending thoracic aortic aneurysm.  4. Cirrhotic morphology of liver with moderate volume ascites.    CXR 1/29/24  IMPRESSION:   1. No substantial change in right moderate hydropneumothorax with  chest tube in place.  2. No substantial change in airspace opacities projecting over the  right mid and lower lung fields.    CXR  1/27/24  IMPRESSION:   1. Stable appearance of right hydropneumothorax with chest tube in  place.  2. Stable appearance of hazy opacity within the right mid/upper lung,  may represent pneumonia.    CT Chest w/o contrast 1/25/24  IMPRESSION:  1. Small right-sided hydropneumothorax, previously moderate, with  slightly increased air component compared to CT 12/15/2023 which may  be in part related to placement of right basilar chest tube. There is  a fairly large apical component which may be communicating with the  basilar component containing the chest tube along the right lateral  aspect. Adjacent linear atelectasis throughout the right lung.  Aeration of the lung has improved compared to CT 11/13/2023.  2. Peribronchial consolidative opacities in the right upper lobe  representing infectious or inflammatory process.  3. Trace left effusion with adjacent atelectasis. Improved groundglass  opacities from CT 11/13/2023. Mild pulmonary edema.  4. Borderline ascending aortic aneurysm measuring 4.3 cm.    XR Chest 2 Views  Result Date: 1/22/2024  IMPRESSION: Large loculated right pleural effusion with associated atelectasis of the right upper, middle, and lower lobes. Increased in fluid volume compared to 12/14/2023.

## 2024-01-30 NOTE — PROGRESS NOTES
[facility] [place]  HEPATOLOGY PROGRESS NOTE  Stefani Crowell [id number]       IMPRESSION:  Stefani Crowell is a 63 year old female with a history of alcohol associated cirrhosis, last use 6/28/23 complicated by diuretic refractory ascites and hepatic hydrothorax with 3x weekly courtney and thoras, hyponatremia, hepatic encephalopathy, MATTIE/CKD (prior terlipressin 11/2023), small EV, asthma, COPD who was admitted following labs indicating serum sodium level at para of 110. She underwent thoracentesis with evidence of empyema.     RECOMMENDATIONS:    Updates for 01/30/2024 :  - pulmonary to determine timing of chest tube removal    # Empyema  # Hepatic hydrothorax  # Ascites  # Trapped lung  - thora 1/23 with 1 lights criteria, high PMNs, low glucose. Remains on ceftriaxone and metronidazole per ID. Pleural Cultures + for e. Coli.   - Appreciate interventional pulm following. Received lytics in chest tube x3 days. Continues to have high PMN's on cell count 1/29. Decrease amount of fluid in chest tube.  Has evidence of trapped lung with atelectasis on CXR/CT. No evidence of hypoxia.   - Lidocaine patches for pain.       - Other infectious work up in progress- 1/2 bottles blood culture with +gpc, likely contaminant. No other + cultures  - para without evidence of SBP. Repeat para as needed for comfort, limit to <4L and send diagnostic testing with each sample. Para sample negative for SBP on 1/29    # Hyponatremia  - nephrology following  - on 2% saline. Sodium level increased to 120-122 from admit of 110. Baseline ~124  - Fluid restriction to 1.2 L    # Alcohol associated cirrhosis  # Transplant candidacy  - MELD 30, ABO A  - with current infection, not a candidate for liver transplant at this time. Still needs [facility] for cardiac risk assessment for transplant. Originally scheduled for 2/1 with OP cath, depending on clinical course, will discuss with cards to see if can be done inpatient.   - US abd 10/22/23 negative  "for HCC    # Hepatic encephalopathy  - continue with lactulose and rifaximin.     # Debility  # Moderate protein calorie malnutrition  - PT/OT- was up walking with OT and steady.   - BID protein supplements- working with RD for other options for protein supplements.     Patient was discussed with attending physician, Dr. Powers.  The above note reflects our joint plan.     Next follow up appointment: Dr. Lim 1/29/24    Thank you for the opportunity to be involved with  Stefani [facility]. Please call with any questions or concerns.    ROD Urban, CNP  Inpatient Hepatology [facility]        Subjective    Feeling improved with recent para. No dyspnea and has been able to be up walking with PT without difficulty. No fevers, abdominal pain, melena.         Objective    Vital signs:  Temp: 97.5  F (36.4  C) Temp src: Oral BP: 102/53 Pulse: 73   Resp: 18 SpO2: 98 % O2 Device: None (Room air)     Weight: 62.6 kg (138 lb 0.1 oz)  Estimated body mass index is 22.97 kg/m  as calculated from the following:    Height as of 12/29/23: 1.651 m (5' 5\").    Weight as of this encounter: 62.6 kg (138 lb 0.1 oz).    Gross per 24 hour   Intake 2038 ml   Output 820 ml   Net 1218 ml      General: In no acute distress, moderate facial muscle wasting  Neuro: alert Ox3, No asterixis.   HEENT:  Noscleral icterus, Nooral lesions  CV:  Skin warm and dry  Lungs:  Respirations even and nonlabored on room air Right chest tube with serous  Abd:  Moderate abdominal distention. nontender   Extrem: no lower peripheral edema   Skin:  mild jaundice  Psych: flat    MEDICATIONS:  Current Facility-Administered Medications   Medication    acetaminophen (TYLENOL) tablet 650 mg    albuterol (PROVENTIL HFA/VENTOLIN HFA) inhaler    albuterol (PROVENTIL) neb solution 2.5 mg    alum & mag hydroxide-simethicone (MAALOX) suspension 30 mL    artificial saliva (BIOTENE DRY MOUTHWASH) liquid 5 mL    benzocaine-menthol (CHLORASEPTIC MAX) 15-10 MG lozenge 1 " lozenge    calcium carbonate (TUMS) chewable tablet 1,000 mg    cefTRIAXone (ROCEPHIN) 2 g vial to attach to  ml bag for ADULTS or NS 50 ml bag for PEDS    cholecalciferol (VITAMIN D3) capsule 50,000 Units    citalopram (celeXA) tablet 40 mg    diclofenac (VOLTAREN) 1 % topical gel 2 g    fluticasone-vilanterol (BREO ELLIPTA) 200-25 MCG/ACT inhaler 1 puff    lactulose (CHRONULAC) solution 20 g    levothyroxine (SYNTHROID/LEVOTHROID) tablet 112 mcg    lidocaine (LMX4) cream    lidocaine 1 % 0.1-1 mL    metroNIDAZOLE (FLAGYL) tablet 500 mg    midodrine (PROAMATINE) tablet 15 mg    montelukast (SINGULAIR) tablet 10 mg    mupirocin (BACTROBAN) 2 % ointment    [Held by provider] ondansetron (ZOFRAN) injection 4 mg    ondansetron (ZOFRAN) injection 4 mg    oxymetazoline (AFRIN) 0.05 % spray 2 spray    pantoprazole (PROTONIX) EC tablet 40 mg    rifaximin (XIFAXAN) tablet 550 mg    senna-docusate (SENOKOT-S/PERICOLACE) 8.6-50 MG per tablet 1 tablet    Or    senna-docusate (SENOKOT-S/PERICOLACE) 8.6-50 MG per tablet 2 tablet    sodium chloride (PF) 0.9% PF flush 3 mL    sodium chloride (PF) 0.9% PF flush 3 mL    sodium chloride 2% infusion       REVIEW OF LABORATORY, PATHOLOGY AND IMAGING RESULTS:  [facility]  Recent Labs   Lab 01/30/24  0800 01/30/24  0524 01/30/24  0026 01/29/24  2045 01/29/24  1158 01/29/24  0542 01/28/24  1441 01/28/24  0426 01/27/24  1557 01/27/24  0603   * 123*  123* 122* 125*   < > 120*   < > 118*  118*   < > 122*  122*   POTASSIUM  --  3.8  --   --   --  3.8  --  3.9  --  3.7   CHLORIDE  --  97*  --   --   --  94*  --  91*  --  94*   UMA  --  9.4  --   --   --  9.3  --  9.7  --  9.7   CO2  --  15*  --   --   --  16*  --  17*  --  17*   BUN  --  75.8*  --   --   --  71.0*  --  66.4*  --  70.1*   CR  --  0.76  --   --   --  0.84  --  0.82  --  0.94   GLC  --  98  --   --   --  95  --  101*  --  119*    < > = values in this interval not displayed.     CBC  Recent Labs   Lab 01/30/24  1846  "01/29/24 0542 01/28/24 0426 01/27/24  0603   WBC 17.3* 19.8* 20.8* 19.1*   RBC 2.53* 2.64* 2.50* 2.42*   HGB 8.4* 8.6* 8.1* 7.7*   HCT 25.0* 26.0* 24.1* 23.4*   MCV 99 99 96 97   MCH 33.2* 32.6 32.4 31.8   MCHC 33.6 33.1 33.6 32.9   RDW 17.4* 17.3* 17.4* 17.5*   PLT 57* 62* 55* 57*     INR  Recent Labs   Lab 01/30/24 0524 01/29/24  0542 01/28/24 0426 01/27/24  0603   INR 3.16* 3.28* 2.87* 2.94*     LFTs  Recent Labs   Lab 01/30/24 0524 01/29/24 0542 01/28/24 0426 01/27/24  0603   ALKPHOS 106 86 77 104   AST 40 45 40 43   ALT 22 25 25 27   BILITOTAL 3.0* 3.3* 3.6* 3.8*   PROTTOTAL 5.3* 5.4* 5.2* 5.3*   ALBUMIN 3.4* 3.5 3.6 3.6        MELD 3.0: 30 at 1/30/2024  8:00 AM  MELD-Na: 30 at 1/30/2024  8:00 AM  Calculated from:  Serum Creatinine: 0.76 mg/dL (Using min of 1 mg/dL) at 1/30/2024  5:24 AM  Serum Sodium: 123 mmol/L (Using min of 125 mmol/L) at 1/30/2024  8:00 AM  Total Bilirubin: 3.0 mg/dL at 1/30/2024  5:24 AM  Serum Albumin: 3.4 g/dL at 1/30/2024  5:24 AM  INR(ratio): 3.16 at 1/30/2024  5:24 AM  Age at listing (hypothetical): 63 years  Sex: Female at 1/30/2024  8:00 AM    Previous work-up:   Lab Results   Component Value Date    HEPBANG Nonreactive 02/18/2021    HBCAB Nonreactive 02/18/2021    HCVAB  08/03/2017     Nonreactive   Assay performance characteristics have not been established for newborns,   infants, and children      TANYA 157 10/03/2023    IRONSAT 26 11/23/2023    TSH 3.98 12/06/2023    CHOL 176 08/15/2022    HDL 44 (L) 08/15/2022     (H) 08/15/2022    TRIG 32 12/06/2023    A1C 4.7 07/24/2023    POPETH <10 01/11/2024    PLPETH <10 01/11/2024      No results found for: \"SPECDES\", \"LDRESULTS\"      Imaging Results:  CXR 1/30/24  IMPRESSION:   1. Stable right moderate hydropneumothorax with chest tube in place.  2. Stable right-sided airspace opacities and atelectasis.        "
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
## 2024-01-30 NOTE — PLAN OF CARE
Neuro: A&Ox4.  Slow to respond at times.  Cardiac: No tele.  Bps 90s-100s/40s-50s, MAPs 61-67.  Paged provider overnight d/t SBPs in 90s and unable to give PRN midodrine d/t frequency of med.   Respiratory: Sating >92% on RA.  GI/: Adequate urine output.  BM X4.  Diet/appetite: Tolerating Regular diet w/ 1L FR.  Activity:  Assist of 1, up to BR.  Pain: R-sided CT pain, unrelieved by voltaren gel.  PRN Tylenol given x1 & effective per pt.  Skin: No new deficits noted.  LDA's: 2 PIVs w/ 2% NS @ 30mL/hr & SL.  R CT to WS.    Plan: Continue with POC. Notify primary team with changes.

## 2024-01-30 NOTE — PROGRESS NOTES
[facility]    Medicine Progress Note - Medicine Service, [facility] TEAM 4    Date of Admission:  1/22/2024    Assessment & Plan   Stefani Crowell is a 63 year old female admitted on 1/22/2024. She has a history of cirrhosis secondary to alcohol use currently being worked up for liver transplant complicated by need for frequent paracenteses and thoracenteses for ascites/hepatic hydrothorax, in addition to asthma and COPD 2/2 tobacco use, MDD/STEPHANIE, hypothyroidism, low back pain, and chronic hyponatremia. She presented to the ED for evaluation of acute on chronic hyponatremia in the setting of recent viral-like URI illness. Admitted for careful monitoring, and for evaluation of loculated pleural effusion with significant leukocytosis. Currently being treated for Empyema      Changes Today:  - Lidocaine patch  - Discontinue diclofenac gel  - CT chest this AM  - 2% NS rate reduced to 25ml/Hr   > Q12h Na check  - Tentative cath on 2/1; made NPO @ 0001 on 2/1 for this (suspect unlikely d/t acute illness)    ==========================================================    # Hydropneumothorax  # Loculated R pleural effusion; E coli empyema  # Asymptomatic Bacteruria  # At risk for skin/soft tissue infection  # Trapped lung  # Leukocytosis  # Hx Hepatic Hydrothorax   # Chest pain at site of chest tube  No clear symptoms, UA and urine culture corroborate possible infection. Notably, Ucx on 11/13/23 was w/ klebsiella species and now E coli on 1/22/24. Patient not reporting any urinary symptoms, though in her altered state it is unclear. On 1/23, had thora + paracentesis per IR and pleural fluid cultures yielded GNB. Blood cultures yielded GPC, though this is most likely a contaminant. Has very fragile skin and numerous tears. Interventional pulm placed chest tube on 1/24. CT Chest on 1/25 demonstrated improved R-sided hydropneumothorax after. Repeat CT Chest on 1/29 demonstrated  increased gas component of the R hydropneumothorax w/o changes in overall size. Patient tolerated 6 doses of alteplase total. Repeat pleural fluid labs on 1/29 were consistent w/ ongoing infection, though marginally improved.  - WOC consult  - Pulmonary Consult   > Chest tube placed 1/24   > Suspect apical collection in communication w/ basilar component   > Continue waterseal   > Will recheck cell counts in 1-2 days to assess for continuing improvement  - Blood cultures:   > 1/23: Staph epi. (suspect contaminant)   > 1/24: NGTD  - Pleural fluid culture:   > 1/23: E coli (Amp res.)  - Peritoneal fluid culture:   > 1/23: NGTD  - Urine culture:   > 1/22: Ampicillin Res. E. Coli   - Infectious Disease consulted (1/24)   > Stop IV Zosyn   > Resumed IV Ceftriaxone 1g every day, Added metronidazole 500mg Q8H  - Interventional Pulm consulted (1/24) for chest tube (source control). Required  2u FFP and INR to follow    > Daily CXR   > Initiated alteplase to break up fibrin; started on 1/26 and received 6 doses   > 1/29; sent additional fluid labs to monitor for improvement of infection  - CT Scan on 1/25, repeat on 1/29 AM w/ stable hydropneumothorax w/ increased gas component  - Para Labs:   > Aerobic/anaerobic culture (NGTD), cell counts (completed), gram stain (NGTD), LDH, glucose, protein, AFB (pending), fungal culture (pending)  - Thora labs:  > Aerobic (GNB)/anaerobic culture (pending), cell counts, gram stain, LDH, glucose, protein, AFB (pending), fungal culture (pending)  > Cell counts demonstrate 125,760 total nucleated cells w/ significant % (98) neutrophils consistent w/ empyema as verified on gram stain  > Cell counts (1/29) w/ 104,300 cells and 92%. Slightly improved from before  - CBC w/ diff daily   - Lidocaine patch for pain related to chest tube  - Abx:  > Ceftriaxone: 1/23 - 1/24, 1/25 - *   > Metronidazole 500mg Q8H: 1/25 - *   > Rifaximin: 1/22 - *  > Zosyn: 1/24 - 1/25  > Vancomycin: 1/24  [  ] Dispo:  Duration of abx TBD, minimum 4-6 weeks for empyema       # Decompensated alcoholic cirrhosis (Ascites, HE)  # Current liver transplant evaluation   # Hepatic encephalopathy   # Recurrent ascites  # Abdominal Pain  Decompensations include jaundice/icterus, G1 esophageal & rectal varices on EGD without bleeding, significant ascites requiring 3 times weekly paracenteses, hepatic hydrothorax requiring frequent thoracenteses, and hepatic encephalopathy.  Gets thoras and courtney through the [facility] system. During prior hospitalization, her transplant eligibility was reopened due to completion of chemotherapy treatment and improved renal function.  Do suspect that she currently has some hepatic encephalopathy due to decreased bowel movements recently.  MELD NA 26 on discharge 12/16, 29 on most recent labs. Last admit: CMV (PCR quant neg), EBV (low DNA quant, 537 copies; discussed with ID and no need to recheck). Last admit: IGRA (neg), treponemal ab (nonreactive), serum HCG (neg), and spot protein urine (0.24 mg/mg). Repeat peritoneal labs negative for infection/SBP.  MELD 3.0: 30 at 1/30/2024  5:24 AM  MELD-Na: 30 at 1/30/2024  5:24 AM  Calculated from:  Serum Creatinine: 0.76 mg/dL (Using min of 1 mg/dL) at 1/30/2024  5:24 AM  Serum Sodium: 123 mmol/L (Using min of 125 mmol/L) at 1/30/2024  5:24 AM  Total Bilirubin: 3.0 mg/dL at 1/30/2024  5:24 AM  Serum Albumin: 3.4 g/dL at 1/30/2024  5:24 AM  INR(ratio): 3.16 at 1/30/2024  5:24 AM  Age at listing (hypothetical): 63 years  Sex: Female at 1/30/2024  5:24 AM    - Hepatology consult   > with current infection, not a candidate for liver transplant at this time. Still needs [facility] for cardiac risk assessment for transplant.   - Tentative cath on 2/1; made NPO @ 0001 on 2/1 for this (unclear if this will happen d/t acute illness)  - IR consulted for diagnostic Paracentesis on 1/23   > Called [facility] to attempt to add-on labs to samples from 1/22; samples were not retained   > Prefer  to avoid therapeutic to avoid massive fluid shifts   > Para + Thora labs: Aerobic/anaerobic culture, cell counts, gram stain, LDH, glucose, protein (results above)  - CAPS to assist w/ para on 1/28 (some hypotension 1/28/2024, would like to avoid shifts)  - Tylenol 650mg Q8H PRN (total dose < 2g)  - Continue lactulose, Rifaximin   - Continue PPI  - Target 3-4 BM daily   - Abx as above.  - Paracentesis log:   > 1/23: IR w/ 100ml removed for diagnostics only   > 1/26: CAPS w/ 1.5L removed (still a lot remaining, opted for less d/t fluid shifts and initially labile BP)   > 1/29: paracentesis w/ 3L remove       # Acute mild on Chronic Hyponatremia - Stable  Chronic hyponatremia common in the setting of cirrhosis, and she has been admitted for hyponatremia several times in the past. Baseline mid 120s.  Given her history of low p.o. intake recently, do worry about a hypovolemic hyponatremia. S/p Albumin with paracentesis 1/22.  Given additional albumin on 1/24, 1/25. Her sodium corrected w/ 2% NS at a slow rate.  - Nephrology consulted   > Albumin 75g   - Restarted 2% at 25ml/hr (1/30)  - Q12hr Na checks  - strict ins/outs   - daily weights   - Regular diet      # MATTIE  # hepatorenal syndrome   Chart review suggests a prior history of hepatorenal syndrome.  However, her history at the moment is more compatible with a prerenal MATTIE secondary to poor p.o. intake.  Baseline Cr around 0.6, nearly double this on admission. Receiving fluids as above for hyponatremia, will trend creatinine. S/p albumin w/ para and additional 75g on 1/23, 1/24, and 1/25. Her Crt remained stable after these doses..   - Nephrology consulted - signed off 1/26  - Avoid nephrotoxic agents  - Fluids as above  - Continue Midodrine 15mg PO TID PRN   > Will make PRN as her BP is labile while here, and do not want to have scheduled as a means of increasing her BP when we would also like to keep her BP within normal range for her aortic aneurysm.  [  ] Dispo:  Renal scheduling in CKD clinic     # Borderline Ascending Aortic Aneurysm  Noted on CT Chest on 1/25. Measured to be 4.3 cm. I personally reviewed the CT chest and found largest diameter to be about 43.5mm. I compared this to the widest portion on of the ascending aorta on her CTA coronary on 12/15/23 which was 42.0mm. This is fairly stable.  - Will speak w/ Thoracic Surg re. Follow up guidelines  - BP Goal near normotensive < 130/<85  - HR goal < 80  [  ] Dispo: Follow up guidelines pending     # Severe malnutrition in context of chronic illness  Patient's phos was appropriate on 1/22. 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 Holding off on prednisone for now iso possible infection and respiratory stability  - Continue PRN albuterol inhaler or nebs  - Continue ICS-LABA  - Continue Singulair       # Hypothyroidism  - Continue PTA levothyroxine      # Hypermagnesemia  - CTM     # H/o hypophosphatemia - not present here  Will recheck in setting of poor oral intake. Fairly low risk for refeeding as low PO intake only recently.      # Normocytic anemia - stable  Has required outpatient white blood cell transfusions in the past.  Her hemoglobin tends to be around 7.5-9.5.  At normal range on admission. No clear bleeding history though is at risk for this. Chronic disease component and nutritional components possible as well. Hgb on 1/24 was 7.5, which is likely dilutional w/ fluids. No sources of bleeding identified.  - CBC w/ diff in AM  - Would transfuse for Hgb <7  - Transfusion log:   > 1u pRBC 1/25     # Thrombocytopenia - stable  ISO cirrhosis. Increased bleeding risk, avoiding heparin/lovenox for now.      # MDD/STEPHANIE  - Continue PTA Celexa  - Would try to avoid sedating agents d/t  current sedative presentation     # Low back pain  Stable, trial hot/cold packs first          Diet: Room Service  Regular Diet Adult  Fluid restriction 1000 ML FLUID  Snacks/Supplements Adult: Ensure Max Protein (bariatric); Between Meals  Snacks/Supplements Adult: Ensure Enlive; Between Meals  NPO per Anesthesia Guidelines for Procedure/Surgery Except for: Meds    DVT Prophylaxis: Pneumatic Compression Devices  Demarco Catheter: Not present  Lines: None     Cardiac Monitoring: None  Code Status: Full Code      Clinically Significant Risk Factors         # Hyponatremia: Lowest Na = 119 mmol/L in last 2 days, will monitor as appropriate      # Hypoalbuminemia: Lowest albumin = 2.9 g/dL at 1/23/2024  6:48 AM, will monitor as appropriate    # Coagulation Defect: INR = 3.16 (Ref range: 0.85 - 1.15) and/or PTT = N/A, will monitor for bleeding  # Thrombocytopenia: Lowest platelets = 57 in last 2 days, will monitor for bleeding           # Severe Malnutrition: based on nutrition assessment    # Financial/Environmental Concerns: none  # Asthma: noted on problem list        Disposition Plan     Expected Discharge Date: 02/02/2024      Destination: home with family            Tyrone Singh MD  Medicine Service, [facility] TEAM [address] [place] [address]  Securely message with Frequent Browser (more info)  Text page via [facility] Paging/Directory   See signed in provider for up to date coverage information  ______________________________________________________________________    Interval History   This morning patient's only complaint was about chest pain in the site of the chest tube. She asked again if possible to have lidocaine patches, which we thought was appropriate. Otherwise, she doesn't have any chest pain or SOB.    Physical Exam   Vital Signs: Temp: 97.7  F (36.5  C) Temp src: Oral BP: 104/53 Pulse: 71   Resp: 18 SpO2: 97 % O2 Device: None (Room air)    Weight: 138 lbs .13 oz    General Appearance:   Chronically ill appearing, conversational and oriented fully  Respiratory: Reduced on bilateral bases R > L w/ some crackles bilateral (stable)  Cardiovascular: RRR, warm and well perfusing extremities, loud systolic murmur  GI: distended, mildly tender throughout, fluid wave, tympanic while supine  Skin: jaundice  Other:  Moves extremities spontaneously, answers all questions appropriately. Chest tube draining red-tinged fluid, normal tidaling noted w/o air leak    Medical Decision Making       MANAGEMENT DISCUSSED with the following over the past 24 hours: nursing       Data     I have personally reviewed the following data over the past 24 hrs:    17.3 (H)  \   8.4 (L)   / 57 (L)     123 (L); 123 (L) 97 (L) 75.8 (H) /  98   3.8 15 (L) 0.76 \     ALT: 22 AST: 40 AP: 106 TBILI: 3.0 (H)   ALB: 3.4 (L) TOT PROTEIN: 5.3 (L) LIPASE: N/A     INR:  3.16 (H) PTT:  N/A   D-dimer:  N/A Fibrinogen:  N/A

## 2024-01-30 NOTE — PROVIDER NOTIFICATION
"Time of notification: 1:00 AM  Provider notified: Ananda Stoddard MD  Patient status: \"Pt's Na came back at 122. Let me know if you want to restart 2% Na gtt\"  Orders received: Continue to hold 2% Na gtt and page with 0400 Na lab result.  "

## 2024-01-31 NOTE — PROGRESS NOTES
[facility]    Medicine Progress Note - Medicine Service, [facility] TEAM 4    Date of Admission:  1/22/2024    Assessment & Plan   Stefani Crowell is a 63 year old female admitted on 1/22/2024. She has a history of cirrhosis secondary to alcohol use currently being worked up for liver transplant complicated by need for frequent paracenteses and thoracenteses for ascites/hepatic hydrothorax, in addition to asthma and COPD 2/2 tobacco use, MDD/STEPHANIE, hypothyroidism, low back pain, and chronic hyponatremia. She presented to the ED for evaluation of acute on chronic hyponatremia in the setting of recent viral-like URI illness. Admitted for careful monitoring, and for evaluation of loculated pleural effusion with significant leukocytosis. Currently being treated for Empyema      Changes Today:  - 2% NS held overnight d/t Na corrected to baseline  - CT Chest w/ + w/o contrast for tomorrow AM  - Chest tube output reducing, possibly removable in 1-2 days  - Anticipate no cath on 2/1 d/t Empyema, but NPO @ 0001 in case it happens    ==========================================================    # Hydropneumothorax  # Loculated R pleural effusion; E coli empyema  # Asymptomatic Bacteruria  # At risk for skin/soft tissue infection  # Trapped lung  # Leukocytosis  # Hx Hepatic Hydrothorax   # Chest pain at site of chest tube  No clear symptoms, UA and urine culture corroborate possible infection. Notably, Ucx on 11/13/23 was w/ klebsiella species and now E coli on 1/22/24. Patient not reporting any urinary symptoms, though in her altered state it is unclear. On 1/23, had thora + paracentesis per IR and pleural fluid cultures yielded GNB. Blood cultures yielded GPC, though this is most likely a contaminant. Has very fragile skin and numerous tears. Interventional pulm placed chest tube on 1/24. CT Chest on 1/25 demonstrated improved R-sided hydropneumothorax after. Repeat CT Chest on  1/29 demonstrated increased gas component of the R hydropneumothorax w/o changes in overall size. Patient tolerated 6 doses of alteplase total. Repeat pleural fluid labs on 1/29 were consistent w/ ongoing infection, though marginally improved. Intermittent reports from nursing of leak, which might be implicated in reduced output, though unclear how frequently this is involved/extent of effect on output as whenever we are in room to assess, even w/ cough, we do not notice leaking.  - WOC consult  - Pulmonary Consult   > Chest tube placed 1/24   > Suspect apical collection in communication w/ basilar component   > CT on 2/1  - Blood cultures:   > 1/23: Staph epi. (suspect contaminant)   > 1/24: NGTD  - Pleural fluid culture:   > 1/23: E coli (Amp res.)  - Peritoneal fluid culture:   > 1/23: NGTD  - Urine culture:   > 1/22: Ampicillin Res. E. Coli   - Infectious Disease consulted (1/24)   > Stop IV Zosyn   > Resumed IV Ceftriaxone 1g every day, Added metronidazole 500mg Q8H   > Plan for repeat pleural fluid analysis on 2/1  - Interventional Pulm consulted (1/24) for chest tube (source control). Required  2u FFP and INR to follow    > Daily CXR   > Initiated alteplase to break up fibrin; started on 1/26 and received 6 doses   > 1/29; sent additional fluid labs to monitor for improvement of infection  - CT Scan on 1/25, repeat on 1/29 AM w/ stable hydropneumothorax w/ increased gas component  - Para Labs:   > Aerobic/anaerobic culture (NGTD), cell counts (completed), gram stain (NGTD), LDH, glucose, protein, AFB (pending), fungal culture (pending)  - Thora labs:  > Aerobic (GNB)/anaerobic culture (pending), cell counts, gram stain, LDH, glucose, protein, AFB (pending), fungal culture (pending)  > Cell counts demonstrate 125,760 total nucleated cells w/ significant % (98) neutrophils consistent w/ empyema as verified on gram stain  > Cell counts (1/29) w/ 104,300 cells and 92%. Slightly improved from before  - CBC w/ diff  daily   - Lidocaine patch for pain related to chest tube  - Abx:  > Ceftriaxone: 1/23 - 1/24, 1/25 - *   > Metronidazole 500mg Q8H: 1/25 - *   > Rifaximin: 1/22 - *  > Zosyn: 1/24 - 1/25  > Vancomycin: 1/24  [  ] Dispo: Duration of abx TBD, minimum 4-6 weeks for empyema       # Decompensated alcoholic cirrhosis (Ascites, HE)  # Current liver transplant evaluation   # Hepatic encephalopathy   # Recurrent ascites  # Abdominal Pain  Decompensations include jaundice/icterus, G1 esophageal & rectal varices on EGD without bleeding, significant ascites requiring 3 times weekly paracenteses, hepatic hydrothorax requiring frequent thoracenteses, and hepatic encephalopathy.  Gets thoras and courtney through the [facility] system. During prior hospitalization, her transplant eligibility was reopened due to completion of chemotherapy treatment and improved renal function.  Do suspect that she currently has some hepatic encephalopathy due to decreased bowel movements recently.  MELD NA 26 on discharge 12/16, 29 on most recent labs. Last admit: CMV (PCR quant neg), EBV (low DNA quant, 537 copies; discussed with ID and no need to recheck). Last admit: IGRA (neg), treponemal ab (nonreactive), serum HCG (neg), and spot protein urine (0.24 mg/mg). Repeat peritoneal labs negative for infection/SBP. We wonder if TIPS might offer relief for patient in a few ways. She is likely to continue to develop hepatic hydrothorax unless her portal HTN is addressed. While chest tube assists w/ source control, it is not a permanent solution and she is likely to reaccumulate and potentially re-infect (if able to resolve current infection at all).  MELD 3.0: 28 at 1/31/2024  5:39 AM  MELD-Na: 29 at 1/31/2024  5:39 AM  Calculated from:  Serum Creatinine: 0.75 mg/dL (Using min of 1 mg/dL) at 1/31/2024  5:39 AM  Serum Sodium: 127 mmol/L at 1/31/2024  5:39 AM  Total Bilirubin: 2.8 mg/dL at 1/31/2024  5:39 AM  Serum Albumin: 3.4 g/dL at 1/31/2024  5:39  AM  INR(ratio): 3.00 at 1/31/2024  5:39 AM  Age at listing (hypothetical): 63 years  Sex: Female at 1/31/2024  5:39 AM    - Hepatology consult   > with current infection, not a candidate for liver transplant at this time. Still needs [facility] for cardiac risk assessment for transplant.   - Tentative cath on 2/1; made NPO @ 0001 on 2/1 for this (unclear if this will happen d/t acute illness)  - IR consulted for diagnostic Paracentesis on 1/23   > Called [facility] to attempt to add-on labs to samples from 1/22; samples were not retained   > Prefer to avoid therapeutic to avoid massive fluid shifts   > Para + Thora labs: Aerobic/anaerobic culture, cell counts, gram stain, LDH, glucose, protein (results above)  - CAPS to assist w/ para on 1/28 (some hypotension 1/28/2024, would like to avoid shifts)  - Tylenol 650mg Q8H PRN (total dose < 2g)  - Continue lactulose, Rifaximin   - Continue PPI  - Target 3-4 BM daily   - Abx as above.  - Paracentesis log:   > 1/23: IR w/ 100ml removed for diagnostics only   > 1/26: CAPS w/ 1.5L removed (still a lot remaining, opted for less d/t fluid shifts and initially labile BP)   > 1/29: paracentesis w/ 3L remove       # Acute mild on Chronic Hyponatremia - Stable  Chronic hyponatremia common in the setting of cirrhosis, and she has been admitted for hyponatremia several times in the past. Baseline mid 120s.  Given her history of low p.o. intake recently, do worry about a hypovolemic hyponatremia. S/p Albumin with paracentesis 1/22.  Given additional albumin on 1/24, 1/25. Her sodium corrected w/ 2% NS at a slow rate.  - Nephrology consulted   > Albumin 75g   - Stopped 2% NS (Na at baseline)  - Q12hr Na checks  - strict ins/outs   - daily weights   - Regular diet      # MATTIE  # hepatorenal syndrome   Chart review suggests a prior history of hepatorenal syndrome.  However, her history at the moment is more compatible with a prerenal MATTIE secondary to poor p.o. intake.  Baseline Cr around 0.6,  nearly double this on admission. Receiving fluids as above for hyponatremia, will trend creatinine. S/p albumin w/ para and additional 75g on 1/23, 1/24, and 1/25. Her Crt remained stable after these doses..   - Nephrology consulted - signed off 1/26  - Avoid nephrotoxic agents  - Fluids as above  - Continue Midodrine 15mg PO TID PRN   > Will make PRN as her BP is labile while here, and do not want to have scheduled as a means of increasing her BP when we would also like to keep her BP within normal range for her aortic aneurysm.  [  ] Dispo: Renal scheduling in CKD clinic     # Borderline Ascending Aortic Aneurysm  Noted on CT Chest on 1/25. Measured to be 4.3 cm. I personally reviewed the CT chest and found largest diameter to be about 43.5mm. I compared this to the widest portion on of the ascending aorta on her CTA coronary on 12/15/23 which was 42.0mm. This is fairly stable.  - Will speak w/ Thoracic Surg re. Follow up guidelines  - BP Goal near normotensive < 130/<85  - HR goal < 80  [  ] Dispo: Follow up guidelines pending     # Severe malnutrition in context of chronic illness  Patient's phos was appropriate on 1/22. 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 Holding off on prednisone for now iso possible infection and respiratory stability  - Continue PRN albuterol inhaler or nebs  - Continue ICS-LABA  - Continue Singulair       # Hypothyroidism  - Continue PTA levothyroxine      # Hypermagnesemia  - CTM     # H/o hypophosphatemia - not present here  Will recheck in setting of poor oral intake. Fairly low risk for refeeding as low PO intake only recently.      # Normocytic anemia - stable  Has required outpatient white blood cell transfusions in the past.  Her hemoglobin tends to  be around 7.5-9.5.  At normal range on admission. No clear bleeding history though is at risk for this. Chronic disease component and nutritional components possible as well. Hgb on 1/24 was 7.5, which is likely dilutional w/ fluids. No sources of bleeding identified.  - CBC w/ diff in AM  - Would transfuse for Hgb <7  - Transfusion log:   > 1u pRBC 1/25     # Thrombocytopenia - stable  ISO cirrhosis. Increased bleeding risk, avoiding heparin/lovenox for now.      # MDD/STEPHANIE  - Continue PTA Celexa  - Would try to avoid sedating agents d/t current sedative presentation     # Low back pain  Stable, trial hot/cold packs first          Diet: Room Service  Regular Diet Adult  Fluid restriction 1000 ML FLUID  Snacks/Supplements Adult: Ensure Max Protein (bariatric); Between Meals  Snacks/Supplements Adult: Ensure Enlive; Between Meals  NPO per Anesthesia Guidelines for Procedure/Surgery Except for: Meds    DVT Prophylaxis: Pneumatic Compression Devices  Demarco Catheter: Not present  Lines: None     Cardiac Monitoring: None  Code Status: Full Code      Clinically Significant Risk Factors         # Hyponatremia: Lowest Na = 121 mmol/L in last 2 days, will monitor as appropriate      # Hypoalbuminemia: Lowest albumin = 2.9 g/dL at 1/23/2024  6:48 AM, will monitor as appropriate    # Coagulation Defect: INR = 3.00 (Ref range: 0.85 - 1.15) and/or PTT = N/A, will monitor for bleeding  # Thrombocytopenia: Lowest platelets = 57 in last 2 days, will monitor for bleeding           # Severe Malnutrition: based on nutrition assessment    # Financial/Environmental Concerns: none  # Asthma: noted on problem list        Disposition Plan     Expected Discharge Date: 02/02/2024      Destination: home with family            Tyrone Singh MD  Medicine Service, [facility] TEAM [address] [place] [address]  Securely message with [employer] (more info)  Text page via [employer] Paging/Directory   See signed in provider for up  to date coverage information  ______________________________________________________________________    Interval History   This morning patient was resting in bed w/ computer and was more energized than other days when I had seen her. She was overall feeling ok, was just tired of the chest tube in place. Denied other chest pain or SOB, fevers/chills. Has had peristent abdominal pain which is, again, attributed to frequent paracentesis.    Physical Exam   Vital Signs: Temp: 97.1  F (36.2  C) Temp src: Oral BP: 124/57 Pulse: 87   Resp: 17 SpO2: 100 % O2 Device: None (Room air)    Weight: 140 lbs 13.98 oz    General Appearance:  Chronically ill appearing, conversational and oriented fully  Respiratory: Reduced on bilateral bases R > L w/ some crackles bilateral (stable)  Cardiovascular: RRR, warm and well perfusing extremities, loud systolic murmur  GI: distended, mildly tender throughout, fluid wave, tympanic while supine  Skin: jaundice  Other:  Moves extremities spontaneously, answers all questions appropriately. Chest tube draining red-tinged fluid, normal tidaling noted w/o air leak, no bubbling noted w/ cough    Medical Decision Making       MANAGEMENT DISCUSSED with the following over the past 24 hours: nursing       Data     I have personally reviewed the following data over the past 24 hrs:    15.4 (H)  \   7.8 (L)   / 58 (L)     127 (L) 102 81.4 (H) /  108 (H)   3.9 15 (L) 0.75 \     ALT: 21 AST: 35 AP: 81 TBILI: 2.8 (H)   ALB: 3.4 (L) TOT PROTEIN: 5.4 (L) LIPASE: N/A     INR:  3.00 (H) PTT:  N/A   D-dimer:  N/A Fibrinogen:  N/A

## 2024-01-31 NOTE — PLAN OF CARE
Neuro: A&Ox4.   Cardiac: Not on tele.  Denies chest pain.   Respiratory: Sating >92% on RA.  GI/: Adequate urine output. BM X 4 this shift, loose.  Diet/appetite: Tolerating regular diet with fluid restriction, monitored accordingly.  Activity:  Assist of 1, up to chair and in halls.  Pain: At acceptable level on current regimen.   Skin: No new deficits noted.  LDA's:  Right and Left PIV, saline lock.  Right chest tube site to water seal.    Plan: Continue with POC. Notify primary team with changes. For CT chest tomorrow.  Will be NPO post MN for possible heart cath.

## 2024-01-31 NOTE — PLAN OF CARE
Goal Outcome Evaluation:    Neuro: A&Ox4. Flay affect.   Cardiac: VSS. No tele orders. PRN not indicated.   Respiratory: Sating above 95% on RA. Chest tube to waterseal, minimal seroussang output.   GI/: Adequate urine output. BM X3, loose - watery.  Diet/appetite: Tolerating regular  diet. Eating sparingly. 1 Liter Fluid restriction.  Activity:  Assist of 1, walked to bathroom.  Pain: At acceptable level on current regimen. No complaints of pain.   Skin: With scattered bruises.   LDA's: 2 PIV's SL on both arms.     Plan: 0609hr Paged Ananda of latest Na - 126, ordered to stop 2% sodium. Plan for Left heart cath on 2/1 for liver workup. Continue with POC. Notify primary team with changes.
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
## 2024-01-31 NOTE — PROGRESS NOTES
CLINICAL NUTRITION SERVICES - REASSESSMENT NOTE     Nutrition Prescription    Malnutrition Status:    Severe malnutrition in setting of chronic illness     Recommendations already ordered by Registered Dietitian (RD):  Continue to encourage PO intake, small frequent meals, high protein snacks between meals  Continue Ensure Plus once daily and Ensure Max Protein once daily as ordered  Add additional snack/supplements as below:   10 AM - Greek yogurt + peaches  2 PM - Berry Magic Cup  8 PM - Cherry Gelatein    Future/Additional Recommendations:  Monitor nutrition-related findings and follow pt per protocol     EVALUATION OF THE PROGRESS TOWARD GOALS   Diet: Regular, 1000 mL FR   Supplement: Ensure Max Protein at 10 AM daily; Ensure Enlive at 2 PM (strawberry flavor once back in stock)   Room service with assistance services     PO Intake: Varies between % of meals per I/O since last assessment.   Had felecia cts in place 1/26-1/27 (ended day early?) with intake as below:   1/26       Total Kcals: 1529     Total Protein: 65g (97% Kcal goal, 87% protein goal)   1/27       Total Kcals: 1018     Total Protein: 61g (64% Kcal goal, 81% protein goal)     Pt assessed at bedside. Reports PO going well with small frequent meals, likes supplements as ordered but would like to add additional snacks/supplements to regimen (yogurt + fruit in AM, Magic Cup in afternoon, Gelatein in evening). Additionally, has multiple snacks at bedside provided by family. Has significant abdominal distention/ascites but able to compensate with small freq meal pattern. Requested for AM snack to be sent following visit. Modified orders and encourage PO intake.     NEW FINDINGS   GI:  3-8 unmeasured BMs per day over past week, per I/O.   Emesis x1 on 1/27; none recent     Weights:  Overall weight trends range 62-64 kg this admit. One outlier with weight 47.1 kg (103 lbs) which is possibly more reflective of pt's dry weight; however, no source for  weight listed.      Labs:  Hyponatremia continues, current Na 127 (L)   BUN 81 (H); Cr WNL; GFR WNL   Tbili 2.8 (H)   BG trends recent  mg/dL     Medications:  Vitamin D3, 50,000 units weekly   Lactulose TID   IVF - 2% hypertonic saline (held by provider this AM)     Skin:  Most recent WOCN note 1/30 (see for detail if needed)  Diffuse skin tears  R chest skin tear/trauma - improving  R ankle skin tear and trauma - improving     MALNUTRITION  % Intake: Decreased intake does not meet criteria  % Weight Loss: Unable to assess -Confounded by fluid status   Subcutaneous Fat Loss: Severe, global  Muscle Loss: Severe, global   Fluid Accumulation/Edema: Moderate  Malnutrition Diagnosis: Severe malnutrition in the context of chronic illness     Previous Goals   Minimum goal for pt to consume at least 1130 Kcals and 55 gm protein daily (65% est needs) vs need to consider nutrition support if aligns with GOC   Evaluation: Met    Previous Nutrition Diagnosis  Malnutrition related to reduced appetite/early satiety w/ ESLD/ascites as evidenced by severe global muscle/fat depletion, pt self-report of poor PO/reduced tolerance of oral nutrition supplements     Evaluation: No change but supplement kolby improving    CURRENT NUTRITION DIAGNOSIS  Malnutrition related to reduced appetite/early satiety with ESLD/ascites as evidenced by severe global muscle/fat depletion, reliance on small freq meals/ONS to optimize total nutrition.       INTERVENTIONS  Implementation  Medical food supplement therapy - Inc frequency  Modify composition of meals/snacks - Continue RS assist, additional high protein snacks added     Goals  Patient to consume % of nutritionally adequate meal trays TID, or the equivalent with supplements/snacks.    Monitoring/Evaluation  Progress toward goals will be monitored and evaluated per protocol.    Nuno Esparza, MS, RDN, LD, CNSC  Available via phone, [employer] chat, and pager  Phone:  [phone]  [address] pager: [phone]         Weekend/Holiday RD pager [phone]

## 2024-01-31 NOTE — PROGRESS NOTES
[facility] PROGRESS NOTE     Patient:  Stefani Crowell   Date of birth 1960, Medical record number [id number]  Date of Visit:  01/31/2024  Date of Admission: 1/22/2024  Consult Requester:Venancio Fu MD          Assessment and Plan:   ID Problem List  Right-sided loculated empyema  Thoracentesis (1/23): ~126K TNC, 98% neutrophils; total protein 2.9, glucose <2, +Light's criteria. Culture = E. Coli  Thora 1/29 = 104K TNC, culture NGTD  Chest tube in place (1/24/24)  Leukocytosis - improving  Positive blood cultures - Staph epi in 1/4 bottles on 1/23 is a contaminant  Decompensated alcohol-related cirrhosis   Anemia, thrombocytopenia, coagulopathy  Hepatic encephalopathy  MATTIE, HRS  Ascites and hepatic hydrothorax requiring 3x/weekly drainage  Grade 1 EV  Hyponatremia  Dirty UA (4 squamous cells), urine culture +E. Coli (Amp-R) - no therapy required  History of C difficile (+PCR 9/4/20)     RECOMMENDATION:  Continue IV ceftriaxone 1g once daily and PO metronidazole 500 mg q8hrs for empyema  Duration of antibiotics TBD - minimum 4-6 weeks for empyema pending source control  Appreciate IP management of chest tube - plan for repeat fluid analysis 2/1 and CT chest     ASSESSMENT:  Stefani Crowell is a 63 year old female with PMHx significant for severe asthma, decompensated alcohol related cirrhosis c/b thrombocytopenia, anemia, esophageal varices (grade 1), hepatic encephalopathy, hepatorenal syndrome, ascites and hepatic hydrothorax requiring 3x/week drainage, hypothyroidism, depression, and anxiety who was admitted for abnormal labs (Na 112, K 5.3). Hospital course notable for CXR with loculated right pleural effusion s/p thoracentesis consistent with empyema. ID consulted for GNB pleural effusion and GPC on blood cultures. Staph epi in 1/2 sets, in 1/4 bottles felt to be contaminant. Repeat blood cultures with no growth. She also had +E. Coli on urine culture, patient is  asymptomatic and this is a dirty specimen with 4 squamous cells, would not treat. However, this will be incidentally covered with therapy for her E. Coli empyema.      Thoracentesis 1/23 with exudative effusion with ~126K total nucleated cells and culture with E. coli. Unclear etiology of this empyema. No recent or current concerns for pneumonia - did have recent URI, likely viral, but no productive cough or reported hypoxia, though does have chest CT with peribronchial consolidative opacities. No concurrent SBP (paracentesis same day with 131 ANC, Cx NG), though does get 3x/week paracentesis +/- thoracentesis which may be source via direct inoculation. Outpatient thoracentesis on 1/17 - no labs done; CXR at that time noted loculated apical component. Appreciate IP involvement for chest tube placement - initially with 1900 ml seropurulent output day 1, now sanguinous. Chest CT with large apical collection. Lytics started per IP on 1/26, tolerated well. She had persistent leukocytosis to ~20s, concerning for lack of source control and considered whether 2nd chest tube would be necessary to drain apical collection. IP feels apical collection is draining from existing chest tube which is reassuring and chest tube output now downtrending. Leukocytosis also slowly downtrending. Repeat pleural fluid analysis 1/29 showed still 104K total nucleated cells. Repeat chest CT 1/29 with unchanged hydropneumothorax, increased gas concerning for lack of source control though has baseline hepatic hydrothorax. It will be difficult to determine source control ultimately given her baseline hydrothorax and is at increased risk with any additional chest tube/healing. Agree with plan for repeat cell counts and chest CT tomorrow. Continue IV ceftriaxone + PO flagyl. Anticipate 4-6 week course for empyema, pending source control.    Infectious diseases will continue to follow.  Patient and plan staffed with Dr. Mohr. Recommendations  discussed with primary team.    Josseline Pace PA-C  Infectious Diseases  Pager #[phone] or Vocera    40 MINUTES SPENT BY ME on the date of service doing chart review, history, exam, documentation & further activities per the note.           Interim History and Events:     Lolly remains afebrile, vitals stable, on room air. WBC downtrending - 15.4 today. Pain at chest tube site. Denies fever, chills, cough, chest pain, dyspnea, abdominal pain. Stools are at baseline.  Chest tube with only 78 ml out yesterday. CXR stable today. Plan for repeat CT chest and fluid analysis tomorrow.          ROS:   -Focused 5 point ROS completed, pertinent positives and negatives listed above.      Physical Examination:  Temp: 97.4  F (36.3  C) Temp src: Axillary BP: 100/50 Pulse: 72   Resp: 17 SpO2: 100 % O2 Device: None (Room air)      Vitals:    01/28/24 0800 01/28/24 1404 01/29/24 0237 01/30/24 0444   Weight: 63.2 kg (139 lb 5.3 oz) 47.1 kg (103 lb 14.4 oz) 64.1 kg (141 lb 5 oz) 62.6 kg (138 lb 0.1 oz)    01/31/24 0330   Weight: 63.9 kg (140 lb 14 oz)       Constitutional: Pleasant adult female, in NAD. Alert and interactive.   HEENT: NCAT, trace icteric sclerae, conjunctiva clear. Moist mucous membranes.  Respiratory: Non-labored breathing, good air exchange. Lungs are clear to auscultation on left, diminished on right but improved today. No wheezing, crackles or rhonchi. No cough noted. Chest tube with serosanguinous output  Cardiovascular: Regular rate and rhythm with no murmur, rub or gallop.  GI: Normoactive BS. Abdomen is soft, nontender to percussion or palpation, mild-moderately distended. No rigidity or guarding. No rebound tenderness.  Skin: Warm and dry. Multiple scabs, skin tears. No purulence or cellulitis.  Musculoskeletal: Extremities grossly normal. No tenderness or edema present.   Neurologic: A &O x3, speech normal, answering questions appropriately. Moves all extremities spontaneously.  VAD: PIV is c/d/i with no  "erythema, drainage, or tenderness.      Medications:   cefTRIAXone  2 g Intravenous Q24H    cholecalciferol  50,000 Units Oral Q7 Days    citalopram  40 mg Oral Daily    fluticasone-vilanterol  1 puff Inhalation Daily    iopamidol (ISOVUE-370)  70 mL Intravenous Once    lactulose  20 g Oral TID    levothyroxine  112 mcg Oral Daily    lidocaine  1 patch Transdermal Q24H    metroNIDAZOLE  500 mg Oral TID    montelukast  10 mg Oral At Bedtime    mupirocin   Topical TID    oxymetazoline  2 spray Both Nostrils BID    pantoprazole  40 mg Oral BID    rifaximin  550 mg Oral BID    sodium chloride (PF)  3 mL Intracatheter Q8H    sodium chloride (PF)  75 mL Intravenous Once       Infusions/Drips:        Laboratory Data:   No results found for: \"ACD4\"    Inflammatory Markers    Recent Labs   Lab Test 12/06/23  1701 06/29/18  1309   SED 12 7   CRP  --  5.9       Metabolic Studies       Recent Labs   Lab Test 01/31/24  0539 01/31/24  0343 01/30/24  1134 01/30/24  0800 01/30/24  0524 01/30/24  0026 01/29/24  1158 01/29/24  0542 01/28/24  1441 01/28/24  0426 01/27/24  1557 01/27/24  0603 01/26/24  1624 01/26/24  0435 01/25/24  0027 01/24/24  2134 01/24/24  1905 01/24/24  1659 01/23/24  0730 01/23/24  0648 01/23/24  0210 01/22/24  2232 01/22/24  1734 10/19/23  1523 10/03/23  0853 08/18/23  1402 07/24/23  1457   * 126* 123* 123* 123*  123* 122*   < > 120*   < > 118*  118*   < > 122*  122*   < > 122*  122*   < > 124*   < > 120*   < > 111*   < > 111* 110*   < > 126*   < > 133*   POTASSIUM 3.9  --   --   --  3.8  --   --  3.8  --  3.9  --  3.7  --  3.9   < >  --   --   --    < > 5.2  --   --  5.1   < > 3.9   < > 3.4   CHLORIDE 102  --   --   --  97*  --   --  94*  --  91*  --  94*  --  93*   < >  --   --   --    < > 85*  --   --  81*   < > 91*   < > 95*   CO2 15*  --   --   --  15*  --   --  16*  --  17*  --  17*  --  17*   < >  --   --   --    < > 17*  --   --  18*   < > 25   < > 24   ANIONGAP 10  --   --   --  11  --   --  " 10  --  10  --  11  --  12   < >  --   --   --    < > 9  --   --  11   < > 10   < > 14   BUN 81.4*  --   --   --  75.8*  --   --  71.0*  --  66.4*  --  70.1*  --  77.5*   < >  --   --   --    < > 95.3*  --   --  85.9*   < > 33.9*   < >  --    CR 0.75  --   --   --  0.76  --   --  0.84  --  0.82  --  0.94  --  1.17*   < >  --   --   --    < > 1.16*  --   --  1.12*   < > 1.24*   < >  --    GFRESTIMATED 90  --   --   --  89  --   --  79  --  81  --  69  --  53*   < >  --   --   --    < > 53*  --   --  56*   < > 49*   < >  --    *  --   --   --  98  --   --  95  --  101*  --  119*  --  119*   < >  --   --   --    < > 74  --   --  121*   < > 98   < >  --    A1C  --   --   --   --   --   --   --   --   --   --   --   --   --   --   --   --   --   --   --   --   --   --   --   --   --   --  4.7   UMA 9.5  --   --   --  9.4  --   --  9.3  --  9.7  --  9.7  --  9.9   < >  --   --   --    < > 9.8  --   --  9.6   < > 10.2   < >  --    PHOS  --   --   --   --   --   --   --   --   --   --   --   --   --   --   --   --   --   --   --   --   --  3.8  --   --  2.6  --   --    MAG  --   --   --   --   --   --   --   --   --   --   --   --   --   --   --   --   --   --   --  3.4*  --   --  2.8*   < >  --    < >  --    LACT  --   --   --   --   --   --   --   --   --   --   --   --   --   --   --  1.9  --  3.0*   < >  --   --   --   --    < >  --   --   --     < > = values in this interval not displayed.       Hepatic Studies    Recent Labs   Lab Test 01/31/24  0539 01/30/24  0524 01/29/24  0542 01/28/24  0426 01/27/24  0603 01/26/24  0435 01/24/24  0532 01/23/24  1405 12/15/23  0545 12/11/23  0812   BILITOTAL 2.8* 3.0* 3.3* 3.6* 3.8* 4.8*   < >  --    < > 4.9*   ALKPHOS 81 106 86 77 104 98   < >  --    < > 67   ALBUMIN 3.4* 3.4* 3.5 3.6 3.6 4.0   < >  --    < > 3.4*   AST 35 40 45 40 43 57*   < >  --    < > 47*   ALT 21 22 25 25 27 30   < >  --    < > 36   LDH  --   --   --   --   --   --   --  195  --  185    < > = values in  this interval not displayed.       Pancreatitis testing    Recent Labs   Lab Test 12/06/23  1843 12/06/23  1608 10/22/23  0150 08/15/22  1426 09/03/20  1216 03/01/19  0954 04/08/16  1040   LIPASE  --  125* 116*  --  189  --   --    TRIG 32  --   --  111  --  125 97       Hematology Studies      Recent Labs   Lab Test 01/31/24  0539 01/30/24  0524 01/29/24  0542 01/28/24  0426 01/27/24  0603 01/26/24  0435 01/19/21  1502 09/16/20  1147 09/03/20  1216 06/29/18  1309   WBC 15.4* 17.3* 19.8* 20.8* 19.1* 17.9*   < > 7.1   < > 5.6   ANEU  --   --   --   --   --   --   --  4.8  --  3.4   ALYM  --   --   --   --   --   --   --  1.5  --  1.5   VIJI  --   --   --   --   --   --   --  0.5  --  0.6   AEOS  --   --   --   --   --   --   --  0.2  --  0.1   HGB 7.8* 8.4* 8.6* 8.1* 7.7* 8.0*   < > 13.9   < > 13.7   HCT 23.9* 25.0* 26.0* 24.1* 23.4* 23.8*   < > 43.0   < > 41.8   PLT 58* 57* 62* 55* 57* 60*   < > 118*   < > 125*    < > = values in this interval not displayed.       Arterial Blood Gas Testing  No lab results found.     Urine Studies     Recent Labs   Lab Test 01/22/24  2323 12/07/23  1237 12/06/23  1730 11/13/23  1540 11/02/23  1236   URINEPH 5.0 5.0 5.0 5.0 5.5   NITRITE Negative Negative Negative Negative Negative   LEUKEST Large* Small* Trace* Small* Large*   WBCU 83* 4 7* 17* 23*       Microbiology:  Culture   Date Value Ref Range Status   01/29/2024 No growth after 1 day  Preliminary   01/24/2024 No Growth  Final   01/24/2024 No Growth  Final   01/23/2024 4+ Escherichia coli (A)  Final   01/23/2024 No Growth  Final   01/23/2024 No anaerobic organisms isolated  Final   01/23/2024 No growth after 7 days  Preliminary   01/23/2024 No growth after 7 days  Preliminary   01/23/2024 Positive on the 1st day of incubation (A)  Final   01/23/2024 Staphylococcus epidermidis (AA)  Final     Comment:     1 of 2 bottles   01/23/2024 No Growth  Final   01/22/2024 >100,000 CFU/mL Escherichia coli (A)  Final   12/12/2023 No Growth   Final   12/06/2023 <10,000 CFU/mL Mixture of Urogenital Jenni  Final   11/13/2023 >100,000 CFU/mL Klebsiella pneumoniae (A)  Final   11/13/2023 50,000-100,000 CFU/mL Klebsiella pneumoniae (A)  Final   11/12/2023 No Growth  Final   11/09/2023 No Growth  Final   11/06/2023 No Growth  Final   11/06/2023 No anaerobic organisms isolated  Final   10/25/2023 No Growth  Final   10/22/2023 No Growth  Final   10/22/2023 >100,000 CFU/mL Mixture of urogenital jenni  Final       Last check of C difficile  C Diff Toxin B PCR   Date Value Ref Range Status   09/04/2020 Positive (A) NEG^Negative Final     Comment:     Positive: Toxin producing C. difficile target DNA sequences detected, presumed   positive for C. difficile toxin B. C. difficile (Requires Enteric Isolation).      C. difficile (Requires Enteric Isolation)  FDA approved assay performed using [employer] GeneXpert real-time PCR.         Imaging:  CXR 1/30 and 1/31 - stable right sided hydropneumothorax per my read    CT Chest w/o contrast 1/29/24  IMPRESSION:   1. Compared to CT 1/25/2024 there is increased gas component of the  moderate right hydropneumothorax without substantial change in overall  size.  2. Interval resolution of several consolidative opacities in the right  lung while other peripheral consolidative opacities remain, likely  areas of atelectasis and rounded atelectasis. No new consolidation.  3. Stable 4.3 cm ascending thoracic aortic aneurysm.  4. Cirrhotic morphology of liver with moderate volume ascites.    CXR 1/29/24  IMPRESSION:   1. No substantial change in right moderate hydropneumothorax with  chest tube in place.  2. No substantial change in airspace opacities projecting over the  right mid and lower lung fields.    CXR 1/27/24  IMPRESSION:   1. Stable appearance of right hydropneumothorax with chest tube in  place.  2. Stable appearance of hazy opacity within the right mid/upper lung,  may represent pneumonia.    CT Chest w/o contrast  1/25/24  IMPRESSION:  1. Small right-sided hydropneumothorax, previously moderate, with  slightly increased air component compared to CT 12/15/2023 which may  be in part related to placement of right basilar chest tube. There is  a fairly large apical component which may be communicating with the  basilar component containing the chest tube along the right lateral  aspect. Adjacent linear atelectasis throughout the right lung.  Aeration of the lung has improved compared to CT 11/13/2023.  2. Peribronchial consolidative opacities in the right upper lobe  representing infectious or inflammatory process.  3. Trace left effusion with adjacent atelectasis. Improved groundglass  opacities from CT 11/13/2023. Mild pulmonary edema.  4. Borderline ascending aortic aneurysm measuring 4.3 cm.    XR Chest 2 Views  Result Date: 1/22/2024  IMPRESSION: Large loculated right pleural effusion with associated atelectasis of the right upper, middle, and lower lobes. Increased in fluid volume compared to 12/14/2023.

## 2024-02-01 NOTE — PLAN OF CARE
Neuro: A&Ox4.   Cardiac: Not on tele. Denies chest pain.  PRN Midodrine given after paracentesis.     Respiratory: Sating >92% on RA.  GI/: Adequate urine output. BM X3, loose.  Diet/appetite: Tolerating regular diet. Poor appetite.  Needs encouragement during meal time.  On fluid restriction, monitored accordingly.  Activity:  Assist of 1 or standby assist, up to chair and in halls.  Pain: At acceptable level on current regimen. PRN given for pain scale of 9 on ct site incision, effective.  Skin: No new deficits noted.  LDA's:  Left single picc, heparin lock.  Left piv, saline lock.    Chest tube removed this afternoon.  Had paracentesis and 3 liters out, albumin ordered and given.  Na q12 timed, latest 124.    Plan: Continue with POC. Notify primary team with changes.

## 2024-02-01 NOTE — PLAN OF CARE
Goal Outcome Evaluation:  Neuro: A&Ox4. Fley affect.  Cardiac: VSS. No tele orders. PRN not indicated   Respiratory: Sating above 95% on RA.  GI/: Adequate urine output. BM X1, loose.   Diet/appetite: Tolerating regular diet. Eating sparingly, 1 liter FR, complied. NPO posr midnight for possible cath lab today,  Activity:  Assist of 1, up to chair and bathroom.   Pain: At acceptable level on current regimen. Prn tylenol x1  Skin: No new deficits noted.  LDA's:Right  CT to waterseal possible removal if pleural fluids results will be negative. no leak. 2 PIV saline locked.    Plan: CT chest today. Possible HC. Continue with POC. Notify primary team with changes.

## 2024-02-01 NOTE — PROCEDURES
[facility]  CAPS PROCEDURE NOTE  Date of Admission:  1/22/2024  Consult Requested by: Medicine  Reason for Consult: ----------Paracentesis------------, diagnostic evaluation of ascites, and management of symptomatic ascites    Indication/HPI: Stefani Crowell is a 63 year old female admitted on 1/22/2024. She has a history of cirrhosis secondary to alcohol use currently being worked up for liver transplant complicated by need for frequent paracenteses and thoracenteses for ascites/hepatic hydrothorax, in addition to asthma and COPD 2/2 tobacco use, MDD/STEPHANIE, hypothyroidism, low back pain, and chronic hyponatremia. She presented to the ED for evaluation of acute on chronic hyponatremia in the setting of recent viral-like URI illness; currently being treated for empyema. CAPS consulted for diagnostic and therapeutic paracentesis.     Pre-Procedure Diagnosis: Ascites    Post-Procedure Diagnosis: Ascites    Risk Assessment: Average risk, Technically straightforward; patient's anticoagulation has been held according to guidelines based on the agent and platelets and coags are within guidelines    Procedure Outcome:  -------------Paracentesis-------------, Diagnostic paracentesis performed, and Therapeutic paracentesis performed with 3L liters of ascites removed.   See additional procedure details below.    The primary covering service should follow up and address any lab results as appropriate.    Nora Bates MD  Internal Medicine, PGY-2    Procedure supervised by Dr. Mayo.    ---    [facility]    Paracentesis    Date/Time: 2/1/2024 12:40 PM    Performed by: Nora Bates MD  Authorized by: Nora Bates MD      UNIVERSAL PROTOCOL   Site Marked: Yes  Prior Images Obtained and Reviewed:  Yes  Required items: Required blood products, implants, devices and special equipment available    Patient identity confirmed:   Verbally with patient, arm band, provided demographic data and hospital-assigned identification number  Patient was reevaluated immediately before administering moderate or deep sedation or anesthesia  Confirmation Checklist:  Patient's identity using two indicators, relevant allergies, procedure was appropriate and matched the consent or emergent situation and correct equipment/implants were available  Time out: Immediately prior to the procedure a time out was called    Universal Protocol: the Joint Commission Universal Protocol was followed    Preparation: Patient was prepped and draped in usual sterile fashion       ANESTHESIA    Anesthesia:  Local infiltration  Local Anesthetic:  Lidocaine 1% without epinephrine      SEDATION    Patient Sedated: No    POST PROCEDURE DETAILS  Needle gauge: 22  Ultrasound guidance: yes  Puncture site: left lower quadrant  Fluid removed: 3000(ml)  Fluid appearance: serosanguinous  Dressing: Sterile Bandage.        PROCEDURE    Patient Tolerance:  Patient tolerated the procedure well with no immediate complications  Length of time physician/provider present for 1:1 monitoring during sedation: 0    ---    POC US GUIDE FOR PARACENTESIS  (Preliminary)  This result has not been signed. Information might be incomplete.    Impression  US Indication: decompensated liver disease, abdominal distension, and abdominal pain    Limited abdominal ultrasound was performed and demonstrated an adequate fluid collection on the left side of the abdomen.    Doppler of the skin demonstrated an area at this site without significant vasculature.    A paracentesis at this site was subsequently performed.    Nora Bates MD

## 2024-02-01 NOTE — PROGRESS NOTES
Home Infusion  Lolly is expected to discharge in a few days once more medically stable and will be going home on IV rocephin q 24 hrs.   She has never done home IV therapy before nor has her S.O. Hari who will be managing her infusions.    Met with Lolly and Hari at bedside to discuss discharge plans (Lolly was dozing) to provided them with information about IV abx therapy with \[facility]\"" services.  Explained about administration method which will be via IVP,  showed Hari the teaching materials and explained that a teaching can be done at home or here at the hospital.  Informed Hari about supplies and delivery of supplies, storage of medication, dosing schedule, plan for SNV and 24/7 availability of \[facility]\"" staff while on IV therapy.   [facility] has accepted pt for home RN/PT/OT services.    Hari verbalized understanding of all information given.   He is willing and able to learn and manage home IV therapy and would prefer the teaching for the rocephin to be at his home.  Questions answered.  Will continue to follow and update pt/spouse with final details once discharge is confirmed.     Jessi RAMOS  Nurse Liaison  [email or web address]  [address] [place] [address]  [email or web address]  [phone] M-F I \[facility]\"" main: [phone]

## 2024-02-01 NOTE — PROGRESS NOTES
[facility] PROGRESS NOTE     Patient:  Stefani Crowell   Date of birth 1960, Medical record number [id number]  Date of Visit:  02/01/2024  Date of Admission: 1/22/2024  Consult Requester:Venancio Fu MD          Assessment and Plan:   ID Problem List  Right-sided loculated empyema  Thoracentesis (1/23): ~126K TNC, 98% neutrophils; total protein 2.9, glucose <2, +Light's criteria. Culture = E. Coli  Thora 1/29 = 104K TNC, culture NGTD  Chest tube in place (1/24/24)  Leukocytosis - improving  Positive blood cultures - Staph epi in 1/4 bottles on 1/23 is a contaminant  Decompensated alcohol-related cirrhosis   Anemia, thrombocytopenia, coagulopathy  Hepatic encephalopathy  MATTIE, HRS  Ascites and hepatic hydrothorax requiring 3x/weekly drainage  Grade 1 EV  Hyponatremia  Dirty UA (4 squamous cells), urine culture +E. Coli (Amp-R) - no therapy required  History of C difficile (+PCR 9/4/20)     RECOMMENDATION:  Continue IV ceftriaxone 2g once daily and PO metronidazole 500 mg q8hrs for empyema  Duration of antibiotics TBD - minimum 4-6 weeks for empyema pending source control  OK for PICC placement anytime  Appreciate IP management of chest tube - will defer removal to IP     ASSESSMENT:  Stefani Crowell is a 63 year old female with PMHx significant for severe asthma, decompensated alcohol related cirrhosis c/b thrombocytopenia, anemia, esophageal varices (grade 1), hepatic encephalopathy, hepatorenal syndrome, ascites and hepatic hydrothorax requiring 3x/week drainage, hypothyroidism, depression, and anxiety who was admitted for abnormal labs (Na 112, K 5.3). Hospital course notable for CXR with loculated right pleural effusion s/p thoracentesis consistent with empyema. ID consulted for GNB pleural effusion and GPC on blood cultures. Staph epi in 1/2 sets, in 1/4 bottles felt to be contaminant. Repeat blood cultures with no growth. She also had +E. Coli on urine culture, patient  is asymptomatic and this is a dirty specimen with 4 squamous cells, would not treat. However, this will be incidentally covered with therapy for her E. Coli empyema.      Thoracentesis 1/23 with exudative effusion with ~126K total nucleated cells and culture with E. coli. Unclear etiology of this empyema. No recent or current concerns for pneumonia - did have recent URI, likely viral, but no productive cough or reported hypoxia, though does have chest CT with peribronchial consolidative opacities. No concurrent SBP (paracentesis same day with 131 ANC, Cx NG), though does get 3x/week paracentesis +/- thoracentesis which may be source via direct inoculation. Outpatient thoracentesis on 1/17 - no labs done; CXR at that time noted loculated apical component. Appreciate IP involvement for chest tube placement - initially with 1900 ml seropurulent output day 1, now sanguinous. Lytics started per IP on 1/26, tolerated well. Persistent leukocytosis to ~20s, concerning for lack of source control, however is now improving. IP feels apical collection is draining from existing chest tube which is reassuring and chest tube output now downtrending. It will be difficult to determine source control ultimately given her baseline hydrothorax as output will not entirely cease. Repeat chest CT x2 with unchanged hydropneumothorax, increased gas though has baseline hepatic hydrothorax and IP feels empyema is improved. Repeat pleural fluid analysis 1/29 with 104K total nucleated cells and on 2/1 with 94K. Agree with IP that this is likely inflammatory/sterilized fluid, reassuring that counts are improving, and may have source control given chest tube + lytics. Will defer removal of chest tube to discretion of IP. She is at increased risk of impaired healing given baseline hydrothorax and will need to monitor chest tube site after removal to ensure this heals. Encourage nutrition. Continue IV ceftriaxone + PO flagyl, plan for 4-6 week  course for empyema. Start date pending source control. OK for PICC placement anytime.    Infectious diseases will continue to follow.  Patient and plan staffed with Dr. Mohr. Recommendations discussed with primary team.    Josseline Pace PA-C  Infectious Diseases  Pager #[phone] or Vocera    40 MINUTES SPENT BY ME on the date of service doing chart review, history, exam, documentation & further activities per the note.           Interim History and Events:     Lolly remains afebrile, vitals stable, on room air. WBC downtrending. Pain at chest tube site, wants removed as soon as able. She otherwise feels well and at baseline. Denies fever, chills, cough, chest pain, dyspnea, abdominal pain. Chest tube with 112 ml out yesterday. CXR stable today. CT chest with right pleural thickening, no change in size, no abscess. Fluid analysis with 94K TNC.   Had 3L paracentesis today, labs pending.          ROS:   -Focused 5 point ROS completed, pertinent positives and negatives listed above.      Physical Examination:  Temp: 97.1  F (36.2  C) Temp src: Oral BP: 102/54 Pulse: 68   Resp: 18 SpO2: 95 % O2 Device: None (Room air)      Vitals:    01/28/24 1404 01/29/24 0237 01/30/24 0444 01/31/24 0330   Weight: 47.1 kg (103 lb 14.4 oz) 64.1 kg (141 lb 5 oz) 62.6 kg (138 lb 0.1 oz) 63.9 kg (140 lb 14 oz)    02/01/24 0400   Weight: 64.9 kg (143 lb 1.3 oz)       Constitutional: Pleasant adult female, in NAD. Alert and interactive.   HEENT: NCAT, trace icteric sclerae, conjunctiva clear. Moist mucous membranes.  Respiratory: Non-labored breathing, good air exchange on room air. Lungs are clear to auscultation on left, diminished on right but improved today at apices. No wheezing, crackles or rhonchi. No cough noted. Chest tube with serosanguinous output  Cardiovascular: Regular rate and rhythm with no murmur, rub or gallop.  GI: Normoactive BS. Abdomen is soft, nontender to percussion or palpation, mild-moderately distended. No rigidity  "or guarding. No rebound tenderness.  Skin: Warm and dry. Multiple scabs, skin tears. No purulence or cellulitis.  Musculoskeletal: Extremities grossly normal. No tenderness or edema present.   Neurologic: A &O x3, speech normal, answering questions appropriately. Moves all extremities spontaneously.  VAD: PIV is c/d/i with no erythema, drainage, or tenderness.      Medications:   cefTRIAXone  2 g Intravenous Q24H    cholecalciferol  50,000 Units Oral Q7 Days    citalopram  40 mg Oral Daily    fluticasone-vilanterol  1 puff Inhalation Daily    lactulose  20 g Oral TID    levothyroxine  112 mcg Oral Daily    lidocaine  1 patch Transdermal Q24H    metroNIDAZOLE  500 mg Oral TID    montelukast  10 mg Oral At Bedtime    mupirocin   Topical TID    oxymetazoline  2 spray Both Nostrils BID    pantoprazole  40 mg Oral BID    rifaximin  550 mg Oral BID    sodium chloride (PF)  3 mL Intracatheter Q8H       Infusions/Drips:        Laboratory Data:   No results found for: \"ACD4\"    Inflammatory Markers    Recent Labs   Lab Test 12/06/23  1701 06/29/18  1309   SED 12 7   CRP  --  5.9       Metabolic Studies       Recent Labs   Lab Test 02/01/24  0638 01/31/24  1552 01/31/24  0539 01/31/24  0343 01/30/24  1134 01/30/24  0800 01/30/24  0524 01/29/24  1158 01/29/24  0542 01/28/24  1441 01/28/24  0426 01/27/24  1557 01/27/24  0603 01/25/24  0027 01/24/24  2134 01/24/24  1905 01/24/24  1659 01/23/24  0730 01/23/24  0648 01/23/24  0210 01/22/24  2232 01/22/24  1734 10/19/23  1523 10/03/23  0853 08/18/23  1402 07/24/23  1457   *  124* 124* 127* 126* 123* 123* 123*  123*   < > 120*   < > 118*  118*   < > 122*  122*   < > 124*   < > 120*   < > 111*   < > 111* 110*   < > 126*   < > 133*   POTASSIUM 4.0  --  3.9  --   --   --  3.8  --  3.8  --  3.9  --  3.7   < >  --   --   --    < > 5.2  --   --  5.1   < > 3.9   < > 3.4   CHLORIDE 100  --  102  --   --   --  97*  --  94*  --  91*  --  94*   < >  --   --   --    < > 85*  --   --  " 81*   < > 91*   < > 95*   CO2 17*  --  15*  --   --   --  15*  --  16*  --  17*  --  17*   < >  --   --   --    < > 17*  --   --  18*   < > 25   < > 24   ANIONGAP 7  --  10  --   --   --  11  --  10  --  10  --  11   < >  --   --   --    < > 9  --   --  11   < > 10   < > 14   BUN 87.7*  --  81.4*  --   --   --  75.8*  --  71.0*  --  66.4*  --  70.1*   < >  --   --   --    < > 95.3*  --   --  85.9*   < > 33.9*   < >  --    CR 0.78  --  0.75  --   --   --  0.76  --  0.84  --  0.82  --  0.94   < >  --   --   --    < > 1.16*  --   --  1.12*   < > 1.24*   < >  --    GFRESTIMATED 86  --  90  --   --   --  89  --  79  --  81  --  69   < >  --   --   --    < > 53*  --   --  56*   < > 49*   < >  --    GLC 94  --  108*  --   --   --  98  --  95  --  101*  --  119*   < >  --   --   --    < > 74  --   --  121*   < > 98   < >  --    A1C  --   --   --   --   --   --   --   --   --   --   --   --   --   --   --   --   --   --   --   --   --   --   --   --   --  4.7   UMA 9.8  --  9.5  --   --   --  9.4  --  9.3  --  9.7  --  9.7   < >  --   --   --    < > 9.8  --   --  9.6   < > 10.2   < >  --    PHOS  --   --   --   --   --   --   --   --   --   --   --   --   --   --   --   --   --   --   --   --  3.8  --   --  2.6  --   --    MAG  --   --   --   --   --   --   --   --   --   --   --   --   --   --   --   --   --   --  3.4*  --   --  2.8*   < >  --    < >  --    LACT  --   --   --   --   --   --   --   --   --   --   --   --   --   --  1.9  --  3.0*   < >  --   --   --   --    < >  --   --   --     < > = values in this interval not displayed.       Hepatic Studies    Recent Labs   Lab Test 02/01/24  0638 01/31/24  0539 01/30/24  0524 01/29/24  0542 01/28/24  0426 01/27/24  0603 01/24/24  0532 01/23/24  1405 12/15/23  0545 12/11/23  0812   BILITOTAL 2.8* 2.8* 3.0* 3.3* 3.6* 3.8*   < >  --    < > 4.9*   ALKPHOS 87 81 106 86 77 104   < >  --    < > 67   ALBUMIN 3.2* 3.4* 3.4* 3.5 3.6 3.6   < >  --    < > 3.4*   AST 35 35 40 45 40  43   < >  --    < > 47*   ALT 21 21 22 25 25 27   < >  --    < > 36   LDH  --   --   --   --   --   --   --  195  --  185    < > = values in this interval not displayed.       Pancreatitis testing    Recent Labs   Lab Test 12/06/23  1843 12/06/23  1608 10/22/23  0150 08/15/22  1426 09/03/20  1216 03/01/19  0954 04/08/16  1040   LIPASE  --  125* 116*  --  189  --   --    TRIG 32  --   --  111  --  125 97       Hematology Studies      Recent Labs   Lab Test 02/01/24  0638 01/31/24  0539 01/30/24  0524 01/29/24  0542 01/28/24  0426 01/27/24  0603 01/19/21  1502 09/16/20  1147 09/03/20  1216 06/29/18  1309   WBC 11.5* 15.4* 17.3* 19.8* 20.8* 19.1*   < > 7.1   < > 5.6   ANEU  --   --   --   --   --   --   --  4.8  --  3.4   ALYM  --   --   --   --   --   --   --  1.5  --  1.5   VIJI  --   --   --   --   --   --   --  0.5  --  0.6   AEOS  --   --   --   --   --   --   --  0.2  --  0.1   HGB 7.4* 7.8* 8.4* 8.6* 8.1* 7.7*   < > 13.9   < > 13.7   HCT 22.9* 23.9* 25.0* 26.0* 24.1* 23.4*   < > 43.0   < > 41.8   PLT 47* 58* 57* 62* 55* 57*   < > 118*   < > 125*    < > = values in this interval not displayed.       Arterial Blood Gas Testing  No lab results found.     Urine Studies     Recent Labs   Lab Test 01/22/24  2323 12/07/23  1237 12/06/23  1730 11/13/23  1540 11/02/23  1236   URINEPH 5.0 5.0 5.0 5.0 5.5   NITRITE Negative Negative Negative Negative Negative   LEUKEST Large* Small* Trace* Small* Large*   WBCU 83* 4 7* 17* 23*       Microbiology:  Culture   Date Value Ref Range Status   01/29/2024 No growth after 2 days  Preliminary   01/24/2024 No Growth  Final   01/24/2024 No Growth  Final   01/23/2024 4+ Escherichia coli (A)  Final   01/23/2024 No Growth  Final   01/23/2024 No anaerobic organisms isolated  Final   01/23/2024 No growth after 8 days  Preliminary   01/23/2024 No growth after 8 days  Preliminary   01/23/2024 Positive on the 1st day of incubation (A)  Final   01/23/2024 Staphylococcus epidermidis (AA)  Final      Comment:     1 of 2 bottles   01/23/2024 No Growth  Final   01/22/2024 >100,000 CFU/mL Escherichia coli (A)  Final   12/12/2023 No Growth  Final   12/06/2023 <10,000 CFU/mL Mixture of Urogenital Jenni  Final   11/13/2023 >100,000 CFU/mL Klebsiella pneumoniae (A)  Final   11/13/2023 50,000-100,000 CFU/mL Klebsiella pneumoniae (A)  Final   11/12/2023 No Growth  Final   11/09/2023 No Growth  Final   11/06/2023 No Growth  Final   11/06/2023 No anaerobic organisms isolated  Final   10/25/2023 No Growth  Final   10/22/2023 No Growth  Final   10/22/2023 >100,000 CFU/mL Mixture of urogenital jenni  Final       Last check of C difficile  C Diff Toxin B PCR   Date Value Ref Range Status   09/04/2020 Positive (A) NEG^Negative Final     Comment:     Positive: Toxin producing C. difficile target DNA sequences detected, presumed   positive for C. difficile toxin B. C. difficile (Requires Enteric Isolation).      C. difficile (Requires Enteric Isolation)  FDA approved assay performed using [employer] GeneXpert real-time PCR.         Imaging:  CT Chest 2/1/24  IMPRESSION:   1. Compared to CT 1/29/2024 there is slight increased inferior gas  component of a moderate right hydropneumothorax without change in  overall size. There is associated right pleural thickening without  hyperenhancement. No evidence of focal pulmonary abscess.  2. No significant change in several right peripheral consolidative  opacities likely representing areas of rounded atelectasis. No new  consolidation.  3. Cirrhotic liver with moderate volume ascites, splenomegaly and  recanalized paraumbilical vein.    CXR 1/30 - 2/1 - stable right sided hydropneumothorax per my read    CT Chest w/o contrast 1/29/24  IMPRESSION:   1. Compared to CT 1/25/2024 there is increased gas component of the  moderate right hydropneumothorax without substantial change in overall  size.  2. Interval resolution of several consolidative opacities in the right  lung while other  peripheral consolidative opacities remain, likely  areas of atelectasis and rounded atelectasis. No new consolidation.  3. Stable 4.3 cm ascending thoracic aortic aneurysm.  4. Cirrhotic morphology of liver with moderate volume ascites.    CXR 1/29/24  IMPRESSION:   1. No substantial change in right moderate hydropneumothorax with  chest tube in place.  2. No substantial change in airspace opacities projecting over the  right mid and lower lung fields.    CXR 1/27/24  IMPRESSION:   1. Stable appearance of right hydropneumothorax with chest tube in  place.  2. Stable appearance of hazy opacity within the right mid/upper lung,  may represent pneumonia.    CT Chest w/o contrast 1/25/24  IMPRESSION:  1. Small right-sided hydropneumothorax, previously moderate, with  slightly increased air component compared to CT 12/15/2023 which may  be in part related to placement of right basilar chest tube. There is  a fairly large apical component which may be communicating with the  basilar component containing the chest tube along the right lateral  aspect. Adjacent linear atelectasis throughout the right lung.  Aeration of the lung has improved compared to CT 11/13/2023.  2. Peribronchial consolidative opacities in the right upper lobe  representing infectious or inflammatory process.  3. Trace left effusion with adjacent atelectasis. Improved groundglass  opacities from CT 11/13/2023. Mild pulmonary edema.  4. Borderline ascending aortic aneurysm measuring 4.3 cm.    XR Chest 2 Views  Result Date: 1/22/2024  IMPRESSION: Large loculated right pleural effusion with associated atelectasis of the right upper, middle, and lower lobes. Increased in fluid volume compared to 12/14/2023.

## 2024-02-01 NOTE — PROGRESS NOTES
[facility]  HEPATOLOGY PROGRESS NOTE  Stefani Crowell [id number]       IMPRESSION:  Stefani Crowell is a 63 year old female with a history of alcohol associated cirrhosis, last use 6/28/23 complicated by diuretic refractory ascites and hepatic hydrothorax with 3x weekly courtney and thoras, hyponatremia, hepatic encephalopathy, MATTIE/CKD (prior terlipressin 11/2023), small EV, asthma, COPD who was admitted following labs indicating serum sodium level at para of 110. She underwent thoracentesis with evidence of empyema.     RECOMMENDATIONS:    Updates for 02/01/2024 :  - pulm following.   - paracentesis  - 25% albumin 50 gms    # Empyema  # Hepatic hydrothorax  # Ascites  # Trapped lung  - thora 1/23 with 1 lights criteria, high PMNs, low glucose. Remains on ceftriaxone and metronidazole per ID. Pleural Cultures + for e. Coli.   - Appreciate interventional pulm following. Received lytics in chest tube x3 days. Continues to have high PMN's on cell count 2/1. Decrease amount of fluid in chest tube.    - Lidocaine patches for pain.       - Other infectious work up in progress- 1/2 bottles blood culture with +gpc, likely contaminant. No other + cultures  - para without evidence of SBP. Repeat para as needed for comfort, limit to <4L and send diagnostic testing with each sample. Para sample negative for SBP on 2/1. Agree with giving albumin 50 gms today due to recent thora drainage and para.     # Hyponatremia  - nephrology following  -  Sodium level 124 from admit of 110. Baseline ~124  - Fluid restriction to 1.2 L    # Alcohol associated cirrhosis  # Transplant candidacy  - MELD 30, ABO A  - with current infection, not a candidate for liver transplant at this time. Still needs [facility] for cardiac risk assessment for transplant. Holding on performing inpatient.   - US abd 10/22/23 negative for HCC  - has been on midodrine, if BP remains >100, ok to have as needed.     # Hepatic encephalopathy  - continue with  "lactulose and rifaximin.     # Debility  # Moderate protein calorie malnutrition  - PT/OT- was up walking with OT and steady.   - BID protein supplements-     Patient was discussed with attending physician, Dr. Powers.  The above note reflects our joint plan.     Next follow up appointment: Dr. Lim 1/29/24    Thank you for the opportunity to be involved with  Stefani Crowell [facility]. Please call with any questions or concerns.    Yanelis Miller, APRN, CNP  Inpatient Hepatology [facility]        Subjective    Denies syncope, nausea, vomiting. Able to be up walking without assistance.        Objective    Vital signs:  Temp: 97.4  F (36.3  C) Temp src: Oral BP: 105/53 Pulse: 71   Resp: 17 SpO2: 100 % O2 Device: None (Room air)     Weight: 64.9 kg (143 lb 1.3 oz)  Estimated body mass index is 23.81 kg/m  as calculated from the following:    Height as of 12/29/23: 1.651 m (5' 5\").    Weight as of this encounter: 64.9 kg (143 lb 1.3 oz).      Gross per 24 hour   Intake 1560 ml   Output 1040 ml   Net 520 ml      General: In no acute distress, moderate facial muscle wasting  Neuro: alert Ox3, No asterixis.   HEENT:  Noscleral icterus, Nooral lesions  CV:  Skin warm and dry  Lungs:  Respirations even and nonlabored on room air   Abd: mild abdominal distention. nontender   Extrem: no lower peripheral edema   Skin:  mild jaundice  Psych: flat    MEDICATIONS:  Current Facility-Administered Medications   Medication    acetaminophen (TYLENOL) tablet 650 mg    albuterol (PROVENTIL HFA/VENTOLIN HFA) inhaler    albuterol (PROVENTIL) neb solution 2.5 mg    alum & mag hydroxide-simethicone (MAALOX) suspension 30 mL    artificial saliva (BIOTENE DRY MOUTHWASH) liquid 5 mL    benzocaine-menthol (CHLORASEPTIC MAX) 15-10 MG lozenge 1 lozenge    calcium carbonate (TUMS) chewable tablet 1,000 mg    cefTRIAXone (ROCEPHIN) 2 g vial to attach to  ml bag for ADULTS or NS 50 ml bag for PEDS    cholecalciferol (VITAMIN D3) capsule 50,000 Units "    citalopram (celeXA) tablet 40 mg    fluticasone-vilanterol (BREO ELLIPTA) 200-25 MCG/ACT inhaler 1 puff    heparin lock flush 10 UNIT/ML injection 5-20 mL    heparin lock flush 10 UNIT/ML injection 5-20 mL    lactulose (CHRONULAC) solution 20 g    levothyroxine (SYNTHROID/LEVOTHROID) tablet 112 mcg    Lidocaine (LIDOCARE) 4 % Patch 1 patch    lidocaine (LMX4) cream    lidocaine (LMX4) cream    lidocaine 1 % 0.1-1 mL    lidocaine 1 % 0.1-5 mL    metroNIDAZOLE (FLAGYL) tablet 500 mg    midodrine (PROAMATINE) tablet 15 mg    montelukast (SINGULAIR) tablet 10 mg    mupirocin (BACTROBAN) 2 % ointment    [Held by provider] ondansetron (ZOFRAN) injection 4 mg    ondansetron (ZOFRAN) injection 4 mg    oxymetazoline (AFRIN) 0.05 % spray 2 spray    pantoprazole (PROTONIX) EC tablet 40 mg    rifaximin (XIFAXAN) tablet 550 mg    senna-docusate (SENOKOT-S/PERICOLACE) 8.6-50 MG per tablet 1 tablet    Or    senna-docusate (SENOKOT-S/PERICOLACE) 8.6-50 MG per tablet 2 tablet    sodium chloride (PF) 0.9% PF flush 10-40 mL    sodium chloride (PF) 0.9% PF flush 3 mL    sodium chloride (PF) 0.9% PF flush 3 mL       REVIEW OF LABORATORY, PATHOLOGY AND IMAGING RESULTS:  BMP  Recent Labs   Lab 02/01/24  0638 01/31/24  1552 01/31/24  0539 01/31/24  0343 01/30/24  0800 01/30/24  0524 01/29/24  1158 01/29/24  0542   *  124* 124* 127* 126*   < > 123*  123*   < > 120*   POTASSIUM 4.0  --  3.9  --   --  3.8  --  3.8   CHLORIDE 100  --  102  --   --  97*  --  94*   UMA 9.8  --  9.5  --   --  9.4  --  9.3   CO2 17*  --  15*  --   --  15*  --  16*   BUN 87.7*  --  81.4*  --   --  75.8*  --  71.0*   CR 0.78  --  0.75  --   --  0.76  --  0.84   GLC 94  --  108*  --   --  98  --  95    < > = values in this interval not displayed.     CBC  Recent Labs   Lab 02/01/24  0638 01/31/24  0539 01/30/24  0524 01/29/24  0542   WBC 11.5* 15.4* 17.3* 19.8*   RBC 2.30* 2.45* 2.53* 2.64*   HGB 7.4* 7.8* 8.4* 8.6*   HCT 22.9* 23.9* 25.0* 26.0*     "98 99 99   MCH 32.2 31.8 33.2* 32.6   MCHC 32.3 32.6 33.6 33.1   RDW 17.5* 17.5* 17.4* 17.3*   PLT 47* 58* 57* 62*     INR  Recent Labs   Lab 02/01/24  0638 01/31/24  0539 01/30/24  0524 01/29/24  0542   INR 3.23* 3.00* 3.16* 3.28*     LFTs  Recent Labs   Lab 02/01/24  0638 01/31/24  0539 01/30/24  0524 01/29/24  0542   ALKPHOS 87 81 106 86   AST 35 35 40 45   ALT 21 21 22 25   BILITOTAL 2.8* 2.8* 3.0* 3.3*   PROTTOTAL 5.2* 5.4* 5.3* 5.4*   ALBUMIN 3.2* 3.4* 3.4* 3.5        MELD 3.0: 30 at 2/1/2024  6:38 AM  MELD-Na: 30 at 2/1/2024  6:38 AM  Calculated from:  Serum Creatinine: 0.78 mg/dL (Using min of 1 mg/dL) at 2/1/2024  6:38 AM  Serum Sodium: 124 mmol/L (Using min of 125 mmol/L) at 2/1/2024  6:38 AM  Total Bilirubin: 2.8 mg/dL at 2/1/2024  6:38 AM  Serum Albumin: 3.2 g/dL at 2/1/2024  6:38 AM  INR(ratio): 3.23 at 2/1/2024  6:38 AM  Age at listing (hypothetical): 63 years  Sex: Female at 2/1/2024  6:38 AM    Previous work-up:   Lab Results   Component Value Date    HEPBANG Nonreactive 02/18/2021    HBCAB Nonreactive 02/18/2021    HCVAB  08/03/2017     Nonreactive   Assay performance characteristics have not been established for newborns,   infants, and children      TANYA 157 10/03/2023    IRONSAT 26 11/23/2023    TSH 3.98 12/06/2023    CHOL 176 08/15/2022    HDL 44 (L) 08/15/2022     (H) 08/15/2022    TRIG 32 12/06/2023    A1C 4.7 07/24/2023    POPETH <10 01/11/2024    PLPETH <10 01/11/2024      No results found for: \"SPECDES\", \"LDRESULTS\"      Imaging Results:  CT chest w 2/1/24  IMPRESSION:   1. Compared to CT 1/29/2024 there is slight increased inferior gas  component of a moderate right hydropneumothorax without change in  overall size. There is associated right pleural thickening without  hyperenhancement. No evidence of focal pulmonary abscess.  2. No significant change in several right peripheral consolidative  opacities likely representing areas of rounded atelectasis. No new  consolidation.  3. " Cirrhotic liver with moderate volume ascites, splenomegaly and  recanalized paraumbilical vein.
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
## 2024-02-01 NOTE — PROCEDURES
[facility]    Single Lumen PICC Placement    Date/Time: 2/1/2024 4:45 PM    Performed by: Crescencio Iyer RN  Authorized by: Tyrone Singh MD  Indications: vascular access      UNIVERSAL PROTOCOL   Site Marked: Yes  Prior Images Obtained and Reviewed:  Yes  Required items: Required blood products, implants, devices and special equipment available    Patient identity confirmed:  Verbally with patient, arm band, provided demographic data, hospital-assigned identification number and anonymous protocol, patient vented/unresponsive  NA - No sedation, light sedation, or local anesthesia  Confirmation Checklist:  Patient's identity using two indicators, relevant allergies, procedure was appropriate and matched the consent or emergent situation and correct equipment/implants were available  Time out: Immediately prior to the procedure a time out was called (Crescencio)    Universal Protocol: the Joint Commission Universal Protocol was followed    Preparation: Patient was prepped and draped in usual sterile fashion       ANESTHESIA    Anesthesia:  Local infiltration  Local Anesthetic:  Lidocaine 1% without epinephrine  Anesthetic Total (mL):  5      SEDATION    Patient Sedated: No        Preparation: skin prepped with ChloraPrep  Skin prep agent: skin prep agent completely dried prior to procedure  Sterile barriers: maximum sterile barriers were used: cap, mask, sterile gown, sterile gloves, and large sterile sheet  Hand hygiene: hand hygiene performed prior to central venous catheter insertion  Type of line used: PICC  Catheter type: single lumen  Lumen type: non-valved and power PICC  Catheter size: 4 Fr  Brand: Bard  Lot number: [id number]  Placement method: venipuncture, MST, ultrasound and tip navigation system  Number of attempts: 1  Difficulty threading catheter: no  Successful placement: yes  Orientation: left    Location: brachial vein (lateral) (0.39cm)  Tip Location: SVC  Arm  circumference: adults 10 cm  Extremity circumference: 22  Visible catheter length: 1  Total catheter length: 47  Dressing and securement: adhesive securement device, blood cleaned with CHG, alcohol impregnated caps, chlorhexidine patch applied, blood removed, fixation device, glue, line secured, securement device, site cleansed, statlock and transparent securement dressing  Post procedure assessment: blood return through all ports, free fluid flow and placement verified by 3CG technology  PROCEDURE Describe Procedure: Left brachial lateral vein 0.39cm dm. 1cm external.Placement verified by Mauri 3CG.PICC okay to use.  Disposal: sharps and needle count correct at the end of procedure, needles and guidewire disposed in sharps container

## 2024-02-01 NOTE — PROGRESS NOTES
[facility]    Medicine Progress Note - Medicine Service, [facility] TEAM 4    Date of Admission:  1/22/2024    Assessment & Plan   Stefani Crowell is a 63 year old female admitted on 1/22/2024. She has a history of cirrhosis secondary to alcohol use currently being worked up for liver transplant complicated by need for frequent paracenteses and thoracenteses for ascites/hepatic hydrothorax, in addition to asthma and COPD 2/2 tobacco use, MDD/STEPHANIE, hypothyroidism, low back pain, and chronic hyponatremia. She presented to the ED for evaluation of acute on chronic hyponatremia in the setting of recent viral-like URI illness. Admitted for careful monitoring, and for evaluation of loculated pleural effusion with significant leukocytosis. Currently being treated for Empyema      Changes Today:  - Paracentesis w/ 3L removed  - CT chest this AM  - Chest tube removed per Pulm  - Pleural fluid labs sent  - Add on fibrinogen  - PICC placement  - May order albumin pending conversation w/ hepatology    ==========================================================    # Hydropneumothorax  # Loculated R pleural effusion; E coli empyema - improving  # Asymptomatic Bacteruria  # At risk for skin/soft tissue infection  # Trapped lung  # Leukocytosis  # Hx Hepatic Hydrothorax   # Chest pain at site of chest tube  No clear symptoms, UA and urine culture corroborate possible infection. Notably, Ucx on 11/13/23 was w/ klebsiella species and now E coli on 1/22/24. Patient not reporting any urinary symptoms. On 1/23, had thora + paracentesis per IR and pleural fluid cultures yielded GNB -> e coli. Blood cultures yielded GPC, though this is most likely a contaminant. Has very fragile skin and numerous tears. Interventional pulm placed chest tube on 1/24. CT Chest on 1/25 demonstrated improved R-sided hydropneumothorax after. Repeat CT Chest on 1/29 demonstrated increased gas component of the R  hydropneumothorax w/o changes in overall size. Patient tolerated 6 doses of alteplase total. Repeat pleural fluid labs on 1/29 were consistent w/ ongoing infection, though marginally improved. Intermittent reports from nursing of leak, which might be implicated in reduced output, though unclear how frequently this is involved/extent of effect on output as whenever we are in room to assess, even w/ cough, we do not notice leaking. On 2/1, CT chest demonstrated somewhat increased air component of hydropneumothorax; pulm was confident that this was not truly gas but absence of fluid in the space. Additionally, fluid labs sent on 2/1 demonstrated slightly improved cell count, prompting chest tube removal.  - WOC consult  - Pulmonary Consult   > Chest tube placed 1/24, removed 2/1   > Suspect apical collection in communication w/ basilar component   > CT on 2/1  - Blood cultures:   > 1/23: Staph epi. (suspect contaminant)   > 1/24: NGTD  - Pleural fluid culture:   > 1/23: E coli (Amp res.)  - Peritoneal fluid culture:   > 1/23: NGTD  - Urine culture:   > 1/22: Ampicillin Res. E. Coli   - Infectious Disease consulted (1/24)   > Stop IV Zosyn   > Resumed IV Ceftriaxone 1g every day, Added metronidazole 500mg Q8H   > Plan for repeat pleural fluid analysis on 2/1  - Interventional Pulm consulted (1/24) for chest tube (source control). Required  2u FFP and INR to follow    > Daily CXR   > Initiated alteplase to break up fibrin; started on 1/26 and received 6 doses   > 1/29; sent additional fluid labs to monitor for improvement of infection  - CT Scan on 1/25, repeat on 1/29 AM w/ stable hydropneumothorax w/ increased gas component  - Para Labs:   > Aerobic/anaerobic culture (NGTD), cell counts (completed), gram stain (NGTD), LDH, glucose, protein, AFB (pending), fungal culture (pending)  - Thora labs:  > Aerobic (GNB)/anaerobic culture (pending), cell counts, gram stain, LDH, glucose, protein, AFB (pending), fungal culture  (pending)  > Cell counts demonstrate 125,760 total nucleated cells w/ significant % (98) neutrophils consistent w/ empyema as verified on gram stain  > Cell counts (1/29) w/ 104,300 cells and 92% neutrophils  > Cell counts (2/1) w/ 94,300 cells and 94% neutrophils  - CBC w/ diff daily   - Lidocaine patch for pain related to chest tube  - PICC placement 2/1 for ongoing IV abx  - Abx:  > Ceftriaxone: 1/23 - 1/24, 1/25 - *   > Metronidazole 500mg Q8H: 1/25 - *   > Rifaximin: 1/22 - *  > Zosyn: 1/24 - 1/25  > Vancomycin: 1/24  [  ] Dispo: Duration of abx TBD, minimum 4-6 weeks for empyema       # Decompensated alcoholic cirrhosis (Ascites, HE)  # Current liver transplant evaluation   # Hepatic encephalopathy   # Recurrent ascites  # Abdominal Pain  Decompensations include jaundice/icterus, G1 esophageal & rectal varices on EGD without bleeding, significant ascites requiring 3 times weekly paracenteses, hepatic hydrothorax requiring frequent thoracenteses, and hepatic encephalopathy.  Gets thoras and courtney through the [facility] system. During prior hospitalization, her transplant eligibility was reopened due to completion of chemotherapy treatment and improved renal function.  Do suspect that she currently has some hepatic encephalopathy due to decreased bowel movements recently.  MELD NA 26 on discharge 12/16, 29 on most recent labs. Last admit: CMV (PCR quant neg), EBV (low DNA quant, 537 copies; discussed with ID and no need to recheck). Last admit: IGRA (neg), treponemal ab (nonreactive), serum HCG (neg), and spot protein urine (0.24 mg/mg). Repeat peritoneal labs negative for infection/SBP. We wonder if TIPS might offer relief for patient in a few ways. She is likely to continue to develop hepatic hydrothorax unless her portal HTN is addressed. While chest tube assists w/ source control, it is not a permanent solution and she is likely to reaccumulate and potentially re-infect (if able to resolve current infection at  all). Patient is, unfortunately, not TIPS candidate d/t elevated MELD and   MELD 3.0: 30 at 2/1/2024  6:38 AM  MELD-Na: 30 at 2/1/2024  6:38 AM  Calculated from:  Serum Creatinine: 0.78 mg/dL (Using min of 1 mg/dL) at 2/1/2024  6:38 AM  Serum Sodium: 124 mmol/L (Using min of 125 mmol/L) at 2/1/2024  6:38 AM  Total Bilirubin: 2.8 mg/dL at 2/1/2024  6:38 AM  Serum Albumin: 3.2 g/dL at 2/1/2024  6:38 AM  INR(ratio): 3.23 at 2/1/2024  6:38 AM  Age at listing (hypothetical): 63 years  Sex: Female at 2/1/2024  6:38 AM    - Hepatology consult   > with current infection, not a candidate for liver transplant at this time. Still needs [facility] for cardiac risk assessment for transplant.   - Will not be able to get cath until acute illness resolves   > Unclear what parameters need to be resolved as patient is at risk of redeveloping pleural fluid which, as of 2/1, still contains significant neutrophils. It is trending down, so clearance may be possible, though chest tube is also removed on 2/1.  - IR consulted for diagnostic Paracentesis on 1/23   > Called [facility] to attempt to add-on labs to samples from 1/22; samples were not retained   > Prefer to avoid therapeutic to avoid massive fluid shifts (on 1/23), following paracentesis w/ larger volumes removed   > Para + Thora labs: Aerobic/anaerobic culture, cell counts, gram stain, LDH, glucose, protein (results above)  - CAPS to assist w/ para on 1/28 (some hypotension 1/28/2024, would like to avoid shifts)  - Tylenol 650mg Q8H PRN (total dose < 2g)  - Continue lactulose, Rifaximin   - Continue PPI  - Target 3-4 BM daily   - Abx as above.  - Paracentesis log:   > 1/23: IR w/ 100ml removed for diagnostics only   > 1/26: CAPS w/ 1.5L removed (still a lot remaining, opted for less d/t fluid shifts and initially labile BP)   > 1/29: paracentesis w/ 3L removed   > 2/1: paracentesis w/ 3L removed - Did not order albumin d/t not being large volume (>4L) or in context of elevated Crt. Will  speak w/ hepatology of albumin indicated or could be offered to patient       # Acute mild on Chronic Hyponatremia - Stable  Chronic hyponatremia common in the setting of cirrhosis, and she has been admitted for hyponatremia several times in the past. Baseline mid 120s.  Given her history of low p.o. intake recently, do worry about a hypovolemic hyponatremia. S/p Albumin with paracentesis 1/22.  Given additional albumin on 1/24, 1/25. Her sodium corrected w/ 2% NS at a slow rate.  - Nephrology consulted   > Albumin 75g for 3 days  - Stopped 2% NS (Na at baseline)  - Q12hr Na checks  - strict ins/outs   - daily weights   - Regular diet      # MATTIE  # hepatorenal syndrome   Chart review suggests a prior history of hepatorenal syndrome.  However, her history at the moment is more compatible with a prerenal MATTIE secondary to poor p.o. intake.  Baseline Cr around 0.6, nearly double this on admission. Receiving fluids as above for hyponatremia, will trend creatinine. S/p albumin w/ para and additional 75g on 1/23, 1/24, and 1/25. Her Crt remained stable after these doses..   - Nephrology consulted - signed off 1/26  - Avoid nephrotoxic agents  - Fluids as above  - Continue Midodrine 15mg PO TID PRN   > Will make PRN as her BP is labile while here, and do not want to have scheduled as a means of increasing her BP when we would also like to keep her BP within normal range for her aortic aneurysm.  [  ] Dispo: Renal scheduling in CKD clinic     # Borderline Ascending Aortic Aneurysm  Noted on CT Chest on 1/25. Measured to be 4.3 cm. I personally reviewed the CT chest and found largest diameter to be about 43.5mm. I compared this to the widest portion on of the ascending aorta on her CTA coronary on 12/15/23 which was 42.0mm. This is fairly stable.  - Will speak w/ Thoracic Surg re. Follow up guidelines  - BP Goal near normotensive < 130/<85  - HR goal < 80  [  ] Dispo: Follow up guidelines pending     # Severe malnutrition in  context of chronic illness  Patient's phos was appropriate on 1/22. 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 Holding off on prednisone for now iso possible infection and respiratory stability  - Continue PRN albuterol inhaler or nebs  - Continue ICS-LABA  - Continue Singulair       # Hypothyroidism  - Continue PTA levothyroxine      # Hypermagnesemia  - CTM     # H/o hypophosphatemia - not present here  Will recheck in setting of poor oral intake. Fairly low risk for refeeding as low PO intake only recently.      # Normocytic anemia - stable  Has required outpatient white blood cell transfusions in the past.  Her hemoglobin tends to be around 7.5-9.5.  At normal range on admission. No clear bleeding history though is at risk for this. Chronic disease component and nutritional components possible as well. Hgb on 1/24 was 7.5, which is likely dilutional w/ fluids. No sources of bleeding identified.  - CBC w/ diff in AM  - Would transfuse for Hgb <7  - Transfusion log:   > 1u pRBC 1/25     # Thrombocytopenia - stable  ISO cirrhosis. Increased bleeding risk, avoiding heparin/lovenox for now.   - Transfusion if < 10     # MDD/STEPHANIE  - Continue PTA Celexa  - Would try to avoid sedating agents d/t current sedative presentation     # Low back pain  Stable, trial hot/cold packs first          Diet: Room Service  Fluid restriction 1000 ML FLUID  Snacks/Supplements Adult: Ensure Max Protein (bariatric); Between Meals  Snacks/Supplements Adult: Ensure Enlive; Between Meals  NPO per Anesthesia Guidelines for Procedure/Surgery Except for: Meds  Snacks/Supplements Adult: Other; 10 AM - Vanilla Greek Yogurt + Peaches; Between Meals  Snacks/Supplements Adult: Magic Cup; Between Meals    DVT Prophylaxis:  "Pneumatic Compression Devices  Demarco Catheter: Not present  Lines: None     Cardiac Monitoring: None  Code Status: Full Code      Clinically Significant Risk Factors         # Hyponatremia: Lowest Na = 123 mmol/L in last 2 days, will monitor as appropriate   # Hypercalcemia: corrected calcium is >10.1, will monitor as appropriate    # Hypoalbuminemia: Lowest albumin = 2.9 g/dL at 1/23/2024  6:48 AM, will monitor as appropriate    # Coagulation Defect: INR = 3.23 (Ref range: 0.85 - 1.15) and/or PTT = N/A, will monitor for bleeding  # Thrombocytopenia: Lowest platelets = 47 in last 2 days, will monitor for bleeding           # Severe Malnutrition: based on nutrition assessment    # Financial/Environmental Concerns: none  # Asthma: noted on problem list        Disposition Plan     Expected Discharge Date: 02/05/2024      Destination: home with family  Discharge Comments: 2/1 [facility] for txp work up, chest tube in place- plan to collect cultures and possibly remove          Tyrone Singh MD  Medicine Service, [facility] TEAM [address] [place] [address]  Securely message with [employer]. (more info)  Text page via [employer] Paging/Directory   See signed in provider for up to date coverage information  ______________________________________________________________________    Interval History   This morning patient was sitting up in chair at bedside w/  in room. She stated that \"I feel full\", gesturing toward her abdomen. We agreed and said that she was likely due for another paracentesis today since it had been 3 days since her last. She otherwise had no other complaints beyond the chest tube pains.    Physical Exam   Vital Signs: Temp: 97.1  F (36.2  C) Temp src: Oral BP: 91/49 Pulse: 66   Resp: 18 SpO2: 100 % O2 Device: None (Room air)    Weight: 143 lbs 1.26 oz    General Appearance:  Chronically ill appearing, conversational and oriented fully  Respiratory: Reduced on bilateral bases R > L w/ " some crackles bilateral (stable)  Cardiovascular: RRR, warm and well perfusing extremities, loud systolic murmur  GI: distended, mildly tender throughout, firm, tympanic  Skin: jaundice  Other:  Moves extremities spontaneously, answers all questions appropriately. Chest tube draining red-tinged fluid, normal tidaling noted w/o air leak, no bubbling noted w/ cough    Medical Decision Making       MANAGEMENT DISCUSSED with the following over the past 24 hours: nursing       Data     I have personally reviewed the following data over the past 24 hrs:    11.5 (H)  \   7.4 (L)   / 47 (LL)     124 (L); 124 (L) 100 87.7 (H) /  94   4.0 17 (L) 0.78 \     ALT: 21 AST: 35 AP: 87 TBILI: 2.8 (H)   ALB: 3.2 (L) TOT PROTEIN: 5.2 (L) LIPASE: N/A     INR:  3.23 (H) PTT:  N/A   D-dimer:  N/A Fibrinogen:  N/A

## 2024-02-02 NOTE — PROGRESS NOTES
[facility] INFECTIOUS DISEASES PROGRESS NOTE     Patient:  Stefani Crowell   Date of birth 1960, Medical record number [id number]  Date of Visit:  02/02/2024  Date of Admission: 1/22/2024  Consult Requester:Venancio Fu MD          Assessment and Plan:   ID Problem List  Right-sided loculated empyema  Thoracentesis (1/23): ~126K TNC, 98% neutrophils; total protein 2.9, glucose <2, +Light's criteria. Culture = E. Coli  Thora 1/29 = 104K TNC, culture NGTD  Thora 2/1 = 94K TNC  Chest tube in place (1/24-2/1/24)  Leukocytosis   Positive blood cultures - Staph epi in 1/4 bottles on 1/23 is a contaminant  Decompensated alcohol-related cirrhosis   Anemia, thrombocytopenia, coagulopathy  Hepatic encephalopathy  MATTIE, HRS  Ascites and hepatic hydrothorax requiring 3x/weekly drainage  Grade 1 EV  Hyponatremia  Dirty UA (4 squamous cells), urine culture +E. Coli (Amp-R) - no therapy required  History of C difficile (+PCR 9/4/20)  PICC in place (2/1/24)     RECOMMENDATION:  Continue IV ceftriaxone 2g once daily and PO metronidazole 500 mg q8hrs for empyema  Duration of antibiotics TBD by outpatient ID, needs minimum 4-6 weeks for empyema (start date = 2/1/24)  Needs weekly CBC with diff, CMP  Will arrange ID clinic follow up in ~4 weeks. Please get CT chest with contrast prior to visit.   See OPAT note for additional details below    ID will sign off     ASSESSMENT:  Stefani Crowell is a 63 year old female with PMHx significant for severe asthma, decompensated alcohol related cirrhosis c/b thrombocytopenia, anemia, esophageal varices (grade 1), hepatic encephalopathy, hepatorenal syndrome, ascites and hepatic hydrothorax requiring 3x/week drainage, hypothyroidism, depression, and anxiety who was admitted for abnormal labs (Na 112, K 5.3). Hospital course notable for CXR with loculated right pleural effusion s/p thoracentesis consistent with empyema. ID consulted for GNB pleural effusion and GPC on blood  cultures. Staph epi in 1/2 sets, in 1/4 bottles felt to be contaminant. Repeat blood cultures with no growth. She also had +E. Coli on urine culture, patient is asymptomatic and this is a dirty specimen with 4 squamous cells, would not treat. However, this will be incidentally covered with therapy for her E. Coli empyema.      Thoracentesis 1/23 with exudative effusion with ~126K total nucleated cells and culture with E. coli. Unclear etiology of this empyema. No recent or current concerns for pneumonia - did have recent URI, likely viral, but no productive cough or reported hypoxia, though does have chest CT with peribronchial consolidative opacities. No concurrent SBP (paracentesis same day with 131 ANC, Cx NG), though does get 3x/week paracentesis +/- thoracentesis which may be source via direct inoculation. Outpatient thoracentesis on 1/17 - no labs done; CXR at that time noted loculated apical component. Appreciate IP involvement for chest tube placement - initially with 1900 ml seropurulent output day 1, now sanguinous. Lytics started per IP on 1/26, tolerated well. Persistent leukocytosis to ~20s, concerning for lack of source control, however is now improving. IP feels apical collection is draining from existing chest tube which is reassuring and chest tube output now downtrending (though has baseline hepatic hydrothorax). Repeat chest CT x2 with unchanged hydropneumothorax, increased gas though has baseline hepatic hydrothorax and IP feels empyema is improved. Repeat pleural fluid analysis 1/29 with 104K total nucleated cells and on 2/1 with 94K. Likely inflammatory/sterilized fluid, reassuring that counts are improving, and felt to have source control given chest tube + lytics. Chest tube removed by IP on 2/1. She is at increased risk of impaired healing given baseline hydrothorax and will need to monitor chest tube site after removal to ensure this heals. Encourage nutrition. Continue IV ceftriaxone + PO  "flagyl, plan for 4-6 week course for empyema. Start date 2/1/24 from chest tube removal. PICC placed 2/1 - needs weekly labs and ID clinic follow up in 4 weeks with CT chest prior.    Infectious diseases will sign off. Please do not hesitate to reach out if questions arise.   Patient and plan staffed with Dr. Mohr. Recommendations discussed with primary team.    Josseline Pace PA-C  Infectious Diseases  Pager #[phone] or Vocera    50 MINUTES SPENT BY ME on the date of service doing chart review, history, exam, documentation & further activities per the note.        Primary team:  Place order panel  OPAT Pharmacy ([facility]) Review and ID Care Transition   Place imaging order(s) requested above prior to discharge.  Contact ID teams about missed ID clinic appointments and/or ongoing therapy needs.    Prolonged Parenteral/Oral Antibiotic Recommendations and ID Follow up  This template provides final ID recommendations as of this date.     Infectious Diseases Indication: E. Coli empyema    Antibiotic Information  Name of Antibiotic Dose of Antibiotic1 Anticipated duration Effective start date2 End date   IV ceftriaxone 2g once daily 4-6 weeks 2/1/24 TBD at ID follow up   PO metronidazole 500 mg q8hrs 4-6 weeks 2/1/24 TBD at ID follow u          1.Dose of antibiotic will need to be renally adjusted if creatinine clearance changes  2.Effective start date is the date of adequate therapy with appropriate spectrum    Method of antibiotic delivery:PICC line.Is the line being used for another indication besides antimicrobials? No At the end of therapy should the line used for antimicrobials be removed or de-accessed? Yes. Selecting \"yes\" will function as written order to remove PICC line or de-access the indwelling line at the end of therapy.    Weekly labs required: CBC with diff and CMP. Dr. Mohr will follow labs at discharge until ID follow up. Fax labs to ID clinic.    Are there pending microbial tests: No     Imaging for " ID follow up: ID Imaging: Yes. Type of imaging needed Yes: CT chest with contrast    Requested ID clinic follow up in 4 weeks, general ID. Type of clinic appointment In-Person visit.     ID provider route note: OPAT RN Care Coordinator [facility] ID Clinic Information:  Phone: [phone]  Fax: [phone] (Joel Navarrete)           Interim History and Events:     Lolly remains afebrile, vitals stable, on room air. Leukocytosis remains 12.4. Chest tube removed 2/1 by IP. She has no acute complaints today. More lethargic, but denies fever, chills, cough, chest pain, dyspnea, abdominal pain. Stools at baseline. Minimal drainage from chest tube site dressing.          ROS:   -Focused 5 point ROS completed, pertinent positives and negatives listed above.      Physical Examination:  Temp: 97.5  F (36.4  C) Temp src: Oral BP: 97/42 (map 71) Pulse: 67   Resp: 15 SpO2: 99 % O2 Device: None (Room air)      Vitals:    01/29/24 0237 01/30/24 0444 01/31/24 0330 02/01/24 0400   Weight: 64.1 kg (141 lb 5 oz) 62.6 kg (138 lb 0.1 oz) 63.9 kg (140 lb 14 oz) 64.9 kg (143 lb 1.3 oz)    02/01/24 2112   Weight: 62.6 kg (138 lb)       Constitutional: Pleasant adult female, in NAD. Alert and interactive.   HEENT: NCAT, trace icteric sclerae, conjunctiva clear. Moist mucous membranes.  Respiratory: Non-labored breathing, good air exchange on room air. Lungs are clear to auscultation on left, diminished on right but improved today at apices. No wheezing, crackles or rhonchi. No cough noted. Chest tube site dressing with scant drainage.  Cardiovascular: Regular rate and rhythm with no murmur, rub or gallop.  GI: Normoactive BS. Abdomen is soft, nontender to percussion or palpation, mild-moderately distended. No rigidity or guarding. No rebound tenderness.  Skin: Warm and dry. Multiple scabs, skin tears. No purulence or cellulitis.  Musculoskeletal: Extremities grossly normal. No  "tenderness or edema present.   Neurologic: A &O x3, speech normal, answering questions appropriately. Moves all extremities spontaneously.  VAD: PICC is c/d/i with no erythema, drainage, or tenderness.      Medications:   cefTRIAXone  2 g Intravenous Q24H    cholecalciferol  50,000 Units Oral Q7 Days    citalopram  40 mg Oral Daily    fluticasone-vilanterol  1 puff Inhalation Daily    heparin lock flush  5-20 mL Intracatheter Q24H    lactulose  20 g Oral TID    levothyroxine  112 mcg Oral Daily    lidocaine  1 patch Transdermal Q24H    metroNIDAZOLE  500 mg Oral TID    montelukast  10 mg Oral At Bedtime    mupirocin   Topical TID    oxymetazoline  2 spray Both Nostrils BID    pantoprazole  40 mg Oral BID    rifaximin  550 mg Oral BID    sodium chloride (PF)  3 mL Intracatheter Q8H       Infusions/Drips:   sodium chloride 2% infusion           Laboratory Data:   No results found for: \"ACD4\"    Inflammatory Markers    Recent Labs   Lab Test 12/06/23  1701 06/29/18  1309   SED 12 7   CRP  --  5.9       Metabolic Studies       Recent Labs   Lab Test 02/02/24  0707 02/01/24  1703 02/01/24  0638 01/31/24  1552 01/31/24  0539 01/31/24  0343 01/30/24  0800 01/30/24  0524 01/29/24  1158 01/29/24  0542 01/28/24  1441 01/28/24  0426 01/25/24  0027 01/24/24  2134 01/24/24  1905 01/24/24  1659 01/23/24  0730 01/23/24  0648 01/23/24  0210 01/22/24  2232 01/22/24  1734 10/19/23  1523 10/03/23  0853 08/18/23  1402 07/24/23  1457   *  121* 124* 124*  124* 124* 127* 126*   < > 123*  123*   < > 120*   < > 118*  118*   < > 124*   < > 120*   < > 111*   < > 111* 110*   < > 126*   < > 133*   POTASSIUM 3.6  --  4.0  --  3.9  --   --  3.8  --  3.8  --  3.9   < >  --   --   --    < > 5.2  --   --  5.1   < > 3.9   < > 3.4   CHLORIDE 97*  --  100  --  102  --   --  97*  --  94*  --  91*   < >  --   --   --    < > 85*  --   --  81*   < > 91*   < > 95*   CO2 15*  --  17*  --  15*  --   --  15*  --  16*  --  17*   < >  --   --   --    < " > 17*  --   --  18*   < > 25   < > 24   ANIONGAP 9  --  7  --  10  --   --  11  --  10  --  10   < >  --   --   --    < > 9  --   --  11   < > 10   < > 14   BUN 81.8*  --  87.7*  --  81.4*  --   --  75.8*  --  71.0*  --  66.4*   < >  --   --   --    < > 95.3*  --   --  85.9*   < > 33.9*   < >  --    CR 0.72  --  0.78  --  0.75  --   --  0.76  --  0.84  --  0.82   < >  --   --   --    < > 1.16*  --   --  1.12*   < > 1.24*   < >  --    GFRESTIMATED >90  --  86  --  90  --   --  89  --  79  --  81   < >  --   --   --    < > 53*  --   --  56*   < > 49*   < >  --    GLC 71  --  94  --  108*  --   --  98  --  95  --  101*   < >  --   --   --    < > 74  --   --  121*   < > 98   < >  --    A1C  --   --   --   --   --   --   --   --   --   --   --   --   --   --   --   --   --   --   --   --   --   --   --   --  4.7   UMA 9.9  --  9.8  --  9.5  --   --  9.4  --  9.3  --  9.7   < >  --   --   --    < > 9.8  --   --  9.6   < > 10.2   < >  --    PHOS  --   --   --   --   --   --   --   --   --   --   --   --   --   --   --   --   --   --   --  3.8  --   --  2.6  --   --    MAG  --   --   --   --   --   --   --   --   --   --   --   --   --   --   --   --   --  3.4*  --   --  2.8*   < >  --    < >  --    LACT  --   --   --   --   --   --   --   --   --   --   --   --   --  1.9  --  3.0*   < >  --   --   --   --    < >  --   --   --     < > = values in this interval not displayed.       Hepatic Studies    Recent Labs   Lab Test 02/02/24  0707 02/01/24  0638 01/31/24  0539 01/30/24  0524 01/29/24  0542 01/28/24  0426 01/24/24  0532 01/23/24  1405 12/15/23  0545 12/11/23  0812   BILITOTAL 2.5* 2.8* 2.8* 3.0* 3.3* 3.6*   < >  --    < > 4.9*   ALKPHOS 78 87 81 106 86 77   < >  --    < > 67   ALBUMIN 3.6 3.2* 3.4* 3.4* 3.5 3.6   < >  --    < > 3.4*   AST 30 35 35 40 45 40   < >  --    < > 47*   ALT 18 21 21 22 25 25   < >  --    < > 36   LDH  --   --   --   --   --   --   --  195  --  185    < > = values in this interval not  displayed.       Pancreatitis testing    Recent Labs   Lab Test 12/06/23  1843 12/06/23  1608 10/22/23  0150 08/15/22  1426 09/03/20  1216 03/01/19  0954 04/08/16  1040   LIPASE  --  125* 116*  --  189  --   --    TRIG 32  --   --  111  --  125 97       Hematology Studies      Recent Labs   Lab Test 02/02/24  0707 02/01/24  0638 01/31/24  0539 01/30/24  0524 01/29/24  0542 01/28/24  0426 01/19/21  1502 09/16/20  1147 09/03/20  1216 06/29/18  1309   WBC 12.4* 11.5* 15.4* 17.3* 19.8* 20.8*   < > 7.1   < > 5.6   ANEU  --   --   --   --   --   --   --  4.8  --  3.4   ALYM  --   --   --   --   --   --   --  1.5  --  1.5   VIJI  --   --   --   --   --   --   --  0.5  --  0.6   AEOS  --   --   --   --   --   --   --  0.2  --  0.1   HGB 7.4* 7.4* 7.8* 8.4* 8.6* 8.1*   < > 13.9   < > 13.7   HCT 22.2* 22.9* 23.9* 25.0* 26.0* 24.1*   < > 43.0   < > 41.8   PLT 49* 47* 58* 57* 62* 55*   < > 118*   < > 125*    < > = values in this interval not displayed.       Arterial Blood Gas Testing  No lab results found.     Urine Studies     Recent Labs   Lab Test 01/22/24  2323 12/07/23  1237 12/06/23  1730 11/13/23  1540 11/02/23  1236   URINEPH 5.0 5.0 5.0 5.0 5.5   NITRITE Negative Negative Negative Negative Negative   LEUKEST Large* Small* Trace* Small* Large*   WBCU 83* 4 7* 17* 23*       Microbiology:  Culture   Date Value Ref Range Status   02/01/2024 No growth, less than 1 day  Preliminary   02/01/2024 No growth, less than 1 day  Preliminary   01/29/2024 No growth after 3 days  Preliminary   01/24/2024 No Growth  Final   01/24/2024 No Growth  Final   01/23/2024 4+ Escherichia coli (A)  Final   01/23/2024 No Growth  Final   01/23/2024 No anaerobic organisms isolated  Final   01/23/2024 No growth after 8 days  Preliminary   01/23/2024 No growth after 8 days  Preliminary   01/23/2024 Positive on the 1st day of incubation (A)  Final   01/23/2024 Staphylococcus epidermidis (AA)  Final     Comment:     1 of 2 bottles   01/23/2024 No  Growth  Final   01/22/2024 >100,000 CFU/mL Escherichia coli (A)  Final   12/12/2023 No Growth  Final   12/06/2023 <10,000 CFU/mL Mixture of Urogenital Jenni  Final   11/13/2023 >100,000 CFU/mL Klebsiella pneumoniae (A)  Final   11/13/2023 50,000-100,000 CFU/mL Klebsiella pneumoniae (A)  Final   11/12/2023 No Growth  Final   11/09/2023 No Growth  Final   11/06/2023 No Growth  Final   11/06/2023 No anaerobic organisms isolated  Final   10/25/2023 No Growth  Final   10/22/2023 No Growth  Final   10/22/2023 >100,000 CFU/mL Mixture of urogenital jenni  Final       Last check of C difficile  C Diff Toxin B PCR   Date Value Ref Range Status   09/04/2020 Positive (A) NEG^Negative Final     Comment:     Positive: Toxin producing C. difficile target DNA sequences detected, presumed   positive for C. difficile toxin B. C. difficile (Requires Enteric Isolation).      C. difficile (Requires Enteric Isolation)  FDA approved assay performed using [employer] GeneXpert real-time PCR.         Imaging:  CT Chest 2/1/24  IMPRESSION:   1. Compared to CT 1/29/2024 there is slight increased inferior gas  component of a moderate right hydropneumothorax without change in  overall size. There is associated right pleural thickening without  hyperenhancement. No evidence of focal pulmonary abscess.  2. No significant change in several right peripheral consolidative  opacities likely representing areas of rounded atelectasis. No new  consolidation.  3. Cirrhotic liver with moderate volume ascites, splenomegaly and  recanalized paraumbilical vein.    CXR 1/30 - 2/1 - stable right sided hydropneumothorax per my read    CT Chest w/o contrast 1/29/24  IMPRESSION:   1. Compared to CT 1/25/2024 there is increased gas component of the  moderate right hydropneumothorax without substantial change in overall  size.  2. Interval resolution of several consolidative opacities in the right  lung while other peripheral consolidative opacities remain,  likely  areas of atelectasis and rounded atelectasis. No new consolidation.  3. Stable 4.3 cm ascending thoracic aortic aneurysm.  4. Cirrhotic morphology of liver with moderate volume ascites.    CXR 1/29/24  IMPRESSION:   1. No substantial change in right moderate hydropneumothorax with  chest tube in place.  2. No substantial change in airspace opacities projecting over the  right mid and lower lung fields.    CXR 1/27/24  IMPRESSION:   1. Stable appearance of right hydropneumothorax with chest tube in  place.  2. Stable appearance of hazy opacity within the right mid/upper lung,  may represent pneumonia.    CT Chest w/o contrast 1/25/24  IMPRESSION:  1. Small right-sided hydropneumothorax, previously moderate, with  slightly increased air component compared to CT 12/15/2023 which may  be in part related to placement of right basilar chest tube. There is  a fairly large apical component which may be communicating with the  basilar component containing the chest tube along the right lateral  aspect. Adjacent linear atelectasis throughout the right lung.  Aeration of the lung has improved compared to CT 11/13/2023.  2. Peribronchial consolidative opacities in the right upper lobe  representing infectious or inflammatory process.  3. Trace left effusion with adjacent atelectasis. Improved groundglass  opacities from CT 11/13/2023. Mild pulmonary edema.  4. Borderline ascending aortic aneurysm measuring 4.3 cm.    XR Chest 2 Views  Result Date: 1/22/2024  IMPRESSION: Large loculated right pleural effusion with associated atelectasis of the right upper, middle, and lower lobes. Increased in fluid volume compared to 12/14/2023.

## 2024-02-02 NOTE — PLAN OF CARE
Goal Outcome Evaluation: Transfer  Transferred to: [facility] 7172  Via:bed  Reason for transfer:Pt no longer appropriate for 6B- improved patinet condition  Family: Aware of transfer- ask patient to message/ inform   Belongings: Packed and sent with pt  Chart: Delivered with pt to next unit  Medications: Meds sent to new unit with pt  Report given to: 7c staff  Pt status: improved.   Alert and oriented x4, awake. Room air sating well above 95%. Vitally stable. Midodrine given. Regular diet, FR 1 liter compled. Piv 1 left arm SL. PICC 1 lumen left arm, SL. No new skin issues. Post paracenthesisnin am, 3 liter removed.

## 2024-02-02 NOTE — SUMMARY OF CARE
Transferred from:  6B  Report received from: Zarina Denton RN  2 RN skin assessment completed by: Mickie Garcia RN and Asia Orellana RN      - Findings (add LDA if needed): skin tears on chest and ankle   Care plan (primary problem) and education initiated if not already done: yes  MDRO education done if applicable: yes  Pt informed about policy regarding no IV pumps off unit: yes  Suction set up in room? yes  Detailed Belongings: phone, , purse, shoes, duffle bag, snacks, jacket, wallet, scarf, laptop

## 2024-02-02 NOTE — PLAN OF CARE
Goal Outcome Evaluation:  Plan of Care Reviewed With: patient  Overall Patient Progress: no change  Outcome Evaluation:   Assumed Cares [id number].     Na checks q8hrs. Transferred to  from 6B. A+Ox4. VSS on RA except hypotension. PRN midodrine given at 8pm by 6B RN. TID PRN midodrine available if SBP sustained <100.   Temp: 97.5  F (36.4  C)    BP: 95/41    Pulse: 75    Resp: 16    SpO2: 98 %    O2 Device: None (Room air)        Up SBA to bedside commode w/ frequent watery stools. Denies pain.

## 2024-02-02 NOTE — PROGRESS NOTES
[facility]    Medicine Progress Note - Medicine Service, [place]AG[place] TEAM 4    Date of Admission:  1/22/2024    Assessment & Plan   Stefani Crowell is a 63 year old female admitted on 1/22/2024. She has a history of cirrhosis secondary to alcohol use currently being worked up for liver transplant complicated by need for frequent paracenteses and thoracenteses for ascites/hepatic hydrothorax, in addition to asthma and COPD 2/2 tobacco use, MDD/STEPHANIE, hypothyroidism, low back pain, and chronic hyponatremia. She presented to the ED for evaluation of acute on chronic hyponatremia in the setting of recent viral-like URI illness. Admitted for careful monitoring, and for evaluation of loculated pleural effusion with significant leukocytosis. Currently being treated for Empyema    Changes Today:  - Chest tube removed  - 2% NS started at 50 ml/hr, repeat Na  - PICC placed    ==========================================================    # Hydropneumothorax  # Loculated R pleural effusion; E coli empyema - improving  # Asymptomatic Bacteruria  # Trapped lung  # Leukocytosis  # Hx Hepatic Hydrothorax   # Chest pain at site of chest tube  No clear symptoms, UA and urine culture corroborate possible infection. Notably, Ucx on 11/13/23 was w/ klebsiella species and now E coli on 1/22/24. Patient not reporting any urinary symptoms. On 1/23, had thora + paracentesis per IR and pleural fluid cultures yielded GNB -> e coli. Blood cultures yielded GPC, though this is most likely a contaminant. Interventional pulm placed chest tube on 1/24. CT Chest on 1/25 demonstrated improved R-sided hydropneumothorax after. Repeat CT Chest on 1/29 demonstrated increased gas component of the R hydropneumothorax w/o changes in overall size. Patient tolerated 6 doses of alteplase total. Repeat pleural fluid labs on 1/29 were consistent w/ ongoing infection, though marginally improved. Additionally, fluid labs  sent on 2/1 demonstrated slightly improved cell count, prompting chest tube removal.  - WOC consult  - Pulmonary Consult   > Chest tube placed 1/24, removed 2/1  - Blood cultures:   > 1/23: Staph epi. (suspect contaminant)   > 1/24: NGTD  - Pleural fluid culture:   > 1/23: E coli (Amp res.)  - Peritoneal fluid culture:   > 1/23: NGTD  - Infectious Disease consulted (1/24)   > Stop IV Zosyn   > Resumed IV Ceftriaxone 1g every day, Added metronidazole 500mg Q8H  - PICC placement 2/1 for ongoing IV abx  - Abx:  > Ceftriaxone: 1/23 - 1/24, 1/25 - *   > Metronidazole 500mg Q8H: 1/25 - *   > Rifaximin: 1/22 - *  > Zosyn: 1/24 - 1/25  > Vancomycin: 1/24  [  ] Dispo: Duration of abx TBD, minimum 4-6 weeks for empyema       # Decompensated alcoholic cirrhosis (Ascites, HE)  # Current liver transplant evaluation   # Hepatic encephalopathy   # Recurrent ascites  # Abdominal Pain  Decompensations include jaundice/icterus, G1 esophageal & rectal varices on EGD without bleeding, significant ascites requiring 3 times weekly paracenteses, hepatic hydrothorax requiring frequent thoracenteses, and hepatic encephalopathy.  Gets thoras and courtney through the [facility] system. During prior hospitalization, her transplant eligibility was reopened due to completion of chemotherapy treatment and improved renal function. Wonder if TIPS might offer relief however patient is, unfortunately, not TIPS candidate d/t elevated MELD and   MELD 3.0: 30 at 2/2/2024  7:07 AM  MELD-Na: 30 at 2/2/2024  7:07 AM  Calculated from:  Serum Creatinine: 0.72 mg/dL (Using min of 1 mg/dL) at 2/2/2024  7:07 AM  Serum Sodium: 121 mmol/L (Using min of 125 mmol/L) at 2/2/2024  7:07 AM  Total Bilirubin: 2.5 mg/dL at 2/2/2024  7:07 AM  Serum Albumin: 3.6 g/dL (Using max of 3.5 g/dL) at 2/2/2024  7:07 AM  INR(ratio): 3.30 at 2/2/2024  7:07 AM  Age at listing (hypothetical): 63 years  Sex: Female at 2/2/2024  7:07 AM    - Hepatology consulted, appreciate rec's   > with  current infection, not a candidate for liver transplant at this time. Still needs [facility] for cardiac risk assessment for transplant.   - Will not be able to get cath until acute illness resolves  - CAPS to assist w/ para on 1/28 (some hypotension 1/28/2024, would like to avoid shifts)  - Tylenol 650mg Q8H PRN (total dose < 2g)  - Continue lactulose, Rifaximin   - Continue PPI  - Target 3-4 BM daily   - Abx as above.  - Paracentesis log:   > 1/23: IR w/ 100ml removed for diagnostics only   > 1/26: CAPS w/ 1.5L removed (still a lot remaining, opted for less d/t fluid shifts and initially labile BP)   > 1/29: paracentesis w/ 3L removed   > 2/1: paracentesis w/ 3L removed - Did not order albumin d/t not being large volume (>4L) or in context of elevated Crt. Will speak w/ hepatology of albumin indicated or could be offered to patient       # Acute on Chronic Hyponatremia  Pt has hx of chronic hyponatremia, baseline around 122-125, she has been admitted for hyponatremia several times in the past. Presented with Na of 117, corrected with 2% NS but began to trend down when discontinued   - Nephrology consulted  - Restarted 2%   - Q12hr Na checks  - strict ins/outs   - daily weights   - Regular diet    # MATTIE, resolved  # hepatorenal syndrome   Chart review suggests a prior history of hepatorenal syndrome.  However, her history at the moment is more compatible with a prerenal MATTIE secondary to poor p.o. intake.  Baseline Cr around 0.6, nearly double this on admission.   - Nephrology consulted - signed off 1/26  - Avoid nephrotoxic agents  - Continue Midodrine 15mg PO TID PRN   > Will make PRN as her BP is labile while here, and do not want to have scheduled as a means of increasing her BP when we would also like to keep her BP within normal range for her aortic aneurysm.  [  ] Dispo: Renal scheduling in CKD clinic     # Borderline Ascending Aortic Aneurysm  Noted on CT Chest on 1/25. Measured to be 4.3 cm. I personally reviewed  the CT chest and found largest diameter to be about 43.5mm. I compared this to the widest portion on of the ascending aorta on her CTA coronary on 12/15/23 which was 42.0mm. This is fairly stable.  - Will speak w/ Thoracic Surg re. Follow up guidelines  - BP Goal near normotensive < 130/<85    # Severe malnutrition in context of chronic illness  Patient's phos was appropriate on 1/22. 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 Holding off on prednisone for now iso possible infection and respiratory stability  - Continue PRN albuterol inhaler or nebs  - Continue ICS-LABA  - Continue Singulair       # Hypothyroidism  - Continue PTA levothyroxine      # Normocytic anemia - stable  Has required outpatient white blood cell transfusions in the past.  Her hemoglobin tends to be around 7.5-9.5.  At normal range on admission. No clear bleeding history though is at risk for this. Chronic disease component and nutritional components possible as well. Hgb on 1/24 was 7.5, which is likely dilutional w/ fluids. No sources of bleeding identified.  - CBC w/ diff in AM  - Would transfuse for Hgb <7  - Transfusion log:   > 1u pRBC 1/25     # Thrombocytopenia - stable  ISO cirrhosis. Increased bleeding risk, avoiding heparin/lovenox for now.   - Transfusion if < 10     # MDD/STEPHANIE  - Continue PTA Celexa  - Would try to avoid sedating agents d/t current sedative presentation     # Low back pain  Stable, trial hot/cold packs first          Diet: Room Service  Fluid restriction 1000 ML FLUID  Snacks/Supplements Adult: Ensure Max Protein (bariatric); Between Meals  Snacks/Supplements Adult: Ensure Enlive; Between Meals  Snacks/Supplements Adult: Other; 10 AM - Vanilla Greek Yogurt + Peaches; Between Meals  Regular Diet  Adult  Snacks/Supplements Adult: Gelatein Plus; Between Meals    DVT Prophylaxis: Pneumatic Compression Devices  Demarco Catheter: Not present  Lines: PRESENT      PICC 02/01/24 Single Lumen Left Brachial vein lateral Antibiotics-Site Assessment: WDL except;Bleeding    Cardiac Monitoring: None  Code Status: Full Code      Clinically Significant Risk Factors         # Hyponatremia: Lowest Na = 121 mmol/L in last 2 days, will monitor as appropriate   # Hypercalcemia: corrected calcium is >10.1, will monitor as appropriate    # Hypoalbuminemia: Lowest albumin = 2.9 g/dL at 1/23/2024  6:48 AM, will monitor as appropriate    # Coagulation Defect: INR = 3.30 (Ref range: 0.85 - 1.15) and/or PTT = N/A, will monitor for bleeding  # Thrombocytopenia: Lowest platelets = 47 in last 2 days, will monitor for bleeding           # Severe Malnutrition: based on nutrition assessment    # Financial/Environmental Concerns: none  # Asthma: noted on problem list        Disposition Plan      Expected Discharge Date: 02/05/2024      Destination: home with family  Discharge Comments: Dispo: Home w/ HI  Progress: PC  3L out + 25% albumin 50g; IV CFTX via PICC;   Plan: CT for empyema; FR: 1.2 L, hypoNa+ (121); not transplant candidate          Paulo Murray MD  Medicine Service, [facility], [place] TEAM [address] [place] [address]  Securely message with [employer] (more info)  Text page via [facility] Paging/Directory   See signed in provider for up to date coverage information  ______________________________________________________________________    Interval History   No acute events overnight, states she feels much better after chest tube removal, denies fever or chills     Physical Exam   Vital Signs: Temp: 97.5  F (36.4  C) Temp src: Oral BP: 96/42 Pulse: 70   Resp: 16 SpO2: 99 % O2 Device: None (Room air)    Weight: 138 lbs 0 oz    Constitutional: Awake, alert, cooperative, no apparent distress  Respiratory: Poor air movement    Cardiovascular: Regular rate and rhythm  GI: Mildly distended, NT  Skin/Integumen: No rashes, no cyanosis      Medical Decision Making       55 MINUTES SPENT BY ME on the date of service doing chart review, history, exam, documentation & further activities per the note.      Data     I have personally reviewed the following data over the past 24 hrs:    12.4 (H)  \   7.4 (L)   / 49 (LL)     121 (L); 121 (L) 97 (L) 81.8 (H) /  71   3.6 15 (L) 0.72 \     ALT: 18 AST: 30 AP: 78 TBILI: 2.5 (H)   ALB: 3.6 TOT PROTEIN: 5.5 (L) LIPASE: N/A     INR:  3.30 (H) PTT:  N/A   D-dimer:  N/A Fibrinogen:  N/A

## 2024-02-02 NOTE — PLAN OF CARE
0829-6136 A/O x4. Stable on RA. SBP >100 x2; midodrine given, pt asymptomatic overnight. Strict I/O in place with fluid restriction. Bed alarm on and call light within reach. Q2hr rounding completed [id number].     Goal Outcome Evaluation:           Overall Patient Progress: no changeOverall Patient Progress: no change    Outcome Evaluation: Hypotension; PRN midodrine given x2

## 2024-02-02 NOTE — PROGRESS NOTES
Care Management Follow Up    Length of Stay (days): 11  Expected Discharge Date: 02/05/2024  Concerns to be Addressed: discharge planning   Patient plan of care discussed at interdisciplinary rounds: Yes  Anticipated Discharge Disposition: Home with assist  Anticipated Discharge Services: Home Infusion; Home Care; OP thoracentesis; OP JEREMY Tx  Anticipated Discharge DME: None  Patient/family educated on Medicare website which has current facility and service quality ratings: no  Education Provided on the Discharge Plan: Yes  Patient/Family in Agreement with the Plan: yes    Referrals Placed by CM/SW:     [facility]  [address] [place] [address]  Phone: [phone]  Fax: [phone]   -  IV rocephin q24 hrs      [facility] (RN, PT and OT)  [address] [place] [address]   Phone: [phone]  Fax: [phone]     [facility]  [address] [place] [address]  Phone: [phone]   - Pt open to resume services     [facility] ([facility])  [address] [place] [address]  Phone: [phone]   - OP Thoracentesis: MWF     Private pay costs discussed: Not applicable    Additional Information:  Continued POC; RNCC following for home discharge planning. HI liaison noted to have had met with pt and significant other, Hari, at the bedside on 2/1. RNCC completed home care tab, which [facility] has accepted service request. Additional OP services had PTA, pt to resume x3/week thoracentesis. OP JEREMY support to be pursued by pt. Home care and Home infusion orders in place. CM team to continue to follow and support safe discharge planning.       Shad Gamble RN BSN  7C RNCC  Phone: [phone]  Pager: [phone]    For Weekend & Holiday on call RN Care Coordinator:  (Tasks: Home care, home infusion, medical equipment, transportation, IMM & AGUILA forms, etc.)  [employer] (0800 - 1630) Saturday and Sunday    Units: 5A, 5B, & 5C: [phone]    Units: 6B, 6C, 6D: [phone]    Units: 7A, 7B, 7C, 7D: [phone]    Units: 6A/ICU : [phone]    [facility] ([phone]) Saturday and Sunday    Units: 6 Med/Surg, 8A, 10 ICU, & Pediatric Units: [phone]    Units: 5 Ortho, 5Med/Surg & WB ED: [phone]

## 2024-02-02 NOTE — PLAN OF CARE
Time: 8108-2214    Admission/Diagnosis:  Hyponatremia [E87.1]  Multiple skin tears [T14.8XXA]    /47 (BP Location: Right arm)   Pulse 68   Temp 97.3  F (36.3  C) (Oral)   Resp 16   Wt 62.6 kg (138 lb)   LMP  (LMP Unknown)   SpO2 98%   BMI 22.96 kg/m      A+Ox4. VSS on RA except hyptension.  Up SBA to bed side commode. Denies pain; N/V. No BM during shift.     Continue with POC. Update provider with any changes.       Goal Outcome Evaluation:      Plan of Care Reviewed With: patient          Outcome Evaluation: Hypotension; PRN Midodrine given x2. OT worked with patient today.

## 2024-02-03 NOTE — PLAN OF CARE
Time: 0139-2956    Admission/Diagnosis:  Hyponatremia [E87.1]  Multiple skin tears [T14.8XXA]    BP 92/48 (BP Location: Right arm)   Pulse 66   Temp 97.4  F (36.3  C) (Oral)   Resp 18   Wt 62.6 kg (138 lb)   LMP  (LMP Unknown)   SpO2 99%   BMI 22.96 kg/m      A+Ox4. Forgetful.  VSS on RA except hypotension. PRN Midodrine for hypotension given x1.  Up SBA to the bathroom. Denies pain; N/V. Scheduled Lactulose given.  BM x2 during shift. Voids adequately. No acute changes.     Shift Updates:     Patient had a shower. Family visited. MD paged per family request. MD came at bedside to address concerns.       Continue current  with POC.

## 2024-02-03 NOTE — PLAN OF CARE
Pt is a 63 yr old female admitted for hyponatremia complicated by cirrhosis. Pt is not a candidate for transplant due to UTI. Will be re-evaluated after UTI clears.   A&OX4  Lines - Piv 7 PICC   VS - hypotensive; midodrine given for SBP below 100   Diet - Regular; 1L fluid restriction   Skin - generalized bruising; Q3 day dressing change   Electrolytes - BID sodium checks   Pt able to make needs known and calls approprietly. Call light within reach and the bed is in low position. Continue plan of care. No new concerns this shift.      Goal Outcome Evaluation:      Plan of Care Reviewed With: patient    Overall Patient Progress: no changeOverall Patient Progress: no change

## 2024-02-03 NOTE — PLAN OF CARE
Outcome Evaluation:  Plan of Care Reviewed With: patient  Overall Patient Progress: no change  Outcome Evaluation:     Assumed Cares [id number].   PRN midodrine for hypotension, met BM goal so PM lactulose held, poor appetite, up SBA. Denies pain. Pt has poor compliance w/ fluid restriction and could use reinforcement from doctors about reasons behind the restriction.   Temp: 97.4  F (36.3  C)    BP: 99/41    Pulse: 73    Resp: 20    SpO2: 99 %    O2 Device: None (Room air)

## 2024-02-03 NOTE — PROGRESS NOTES
[facility]    Medicine Progress Note - Medicine Service, [facility] TEAM 4    Date of Admission:  1/22/2024    Assessment & Plan   Stefani Crowell is a 63 year old female admitted on 1/22/2024. She has a history of cirrhosis secondary to alcohol use currently being worked up for liver transplant complicated by need for frequent paracenteses and thoracenteses for ascites/hepatic hydrothorax, in addition to asthma and COPD 2/2 tobacco use, MDD/STEPHANIE, hypothyroidism, low back pain, and chronic hyponatremia. She presented to the ED for evaluation of acute on chronic hyponatremia in the setting of recent viral-like URI illness. Admitted for careful monitoring, and for evaluation of loculated pleural effusion with significant leukocytosis. Currently being treated for Empyema    Changes Today:  -Will consider loop diuretic tomorrow  - 2% NS off  - Long term Hyponatremia maintenance plan pending    ==========================================================    # Hydropneumothorax  # Loculated R pleural effusion; E coli empyema - improving  # Asymptomatic Bacteruria  # Trapped lung  # Leukocytosis  # Hx Hepatic Hydrothorax   # Chest pain at site of chest tube  No clear symptoms, UA and urine culture corroborate possible infection. Notably, Ucx on 11/13/23 was w/ klebsiella species and now E coli on 1/22/24. Patient not reporting any urinary symptoms. On 1/23, had thora + paracentesis per IR and pleural fluid cultures yielded GNB -> e coli. Blood cultures yielded GPC, though this is most likely a contaminant. Interventional pulm placed chest tube on 1/24. CT Chest on 1/25 demonstrated improved R-sided hydropneumothorax after. Repeat CT Chest on 1/29 demonstrated increased gas component of the R hydropneumothorax w/o changes in overall size. Patient tolerated 6 doses of alteplase total. Repeat pleural fluid labs on 1/29 were consistent w/ ongoing infection, though marginally improved.  Additionally, fluid labs sent on 2/1 demonstrated slightly improved cell count, prompting chest tube removal.  - WOC consult  - Pulmonary Consult   > Chest tube placed 1/24, removed 2/1  - Blood cultures:   > 1/23: Staph epi. (suspect contaminant)   > 1/24: NGTD  - Pleural fluid culture:   > 1/23: E coli (Amp res.)  - Peritoneal fluid culture:   > 1/23: NGTD  - Infectious Disease consulted (1/24)   > Stop IV Zosyn   > Resumed IV Ceftriaxone 1g every day, Added metronidazole 500mg Q8H  - PICC placement 2/1 for ongoing IV abx  - Abx:  > Ceftriaxone: 1/23 - 1/24, 1/25 - *   > Metronidazole 500mg Q8H: 1/25 - *   > Rifaximin: 1/22 - *  > Zosyn: 1/24 - 1/25  > Vancomycin: 1/24  [  ] Dispo: Duration of abx TBD, minimum 4-6 weeks for empyema, CBC with diff and CMP. Dr. Mohr will follow labs at discharge until ID follow up. Fax labs to ID clinic. ID in 4 weeks (appt on 3/1 w/ Dr. Amanda Martinez), CT Chest w/ contrast (4 weeks?)       # Decompensated alcoholic cirrhosis (Ascites, HE)  # Current liver transplant evaluation   # Hepatic encephalopathy   # Recurrent ascites  # Abdominal Pain  Decompensations include jaundice/icterus, G1 esophageal & rectal varices on EGD without bleeding, significant ascites requiring 3 times weekly paracenteses, hepatic hydrothorax requiring frequent thoracenteses, and hepatic encephalopathy.  Gets thoras and courtney through the [facility] system. During prior hospitalization, her transplant eligibility was reopened due to completion of chemotherapy treatment and improved renal function. Wonder if TIPS might offer relief however patient is, unfortunately, not TIPS candidate d/t elevated MELD and   MELD 3.0: 30 at 2/3/2024  5:34 AM  MELD-Na: 30 at 2/3/2024  5:34 AM  Calculated from:  Serum Creatinine: 0.78 mg/dL (Using min of 1 mg/dL) at 2/3/2024  5:34 AM  Serum Sodium: 121 mmol/L (Using min of 125 mmol/L) at 2/3/2024  5:34 AM  Total Bilirubin: 2.7 mg/dL at 2/3/2024  5:34 AM  Serum Albumin: 3.5  g/dL at 2/3/2024  5:34 AM  INR(ratio): 3.24 at 2/3/2024  5:34 AM  Age at listing (hypothetical): 63 years  Sex: Female at 2/3/2024  5:34 AM    - Hepatology consulted, appreciate rec's   > with current infection, not a candidate for liver transplant at this time. Still needs [facility] for cardiac risk assessment for transplant.   - Will not be able to get cath until acute illness resolves  - CAPS to assist w/ para on 1/28 (some hypotension 1/28/2024, would like to avoid shifts)  - Tylenol 650mg Q8H PRN (total dose < 2g)  - Continue lactulose, Rifaximin   - Continue PPI  - Target 3-4 BM daily   - Abx as above.  - Paracentesis log:   > 1/23: IR w/ 100ml removed for diagnostics only   > 1/26: CAPS w/ 1.5L removed (still a lot remaining, opted for less d/t fluid shifts and initially labile BP)   > 1/29: paracentesis w/ 3L removed   > 2/1: paracentesis w/ 3L removed - Did not order albumin d/t not being large volume (>4L) or in context of elevated Crt. Will speak w/ hepatology of albumin indicated or could be offered to patient       # Acute on Chronic Hyponatremia  Pt has hx of chronic hyponatremia, baseline around 122-125, she has been admitted for hyponatremia several times in the past. Presented with Na of 117, corrected with 2% NS but began to trend down when discontinued   - Nephrology consulted  - strict ins/outs   - daily weights   - Regular diet    # MATTIE, resolved  # hepatorenal syndrome   Chart review suggests a prior history of hepatorenal syndrome.  However, her history at the moment is more compatible with a prerenal MATTIE secondary to poor p.o. intake.  Baseline Cr around 0.6, nearly double this on admission.   - Nephrology consulted - signed off 1/26  - Avoid nephrotoxic agents  - Continue Midodrine 15mg PO TID PRN   > Will make PRN as her BP is labile while here, and do not want to have scheduled as a means of increasing her BP when we would also like to keep her BP within normal range for her aortic aneurysm.  [   ] Dispo: Renal scheduling in CKD clinic     # Borderline Ascending Aortic Aneurysm  Noted on CT Chest on 1/25. Measured to be 4.3 cm. I personally reviewed the CT chest and found largest diameter to be about 43.5mm. I compared this to the widest portion on of the ascending aorta on her CTA coronary on 12/15/23 which was 42.0mm. This is fairly stable.  - Will speak w/ Thoracic Surg re. Follow up guidelines  - BP Goal near normotensive < 130/<85    # Severe malnutrition in context of chronic illness  Patient's phos was appropriate on 1/22. 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 Holding off on prednisone for now iso possible infection and respiratory stability  - Continue PRN albuterol inhaler or nebs  - Continue ICS-LABA  - Continue Singulair       # Hypothyroidism  - Continue PTA levothyroxine      # Normocytic anemia - stable  Has required outpatient white blood cell transfusions in the past.  Her hemoglobin tends to be around 7.5-9.5.  At normal range on admission. No clear bleeding history though is at risk for this. Chronic disease component and nutritional components possible as well. Hgb on 1/24 was 7.5, which is likely dilutional w/ fluids. No sources of bleeding identified.  - CBC w/ diff in AM  - Would transfuse for Hgb <7  - Transfusion log:   > 1u pRBC 1/25     # Thrombocytopenia - stable  ISO cirrhosis. Increased bleeding risk, avoiding heparin/lovenox for now.   - Transfusion if < 10     # MDD/STEPHANIE  - Continue PTA Celexa  - Would try to avoid sedating agents d/t current sedative presentation     # Low back pain  Stable, trial hot/cold packs first          Diet: Room Service  Fluid restriction 1000 ML FLUID  Snacks/Supplements Adult: Ensure Max Protein (bariatric); Between  Meals  Snacks/Supplements Adult: Ensure Enlive; Between Meals  Snacks/Supplements Adult: Other; 10 AM - Vanilla Greek Yogurt + Peaches; Between Meals  Regular Diet Adult  Snacks/Supplements Adult: Gelatein Plus; Between Meals    DVT Prophylaxis: Pneumatic Compression Devices  Demarco Catheter: Not present  Lines: PRESENT      PICC 02/01/24 Single Lumen Left Brachial vein lateral Antibiotics-Site Assessment: WDL except;Bleeding    Cardiac Monitoring: None  Code Status: Full Code      Clinically Significant Risk Factors         # Hyponatremia: Lowest Na = 121 mmol/L in last 2 days, will monitor as appropriate      # Hypoalbuminemia: Lowest albumin = 2.9 g/dL at 1/23/2024  6:48 AM, will monitor as appropriate    # Coagulation Defect: INR = 3.24 (Ref range: 0.85 - 1.15) and/or PTT = N/A, will monitor for bleeding  # Thrombocytopenia: Lowest platelets = 49 in last 2 days, will monitor for bleeding           # Severe Malnutrition: based on nutrition assessment    # Financial/Environmental Concerns: none  # Asthma: noted on problem list        Disposition Plan     Expected Discharge Date: 02/05/2024      Destination: home with family  Discharge Comments: Dispo: Home w/ HI  Progress: PC: 3L out + 25% albumin 50g; IV CFTX via PICC, duration TBD;   Plan: CT for empyema; FR: 1.2 L, hypoNa+ (121); not transplant candidate          Tyrone Singh MD  Medicine Service, [facility] TEAM 4  [facility]  Securely message with [employer] (more info)  Text page via [facility] Paging/Directory   See signed in provider for up to date coverage information  ______________________________________________________________________    Interval History   No acute events overnight. This morning patient expressed relief that chest tube was removed. Overall, she hopes she can leave hospital in a few days. We spoke about how we need to create a good plan for her once outpatient to ensure that her fluid reaccumulation is  managed outpatient. We also expressed to her that she there has been improvement in the cell counts of the pleural fluid, but that it will likely remain elevated for a while.    Physical Exam   Vital Signs: Temp: 97.4  F (36.3  C) Temp src: Oral BP: 97/43 Pulse: 66   Resp: 18 SpO2: 99 % O2 Device: None (Room air)    Weight: 138 lbs 0 oz    Constitutional: Awake, alert, cooperative, no apparent distress  Respiratory: poor air movement at bases, fine crackles throughout bilateral  Cardiovascular: Regular rate and rhythm  GI: Mildly distended, NT  Skin/Integumen: No rashes, no cyanosis, diffuse echymoses      Medical Decision Making       55 MINUTES SPENT BY ME on the date of service doing chart review, history, exam, documentation & further activities per the note.      Data     I have personally reviewed the following data over the past 24 hrs:    15.1 (H)  \   7.5 (L)   / 57 (L)     121 (L) 97 (L) 87.2 (H) /  79   4.0 15 (L) 0.78 \     ALT: 19 AST: 32 AP: 87 TBILI: 2.7 (H)   ALB: 3.5 TOT PROTEIN: 5.4 (L) LIPASE: N/A     INR:  3.24 (H) PTT:  N/A   D-dimer:  N/A Fibrinogen:  N/A

## 2024-02-04 NOTE — PROGRESS NOTES
[facility]    Medicine Progress Note - Medicine Service, [facility] TEAM 4    Date of Admission:  1/22/2024    Assessment & Plan   Stefani Crowell is a 63 year old female admitted on 1/22/2024. She has a history of cirrhosis secondary to alcohol use currently being worked up for liver transplant complicated by need for frequent paracenteses and thoracenteses for ascites/hepatic hydrothorax, in addition to asthma and COPD 2/2 tobacco use, MDD/STEPHANIE, hypothyroidism, low back pain, and chronic hyponatremia. She presented to the ED for evaluation of acute on chronic hyponatremia in the setting of recent viral-like URI illness. Admitted for careful monitoring, and for evaluation of loculated pleural effusion with significant leukocytosis. Currently being treated for Empyema    Changes Today:  - Na stable, WBC slightly improved  - Cont to monitor, likely discharge tomorrow if stable      # Hydropneumothorax  # Loculated R pleural effusion; E coli empyema - improving  # Asymptomatic Bacteruria  # Trapped lung  # Hx Hepatic Hydrothorax   # Chest pain at site of chest tube  No clear symptoms, UA and urine culture corroborate possible infection. Notably, Ucx on 11/13/23 was w/ klebsiella species and now E coli on 1/22/24. Patient not reporting any urinary symptoms. On 1/23, had thora + paracentesis per IR and pleural fluid cultures yielded GNB -> e coli. Blood cultures yielded GPC, though this is most likely a contaminant. Interventional pulm placed chest tube on 1/24. CT Chest on 1/25 demonstrated improved R-sided hydropneumothorax after. Repeat CT Chest on 1/29 demonstrated increased gas component of the R hydropneumothorax w/o changes in overall size. Patient tolerated 6 doses of alteplase total. Repeat pleural fluid labs on 1/29 were consistent w/ ongoing infection, though marginally improved. Additionally, fluid labs sent on 2/1 demonstrated slightly improved cell count,  prompting chest tube removal.  - S/p chest tube placement, now removed  - Pt will continue to have hepatic hydrothorax, will need to continue outpt thora  - Pulmonary Consulted   > Chest tube placed 1/24, removed 2/1  - Blood cultures:   > 1/23: Staph epi. (suspect contaminant)   > 1/24: NGTD  - Pleural fluid culture:   > 1/23: E coli (Amp res.)  - Peritoneal fluid culture:   > 1/23: NGTD  - Infectious Disease consulted (1/24)   > Stop IV Zosyn   > Resumed IV Ceftriaxone 1g every day, Added metronidazole 500mg Q8H  - PICC placement 2/1 for ongoing IV abx  - Abx:  > Ceftriaxone: 1/23 - 1/24, 1/25 - *   > Metronidazole 500mg Q8H: 1/25 - *   > Rifaximin: 1/22 - *  > Zosyn: 1/24 - 1/25  > Vancomycin: 1/24  [  ] Dispo: Duration of abx TBD, minimum 4-6 weeks for empyema       # Decompensated alcoholic cirrhosis (Ascites, HE)  # Current liver transplant evaluation   # Hepatic encephalopathy   # Recurrent ascites  # Abdominal Pain  Decompensations include jaundice/icterus, G1 esophageal & rectal varices on EGD without bleeding, significant ascites requiring 3 times weekly paracenteses, hepatic hydrothorax requiring frequent thoracenteses, and hepatic encephalopathy.  Gets thoras and courtney through the [facility]. During prior hospitalization, her transplant eligibility was reopened due to completion of chemotherapy treatment and improved renal function. Wonder if TIPS might offer relief however patient is, unfortunately, not TIPS candidate d/t elevated MELD and   MELD 3.0: 29 at 2/4/2024  6:07 AM  MELD-Na: 30 at 2/4/2024  6:07 AM  Calculated from:  Serum Creatinine: 0.74 mg/dL (Using min of 1 mg/dL) at 2/4/2024  6:07 AM  Serum Sodium: 121 mmol/L (Using min of 125 mmol/L) at 2/4/2024  6:07 AM  Total Bilirubin: 3.1 mg/dL at 2/4/2024  6:07 AM  Serum Albumin: 3.5 g/dL at 2/4/2024  6:07 AM  INR(ratio): 3.04 at 2/4/2024  6:07 AM  Age at listing (hypothetical): 63 years  Sex: Female at 2/4/2024  6:07 AM    - Hepatology  consulted, appreciate rec's   > with current infection, not a candidate for liver transplant at this time. Still needs [facility] for cardiac risk assessment for transplant.   - Will not be able to get cath until acute illness resolves  - CAPS to assist w/ para on 1/28 (some hypotension 1/28/2024, would like to avoid shifts)  - Tylenol 650mg Q8H PRN (total dose < 2g)  - Continue lactulose, Rifaximin   - Continue PPI  - Target 3-4 BM daily   - Abx as above.  - Paracentesis log:   > 1/23: IR w/ 100ml removed for diagnostics only   > 1/26: CAPS w/ 1.5L removed (still a lot remaining, opted for less d/t fluid shifts and initially labile BP)   > 1/29: paracentesis w/ 3L removed   > 2/1: paracentesis w/ 3L removed - Did not order albumin d/t not being large volume (>4L) or in context of elevated Crt. Will speak w/ hepatology of albumin indicated or could be offered to patient       # Acute on Chronic Hyponatremia  Pt has hx of chronic hyponatremia, baseline around 122-125, she has been admitted for hyponatremia several times in the past. Presented with Na of 117, corrected with 2% NS but began to trend down when discontinued   - Nephrology consulted  - Cont to monitor off 2% NS  - strict ins/outs   - daily weights   - Regular diet    # MATTIE, resolved  # hepatorenal syndrome   Chart review suggests a prior history of hepatorenal syndrome.  However, her history at the moment is more compatible with a prerenal MATTIE secondary to poor p.o. intake.  Baseline Cr around 0.6, nearly double this on admission.   - Nephrology consulted - signed off 1/26  - Avoid nephrotoxic agents  - Continue Midodrine 15mg PO TID PRN   > Will make PRN as her BP is labile while here, and do not want to have scheduled as a means of increasing her BP when we would also like to keep her BP within normal range for her aortic aneurysm.  [  ] Dispo: Renal scheduling in CKD clinic     # Borderline Ascending Aortic Aneurysm  Noted on CT Chest on 1/25. Measured to be  4.3 cm. I personally reviewed the CT chest and found largest diameter to be about 43.5mm. I compared this to the widest portion on of the ascending aorta on her CTA coronary on 12/15/23 which was 42.0mm. This is fairly stable.  - Will speak w/ Thoracic Surg re. Follow up guidelines  - BP Goal near normotensive < 130/<85    # Severe malnutrition in context of chronic illness  Patient's phos was appropriate on 1/22. 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 Holding off on prednisone for now iso possible infection and respiratory stability  - Continue PRN albuterol inhaler or nebs  - Continue ICS-LABA  - Continue Singulair       # Hypothyroidism  - Continue PTA levothyroxine      # Normocytic anemia - stable  Has required outpatient white blood cell transfusions in the past.  Her hemoglobin tends to be around 7.5-9.5.  At normal range on admission. No clear bleeding history though is at risk for this. Chronic disease component and nutritional components possible as well. Hgb on 1/24 was 7.5, which is likely dilutional w/ fluids. No sources of bleeding identified.  - CBC w/ diff in AM  - Would transfuse for Hgb <7  - Transfusion log:   > 1u pRBC 1/25     # Thrombocytopenia - stable  ISO cirrhosis. Increased bleeding risk, avoiding heparin/lovenox for now.   - Transfusion if < 10     # MDD/STEPHANIE  - Continue PTA Celexa  - Would try to avoid sedating agents d/t current sedative presentation     # Low back pain  Stable, trial hot/cold packs first          Diet: Room Service  Fluid restriction 1000 ML FLUID  Snacks/Supplements Adult: Ensure Max Protein (bariatric); Between Meals  Snacks/Supplements Adult: Ensure Enlive; Between Meals  Snacks/Supplements Adult: Other; 10 AM - Vanilla Greek Yogurt + Peaches; Between  Meals  Regular Diet Adult  Snacks/Supplements Adult: Gelatein Plus; Between Meals    DVT Prophylaxis: Pneumatic Compression Devices  Demarco Catheter: Not present  Lines: PRESENT      PICC 02/01/24 Single Lumen Left Brachial vein lateral Antibiotics-Site Assessment: (P) WDL except;Bleeding    Cardiac Monitoring: None  Code Status: Full Code      Clinically Significant Risk Factors         # Hyponatremia: Lowest Na = 121 mmol/L in last 2 days, will monitor as appropriate      # Hypoalbuminemia: Lowest albumin = 2.9 g/dL at 1/23/2024  6:48 AM, will monitor as appropriate    # Coagulation Defect: INR = 3.04 (Ref range: 0.85 - 1.15) and/or PTT = N/A, will monitor for bleeding  # Thrombocytopenia: Lowest platelets = 57 in last 2 days, will monitor for bleeding           # Severe Malnutrition: based on nutrition assessment    # Financial/Environmental Concerns: none  # Asthma: noted on problem list        Disposition Plan     Expected Discharge Date: 02/05/2024      Destination: home with family  Discharge Comments: Dispo: Home w/ HI  Progress: PC: 3L out + 25% albumin 50g; IV CFTX via PICC, duration TBD;   Plan: CT for empyema; FR: 1.2 L, hypoNa+ (121); not transplant candidate          Paulo Murray MD  Medicine Service, [facility] TEAM 4  [facility]  Securely message with [employer] (more info)  Text page via [employer] Paging/Directory   See signed in provider for up to date coverage information  ______________________________________________________________________    Interval History   No acute events overnight, states she continues to feel well    Physical Exam   Vital Signs: Temp: 98.2  F (36.8  C) Temp src: Oral BP: (!) 92/38 Pulse: 66   Resp: 16 SpO2: 100 % O2 Device: None (Room air)    Weight: 149 lbs 11.08 oz    Constitutional: Awake, alert, cooperative, no apparent distress  Respiratory: Poor air movement   Cardiovascular: Regular rate and rhythm  GI: Mildly distended,  NT  Skin/Integumen: No rashes, no cyanosis      Medical Decision Making       55 MINUTES SPENT BY ME on the date of service doing chart review, history, exam, documentation & further activities per the note.      Data     I have personally reviewed the following data over the past 24 hrs:    14.0 (H)  \   7.5 (L)   / 59 (L)     121 (L) 97 (L) 93.1 (H) /  73   4.1 14 (L) 0.74 \     ALT: 20 AST: 30 AP: 78 TBILI: 3.1 (H)   ALB: 3.5 TOT PROTEIN: 5.4 (L) LIPASE: N/A     INR:  3.04 (H) PTT:  N/A   D-dimer:  N/A Fibrinogen:  N/A

## 2024-02-04 NOTE — PLAN OF CARE
Goal Outcome Evaluation:      Plan of Care Reviewed With: patient    Overall Patient Progress: no changeOverall Patient Progress: no change       Time: 5475-1704    Activity: SBA, bed alarm on  Pain: denies  Neuro: A&O x4, forgetful  Cardiac: WDL  Respiratory: WDL  GI/: diarrhea noted, LBM 2/3, voiding spontaneously  Diet: reg diet  Lines: PIV and PICC  Labs/imaging: Reviewed, no replacements indicated this shift.   Vitals: VSS      This Shift: Pt pleasant this shift. Denies pain and SOB. Emesis x1, N/V reported to be resolved after emesis. No PRNs requested/given. No acute events this shift.       Continue POC..

## 2024-02-04 NOTE — PLAN OF CARE
Goal Outcome Evaluation:      Plan of Care Reviewed With: patient  Overall Patient Progress: no change    Outcome Evaluation: Alert and orietned x 4. Denies pain. Forgetful. Up with standby assist. Reeducated on fluid limitation of 1000 ml/hr. Had been having frequent small stools this shift. Had 1 nose bleeding episode this morning. Vital signs stable on room air. Plan s to discharge to home with home infusion

## 2024-02-05 NOTE — PLAN OF CARE
Shift 1900 - 2300    VSS on RA except hypotension, on scheduled midodrine. A&O x4, forgetful. PICC has been hep locked. Denies pain.  On 1L fluid restriction. Voiding, multiple loose BMs today, refused lactulose. Up SBA. Skin tears with dressings in place. Thoracentesis before discharge, possible discharge tomorrow 02/5. Cont with currentPOC

## 2024-02-05 NOTE — PLAN OF CARE
Goal Outcome Evaluation:      Plan of Care Reviewed With: patient    Overall Patient Progress: no change    Outcome Evaluation: Alert and oriented x 4. Denies pain. Up with standby assist. PICC in place. Possible paracentesis/thoracentesis today. Vital signs stable on room air

## 2024-02-05 NOTE — PROGRESS NOTES
[facility]    Medicine Progress Note - [place] 4    Date of Admission:  1/22/2024    Assessment & Plan   Stefani Crowell is a 63 year old female admitted on 1/22/2024. She has a history of cirrhosis secondary to alcohol use currently being worked up for liver transplant complicated by need for frequent paracenteses and thoracenteses for ascites/hepatic hydrothorax, in addition to asthma and COPD 2/2 tobacco use, MDD/STEPHANIE, hypothyroidism, low back pain, and chronic hyponatremia. She presented to the ED for evaluation of acute on chronic hyponatremia in the setting of recent viral-like URI illness. Admitted for careful monitoring, and for evaluation of loculated pleural effusion with significant leukocytosis. Currently being treated for Empyema and persistent hyponatremia.     Changes Today:  - 1u prbc for hgb 6.8  - Monitor Na and Hgb  - CAPS consult for Paracentesis   - 0.8 L fluid restriction      # Hydropneumothorax  # Loculated R pleural effusion; E coli empyema - improving  # Asymptomatic Bacteruria  # Trapped lung  # Hx Hepatic Hydrothorax   # Chest pain at site of chest tube  No clear symptoms, UA and urine culture corroborate possible infection. Notably, Ucx on 11/13/23 was w/ klebsiella species and now E coli on 1/22/24. Patient not reporting any urinary symptoms. On 1/23, had thora + paracentesis per IR and pleural fluid cultures yielded GNB -> e coli. Blood cultures yielded GPC, though this is most likely a contaminant. Interventional pulm placed chest tube on 1/24. CT Chest on 1/25 demonstrated improved R-sided hydropneumothorax after. Repeat CT Chest on 1/29 demonstrated increased gas component of the R hydropneumothorax w/o changes in overall size. Patient tolerated 6 doses of alteplase total. Repeat pleural fluid labs on 1/29 were consistent w/ ongoing infection, though marginally improved. Additionally, fluid labs sent on 2/1 demonstrated slightly  improved cell count, prompting chest tube removal.  - S/p chest tube placement, now removed  - Pt will continue to have hepatic hydrothorax, will need to continue outpt thora  - Pulmonary Consulted   > Chest tube placed 1/24, removed 2/1  - Blood cultures:   > 1/23: Staph epi. (suspect contaminant)   > 1/24: NGTD  - Pleural fluid culture:   > 1/23: E coli (Amp res.)  - Peritoneal fluid culture:   > 1/23: NGTD  - Infectious Disease consulted (1/24)   > Stop IV Zosyn   > Resumed IV Ceftriaxone 1g every day, Added metronidazole 500mg Q8H  - PICC placement 2/1 for ongoing IV abx  - Abx:  > Ceftriaxone: 1/23 - 1/24, 1/25 - *   > Metronidazole 500mg Q8H: 1/25 - *   > Rifaximin: 1/22 - *  > Zosyn: 1/24 - 1/25  > Vancomycin: 1/24  [  ] Dispo: Duration of abx TBD, minimum 4-6 weeks for empyema       # Decompensated alcoholic cirrhosis (Ascites, HE)  # Current liver transplant evaluation   # Hepatic encephalopathy   # Recurrent ascites  # Abdominal Pain  Decompensations include jaundice/icterus, G1 esophageal & rectal varices on EGD without bleeding, significant ascites requiring 3 times weekly paracenteses, hepatic hydrothorax requiring frequent thoracenteses, and hepatic encephalopathy.  Gets thoras and courtney through the [facility] system. During prior hospitalization, her transplant eligibility was reopened due to completion of chemotherapy treatment and improved renal function. Wonder if TIPS might offer relief however patient is, unfortunately, not TIPS candidate d/t elevated MELD and   MELD 3.0: 29 at 2/5/2024  6:40 AM  MELD-Na: 30 at 2/5/2024  6:40 AM  Calculated from:  Serum Creatinine: 0.87 mg/dL (Using min of 1 mg/dL) at 2/5/2024  6:40 AM  Serum Sodium: 119 mmol/L (Using min of 125 mmol/L) at 2/5/2024  6:40 AM  Total Bilirubin: 3.0 mg/dL at 2/5/2024  6:40 AM  Serum Albumin: 3.4 g/dL at 2/5/2024  6:40 AM  INR(ratio): 3.07 at 2/5/2024  6:40 AM  Age at listing (hypothetical): 63 years  Sex: Female at 2/5/2024  6:40 AM    -  Hepatology consulted, appreciate rec's   > with current infection, not a candidate for liver transplant at this time. Still needs [facility] for cardiac risk assessment for transplant.   - Will not be able to get cath until acute illness resolves  - CAPS to assist w/ para on 1/28 (some hypotension 1/28/2024, would like to avoid shifts)  - Tylenol 650mg Q8H PRN (total dose < 2g)  - Continue lactulose, Rifaximin   - Continue PPI  - Target 3-4 BM daily   - Abx as above.  - Paracentesis log:   > 1/23: IR w/ 100ml removed for diagnostics only   > 1/26: CAPS w/ 1.5L removed (still a lot remaining, opted for less d/t fluid shifts and initially labile BP)   > 1/29: paracentesis w/ 3L removed   > 2/1: paracentesis w/ 3L removed - Did not order albumin d/t not being large volume (>4L) or in context of elevated Crt. Will speak w/ hepatology of albumin indicated or could be offered to patient       # Acute on Chronic Hyponatremia  Pt has hx of chronic hyponatremia, baseline around 122-125, she has been admitted for hyponatremia several times in the past. Presented with Na of 117, corrected with 2% NS but began to trend down when discontinued   - Nephrology consulted   - Nothing more to do in regards to hyponatremia, salt tabs relatively CI due to underlying decompensated cirrhosis  - Cont to monitor off 2% NS  - strict ins/outs   - daily weights   - Regular diet  - Na recheck in the PM    # MATTIE, resolved  # hepatorenal syndrome   Chart review suggests a prior history of hepatorenal syndrome.  However, her history at the moment is more compatible with a prerenal MATTIE secondary to poor p.o. intake.  Baseline Cr around 0.6, nearly double this on admission.   - Nephrology consulted - signed off 1/26  - Avoid nephrotoxic agents  - Continue Midodrine 15mg PO TID PRN   > Will make PRN as her BP is labile while here, and do not want to have scheduled as a means of increasing her BP when we would also like to keep her BP within normal range for  her aortic aneurysm.  [  ] Dispo: Renal scheduling in CKD clinic     # Borderline Ascending Aortic Aneurysm  Noted on CT Chest on 1/25. Measured to be 4.3 cm. I personally reviewed the CT chest and found largest diameter to be about 43.5mm. I compared this to the widest portion on of the ascending aorta on her CTA coronary on 12/15/23 which was 42.0mm. This is fairly stable.  - Will speak w/ Thoracic Surg re. Follow up guidelines  - BP Goal near normotensive < 130/<85    # Severe malnutrition in context of chronic illness  Patient's phos was appropriate on 1/22. 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 Holding off on prednisone for now iso possible infection and respiratory stability  - Continue PRN albuterol inhaler or nebs  - Continue ICS-LABA  - Continue Singulair       # Hypothyroidism  - Continue PTA levothyroxine      # Normocytic anemia - stable  Has required outpatient white blood cell transfusions in the past.  Her hemoglobin tends to be around 7.5-9.5.  At normal range on admission. No clear bleeding history though is at risk for this. Chronic disease component and nutritional components possible as well. Hgb on 1/24 was 7.5, which is likely dilutional w/ fluids. No sources of bleeding identified.  - CBC w/ diff in AM  - Would transfuse for Hgb <7  - Transfusion log:   > 1u pRBC 1/25     # Thrombocytopenia - stable  ISO cirrhosis. Increased bleeding risk, avoiding heparin/lovenox for now.   - Transfusion if < 10     # MDD/STEPHANIE  - Continue PTA Celexa  - Would try to avoid sedating agents d/t current sedative presentation     # Low back pain  Stable, trial hot/cold packs first          Diet: Room Service  Snacks/Supplements Adult: Ensure Max Protein (bariatric); Between Meals  Snacks/Supplements  Adult: Ensure Enlive; Between Meals  Snacks/Supplements Adult: Other; 10 AM - Vanilla Greek Yogurt + Peaches; Between Meals  Regular Diet Adult  Snacks/Supplements Adult: Gelatein Plus; Between Meals  Fluid restriction OTHER (SEE COMMENTS) (800)    DVT Prophylaxis: Pneumatic Compression Devices  Demarco Catheter: Not present  Lines: PRESENT      PICC 02/01/24 Single Lumen Left Brachial vein lateral Antibiotics-Site Assessment: WDL;Ecchymotic    Cardiac Monitoring: None  Code Status: Full Code      Clinically Significant Risk Factors         # Hyponatremia: Lowest Na = 119 mmol/L in last 2 days, will monitor as appropriate   # Hypercalcemia: Highest Ca = 10.2 mg/dL in last 2 days, will monitor as appropriate    # Hypoalbuminemia: Lowest albumin = 2.9 g/dL at 1/23/2024  6:48 AM, will monitor as appropriate    # Coagulation Defect: INR = 3.07 (Ref range: 0.85 - 1.15) and/or PTT = N/A, will monitor for bleeding  # Thrombocytopenia: Lowest platelets = 59 in last 2 days, will monitor for bleeding           # Severe Malnutrition: based on nutrition assessment    # Financial/Environmental Concerns: none  # Asthma: noted on problem list        Disposition Plan      Expected Discharge Date: 02/05/2024,  3:00 PM    Destination: home with family  Discharge Comments: Dispo: Home w/ HI/HC  Progress: PC x3/week; Cont. lactulose & Rifaximin: (1/22 - *)  Plan: 2/5: Na+ *119; Abx: IV Ceftriaxone (1/25 - *); PO Metronidazole (1/25 - *)          DO Keo Leon [address] [place] [address]  Securely message with [email or web address] (more info)  Text page via [facility] Paging/Directory   See signed in provider for up to date coverage information  ______________________________________________________________________    Interval History   No acute events overnight, no concerns or complaints.     Physical Exam   Vital Signs: Temp: 98  F (36.7  C) Temp src: Oral BP: 119/50 Pulse: 71   Resp: 16 SpO2: 99 % O2  Device: None (Room air)    Weight: 146 lbs 9.6 oz    Constitutional: Awake, alert, cooperative, no apparent distress  Respiratory: Poor air movement   Cardiovascular: Regular rate and rhythm  GI: Moderately distended, NT  Skin/Integumen: No rashes, no cyanosis      Medical Decision Making         Data     I have personally reviewed the following data over the past 24 hrs:    15.6 (H)  \   6.8 (LL)   / 62 (L)     119 (LL) 96 (L) 109.0 (H) /  72   4.7 14 (L) 0.87 \     ALT: 20 AST: 33 AP: 87 TBILI: 3.0 (H)   ALB: 3.4 (L) TOT PROTEIN: 5.2 (L) LIPASE: N/A     INR:  3.07 (H) PTT:  N/A   D-dimer:  N/A Fibrinogen:  N/A

## 2024-02-05 NOTE — PROGRESS NOTES
[facility]  HEPATOLOGY PROGRESS NOTE  Stefani Crowell [id number]       IMPRESSION:  Stefani Crowell is a 63 year old female with a history of alcohol associated cirrhosis, last use 6/28/23 complicated by diuretic refractory ascites and hepatic hydrothorax with 3x weekly courtney and thoras, hyponatremia, hepatic encephalopathy, MATTIE/CKD (prior terlipressin 11/2023), small EV, asthma, COPD who was admitted following labs indicating serum sodium level at para of 110. She underwent thoracentesis with evidence of empyema.     RECOMMENDATIONS:    Updates for 02/05/2024 :   - paracentesis  -strict fluid restriction    # Empyema  # Hepatic hydrothorax  # Ascites  # Trapped lung  - thora 1/23 with 1 lights criteria, high PMNs, low glucose. Remains on ceftriaxone and metronidazole per ID. Pleural Cultures + for e. Coli.   - Appreciate interventional pulm following. Received lytics in chest tube x3 days. Continues to have high PMN's on cell count 2/1. Chest tube removed and remains on IV abx for 4-6 weeks.    - Lidocaine patches for pain.       - Other infectious work up in progress- 1/2 bottles blood culture with +gpc, likely contaminant. No other + cultures  - para without evidence of SBP. Repeat para as needed for comfort, limit to <4L and send diagnostic testing with each sample while inpatient. Para sample negative for SBP on 2/1.     # Hyponatremia  - nephrology following  -  Sodium level now declining to 119. Unclear her adherence to fluid restriction. Baseline ~124  - Fluid restriction to 1 L    # Alcohol associated cirrhosis  # Transplant candidacy  - MELD 29, ABO A  - with current infection, not a candidate for liver transplant at this time. Still needs [facility] for cardiac risk assessment for transplant. Holding on performing inpatient.   - US abd 10/22/23 negative for HCC  - midodrine TID    # Hepatic encephalopathy  - continue with lactulose and rifaximin.     # Debility  # Moderate protein calorie  "malnutrition  - PT/OT- was up walking with OT and steady.   - BID protein supplements     Patient was discussed with attending physician, Dr. Sim.  The above note reflects our joint plan.     Next follow up appointment: Dr. Lim 1/29/24    Thank you for the opportunity to be involved with  Stefani Serrano Jocedave ralph. Please call with any questions or concerns.    Yanelis Miller, APRN, CNP  Inpatient Hepatology [facility]        Subjective    States is having mild increase in dyspnea. No fevers, melena or hematochezia. More abdominal distention.         Objective    Vital signs:  Temp: 98  F (36.7  C) Temp src: Oral BP: 119/50 Pulse: 71   Resp: 16 SpO2: 99 % O2 Device: None (Room air)     Weight: 66.5 kg (146 lb 9.6 oz)  Estimated body mass index is 24.4 kg/m  as calculated from the following:    Height as of 12/29/23: 1.651 m (5' 5\").    Weight as of this encounter: 66.5 kg (146 lb 9.6 oz).    Gross per 24 hour   Intake 1297 ml   Output 400 ml   Net 897 ml      General: In no acute distress, moderate facial muscle wasting  Neuro: alert Ox3, No asterixis.   HEENT:  Noscleral icterus, Nooral lesions  CV:  Skin warm and dry  Lungs:  Respirations even and nonlabored on room air   Abd: moderate abdominal distention. nontender   Extrem: no lower peripheral edema   Skin:  mild jaundice  Psych: flat    MEDICATIONS:  Current Facility-Administered Medications   Medication    acetaminophen (TYLENOL) tablet 650 mg    albumin human 25 % injection 25-50 g    albuterol (PROVENTIL HFA/VENTOLIN HFA) inhaler    albuterol (PROVENTIL) neb solution 2.5 mg    alum & mag hydroxide-simethicone (MAALOX) suspension 30 mL    artificial saliva (BIOTENE DRY MOUTHWASH) liquid 5 mL    benzocaine-menthol (CHLORASEPTIC MAX) 15-10 MG lozenge 1 lozenge    calcium carbonate (TUMS) chewable tablet 1,000 mg    cefTRIAXone (ROCEPHIN) 2 g vial to attach to  ml bag for ADULTS or NS 50 ml bag for PEDS    cholecalciferol (VITAMIN D3) capsule 50,000 Units    " citalopram (celeXA) tablet 40 mg    fluticasone-vilanterol (BREO ELLIPTA) 200-25 MCG/ACT inhaler 1 puff    heparin lock flush 10 UNIT/ML injection 5-20 mL    heparin lock flush 10 UNIT/ML injection 5-20 mL    lactulose (CHRONULAC) solution 20 g    levothyroxine (SYNTHROID/LEVOTHROID) tablet 112 mcg    Lidocaine (LIDOCARE) 4 % Patch 1 patch    lidocaine (LMX4) cream    lidocaine 1 % 0.1-1 mL    metroNIDAZOLE (FLAGYL) tablet 500 mg    midodrine (PROAMATINE) tablet 15 mg    montelukast (SINGULAIR) tablet 10 mg    mupirocin (BACTROBAN) 2 % ointment    [Held by provider] ondansetron (ZOFRAN) injection 4 mg    ondansetron (ZOFRAN) injection 4 mg    oxymetazoline (AFRIN) 0.05 % spray 2 spray    pantoprazole (PROTONIX) EC tablet 40 mg    rifaximin (XIFAXAN) tablet 550 mg    senna-docusate (SENOKOT-S/PERICOLACE) 8.6-50 MG per tablet 1 tablet    Or    senna-docusate (SENOKOT-S/PERICOLACE) 8.6-50 MG per tablet 2 tablet    sodium chloride (PF) 0.9% PF flush 10-40 mL    sodium chloride (PF) 0.9% PF flush 3 mL    sodium chloride (PF) 0.9% PF flush 3 mL       REVIEW OF LABORATORY, PATHOLOGY AND IMAGING RESULTS:  BMP  Recent Labs   Lab 02/05/24  0640 02/04/24  0607 02/03/24  0534 02/02/24  1449 02/02/24  0707   * 121* 121* 123* 121*  121*   POTASSIUM 4.7 4.1 4.0  --  3.6   CHLORIDE 96* 97* 97*  --  97*   UMA 10.2 10.1 9.8  --  9.9   CO2 14* 14* 15*  --  15*   .0* 93.1* 87.2*  --  81.8*   CR 0.87 0.74 0.78  --  0.72   GLC 72 73 79  --  71     CBC  Recent Labs   Lab 02/05/24  0943 02/05/24  0640 02/04/24  0607 02/03/24  0534 02/02/24  0707   WBC  --  15.6* 14.0* 15.1* 12.4*   RBC  --  2.13* 2.28* 2.27* 2.30*   HGB 6.8* 6.8* 7.5* 7.5* 7.4*   HCT  --  20.8* 22.4* 22.3* 22.2*   MCV  --  98 98 98 97   MCH  --  31.9 32.9 33.0 32.2   MCHC  --  32.7 33.5 33.6 33.3   RDW  --  18.0* 18.0* 17.8* 17.6*   PLT  --  62* 59* 57* 49*     INR  Recent Labs   Lab 02/05/24  0640 02/04/24  0607 02/03/24  0534 02/02/24  0707   INR 3.07*  "3.04* 3.24* 3.30*     LFTs  Recent Labs   Lab 02/05/24  0640 02/04/24  0607 02/03/24  0534 02/02/24  0707   ALKPHOS 87 78 87 78   AST 33 30 32 30   ALT 20 20 19 18   BILITOTAL 3.0* 3.1* 2.7* 2.5*   PROTTOTAL 5.2* 5.4* 5.4* 5.5*   ALBUMIN 3.4* 3.5 3.5 3.6        MELD 3.0: 29 at 2/5/2024  6:40 AM  MELD-Na: 30 at 2/5/2024  6:40 AM  Calculated from:  Serum Creatinine: 0.87 mg/dL (Using min of 1 mg/dL) at 2/5/2024  6:40 AM  Serum Sodium: 119 mmol/L (Using min of 125 mmol/L) at 2/5/2024  6:40 AM  Total Bilirubin: 3.0 mg/dL at 2/5/2024  6:40 AM  Serum Albumin: 3.4 g/dL at 2/5/2024  6:40 AM  INR(ratio): 3.07 at 2/5/2024  6:40 AM  Age at listing (hypothetical): 63 years  Sex: Female at 2/5/2024  6:40 AM    Previous work-up:   Lab Results   Component Value Date    HEPBANG Nonreactive 02/18/2021    HBCAB Nonreactive 02/18/2021    HCVAB  08/03/2017     Nonreactive   Assay performance characteristics have not been established for newborns,   infants, and children      TANYA 157 10/03/2023    IRONSAT 26 11/23/2023    TSH 3.98 12/06/2023    CHOL 176 08/15/2022    HDL 44 (L) 08/15/2022     (H) 08/15/2022    TRIG 32 12/06/2023    A1C 4.7 07/24/2023    POPETH <10 01/11/2024    PLPETH <10 01/11/2024      No results found for: \"SPECDES\", \"LDRESULTS\"      Imaging Results:  None current        "

## 2024-02-05 NOTE — PLAN OF CARE
Time: 0700-1930    Admission/Diagnosis:  Hyponatremia [E87.1]  Multiple skin tears [T14.8XXA]    BP (!) 95/35 (BP Location: Left arm)   Pulse 66   Temp 97.9  F (36.6  C) (Oral)   Resp 16   Wt 67.9 kg (149 lb 11.1 oz)   LMP  (LMP Unknown)   SpO2 100%   BMI 24.91 kg/m      A+Ox4. Forgetful. VSS on RA except hypotension. Scheduled Midodrine given.  Fatigued.  Denies pain and N/V. Patient refused lactulose today. PICC dressing changed; Cap changed. Skin tear wounds changed today.   Loose BM x3 during shift.   came to visit this afternoon. No acute changes.     Plan:   Thoracentesis before discharge. Possible discharge 2//05.     Continue  with current POC. Update provider with any changes              Goal Outcome Evaluation:      Plan of Care Reviewed With: patient, spouse

## 2024-02-05 NOTE — PLAN OF CARE
Time: 5579-5256    Admission/Diagnosis:  Hyponatremia [E87.1]  Multiple skin tears [T14.8XXA]    /50 (BP Location: Right leg)   Pulse 71   Temp 98  F (36.7  C) (Oral)   Resp 16   Wt 66.5 kg (146 lb 9.6 oz)   LMP  (LMP Unknown)   SpO2 99%   BMI 24.40 kg/m      A+Ox4. Forgetful. VSS on RA except hypotension; Scheduled midodrine given. On BA  during shift or safety. Up with SBA to bathroom. Patient reports being fatigued throughout shift and refused to work with PT today. Remained in bed unless up to the bathroom. CHG completed.     Shift Updates:   HGB of 6.8. Blood transfusion orders obtained.     Plan:   Waiting for the labs (Sodium and Hgb) to stabilize.     Continue with current POC. Update the provider with any changes.         Goal Outcome Evaluation:      Plan of Care Reviewed With: patient, spouse

## 2024-02-05 NOTE — PROCEDURES
[facility]  CAPS PROCEDURE NOTE  Date of Admission:  1/22/2024  Consult Requested by: Medicine  Reason for Consult: diagnostic evaluation of ascites and management of symptomatic ascites    Indication/HPI: Stefani Crowell is a 63 year old female with a history of alcohol associated cirrhosis, last use 6/28/23 complicated by diuretic refractory ascites and hepatic hydrothorax with 3x weekly courtney and thoras, hyponatremia, hepatic encephalopathy, MATTIE/CKD (prior terlipressin 11/2023), small EV, asthma, COPD who was admitted with hyponatremia.    Pre-Procedure Diagnosis: Ascites    Post-Procedure Diagnosis: Ascites    Risk Assessment: Average risk, Technically straightforward; patient's anticoagulation has been held according to guidelines based on the agent and platelets and coags are within guidelines    Procedure Outcome:  Diagnostic paracentesis performed and Therapeutic paracentesis performed with 3.4 liters of ascites removed.   See additional procedure details below.    The primary covering service should follow up and address any lab results as appropriate.    Performed by Sherry White and Dr. Mayito Wylie.     Sherry White MD  [facility]  Securely message with [employer] (more info)  Text page via [facility] Paging/Directory   See signed in provider for up to date coverage information      [facility]    Paracentesis    Date/Time: 2/5/2024 5:07 PM    Performed by: Sherry White MD  Authorized by: Sherry White MD    PRE-PROCEDURE DETAILS  Procedure purpose: diagnostic and therapeutic  Indications: abdominal discomfort secondary to ascites        UNIVERSAL PROTOCOL   Site Marked: Yes  Prior Images Obtained and Reviewed:  Yes  Required items: Required blood products, implants, devices and special equipment available    Patient identity confirmed:  Verbally with  patient, arm band and hospital-assigned identification number  Patient was reevaluated immediately before administering moderate or deep sedation or anesthesia  Confirmation Checklist:  Patient's identity using two indicators, relevant allergies, procedure was appropriate and matched the consent or emergent situation and correct equipment/implants were available  Time out: Immediately prior to the procedure a time out was called    Universal Protocol: the Joint Commission Universal Protocol was followed    Preparation: Patient was prepped and draped in usual sterile fashion       ANESTHESIA    Anesthesia:  Local infiltration  Local Anesthetic:  Lidocaine 1% without epinephrine      SEDATION    Patient Sedated: No    POST PROCEDURE DETAILS  Needle gauge: 5 Grenadian Straight.  Ultrasound guidance: yes  Puncture site: left lower quadrant  Fluid removed: 3400(ml)  Fluid appearance: serosanguinous  Dressing: Bandaid.        PROCEDURE    Patient Tolerance:  Patient tolerated the procedure well with no immediate complications  Length of time physician/provider present for 1:1 monitoring during sedation: 0        POC US GUIDE FOR PARACENTESIS     Impression  US Indication: Ascites    FINDINGS:  Limited abdominal ultrasound was performed and demonstrated an adequate fluid collection on the left of the abdomen. Doppler of the skin demonstrated an area at this site without significant vasculature. A paracentesis at this site was subsequently performed.

## 2024-02-06 NOTE — PLAN OF CARE
Goal Outcome Evaluation:      Plan of Care Reviewed With: patient    Overall Patient Progress: no changeOverall Patient Progress: no change    Outcome Evaluation: [id number]: A&O, VSS w/chronically low BP and scheduled midodrine. Up to commode x3 overnight w/loose bm. Mostly continent. Received 1u PRBC, current hgb 7.6. L leg swollen and tender; provider aware.

## 2024-02-06 NOTE — PROGRESS NOTES
[facility]    Medicine Progress Note - [place] 4    Date of Admission:  1/22/2024    Assessment & Plan   Stefani Crowell is a 63 year old female admitted on 1/22/2024. She has a history of cirrhosis secondary to alcohol use currently being worked up for liver transplant complicated by need for frequent paracenteses and thoracenteses for ascites/hepatic hydrothorax, in addition to asthma and COPD 2/2 tobacco use, MDD/STEPHANIE, hypothyroidism, low back pain, and chronic hyponatremia. She presented to the ED for evaluation of acute on chronic hyponatremia in the setting of recent viral-like URI illness. Admitted for careful monitoring, and for evaluation of loculated pleural effusion with significant leukocytosis. Currently being treated for empyema and persistent hyponatremia.     Plan Today:  - Monitor Na and Hgb, would like to see stabilization of lab value and asymptomatic prior to discharge  - Lidocaine patch prn for back pain near chest tube site  - Continue 0.8 L fluid restriction      # Hepatic Hydropneumothorax  # Loculated R pleural effusion; E coli empyema - improving  # Asymptomatic Bacteruria  # Trapped lung  # Hx Hepatic Hydrothorax   # Chest pain at site of chest tube  No clear symptoms, UA and urine culture corroborate possible infection. Notably, Ucx on 11/13/23 was w/ klebsiella species and now E coli on 1/22/24. Patient not reporting any urinary symptoms. On 1/23, had thora + paracentesis per IR and pleural fluid cultures yielded GNB -> e coli. Blood cultures yielded GPC, though this is most likely a contaminant. Interventional pulm placed chest tube on 1/24. CT Chest on 1/25 demonstrated improved R-sided hydropneumothorax after. Repeat CT Chest on 1/29 demonstrated increased gas component of the R hydropneumothorax w/o changes in overall size. Patient tolerated 6 doses of alteplase total. Repeat pleural fluid labs on 1/29 were consistent w/ ongoing  infection, though marginally improved. Additionally, fluid labs sent on 2/1 demonstrated slightly improved cell count, prompting chest tube removal.  - S/p chest tube placement, now removed  - Pt will continue to have hepatic hydrothorax, will need to continue outpt thora  - Pulmonary Consulted   > Chest tube placed 1/24, removed 2/1  - Blood cultures:   > 1/23: Staph epi. (suspect contaminant)   > 1/24: NGTD  - Pleural fluid culture:   > 1/23: E coli (Amp res.)  - Peritoneal fluid culture:   > 1/23: NGTD   > 2/5: NGTD  - Infectious Disease consulted (1/24)   > Stop IV Zosyn   > Resumed IV Ceftriaxone 1g every day, added metronidazole 500mg Q8H  - PICC placement 2/1 for ongoing IV abx  - Abx:  > Ceftriaxone: 1/23 - 1/24, 1/25 - *   > Metronidazole 500mg Q8H: 1/25 - *   > Rifaximin: 1/22 - *  > Zosyn: 1/24 - 1/25  > Vancomycin: 1/24  [  ] Dispo: Duration of abx TBD, minimum 4-6 weeks for empyema       # Decompensated alcoholic cirrhosis (Ascites, HE)  # Current liver transplant evaluation   # Hepatic encephalopathy   # Recurrent ascites  # Abdominal Pain  Decompensations include jaundice/icterus, G1 esophageal & rectal varices on EGD without bleeding, significant ascites requiring 3 times weekly paracenteses, hepatic hydrothorax requiring frequent thoracenteses, and hepatic encephalopathy.  Gets thoras and courtney through the [facility] system. During prior hospitalization, her transplant eligibility was reopened due to completion of chemotherapy treatment and improved renal function. Wonder if TIPS might offer relief however patient is, unfortunately, not TIPS candidate d/t elevated MELD.  MELD 3.0: 30 at 2/6/2024  6:30 AM  MELD-Na: 30 at 2/6/2024  6:30 AM  Calculated from:  Serum Creatinine: 0.75 mg/dL (Using min of 1 mg/dL) at 2/6/2024  6:30 AM  Serum Sodium: 120 mmol/L (Using min of 125 mmol/L) at 2/6/2024  6:30 AM  Total Bilirubin: 3.4 mg/dL at 2/6/2024  6:30 AM  Serum Albumin: 3.2 g/dL at 2/6/2024  6:30  AM  INR(ratio): 3.18 at 2/6/2024  6:30 AM  Age at listing (hypothetical): 63 years  Sex: Female at 2/6/2024  6:30 AM    - Hepatology consulted, appreciate recs   > with current infection, not a candidate for liver transplant at this time. Still needs [facility] for cardiac risk assessment for transplant.   - Will not be able to get cath until acute illness resolves  - CAPS to assist w/ para on 1/28 (some hypotension 1/28/2024, would like to avoid shifts)  - Tylenol 650mg Q8H PRN (total dose < 2g)  - Continue lactulose, Rifaximin   - Continue PPI  - Target 3-4 BM daily   - Abx as above.  - Paracentesis log:   > 1/23: IR w/ 100ml removed for diagnostics only   > 1/26: CAPS w/ 1.5L removed (still a lot remaining, opted for less d/t fluid shifts and initially labile BP)   > 1/29: paracentesis w/ 3L removed   > 2/1: paracentesis w/ 3L removed - Did not order albumin d/t not being large volume (>4L) or in context of elevated Crt. Will speak w/ hepatology if albumin indicated or could be offered to patient   > 2/6: paracentesis w/3.4 L removed        # Acute on Chronic Hyponatremia  Pt has hx of chronic hyponatremia, baseline around 122-125, she has been admitted for hyponatremia several times in the past. Presented with Na of 117, corrected with 2% NS but began to trend down when discontinued.   - Nephrology consulted   - Nothing more to do in regards to hyponatremia, salt tabs relatively CI due to underlying decompensated cirrhosis  - Cont to monitor off 2% NS  - Strict ins/outs   - Daily weights   - Regular diet, fluid restriction   - CMP in the AM, would like to see stabilization of Na in the 120's and asymptomatic prior to discharge    # MATTIE, resolved  # Hepatorenal syndrome   Chart review suggests a prior history of hepatorenal syndrome. However, her history at the moment is more compatible with a prerenal MATTIE secondary to poor p.o. intake. Baseline Cr around 0.6, nearly double this on admission.   - Nephrology consulted -  signed off 1/26  - Avoid nephrotoxic agents  - Continue Midodrine 15mg PO TID PRN   > Will make PRN as her BP is labile while here, and do not want to have scheduled as a means of increasing her BP when we would also like to keep her BP within normal range for her aortic aneurysm.  [  ] Dispo: Renal scheduling in CKD clinic     # Borderline Ascending Aortic Aneurysm  Noted on CT Chest on 1/25. Measured to be 4.3 cm. I personally reviewed the CT chest and found largest diameter to be about 43.5mm. I compared this to the widest portion on of the ascending aorta on her CTA coronary on 12/15/23 which was 42.0mm. This is fairly stable.  - Will speak w/ Thoracic Surg re. Follow up guidelines  - BP Goal near normotensive < 130/<85    # Severe malnutrition in context of chronic illness  Patient's phos was appropriate on 1/22. 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 Holding off on prednisone for now iso possible infection and respiratory stability  - Continue PRN albuterol inhaler or nebs  - Continue ICS-LABA  - Continue Singulair       # Hypothyroidism  - Continue PTA levothyroxine      # Normocytic anemia - stable  Has required outpatient white blood cell transfusions in the past.  Her hemoglobin tends to be around 7.5-9.5.  At normal range on admission. No clear bleeding history though is at risk for this. Chronic disease component and nutritional components possible as well. Hgb on 1/24 was 7.5, which is likely dilutional w/ fluids. No sources of bleeding identified.  - CBC w/ diff in AM  - Would transfuse for Hgb <7  - Transfusion log:   > 1u pRBC 1/25   > 1u pRBC 2/5     # Thrombocytopenia - stable  ISO cirrhosis. Increased bleeding risk, avoiding heparin/lovenox for now.   - Transfusion if < 10     #  MDD/STEPHANIE  - Continue PTA Celexa  - Would try to avoid sedating agents d/t current sedative presentation     # Back pain  Stable, trial hot/cold packs first  - Lidocaine patch prn          Diet: Room Service  Snacks/Supplements Adult: Ensure Max Protein (bariatric); Between Meals  Snacks/Supplements Adult: Ensure Enlive; Between Meals  Snacks/Supplements Adult: Other; 10 AM - Vanilla Greek Yogurt + Peaches; Between Meals  Regular Diet Adult  Snacks/Supplements Adult: Gelatein Plus; Between Meals  Fluid restriction OTHER (SEE COMMENTS) (800)    DVT Prophylaxis: Pneumatic Compression Devices  Demarco Catheter: Not present  Lines: PRESENT      PICC 02/01/24 Single Lumen Left Brachial vein lateral Antibiotics-Site Assessment: WDL    Cardiac Monitoring: None  Code Status: Full Code      Clinically Significant Risk Factors         # Hyponatremia: Lowest Na = 119 mmol/L in last 2 days, will monitor as appropriate   # Hypercalcemia: Highest Ca = 10.3 mg/dL in last 2 days, will monitor as appropriate    # Hypoalbuminemia: Lowest albumin = 2.9 g/dL at 1/23/2024  6:48 AM, will monitor as appropriate    # Coagulation Defect: INR = 3.18 (Ref range: 0.85 - 1.15) and/or PTT = N/A, will monitor for bleeding  # Thrombocytopenia: Lowest platelets = 51 in last 2 days, will monitor for bleeding           # Severe Malnutrition: based on nutrition assessment    # Financial/Environmental Concerns: none  # Asthma: noted on problem list        Disposition Plan      Expected Discharge Date: 02/08/2024      Destination: home with family  Discharge Comments: Dispo: Home w/ HI/HC  Progress: PC x3/week; Cont. lactulose & Rifaximin: (1/22 - *); Hgb 6.8 >1u pRBC > 7.6  Plan: 2/8: Na+ *120; Abx: IV Ceftriaxone (1/25 - *); PO Metronidazole (1/25 - *)          Quinton Rajan, MS4  [place] 4    Resident/Fellow Attestation   I, Modesta Palencia DO, was present with the medical/[facility] student who participated in the service and in the documentation of the  note.  I have verified the history and personally performed the physical exam and medical decision making.  I agree with the assessment and plan of care as documented in the note.      Modesta Palencia DO  PGY3  Date of Service (when I saw the patient): 02/06/24     [facility]  Securely message with [employer] (more info)  Text page via [employer] Paging/Directory   See signed in provider for up to date coverage information  ______________________________________________________________________    Interval History   No acute events overnight. 3.4 L of ascites removed by CAPS team, patient reports subsequent improvement in abdominal pain. Ranks this pain at 2/10. Requesting lidocaine patch for pain on back near prior chest tube site. Still reports low appetite, tolerating clear liquid diet only. BM soft but non-bloody. No other concerns. Would like to discuss discharge plan.     Physical Exam   Vital Signs: Temp: 97.1  F (36.2  C) Temp src: Oral BP: 123/45 Pulse: 63   Resp: 20 SpO2: 100 % O2 Device: None (Room air)    Weight: 135 lbs 12.85 oz    Constitutional: Awake, alert   Respiratory: Non-labored breathing, CTAB with no wheezes/crackles/rhonchi  Cardiovascular: RRR, 3/6 systolic murmur  GI: Distended, + mild tenderness to palpation  Skin/Integumen: No rashes or bruises seen on exposed skin  Neuro: A&O x3      Medical Decision Making         Data     I have personally reviewed the following data over the past 24 hrs:    14.9 (H)  \   7.6 (L)   / 51 (L)     120 (L) 97 (L) 108.0 (H) /  90   4.6 15 (L) 0.75 \     ALT: 19 AST: 34 AP: 71 TBILI: 3.4 (H)   ALB: 3.2 (L) TOT PROTEIN: 5.0 (L) LIPASE: N/A     INR:  3.18 (H) PTT:  N/A   D-dimer:  N/A Fibrinogen:  N/A

## 2024-02-06 NOTE — COMMITTEE REVIEW
Abdominal Committee Review Note     Evaluation Date: 12/12/2023  Committee Review Date: 2/6/2024    Organ being evaluated for: Liver    Transplant Phase: Evaluation  Transplant Status: Active    Transplant Coordinator: Lexi Schumacher  Transplant Surgeon:       Referring Physician: Christine Fairchild    Primary Diagnosis: Acute Alcohol-Associated Hepatitis With or Without Cirrhosis  Secondary Diagnosis:     Committee Review Members:  Nutrition Katy Lima, RD   Pharmacist Nely Millan, [facility]    - Clinical Mary Verma, [facility], Rachle Cohen, [facility], Víctor Underwood, [facility]   Transplant Darleen Villagomez, RN, Bria Jensen, LPN, Lexi Schumacher, RN, Shobha Duckworth, RN, Jr Florencio Bettencourt, REN, Yanelis Miller APRN CNP, Syeda Lewis, RN, Nolberto Ochoa, RN, Riri Ndiaye, RN   Transplant Hepatology  Kaylee Funes PA-C, Stephanie Lim MD, Nadine Sellers PA-C, Annie Cary MD, Jeffery Yang MD, Shoaib Sim MD, Thomas M. Leventhal, MD   Transplant Surgery Oni Paris MD, ROD Willis CNP, Pablo Welch MD, Javier Fung MD, Irving Sibley MD       Transplant Eligibility: Cirrhosis with MELD, ETOH    Committee Review Decision: Needs Re-presentation    Relative Contraindications: Other    Absolute Contraindications: None    Committee Chair Shoaib Kirk MD verbally attested to the committee's decision.    Committee Discussion Details:       Inpatient, needs empyema treatment 4-6 weeks, RHC/LHC closer to end of antbx therapy

## 2024-02-07 NOTE — PROGRESS NOTES
[facility]  HEPATOLOGY PROGRESS NOTE  Stefani Crowell [id number]       IMPRESSION:  Stefani Crowell is a 63 year old female with a history of alcohol associated cirrhosis, last use 6/28/23 complicated by diuretic refractory ascites and hepatic hydrothorax with 3x weekly courtney and thoras, hyponatremia, hepatic encephalopathy, MATTIE/CKD (prior terlipressin 11/2023), small EV, asthma, COPD who was admitted following labs indicating serum sodium level at para of 110. She underwent thoracentesis with evidence of empyema.     RECOMMENDATIONS:    Updates for 02/07/2024 :   - remains on tight fluid restriction  -     # Empyema  # Hepatic hydrothorax  # Ascites  - thora 1/23 with 1 lights criteria, high PMNs, low glucose.  Pleural Cultures + for e. Coli.   - Chest tube placed due to loculated effusion. Received lytics in chest tube x3 days. Continues to have high PMN's on cell count 2/1. Chest tube removed and remains on IV abx for 4-6 weeks.    - Lidocaine patches for pain.       - courtney without evidence of SBP. Repeat para as needed for comfort, limit to <4L and send diagnostic testing with each sample while inpatient. Para sample negative for SBP on 2/5.     # Hyponatremia  - difficulty with maintaining sodium level for safety with discharge. Currently on 0.8L fluid restriction.   - rising BUN may be related to epistaxis or may have underlying renal dysfunction. Prior cystatin C higher than creatine. Agree with involiv nephrology and repeat cystatin C    # Alcohol associated cirrhosis  # Transplant candidacy  - MELD 30, ABO A  - with current infection, not a candidate for liver transplant at this time. Still needs [facility] for cardiac risk assessment for transplant. Holding on performing inpatient. With her limited mobility, she has a high risk of becoming too frail for liver transplant. Discussed with her that with empyema, frailty, opportunity for transplant more limited.   - US abd 10/22/23 negative for HCC  -  "midodrine TID    # Hepatic encephalopathy  - continue with lactulose and rifaximin.     # Debility  # Moderate protein calorie malnutrition  - PT/OT- now becoming more weak and has limited walking. Was up with PT and walker earlier in stay. Frailty remains a concern for transplant opportunities.   - BID protein supplements     Patient was discussed with attending physician, Dr. Sim.  The above note reflects our joint plan.     Next follow up appointment: Dr. Lim 1/29/24    Thank you for the opportunity to be involved with  Stefani Crowell [facility]. Please call with any questions or concerns.    ROD Urban, CNP  Inpatient Hepatology [facility]        Subjective    Reports feeling weaker over the past several days and limited movement out of bed. Continues to feel thirsty and would like more fluids. Denies fevers, dyspnea. Continues to have increasing ascites.         Objective    Vital signs:  Temp: 97.9  F (36.6  C) Temp src: Oral BP: (!) (P) 90/30 Pulse: (P) 70   Resp: 16 SpO2: (P) 96 % O2 Device: (P) None (Room air)     Weight: 61.6 kg (135 lb 12.9 oz)  Estimated body mass index is 22.6 kg/m  as calculated from the following:    Height as of 12/29/23: 1.651 m (5' 5\").    Weight as of this encounter: 61.6 kg (135 lb 12.9 oz).    Gross per 24 hour   Intake 820 ml   Output 300 ml   Net 520 ml      General: In no acute distress, moderate facial muscle wasting  Neuro: alert Ox3, No asterixis.   HEENT:  Noscleral icterus, Nooral lesions  CV:  Skin warm and dry  Lungs:  Respirations even and nonlabored on room air   Abd: moderate abdominal distention. nontender   Extrem: no lower peripheral edema   Skin:  mild jaundice  Psych: flat    MEDICATIONS:  Current Facility-Administered Medications   Medication    acetaminophen (TYLENOL) tablet 650 mg    albuterol (PROVENTIL HFA/VENTOLIN HFA) inhaler    albuterol (PROVENTIL) neb solution 2.5 mg    alum & mag hydroxide-simethicone (MAALOX) suspension 30 mL    artificial " saliva (BIOTENE DRY MOUTHWASH) liquid 5 mL    benzocaine-menthol (CHLORASEPTIC MAX) 15-10 MG lozenge 1 lozenge    calcium carbonate (TUMS) chewable tablet 1,000 mg    cefTRIAXone (ROCEPHIN) 2 g vial to attach to  ml bag for ADULTS or NS 50 ml bag for PEDS    cholecalciferol (VITAMIN D3) capsule 50,000 Units    citalopram (celeXA) tablet 40 mg    fluticasone-vilanterol (BREO ELLIPTA) 200-25 MCG/ACT inhaler 1 puff    heparin lock flush 10 UNIT/ML injection 5-20 mL    heparin lock flush 10 UNIT/ML injection 5-20 mL    lactulose (CHRONULAC) solution 20 g    levothyroxine (SYNTHROID/LEVOTHROID) tablet 112 mcg    Lidocaine (LIDOCARE) 4 % Patch 1 patch    lidocaine (LMX4) cream    lidocaine 1 % 0.1-1 mL    metroNIDAZOLE (FLAGYL) tablet 500 mg    midodrine (PROAMATINE) tablet 15 mg    montelukast (SINGULAIR) tablet 10 mg    mupirocin (BACTROBAN) 2 % ointment    [Held by provider] ondansetron (ZOFRAN) injection 4 mg    ondansetron (ZOFRAN) injection 4 mg    oxymetazoline (AFRIN) 0.05 % spray 2 spray    pantoprazole (PROTONIX) EC tablet 40 mg    rifaximin (XIFAXAN) tablet 550 mg    senna-docusate (SENOKOT-S/PERICOLACE) 8.6-50 MG per tablet 1 tablet    Or    senna-docusate (SENOKOT-S/PERICOLACE) 8.6-50 MG per tablet 2 tablet    sodium chloride (PF) 0.9% PF flush 10-40 mL    sodium chloride (PF) 0.9% PF flush 3 mL    sodium chloride (PF) 0.9% PF flush 3 mL       REVIEW OF LABORATORY, PATHOLOGY AND IMAGING RESULTS:  BMP  Recent Labs   Lab 02/07/24  0601 02/06/24  0630 02/06/24  0134 02/05/24  1555 02/05/24  0640 02/04/24  0607   * 120* 121* 120* 119* 121*   POTASSIUM 4.8 4.6  --   --  4.7 4.1   CHLORIDE 97* 97*  --   --  96* 97*   UMA 10.5* 10.3*  --   --  10.2 10.1   CO2 15* 15*  --   --  14* 14*   .0* 108.0*  --   --  109.0* 93.1*   CR 0.78 0.75  --   --  0.87 0.74   GLC 72 90  --   --  72 73     CBC  Recent Labs   Lab 02/07/24  0601 02/06/24  0630 02/05/24  0943 02/05/24  0640 02/04/24  0607   WBC 15.2*  "14.9*  --  15.6* 14.0*   RBC 2.29* 2.34*  --  2.13* 2.28*   HGB 7.4* 7.6*   < > 6.8* 7.5*   HCT 21.7* 22.6*  --  20.8* 22.4*   MCV 95 97  --  98 98   MCH 32.3 32.5  --  31.9 32.9   MCHC 34.1 33.6  --  32.7 33.5   RDW 18.2* 17.8*  --  18.0* 18.0*   PLT 63* 51*  --  62* 59*    < > = values in this interval not displayed.     INR  Recent Labs   Lab 02/07/24 0601 02/06/24 0630 02/05/24  0640 02/04/24  0607   INR 3.09* 3.18* 3.07* 3.04*     LFTs  Recent Labs   Lab 02/07/24  0601 02/06/24 0630 02/05/24  0640 02/04/24  0607   ALKPHOS 79 71 87 78   AST 33 34 33 30   ALT 21 19 20 20   BILITOTAL 3.1* 3.4* 3.0* 3.1*   PROTTOTAL 4.9* 5.0* 5.2* 5.4*   ALBUMIN 3.2* 3.2* 3.4* 3.5        MELD 3.0: 30 at 2/7/2024  6:01 AM  MELD-Na: 30 at 2/7/2024  6:01 AM  Calculated from:  Serum Creatinine: 0.78 mg/dL (Using min of 1 mg/dL) at 2/7/2024  6:01 AM  Serum Sodium: 120 mmol/L (Using min of 125 mmol/L) at 2/7/2024  6:01 AM  Total Bilirubin: 3.1 mg/dL at 2/7/2024  6:01 AM  Serum Albumin: 3.2 g/dL at 2/7/2024  6:01 AM  INR(ratio): 3.09 at 2/7/2024  6:01 AM  Age at listing (hypothetical): 63 years  Sex: Female at 2/7/2024  6:01 AM    Previous work-up:   Lab Results   Component Value Date    HEPBANG Nonreactive 02/18/2021    HBCAB Nonreactive 02/18/2021    HCVAB  08/03/2017     Nonreactive   Assay performance characteristics have not been established for newborns,   infants, and children      TANYA 157 10/03/2023    IRONSAT 26 11/23/2023    TSH 3.98 12/06/2023    CHOL 176 08/15/2022    HDL 44 (L) 08/15/2022     (H) 08/15/2022    TRIG 32 12/06/2023    A1C 4.7 07/24/2023    POPETH <10 01/11/2024    PLPETH <10 01/11/2024      No results found for: \"SPECDES\", \"LDRESULTS\"      Imaging Results:  None current        "

## 2024-02-07 NOTE — PROGRESS NOTES
[facility]    Medicine Progress Note - Marpier[place] 4    Date of Admission:  1/22/2024    Assessment & Plan   Stefani Crowell is a 63 year old female admitted on 1/22/2024. She has a history of cirrhosis secondary to alcohol use currently being worked up for liver transplant complicated by need for frequent paracenteses and thoracenteses for ascites/hepatic hydrothorax, in addition to asthma and COPD 2/2 tobacco use, MDD/STEPHANIE, hypothyroidism, low back pain, and chronic hyponatremia. She presented to the ED for evaluation of acute on chronic hyponatremia in the setting of recent viral-like URI illness. Admitted for careful monitoring, and for evaluation of loculated pleural effusion with significant leukocytosis. Currently being treated for empyema and persistent hyponatremia.     Plan Today:  - Discussion with nephrology re: hyponatremia, rising BUN iso stable creatinine  - Discussion with hepatology re: above, transplant status  - Cystatin C ordered to further work-up rising BUN    # Hepatic Hydropneumothorax  # Loculated R pleural effusion; E coli empyema - improving  # Asymptomatic Bacteruria  # Trapped lung  # Hx Hepatic Hydrothorax   # Chest pain at site of chest tube  No clear symptoms, UA and urine culture corroborate possible infection. Notably, Ucx on 11/13/23 was w/ klebsiella species and now E coli on 1/22/24. Patient not reporting any urinary symptoms. On 1/23, had thora + paracentesis per IR and pleural fluid cultures yielded GNB -> e coli. Blood cultures yielded GPC, though this is most likely a contaminant. Interventional pulm placed chest tube on 1/24. CT Chest on 1/25 demonstrated improved R-sided hydropneumothorax after. Repeat CT Chest on 1/29 demonstrated increased gas component of the R hydropneumothorax w/o changes in overall size. Patient tolerated 6 doses of alteplase total. Repeat pleural fluid labs on 1/29 were consistent w/ ongoing infection,  though marginally improved. Additionally, fluid labs sent on 2/1 demonstrated slightly improved cell count, prompting chest tube removal.  - S/p chest tube placement, now removed  - Pt will continue to have hepatic hydrothorax, will need to continue outpt thora  - Pulmonary Consulted   > Chest tube placed 1/24, removed 2/1  - Blood cultures:   > 1/23: Staph epi. (suspect contaminant)   > 1/24: NGTD  - Pleural fluid culture:   > 1/23: E coli (Amp res.)  - Peritoneal fluid culture:   > 1/23: NGTD   > 2/5: NGTD  - Infectious Disease consulted (1/24)   > Stop IV Zosyn   > Resumed IV Ceftriaxone 1g every day, added metronidazole 500mg Q8H  - PICC placement 2/1 for ongoing IV abx  - Abx:  > Ceftriaxone: 1/23 - 1/24, 1/25 - *   > Metronidazole 500mg Q8H: 1/25 - *   > Rifaximin: 1/22 - *  > Zosyn: 1/24 - 1/25  > Vancomycin: 1/24  [  ] Dispo: Duration of abx TBD, minimum 4-6 weeks for empyema       # Decompensated alcoholic cirrhosis (Ascites, HE)  # Current liver transplant evaluation   # Hepatic encephalopathy   # Recurrent ascites  # Abdominal Pain  Decompensations include jaundice/icterus, G1 esophageal & rectal varices on EGD without bleeding, significant ascites requiring 3 times weekly paracenteses, hepatic hydrothorax requiring frequent thoracenteses, and hepatic encephalopathy. Gets thoras and courtney [facility]. During prior hospitalization, her transplant eligibility was reopened due to completion of chemotherapy treatment and improved renal function. Wonder if TIPS might offer relief however patient is, unfortunately, not TIPS candidate d/t elevated MELD.  MELD 3.0: 30 at 2/7/2024  6:01 AM  MELD-Na: 30 at 2/7/2024  6:01 AM  Calculated from:  Serum Creatinine: 0.78 mg/dL (Using min of 1 mg/dL) at 2/7/2024  6:01 AM  Serum Sodium: 120 mmol/L (Using min of 125 mmol/L) at 2/7/2024  6:01 AM  Total Bilirubin: 3.1 mg/dL at 2/7/2024  6:01 AM  Serum Albumin: 3.2 g/dL at 2/7/2024  6:01 AM  INR(ratio): 3.09 at  2/7/2024  6:01 AM  Age at listing (hypothetical): 63 years  Sex: Female at 2/7/2024  6:01 AM    - Hepatology consulted, appreciate recs   > with current infection, not a candidate for liver transplant at this time.    > with limited mobility, high risk of becoming too frail for liver transplant  - Will not be able to get cath until acute illness resolves  - CAPS to assist w/ para on 1/28 (some hypotension 1/28/2024, would like to avoid shifts)  - Tylenol 650mg Q8H PRN (total dose < 2g)  - Continue lactulose, Rifaximin   - Continue PPI  - Target 3-4 BM daily   - Abx as above.  - Paracentesis log:   > 1/23: IR w/ 100ml removed for diagnostics only   > 1/26: CAPS w/ 1.5L removed (still a lot remaining, opted for less d/t fluid shifts and initially labile BP)   > 1/29: paracentesis w/ 3L removed   > 2/1: paracentesis w/ 3L removed - Did not order albumin d/t not being large volume (>4L) or in context of elevated Crt. Will speak w/ hepatology if albumin indicated or could be offered to patient   > 2/6: paracentesis w/3.4 L removed        # Acute on Chronic Hyponatremia  Pt has hx of chronic hyponatremia, baseline around 122-125, she has been admitted for hyponatremia several times in the past. Presented with Na of 117, corrected with 2% NS but began to trend down when discontinued.   - Nephrology consulted - signed off 1/26   - Salt tabs relatively CI due to underlying decompensated cirrhosis   - Will re-involve today 2/7 to get further recs on persistent hyponatremia, rising BUN iso stable creatinine  - Cont to monitor off 2% NS  - Strict ins/outs   - Daily weights   - Regular diet, fluid restriction   - CMP in the AM, would like to see stabilization of Na in the 120's and asymptomatic prior to discharge    # MATTIE, resolved  # Hepatorenal syndrome   # Elevated BUN  Chart review suggests a prior history of hepatorenal syndrome. However, her history at the moment is more compatible with a prerenal MATTIE secondary to poor  p.o. intake. Baseline Cr around 0.6, nearly double early this admission, now stable at 0.7-0.8. However, now with steadily rising BUN, to 118 on 2/7.   - Nephrology consulted - signed off 1/26   - Will re-involve today 2/7 to get further recs on persistent hyponatremia, rising BUN iso stable creatinine  - Cystatin C to further evaluate kidney function  - Avoid nephrotoxic agents  - Continue Midodrine 15mg PO TID PRN   > Will make PRN as her BP is labile while here, and do not want to have scheduled as a means of increasing her BP when we would also like to keep her BP within normal range for her aortic aneurysm.  [  ] Dispo: Renal scheduling in CKD clinic     # Borderline Ascending Aortic Aneurysm  Noted on CT Chest on 1/25. Measured to be 4.3 cm. Reviewed the CT chest and found largest diameter to be about 43.5mm. Compared this to the widest portion on of the ascending aorta on her CTA coronary on 12/15/23 which was 42.0mm. This is fairly stable.  - Will speak w/ Thoracic Surg re. Follow up guidelines  - BP Goal near normotensive < 130/<85    # Severe malnutrition in context of chronic illness  Patient's phos was appropriate on 1/22. 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 Holding off on prednisone for now iso possible infection and respiratory stability  - Continue PRN albuterol inhaler or nebs  - Continue ICS-LABA  - Continue Singulair       # Hypothyroidism  - Continue PTA levothyroxine      # Normocytic anemia - stable  Has required outpatient white blood cell transfusions in the past.  Her hemoglobin tends to be around 7.5-9.5.  At normal range on admission. No clear bleeding history though is at risk for this. Chronic disease component and nutritional components possible as well. Hgb on 1/24 was  7.5, which is likely dilutional w/ fluids. No sources of bleeding identified.  - CBC w/ diff in AM  - Would transfuse for Hgb <7  - Transfusion log:   > 1u pRBC 1/25   > 1u pRBC 2/5     # Thrombocytopenia - stable  ISO cirrhosis. Increased bleeding risk, avoiding heparin/lovenox for now.   - Transfusion if < 10     # MDD/STEPHANIE  - Continue PTA Celexa  - Would try to avoid sedating agents d/t current sedative presentation     # Back pain  Stable, trial hot/cold packs first  - Lidocaine patch prn          Diet: Room Service  Regular Diet Adult  Fluid restriction OTHER (SEE COMMENTS) (800)  Snacks/Supplements Adult: Other; Ensure enlive vanilla or strawberry at 10:00; Between Meals  Snacks/Supplements Adult: Other; Ensure enlive with bkf , lunch and dinner trays ( do not count with fluid restrictions); With Meals    DVT Prophylaxis: Pneumatic Compression Devices  Demarco Catheter: Not present  Lines: PRESENT      PICC 02/01/24 Single Lumen Left Brachial vein lateral Antibiotics-Site Assessment: WDL    Cardiac Monitoring: None  Code Status: Full Code      Clinically Significant Risk Factors         # Hyponatremia: Lowest Na = 120 mmol/L in last 2 days, will monitor as appropriate   # Hypercalcemia: Highest Ca = 10.5 mg/dL in last 2 days, will monitor as appropriate    # Hypoalbuminemia: Lowest albumin = 2.9 g/dL at 1/23/2024  6:48 AM, will monitor as appropriate    # Coagulation Defect: INR = 3.09 (Ref range: 0.85 - 1.15) and/or PTT = N/A, will monitor for bleeding  # Thrombocytopenia: Lowest platelets = 51 in last 2 days, will monitor for bleeding           # Severe Malnutrition: based on nutrition assessment    # Financial/Environmental Concerns: none  # Asthma: noted on problem list        Disposition Plan      Expected Discharge Date: 02/09/2024      Destination: home with family  Discharge Comments: Dispo: Home with home care, home infusion, OP services  Progress: PC x3/week; Cont. lactulose & Rifaximin: (1/22 - *); Hgb  6.8 >1u pRBC > 7.6  Plan: 2/8: Na+ *120; Abx: IV Ceftriaxone (1/25 - *); PO Metronidazole (1/25 - *)          Quinton Rajan, MS4  Keo 4    Resident/Fellow Attestation   I, Modesta Palencia DO, was present with the medical/MALOU student who participated in the service and in the documentation of the note.  I have verified the history and personally performed the physical exam and medical decision making.  I agree with the assessment and plan of care as documented in the note.      Modesta Palencia DO  PGY3  Date of Service (when I saw the patient): 02/07/24        [facility]  Securely message with [employer] (more info)  Text page via [facility] Paging/Directory   See signed in provider for up to date coverage information  ______________________________________________________________________    Interval History   No acute events overnight. Appetite improving, able to tolerate yogurt. Pain in back persistent but has not yet received lidocaine patch. BM soft but non-bloody. No other concerns.     Physical Exam   Vital Signs: Temp: 97.9  F (36.6  C) Temp src: Oral BP: (!) (P) 90/30 Pulse: (P) 70   Resp: 16 SpO2: (P) 96 % O2 Device: (P) None (Room air)    Weight: 135 lbs 12.85 oz    Constitutional: Awake, alert   Respiratory: Non-labored breathing, CTAB with no wheezes/crackles/rhonchi  Cardiovascular: RRR, 3/6 systolic murmur  GI: Less distended, + mild tenderness to palpation, no asterixis  Skin/Integumen: No rashes or bruises seen on exposed skin  Neuro: A&O x3

## 2024-02-07 NOTE — PROGRESS NOTES
CLINICAL NUTRITION SERVICES - REASSESSMENT NOTE     Nutrition Prescription    RECOMMENDATIONS FOR MDs/PROVIDERS TO ORDER:  1. Anorexia and severe malnutrition. Encouraged patient to utilize 4 ensure plus per day to avoid TF needs.   2. Please pair lactulose with zinc sulfate 220 mg daily     Malnutrition Status:    Severe malnutrition in the context of chronic illness    Recommendations already ordered by Registered Dietitian (RD):  Ordered 4 ensure plus per day, all vanilla     Future/Additional Recommendations:  PO improvement vs. Need for TF support      EVALUATION OF THE PROGRESS TOWARD GOALS   Diet: Regular + 800  ml FR, snack in between meals + ensure at 2:00 pm, ensure max at 10:00  Intake: reports low appetite, tolerating clear liquid diet only, room service with assist     Visited with patient today. Family at bedside. Patient notes a very poor intake. Not hungry at all. Has anorexia. She is able to drink her ensure. Patient says she loves the ensure supplements and would try to drink more. Encouraged patient to utilize up to 4 ensure enlive per day to meet lower end needs (~ 1400 kcal + pending PO) and 80 gm protein.        NEW FINDINGS   PMH: alcohol associated cirrhosis, last use 6/28/23 complicated by diuretic refractory ascites and hepatic hydrothorax with 3x weekly courtney and thoras, hyponatremia, hepatic encephalopathy, MTATIE/CKD (prior terlipressin 11/2023), small EV, asthma, COPD    - Admitted following labs indicating serum sodium level at para of 110. She underwent thoracentesis with evidence of empyema.     ESLD: MELD 30, not a candidate for liver transplant at this time   HE: lactulose and rifaximin.    2/6: paracentesis w/3.4 L removed      WOC RN note on 2/7:  R. Chest: Wound due to: Skin Tear and Trauma , STATUS: improving   R. Ankle: Wound due to: Skin Tear and Trauma , STATUS: improving     Wt trend:   63.2 kg (139 lb 5.3 oz)  admit wt / standing wt on 1/24  61.6 kg (135 lb 12.9 oz) bed scale  on  ( had para on  with 3.4 liters removed).     Labs noted:  BUN: 118 (H), Cr: 0.78, GFR: 86  Total bili: 3.1 (H)  B    GI/stool:   On lactulose and is having 4 BM per day       MALNUTRITION  % Intake: </= 50% for >/= 5 days (severe)  % Weight Loss: Unable to assess  Subcutaneous Fat Loss: Global severe  Muscle Loss: Global severe   Fluid Accumulation/Edema: Moderate  Malnutrition Diagnosis: Severe malnutrition in the context of chronic illness    Previous Goals   Patient to consume % of nutritionally adequate meal trays TID, or the equivalent with supplements/snacks.  Evaluation: Not met    Previous Nutrition Diagnosis  Malnutrition related to reduced appetite/early satiety with ESLD/ascites as evidenced by severe global muscle/fat depletion, reliance on small freq meals/ONS to optimize total nutrition.      Evaluation: Declining    CURRENT NUTRITION DIAGNOSIS  Malnutrition related to reduced appetite/early satiety with ESLD/ascites as evidenced by severe global muscle/fat depletion, reliance on small freq meals/ONS to optimize total nutrition.        INTERVENTIONS  Implementation  Medical food supplement therapy    Goals  Patient to consume % of nutritionally adequate meal trays TID, or the equivalent with supplements/snacks.    Monitoring/Evaluation  Progress toward goals will be monitored and evaluated per protocol.      Newton Partida RD/ANDRE  7C (508-130)/7D RD pager: [phone]  Weekend/Holiday RD pager: [phone]

## 2024-02-07 NOTE — PLAN OF CARE
Goal Outcome Evaluation:      Plan of Care Reviewed With: patient    Overall Patient Progress: no changeOverall Patient Progress: no change    Outcome Evaluation: Intermittently lethargic overnight, Ox4. VSS on RA ex low BPs; MD notified, pt assessed at bedside and q15 minute checks completed x4, then another check completed 4 hr later per provider instruction. BP remained in 80s-90s/30s-40s. Pt remained at baseline orientation, denied dizziness or SOB at rest, or chest pain. Continuing to monitor. No BM since 0000, 20 mL fluid consumed since 0000; 800 mL fluid restriction. Pt reporting incisional pain from chest tube site, given tylenol x1 this AM. Continue w/ POC.

## 2024-02-07 NOTE — PLAN OF CARE
Goal Outcome Evaluation:      Plan of Care Reviewed With: patient    Overall Patient Progress: no changeOverall Patient Progress: no change    Outcome Evaluation: Admitted for hyponatremia and empyema. Administered IV abx through PICC. Hgb 7.6. Na 120. 800mL FR, has already reached fluid restriction. Pt denies pain. Frequent loose BM.

## 2024-02-07 NOTE — PROGRESS NOTES
Care Management Follow Up    Length of Stay (days): 16  Expected Discharge Date: 02/09/2024  Concerns to be Addressed: all concerns addressed in this encounter     Patient plan of care discussed at interdisciplinary rounds: Yes  Anticipated Discharge Disposition: Home Care, Home Infusion    [facility] (Ph: [phone]; Fax: [phone])  [address] [place] [address]   RN, PT and OT    [facility] (Phone: [phone]; Fax: [phone])  [address] , [place] [address]  IV rocephin q24 hrs      Anticipated Discharge Services: [facility], Other (see comment)    numberFire: [facility] (Phone: [phone])  [address] [place] [address]  Pt open to resume services      [facility] (Phone: [phone])  [facility] ([facility])  [address] [place] [address]  OP Thoracentesis: MWF     Anticipated Discharge DME: None  Patient/family educated on Medicare website which has current facility and service quality ratings: no  Education Provided on the Discharge Plan: Yes  Patient/Family in Agreement with the Plan: yes  Referrals Placed by CM/CJ: Internal Clinic Care Coordination, Homecare, Home Infusion, Communication hand-offs to next level of Care Providers  Private pay costs discussed: Not applicable  Additional Information: SW met with pt at bedside to discuss dual disposition recommendations from PT/OT and discuss patient's preference. Pt has multiple outpatient services in-place and would like to return home. She confirmed that she plans to work with [facility] for RN/PT/OT and [facility] for IVABx as well as outpatient CD services & thoracentesis.     Care management team will monitor this patient for safe discharge planning.  __________________________     KAR An, [facility]  Clinical   Phone: [phone]  Pager: [phone]   Johan  [facility]-  Social Work & Care Management Department

## 2024-02-07 NOTE — PROGRESS NOTES
Brief Nephrology note      Mrs Crowell is well known to me/nephrology from previous admissions mostly for hyponatremia with advanced liver disease.  Has remained hyponatremic despite following strict H2O restriction, is 120 today.  Cr also thought to be overestimating renal fx and Cystatin-C confirms this with gfr of ~18ml/min/1.7m2 today despite Cr of 0.78.  BUN is also climbing despite stable/slightly improved Cr the past ~week which usually suggests another cause (GIB, steroids, fevers/hypermetabolic state), Hgb is on slow downtrend.  Did receive contrast on 2/1 without significant change in Cr.      Ca is notably up in past 3 days to 10.5 after being stable in 9's long term, checking pth to work up as hypercalcemia could be exacerbating hypoperfusion to kidneys in the short term.  Will follow and consider intervention (has responded to terlipressin before if we determine this is HRS) to help renal fx.      Denny Arriola, ROD CNS  Clinical Nurse Specialist  [phone]

## 2024-02-07 NOTE — PLAN OF CARE
Goal Outcome Evaluation:      Plan of Care Reviewed With: patient    Overall Patient Progress: no changeOverall Patient Progress: no change    Outcome Evaluation: Anticipate this pt will go home with home care, home infusion, and outpatient services. Care management team will remain available and follow for safe discharge planning.  __________________________     KAR An, HERNESTO  Clinical   Phone: [phone]  Pager: [phone]  Johan  [address]  Social Work & Care Management Department

## 2024-02-07 NOTE — PROGRESS NOTES
[facility] Nurse Inpatient Assessment     Consulted for: Diffuse skin tears    Summery: Pt has extremely fragile skin. There are multiple area where she had had a skin tear but it has healed or is no longer open. Pt would benefit from protective garments such as leyda sleeves. For additional skin tears please use skin care protocol to treat.    Patient History (according to provider note(s):      Stefani Crowell is a 63 year old female admitted on 1/22/2024. She has a history of cirrhosis secondary to alcohol use currently being worked up for liver transplant complicated by need for frequent paracenteses and thoracenteses for ascites/hepatic hydrothorax, in addition to asthma and COPD 2/2 tobacco use, MDD/STEPHANIE, hypothyroidism, low back pain, and chronic hyponatremia. She presented to the ED for evaluation of acute on chronic hyponatremia in the setting of recent viral-like URI illness. Admitted for careful monitoring, and for evaluation of loculated pleural effusion with significant leukocytosis     Assessment:      Areas visualized during today's visit: Lower extremities , Upper extremities , and Chest    Wound location: Right Chest    Date of photos: 1/25/24, 2/7/24  Wound due to: Skin Tear and Trauma  Wound history/plan of care: Skin tear occurred when ECG line was pulled off on accident.  Wound base: 90 % dermis, 20 %  Skin flap     Palpation of the wound bed: normal      Drainage: small     Description of drainage: bloody     Measurements (length x width x depth, in cm): 3  x 1  x  0.2 cm      Tunneling: N/A     Undermining: N/A  Periwound skin: Intact and Ecchymosis      Color: purple      Temperature: normal   Odor: none  Pain: mild, sharp well being cleaned  Pain interventions prior to dressing change: soaking and slow and gentle cares   Treatment goal: Heal  and Protection  STATUS: improving  Supplies ordered: supplies stored on unit     Wound location:  Right ankle    Date of photos: 1/23/24, 2/7/24  Wound due to: Skin Tear and Trauma  Wound history/plan of care: Unclear. Pt has very fragile skin that is prone to skin tears.   Wound base: 80 % dermis, 20 %  skin flap     Palpation of the wound bed: normal      Drainage: scant     Description of drainage: bloody     Measurements (length x width x depth, in cm): 1.9  x 2 x  0.1 cm      Tunneling: N/A     Undermining: N/A  Periwound skin: Intact      Color: normal and consistent with surrounding tissue      Temperature: normal   Odor: none  Pain: mild,   Pain interventions prior to dressing change: slow and gentle cares   Treatment goal: Heal  and Protection  STATUS: improving  Supplies ordered: supplies stored on unit      Treatment Plan:     Skin tears wound(s) right upper chest and right ankle: Every 3 days and PRN  Gently cleanse with normal saline.  Apply No sting barrier film to any area that will be covered the adhesive.  Cut adaptic to fit wound wound bed.  Cover with mepilex.     Orders: Written    RECOMMEND PRIMARY TEAM ORDER: None, at this time  Education provided: Not appropriate today   Discussed plan of care with: Patient and Nurse  WOC nurse follow-up plan: weekly  Notify WOC if wound(s) deteriorate.  Nursing to notify the Provider(s) and re-consult the WOC Nurse if new skin concern.    DATA:     Current support surface: Standard  Standard gel/foam mattress (IsoFlex, Atmos air, etc)  Containment of urine/stool: Incontinence Protocol  BMI: Body mass index is 22.6 kg/m .   Active diet order: Orders Placed This Encounter      Regular Diet Adult     Output: I/O last 3 completed shifts:  In: 840 [P.O.:840]  Out: 700 [Urine:300; Stool:400]     Labs:   Recent Labs   Lab 02/07/24  0601   ALBUMIN 3.2*   HGB 7.4*   INR 3.09*   WBC 15.2*     Pressure injury risk assessment:   Sensory Perception: 3-->slightly limited  Moisture: 3-->occasionally moist  Activity: 3-->walks occasionally  Mobility: 3-->slightly  limited  Nutrition: 2-->probably inadequate  Friction and Shear: 2-->potential problem  Castillo Score: 16      Please contact through Johan Prado group: [facility] Nurse  Dept. Office Number: [phone]

## 2024-02-08 NOTE — PLAN OF CARE
Goal Outcome Evaluation:     BP (!) 81/34 (BP Location: Right arm)   Pulse 62   Temp 96.8  F (36  C) (Temporal)   Resp 16   Wt 63.3 kg (139 lb 9.6 oz)   LMP  (LMP Unknown)   SpO2 100%   BMI 23.23 kg/m      [id number]    Neuro: A&Ox4.   Cardiac: SR. VSS.   Respiratory: Sating 100% on RA.  GI/: Adequate urine output. BM X1  Diet/appetite: Tolerating regular with 800 ml/day diet. Pt is not eating, just taking liquids.  Activity:  Assist of one, up to commode.  Pain: At acceptable level on current regimen.   Skin: No new deficits noted.  LDA's:PICC.  New this shift: One unit of PRBC for hgb of 6.8.  Plan: Continue with POC. Notify primary team with changes.

## 2024-02-08 NOTE — TELEPHONE ENCOUNTER
Averym for IR at [facility].  She's been on their standing schedule for M/W/F, update that still inpatient.

## 2024-02-08 NOTE — PLAN OF CARE
Goal Outcome Evaluation:      Plan of Care Reviewed With: patient    Overall Patient Progress: no changeOverall Patient Progress: no change    Outcome Evaluation: A&Ox4, flat affect. VSS on RA ex sustained low BPs, 80s/30s overnight consistently, provider paged, advised that team will reassess low BPs in AM. 1 loose BM since 0000; unable to collect urine sample due to stool & urine being mixed; advised pt to use sterile collection cup for next bathroom trip in order to collect. PICC-SL. Denies pain. Critical lab result of 6.8 for hemoglobin this AM; provider ordered RBCs. Continue w/ POC.

## 2024-02-08 NOTE — PROGRESS NOTES
Nephrology Progress Note  02/08/2024       Stefani Crowell is a 63 yof with hx of ETOH cirrhosis being worked up for transplant complicated by hydrothorax requiring at least twice weekly thoracenteses, COPD, known ETOH Cirrhosis who presented for paracentesis and noted to be 112.  2.7L para was performed then she was sent to the ED.  Na normal up until July 2023 when it has been on the decline but generally 120-125 as recently as 1/11.  Cr also up from baseline of 0.7 on 1/11 to 1.2.  Nephrology consulted for management of hyponatremia and MATTIE.       Interval History :   I was called about Mrs Crowell regarding hyponatremia and low gfr by Cystatin-C despite Cr of 0.76 suggesting very low muscle mass.  Discussed with hepatology and primary team, with SBP in low 80's and ongoing empyema she is too hypotensive to consider dialysis.  We can try albumin challenge, received PRBC's today which may temporarily help renal perfusion but overall is worsening despite our interventions the past 2-3 months.  Planning to get palliative consult and have ongoing discussions.  Na stable at 120 but requiring very strict fluid restriction.        Assessment & Recommendations:   Renal fx-Baseline Cr 0.7, has cirrhosis and low muscle mass and Cystatin-C is very low (gfr ~18) despite her normal Cr.  This is poor overall prognostic sign of poor nutrition related to liver failure. There is little to offer given her overall picture of not being a transplant candidate currently.                  -MATTIE, has underlying HRS physiology, unclear if she has acute infectious issue.                 -Giving 75g albumin x1 again today which will be x3 days.  Already on midodrine.      Hyponatremia-Hypervolemic with liver failure, has chronic hyponatremia in 120-125 range and currently is 120 but requiring 800cc/day fluid restriction.  Will follow.      Volume-Minimal edema in LE, PRBC's given today, + abd distension but soft, breathing is comfortable.        K/pH-Na 120 with goal of >120 w/chronic hyponatremia and cirrhosis.  Bicarb low but avoiding PO bicarb due to Na load which will worsen ascites/hydrothorax.      BMD-Mg and Phos not checked today.       Anemia-Hgb 6.8, getting 1u PRBC's this morning.  No active signs of bleeding.     Other comorbidites  Nutrition-Taking PO, 1L fluid restriction.     Infection-Hydrothorax + for E.Coli from 1/23, on Ceftriaxone.       Time spent: 40 minutes on this date of encounter for chart review, physical exam, medical decision making and co-ordination of care.      Discussed with Dr Escobar     Recommendations were communicated to primary team via verbal communication.         ROD Pena CNS  Clinical Nurse Specialist  [phone]      Review of Systems:   I reviewed the following systems:  Gen: No fevers or chills, fatigued.    CV: No CP at rest  Resp: No SOB at rest  GI: No N/V    Physical Exam:   I/O last 3 completed shifts:  In: 770 [P.O.:760; I.V.:10]  Out: 900 [Other:900]   BP (!) 81/34 (BP Location: Right arm)   Pulse 62   Temp 97.4  F (36.3  C) (Temporal)   Resp 16   Wt 63.3 kg (139 lb 9.6 oz)   LMP  (LMP Unknown)   SpO2 100%   BMI 23.23 kg/m       GENERAL APPEARANCE: alert and no distress, more awake this am, frail.    Pulmonary: Breathing comfortably on RA.   CV: Regular rhythm, normal rate   - Edema +1 generalized.   GI: soft, nontender, normal bowel sounds  MS: no evidence of inflammation in joints, no muscle tenderness  : No Demarco  SKIN: no rash, warm, dry  NEURO: mild confusion but no focal deficits.      Labs:   All labs reviewed by me  Electrolytes/Renal -   Recent Labs   Lab Test 02/08/24  0610 02/07/24  0601 02/06/24  0630 01/23/24  0730 01/23/24  0648 01/23/24  0210 01/22/24  2232 01/22/24  1734 12/13/23  1000 12/13/23  0418 10/19/23  1523 10/03/23  0853   * 120* 120*   < > 111*   < > 111* 110*   < > 124*  124*   < > 126*   POTASSIUM 4.8 4.8 4.6   < > 5.2  --   --  5.1   < > 5.4*    < > 3.9   CHLORIDE 97* 97* 97*   < > 85*  --   --  81*   < > 100   < > 91*   CO2 15* 15* 15*   < > 17*  --   --  18*   < > 17*   < > 25   .0* 118.0* 108.0*   < > 95.3*  --   --  85.9*   < > 23.4*   < > 33.9*   CR 0.91 0.78 0.75   < > 1.16*  --   --  1.12*   < > 0.59   < > 1.24*   * 72 90   < > 74  --   --  121*   < > 104*   < > 98   UMA 10.2 10.5* 10.3*   < > 9.8  --   --  9.6   < > 8.8   < > 10.2   MAG  --   --   --   --  3.4*  --   --  2.8*  --  2.0   < >  --    PHOS  --   --   --   --   --   --  3.8  --   --   --   --  2.6    < > = values in this interval not displayed.       CBC -   Recent Labs   Lab Test 02/08/24  0610 02/07/24  0601 02/06/24  0630   WBC 14.0* 15.2* 14.9*   HGB 6.8* 7.4* 7.6*   PLT 61* 63* 51*       LFTs -   Recent Labs   Lab Test 02/08/24  0610 02/07/24  0601 02/06/24  0630   ALKPHOS 89 79 71   BILITOTAL 2.6* 3.1* 3.4*   ALT 22 21 19   AST 36 33 34   PROTTOTAL 4.8* 4.9* 5.0*   ALBUMIN 2.9* 3.2* 3.2*       Iron Panel -   Recent Labs   Lab Test 11/23/23  0638 10/26/23  1311 10/03/23  0853   IRON 52 62 43   IRONSAT 26 28 28   TANYA  --   --  157           Current Medications:    cefTRIAXone  2 g Intravenous Q24H     cholecalciferol  50,000 Units Oral Q7 Days     citalopram  40 mg Oral Daily     fluticasone-vilanterol  1 puff Inhalation Daily     heparin lock flush  5-20 mL Intracatheter Q24H     lactulose  20 g Oral TID     levothyroxine  112 mcg Oral Daily     lidocaine  1 patch Transdermal Q24H     metroNIDAZOLE  500 mg Oral TID     midodrine  15 mg Oral TID w/meals     montelukast  10 mg Oral At Bedtime     mupirocin   Topical TID     oxymetazoline  2 spray Both Nostrils BID     pantoprazole  40 mg Oral BID     rifaximin  550 mg Oral BID     sodium chloride (PF)  3 mL Intracatheter Q8H     zinc sulfate  220 mg Oral Daily

## 2024-02-08 NOTE — PROGRESS NOTES
[facility]    Medicine Progress Note - Maroon 4    Date of Admission:  1/22/2024    Assessment & Plan   Stefani Crowell is a 63 year old female admitted on 1/22/2024. She has a history of cirrhosis secondary to alcohol use currently being worked up for liver transplant complicated by need for frequent paracenteses and thoracenteses for ascites/hepatic hydrothorax, in addition to asthma and COPD 2/2 tobacco use, MDD/STEPHANIE, hypothyroidism, low back pain, and chronic hyponatremia. Admitted with decompensated cirrhosis, and E coli empyema. Course complicated by recurrent ascites, renal injury with moderate-severe hyponatremia and debility    Plan Today:  - Discussion with nephrology re: hyponatremia, rising BUN iso stable creatinine  - Discussion with hepatology re: above, transplant status      Decompensated alcoholic cirrhosis (Ascites, HE, EV, Hepatic hydrothorax)  Gets thoras and courtney through the [facility]. During prior hospitalization, her transplant eligibility was reopened due to completion of chemotherapy treatment and improved renal function, however with new renal failure, ongoing hyponatremia, debility/fraility she would not a transplant candidate. Not a candidate for TIPS. MELD-Na scores persistently in the 30s  - Hepatology following  - Intermittent CAPS consult for paracentesis; had multiple paracentesis this hosptalization  - Tylenol 650mg Q8H PRN (total dose < 2g)  - Continue lactulose, Rifaximin   - Continue PPI  - Abx as below    E coli empyema - improving  Recurrent hepatic Hydropneumothorax c/b trapped lung  Hx Hepatic Hydrothorax   On 1/23, had thora + paracentesis per IR and pleural fluid cultures yielded GNB -> e coli. Blood cultures yielded GPC, though this is most likely a contaminant. Interventional pulm placed chest tube on 1/24. CT Chest on 1/25 demonstrated improved R-sided hydropneumothorax after. Repeat CT Chest on 1/29 demonstrated  increased gas component of the R hydropneumothorax w/o changes in overall size. Patient tolerated 6 doses of alteplase total. Repeat pleural fluid labs on 1/29 were consistent w/ ongoing infection, though marginally improved. Additionally, fluid labs sent on 2/1 demonstrated slightly improved cell count, prompting chest tube removal.   - S/p chest tube placement, now removed  - Pt will continue to have hepatic hydrothorax, will need to continue outpt thora  - Pulmonary Consulted   > Chest tube placed 1/24, removed 2/1  - Blood cultures:   > 1/23: Staph epi. (suspect contaminant)   > 1/24: NGTD  - Pleural fluid culture:   > 1/23: E coli (Amp res.)  - Infectious Disease consulted (1/24)   > Resumed IV Ceftriaxone 1g every day, added metronidazole 500mg Q8H through PICC (2/1)  - Abx:  > Ceftriaxone: 1/23 - 1/24, 1/25 - *   > Metronidazole 500mg Q8H: 1/25 - *   > Rifaximin: 1/22 - *  > Zosyn: 1/24 - 1/25  > Vancomycin: 1/24  [  ] Dispo: Duration of abx TBD, minimum 4-6 weeks for empyema      MATTIE  Acute on Chronic Hyponatremia  Hx of HRS  Pt has hx of chronic hyponatremia, baseline around 122-125, she has been admitted for hyponatremia several times in the past. Presented with Na of 117, corrected with 2% NS but began to trend down when discontinued.   - Nephrology consulted - signed off 1/26   - Salt tabs relatively CI due to underlying decompensated cirrhosis   - Will re-involve today 2/7 to get further recs on persistent hyponatremia, rising BUN iso stable creatinine  - Continue Midodrine 15mg PO TID PRN  - Cont to monitor off 2% NS  - Strict ins/outs   - Daily weights   - Regular diet, fluid restriction   - CMP in the AM, would like to see stabilization of Na in the 120's and asymptomatic prior to discharge    Severe malnutrition in context of chronic illness  Patient's phos was appropriate on 1/22. If persistent electrolyte abnormalities, will check vitamin levels  - Nutrition following    Borderline Ascending  Aortic Aneurysm  Noted on CT Chest on 1/25. Measured to be 4.3 cm. Reviewed the CT chest and found largest diameter to be about 43.5mm. Compared this to the widest portion on of the ascending aorta on her CTA coronary on 12/15/23 which was 42.0mm. This is fairly stable.  - Will speak w/ Thoracic Surg re. Follow up guidelines  - BP Goal near normotensive < 130/<85    Persistent Asthma  COPD 2/2 TUD in remission   PCP notes suggest current or recent exacerbation, though doing well from a respiratory standpoint on admission. Holding off on prednisone for now iso possible infection and respiratory stability  - Continue PRN albuterol inhaler or nebs  - Continue ICS-LABA  - Continue Singulair       Hypothyroidism  - Continue PTA levothyroxine      Normocytic anemia - stable  Has required outpatient white blood cell transfusions in the past.  Her hemoglobin tends to be around 7.5-9.5.  At normal range on admission. No clear bleeding history though is at risk for this. Chronic disease component and nutritional components possible as well. Hgb on 1/24 was 7.5, which is likely dilutional w/ fluids. No sources of bleeding identified.  - CBC w/ diff in AM  - Would transfuse for Hgb <7     Thrombocytopenia - stable  ISO cirrhosis. Increased bleeding risk, avoiding heparin/lovenox for now.   - Transfusion if < 10     MDD/STEPHANIE  - Continue PTA Celexa  - Would try to avoid sedating agents d/t current sedative presentation     Back pain  Stable, trial hot/cold packs first  - Lidocaine patch prn          Diet: Room Service  Regular Diet Adult  Fluid restriction OTHER (SEE COMMENTS) (800)  Snacks/Supplements Adult: Other; Ensure enlive vanilla or strawberry at 10:00; Between Meals  Snacks/Supplements Adult: Other; Ensure enlive with bkf , lunch and dinner trays ( do not count with fluid restrictions); With Meals    DVT Prophylaxis: Pneumatic Compression Devices  Demarco Catheter: Not present  Lines: PRESENT      PICC 02/01/24 Single Lumen  Left Brachial vein lateral Antibiotics-Site Assessment: WDL    Cardiac Monitoring: None  Code Status: Full Code      Clinically Significant Risk Factors         # Hyponatremia: Lowest Na = 120 mmol/L in last 2 days, will monitor as appropriate   # Hypercalcemia: Highest Ca = 10.5 mg/dL in last 2 days, will monitor as appropriate    # Hypoalbuminemia: Lowest albumin = 2.9 g/dL at 2/8/2024  6:10 AM, will monitor as appropriate    # Coagulation Defect: INR = 3.42 (Ref range: 0.85 - 1.15) and/or PTT = N/A, will monitor for bleeding  # Thrombocytopenia: Lowest platelets = 61 in last 2 days, will monitor for bleeding           # Severe Malnutrition: based on nutrition assessment    # Financial/Environmental Concerns: none  # Asthma: noted on problem list        Disposition Plan     Expected Discharge Date: 02/09/2024      Destination: home with family  Discharge Comments: Dispo: Home with home care, home infusion, OP services  Progress: PC x3/week; Cont. lactulose & Rifaximin: (1/22 - *); Hgb 6.8 >1u pRBC > 7.6  Plan: 2/8: Na+ *120; Abx: IV Ceftriaxone (1/25 - *); PO Metronidazole (1/25 - *)          Quinton Rajan, MS4  [facility] 4    Resident/Fellow Attestation   I, Modesta Palencia DO, was present with the medical/MALOU student who participated in the service and in the documentation of the note.  I have verified the history and personally performed the physical exam and medical decision making.  I agree with the assessment and plan of care as documented in the note.      Modesta Palencia DO  PGY3  Date of Service (when I saw the patient): 02/07/24        [facility]  Securely message with Auto I.D. (more info)  Text page via [facility] Paging/Directory   See signed in provider for up to date coverage information  ______________________________________________________________________    Interval History   No acute events overnight. Appetite improving, able to tolerate yogurt.  Pain in back persistent but has not yet received lidocaine patch. BM soft but non-bloody. No other concerns.     Physical Exam   Vital Signs: Temp: 97.4  F (36.3  C) Temp src: Temporal BP: (!) 81/34 Pulse: 62   Resp: 16 SpO2: 100 % O2 Device: None (Room air)    Weight: 139 lbs 9.6 oz    Constitutional: Awake, alert   Respiratory: Non-labored breathing, CTAB with no wheezes/crackles/rhonchi  Cardiovascular: RRR, 3/6 systolic murmur  GI: Less distended, + mild tenderness to palpation, no asterixis  Skin/Integumen: No rashes or bruises seen on exposed skin  Neuro: A&O x3

## 2024-02-08 NOTE — PROGRESS NOTES
[facility]  HEPATOLOGY PROGRESS NOTE  Stefani Crowell [id number]       IMPRESSION:  Stefani Crowell is a 63 year old female with a history of alcohol associated cirrhosis, last use 6/28/23 complicated by diuretic refractory ascites and hepatic hydrothorax with 3x weekly courtney and thoras, hyponatremia, hepatic encephalopathy, MATTIE/CKD (prior terlipressin 11/2023), small EV, asthma, COPD who was admitted following labs indicating serum sodium level at para of 110. She underwent thoracentesis with evidence of empyema and remains on IV abx.     RECOMMENDATIONS:    # Empyema  # Hepatic hydrothorax  # Ascites  - thora 1/23 with 1 lights criteria, high PMNs, low glucose.  Pleural Cultures + for e. Coli.   - Chest tube initially placed due to loculated effusion. Received lytics in chest tube x3 days. Continues to have high PMN's on cell count 2/1. Chest tube removed and remains on IV abx for 4-6 weeks.    - Lidocaine patches for pain.       - courtney without evidence of SBP. Repeat para as needed for comfort, limit to <4L and send diagnostic testing with each sample while inpatient. Para sample negative for SBP on 2/5.     # Hyponatremia  - difficulty with maintaining sodium level for safety with discharge. Currently on 0.8L fluid restriction.   - Cystatin C with very low GFR and discordant with creatinine due to low muscle mass.   - Nephrology following. With current significant symptomatic hypotension, likely would not be able to tolerate HD.     # Alcohol associated cirrhosis  # Transplant candidacy  - MELD 31, ABO A  - with current infection, not a candidate for liver transplant at this time. She had been borderline with her mobility, now has not been out of bed with further weakness. Now with symptomatic hypotension, renal failure, unlikely that she will be able to be a transplant candidate.  Agree with involving palliative.   - US abd 10/22/23 negative for HCC  - midodrine TID    # Hepatic encephalopathy  -  "continue with lactulose and rifaximin.     # Debility  # Moderate protein calorie malnutrition  - PT/OT- more weak and has limited walking. Was up with PT and walker earlier in stay. Frailty remains a concern for transplant opportunities.   - BID protein supplements     Patient was discussed with attending physician, Dr. Stephanie Lim.  The above note reflects our joint plan.     Next follow up appointment: Dr. Lim 1/29/24    Thank you for the opportunity to be involved with  Stefani [facility]. Please call with any questions or concerns.    ROD Urban, CNP  Inpatient Hepatology [facility]        Subjective    States she has been dizzy with getting up walking. Denies fevers, abdominal pain. Continues to have abdominal distention. No increase in dyspnea        Objective    Vital signs:  Temp: 96.8  F (36  C) Temp src: Temporal BP: 92/42 Pulse: 64   Resp: 16 SpO2: 100 % O2 Device: None (Room air)     Weight: 63.3 kg (139 lb 9.6 oz)  Estimated body mass index is 23.23 kg/m  as calculated from the following:    Height as of 12/29/23: 1.651 m (5' 5\").    Weight as of this encounter: 63.3 kg (139 lb 9.6 oz).    Gross per 24 hour   Intake 897 ml   Output 1050 ml   Net -153 ml      General: In no acute distress, moderate facial muscle wasting  Neuro: alert Ox3, No asterixis.   HEENT:  Noscleral icterus, Nooral lesions  CV:  Skin warm and dry  Lungs:  Respirations even and nonlabored on room air   Abd: moderate abdominal distention. nontender   Extrem: no lower peripheral edema   Skin:  mild jaundice  Psych: flat    MEDICATIONS:  Current Facility-Administered Medications   Medication    acetaminophen (TYLENOL) tablet 650 mg    albuterol (PROVENTIL HFA/VENTOLIN HFA) inhaler    albuterol (PROVENTIL) neb solution 2.5 mg    alum & mag hydroxide-simethicone (MAALOX) suspension 30 mL    artificial saliva (BIOTENE DRY MOUTHWASH) liquid 5 mL    benzocaine-menthol (CHLORASEPTIC MAX) 15-10 MG lozenge 1 lozenge    calcium " carbonate (TUMS) chewable tablet 1,000 mg    cefTRIAXone (ROCEPHIN) 2 g vial to attach to  ml bag for ADULTS or NS 50 ml bag for PEDS    cholecalciferol (VITAMIN D3) capsule 50,000 Units    citalopram (celeXA) tablet 40 mg    fluticasone-vilanterol (BREO ELLIPTA) 200-25 MCG/ACT inhaler 1 puff    heparin lock flush 10 UNIT/ML injection 5-20 mL    heparin lock flush 10 UNIT/ML injection 5-20 mL    lactulose (CHRONULAC) solution 20 g    levothyroxine (SYNTHROID/LEVOTHROID) tablet 112 mcg    Lidocaine (LIDOCARE) 4 % Patch 1 patch    lidocaine (LMX4) cream    lidocaine 1 % 0.1-1 mL    metroNIDAZOLE (FLAGYL) tablet 500 mg    midodrine (PROAMATINE) tablet 15 mg    montelukast (SINGULAIR) tablet 10 mg    mupirocin (BACTROBAN) 2 % ointment    [Held by provider] ondansetron (ZOFRAN) injection 4 mg    ondansetron (ZOFRAN) injection 4 mg    oxymetazoline (AFRIN) 0.05 % spray 2 spray    pantoprazole (PROTONIX) EC tablet 40 mg    rifaximin (XIFAXAN) tablet 550 mg    senna-docusate (SENOKOT-S/PERICOLACE) 8.6-50 MG per tablet 1 tablet    Or    senna-docusate (SENOKOT-S/PERICOLACE) 8.6-50 MG per tablet 2 tablet    sodium chloride (PF) 0.9% PF flush 10-40 mL    sodium chloride (PF) 0.9% PF flush 3 mL    sodium chloride (PF) 0.9% PF flush 3 mL    zinc sulfate (ZINCATE) capsule 220 mg       REVIEW OF LABORATORY, PATHOLOGY AND IMAGING RESULTS:  BMP  Recent Labs   Lab 02/08/24  0610 02/07/24  0601 02/06/24  0630 02/06/24  0134 02/05/24  1555 02/05/24  0640   * 120* 120* 121*   < > 119*   POTASSIUM 4.8 4.8 4.6  --   --  4.7   CHLORIDE 97* 97* 97*  --   --  96*   UMA 10.2 10.5* 10.3*  --   --  10.2   CO2 15* 15* 15*  --   --  14*   .0* 118.0* 108.0*  --   --  109.0*   CR 0.91 0.78 0.75  --   --  0.87   * 72 90  --   --  72    < > = values in this interval not displayed.     CBC  Recent Labs   Lab 02/08/24  0610 02/07/24  0601 02/06/24  0630 02/05/24  0943 02/05/24  0640   WBC 14.0* 15.2* 14.9*  --  15.6*   RBC  "2.16* 2.29* 2.34*  --  2.13*   HGB 6.8* 7.4* 7.6*   < > 6.8*   HCT 20.6* 21.7* 22.6*  --  20.8*   MCV 95 95 97  --  98   MCH 31.5 32.3 32.5  --  31.9   MCHC 33.0 34.1 33.6  --  32.7   RDW 18.6* 18.2* 17.8*  --  18.0*   PLT 61* 63* 51*  --  62*    < > = values in this interval not displayed.     INR  Recent Labs   Lab 02/08/24  0610 02/07/24  0601 02/06/24  0630 02/05/24  0640   INR 3.42* 3.09* 3.18* 3.07*     LFTs  Recent Labs   Lab 02/08/24  0610 02/07/24  0601 02/06/24  0630 02/05/24  0640   ALKPHOS 89 79 71 87   AST 36 33 34 33   ALT 22 21 19 20   BILITOTAL 2.6* 3.1* 3.4* 3.0*   PROTTOTAL 4.8* 4.9* 5.0* 5.2*   ALBUMIN 2.9* 3.2* 3.2* 3.4*        MELD 3.0: 31 at 2/8/2024  6:10 AM  MELD-Na: 30 at 2/8/2024  6:10 AM  Calculated from:  Serum Creatinine: 0.91 mg/dL (Using min of 1 mg/dL) at 2/8/2024  6:10 AM  Serum Sodium: 120 mmol/L (Using min of 125 mmol/L) at 2/8/2024  6:10 AM  Total Bilirubin: 2.6 mg/dL at 2/8/2024  6:10 AM  Serum Albumin: 2.9 g/dL at 2/8/2024  6:10 AM  INR(ratio): 3.42 at 2/8/2024  6:10 AM  Age at listing (hypothetical): 63 years  Sex: Female at 2/8/2024  6:10 AM    Previous work-up:   Lab Results   Component Value Date    HEPBANG Nonreactive 02/18/2021    HBCAB Nonreactive 02/18/2021    HCVAB  08/03/2017     Nonreactive   Assay performance characteristics have not been established for newborns,   infants, and children      TANYA 157 10/03/2023    IRONSAT 26 11/23/2023    TSH 3.98 12/06/2023    CHOL 176 08/15/2022    HDL 44 (L) 08/15/2022     (H) 08/15/2022    TRIG 32 12/06/2023    A1C 4.7 07/24/2023    POPETH <10 01/11/2024    PLPETH <10 01/11/2024      No results found for: \"SPECDES\", \"LDRESULTS\"      Imaging Results:  None current        "

## 2024-02-08 NOTE — PLAN OF CARE
Goal Outcome Evaluation:      Plan of Care Reviewed With: patient, spouse    Overall Patient Progress: no changeOverall Patient Progress: no change    Outcome Evaluation: A&O, flat affect today. 3x bm on PM shift. Need urine sample. New order for additional Ensures during the day.

## 2024-02-09 NOTE — PROGRESS NOTES
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  2.7L para was performed then she was sent to the ED.  Na normal up until July 2023 when it has been on the decline but generally 120-125 as recently as 1/11.  Cr also up from baseline of 0.7 on 1/11 to 1.2.  Nephrology consulted for management of hyponatremia and MATTIE.       Interval History :   Mrs Crowell's Na is a bit improved today at 124. Palliative consulted as she is not a candidate for dialysis due to hypotension (80's/30's) with ESLD and recurrent thoracentesis now with empyema.  She does have some low level renal fx with some chance of short term improvement but in bigger picture renal fx is progressing towards ESRD, agree with palliative consult.  We are available for further discussions but not actively managing any issues on a daily basis so will sign off for now.      Assessment & Recommendations:   Renal fx-Baseline Cr 0.7, has cirrhosis and low muscle mass and Cystatin-C is very low (gfr ~18) despite her normal Cr.  This is poor overall prognostic sign of poor nutrition related to liver failure. There is little to offer given her overall picture of not being a transplant candidate currently.                  -MATTIE, has underlying HRS physiology, unclear if she has acute infectious issue.                 -Already on midodrine.       -Some chance she will regain some renal fx but overall picture is towards needing RRT despite her low Cr (this is reflective of very poor muscle mass).  Agree with palliative consult.      Hyponatremia-Hypervolemic with liver failure, has chronic hyponatremia in 120-125 range and currently is 120 but requiring 800cc/day fluid restriction.  Will follow.      Volume-Minimal edema in LE, volume  mostly with ascites. PRBC's given yesterday, + abd distension but soft, breathing is comfortable.  Can consider albumin for renal perfusion.       K/pH-Na 120 with goal of >120 w/chronic hyponatremia and cirrhosis.  Bicarb low but avoiding PO bicarb due to Na load which will worsen ascites/hydrothorax.      BMD-Mg and Phos not checked today.       Anemia-Hgb 8.2, getting 1u PRBC's this morning.  No active signs of bleeding.     Other comorbidites  Nutrition-Taking PO, 800cc fluid restriction.     Infection-Hydrothorax + for E.Coli from 1/23, on Ceftriaxone.       Time spent: 40 minutes on this date of encounter for chart review, physical exam, medical decision making and co-ordination of care.      Discussed with Dr Escobar     Recommendations were communicated to primary team via verbal communication.         Denny Arriola, ROD CNS  Clinical Nurse Specialist  [phone]      Review of Systems:   I reviewed the following systems:  Gen: No fevers or chills, fatigued.    CV: No CP at rest  Resp: No SOB at rest  GI: No N/V    Physical Exam:   I/O last 3 completed shifts:  In: 1567 [P.O.:1250; I.V.:40]  Out: 950 [Urine:350; Stool:600]   BP (!) 84/38 (BP Location: Right arm)   Pulse 60   Temp 98.7  F (37.1  C) (Temporal)   Resp 16   Wt 63.3 kg (139 lb 9.6 oz)   LMP  (LMP Unknown)   SpO2 100%   BMI 23.23 kg/m       GENERAL APPEARANCE: alert and no distress, more awake this am, frail.    Pulmonary: Breathing comfortably on RA.   CV: Regular rhythm, normal rate   - Edema +1 generalized.   GI: soft, nontender, normal bowel sounds  MS: no evidence of inflammation in joints, no muscle tenderness  : No Demarco  SKIN: no rash, warm, dry  NEURO: mild confusion but no focal deficits.      Labs:   All labs reviewed by me  Electrolytes/Renal -   Recent Labs   Lab Test 02/09/24  0629 02/08/24  0610 02/07/24  0601 01/23/24  0730 01/23/24  0648 01/23/24  0210 01/22/24  2232 01/22/24  1734 12/13/23  1000 12/13/23  8858  10/19/23  1523 10/03/23  0853   * 120* 120*   < > 111*   < > 111* 110*   < > 124*  124*   < > 126*   POTASSIUM 4.6 4.8 4.8   < > 5.2  --   --  5.1   < > 5.4*   < > 3.9   CHLORIDE 100 97* 97*   < > 85*  --   --  81*   < > 100   < > 91*   CO2 14* 15* 15*   < > 17*  --   --  18*   < > 17*   < > 25   .0* 128.0* 118.0*   < > 95.3*  --   --  85.9*   < > 23.4*   < > 33.9*   CR 0.90 0.91 0.78   < > 1.16*  --   --  1.12*   < > 0.59   < > 1.24*   GLC 79 106* 72   < > 74  --   --  121*   < > 104*   < > 98   UMA 10.4* 10.2 10.5*   < > 9.8  --   --  9.6   < > 8.8   < > 10.2   MAG  --   --   --   --  3.4*  --   --  2.8*  --  2.0   < >  --    PHOS  --   --   --   --   --   --  3.8  --   --   --   --  2.6    < > = values in this interval not displayed.       CBC -   Recent Labs   Lab Test 02/09/24  0629 02/08/24  1559 02/08/24  0610 02/07/24  0601   WBC 11.9*  --  14.0* 15.2*   HGB 8.2* 8.1* 6.8* 7.4*   PLT 55*  --  61* 63*       LFTs -   Recent Labs   Lab Test 02/09/24  0629 02/08/24  0610 02/07/24  0601   ALKPHOS 110 89 79   BILITOTAL 3.5* 2.6* 3.1*   ALT 23 22 21   AST 39 36 33   PROTTOTAL 5.0* 4.8* 4.9*   ALBUMIN 3.1* 2.9* 3.2*       Iron Panel -   Recent Labs   Lab Test 11/23/23  0638 10/26/23  1311 10/03/23  0853   IRON 52 62 43   IRONSAT 26 28 28   TANYA  --   --  157           Current Medications:   cefTRIAXone  2 g Intravenous Q24H    cholecalciferol  50,000 Units Oral Q7 Days    citalopram  40 mg Oral Daily    fluticasone-vilanterol  1 puff Inhalation Daily    heparin lock flush  5-20 mL Intracatheter Q24H    lactulose  20 g Oral TID    levothyroxine  112 mcg Oral Daily    lidocaine  1 patch Transdermal Q24H    metroNIDAZOLE  500 mg Oral TID    midodrine  15 mg Oral TID w/meals    montelukast  10 mg Oral At Bedtime    mupirocin   Topical TID    oxymetazoline  2 spray Both Nostrils BID    pantoprazole  40 mg Oral BID    rifaximin  550 mg Oral BID    sodium chloride (PF)  3 mL Intracatheter Q8H    zinc sulfate   220 mg Oral Daily

## 2024-02-09 NOTE — CONSULTS
"SPIRITUAL HEALTH SERVICES Consult Note  [facility]C    I visited Lolly per routine consult for End of Life concerns. Lolly's significant other, Hari, was at the bedside. I introduced my role as an extra layer of emotional support. I asked Lolly how she was doing today.  She said she was feeling \"weak and depressed\".  I asked if she'd like me to stay so we can discuss that further, but she declined. Lolly also declined my offer to comeback at a better time for her. I let them know that was ok and there were other people here to support her such as social work.  I let Hari know I am here for family as well if he or anyone else needs support. Hari declined at this time.    Plan: Visit was declined at this time.  Please contact [facility] if needs arise.    Ria Gr  Chaplain Resident  Pager [phone]    * [facility] remains available 24/7 for emergent requests/referrals, either by having the switchboard page the on-call  or by entering an ASAP/STAT consult in Epic (this will also page the on-call ). Routine Epic consults receive an initial response within 24 hours.*    "

## 2024-02-09 NOTE — PLAN OF CARE
Goal Outcome Evaluation:      Plan of Care Reviewed With: patient    Overall Patient Progress: no changeOverall Patient Progress: no change    Outcome Evaluation: No acute changes. AOx4, hypotensive but within parameters, on RA. Flat affect. Multiple loose BM. 800 ml fluid restriction in place, had about 300 mls overnight. Call light in reach and able to make needs known.

## 2024-02-09 NOTE — CONSULTS
Social Work Brief Note:    Care Management/Social work consult acknowledged. SW/RNCC will continue to follow this patient and provide services.  _____________________________     KAR An, LICSW  Phone: [phone]  Fax: [phone]  [address]

## 2024-02-09 NOTE — PROGRESS NOTES
[facility]    Medicine Progress Note - [place] 4    Date of Admission:  1/22/2024    Assessment & Plan   Stefani Crowell is a 63 year old female admitted on 1/22/2024. She has a history of cirrhosis secondary to alcohol use currently being worked up for liver transplant complicated by need for frequent paracenteses and thoracenteses for ascites/hepatic hydrothorax, in addition to asthma and COPD 2/2 tobacco use, MDD/STEPHANIE, hypothyroidism, low back pain, and chronic hyponatremia. Admitted with decompensated cirrhosis, and E coli empyema. Course complicated by recurrent ascites, renal injury with moderate-severe hyponatremia and debility. Transitioned to comfort cares 2/9.    Plan Today:  - CAPS today for repeat para  - Transition to comfort cares, DNR/DNI  - Social work consult for home hospice planning  - Fluid restriction increased to 1200 mL    Decompensated alcoholic cirrhosis (Ascites, HE, EV, Hepatic hydrothorax)  ESLD  Gets thoras and courtney through the [facility]. During prior hospitalization, her transplant eligibility was reopened due to completion of chemotherapy treatment and improved renal function, however with new renal failure, ongoing hyponatremia, debility/fraility she would not be a transplant candidate. Not a candidate for TIPS. MELD-Na scores persistently in the 30s.  - Hepatology following, appreciate recs  - Intermittent CAPS consult for paracentesis; had multiple paracentesis this hospitalization  - Tylenol 650mg Q8H PRN (total dose < 2g)  - Continue lactulose, Rifaximin   - Continue PPI  - Abx as below  - Initiate comfort cares 2/9/24    E coli empyema - improving  Recurrent hepatic Hydropneumothorax c/b trapped lung  Hx Hepatic Hydrothorax   On 1/23, had thora + paracentesis per IR and pleural fluid cultures yielded GNB -> e coli. Blood cultures yielded GPC, though this is most likely a contaminant. Interventional pulm placed chest tube on  1/24. CT Chest on 1/25 demonstrated improved R-sided hydropneumothorax after. Repeat CT Chest on 1/29 demonstrated increased gas component of the R hydropneumothorax w/o changes in overall size. Patient tolerated 6 doses of alteplase total. Repeat pleural fluid labs on 1/29 were consistent w/ ongoing infection, though marginally improved. Additionally, fluid labs sent on 2/1 demonstrated slightly improved cell count, prompting chest tube removal.   - S/p chest tube placement, now removed  - Pt will continue to have hepatic hydrothorax, will need to continue outpt thora vs. Home PleurX  - Pulmonary Consulted   > Chest tube placed 1/24, removed 2/1  - Blood cultures:   > 1/23: Staph epi. (suspect contaminant)   > 1/24: NGTD  - Pleural fluid culture:   > 1/23: E coli (Amp res.)  - Infectious Disease consulted (1/24)   > Resumed IV Ceftriaxone 1g every day, added metronidazole 500mg Q8H through PICC (2/1)  - Abx:  > Ceftriaxone: 1/23 - 1/24, 1/25 - *   > Metronidazole 500mg Q8H: 1/25 - *   > Rifaximin: 1/22 - *  > Zosyn: 1/24 - 1/25  > Vancomycin: 1/24  [  ] Dispo: Duration of abx TBD, minimum 4-6 weeks for empyema      MATTIE  Acute on Chronic Hyponatremia  Hx of HRS  Pt has hx of chronic hyponatremia, baseline around 122-125, she has been admitted for hyponatremia several times in the past. Presented with Na of 117, corrected with 2% NS but began to trend down when discontinued. Also with rising BUN. Nephrology re-consulted but thought not to be good candidate for dialysis with low blood pressures.   - Nephrology consulted - signed off 1/26   - Salt tabs relatively CI due to underlying decompensated cirrhosis  - Continue Midodrine 15mg PO TID PRN  - Cont to monitor off 2% NS  - Strict ins/outs, daily weights   - Regular diet  - Will discontinue repeat lab work-up iso transition to comfort cares    Severe malnutrition in context of chronic illness  Patient's phos was appropriate on 1/22. If persistent electrolyte  abnormalities, will check vitamin levels  - Nutrition following    Borderline Ascending Aortic Aneurysm  Noted on CT Chest on 1/25. Measured to be 4.3 cm. Reviewed the CT chest and found largest diameter to be about 43.5mm. Compared this to the widest portion on of the ascending aorta on her CTA coronary on 12/15/23 which was 42.0mm. This is fairly stable.  - Will speak w/ Thoracic Surg re. Follow up guidelines  - BP Goal near normotensive < 130/<85    Persistent Asthma  COPD 2/2 TUD in remission   PCP notes suggest current or recent exacerbation, though doing well from a respiratory standpoint on admission. Holding off on prednisone for now iso possible infection and respiratory stability  - Continue PRN albuterol inhaler or nebs  - Continue ICS-LABA  - Continue Singulair       Hypothyroidism  - Continue PTA levothyroxine      Normocytic anemia - stable  Has required outpatient white blood cell transfusions in the past.  Her hemoglobin tends to be around 7.5-9.5.  At normal range on admission. No clear bleeding history though is at risk for this. Chronic disease component and nutritional components possible as well. Hgb on 1/24 was 7.5, which is likely dilutional w/ fluids. No sources of bleeding identified.  - Will discontinue repeat lab work-up iso transition to comfort cares   - Would transfuse for Hgb <7     Thrombocytopenia - stable  ISO cirrhosis. Increased bleeding risk, avoiding heparin/lovenox for now.   - Transfusion if < 10     MDD/STEPHANIE  - Continue PTA Celexa  - Would try to avoid sedating agents d/t current sedative presentation     Back pain  Stable, trial hot/cold packs first  - Lidocaine patch prn          Diet: Room Service  Regular Diet Adult  Snacks/Supplements Adult: Other; Ensure enlive vanilla or strawberry at 10:00; Between Meals  Snacks/Supplements Adult: Other; Ensure enlive with bkf , lunch and dinner trays ( do not count with fluid restrictions); With Meals  Fluid restriction 1200 ML FLUID  (800)    DVT Prophylaxis: Pneumatic Compression Devices  Demarco Catheter: Not present  Lines: PRESENT      PICC 02/01/24 Single Lumen Left Brachial vein lateral Antibiotics-Site Assessment: WDL    Cardiac Monitoring: None  Code Status: No CPR- Do NOT Intubate      Clinically Significant Risk Factors         # Hyponatremia: Lowest Na = 120 mmol/L in last 2 days, will monitor as appropriate   # Hypercalcemia: Highest Ca = 10.4 mg/dL in last 2 days, will monitor as appropriate    # Hypoalbuminemia: Lowest albumin = 2.9 g/dL at 2/8/2024  6:10 AM, will monitor as appropriate    # Coagulation Defect: INR = 2.93 (Ref range: 0.85 - 1.15) and/or PTT = N/A, will monitor for bleeding  # Thrombocytopenia: Lowest platelets = 55 in last 2 days, will monitor for bleeding           # Severe Malnutrition: based on nutrition assessment    # Financial/Environmental Concerns: none  # Asthma: noted on problem list        Disposition Plan     Expected Discharge Date: 02/09/2024      Destination: home with family  Discharge Comments: Dispo: Home with home care, home infusion, OP services  Progress: PC x3/week; Cont. lactulose & Rifaximin: (1/22 - *); Hgb 6.8 >1u pRBC > 7.6  Plan: 2/8: Na+ *120; Abx: IV Ceftriaxone (1/25 - *); PO Metronidazole (1/25 - *)          Quinton Rajan, MS4  Ke[place] 4    Resident/Fellow Attestation   I, Modesta Palencia DO, was present with the medical/MALOU student who participated in the service and in the documentation of the note.  I have verified the history and personally performed the physical exam and medical decision making.  I agree with the assessment and plan of care as documented in the note.      Modesta Palencia DO  PGY3  Date of Service (when I saw the patient): 02/07/24        [facility]  Securely message with [email or web address] (more info)  Text page via [facility] Paging/Directory   See signed in provider for up to date coverage  information  ______________________________________________________________________    Interval History   No acute events overnight. Reasonably sad/depressed about news yesterday re: no longer a transplant candidate, not able to receive dialysis. Having tenderness in abdomen. Desires to transition to comfort cares, DNR/DNI.     Physical Exam   Vital Signs: Temp: 98.7  F (37.1  C) Temp src: Temporal BP: (!) 84/38 Pulse: 60   Resp: 16 SpO2: 100 % O2 Device: None (Room air)    Weight: 139 lbs 9.6 oz    Constitutional: Awake, alert, in room with family present   Respiratory: Non-labored breathing, CTAB with no wheezes/crackles/rhonchi  Cardiovascular: RRR, 3/6 systolic murmur  GI: Distended, + mild tenderness to palpation, no asterixis  Skin/Integumen: No rashes or bruises seen on exposed skin  Neuro: A&O x3

## 2024-02-09 NOTE — PROCEDURES
[facility]  CAPS PROCEDURE NOTE  Date of Admission:  1/22/2024  Consult Requested by: Medicine  Reason for Consult: management of symptomatic ascites    Indication/HPI: ascites    Pre-Procedure Diagnosis: Ascites    Post-Procedure Diagnosis: Ascites    Risk Assessment: Average risk, Technically straightforward; patient's anticoagulation has been held according to guidelines based on the agent and platelets and coags are within guidelines    Procedure Outcome:  Therapeutic paracentesis performed with 3.3 liters of ascites removed.   See additional procedure details below.    The primary covering service should follow up and address any lab results as appropriate.    Jennifer Simmons MD  [facility]  Securely message with [employer] (more info)  Text page via [facility] Paging/Directory   See signed in provider for up to date coverage information      [facility]    Paracentesis    Date/Time: 2/9/2024 3:01 PM    Performed by: Jennifer Simmons MD  Authorized by: Jennifer Simmons MD    PRE-PROCEDURE DETAILS  Initial or subsequent exam: subsequent  Procedure purpose: therapeutic  Indications: abdominal discomfort secondary to ascites        UNIVERSAL PROTOCOL   Site Marked: Yes  Prior Images Obtained and Reviewed:  Yes  Required items: Required blood products, implants, devices and special equipment available    Patient identity confirmed:  Verbally with patient  Patient was reevaluated immediately before administering moderate or deep sedation or anesthesia  Confirmation Checklist:  Patient's identity using two indicators  Time out: Immediately prior to the procedure a time out was called    Universal Protocol: the Joint Commission Universal Protocol was followed    Preparation: Patient was prepped and draped in usual sterile fashion       ANESTHESIA    Anesthesia:  Local  infiltration  Local Anesthetic:  Lidocaine 1% without epinephrine  Anesthetic Total (mL):  8      SEDATION    Patient Sedated: No    POST PROCEDURE DETAILS  Needle gauge: 22  Ultrasound guidance: yes  Puncture site: left lower quadrant  Fluid removed: 3300(ml)  Fluid appearance: serosanguinous  Dressing: glue.        PROCEDURE  Describe Procedure: Sample was sent to the lab  Patient Tolerance:  Patient tolerated the procedure well with no immediate complications  Length of time physician/provider present for 1:1 monitoring during sedation: 0        POC US GUIDE FOR PARACENTESIS     Impression  US Indication: abdominal distension    Limited abdominal ultrasound was performed and demonstrated an adequate fluid collection on the left side of the abdomen.    Doppler of the skin demonstrated an area at this site without significant vasculature.  A paracentesis at this site was subsequently performed.    Jennifer Simmons MD

## 2024-02-09 NOTE — CONSULTS
[facility]  Palliative Care Consultation Note    Patient: Stefani Crowell  Date of Admission:  1/22/2024    Requesting Clinician / Team: Keo Barone  Reason for consult: Goals of care    Impression & Recommendations:    Goals of Care:   - comfort care, planning on home hospice  - abdominal pleurX prior to discharge  - transition to oral antibiotics for ongoing suppression of empyema ; patient and family aware that home hospice not able to accommodate IV antibiotics  - Lolly understands the severity of her disease and understands that as she is no longer a transplant candidate for liver transplantation her prognosis is guarded. She understands that given the severity of her liver disease her kidney function will likely continue to deteriorate given she is also not a candidate for dialysis.  - Lolly and her family also relay how her father was on ventilator and could not wean off and does not want to be in that situation. Given that we talked about code status and she wants to be DNR/DNI.   - She has expressed that she no longer wants daily lab work and she does not want as strict of a  fluid   restriction She would want to undergo comfortable measures only.  - Lolly expressed that she would want to die comfortably and peacefully at home and she and her family are in agreement that home with hospice is in alignment with her [facility].   - In regards to Lactulose, discussed that it might be in Lolly and the family's wishes to continue in order for her to be as alert as possible but with plans to forgo if it is no longer working as her disease progresses.  - In regards to antibiotics, discussed transitioning to PO Abx for infection suppression until disease progression and she is no longer able to tolerate PO medication.  - Lolly and her family would like to discuss the option of an abdominal drain be placed to alleviate the ascites on Lolly's abdomen as well as mitigate the need for her to receive outpatient  paracentesis. This would allow her to spend as much time at home as able. Discussed with primary team, IR consult placed. Discussed with family that hospice can provide teaching regarding caring for the drain.  - Discussed discontinuing scheduled inh, Lolly did not take prior to arrival and solely used her albuterol rescue inh PRN.      Advanced Care Planning:  Code status: DNR/DNI - changed today  Health care agent/surrogate: DaughterAngelia and , Hari       Symptom Management:  We are not actively managing symptoms at this time.   Reviewed medication list which appears appropriate for hospice plan    At discharge recommend including the following standard hospice PRN orders:  - Bisacodyl 10 mg Suppository LA daily to bid prn constipation  - Tylenol 650 mg suppository PO/LA q4hr prn pain, fever  - Lorazepam  0.5-1.0 mg PO/SL/LA q4h prn anxiety/restlessness  - Atropine sulfate 1% opth solution 1-2 drops SL q2h prn secretions  - Senna 8.6 mg 1-2 tabs PO prn constipation  - Haldol 1-2 mg PO/SL/LA q6h prn nausea, agitation or delirium.   - dilaudid 1-2 mg q 2 hours prn pain or SOB       Psychosocial & Spiritual:   - Good support from , daughter, stepchildren, siblings  - Spiritual  - Decline  support at this time    Palliative medicine will continue to follow. Thank you for involvement in this patient's care. These recommendations were given to the primary team via phone conversation.    Agata Fernández MD  Internal Medicine - Pediatrics Resident, PGY-3  [facility]  2/9/2024    Patient seen and evaluated with Dr. Fernández.   Agree with assessment and recommendations.  Dee Duncan MD / Palliative Medicine   Securely message with the Vocera Web Console (learn more here)   Text page via [employer] Paging/Directory         Prognosis, Goals, & Planning:    Functional Status just prior to hospitalization: Has been living at home with . There has been more difficulty in mobility  and function as her disease has progressed.    Prognosis, Goals, and/or Advance Care Planning were addressed today: Yes        Summary/Comments:     Patient's decision making preferences: shared with support from loved ones        Patient has decision-making capacity today for complex decisions: Yes          I have concerns about the patient/family's health literacy today: No         Patient has a completed Health Care Directive: Yes, and on file.    Code status: No CPR / No Intubation    Coping, Meaning, & Spirituality:   Mood, coping, and/or meaning in the context of serious illness were addressed today: Yes  Summary/Comments: Lolly states she is spiritual. Angelia, her daughter, elaborates that she does not believe in any organized Judaism.    Social:   Living situation: Lives with , Hari in [place], [place]  [place] family / caregivers: Daughter, Angelia; , Hari, Sister, Bev  Occupational history: Has not worked since 2021  Substance use history: Hx of EtOH use disorder, no alcohol since Jun 2023    History of Present Illness:  History gathered today from: patient, family/loved ones, medical chart, medical team members    Stefani Crowell is a 63 year old female admitted on 1/22/2024. She has a history of cirrhosis secondary to alcohol use currently being worked up for liver transplant complicated by need for frequent paracenteses and thoracenteses for ascites/hepatic hydrothorax, in addition to asthma and COPD 2/2 tobacco use, MDD/STEPHANIE, hypothyroidism, low back pain, and chronic hyponatremia. Admitted with decompensated cirrhosis, and E coli empyema. Course complicated by recurrent ascites, renal injury with moderate-severe hyponatremia and debility. The palliative care team was consulted to address GOC with Lolly and her family.    Met at bedside with Lolly, her  Hari, Daughter Angelia, and Sister Bev (on the phone). Family was aware of our involvement and welcomed the conversation. Lolly's  "main complaints this morning are increased stooling that started overnight and that she is thirsty. She has been on lactulose with the goal of 2-3 stools daily. She has not taken lactulose today given the increased stooling. When asked how she is feeling, Lolly stated \"depressed\". When asked why, Lolly stated \"because, I'm dying\". Lolly further explained that her teams have told her that she is no longer a liver transplant candidate and not a candidate for dialysis as well and to Lolly this means, she is dying. Family was in agreement that this was their understanding. Lolly was able to explain that dying for her would be at home surrounded by family. She explained that she would want to be comfortable. She no longer wants lab work, she does not want to take the lactulose, and she does not want fluid restriction. Discussed the idea of Hospice at home with the family as Lolly stated she wants to go home. Family is all in agreement that Hospice would be something they would also support. Bev lives in [place] but plans to fly in this weekend and spend the remaining time with Lolly. Discussed code status, Lolly and family confirmed that they would like Lolly to be DNR/DNI. They have had a bad experience in the past with Angelia's grandfather getting resuscitated and intubated and they would not like Lolly to suffer. Daughter notes that Lolly does have a history of asthma and COPD and knows that coming off the ventilator would be very difficult. Discussed that until Lolly can go home with hospice that comfort measures would be more in alignment with Lolly's goals of care and she wants to drink water as much as she likes and does not want to stool as much either. The family stated that GI discussed that a semi-permanent abdominal tube could be placed to alleviate the ascites on Lolly's abdomen as well as mitigate the need for her to receive outpatient paracentesis.  This would allow her to spend as much time at home as " able.    ROS:  Comprehensive ROS is reviewed and is negative except per HPI.    Medications:  I have reviewed this patient's medication profile and medications from this hospitalization.    PERTINENT PHYSICAL EXAMINATION:  Vital Signs: Blood pressure (!) 84/38, pulse 60, temperature 98.7  F (37.1  C), temperature source Temporal, resp. rate 16, weight 63.3 kg (139 lb 9.6 oz), SpO2 100%, not currently breastfeeding.   GENERAL: lying in bed, chronically ill appearing, thin, intermittently alert    SKIN: Warm and dry, no jaundice  HEENT: Normocephalic, anicteric sclera, moist mucous membranes  LUNGS: non-labored, breathing comfortably on room air  CARDIAC: regular rate  ABDOMINAL: slightly distended  MUSKL: no gross joint deformities   EXTREMITIES: No edema or cyanosis, pulses 2+ and symmetrical  NEUROLOGIC: A&O, answering questions appropriately, NFD  PSYCH: depressed mood but overall calm and engaged    Data reviewed:  Results for orders placed or performed during the hospital encounter of 01/22/24 (from the past 24 hour(s))   Sodium random urine   Result Value Ref Range    Sodium Urine mmol/L <20 mmol/L   UA with Microscopic   Result Value Ref Range    Color Urine Yellow Colorless, Straw, Light Yellow, Yellow    Appearance Urine Clear Clear    Glucose Urine Negative Negative mg/dL    Bilirubin Urine Negative Negative    Ketones Urine Negative Negative mg/dL    Specific Gravity Urine 1.015 1.003 - 1.035    Blood Urine Trace (A) Negative    pH Urine 5.5 5.0 - 7.0    Protein Albumin Urine Negative Negative mg/dL    Urobilinogen Urine Normal Normal, 2.0 mg/dL    Nitrite Urine Negative Negative    Leukocyte Esterase Urine Small (A) Negative    Mucus Urine Present (A) None Seen /LPF    RBC Urine 3 (H) <=2 /HPF    WBC Urine 12 (H) <=5 /HPF    Squamous Epithelials Urine 3 (H) <=1 /HPF    Transitional Epithelials Urine <1 <=1 /HPF    Hyaline Casts Urine 14 (H) <=2 /LPF   Hemoglobin   Result Value Ref Range    Hemoglobin 8.1  (L) 11.7 - 15.7 g/dL   CBC with platelets   Result Value Ref Range    WBC Count 11.9 (H) 4.0 - 11.0 10e3/uL    RBC Count 2.50 (L) 3.80 - 5.20 10e6/uL    Hemoglobin 8.2 (L) 11.7 - 15.7 g/dL    Hematocrit 23.7 (L) 35.0 - 47.0 %    MCV 95 78 - 100 fL    MCH 32.8 26.5 - 33.0 pg    MCHC 34.6 31.5 - 36.5 g/dL    RDW 18.6 (H) 10.0 - 15.0 %    Platelet Count 55 (L) 150 - 450 10e3/uL   Comprehensive metabolic panel   Result Value Ref Range    Sodium 124 (L) 135 - 145 mmol/L    Potassium 4.6 3.4 - 5.3 mmol/L    Carbon Dioxide (CO2) 14 (L) 22 - 29 mmol/L    Anion Gap 10 7 - 15 mmol/L    Urea Nitrogen 132.0 (H) 8.0 - 23.0 mg/dL    Creatinine 0.90 0.51 - 0.95 mg/dL    GFR Estimate 72 >60 mL/min/1.73m2    Calcium 10.4 (H) 8.8 - 10.2 mg/dL    Chloride 100 98 - 107 mmol/L    Glucose 79 70 - 99 mg/dL    Alkaline Phosphatase 110 40 - 150 U/L    AST 39 0 - 45 U/L    ALT 23 0 - 50 U/L    Protein Total 5.0 (L) 6.4 - 8.3 g/dL    Albumin 3.1 (L) 3.5 - 5.2 g/dL    Bilirubin Total 3.5 (H) <=1.2 mg/dL   INR   Result Value Ref Range    INR 2.93 (H) 0.85 - 1.15   Fibrinogen activity   Result Value Ref Range    Fibrinogen Activity 154 (L) 170 - 490 mg/dL        Medical Decision Making       MANAGEMENT DISCUSSED with the following over the past 24 hours: medicine attending and resident   NOTE(S)/MEDICAL RECORDS REVIEWED over the past 24 hours: GI, medicine, nephrology, nursing, thorough review of MAR  Tests REVIEWED in the past 24 hours:  - CMP  - CBC  - Coags/INR  SUPPLEMENTAL HISTORY, in addition to the patient's history, over the past 24 hours obtained from:   - , daughter, step daughter, sister

## 2024-02-09 NOTE — PROGRESS NOTES
Vitals stable, low BP WDL, flat affect, alert/oriented, up with assist of 1 with GB/walker, voiding spontaneously, diarrhea stools x2, regular diet, 800 ml fluid restriction, low sodium level, in the 120s, BUN elevated, 1 unit RBC today for Hgb 6.8, continue to monitor

## 2024-02-10 NOTE — CONSULTS
"INTERVENTIONAL RADIOLOGY CONSULT NOTE    Reason for referral:   Placement of abdominal pleurx    History:   63 year old female with cirrhosis and recurrent ascites. Patient transitioned to comfort cares with plan for home hospice.    Assessment/Plan:   End stage liver disease requiring recurrent paracentesis (3 in the past 2 weeks).  - Patient is approved for abdominal pleurx/ peritoneal drain placement this week. On Monday, IR will coordinate scheduling with the primary team.  - Please keep INR below 3.0. Check CBC and INR day of the procedure once scheduled.   - Consent will be done prior to procedure.    Timing of procedure is TBD based on IR staffing/schedule and triage.     Labs:  Lab Results   Component Value Date    HGB 8.2 02/09/2024    HGB 14.0 03/25/2021     Lab Results   Component Value Date    PLT 55 02/09/2024    PLT 56 03/25/2021     Lab Results   Component Value Date    WBC 11.9 02/09/2024    WBC 5.9 03/25/2021       Lab Results   Component Value Date    INR 2.93 02/09/2024    INR 2.5 01/22/2024    INR 1.36 03/25/2021       Lab Results   Component Value Date    PROTTOTAL 5.0 02/09/2024    PROTTOTAL 8.1 03/25/2021      Lab Results   Component Value Date    ALBUMIN 3.1 02/09/2024    ALBUMIN 3.6 08/15/2022    ALBUMIN 4.2 03/25/2021     Lab Results   Component Value Date    BILITOTAL 3.5 02/09/2024    BILITOTAL 1.7 03/25/2021     No results found for: \"BILICONJ\"   Lab Results   Component Value Date    ALKPHOS 110 02/09/2024    ALKPHOS 76 03/25/2021     Lab Results   Component Value Date    AST 39 02/09/2024    AST 43 03/25/2021     Lab Results   Component Value Date    ALT 23 02/09/2024    ALT 41 03/25/2021       Lab Results   Component Value Date    CR 0.90 02/09/2024    CR 0.70 03/25/2021     Lab Results   Component Value Date    .0 02/09/2024    BUN 9 03/25/2021         If procedure has been approved, IR charge RN can be contacted at *[phone] for estimated time of procedure.     Discussed with " on-call IR attending.    Zachery Sandra MD  Diagnostic Radiology PGY-3    I agree with the note, I did not see the patient on this date.   Clark Davey

## 2024-02-10 NOTE — PLAN OF CARE
Goal Outcome Evaluation:      Plan of Care Reviewed With: patient    Overall Patient Progress: no change    No significant changes overnight. Pt remained alert and oriented, called to make needs known. Denied SOB. reported mild pain in back this morning, tylenol given. X1 BM in bedpan this AM. Refused repositioning every 2 hrs.Refused evening lactulose. Mepilex dressings in place. Will continue with comfort cares.

## 2024-02-10 NOTE — PLAN OF CARE
Shift: [id number]     D: See flowsheets for full assessment & vitals    PRNs: none  Labs: no RN managed labs, Hgb 8.2  Running: L SL picc hep locked      A/R: patient transitioned to comfort cares this shift. Family present at bedside. A/O x4 but forgetful. Refused scheduled doses of lactulose this shift- bm x1. Fluid restriction no applicable anymore. No new skin concerns. Para done today. Patient comfortable laying in bed. awaiting safe discharge plans, Q1 hour rounding completed, call light w/in reach and able to make needs known, will continue to monitor     P: continue w/ poc     Goal Outcome Evaluation:      Plan of Care Reviewed With: patient    Overall Patient Progress: no changeOverall Patient Progress: no change    Outcome Evaluation: comfort cares

## 2024-02-10 NOTE — PROGRESS NOTES
[facility]    Medicine Progress Note - Maroon 4    Date of Admission:  1/22/2024    Assessment & Plan   Stefani Crowell is a 63 year old female admitted on 1/22/2024. She has a history of cirrhosis secondary to alcohol use currently being worked up for liver transplant complicated by need for frequent paracenteses and thoracenteses for ascites/hepatic hydrothorax, in addition to asthma and COPD 2/2 tobacco use, MDD/STEPHANIE, hypothyroidism, low back pain, and chronic hyponatremia. Admitted with decompensated cirrhosis, and E coli empyema. Course complicated by recurrent ascites, renal injury with moderate-severe hyponatremia and debility. Transitioned to comfort cares 2/9 with plans for home hospice on discharge.     Plan Today:  - Lactulose changed to Miralax, titrate to <4 BM in 24 hours  - Awaiting PleurX placement and social work consult for home hospice planning    Decompensated alcoholic cirrhosis (Ascites, HE, EV, Hepatic hydrothorax)  ESLD  Gets thoras and courtney through the [facility] system. During prior hospitalization, her transplant eligibility was reopened due to completion of chemotherapy treatment and improved renal function, however with new renal failure, ongoing hyponatremia, debility/fraility she would not be a transplant candidate. Not a candidate for TIPS. MELD-Na scores persistently in the 30s.  - Hepatology following, appreciate recs  - Intermittent CAPS consult for paracentesis; had multiple paracenteses this hospitalization  - Tylenol 650mg Q8H PRN (total dose < 2g)  - Continue rifaximin, change lactulose to Miralax and titrate to <4 BM in 24 hours   - Continue PPI  - Abx as below, continue IV through weekend  - Initiated comfort cares 2/9/24    E coli empyema - improving  Recurrent hepatic Hydropneumothorax c/b trapped lung  Hx Hepatic Hydrothorax   On 1/23, had thora + paracentesis per IR and pleural fluid cultures yielded GNB -> E coli. Blood  cultures yielded GPC, though this is most likely a contaminant. Interventional pulm placed chest tube on 1/24. CT chest on 1/25 demonstrated improved R-sided hydropneumothorax after. Repeat CT chest on 1/29 demonstrated increased gas component of the R hydropneumothorax w/o changes in overall size. Patient tolerated 6 doses of alteplase total. Repeat pleural fluid labs on 1/29 were consistent w/ ongoing infection, though marginally improved. Additionally, fluid labs sent on 2/1 demonstrated slightly improved cell count, prompting chest tube removal. With transition to comfort cares, plan to continue IV abx through the weekend and consider switch to PO after PleurX placement.   - S/p chest tube placement, now removed  - Pt will continue to have hepatic hydrothorax, IR consult placed for home PleurX  - Pulmonary Consulted   > Chest tube placed 1/24, removed 2/1  - Blood cultures:   > 1/23: Staph epi. (suspect contaminant)   > 1/24: NGTD  - Pleural fluid culture:   > 1/23: E coli (Amp res.)  - Infectious Disease consulted (1/24)   > Resumed IV Ceftriaxone 1g every day, added metronidazole 500mg Q8H through PICC (2/1)  - Abx:  > Ceftriaxone: 1/23 - 1/24, 1/25 - *   > Metronidazole 500mg Q8H: 1/25 - *   > Rifaximin: 1/22 - *  > Zosyn: 1/24 - 1/25  > Vancomycin: 1/24  [  ] Dispo: Duration of abx TBD, minimum 4-6 weeks for empyema      MATTIE  Acute on Chronic Hyponatremia  Hx of HRS  Pt has hx of chronic hyponatremia, baseline around 122-125, she has been admitted for hyponatremia several times in the past. Presented with Na of 117, corrected with 2% NS but began to trend down when discontinued. Also with rising BUN. Nephrology re-consulted but thought not to be good candidate for dialysis with low blood pressures.   - Nephrology consulted - signed off 1/26   - Salt tabs relatively CI due to underlying decompensated cirrhosis  - Continue Midodrine 15mg PO TID PRN  - Cont to monitor off 2% NS  - Discontinue strict ins/outs,  daily weights   - Regular diet  - Discontinue repeat lab work-up iso transition to comfort cares    Severe malnutrition in context of chronic illness  Patient's phos was appropriate on 1/22.   - Nutrition following    Borderline Ascending Aortic Aneurysm  Noted on CT Chest on 1/25. Measured to be 4.3 cm. Reviewed the CT chest and found largest diameter to be about 43.5mm. Compared this to the widest portion on of the ascending aorta on her CTA coronary on 12/15/23 which was 42.0mm. This is fairly stable.  - BP Goal near normotensive < 130/<85    Persistent Asthma  COPD 2/2 TUD in remission   PCP notes suggest current or recent exacerbation, though doing well from a respiratory standpoint on admission. Holding off on prednisone for now iso possible infection and respiratory stability  - Continue PRN albuterol inhaler or nebs  - Continue ICS-LABA  - Continue Singulair       Hypothyroidism  - Continue PTA levothyroxine      Normocytic anemia - stable  Has required outpatient white blood cell transfusions in the past.  Her hemoglobin tends to be around 7.5-9.5.  At normal range on admission. No clear bleeding history though is at risk for this. Chronic disease component and nutritional components possible as well. Hgb on 1/24 was 7.5, which is likely dilutional w/ fluids. No sources of bleeding identified.  - Will discontinue repeat lab work-up iso transition to comfort cares   - Would transfuse for Hgb <7     Thrombocytopenia - stable  ISO cirrhosis. Increased bleeding risk, avoiding heparin/lovenox for now.   - Transfusion if < 10     MDD/STEPHANIE  - Continue PTA Celexa  - Would try to avoid sedating agents d/t current sedative presentation     Back pain  Stable, trial hot/cold packs first  - Lidocaine patch prn          Diet: Room Service  Regular Diet Adult  Snacks/Supplements Adult: Other; Ensure enlive vanilla or strawberry at 10:00; Between Meals  Snacks/Supplements Adult: Other; Ensure enlive with bkf , lunch and  dinner trays ( do not count with fluid restrictions); With Meals    DVT Prophylaxis: Pneumatic Compression Devices  Demarco Catheter: Not present  Lines: PRESENT      PICC 02/01/24 Single Lumen Left Brachial vein lateral Antibiotics-Site Assessment: WDL    Cardiac Monitoring: None  Code Status: No CPR- Do NOT Intubate      Clinically Significant Risk Factors         # Hyponatremia: Lowest Na = 124 mmol/L in last 2 days, will monitor as appropriate   # Hypercalcemia: Highest Ca = 10.4 mg/dL in last 2 days, will monitor as appropriate    # Hypoalbuminemia: Lowest albumin = 2.9 g/dL at 2/8/2024  6:10 AM, will monitor as appropriate    # Coagulation Defect: INR = 2.93 (Ref range: 0.85 - 1.15) and/or PTT = N/A, will monitor for bleeding  # Thrombocytopenia: Lowest platelets = 55 in last 2 days, will monitor for bleeding           # Severe Malnutrition: based on nutrition assessment    # Financial/Environmental Concerns: none  # Asthma: noted on problem list        Disposition Plan      Expected Discharge Date: 02/13/2024      Destination: home with family  Discharge Comments: Dispo: Home hospice  Progress: PC x3/week; Cont. Miralax & Rifaximin: (1/22 - *)  Plan: Abx: IV Ceftriaxone (1/25 - *); PO Metronidazole (1/25 - *)          Quinton Rajan, MS4  Keo 4    Resident/Fellow Attestation   I, Modesta Palencia DO, was present with the medical/MALOU student who participated in the service and in the documentation of the note.  I have verified the history and personally performed the physical exam and medical decision making.  I agree with the assessment and plan of care as documented in the note.      Modesta Palencia DO  PGY3  Date of Service (when I saw the patient): 02/10/24        [facility]  Securely message with [employer] (more info)  Text page via [facility] Paging/Directory   See signed in provider for up to date coverage  information  ______________________________________________________________________    Interval History   No acute events overnight. Continues to have back and abdominal pain, though improved from prior. Requested not to receive lactulose as too many BM overnight. Agreeable to continuing IV abx through weekend.     Physical Exam   Vital Signs:     BP: (!) 76/33 Pulse: 62     SpO2: 98 % O2 Device: None (Room air)    Weight: 139 lbs 1.76 oz    Constitutional: Awake, alert, answering all questions appropriately and able to engage in conversation about cares  Respiratory: Non-labored breathing, CTAB with no wheezes/crackles/rhonchi  Cardiovascular: RRR, 3/6 systolic murmur  GI: Less distended, + mild tenderness to palpation, no asterixis  Skin/Integumen: No rashes or bruises seen on exposed skin  Neuro: A&O x3

## 2024-02-11 NOTE — PLAN OF CARE
Shift: [id number]     Events this shift: no acute events this shift. Patient continues on comfort cares and appears to be resting comfortably in bed. Back pain managed well w/ a lidocaine patch that was placed this morning. Lactulose changed to miralax, BM x2. L SL picc hep locked. Chest, R wrist and ankle wound cares completed this morning. Family present at bedside most of the shift. awaiting safe discharge plans, Q1 hour rounding completed, call light w/in reach and able to make needs known, will continue to monitor     P: continue w/ poc, pending pleurX drain placement    Goal Outcome Evaluation:      Plan of Care Reviewed With: patient    Overall Patient Progress: no changeOverall Patient Progress: no change    Outcome Evaluation: comfort cares

## 2024-02-11 NOTE — PLAN OF CARE
Goal Outcome Evaluation:    Continued with comfort measures overnight. Pt appeared to sleep well between cares. Tylenol given before bedtime to help manage abdominal pain. Upon assessment, it appeared old paracentesis site was leaking. MD (Modesta Palencia) notified to make aware incase further interventions were wanted. Dressing applied and changed again this morning. Scattered bruising worsening. Also attempting to limit adhesives due to pts skin becoming extremely fragile. Pt otherwise comfortable and repositioning self overnight and denies pain this morning. Pt looking forward to sister from out of town visiting today.

## 2024-02-11 NOTE — PROGRESS NOTES
[facility]    Medicine Progress Note - Keo 4    Date of Admission:  1/22/2024    Assessment & Plan   Stefani rCowell is a 63 year old female admitted on 1/22/2024. She has a history of cirrhosis secondary to alcohol use currently being worked up for liver transplant complicated by need for frequent paracenteses and thoracenteses for ascites/hepatic hydrothorax, in addition to asthma and COPD 2/2 tobacco use, MDD/STEPHANIE, hypothyroidism, low back pain, and chronic hyponatremia. Admitted with decompensated cirrhosis, and E coli empyema. Course complicated by recurrent ascites, renal injury with moderate-severe hyponatremia and debility. Transitioned to comfort cares 2/9 with plans for home hospice on discharge.     Plan Today:  - Added oxycodone for pain control  - Will check CBC and INR on 2/12 per IR request pre-pleurx placement    Decompensated alcoholic cirrhosis (Ascites, HE, EV, Hepatic hydrothorax)  Gets thoras and courtney through the [facility] system. During prior hospitalization, her transplant eligibility was reopened due to completion of chemotherapy treatment and improved renal function, however with new renal failure, ongoing hyponatremia, debility/fraility she would not be a transplant candidate. Not a candidate for TIPS. MELD-Na scores persistently in the 30s.  - Hepatology following, appreciate recs  - Intermittent CAPS consult for paracentesis; had multiple paracenteses this hospitalization  - Tylenol 650mg Q8H PRN (total dose < 2g)  - Continue rifaximin, change lactulose to Miralax and titrate to <4 BM in 24 hours   - Continue PPI  - Abx as below, continue IV until abdominal pleurx is placed in order to temporize until discharge to home on hospice  - Initiated comfort cares 2/9/24    E coli empyema - improving on top of a history of recurrent hepatic Hydropneumothorax c/b trapped lung  On 1/23, had thora + paracentesis per IR and pleural fluid cultures  yielded GNB -> E coli. Blood cultures yielded GPC, though this is most likely a contaminant. Interventional pulm placed chest tube on 1/24. CT chest on 1/25 demonstrated improved R-sided hydropneumothorax after. Repeat CT chest on 1/29 demonstrated increased gas component of the R hydropneumothorax w/o changes in overall size. Patient tolerated 6 doses of alteplase total. Repeat pleural fluid labs on 1/29 were consistent w/ ongoing infection, though marginally improved. Additionally, fluid labs sent on 2/1 demonstrated slightly improved cell count, prompting chest tube removal. With transition to comfort cares, plan to continue IV abx until after the pleurx has been placed and consider switch to PO after PleurX placement.   - S/p chest tube placement, now removed  - Pulmonary Consulted   > Chest tube placed 1/24, removed 2/1  - Blood cultures:   > 1/23: Staph epi. (suspect contaminant)   > 1/24: NGTD  - Pleural fluid culture:   > 1/23: E coli (Amp res.)  - Infectious Disease consulted (1/24)   > Resumed IV Ceftriaxone 1g every day, added metronidazole 500mg Q8H through PICC (2/1)  - Abx:  > Ceftriaxone: 1/23 - 1/24, 1/25 - *   > Metronidazole 500mg Q8H: 1/25 - *   > Rifaximin: 1/22 - *  > Zosyn: 1/24 - 1/25  > Vancomycin: 1/24  [  ] Dispo: Will transition to oral antibiotics for discharge home on hospice, okay to discontinue when she is unable to swallow      MATTIE and Acute on Chronic Hyponatremia due to HRS  Pt has hx of chronic hyponatremia, baseline around 122-125, she has been admitted for hyponatremia several times in the past. Presented with Na of 117, corrected with 2% NS but began to trend down when discontinued. Also with rising BUN. Nephrology re-consulted but thought not to be good candidate for dialysis with low blood pressures. Overall, prognosis poor with both liver failure and renal failure.  Now on comfort cares and anticipate home hospice cares.  - Nephrology consulted - appreciate recs  - Continue  Midodrine 15mg PO TID PRN  - Discontinue strict ins/outs, daily weights   - Regular diet  - Discontinue repeat lab work-up iso transition to comfort cares    Severe malnutrition in context of chronic illness  Patient's phos was appropriate on 1/22.   - Nutrition following    Borderline Ascending Aortic Aneurysm  Noted on CT Chest on 1/25. Measured to be 4.3 cm. Reviewed the CT chest and found largest diameter to be about 43.5mm. Compared this to the widest portion on of the ascending aorta on her CTA coronary on 12/15/23 which was 42.0mm. This is fairly stable.  - BP Goal near normotensive < 130/<85    Persistent Asthma  COPD 2/2 TUD in remission   PCP notes suggest current or recent exacerbation, though doing well from a respiratory standpoint on admission. Holding off on prednisone for now iso possible infection and respiratory stability  - Continue PRN albuterol inhaler or nebs  - Continue ICS-LABA  - Continue Singulair       Hypothyroidism  - Continue PTA levothyroxine      Normocytic anemia - stable  Has required outpatient white blood cell transfusions in the past.  Her hemoglobin tends to be around 7.5-9.5.  At normal range on admission. No clear bleeding history though is at risk for this. Chronic disease component and nutritional components possible as well. Hgb on 1/24 was 7.5, which is likely dilutional w/ fluids. No sources of bleeding identified.  - Will discontinue repeat lab work-up due to transition to comfort cares      Thrombocytopenia - stable due to ESLD  Has an increased bleeding risk, avoiding heparin/lovenox.      MDD/STEPHANIE  - Continue PTA Celexa  - Would try to avoid sedating agents d/t current sedative presentation     Back pain  Stable, trial hot/cold packs first  - Lidocaine patch prn          Diet: Room Service  Regular Diet Adult  Snacks/Supplements Adult: Other; Ensure enlive vanilla or strawberry at 10:00; Between Meals  Snacks/Supplements Adult: Other; Ensure enlive with bkf , lunch and  dinner trays ( do not count with fluid restrictions); With Meals    DVT Prophylaxis: Pneumatic Compression Devices  Demarco Catheter: Not present  Lines: PRESENT      PICC 02/01/24 Single Lumen Left Brachial vein lateral Antibiotics-Site Assessment: WDL    Cardiac Monitoring: None  Code Status: No CPR- Do NOT Intubate      Clinically Significant Risk Factors              # Hypoalbuminemia: Lowest albumin = 2.9 g/dL at 2/8/2024  6:10 AM, will monitor as appropriate    # Coagulation Defect: INR = 2.93 (Ref range: 0.85 - 1.15) and/or PTT = N/A, will monitor for bleeding            # Severe Malnutrition: based on nutrition assessment    # Financial/Environmental Concerns: none  # Asthma: noted on problem list        Disposition Plan     Expected Discharge Date: 02/13/2024      Destination: home with family  Discharge Comments: Dispo: Home hospice  Progress: PC x3/week; Cont. Miralax & Rifaximin: (1/22 - *)  Plan: Abx: IV Ceftriaxone (1/25 - *); PO Metronidazole (1/25 - *)          Ella Palacios MD  Internal Medicine Hospitalist  [phone]      [facility]  Securely message with [employer] (more info)  Text page via [facility] Paging/Directory   See signed in provider for up to date coverage information  ______________________________________________________________________    Interval History   No acute events overnight. Had some pain overnight, feeling okay this morning.  Overall feeling more lethargic.    Physical Exam   Vital Signs:                    Weight: 135 lbs 5.8 oz    Constitutional: Awake, alert, reclining in bed  Respiratory: CTAB, breathing comfortably on room air  Cardiovascular: RRR, 3/6 systolic murmur  GI: NABS, soft, NT, moderately distended.  Dressing in place on left abdomen over prior paracentesis site  Skin/Integumen: Scattered spider angiomata  Neuro: Alert, appropriately interactive

## 2024-02-12 NOTE — DISCHARGE SUMMARY
Care Management Discharge Note    Discharge Date: 02/14/2024       Discharge Disposition: Home Hospice    [facility]  PH: [phone], Liaison Katy Francisco Ph: [phone]  F: [phone]   2/12: Initial hospice referral sent.  CJ called to follow up with intake and was forwarded to Katy Francisco' .  CJ left  requesting call back.      Discharge Services: Home Hospice Services    Discharge DME: To be provided by Home Hospice    Discharge Transportation: [facility]- [phone]  Stretcher Ride arranged for [id number]    Private pay costs discussed: transportation costs- NA - patient has MA    Does the patient's insurance plan have a 3 day qualifying hospital stay waiver?  No    PAS Confirmation Code:  none  Patient/family educated on Medicare website which has current facility and service quality ratings: no    Education Provided on the Discharge Plan: Yes  Persons Notified of Discharge Plans: patient, patient's significant other, Hari, patient's sisterBev, patient's DaughterAngelia  Patient/Family in Agreement with the Plan: yes    Handoff Referral Completed: Yes    Additional Information:   spoke with patient's sister, Bev, and significant other, Hari, at bedside and confirmed home hospice choices to be [facility] and [facility]. Hari will be the point of contact for any DME equipment delivery. CJ sent home hospice referrals. CJ spoke with Liaison Katy Nolan, and confirmed [facility] able to take her for intake this evening. CJ confirmed with family that they will choose [facility].  CJ informed [place] [id number] Providers, bedside nurse and charge nurse and arranged stretcher transport.  PCS form placed in chart.      CJ confirmed with providers that they will provide 2-3 days of meds upon discharge.  CJ confirmed with bedside nurse that drain supplies are being sent and nurse will provide bedside teaching with family prior to discharge.  Katy  requested bedside nurse, Kervin, call her when transport arrives this evening to ensure that equipment was delivered to home and intake nurse will meet patient shortly after she arrives home for hospice intake.  Family updated and in agreement with plan. Discharge orders faxed to [facility].     KAR Scott, [facility]  [address]   [email or web address]  Office: [phone]   Pager: [phone]

## 2024-02-12 NOTE — PLAN OF CARE
Goal Outcome Evaluation:      Plan of Care Reviewed With: patient    Overall Patient Progress: declining    Pt becoming more lethargic, but able to answer questions, reposition and take medications. Oxycodone given in the evening for mild abdominal discomfort. Pt denied pain throughout the night and this morning. MD was notified overnight to update on pts increase in bleeding on wounds, lips, old paracentesis site and possibly GI (dark brown/black stool).  Planning on possible abd. Pleurx pending CBC and INR.

## 2024-02-12 NOTE — PROGRESS NOTES
Physical Therapy Discharge Summary    Reason for therapy discharge:    Discharged to Patient is now on comfort cares    Progress towards therapy goal(s). See goals on Care Plan in [facility] electronic health record for goal details.  Goals not met.  Barriers to achieving goals:   Patient is now on comfort cares.    Therapy recommendation(s):    No further therapy is recommended.

## 2024-02-12 NOTE — PLAN OF CARE
Shift: 5914-3132     Events this shift: no acute events this shift. Patient continues on comfort cares and appears to be resting comfortably in bed. Back and abdominal pain managed well w/ a lidocaine patch, prn oxycodone and tylenol along w/ ice pack applied. BM x3. L SL picc hep locked, dressing and cap change completed. Old para site to L abdomen still leaking, dressing changed x2. Family present at bedside most of the shift. awaiting safe discharge plans, Q1 hour rounding completed, call light w/in reach and able to make needs known, will continue to monitor     P: continue w/ poc, pending pleurX drain placement    Goal Outcome Evaluation:      Plan of Care Reviewed With: patient    Overall Patient Progress: no changeOverall Patient Progress: no change    Outcome Evaluation: comfort cares

## 2024-02-12 NOTE — IR NOTE
Patient Name: Stefani Crowell  Medical Record Number: [id number]  Today's Date: 2/12/2024    Procedure: Intraperitoneal Catheter Placement  Proceduralist: Igor White MD    Procedure Start: 1325  Procedure end: 1350  Sedation medications administered: local     - removed 500cc    Report: REN Galeana    Other Notes: Pt arrived to IR room 5 from . Consent reviewed. Pt denies any questions or concerns regarding procedure. Pt positioned supine and monitored per protocol. Pt tolerated procedure without any noted complications. Pt transferred back to .

## 2024-02-12 NOTE — DISCHARGE SUMMARY
[facility]  Discharge Summary - Medicine & Pediatrics       Date of Admission:  1/22/2024  Date of Discharge:  2/12/2024  Discharging Provider: Dr. Ella Palacios  Discharge Service: Medicine Service, [facility] TEAM 4    Discharge Diagnoses   Decompensated alcoholic cirrhosis c/b ascites, HE, EV, hepatic hydrothorax  E. coli empyema  Recurrent hepatic hydropneumothorax c/b trapped lung  MATTIE  Acute on chronic hyponatremia due to hepatorenal syndrome  Severe malnutrition in context of chronic illness  Borderline ascending aortic aneurysm  Persistent asthma  COPD  Normocytic anemia  Thrombocytopenia  MDD  STEPHANIE  Back pain      Clinically Significant Risk Factors     # Severe Malnutrition: based on nutrition assessment      Follow-ups Needed After Discharge   Follow-up Appointments     Adult [facility] Follow-up and recommended labs and tests      None needed            Unresulted Labs Ordered in the Past 30 Days of this Admission       Date and Time Order Name Status Description    2/9/2024  9:31 AM Ascites Fluid Aerobic Bacterial Culture Routine With Gram Stain Preliminary     1/24/2024  8:27 AM Fungal or Yeast Culture Routine Preliminary     1/24/2024  8:27 AM Fungal or Yeast Culture Routine Preliminary     1/24/2024  7:29 AM Acid-Fast Bacilli Culture and Stain In process         These results will be followed up by: N/A    Discharge Disposition   Discharged to home hospice care  Condition at discharge: Terminal    Hospital Course   Stefani Crowell was admitted on 1/22/2024. She has a history of cirrhosis secondary to alcohol use complicated by need for frequent paracenteses and thoracenteses for ascites/hepatic hydrothorax, in addition to asthma and COPD 2/2 tobacco use, MDD/STEPHANIE, hypothyroidism, low back pain, and chronic hyponatremia. She was admitted with decompensated cirrhosis and E coli empyema. Her course was complicated by recurrent ascites, renal injury with  moderate-severe hyponatremia, and debility. She was transitioned to comfort cares 2/9 with plans for home hospice on discharge with PleurX in place. The following problems were addressed during her hospitalization:    Decompensated alcoholic cirrhosis c/b ascites, HE, EV, hepatic hydrothorax  Prior to admission, the patient received thoras and courtney through the [facility] system. During a prior hospitalization, her transplant eligibility was reopened due to completion of chemotherapy treatment and improved renal function. The patient was started on rifaximin, lactulose, PPI, antibiotics (per below) this admission. Hepatology was also consulted and intermittent CAPS consults were placed for repeat courtney. However with new renal failure, ongoing hyponatremia, debility/frailty, it was deemed that she would not be a transplant candidate. She was not a candidate for TIPS. MELD-Na scores were persistently in the 30s. Comfort cares were initiated on 2/9/24. Lactulose was transitioned to Miralax and an abdominal PleurX was placed prior to discharge.     E coli empyema with history of recurrent hepatic hydropneumothorax c/b trapped lung  On 1/23, had thora + paracentesis per IR and pleural fluid cultures yielded GNB -> E coli. Blood cultures yielded GPC, though this was thought to be most likely a contaminant. ID was consulted, resuming IV ceftriaxone and started metronidazole through PICC. Zosyn and vancomycin were also briefly provided. Interventional pulm placed chest tube on 1/24. CT chest on 1/25 demonstrated improved R-sided hydropneumothorax after. Repeat CT chest on 1/29 demonstrated increased gas component of the R hydropneumothorax w/o changes in overall size. Patient tolerated 6 doses of alteplase total. Repeat pleural fluid labs on 1/29 were consistent w/ ongoing infection, though marginally improved. Additionally, fluid labs sent on 2/1 demonstrated slightly improved cell count, prompting chest tube removal. With  transition to comfort cares, IV abx were switched to PO, including switch to cefpodoxime, which the patient was advised to continue until she could no longer swallow.       MATTIE and Acute on Chronic Hyponatremia due to HRS  Pt had a hx of chronic hyponatremia with baseline around 122-125. She was admitted for hyponatremia several times in the past. She presented with Na of 117, corrected with 2% NS but began to trend down when discontinued despite fluid restriction, strict I/Os, and daily weight. She also had rising BUN. Nephrology was consulted but ultimately she was thought not to be good candidate for dialysis with her concomitant low blood pressures despite use of midodrine TID. Overall, her prognosis was poor with both liver failure and renal failure. She was transitioned to comfort cares on 2/9 and discharged to home hospice.    Severe malnutrition in context of chronic illness  Nutrition was consulted for poor nutrition in the context of the patient's chronic illness. Her electrolytes were replaced when needed. The patient was encouraged to eat PO, however, this was limited by poor appetite throughout the admission and strict fluid restriction. Fluid restriction was liberalized with transition to comfort cares on 2/9.     Borderline Ascending Aortic Aneurysm  Noted on CT Chest on 1/25. Measured to be 4.3 cm. Reviewed the CT chest and found largest diameter to be about 43.5mm. Compared this to the widest portion on of the ascending aorta on her CTA coronary on 12/15/23 which was 42.0mm. This was fairly stable. Her BP goal was to be near normotensive < 130/<85, however, she frequently had low BP requiring midodrine. No further work-up of the aneurysm was thought to be needed iso transition to comfort cares.     Persistent Asthma  COPD 2/2 TUD in remission   PCP notes suggested current or recent exacerbation, though doing well from a respiratory standpoint on admission. Held off on prednisone iso possible  infection and respiratory stability. She was continued on PRN albuterol inhaler or nebs, ICS-LABA, and Singulair.      Hypothyroidism  She was continued on PTA levothyroxine.      Normocytic anemia - stable   She required outpatient white blood cell transfusions in the past. Her hemoglobin tends to be around 7.5-9.5 and was at normal range on admission. She had no clear bleeding history though was at risk for this. Her anemia was thought to be due to chronic disease and nutritional components as well. Hgb on 1/24 was 7.5, which was likely dilutional w/ fluids. No sources of bleeding identified. However, the patient did require 1U of plasma and 3 U of pRBCs throughout this admission.     Thrombocytopenia - stable due to ESLD  Due to her increased bleeding risk, heparin and lovenox were avoided this admission. Her platelets remained low throughout her time in the hospital but did not require transfusion.      MDD/STEPHANIE  She was continued on PTA Celexa. Other sedating agents were avoided iso her already sedated presentation upon admission.     Back pain  Stable throughout the admission, thought to be related to chest tube placement and debility. Hot/cold packs were trialed first, ultimately improved with lidocaine patches prn.       Consultations This Hospital Stay   NEPHROLOGY GENERAL ADULT IP CONSULT  GI HEPATOLOGY ADULT IP CONSULT  WOUND OSTOMY CONTINENCE NURSE  IP CONSULT  INTERVENTIONAL RADIOLOGY ADULT/PEDS IP CONSULT  PHARMACY TO DOSE VANCO  NURSING TO CONSULT FOR VASCULAR ACCESS CARE IP CONSULT  PHYSICAL THERAPY ADULT IP CONSULT  OCCUPATIONAL THERAPY ADULT IP CONSULT  INFECTIOUS DISEASE TRANSPLANT SOT ADULT IP CONSULT  INFECTIOUS DISEASE GENERAL ADULT IP CONSULT  NURSING TO CONSULT FOR VASCULAR ACCESS CARE IP CONSULT  CARE MANAGEMENT / SOCIAL WORK IP CONSULT  INTERVENTIONAL PULMONARY ADULT IP CONSULT  NUTRITION SERVICES ADULT IP CONSULT  INTERNAL MEDICINE PROCEDURE TEAM ADULT IP CONSULT [facility]  [facility]  INTERNAL MEDICINE PROCEDURE TEAM ADULT IP CONSULT [place] - [facility]  INTERNAL MEDICINE PROCEDURE TEAM ADULT IP CONSULT [place] - [place]  INTERNAL MEDICINE PROCEDURE TEAM ADULT IP CONSULT [place] - [place]  VASCULAR ACCESS FOR PICC PLACEMENT ADULT IP CONSULT  INTERNAL MEDICINE PROCEDURE TEAM ADULT IP CONSULT [facility] [place] - [facility]  PALLIATIVE CARE ADULT IP CONSULT  INTERNAL MEDICINE PROCEDURE TEAM ADULT IP CONSULT [place] - [facility]  SPIRITUAL HEALTH SERVICES IP CONSULT  CARE MANAGEMENT / SOCIAL WORK IP CONSULT  INTERVENTIONAL RADIOLOGY ADULT/PEDS IP CONSULT    Code Status   No CPR- Do NOT Intubate       The patient was discussed with Dr. Philip Crowley [address] [place] [address] 7C MED SURG  [address]  [place] [address]  Phone: [phone]  ______________________________________________________________________    Physical Exam   Vital Signs: Temp: (!) 95.3  F (35.2  C) Temp src: Temporal BP: (!) 71/22 Pulse: 53     SpO2: 100 % O2 Device: None (Room air)    Weight: 135 lbs 5.8 oz  General Appearance: Lethargic, still able to answer questions appropriately  Respiratory: CTAB, breathing comfortably on RA  Cardiovascular: RRR, 3/6 systolic murmur  GI: Soft, mild tenderness to palpation, normoactive BS, moderate distension, dressing in place with mild serosanguineous drainage  Skin: Scattered ecchymoses and spider angiomata        Primary Care Physician   Christian Davis    Discharge Orders      Cardiovascular Surgery Consult  Referral      Home Care Referral      Hospice Referral      Reason for your hospital stay    You were admitted to [facility] for decompensated cirrhosis, and empyema. You were treated with antibiotics, your hospital course was complicated by low sodium, renal failure, anemia requiring recurrent transfusions and recurrent ascites. You elected to pursue hospice in the light of end stage liver disease. We are discharging you  home with home hospice.     Activity    Your activity upon discharge: activity as tolerated     Adult [facility] Follow-up and recommended labs and tests    None needed     Diet    Follow this diet upon discharge: Orders Placed This Encounter      Calorie Counts      Room Service      Snacks/Supplements Adult: Other; Ensure enlive vanilla or strawberry at 10:00; Between Meals      Snacks/Supplements Adult: Other; Ensure enlive with bkf , lunch and dinner trays ( do not count with fluid restrictions); With Meals      NPO per Anesthesia Guidelines for Procedure/Surgery Except for: Meds       Significant Results and Procedures   Most Recent 3 CBC's:  Recent Labs   Lab Test 02/12/24  0758 02/09/24  0629 02/08/24  1559 02/08/24  0610   WBC 8.3 11.9*  --  14.0*   HGB 7.5* 8.2* 8.1* 6.8*   MCV 97 95  --  95   PLT 55* 55*  --  61*     Most Recent 3 BMP's:  Recent Labs   Lab Test 02/09/24  0629 02/08/24  0610 02/07/24  0601   * 120* 120*   POTASSIUM 4.6 4.8 4.8   CHLORIDE 100 97* 97*   CO2 14* 15* 15*   .0* 128.0* 118.0*   CR 0.90 0.91 0.78   ANIONGAP 10 8 8   UMA 10.4* 10.2 10.5*   GLC 79 106* 72     Most Recent 2 LFT's:  Recent Labs   Lab Test 02/09/24  0629 02/08/24  0610   AST 39 36   ALT 23 22   ALKPHOS 110 89   BILITOTAL 3.5* 2.6*     Most Recent 3 INR's:  Recent Labs   Lab Test 02/12/24  0650 02/09/24  0629 02/08/24  0610   INR 3.24* 2.93* 3.42*       Discharge Medications   Current Discharge Medication List        START taking these medications    Details   cefpodoxime (VANTIN) 200 MG tablet Take 1 tablet (200 mg) by mouth 2 times daily for 7 days  Qty: 14 tablet, Refills: 0    Associated Diagnoses: Empyema (H); Acute renal failure, unspecified acute renal failure type (H24)      lactulose (CEPHULAC) 10 GM packet Take 1 packet (10 g) by mouth 3 times daily as needed for constipation  Qty: 15 each, Refills: 0    Associated Diagnoses: Hepatic encephalopathy (H)      metroNIDAZOLE (FLAGYL) 500 MG tablet Take  1 tablet (500 mg) by mouth 3 times daily for 7 days  Qty: 21 tablet, Refills: 0    Associated Diagnoses: Empyema (H); Acute renal failure, unspecified acute renal failure type (H24)      oxyCODONE (ROXICODONE) 5 MG tablet Take 1 tablet (5 mg) by mouth every 4 hours as needed for moderate pain  Qty: 28 tablet, Refills: 0    Associated Diagnoses: End of life care      polyethylene glycol (MIRALAX) 17 GM/Dose powder Take 17 g by mouth daily for 7 days  Qty: 119 g, Refills: 0    Associated Diagnoses: End of life care           CONTINUE these medications which have CHANGED    Details   albuterol (PROVENTIL) (2.5 MG/3ML) 0.083% neb solution Take 1 vial (2.5 mg) by nebulization every 4 hours as needed for wheezing  Qty: 90 mL, Refills: 0    Associated Diagnoses: Dyspnea, unspecified type      levothyroxine (SYNTHROID/LEVOTHROID) 112 MCG tablet Take 1 tablet (112 mcg) by mouth daily  Qty: 7 tablet, Refills: 0    Associated Diagnoses: Hypothyroidism, unspecified type      midodrine (PROAMATINE) 5 MG tablet Take 3 tablets (15 mg) by mouth 3 times daily (with meals) for 7 days  Qty: 63 tablet, Refills: 0    Associated Diagnoses: Alcoholic cirrhosis of liver with ascites (H)      mupirocin (BACTROBAN) 2 % external ointment Apply topically 3 times daily  Qty: 1 g, Refills: 0    Associated Diagnoses: Multiple skin tears      pantoprazole (PROTONIX) 40 MG EC tablet Take 1 tablet (40 mg) by mouth 2 times daily for 7 days  Qty: 14 tablet, Refills: 0    Associated Diagnoses: Alcoholic cirrhosis of liver with ascites (H)           CONTINUE these medications which have NOT CHANGED    Details   albuterol (PROAIR HFA/PROVENTIL HFA/VENTOLIN HFA) 108 (90 Base) MCG/ACT inhaler Inhale 1-2 puffs into the lungs every 6 hours as needed for shortness of breath or wheezing  Qty: 18 g, Refills: 11    Comments: Pharmacy may dispense brand covered by insurance (Proair, or proventil or ventolin or generic albuterol inhaler)  Associated Diagnoses:  Severe persistent asthma without complication (H28)      artificial saliva (BIOTENE DRY MOUTHWASH) LIQD liquid Swish and spit 5 mLs in mouth 4 times daily as needed for dry mouth  Qty: 473 mL, Refills: 3    Associated Diagnoses: Pleural effusion associated with hepatic disorder      citalopram (CELEXA) 40 MG tablet Take 1 tablet (40 mg) by mouth daily  Qty: 90 tablet, Refills: 0    Associated Diagnoses: STEPHANIE (generalized anxiety disorder); Mild major depression (H24)      rifaximin (XIFAXAN) 550 MG TABS tablet Take 1 tablet (550 mg) by mouth 2 times daily  Qty: 180 tablet, Refills: 0    Associated Diagnoses: Hepatic encephalopathy (H)           STOP taking these medications       alendronate (FOSAMAX) 70 MG tablet Comments:   Reason for Stopping:         ferrous sulfate (FEROSUL) 325 (65 Fe) MG tablet Comments:   Reason for Stopping:         fluticasone-salmeterol (ADVAIR) 250-50 MCG/ACT inhaler Comments:   Reason for Stopping:         fluticasone-salmeterol (ADVAIR) 500-50 MCG/ACT inhaler Comments:   Reason for Stopping:         lactulose (CHRONULAC) 10 GM/15ML solution Comments:   Reason for Stopping:         montelukast (SINGULAIR) 10 MG tablet Comments:   Reason for Stopping:         omeprazole (PRILOSEC) 20 MG DR capsule Comments:   Reason for Stopping:         OVER-THE-COUNTER Comments:   Reason for Stopping:         traZODone (DESYREL) 50 MG tablet Comments:   Reason for Stopping:         vitamin D3 (CHOLECALCIFEROL) 1.25 MG (92099 UT) capsule Comments:   Reason for Stopping:             Allergies   Allergies   Allergen Reactions    Blood Transfusion Related (Informational Only) Other (See Comments)     Patient has a history of a clinically significant antibody against RBC antigens.  A delay in compatible RBCs may occur. PATIENT HAS A UID AND ANTI-K    Azithromycin Nausea    Dust Mite Extract     Dust Mites      Other Reaction(s): Not available    Pollen Extract      Other Reaction(s): Not available     Ragweeds     Tetracycline Nausea

## 2024-02-12 NOTE — OP NOTE
Procedure 2/12/2024:  1. Ultrasound-guided access/paracentesis of the peritoneal cavity.  2. Fluoroscopy guidance for placement of a catheter.  3. Placement of a large-bore tunneled peritoneal catheter.    HISTORY: 63-year-old female with cirrhosis and recurrent ascites which requires frequent paracenteses, nearly every 4-5 days with 3-4 L of volume aspirated. Patient has transitioned to comfort cares with plan to go for home hospice. Request for placement of a tunneled large bore peritoneal drainage catheter.    Preprocedure diagnosis: Cirrhosis and refractory ascites    Postprocedure diagnosis: Same    Interventional radiologist: KELL Lloyd, JACKSON BOYKIN MD, attest that I was present in the procedure room for the entire procedure.     Nursing: Throughout the procedure the patient's vital signs and oxygen saturations were continuously monitored and remained stable.    Given patient's vital signs decision was made to proceed only with local anesthesia and no intravenous sedation was provided. 1% lidocaine with 8.4% sodium bicarbonate 20 ml local anesthesia.    Fluoroscopy time: 1.2 minutes.    IV contrast: None.    Consent: Informed written and verbal consent was obtained from patient's sister over the phone, which was witnessed and signed.    Procedure: Patient was positioned supine on the gurney. Ultrasound evaluation of the right lower quadrant revealed small amount of ascites. Evaluation of the left lower quadrant demonstrated small to moderate amount of ascites, however over the skin and ostomy bag was noticed to have been placed over a leakage site from the previous paracentesis. Given the constellation of findings decision was made to place the first needle into the right lower quadrant and evaluate behavior of the wire to decide whether a right-sided approach would be sufficient for drainage. The right lower quadrant was prepped and draped in the usual sterile fashion. 1% lidocaine was used for  local anesthesia. Under ultrasound guidance, a 5 Macedonian, 10 cm straight Yueh catheter was advanced into the right lower quadrant peritoneal cavity. Syringe aspiration revealed  bloody peritoneal fluid in keeping with her previous aspirations, and a Bentson wire was advanced through the catheter which freely looped into the contralateral (left) lower quadrant. The right and anterior upper to mid abdomen was then anesthetized with 1% buffered lidocaine, and a 1 cm incision was made at aspect of the abdominal wall with a #11 blade. The Pleurx catheter was cut to length and connected to a tunneling device. The catheter was tunneled from the anterior upper abdominal wall slightly inferior to the puncture site, pulled through, and positioned so that the cuff was 2 cm deep to the catheter exit site. The catheter was uncapped, and, under fluoroscopy guidance, a 0.035 Bentson wire was advanced through the catheter and into the pleural space. Under fluoroscopy guidance, serial dilatation was performed using 8, 12, and 14 Macedonian dilators. The 14 Macedonian dilator was removed, and a 16 Macedonian dilator/peel-away sheath was advanced over wire into the pleural space. The 16 Macedonian dilator and wire were removed, a 16 Macedonian Pleurx catheter was advanced through the peel-away sheath into the peritoneal space, and the peel-away sheath was removed. Catheter was secured using a single 2-0 Ethilon suture. The puncture site incision was closed with a single subcutaneous 3-0 Vicryl suture, and Dermabond glue. 600 cc of fluid was rapidly removed using suction attached to the pleurex tubing. The pleurex catheter was then capped. The area was cleansed and appropriately dressed with a sterile bandage. Images were saved throughout the procedure. The procedure was well tolerated, with no immediate complications.    Estimated blood loss: Less than 5 cc.    Specimens: None.    Impression: Successful placement of a  right lower quadrant abdominal  pleurex catheter.    Follow-up plan: Patient to followup with primary team. The catheter can be used immediately in the home setting.

## 2024-02-12 NOTE — PROGRESS NOTES
IR follow-up note.    Case and imaging was reviewed with Dr. Rudolph from IR. Will plan to proceed with abdominal pleurx placement for ascites. Pt is planning to enroll in hospice. Pt is NPO and currently receiving IV antibiotics. INR is elevated above 3.0. TEG pending. Ideally we would not given any additional blood products.    Discussed with primary team. Order placed and IR charge RN updated.    Asia Mathis DNP, APRN  Interventional Radiology   IR on-call pager: [phone]

## 2024-02-13 NOTE — CONFIDENTIAL NOTE
Left VM to cancel appointment tomorrow. Originally s/p angiogram follow up. Pt discharged from hospital yesterday, transitioning to comfort cares. My chart message sent as well. Cancellation approved by Esperanza Rosales NP.

## 2024-02-13 NOTE — COMMITTEE REVIEW
Abdominal Committee Review Note     Evaluation Date: 12/12/2023  Committee Review Date: 2/13/2024    Organ being evaluated for: Liver    Transplant Phase: Evaluation  Transplant Status: Active    Transplant Coordinator: Lexi Schumacher  Transplant Surgeon:       Referring Physician: Christine Fairchild    Primary Diagnosis: Acute Alcohol-Associated Hepatitis With or Without Cirrhosis  Secondary Diagnosis:     Committee Review Members:  Anesthesiology Rodrigo Jean, DO   Nutrition Katy Lima, RD   Pharmacist Nely Millan, [facility]    - Clinical Mary Verma, MSW, Rachel Cohen, [facility], Víctor Underwood, [facility]   Transplant Odalys Barrientos, RN, Darleen Villagomez, RN, Lexi Schumacher, RN, Reuben Sargent, RN, Jr Florencio Bettencourt, REN, Yanelis Miller, APRN CNP, Syeda Lewis, REN, NILAM BALDERAS, RN, Nolberto Ochoa, RN, Riri Ndiaye, RN   Transplant Hepatology  Poonam Hays MD, Kaylee Funes, PA-C, Stephanie Lim MD, VERÓNICA Rahman-C, Annie Cary MD, Jeffery Yang MD, Shoaib Sim MD   Transplant Surgery Oni Paris MD, Luzma Mcguire NP, Sydney Curtis, APRN CNP, Ellen Interiano NP, Javier Fung MD       Transplant Eligibility: Cirrhosis with MELD, ETOH    Committee Review Decision: Declined    Relative Contraindications: Other    Absolute Contraindications: Other    Committee Chair Stephanie Lim MD verbally attested to the committee's decision.    Committee Discussion Details:     Patient has transitioned to hospice, inpatient team discussed with patient that she is not a transplant candidate.   Close evaluation.

## 2024-02-13 NOTE — PROGRESS NOTES
In no distress most of day, some discomfort and abdominal pain this evening - received oxycodone 5mg and tylenol 650mg. Swallows without difficulty. BMx1, minimal urine output. Pleurx drain placed in IR, returned to floor with box of 4 vacutainers. Sister Bev educated on technique for emptying abdomen using drain., reviewed instructions included with vacutainers, and suggested youtube tutorials if needed. Transportation here at 1850 to take her home with home hospice. Transferred to [facility] and left around 1900. Voicemail left with hospice RN to notify her that pt is on the way.

## 2024-02-13 NOTE — LETTER
February 15, 2024    Stefani Maggiedestiney Crowell  [address]  [place] [address]      Dear Ms. Crowell,   The purpose of this letter is to let you know that on 2/13/2024 the [facility] Multi-Disciplinary Selection Team reviewed the results of your transplant evaluation.  Based on the results of your evaluation and the selection criteria used by our program, the decision was made to not list you on the liver transplant list.  This is because of worsening medical condition/stability.   Important things you should know:  If you would like to discuss the decision, or if your medical status changes you may schedule a return visits with your doctor by calling [phone] and asking to speak to your transplant coordinator.  We recommend that you continue to follow up with your primary care doctor in order to manage your health concerns.  We want you to know that our program has physician and surgeon coverage 24 hours a day, 365 days a year. In addition, our transplant surgeons and physicians will not be on call for two or more transplant programs more than 30 miles apart unless the circumstances have been reviewed and approved by the [employer] (UNOS) Membership and Professional Standards Committee (MPSC). Finally, our primary physician and primary surgeons are not designated as the primary surgeon or primary physician at more than 1 transplant hospital. If this coverage changes or there are substantial program changes, you will be notified in writing by letter.   Attached is a letter from UNOS that describes the services and information offered to patients by UNOS and the Organ Procurement and Transplantation Network (OPTN).  Thank you for allowing us to participate in your care.   Sincerely,    Jerica Schumacher RN, BSN  Pre-Liver Transplant Coordinator  Phone: [phone]       Solid Organ Transplant  [facility]  [facility]    Enclosures: UNOS Letter  cc: Care Team    The [facility] Network Toll-free patient services line:  3-855-345-0809  Your resource for organ transplant information    Staffed 8:30 am - 5:00 pm ET Monday - Friday Leave a message 24/7 to receive a call back    The Organ Procurement and Transplantation Network (OPTN) is the national transplant system. It makes the policies that decide how donated organs are matched to patients waiting for a transplant. The OPTN:    Makes sure donated organs get matched to people on the transplant waiting list  Tells people about the donation and transplant processes  Makes sure that the public knows about the need for more organ and tissue donations    The OPTN has a free patient services line that you can call to:  Get more information about:  Organ donation and organ transplants  Donation and transplant policies  Get an information kit with:  A list of transplant hospitals  Waiting list information  Talk about any questions you may have about your transplant hospital or organ procurement organization. The staff will do their best to help you or point you to others who may help.  Find out how you can volunteer with the OPTN and help shape transplant policy     The patient services line number is: 3-403-892-0388    Patient services line staff CANNOT answer questions about your own medical care, including:  Waiting list status  Test results  Medical records  You will need to call your transplant hospital for this information.    The following websites have more information about transplantation and donation:    OPTN: https://optn.transplant.hrsa.gov/    For potential living donors and transplant recipients:    Living with transplant: https://www.transplantliving.org/    Living donation process: https://optn.transplant.hrsa.gov/living-donation/    Financial assistance: https://www.livingdonorassistance.org/    Transplantation data:  https://www.srtr.org/    Organ donation: https://www.organdonor.gov/    Volunteer with the OPTN: https://optn.transplant.[facility].gov/get-involved/

## 2024-02-15 ENCOUNTER — PATIENT OUTREACH (OUTPATIENT)
Dept: CARE COORDINATION | Facility: CLINIC | Age: 64
End: 2024-02-15
Payer: COMMERCIAL

## 2024-02-15 NOTE — PROGRESS NOTES
Clinic Care Coordination Contact    Situation: Patient chart reviewed by care coordinator.    Background: Referred by Inpatient SW for Care Transition related to Home Hospice on 2/12/24.     Assessment: RN will close program at this time as patient is on hospice and not eligible for care coordination.     Plan/Recommendations: RN will close program.     WALLACE VITAL RN on 2/15/2024 at 3:28 PM

## 2024-02-16 ENCOUNTER — TELEPHONE (OUTPATIENT)
Dept: INTERNAL MEDICINE | Facility: CLINIC | Age: 64
End: 2024-02-16
Payer: COMMERCIAL

## 2024-02-16 NOTE — TELEPHONE ENCOUNTER
[employer] requesting call back to verify who can sign the death certificate for the pt, pt passed away on 2/14/24. Nirmal from [employer] stated she could not find Dr. Davis on the [facility]

## 2024-02-19 ENCOUNTER — DOCUMENTATION ONLY (OUTPATIENT)
Dept: TRANSPLANT | Facility: CLINIC | Age: 64
End: 2024-02-19
Payer: COMMERCIAL

## 2024-02-19 NOTE — PROGRESS NOTES
Received a voice mail from Riri, patient's sister, that patient passed away on 2/14/24.  She reports it was peaceful death at home with family.

## 2024-02-21 LAB
BACTERIA FLD CULT: NO GROWTH
BACTERIA PLR CULT: NO GROWTH

## 2024-02-29 NOTE — TELEPHONE ENCOUNTER
RN called and left a message for Nirmal at [employer] to call the RN back for a status update.

## 2024-02-29 NOTE — TELEPHONE ENCOUNTER
Nirmal called back stating she has found a doctor and there is no need to continue to try to find one. Call with questions. Thank you.    You can access the FollowMyHealth Patient Portal offered by [facility] by registering at the following website: [email or web address]. By joining [facility]’s FollowMyHealth portal, you will also be able to view your health information using other applications (apps) compatible with our system.

## 2024-03-20 LAB
ACID FAST STAIN (ARUP): NORMAL

## 2024-05-17 NOTE — PLAN OF CARE
Goal Outcome Evaluation:      Plan of Care Reviewed With: patient, spouse, family    Overall Patient Progress: improvingOverall Patient Progress: improving    Outcome Evaluation: No acute changes this shift. A&O x4, refused AM lactulose due to having thora in IR this AM. 4L removed during thora. VSS on RA. Pt reports having 4 BMs today. Wound cares completed. Family at bedside and supportive with cares.        PTT- 154.5

## (undated) DEVICE — TUBING SUCTION 12"X1/4" N612

## (undated) DEVICE — SUCTION MANIFOLD NEPTUNE 2 SYS 1 PORT 702-025-000

## (undated) DEVICE — ENDO FORCEP BX CAPTURA PRO SPIKE G50696

## (undated) DEVICE — SHTH INTRO 0.021IN ID 6FR DIA

## (undated) DEVICE — GOWN IMPERVIOUS 2XL BLUE

## (undated) DEVICE — SPECIMEN CONTAINER 3OZ W/FORMALIN 59901

## (undated) DEVICE — SYR 30ML SLIP TIP W/O NDL 302833

## (undated) DEVICE — SUCTION CATH AIRLIFE TRI-FLO W/CONTROL PORT 14FR  T60C

## (undated) DEVICE — KIT HAND CONTROL ACIST 016795

## (undated) DEVICE — PACK HEART LEFT CUSTOM

## (undated) DEVICE — MANIFOLD KIT ANGIO AUTOMATED 014613

## (undated) DEVICE — KIT ENDO FIRST STEP DISINFECTANT 200ML W/POUCH EP-4

## (undated) DEVICE — TUBING PRESSURE 30"

## (undated) DEVICE — KIT ENDO TURNOVER/PROCEDURE CARRY-ON 101822

## (undated) DEVICE — SOL WATER IRRIG 500ML BOTTLE 2F7113

## (undated) DEVICE — ENDO BITE BLOCK ADULT OMNI-BLOC

## (undated) RX ORDER — ALBUMIN (HUMAN) 12.5 G/50ML
SOLUTION INTRAVENOUS
Status: DISPENSED
Start: 2023-01-01

## (undated) RX ORDER — LIDOCAINE HYDROCHLORIDE 10 MG/ML
INJECTION, SOLUTION EPIDURAL; INFILTRATION; INTRACAUDAL; PERINEURAL
Status: DISPENSED
Start: 2024-01-01

## (undated) RX ORDER — SIMETHICONE 40MG/0.6ML
SUSPENSION, DROPS(FINAL DOSAGE FORM)(ML) ORAL
Status: DISPENSED
Start: 2023-01-01

## (undated) RX ORDER — SODIUM CHLORIDE 9 MG/ML
INJECTION, SOLUTION INTRAVENOUS
Status: DISPENSED
Start: 2024-01-01

## (undated) RX ORDER — LIDOCAINE HYDROCHLORIDE 10 MG/ML
INJECTION, SOLUTION EPIDURAL; INFILTRATION; INTRACAUDAL; PERINEURAL
Status: DISPENSED
Start: 2023-01-01

## (undated) RX ORDER — FENTANYL CITRATE 50 UG/ML
INJECTION, SOLUTION INTRAMUSCULAR; INTRAVENOUS
Status: DISPENSED
Start: 2023-01-01

## (undated) RX ORDER — NITROGLYCERIN 0.4 MG/1
TABLET SUBLINGUAL
Status: DISPENSED
Start: 2023-01-01

## (undated) RX ORDER — FENTANYL CITRATE 50 UG/ML
INJECTION, SOLUTION INTRAMUSCULAR; INTRAVENOUS
Status: DISPENSED
Start: 2021-03-31

## (undated) RX ORDER — EPINEPHRINE 0.1 MG/ML
INJECTION INTRAVENOUS
Status: DISPENSED
Start: 2023-01-01

## (undated) RX ORDER — ONDANSETRON 2 MG/ML
INJECTION INTRAMUSCULAR; INTRAVENOUS
Status: DISPENSED
Start: 2024-01-01

## (undated) RX ORDER — METOPROLOL TARTRATE 1 MG/ML
INJECTION, SOLUTION INTRAVENOUS
Status: DISPENSED
Start: 2023-01-01